# Patient Record
Sex: FEMALE | Race: WHITE | NOT HISPANIC OR LATINO | Employment: OTHER | URBAN - METROPOLITAN AREA
[De-identification: names, ages, dates, MRNs, and addresses within clinical notes are randomized per-mention and may not be internally consistent; named-entity substitution may affect disease eponyms.]

---

## 2017-01-12 ENCOUNTER — ALLSCRIPTS OFFICE VISIT (OUTPATIENT)
Dept: OTHER | Facility: OTHER | Age: 77
End: 2017-01-12

## 2017-01-16 ENCOUNTER — GENERIC CONVERSION - ENCOUNTER (OUTPATIENT)
Dept: OTHER | Facility: OTHER | Age: 77
End: 2017-01-16

## 2017-01-30 ENCOUNTER — GENERIC CONVERSION - ENCOUNTER (OUTPATIENT)
Dept: OTHER | Facility: OTHER | Age: 77
End: 2017-01-30

## 2017-02-17 ENCOUNTER — GENERIC CONVERSION - ENCOUNTER (OUTPATIENT)
Dept: OTHER | Facility: OTHER | Age: 77
End: 2017-02-17

## 2017-05-03 ENCOUNTER — TRANSCRIBE ORDERS (OUTPATIENT)
Dept: ADMINISTRATIVE | Facility: HOSPITAL | Age: 77
End: 2017-05-03

## 2017-05-03 ENCOUNTER — ALLSCRIPTS OFFICE VISIT (OUTPATIENT)
Dept: OTHER | Facility: OTHER | Age: 77
End: 2017-05-03

## 2017-05-03 DIAGNOSIS — N64.9 DISORDER OF BREAST: Primary | ICD-10-CM

## 2017-05-30 ENCOUNTER — ALLSCRIPTS OFFICE VISIT (OUTPATIENT)
Dept: OTHER | Facility: OTHER | Age: 77
End: 2017-05-30

## 2017-06-07 ENCOUNTER — HOSPITAL ENCOUNTER (OUTPATIENT)
Dept: ULTRASOUND IMAGING | Facility: CLINIC | Age: 77
Discharge: HOME | End: 2017-06-07
Payer: COMMERCIAL

## 2017-06-07 ENCOUNTER — HOSPITAL ENCOUNTER (OUTPATIENT)
Dept: MAMMOGRAPHY | Facility: CLINIC | Age: 77
Discharge: HOME | End: 2017-06-07
Payer: COMMERCIAL

## 2017-06-07 DIAGNOSIS — N64.9 DISORDER OF BREAST: ICD-10-CM

## 2017-06-07 PROCEDURE — G0204 DX MAMMO INCL CAD BI: HCPCS

## 2017-06-07 PROCEDURE — 76641 ULTRASOUND BREAST COMPLETE: CPT

## 2017-06-07 PROCEDURE — 76377 3D RENDER W/INTRP POSTPROCES: CPT

## 2017-07-10 ENCOUNTER — ALLSCRIPTS OFFICE VISIT (OUTPATIENT)
Dept: OTHER | Facility: OTHER | Age: 77
End: 2017-07-10

## 2017-07-10 ENCOUNTER — TRANSCRIBE ORDERS (OUTPATIENT)
Dept: ADMINISTRATIVE | Facility: HOSPITAL | Age: 77
End: 2017-07-10

## 2017-07-10 DIAGNOSIS — E78.5 OTHER AND UNSPECIFIED HYPERLIPIDEMIA: ICD-10-CM

## 2017-07-10 DIAGNOSIS — C85.19 B-CELL LYMPHOMA OF EXTRANODAL SITE (HCC): Primary | ICD-10-CM

## 2017-07-17 ENCOUNTER — APPOINTMENT (OUTPATIENT)
Dept: LAB | Facility: CLINIC | Age: 77
End: 2017-07-17
Payer: COMMERCIAL

## 2017-07-17 DIAGNOSIS — C85.19 B-CELL LYMPHOMA OF EXTRANODAL SITE (HCC): ICD-10-CM

## 2017-07-17 LAB
ALBUMIN SERPL BCP-MCNC: 3.6 G/DL (ref 3.5–5)
ALP SERPL-CCNC: 140 U/L (ref 46–116)
ALT SERPL W P-5'-P-CCNC: 39 U/L (ref 12–78)
ANION GAP SERPL CALCULATED.3IONS-SCNC: 5 MMOL/L (ref 4–13)
AST SERPL W P-5'-P-CCNC: 29 U/L (ref 5–45)
BASOPHILS # BLD AUTO: 0.02 THOUSANDS/ΜL (ref 0–0.1)
BASOPHILS NFR BLD AUTO: 0 % (ref 0–1)
BILIRUB SERPL-MCNC: 0.48 MG/DL (ref 0.2–1)
BUN SERPL-MCNC: 16 MG/DL (ref 5–25)
CALCIUM SERPL-MCNC: 9.7 MG/DL (ref 8.3–10.1)
CHLORIDE SERPL-SCNC: 105 MMOL/L (ref 100–108)
CHOLEST SERPL-MCNC: 179 MG/DL (ref 50–200)
CO2 SERPL-SCNC: 30 MMOL/L (ref 21–32)
CREAT SERPL-MCNC: 0.78 MG/DL (ref 0.6–1.3)
EOSINOPHIL # BLD AUTO: 0.38 THOUSAND/ΜL (ref 0–0.61)
EOSINOPHIL NFR BLD AUTO: 4 % (ref 0–6)
ERYTHROCYTE [DISTWIDTH] IN BLOOD BY AUTOMATED COUNT: 14.1 % (ref 11.6–15.1)
GFR SERPL CREATININE-BSD FRML MDRD: >60 ML/MIN/1.73SQ M
GLUCOSE P FAST SERPL-MCNC: 87 MG/DL (ref 65–99)
HCT VFR BLD AUTO: 42.7 % (ref 34.8–46.1)
HDLC SERPL-MCNC: 30 MG/DL (ref 40–60)
HGB BLD-MCNC: 14.1 G/DL (ref 11.5–15.4)
LDH SERPL-CCNC: 676 U/L (ref 81–234)
LDLC SERPL CALC-MCNC: 125 MG/DL (ref 0–100)
LYMPHOCYTES # BLD AUTO: 2.38 THOUSANDS/ΜL (ref 0.6–4.47)
LYMPHOCYTES NFR BLD AUTO: 28 % (ref 14–44)
MCH RBC QN AUTO: 29.9 PG (ref 26.8–34.3)
MCHC RBC AUTO-ENTMCNC: 33 G/DL (ref 31.4–37.4)
MCV RBC AUTO: 91 FL (ref 82–98)
MONOCYTES # BLD AUTO: 1.14 THOUSAND/ΜL (ref 0.17–1.22)
MONOCYTES NFR BLD AUTO: 13 % (ref 4–12)
NEUTROPHILS # BLD AUTO: 4.65 THOUSANDS/ΜL (ref 1.85–7.62)
NEUTS SEG NFR BLD AUTO: 55 % (ref 43–75)
NRBC BLD AUTO-RTO: 0 /100 WBCS
PLATELET # BLD AUTO: 238 THOUSANDS/UL (ref 149–390)
PMV BLD AUTO: 9.9 FL (ref 8.9–12.7)
POTASSIUM SERPL-SCNC: 4.1 MMOL/L (ref 3.5–5.3)
PROT SERPL-MCNC: 7 G/DL (ref 6.4–8.2)
RBC # BLD AUTO: 4.71 MILLION/UL (ref 3.81–5.12)
SODIUM SERPL-SCNC: 140 MMOL/L (ref 136–145)
TRIGL SERPL-MCNC: 120 MG/DL
URATE SERPL-MCNC: 5.8 MG/DL (ref 2–6.8)
WBC # BLD AUTO: 8.61 THOUSAND/UL (ref 4.31–10.16)

## 2017-07-17 PROCEDURE — 82232 ASSAY OF BETA-2 PROTEIN: CPT

## 2017-07-17 PROCEDURE — 80053 COMPREHEN METABOLIC PANEL: CPT | Performed by: INTERNAL MEDICINE

## 2017-07-17 PROCEDURE — 36415 COLL VENOUS BLD VENIPUNCTURE: CPT | Performed by: INTERNAL MEDICINE

## 2017-07-17 PROCEDURE — 80061 LIPID PANEL: CPT | Performed by: INTERNAL MEDICINE

## 2017-07-17 PROCEDURE — 84550 ASSAY OF BLOOD/URIC ACID: CPT | Performed by: INTERNAL MEDICINE

## 2017-07-17 PROCEDURE — 83615 LACTATE (LD) (LDH) ENZYME: CPT

## 2017-07-17 PROCEDURE — 85025 COMPLETE CBC W/AUTO DIFF WBC: CPT

## 2017-07-18 ENCOUNTER — GENERIC CONVERSION - ENCOUNTER (OUTPATIENT)
Dept: OTHER | Facility: OTHER | Age: 77
End: 2017-07-18

## 2017-07-18 LAB — B2 MICROGLOB SERPL-MCNC: 3.5 MG/L (ref 0.6–2.4)

## 2017-07-24 ENCOUNTER — HOSPITAL ENCOUNTER (OUTPATIENT)
Dept: RADIOLOGY | Age: 77
Discharge: HOME | End: 2017-07-24
Payer: COMMERCIAL

## 2017-07-24 DIAGNOSIS — I10 ESSENTIAL (PRIMARY) HYPERTENSION: ICD-10-CM

## 2017-07-24 DIAGNOSIS — C85.19 B-CELL LYMPHOMA OF EXTRANODAL SITE (HCC): ICD-10-CM

## 2017-07-24 DIAGNOSIS — I44.7 LEFT BUNDLE-BRANCH BLOCK: ICD-10-CM

## 2017-07-24 DIAGNOSIS — Z01.810 ENCOUNTER FOR PREPROCEDURAL CARDIOVASCULAR EXAMINATION: ICD-10-CM

## 2017-07-24 LAB — GLUCOSE SERPL-MCNC: 88 MG/DL (ref 65–140)

## 2017-07-24 PROCEDURE — A9552 F18 FDG: HCPCS

## 2017-07-24 PROCEDURE — 78815 PET IMAGE W/CT SKULL-THIGH: CPT

## 2017-07-24 PROCEDURE — 82948 REAGENT STRIP/BLOOD GLUCOSE: CPT

## 2017-07-24 RX ADMIN — IOHEXOL 5 ML: 240 INJECTION, SOLUTION INTRATHECAL; INTRAVASCULAR; INTRAVENOUS; ORAL at 10:10

## 2017-07-28 ENCOUNTER — ALLSCRIPTS OFFICE VISIT (OUTPATIENT)
Dept: OTHER | Facility: OTHER | Age: 77
End: 2017-07-28

## 2017-07-28 ENCOUNTER — OFFICE VISIT (OUTPATIENT)
Dept: LAB | Facility: CLINIC | Age: 77
End: 2017-07-28
Payer: COMMERCIAL

## 2017-07-28 ENCOUNTER — TRANSCRIBE ORDERS (OUTPATIENT)
Dept: LAB | Facility: CLINIC | Age: 77
End: 2017-07-28

## 2017-07-28 DIAGNOSIS — R59.9 ADENOPATHY: Primary | ICD-10-CM

## 2017-07-28 DIAGNOSIS — R59.9 ADENOPATHY: ICD-10-CM

## 2017-07-28 LAB
ATRIAL RATE: 71 BPM
P AXIS: 49 DEGREES
PR INTERVAL: 170 MS
QRS AXIS: -17 DEGREES
QRSD INTERVAL: 166 MS
QT INTERVAL: 484 MS
QTC INTERVAL: 525 MS
T WAVE AXIS: 96 DEGREES
VENTRICULAR RATE: 71 BPM

## 2017-07-28 PROCEDURE — 93005 ELECTROCARDIOGRAM TRACING: CPT

## 2017-08-01 ENCOUNTER — GENERIC CONVERSION - ENCOUNTER (OUTPATIENT)
Dept: OTHER | Facility: OTHER | Age: 77
End: 2017-08-01

## 2017-08-07 ENCOUNTER — GENERIC CONVERSION - ENCOUNTER (OUTPATIENT)
Dept: OTHER | Facility: OTHER | Age: 77
End: 2017-08-07

## 2017-08-07 ENCOUNTER — ALLSCRIPTS OFFICE VISIT (OUTPATIENT)
Dept: OTHER | Facility: OTHER | Age: 77
End: 2017-08-07

## 2017-08-07 ENCOUNTER — TRANSCRIBE ORDERS (OUTPATIENT)
Dept: ADMINISTRATIVE | Facility: HOSPITAL | Age: 77
End: 2017-08-07

## 2017-08-07 DIAGNOSIS — I10 ESSENTIAL HYPERTENSION, MALIGNANT: Primary | ICD-10-CM

## 2017-08-08 ENCOUNTER — HOSPITAL ENCOUNTER (OUTPATIENT)
Dept: NON INVASIVE DIAGNOSTICS | Facility: CLINIC | Age: 77
Discharge: HOME | End: 2017-08-08
Payer: COMMERCIAL

## 2017-08-08 DIAGNOSIS — I10 ESSENTIAL HYPERTENSION, MALIGNANT: ICD-10-CM

## 2017-08-08 LAB
MAX DIASTOLIC BP: 78 MMHG
MAX HEART RATE: 106 BPM
MAX PREDICTED HEART RATE: 146 BPM
MAX. SYSTOLIC BP: 152 MMHG
PROTOCOL NAME: NORMAL
REASON FOR TERMINATION: NORMAL
TARGET HR FORMULA: NORMAL
TEST INDICATION: NORMAL
TIME IN EXERCISE PHASE: 283 S

## 2017-08-08 PROCEDURE — 78452 HT MUSCLE IMAGE SPECT MULT: CPT

## 2017-08-08 PROCEDURE — A9502 TC99M TETROFOSMIN: HCPCS

## 2017-08-08 PROCEDURE — 93306 TTE W/DOPPLER COMPLETE: CPT

## 2017-08-08 PROCEDURE — 93017 CV STRESS TEST TRACING ONLY: CPT

## 2017-08-08 RX ADMIN — REGADENOSON 0.4 MG: 0.08 INJECTION, SOLUTION INTRAVENOUS at 13:50

## 2017-08-09 ENCOUNTER — GENERIC CONVERSION - ENCOUNTER (OUTPATIENT)
Dept: OTHER | Facility: OTHER | Age: 77
End: 2017-08-09

## 2017-08-13 ENCOUNTER — ANESTHESIA EVENT (OUTPATIENT)
Dept: PERIOP | Facility: HOSPITAL | Age: 77
End: 2017-08-13
Payer: COMMERCIAL

## 2017-08-14 ENCOUNTER — HOSPITAL ENCOUNTER (OUTPATIENT)
Facility: HOSPITAL | Age: 77
Setting detail: OUTPATIENT SURGERY
Discharge: HOME | End: 2017-08-14
Attending: SURGERY | Admitting: SURGERY
Payer: COMMERCIAL

## 2017-08-14 ENCOUNTER — ANESTHESIA (OUTPATIENT)
Dept: PERIOP | Facility: HOSPITAL | Age: 77
End: 2017-08-14
Payer: COMMERCIAL

## 2017-08-14 VITALS
DIASTOLIC BLOOD PRESSURE: 49 MMHG | TEMPERATURE: 97.9 F | OXYGEN SATURATION: 94 % | HEIGHT: 64 IN | BODY MASS INDEX: 38.58 KG/M2 | HEART RATE: 70 BPM | RESPIRATION RATE: 16 BRPM | WEIGHT: 226 LBS | SYSTOLIC BLOOD PRESSURE: 102 MMHG

## 2017-08-14 DIAGNOSIS — R59.1 GENERALIZED ENLARGED LYMPH NODES: ICD-10-CM

## 2017-08-14 PROBLEM — R59.0 LYMPHADENOPATHY, AXILLARY: Status: ACTIVE | Noted: 2017-08-14

## 2017-08-14 PROCEDURE — 88333 PATH CONSLTJ SURG CYTO XM 1: CPT | Performed by: SURGERY

## 2017-08-14 PROCEDURE — 88342 IMHCHEM/IMCYTCHM 1ST ANTB: CPT

## 2017-08-14 PROCEDURE — 88341 IMHCHEM/IMCYTCHM EA ADD ANTB: CPT

## 2017-08-14 PROCEDURE — 88342 IMHCHEM/IMCYTCHM 1ST ANTB: CPT | Performed by: SURGERY

## 2017-08-14 PROCEDURE — 81261 IGH GENE REARRANGE AMP METH: CPT | Performed by: SURGERY

## 2017-08-14 PROCEDURE — 88341 IMHCHEM/IMCYTCHM EA ADD ANTB: CPT | Performed by: SURGERY

## 2017-08-14 PROCEDURE — 88185 FLOWCYTOMETRY/TC ADD-ON: CPT | Performed by: NURSE ANESTHETIST, CERTIFIED REGISTERED

## 2017-08-14 PROCEDURE — 88184 FLOWCYTOMETRY/ TC 1 MARKER: CPT | Performed by: SURGERY

## 2017-08-14 PROCEDURE — 88305 TISSUE EXAM BY PATHOLOGIST: CPT | Performed by: SURGERY

## 2017-08-14 RX ORDER — ACETAMINOPHEN 325 MG/1
650 TABLET ORAL EVERY 6 HOURS PRN
Status: DISCONTINUED | OUTPATIENT
Start: 2017-08-14 | End: 2017-08-14 | Stop reason: HOSPADM

## 2017-08-14 RX ORDER — EPHEDRINE SULFATE 50 MG/ML
INJECTION, SOLUTION INTRAVENOUS AS NEEDED
Status: DISCONTINUED | OUTPATIENT
Start: 2017-08-14 | End: 2017-08-14 | Stop reason: SURG

## 2017-08-14 RX ORDER — FENTANYL CITRATE/PF 50 MCG/ML
25 SYRINGE (ML) INJECTION
Status: DISCONTINUED | OUTPATIENT
Start: 2017-08-14 | End: 2017-08-14

## 2017-08-14 RX ORDER — SODIUM CHLORIDE, SODIUM LACTATE, POTASSIUM CHLORIDE, CALCIUM CHLORIDE 600; 310; 30; 20 MG/100ML; MG/100ML; MG/100ML; MG/100ML
50 INJECTION, SOLUTION INTRAVENOUS CONTINUOUS
Status: DISCONTINUED | OUTPATIENT
Start: 2017-08-14 | End: 2017-08-14 | Stop reason: HOSPADM

## 2017-08-14 RX ORDER — ONDANSETRON 2 MG/ML
INJECTION INTRAMUSCULAR; INTRAVENOUS AS NEEDED
Status: DISCONTINUED | OUTPATIENT
Start: 2017-08-14 | End: 2017-08-14 | Stop reason: SURG

## 2017-08-14 RX ORDER — ONDANSETRON 2 MG/ML
4 INJECTION INTRAMUSCULAR; INTRAVENOUS ONCE AS NEEDED
Status: DISCONTINUED | OUTPATIENT
Start: 2017-08-14 | End: 2017-08-14

## 2017-08-14 RX ORDER — FENTANYL CITRATE/PF 50 MCG/ML
50 SYRINGE (ML) INJECTION
Status: DISCONTINUED | OUTPATIENT
Start: 2017-08-14 | End: 2017-08-14 | Stop reason: HOSPADM

## 2017-08-14 RX ORDER — BUPIVACAINE HYDROCHLORIDE 2.5 MG/ML
INJECTION, SOLUTION INFILTRATION; PERINEURAL AS NEEDED
Status: DISCONTINUED | OUTPATIENT
Start: 2017-08-14 | End: 2017-08-14 | Stop reason: HOSPADM

## 2017-08-14 RX ORDER — ONDANSETRON 2 MG/ML
4 INJECTION INTRAMUSCULAR; INTRAVENOUS EVERY 6 HOURS PRN
Status: DISCONTINUED | OUTPATIENT
Start: 2017-08-14 | End: 2017-08-14 | Stop reason: HOSPADM

## 2017-08-14 RX ORDER — MAGNESIUM HYDROXIDE 1200 MG/15ML
LIQUID ORAL AS NEEDED
Status: DISCONTINUED | OUTPATIENT
Start: 2017-08-14 | End: 2017-08-14 | Stop reason: HOSPADM

## 2017-08-14 RX ORDER — SODIUM CHLORIDE 9 MG/ML
50 INJECTION, SOLUTION INTRAVENOUS CONTINUOUS
Status: DISCONTINUED | OUTPATIENT
Start: 2017-08-14 | End: 2017-08-14 | Stop reason: HOSPADM

## 2017-08-14 RX ORDER — LIDOCAINE HYDROCHLORIDE 10 MG/ML
INJECTION, SOLUTION INFILTRATION; PERINEURAL AS NEEDED
Status: DISCONTINUED | OUTPATIENT
Start: 2017-08-14 | End: 2017-08-14 | Stop reason: SURG

## 2017-08-14 RX ORDER — PROMETHAZINE HYDROCHLORIDE 25 MG/ML
25 INJECTION, SOLUTION INTRAMUSCULAR; INTRAVENOUS ONCE AS NEEDED
Status: DISCONTINUED | OUTPATIENT
Start: 2017-08-14 | End: 2017-08-14 | Stop reason: HOSPADM

## 2017-08-14 RX ORDER — SUCCINYLCHOLINE/SOD CL,ISO/PF 100 MG/5ML
SYRINGE (ML) INTRAVENOUS AS NEEDED
Status: DISCONTINUED | OUTPATIENT
Start: 2017-08-14 | End: 2017-08-14 | Stop reason: SURG

## 2017-08-14 RX ORDER — FENTANYL CITRATE 50 UG/ML
INJECTION, SOLUTION INTRAMUSCULAR; INTRAVENOUS AS NEEDED
Status: DISCONTINUED | OUTPATIENT
Start: 2017-08-14 | End: 2017-08-14 | Stop reason: SURG

## 2017-08-14 RX ORDER — MIDAZOLAM HYDROCHLORIDE 1 MG/ML
INJECTION INTRAMUSCULAR; INTRAVENOUS AS NEEDED
Status: DISCONTINUED | OUTPATIENT
Start: 2017-08-14 | End: 2017-08-14 | Stop reason: SURG

## 2017-08-14 RX ORDER — OXYCODONE HYDROCHLORIDE 5 MG/1
5 TABLET ORAL EVERY 4 HOURS PRN
Status: DISCONTINUED | OUTPATIENT
Start: 2017-08-14 | End: 2017-08-14 | Stop reason: HOSPADM

## 2017-08-14 RX ORDER — PROPOFOL 10 MG/ML
INJECTION, EMULSION INTRAVENOUS AS NEEDED
Status: DISCONTINUED | OUTPATIENT
Start: 2017-08-14 | End: 2017-08-14 | Stop reason: SURG

## 2017-08-14 RX ORDER — SODIUM CHLORIDE, SODIUM LACTATE, POTASSIUM CHLORIDE, CALCIUM CHLORIDE 600; 310; 30; 20 MG/100ML; MG/100ML; MG/100ML; MG/100ML
100 INJECTION, SOLUTION INTRAVENOUS CONTINUOUS
Status: DISCONTINUED | OUTPATIENT
Start: 2017-08-14 | End: 2017-08-14 | Stop reason: HOSPADM

## 2017-08-14 RX ADMIN — FENTANYL CITRATE 50 MCG: 50 INJECTION INTRAMUSCULAR; INTRAVENOUS at 11:14

## 2017-08-14 RX ADMIN — FENTANYL CITRATE 50 MCG: 50 INJECTION, SOLUTION INTRAMUSCULAR; INTRAVENOUS at 10:31

## 2017-08-14 RX ADMIN — EPHEDRINE SULFATE 10 MG: 50 INJECTION, SOLUTION INTRAMUSCULAR; INTRAVENOUS; SUBCUTANEOUS at 10:24

## 2017-08-14 RX ADMIN — PROPOFOL 200 MG: 10 INJECTION, EMULSION INTRAVENOUS at 10:15

## 2017-08-14 RX ADMIN — FENTANYL CITRATE 100 MCG: 50 INJECTION, SOLUTION INTRAMUSCULAR; INTRAVENOUS at 10:14

## 2017-08-14 RX ADMIN — SODIUM CHLORIDE, SODIUM LACTATE, POTASSIUM CHLORIDE, AND CALCIUM CHLORIDE: .6; .31; .03; .02 INJECTION, SOLUTION INTRAVENOUS at 10:09

## 2017-08-14 RX ADMIN — FENTANYL CITRATE 50 MCG: 50 INJECTION, SOLUTION INTRAMUSCULAR; INTRAVENOUS at 10:29

## 2017-08-14 RX ADMIN — Medication 100 MG: at 10:15

## 2017-08-14 RX ADMIN — ONDANSETRON 4 MG: 2 INJECTION INTRAMUSCULAR; INTRAVENOUS at 13:01

## 2017-08-14 RX ADMIN — CEFAZOLIN SODIUM 2000 MG: 2 SOLUTION INTRAVENOUS at 10:19

## 2017-08-14 RX ADMIN — SODIUM CHLORIDE, SODIUM LACTATE, POTASSIUM CHLORIDE, AND CALCIUM CHLORIDE 100 ML/HR: .6; .31; .03; .02 INJECTION, SOLUTION INTRAVENOUS at 11:49

## 2017-08-14 RX ADMIN — LIDOCAINE HYDROCHLORIDE 50 MG: 10 INJECTION, SOLUTION INFILTRATION; PERINEURAL at 10:14

## 2017-08-14 RX ADMIN — FENTANYL CITRATE 50 MCG: 50 INJECTION INTRAMUSCULAR; INTRAVENOUS at 11:21

## 2017-08-14 RX ADMIN — OXYCODONE HYDROCHLORIDE 5 MG: 5 TABLET ORAL at 12:30

## 2017-08-14 RX ADMIN — MIDAZOLAM HYDROCHLORIDE 0.5 MG: 1 INJECTION, SOLUTION INTRAMUSCULAR; INTRAVENOUS at 10:10

## 2017-08-14 RX ADMIN — FENTANYL CITRATE 50 MCG: 50 INJECTION INTRAMUSCULAR; INTRAVENOUS at 11:09

## 2017-08-14 RX ADMIN — ONDANSETRON 4 MG: 2 INJECTION INTRAMUSCULAR; INTRAVENOUS at 10:36

## 2017-08-14 RX ADMIN — SODIUM CHLORIDE, SODIUM LACTATE, POTASSIUM CHLORIDE, AND CALCIUM CHLORIDE 50 ML/HR: .6; .31; .03; .02 INJECTION, SOLUTION INTRAVENOUS at 09:24

## 2017-08-17 LAB — SCAN RESULT: NORMAL

## 2017-08-23 ENCOUNTER — ALLSCRIPTS OFFICE VISIT (OUTPATIENT)
Dept: OTHER | Facility: OTHER | Age: 77
End: 2017-08-23

## 2017-08-28 ENCOUNTER — GENERIC CONVERSION - ENCOUNTER (OUTPATIENT)
Dept: OTHER | Facility: OTHER | Age: 77
End: 2017-08-28

## 2017-09-01 ENCOUNTER — ALLSCRIPTS OFFICE VISIT (OUTPATIENT)
Dept: OTHER | Facility: OTHER | Age: 77
End: 2017-09-01

## 2017-09-07 ENCOUNTER — ALLSCRIPTS OFFICE VISIT (OUTPATIENT)
Dept: OTHER | Facility: OTHER | Age: 77
End: 2017-09-07

## 2017-09-08 ENCOUNTER — HOSPITAL ENCOUNTER (OUTPATIENT)
Facility: HOSPITAL | Age: 77
Setting detail: OUTPATIENT SURGERY
Discharge: HOME | End: 2017-09-08
Attending: SURGERY | Admitting: SURGERY
Payer: COMMERCIAL

## 2017-09-08 ENCOUNTER — ANESTHESIA EVENT (OUTPATIENT)
Dept: PERIOP | Facility: HOSPITAL | Age: 77
End: 2017-09-08
Payer: COMMERCIAL

## 2017-09-08 ENCOUNTER — HOSPITAL ENCOUNTER (OUTPATIENT)
Dept: RADIOLOGY | Facility: HOSPITAL | Age: 77
Discharge: HOME | End: 2017-09-08
Attending: SURGERY
Payer: COMMERCIAL

## 2017-09-08 ENCOUNTER — ANESTHESIA (OUTPATIENT)
Dept: PERIOP | Facility: HOSPITAL | Age: 77
End: 2017-09-08
Payer: COMMERCIAL

## 2017-09-08 VITALS
HEIGHT: 64 IN | SYSTOLIC BLOOD PRESSURE: 132 MMHG | DIASTOLIC BLOOD PRESSURE: 80 MMHG | RESPIRATION RATE: 16 BRPM | WEIGHT: 216 LBS | BODY MASS INDEX: 36.88 KG/M2 | OXYGEN SATURATION: 98 % | TEMPERATURE: 98.2 F | HEART RATE: 64 BPM

## 2017-09-08 DIAGNOSIS — R59.1 LYMPHADENOPATHY: ICD-10-CM

## 2017-09-08 DIAGNOSIS — C85.80 MARGINAL ZONE LYMPHOMA (HCC): ICD-10-CM

## 2017-09-08 PROCEDURE — 88366 INSITU HYBRIDIZATION (FISH): CPT

## 2017-09-08 PROCEDURE — 88342 IMHCHEM/IMCYTCHM 1ST ANTB: CPT

## 2017-09-08 PROCEDURE — 88333 PATH CONSLTJ SURG CYTO XM 1: CPT | Performed by: SURGERY

## 2017-09-08 PROCEDURE — 88184 FLOWCYTOMETRY/ TC 1 MARKER: CPT | Performed by: SURGERY

## 2017-09-08 PROCEDURE — 88305 TISSUE EXAM BY PATHOLOGIST: CPT | Performed by: SURGERY

## 2017-09-08 PROCEDURE — 88271 CYTOGENETICS DNA PROBE: CPT

## 2017-09-08 PROCEDURE — 88341 IMHCHEM/IMCYTCHM EA ADD ANTB: CPT

## 2017-09-08 PROCEDURE — 81479 UNLISTED MOLECULAR PATHOLOGY: CPT

## 2017-09-08 PROCEDURE — 88185 FLOWCYTOMETRY/TC ADD-ON: CPT | Performed by: NURSE ANESTHETIST, CERTIFIED REGISTERED

## 2017-09-08 PROCEDURE — 38999 UNLISTD PX HEMIC/LYMPHTC SYS: CPT

## 2017-09-08 RX ORDER — SODIUM CHLORIDE, SODIUM LACTATE, POTASSIUM CHLORIDE, CALCIUM CHLORIDE 600; 310; 30; 20 MG/100ML; MG/100ML; MG/100ML; MG/100ML
INJECTION, SOLUTION INTRAVENOUS CONTINUOUS PRN
Status: DISCONTINUED | OUTPATIENT
Start: 2017-09-08 | End: 2017-09-08 | Stop reason: SURG

## 2017-09-08 RX ORDER — FENTANYL CITRATE/PF 50 MCG/ML
25 SYRINGE (ML) INJECTION
Status: COMPLETED | OUTPATIENT
Start: 2017-09-08 | End: 2017-09-08

## 2017-09-08 RX ORDER — ONDANSETRON 2 MG/ML
4 INJECTION INTRAMUSCULAR; INTRAVENOUS ONCE AS NEEDED
Status: DISCONTINUED | OUTPATIENT
Start: 2017-09-08 | End: 2017-09-08 | Stop reason: HOSPADM

## 2017-09-08 RX ORDER — BUPIVACAINE HYDROCHLORIDE 2.5 MG/ML
INJECTION, SOLUTION EPIDURAL; INFILTRATION; INTRACAUDAL AS NEEDED
Status: DISCONTINUED | OUTPATIENT
Start: 2017-09-08 | End: 2017-09-08 | Stop reason: HOSPADM

## 2017-09-08 RX ORDER — SUCCINYLCHOLINE/SOD CL,ISO/PF 100 MG/5ML
SYRINGE (ML) INTRAVENOUS AS NEEDED
Status: DISCONTINUED | OUTPATIENT
Start: 2017-09-08 | End: 2017-09-08 | Stop reason: SURG

## 2017-09-08 RX ORDER — ONDANSETRON 2 MG/ML
INJECTION INTRAMUSCULAR; INTRAVENOUS AS NEEDED
Status: DISCONTINUED | OUTPATIENT
Start: 2017-09-08 | End: 2017-09-08 | Stop reason: SURG

## 2017-09-08 RX ORDER — FENTANYL CITRATE 50 UG/ML
INJECTION, SOLUTION INTRAMUSCULAR; INTRAVENOUS AS NEEDED
Status: DISCONTINUED | OUTPATIENT
Start: 2017-09-08 | End: 2017-09-08 | Stop reason: SURG

## 2017-09-08 RX ORDER — PROPOFOL 10 MG/ML
INJECTION, EMULSION INTRAVENOUS AS NEEDED
Status: DISCONTINUED | OUTPATIENT
Start: 2017-09-08 | End: 2017-09-08 | Stop reason: SURG

## 2017-09-08 RX ADMIN — FENTANYL CITRATE 25 MCG: 50 INJECTION INTRAMUSCULAR; INTRAVENOUS at 14:17

## 2017-09-08 RX ADMIN — PROPOFOL 150 MG: 10 INJECTION, EMULSION INTRAVENOUS at 13:19

## 2017-09-08 RX ADMIN — ONDANSETRON 4 MG: 2 INJECTION INTRAMUSCULAR; INTRAVENOUS at 13:48

## 2017-09-08 RX ADMIN — SODIUM CHLORIDE, SODIUM LACTATE, POTASSIUM CHLORIDE, AND CALCIUM CHLORIDE: .6; .31; .03; .02 INJECTION, SOLUTION INTRAVENOUS at 11:30

## 2017-09-08 RX ADMIN — FENTANYL CITRATE 100 MCG: 50 INJECTION INTRAMUSCULAR; INTRAVENOUS at 13:19

## 2017-09-08 RX ADMIN — FENTANYL CITRATE 25 MCG: 50 INJECTION INTRAMUSCULAR; INTRAVENOUS at 14:28

## 2017-09-08 RX ADMIN — FENTANYL CITRATE 25 MCG: 50 INJECTION INTRAMUSCULAR; INTRAVENOUS at 14:34

## 2017-09-08 RX ADMIN — LIDOCAINE HYDROCHLORIDE 100 MG: 20 INJECTION, SOLUTION INTRAVENOUS at 13:19

## 2017-09-08 RX ADMIN — Medication 120 MG: at 13:19

## 2017-09-08 RX ADMIN — FENTANYL CITRATE 25 MCG: 50 INJECTION INTRAMUSCULAR; INTRAVENOUS at 14:39

## 2017-09-08 RX ADMIN — CEFAZOLIN SODIUM 2000 MG: 2 SOLUTION INTRAVENOUS at 13:25

## 2017-09-11 LAB — SCAN RESULT: NORMAL

## 2017-09-18 ENCOUNTER — ALLSCRIPTS OFFICE VISIT (OUTPATIENT)
Dept: OTHER | Facility: OTHER | Age: 77
End: 2017-09-18

## 2017-09-18 LAB
BILIRUB UR QL STRIP: ABNORMAL
CLARITY UR: ABNORMAL
COLOR UR: ABNORMAL
GLUCOSE (HISTORICAL): ABNORMAL
HGB UR QL STRIP.AUTO: ABNORMAL
KETONES UR STRIP-MCNC: ABNORMAL MG/DL
LEUKOCYTE ESTERASE UR QL STRIP: ABNORMAL
NITRITE UR QL STRIP: ABNORMAL
PH UR STRIP.AUTO: 6.5 [PH]
PROT UR STRIP-MCNC: ABNORMAL MG/DL
SP GR UR STRIP.AUTO: 1.01
UROBILINOGEN UR QL STRIP.AUTO: ABNORMAL

## 2017-09-20 ENCOUNTER — GENERIC CONVERSION - ENCOUNTER (OUTPATIENT)
Dept: OTHER | Facility: OTHER | Age: 77
End: 2017-09-20

## 2017-09-20 ENCOUNTER — LAB REQUISITION (OUTPATIENT)
Dept: LAB | Facility: HOSPITAL | Age: 77
End: 2017-09-20
Payer: COMMERCIAL

## 2017-09-20 DIAGNOSIS — R82.90 ABNORMAL FINDING IN URINE: ICD-10-CM

## 2017-09-20 DIAGNOSIS — R53.83 OTHER FATIGUE: ICD-10-CM

## 2017-09-20 PROCEDURE — 87086 URINE CULTURE/COLONY COUNT: CPT | Performed by: FAMILY MEDICINE

## 2017-09-20 PROCEDURE — 87186 SC STD MICRODIL/AGAR DIL: CPT | Performed by: FAMILY MEDICINE

## 2017-09-20 PROCEDURE — 87077 CULTURE AEROBIC IDENTIFY: CPT | Performed by: FAMILY MEDICINE

## 2017-09-22 ENCOUNTER — APPOINTMENT (OUTPATIENT)
Dept: LAB | Facility: CLINIC | Age: 77
End: 2017-09-22
Payer: COMMERCIAL

## 2017-09-22 ENCOUNTER — TRANSCRIBE ORDERS (OUTPATIENT)
Dept: LAB | Facility: CLINIC | Age: 77
End: 2017-09-22

## 2017-09-22 ENCOUNTER — GENERIC CONVERSION - ENCOUNTER (OUTPATIENT)
Dept: OTHER | Facility: OTHER | Age: 77
End: 2017-09-22

## 2017-09-22 DIAGNOSIS — E55.9 UNSPECIFIED VITAMIN D DEFICIENCY: ICD-10-CM

## 2017-09-22 DIAGNOSIS — E53.8 OTHER B-COMPLEX DEFICIENCIES: ICD-10-CM

## 2017-09-22 DIAGNOSIS — R53.83 FATIGUE, UNSPECIFIED TYPE: ICD-10-CM

## 2017-09-22 DIAGNOSIS — R53.83 FATIGUE, UNSPECIFIED TYPE: Primary | ICD-10-CM

## 2017-09-22 LAB
25(OH)D3 SERPL-MCNC: 31 NG/ML (ref 30–100)
BACTERIA UR CULT: NORMAL
T4 FREE SERPL-MCNC: 1.17 NG/DL (ref 0.76–1.46)
TSH SERPL DL<=0.05 MIU/L-ACNC: 1.49 UIU/ML (ref 0.36–3.74)
VIT B12 SERPL-MCNC: 1238 PG/ML (ref 100–900)

## 2017-09-22 PROCEDURE — 36415 COLL VENOUS BLD VENIPUNCTURE: CPT

## 2017-09-22 PROCEDURE — 84443 ASSAY THYROID STIM HORMONE: CPT

## 2017-09-22 PROCEDURE — 82607 VITAMIN B-12: CPT

## 2017-09-22 PROCEDURE — 82306 VITAMIN D 25 HYDROXY: CPT

## 2017-09-22 PROCEDURE — 84439 ASSAY OF FREE THYROXINE: CPT

## 2017-09-26 ENCOUNTER — GENERIC CONVERSION - ENCOUNTER (OUTPATIENT)
Dept: OTHER | Facility: OTHER | Age: 77
End: 2017-09-26

## 2017-09-26 ENCOUNTER — TRANSCRIBE ORDERS (OUTPATIENT)
Dept: ADMINISTRATIVE | Facility: HOSPITAL | Age: 77
End: 2017-09-26

## 2017-09-26 DIAGNOSIS — C83.38 DIFFUSE LARGE B-CELL LYMPHOMA OF LYMPH NODES OF MULTIPLE SITES (HCC): Primary | ICD-10-CM

## 2017-09-27 ENCOUNTER — GENERIC CONVERSION - ENCOUNTER (OUTPATIENT)
Dept: OTHER | Facility: OTHER | Age: 77
End: 2017-09-27

## 2017-09-28 ENCOUNTER — TRANSCRIBE ORDERS (OUTPATIENT)
Dept: LAB | Facility: CLINIC | Age: 77
End: 2017-09-28

## 2017-09-28 ENCOUNTER — APPOINTMENT (OUTPATIENT)
Dept: LAB | Facility: CLINIC | Age: 77
End: 2017-09-28
Payer: COMMERCIAL

## 2017-09-28 ENCOUNTER — ALLSCRIPTS OFFICE VISIT (OUTPATIENT)
Dept: OTHER | Facility: OTHER | Age: 77
End: 2017-09-28

## 2017-09-28 DIAGNOSIS — C85.19 B-CELL LYMPHOMA OF EXTRANODAL SITE (HCC): ICD-10-CM

## 2017-09-28 DIAGNOSIS — C85.19 B-CELL LYMPHOMA OF EXTRANODAL SITE (HCC): Primary | ICD-10-CM

## 2017-09-28 LAB
ALBUMIN SERPL BCP-MCNC: 3.9 G/DL (ref 3.5–5)
ALP SERPL-CCNC: 121 U/L (ref 46–116)
ALT SERPL W P-5'-P-CCNC: 26 U/L (ref 12–78)
ANION GAP SERPL CALCULATED.3IONS-SCNC: 9 MMOL/L (ref 4–13)
AST SERPL W P-5'-P-CCNC: 30 U/L (ref 5–45)
BASOPHILS # BLD AUTO: 0.02 THOUSANDS/ΜL (ref 0–0.1)
BASOPHILS NFR BLD AUTO: 0 % (ref 0–1)
BILIRUB SERPL-MCNC: 0.54 MG/DL (ref 0.2–1)
BUN SERPL-MCNC: 13 MG/DL (ref 5–25)
CALCIUM SERPL-MCNC: 9.7 MG/DL (ref 8.3–10.1)
CHLORIDE SERPL-SCNC: 105 MMOL/L (ref 100–108)
CO2 SERPL-SCNC: 26 MMOL/L (ref 21–32)
CREAT SERPL-MCNC: 0.86 MG/DL (ref 0.6–1.3)
EOSINOPHIL # BLD AUTO: 0.74 THOUSAND/ΜL (ref 0–0.61)
EOSINOPHIL NFR BLD AUTO: 8 % (ref 0–6)
ERYTHROCYTE [DISTWIDTH] IN BLOOD BY AUTOMATED COUNT: 14.9 % (ref 11.6–15.1)
GFR SERPL CREATININE-BSD FRML MDRD: 67 ML/MIN/1.73SQ M
GLUCOSE SERPL-MCNC: 74 MG/DL (ref 65–140)
HCT VFR BLD AUTO: 40.9 % (ref 34.8–46.1)
HGB BLD-MCNC: 13.2 G/DL (ref 11.5–15.4)
LDH SERPL-CCNC: 745 U/L (ref 81–234)
LYMPHOCYTES # BLD AUTO: 2.18 THOUSANDS/ΜL (ref 0.6–4.47)
LYMPHOCYTES NFR BLD AUTO: 24 % (ref 14–44)
MCH RBC QN AUTO: 29 PG (ref 26.8–34.3)
MCHC RBC AUTO-ENTMCNC: 32.3 G/DL (ref 31.4–37.4)
MCV RBC AUTO: 90 FL (ref 82–98)
MONOCYTES # BLD AUTO: 1.13 THOUSAND/ΜL (ref 0.17–1.22)
MONOCYTES NFR BLD AUTO: 12 % (ref 4–12)
NEUTROPHILS # BLD AUTO: 5.15 THOUSANDS/ΜL (ref 1.85–7.62)
NEUTS SEG NFR BLD AUTO: 56 % (ref 43–75)
NRBC BLD AUTO-RTO: 0 /100 WBCS
PLATELET # BLD AUTO: 219 THOUSANDS/UL (ref 149–390)
PMV BLD AUTO: 10.4 FL (ref 8.9–12.7)
POTASSIUM SERPL-SCNC: 3.7 MMOL/L (ref 3.5–5.3)
PROT SERPL-MCNC: 6.6 G/DL (ref 6.4–8.2)
RBC # BLD AUTO: 4.55 MILLION/UL (ref 3.81–5.12)
SODIUM SERPL-SCNC: 140 MMOL/L (ref 136–145)
URATE SERPL-MCNC: 6.4 MG/DL (ref 2–6.8)
WBC # BLD AUTO: 9.25 THOUSAND/UL (ref 4.31–10.16)

## 2017-09-28 PROCEDURE — 82232 ASSAY OF BETA-2 PROTEIN: CPT

## 2017-09-28 PROCEDURE — 80053 COMPREHEN METABOLIC PANEL: CPT

## 2017-09-28 PROCEDURE — 83615 LACTATE (LD) (LDH) ENZYME: CPT

## 2017-09-28 PROCEDURE — 36415 COLL VENOUS BLD VENIPUNCTURE: CPT

## 2017-09-28 PROCEDURE — 84550 ASSAY OF BLOOD/URIC ACID: CPT

## 2017-09-28 PROCEDURE — 85025 COMPLETE CBC W/AUTO DIFF WBC: CPT

## 2017-09-29 LAB — B2 MICROGLOB SERPL-MCNC: 3.7 MG/L (ref 0.6–2.4)

## 2017-10-02 ENCOUNTER — HOSPITAL ENCOUNTER (OUTPATIENT)
Dept: RADIOLOGY | Age: 77
Discharge: HOME | End: 2017-10-02
Payer: COMMERCIAL

## 2017-10-02 DIAGNOSIS — C83.38 DIFFUSE LARGE B-CELL LYMPHOMA OF LYMPH NODES OF MULTIPLE SITES (HCC): ICD-10-CM

## 2017-10-02 DIAGNOSIS — C85.19 B-CELL LYMPHOMA OF EXTRANODAL SITE (HCC): ICD-10-CM

## 2017-10-02 PROCEDURE — 71260 CT THORAX DX C+: CPT

## 2017-10-02 PROCEDURE — 74177 CT ABD & PELVIS W/CONTRAST: CPT

## 2017-10-02 PROCEDURE — 70491 CT SOFT TISSUE NECK W/DYE: CPT

## 2017-10-02 RX ADMIN — IOHEXOL 100 ML: 350 INJECTION, SOLUTION INTRAVENOUS at 09:42

## 2017-10-03 ENCOUNTER — APPOINTMENT (OUTPATIENT)
Dept: RADIOLOGY | Facility: HOSPITAL | Age: 77
End: 2017-10-03
Payer: COMMERCIAL

## 2017-10-03 ENCOUNTER — ANESTHESIA (OUTPATIENT)
Dept: PERIOP | Facility: HOSPITAL | Age: 77
End: 2017-10-03
Payer: COMMERCIAL

## 2017-10-03 ENCOUNTER — HOSPITAL ENCOUNTER (OUTPATIENT)
Facility: HOSPITAL | Age: 77
Setting detail: OUTPATIENT SURGERY
Discharge: HOME | End: 2017-10-03
Attending: SURGERY | Admitting: SURGERY
Payer: COMMERCIAL

## 2017-10-03 ENCOUNTER — ANESTHESIA EVENT (OUTPATIENT)
Dept: PERIOP | Facility: HOSPITAL | Age: 77
End: 2017-10-03
Payer: COMMERCIAL

## 2017-10-03 VITALS
SYSTOLIC BLOOD PRESSURE: 124 MMHG | BODY MASS INDEX: 35.85 KG/M2 | TEMPERATURE: 97.5 F | RESPIRATION RATE: 16 BRPM | HEIGHT: 64 IN | OXYGEN SATURATION: 93 % | WEIGHT: 210 LBS | DIASTOLIC BLOOD PRESSURE: 69 MMHG | HEART RATE: 76 BPM

## 2017-10-03 PROBLEM — C85.90 LYMPHOMA (HCC): Status: ACTIVE | Noted: 2017-10-03

## 2017-10-03 PROCEDURE — 77001 FLUOROGUIDE FOR VEIN DEVICE: CPT

## 2017-10-03 PROCEDURE — C1788 PORT, INDWELLING, IMP: HCPCS | Performed by: SURGERY

## 2017-10-03 PROCEDURE — 71010 HB CHEST X-RAY 1 VIEW FRONTAL (PORTABLE): CPT

## 2017-10-03 DEVICE — PORT HIGH PROFILE 8FR KIT PRO-FUSE: Type: IMPLANTABLE DEVICE | Site: CHEST | Status: FUNCTIONAL

## 2017-10-03 RX ORDER — PROPOFOL 10 MG/ML
INJECTION, EMULSION INTRAVENOUS AS NEEDED
Status: DISCONTINUED | OUTPATIENT
Start: 2017-10-03 | End: 2017-10-03 | Stop reason: SURG

## 2017-10-03 RX ORDER — SODIUM CHLORIDE 9 MG/ML
20 INJECTION, SOLUTION INTRAVENOUS CONTINUOUS
Status: DISCONTINUED | OUTPATIENT
Start: 2017-10-04 | End: 2017-10-07 | Stop reason: HOSPADM

## 2017-10-03 RX ORDER — SODIUM CHLORIDE, SODIUM LACTATE, POTASSIUM CHLORIDE, CALCIUM CHLORIDE 600; 310; 30; 20 MG/100ML; MG/100ML; MG/100ML; MG/100ML
INJECTION, SOLUTION INTRAVENOUS CONTINUOUS PRN
Status: DISCONTINUED | OUTPATIENT
Start: 2017-10-03 | End: 2017-10-03 | Stop reason: SURG

## 2017-10-03 RX ORDER — LIDOCAINE HYDROCHLORIDE 10 MG/ML
INJECTION, SOLUTION EPIDURAL; INFILTRATION; INTRACAUDAL; PERINEURAL AS NEEDED
Status: DISCONTINUED | OUTPATIENT
Start: 2017-10-03 | End: 2017-10-03 | Stop reason: SURG

## 2017-10-03 RX ORDER — PROPOFOL 10 MG/ML
INJECTION, EMULSION INTRAVENOUS CONTINUOUS PRN
Status: DISCONTINUED | OUTPATIENT
Start: 2017-10-03 | End: 2017-10-03 | Stop reason: SURG

## 2017-10-03 RX ORDER — FENTANYL CITRATE 50 UG/ML
INJECTION, SOLUTION INTRAMUSCULAR; INTRAVENOUS AS NEEDED
Status: DISCONTINUED | OUTPATIENT
Start: 2017-10-03 | End: 2017-10-03 | Stop reason: SURG

## 2017-10-03 RX ORDER — ONDANSETRON 2 MG/ML
4 INJECTION INTRAMUSCULAR; INTRAVENOUS EVERY 4 HOURS PRN
Status: DISCONTINUED | OUTPATIENT
Start: 2017-10-03 | End: 2017-10-03 | Stop reason: HOSPADM

## 2017-10-03 RX ORDER — ALLOPURINOL 300 MG/1
300 TABLET ORAL DAILY
Status: ON HOLD | COMMUNITY
End: 2017-12-06 | Stop reason: ALTCHOICE

## 2017-10-03 RX ORDER — ONDANSETRON 2 MG/ML
4 INJECTION INTRAMUSCULAR; INTRAVENOUS ONCE AS NEEDED
Status: DISCONTINUED | OUTPATIENT
Start: 2017-10-03 | End: 2017-10-03 | Stop reason: HOSPADM

## 2017-10-03 RX ORDER — ACETAMINOPHEN 325 MG/1
650 TABLET ORAL EVERY 6 HOURS PRN
Status: DISCONTINUED | OUTPATIENT
Start: 2017-10-03 | End: 2017-10-03 | Stop reason: HOSPADM

## 2017-10-03 RX ORDER — PROMETHAZINE HYDROCHLORIDE 25 MG/ML
12.5 INJECTION, SOLUTION INTRAMUSCULAR; INTRAVENOUS ONCE AS NEEDED
Status: DISCONTINUED | OUTPATIENT
Start: 2017-10-03 | End: 2017-10-03 | Stop reason: HOSPADM

## 2017-10-03 RX ORDER — ONDANSETRON 2 MG/ML
INJECTION INTRAMUSCULAR; INTRAVENOUS AS NEEDED
Status: DISCONTINUED | OUTPATIENT
Start: 2017-10-03 | End: 2017-10-03 | Stop reason: SURG

## 2017-10-03 RX ORDER — SODIUM CHLORIDE, SODIUM LACTATE, POTASSIUM CHLORIDE, CALCIUM CHLORIDE 600; 310; 30; 20 MG/100ML; MG/100ML; MG/100ML; MG/100ML
100 INJECTION, SOLUTION INTRAVENOUS CONTINUOUS
Status: DISCONTINUED | OUTPATIENT
Start: 2017-10-03 | End: 2017-10-03 | Stop reason: HOSPADM

## 2017-10-03 RX ORDER — FENTANYL CITRATE/PF 50 MCG/ML
25 SYRINGE (ML) INJECTION
Status: DISCONTINUED | OUTPATIENT
Start: 2017-10-03 | End: 2017-10-03 | Stop reason: HOSPADM

## 2017-10-03 RX ORDER — BUPIVACAINE HYDROCHLORIDE 2.5 MG/ML
INJECTION, SOLUTION EPIDURAL; INFILTRATION; INTRACAUDAL AS NEEDED
Status: DISCONTINUED | OUTPATIENT
Start: 2017-10-03 | End: 2017-10-03 | Stop reason: HOSPADM

## 2017-10-03 RX ORDER — METOCLOPRAMIDE HYDROCHLORIDE 5 MG/ML
10 INJECTION INTRAMUSCULAR; INTRAVENOUS ONCE AS NEEDED
Status: DISCONTINUED | OUTPATIENT
Start: 2017-10-03 | End: 2017-10-03 | Stop reason: HOSPADM

## 2017-10-03 RX ORDER — OXYCODONE HYDROCHLORIDE AND ACETAMINOPHEN 5; 325 MG/1; MG/1
1 TABLET ORAL EVERY 4 HOURS PRN
Status: DISCONTINUED | OUTPATIENT
Start: 2017-10-03 | End: 2017-10-03 | Stop reason: HOSPADM

## 2017-10-03 RX ORDER — DOXORUBICIN HYDROCHLORIDE 2 MG/ML
100 INJECTION, SOLUTION INTRAVENOUS ONCE
Status: COMPLETED | OUTPATIENT
Start: 2017-10-04 | End: 2017-10-04

## 2017-10-03 RX ADMIN — SODIUM CHLORIDE, SODIUM LACTATE, POTASSIUM CHLORIDE, AND CALCIUM CHLORIDE: .6; .31; .03; .02 INJECTION, SOLUTION INTRAVENOUS at 10:20

## 2017-10-03 RX ADMIN — CEFAZOLIN SODIUM 2000 MG: 2 SOLUTION INTRAVENOUS at 10:30

## 2017-10-03 RX ADMIN — LIDOCAINE HYDROCHLORIDE 50 MG: 10 INJECTION, SOLUTION EPIDURAL; INFILTRATION; INTRACAUDAL; PERINEURAL at 10:24

## 2017-10-03 RX ADMIN — FENTANYL CITRATE 25 MCG: 50 INJECTION INTRAMUSCULAR; INTRAVENOUS at 10:53

## 2017-10-03 RX ADMIN — FENTANYL CITRATE 50 MCG: 50 INJECTION INTRAMUSCULAR; INTRAVENOUS at 10:24

## 2017-10-03 RX ADMIN — PROPOFOL 200 MG: 10 INJECTION, EMULSION INTRAVENOUS at 10:24

## 2017-10-03 RX ADMIN — FENTANYL CITRATE 25 MCG: 50 INJECTION INTRAMUSCULAR; INTRAVENOUS at 10:39

## 2017-10-03 RX ADMIN — PROPOFOL 50 MCG/KG/MIN: 10 INJECTION, EMULSION INTRAVENOUS at 10:26

## 2017-10-03 RX ADMIN — ONDANSETRON 4 MG: 2 INJECTION INTRAMUSCULAR; INTRAVENOUS at 10:50

## 2017-10-03 NOTE — OP NOTE
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]    Procedure(s) (LRB):  PLACEMENT OF PORT-A-CATH DEVICE (N/A)    Specimen(s):  * No specimens in log *    Estimated Blood Loss:   Minimal    Drains:       Anesthesia Type:   General    Operative Indications:  B-cell lymphoma of extranodal site []  63-year-old female with recently diagnosed lymphoma  She needs a port for chemotherapy  Risks and benefits were explained including bleeding, infection, need for further surgery, port malfunction, DVT, and pneumothorax  Informed consent was obtained  She was brought to the operating room  Operative Findings:  Catheter tip at the cavoatrial junction  Complications:   None    Procedure and Technique:  After identifying the patient, general anesthesia was achieved  Patient was prepped and draped in the usual sterile fashion  A time-out was performed  An incision was made in the left deltopectoral groove  This was taken down to the cephalic vein  Cephalic vein was encircled and tied off distally with 2 0 silk suture  A tie was placed proximally around the vein  Catheter was introduced through the vein and confirmed to be in the superior vena cava using fluoroscopy  A subcutaneous port pocket was created using sharp dissection  There was excellent hemostasis  Three 2 0 Prolene sutures were placed in the port pocket and attached to the port  Catheter was manipulated into the cavoatrial junction under fluoroscopy, cut and attached to the port  Port was secured using the previously placed Prolene sutures   The proximal tie was placed around the catheter and vein to prevent any backbleeding  The port was accessed and withdrew blood easily and flushed very easily and was ultimately flushed with a final Hep-Lock solution  Wound was copiously irrigated  There was excellent hemostasis  Incision was approximated with interrupted 3 0 Vicryl suture subcutaneously and a running 4-0 Monocryl suture in a subcuticular fashion  The port was now accessed and left accessed  It withdrew blood easily and flushed very easily and was ultimately flushed with a final Hep-Lock solution  Steri-Strips and sterile dressings were applied  Patient was extubated having tolerated the procedure well and taken to the recovery room in stable condition  I was present and participated in all aspects of this procedure  A chest x-ray was pending at the time of this dictation         I was present for the entire procedure    Patient Disposition:  PACU     SIGNATURE: Luis Patel MD  DATE: October 3, 2017  TIME: 11:07 AM

## 2017-10-03 NOTE — ANESTHESIA PREPROCEDURE EVALUATION
Review of Systems/Medical History  Patient summary reviewed  Chart reviewed  No history of anesthetic complications     Cardiovascular  Exercise tolerance: good,  Hypertension controlled,    Pulmonary  Negative pulmonary ROS ,        GI/Hepatic  Negative GI/hepatic ROS          Negative  ROS        Endo/Other  Negative endo/other ROS      GYN  Negative gynecology ROS          Hematology    Lymphoma   Musculoskeletal       Neurology  Negative neurology ROS      Psychology           Physical Exam    Airway    Mallampati score: III  TM Distance: >3 FB  Neck ROM: full     Dental   No notable dental hx     Cardiovascular      Pulmonary      Other Findings        Anesthesia Plan  ASA Score- 3       Anesthesia Type- general  Comment: LMA, GETA backup      Induction- intravenous      Informed Consent  Anesthetic plan and risks discussed with patient

## 2017-10-03 NOTE — ANESTHESIA POSTPROCEDURE EVALUATION
Post-Op Assessment Note      CV Status:  Stable    Mental Status:  Awake and lethargic    Hydration Status:  Euvolemic    PONV Controlled:  None    Airway Patency:  Patent    Post Op Vitals Reviewed: Yes          Staff: CRNA           BP      Temp      Pulse     Resp      SpO2

## 2017-10-03 NOTE — PROGRESS NOTES
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)   3  Increased urinary frequency (788 41) (R35 0)    Plan  Flu vaccine need    · Fluzone High-Dose 0 5 ML Intramuscular Suspension Prefilled Syringe    Discussion/Summary    In office urine dip clean and negative, no antibiotic given diagnosis of diffuse B cell lymphoma, will be starting chemo this week  The was counseled regarding diagnostic results,-instructions for management,-risk factor reductions,-patient and family education,-impressions  The treatment plan was reviewed with the patient/guardian  The patient/guardian understands and agrees with the treatment plan      Chief Complaint  Pt has c/o urinary frequency, lower back pain, and overly tired  History of Present Illness  HPI: Patient presents for sick visit  She states she has had increased urinary frequency that started today  She does state that she has been drinking a lot more though  But she is worried that she has another E coli UTI as she was just treated for one Sept 18 with nitrofurantoin  She denies fever, chills, flank pain, dysuria, hematuria, urgency/hesitancy  SHe does complain and R lumbar back pain but points to the area that she had just had her attempt of BM bx the day prior, they had difficulty obtaining biopsy and really irritated the area  She is starting chemo later next week and wanted to ensure does not have a UTI  Review of Systems    Constitutional: no fever,-not feeling poorly,-no chills-and-not feeling tired  Cardiovascular: the heart rate was not slow,-no chest pain,-no intermittent leg claudication,-the heart rate was not fast,-no palpitations-and-no lower extremity edema  Respiratory: no shortness of breath,-no cough,-no wheezing-and-no shortness of breath during exertion  Gastrointestinal: no abdominal pain,-no nausea,-no vomiting,-no constipation-and-no diarrhea     Genitourinary: urinary frequency, no hematuria/urgency/hesitancy, but-no dysuria,-no pelvic pain,-no vaginal discharge-and-no incontinence  Musculoskeletal: R lumbar back discomfort where BM bx done, but-no arthralgias,-no joint swelling,-no myalgias-and-no joint stiffness  Integumentary: no rashes-and-no skin lesions  Neurological: no headache,-no numbness,-no confusion-and-no dizziness  ROS reviewed  Active Problems  1  Abnormal urinalysis (791 9) (R82 90)   2  Acute bilateral low back pain without sciatica (724 2,338 19) (M54 5)   3  Acute cystitis without hematuria (595 0) (N30 00)   4  Acute maxillary sinusitis (461 0) (J01 00)   5  Ankle pain, left (719 47) (M25 572)   6  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)   7  Breast disorder (611 9) (N64 9)   8  Cataract (366 9) (H26 9)   9  Chemotherapy-induced nausea (787 02,E933 1) (R11 0,T45  1X5A)   10  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)   11  Diffuse large B-cell lymphoma of lymph nodes of multiple regions (202 88) (C83 38)   12  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)   13  Dyslipidemia (272 4) (E78 5)   14  Dysphagia (787 20) (R13 10)   15  Dyspnea (786 09) (R06 00)   16  Encounter for screening fecal occult blood testing (V76 51) (Z12 11)   17  Fatigue (780 79) (R53 83)   18  Flu vaccine need (V04 81) (Z23)   19  Foot fracture, left (825 20) (S92 902A)   20  Gastroesophageal reflux disease (530 81) (K21 9)   21  Hypertension (401 9) (I10)   22  Itching (698 9) (L29 9)   23  Left bundle branch block (LBBB) (426 3) (I44 7)   24  Lip swelling (784 2) (R22 0)   25  Lymphadenopathy (785 6) (R59 1)   26  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)   27  Medicare annual wellness visit, subsequent (V70 0) (Z00 00)   28  Need for vaccination with 13-polyvalent pneumococcal conjugate vaccine (V03 82) (Z23)   29  Other screening mammogram (V76 12) (Z12 31)   30   Pain, foot, chronic, left (005 1,521 82) (M79 672,G89 29)   31  Preop cardiovascular exam (V72 81) (Z01 810)   32  Screening for colon cancer (V76 51) (Z12 11)   33  Screening for depression (V79 0) (Z13 89)   34  Screening for other and unspecified genitourinary condition (V81 6) (Z13 89)   35  Situational anxiety (300 09) (F41 8)   36  Special screening for other neurological conditions (V80 09) (Z13 89)   37  Urticaria (708 9) (L50 9)   38  Vitamin B12 deficiency (266 2) (E53 8)   39  Vitamin D deficiency (268 9) (E55 9)    Past Medical History  1  History of Bruit of right carotid artery (785 9) (R09 89)   2  History of Cellulitis (682 9) (L03 90)   3  Contact dermatitis (692 9) (L25 9)   4  History of Encounter for screening mammogram for malignant neoplasm of breast   (V76 12) (Z12 31)   5  History of Esophageal stricture (530 3) (K22 2)   6  History of basal cell carcinoma (V10 83) (Z85 828)   7  History of hypertension (V12 59) (Z86 79)   8  History of impacted cerumen (V12 49) (Z86 69)   9  History of osteoarthritis (V13 4) (Z87 39)   10  History of osteopenia (V13 59) (Z87 39)   11  History of vaginitis (V13 29) (Z87 42)   12  History of Medicare annual wellness visit, initial (V70 0) (Z00 00)   13  History of Need for pneumococcal vaccine (V03 82) (Z23)   14  History of Osteoarthrosis Of The Ankle/Foot (715 97)   15  History of Plantar fasciitis (728 71) (M72 2)   16  History of Plantar fasciitis of left foot (728 71) (M72 2)   17  History of Polyp of sigmoid colon (211 3) (D12 5)   18  History of Screening for colorectal cancer (V76 51) (Z12 11,Z12 12)   19  History of Vulvar abscess (616 4) (N76 4)  Active Problems And Past Medical History Reviewed: The active problems and past medical history were reviewed and updated today  Family History  Mother    1  Family history of Multiple Myeloma  Father    2  Family history of Heart Disease (V17 49)   3  Family history of Hypertension (V17 49)  Sister    4   Family history of Heart Disease (V17 49)   5  Family history of Hypertension (V17 49)  Family History    6  Family history of Arthritis (V17 7)   7  Family history of Breast Cancer (V16 3)   8  Family history of Osteoporosis (V17 81)  Family History Reviewed: The family history was reviewed and updated today  Social History   · Former smoker (I16 55) (N53 457)   · Never Drank Alcohol   · Uses Safety Equipment - Seatbelts  The social history was reviewed and updated today  The social history was reviewed and is unchanged  Surgical History  1  History of Ankle Surgery   2  History of Biopsy Lymph Node   3  History of Cataract Surgery   4  History of Esophagoscopy Rigid With Insertion Of Plastic Tube/Stent   5  History of Hysterectomy    Current Meds   1  Allopurinol 300 MG Oral Tablet; 1 tab PO daily; Therapy: 44BVI0288 to (Last SX:43TLF4108)  Requested for: 74FTL8949 Ordered   2  Calcium 600 TABS; Therapy: (Stevie Jama) to Recorded   3  Fish Oil CAPS; Therapy: (Stevie Jama) to Recorded   4  HydroCHLOROthiazide 25 MG Oral Tablet; Take 1 tablet daily; Therapy: 14RXZ2516 to (Evaluate:94Sem1531)  Requested for: 99KPW0432; Last   Rx:12Jan2017 Ordered   5  Klor-Con M20 20 MEQ Oral Tablet Extended Release; Take 1 tablet daily; Therapy: 21NBV1814 to (Evaluate:11Jul2017)  Requested for: 83WYK8864; Last   Rx:12Jan2017 Ordered   6  Multi Complete CAPS; Therapy: (Stevie Jama) to Recorded   7  Omeprazole 40 MG Oral Capsule Delayed Release; take 1 capsule daily; Therapy: 38AUX7014 to (Evaluate:19Nov2017)  Requested for: 39Qfn4873; Last   Rx:61Onc3693 Ordered   8  Ondansetron HCl - 8 MG Oral Tablet; 1 TAB PO Q 8 HR PRN N/V;   Therapy: 24LIL6370 to (Last Rx:68Bll8962)  Requested for: 82TTD9849 Ordered   9  PredniSONE 50 MG Oral Tablet; 1 tab po bid x5 days with food with each chemo cycle; Therapy: 12YZT0056 to (Last Khris Stair)  Requested for: 19GQE1206 Ordered   10   Verapamil HCl  MG Oral Tablet Extended Release; Take 1 tablet daily; Therapy: 23QQW6310 to (KAQMFWOL:12BYA0982)  Requested for: 08KCX2161; Last    Rx:67Muv6037 Ordered   11  Vitamin B12 100 MCG Oral Tablet; Therapy: (Synetta Drain) to Recorded   12  Vitamin C CAPS; Therapy: (Synetta Drain) to Recorded   13  Vitamin D 1000 UNIT Oral Tablet; TAKE 1 TABLET DAILY; Therapy: 41XUV7438 to Recorded    The medication list was reviewed and updated today  Allergies  1  Bactrim TABS   2  Aspirin CHEW   3  CeleBREX CAPS   4  NSAIDs   5  Sulfa Drugs    Vitals   Recorded: 19PVS9533 06:31PM   Temperature 99 F   Heart Rate 105   Respiration 16   Systolic 059   Diastolic 70   Height 5 ft 2 in   Weight 216 lb    BMI Calculated 39 51   BSA Calculated 1 98   O2 Saturation 96     Physical Exam    Constitutional   General appearance: No acute distress, well appearing and well nourished  Eyes   Conjunctiva and lids: No swelling, erythema or discharge  Pupils and irises: Equal, round and reactive to light  Ears, Nose, Mouth, and Throat   External inspection of ears and nose: Normal     Pulmonary   Respiratory effort: No increased work of breathing or signs of respiratory distress  Auscultation of lungs: Clear to auscultation  Cardiovascular   Auscultation of heart: Normal rate and rhythm, normal S1 and S2, without murmurs  Examination of extremities for edema and/or varicosities: Normal     Abdomen   Abdomen: Non-tender, no masses  Musculoskeletal   Gait and station: Normal     Digits and nails: Normal without clubbing or cyanosis  -Slight tenderness to palpation of R lumbar spine  Skin   Skin and subcutaneous tissue: Normal without rashes or lesions  Neurologic   Cranial nerves: Cranial nerves 2-12 intact      Psychiatric   Orientation to person, place, and time: Normal     Mood and affect: Normal          Attending Note  Collaborating Physician Note: Collaborating Note: I supervised the Advanced Practitioner-and-I agree with the Advanced Practitioner note  Future Appointments    Date/Time Provider Specialty Site   10/03/2017 10:00 AM LEXX Simms  Surgical Oncology [facility] INPATIENT   10/18/2017 10:00 AM Sherri Clemens [facility] H[facility]ology On[facility]   10/27/2017 10:40 AM LEXX Nunez  Cardiology [address]     Signatures   Electronically signed by :  Genesis Sparks [facility]; Sep 30 2017  9:00PM EST                       (Author)    Electronically signed by : Heath Ackerman DO; Oct  2 2017  7:45AM EST                       (Author)

## 2017-10-04 ENCOUNTER — HOSPITAL ENCOUNTER (OUTPATIENT)
Dept: INFUSION CENTER | Facility: CLINIC | Age: 77
Discharge: HOME | End: 2017-10-04
Payer: COMMERCIAL

## 2017-10-04 VITALS
TEMPERATURE: 97.3 F | WEIGHT: 215.61 LBS | BODY MASS INDEX: 36.81 KG/M2 | DIASTOLIC BLOOD PRESSURE: 59 MMHG | HEART RATE: 88 BPM | HEIGHT: 64 IN | SYSTOLIC BLOOD PRESSURE: 111 MMHG | RESPIRATION RATE: 18 BRPM

## 2017-10-04 PROCEDURE — 96411 CHEMO IV PUSH ADDL DRUG: CPT

## 2017-10-04 PROCEDURE — 96372 THER/PROPH/DIAG INJ SC/IM: CPT

## 2017-10-04 PROCEDURE — 96367 TX/PROPH/DG ADDL SEQ IV INF: CPT

## 2017-10-04 PROCEDURE — 96417 CHEMO IV INFUS EACH ADDL SEQ: CPT

## 2017-10-04 PROCEDURE — 96413 CHEMO IV INFUSION 1 HR: CPT

## 2017-10-04 RX ADMIN — DEXAMETHASONE SODIUM PHOSPHATE: 10 INJECTION, SOLUTION INTRAMUSCULAR; INTRAVENOUS at 13:49

## 2017-10-04 RX ADMIN — PEGFILGRASTIM 6 MG: KIT SUBCUTANEOUS at 16:30

## 2017-10-04 RX ADMIN — CYCLOPHOSPHAMIDE 1500 MG: 1 INJECTION, POWDER, FOR SOLUTION INTRAVENOUS; ORAL at 14:53

## 2017-10-04 RX ADMIN — SODIUM CHLORIDE 20 ML/HR: 0.9 INJECTION, SOLUTION INTRAVENOUS at 13:40

## 2017-10-04 RX ADMIN — DOXORUBICIN HYDROCHLORIDE 100 MG: 2 INJECTION, SOLUTION INTRAVENOUS at 15:39

## 2017-10-04 RX ADMIN — SODIUM CHLORIDE 150 MG: 0.9 INJECTION, SOLUTION INTRAVENOUS at 14:13

## 2017-10-04 RX ADMIN — Medication 300 UNITS: at 16:35

## 2017-10-04 RX ADMIN — VINCRISTINE SULFATE 1.5 MG: 1 INJECTION, SOLUTION INTRAVENOUS at 15:51

## 2017-10-04 NOTE — PROGRESS NOTES
Pt  Tolerated Cytoxan, Adriamycin and Vincristine without adverse event  Neulasta Onpro 6 mg placed on MAGDY  Education provided  Pt , spouse and son verbalized understanding  Future appointments reviewed  AVS provided  Post chemotherapy instruction discussed  Pt  Maida Carroll understanding and will call physician with any concerns  Pt  Understands to obtain labs 7 days prior to next chemotherapy as ordered

## 2017-10-04 NOTE — PROGRESS NOTES
Pt  Denies new symptoms or concerns at this time  Labs reviewed and within normal parameters for ordered infusion today  Spoke with Jai in pharmacy today who reviewed ordered dose of Vincristine 1 5mg  Dose was based on maximum dose of 2mg in which a reduced dose was ordered

## 2017-10-04 NOTE — PLAN OF CARE
Problem: PAIN - ADULT  Goal: Verbalizes/displays adequate comfort level or baseline comfort level  Interventions:  - Encourage patient to monitor pain and request assistance  - Assess pain using appropriate pain scale  - Administer analgesics based on type and severity of pain and evaluate response  - Implement non-pharmacological measures as appropriate and evaluate response  - Consider cultural and social influences on pain and pain management  - Notify physician/advanced practitioner if interventions unsuccessful or patient reports new pain  Outcome: Progressing      Problem: INFECTION - ADULT  Goal: Absence or prevention of progression during hospitalization  INTERVENTIONS:  - Assess and monitor for signs and symptoms of infection  - Monitor lab/diagnostic results  - Monitor all insertion sites, i e  indwelling lines, tubes, and drains  - Monitor endotracheal (as able) and nasal secretions for changes in amount and color  - Benson appropriate cooling/warming therapies per order  - Administer medications as ordered  - Instruct and encourage patient and family to use good hand hygiene technique  - Identify and instruct in appropriate isolation precautions for identified infection/condition  Outcome: Progressing    Goal: Absence of fever/infection during neutropenic period  INTERVENTIONS:  - Monitor WBC  - Implement neutropenic guidelines  Outcome: Progressing      Problem: Knowledge Deficit  Goal: Patient/family/caregiver demonstrates understanding of disease process, treatment plan, medications, and discharge instructions  Complete learning assessment and assess knowledge base    Interventions:  - Provide teaching at level of understanding  - Provide teaching via preferred learning methods  Outcome: Progressing

## 2017-10-09 ENCOUNTER — HOSPITAL ENCOUNTER (OUTPATIENT)
Dept: CT IMAGING | Facility: HOSPITAL | Age: 77
Discharge: HOME | End: 2017-10-09
Attending: INTERNAL MEDICINE | Admitting: RADIOLOGY
Payer: COMMERCIAL

## 2017-10-09 VITALS
HEIGHT: 63 IN | TEMPERATURE: 98.5 F | OXYGEN SATURATION: 97 % | DIASTOLIC BLOOD PRESSURE: 57 MMHG | WEIGHT: 210 LBS | HEART RATE: 74 BPM | RESPIRATION RATE: 16 BRPM | SYSTOLIC BLOOD PRESSURE: 127 MMHG | BODY MASS INDEX: 37.21 KG/M2

## 2017-10-09 DIAGNOSIS — C83.38 DIFFUSE LARGE B-CELL LYMPHOMA OF LYMPH NODES OF MULTIPLE REGIONS (HCC): ICD-10-CM

## 2017-10-09 PROCEDURE — 88333 PATH CONSLTJ SURG CYTO XM 1: CPT | Performed by: INTERNAL MEDICINE

## 2017-10-09 PROCEDURE — 99153 MOD SED SAME PHYS/QHP EA: CPT

## 2017-10-09 PROCEDURE — 85097 BONE MARROW INTERPRETATION: CPT | Performed by: INTERNAL MEDICINE

## 2017-10-09 PROCEDURE — 88341 IMHCHEM/IMCYTCHM EA ADD ANTB: CPT | Performed by: INTERNAL MEDICINE

## 2017-10-09 PROCEDURE — 88360 TUMOR IMMUNOHISTOCHEM/MANUAL: CPT | Performed by: INTERNAL MEDICINE

## 2017-10-09 PROCEDURE — 88305 TISSUE EXAM BY PATHOLOGIST: CPT | Performed by: INTERNAL MEDICINE

## 2017-10-09 PROCEDURE — 88185 FLOWCYTOMETRY/TC ADD-ON: CPT

## 2017-10-09 PROCEDURE — 88365 INSITU HYBRIDIZATION (FISH): CPT | Performed by: INTERNAL MEDICINE

## 2017-10-09 PROCEDURE — 88184 FLOWCYTOMETRY/ TC 1 MARKER: CPT | Performed by: INTERNAL MEDICINE

## 2017-10-09 PROCEDURE — 88313 SPECIAL STAINS GROUP 2: CPT | Performed by: INTERNAL MEDICINE

## 2017-10-09 PROCEDURE — 77012 CT SCAN FOR NEEDLE BIOPSY: CPT

## 2017-10-09 PROCEDURE — 38221 DX BONE MARROW BIOPSIES: CPT

## 2017-10-09 PROCEDURE — 99152 MOD SED SAME PHYS/QHP 5/>YRS: CPT

## 2017-10-09 PROCEDURE — 88342 IMHCHEM/IMCYTCHM 1ST ANTB: CPT | Performed by: INTERNAL MEDICINE

## 2017-10-09 PROCEDURE — 38220 DX BONE MARROW ASPIRATIONS: CPT

## 2017-10-09 PROCEDURE — 88311 DECALCIFY TISSUE: CPT | Performed by: INTERNAL MEDICINE

## 2017-10-09 RX ORDER — FENTANYL CITRATE 50 UG/ML
INJECTION, SOLUTION INTRAMUSCULAR; INTRAVENOUS CODE/TRAUMA/SEDATION MEDICATION
Status: COMPLETED | OUTPATIENT
Start: 2017-10-09 | End: 2017-10-09

## 2017-10-09 RX ORDER — SODIUM CHLORIDE 9 MG/ML
75 INJECTION, SOLUTION INTRAVENOUS CONTINUOUS
Status: DISCONTINUED | OUTPATIENT
Start: 2017-10-09 | End: 2017-10-10 | Stop reason: HOSPADM

## 2017-10-09 RX ORDER — MIDAZOLAM HYDROCHLORIDE 1 MG/ML
INJECTION INTRAMUSCULAR; INTRAVENOUS CODE/TRAUMA/SEDATION MEDICATION
Status: COMPLETED | OUTPATIENT
Start: 2017-10-09 | End: 2017-10-09

## 2017-10-09 RX ORDER — ONDANSETRON HYDROCHLORIDE 8 MG/1
TABLET, FILM COATED ORAL AS NEEDED
COMMUNITY
End: 2018-02-09

## 2017-10-09 RX ADMIN — FENTANYL CITRATE 50 MCG: 50 INJECTION INTRAMUSCULAR; INTRAVENOUS at 14:28

## 2017-10-09 RX ADMIN — FENTANYL CITRATE 50 MCG: 50 INJECTION INTRAMUSCULAR; INTRAVENOUS at 14:20

## 2017-10-09 RX ADMIN — MIDAZOLAM 2 MG: 1 INJECTION INTRAMUSCULAR; INTRAVENOUS at 14:28

## 2017-10-09 RX ADMIN — MIDAZOLAM 2 MG: 1 INJECTION INTRAMUSCULAR; INTRAVENOUS at 14:20

## 2017-10-09 RX ADMIN — SODIUM CHLORIDE 75 ML/HR: 0.9 INJECTION, SOLUTION INTRAVENOUS at 12:51

## 2017-10-09 NOTE — DISCHARGE INSTRUCTIONS
Bone Biopsy   WHAT YOU NEED TO KNOW:   A bone biopsy is a procedure to take a sample of bone tissue  It may be done using a special needle or during surgery  A bone biopsy may be done to test for cancer, infection, or bone disease  DISCHARGE INSTRUCTIONS:   Seek care immediately if:   · Blood soaks through your bandage  · Your stitches come apart  · Your bone breaks  Contact your healthcare provider if:   · You have a fever or chills  · Your wound is red, swollen, or draining pus  · You have nausea or vomiting  · Your skin is itchy, swollen, or you have a rash  · You have questions or concerns about your condition or care  Medicines: You may need any of the following:  · Acetaminophen  decreases pain and fever  It is available without a doctor's order  Ask how much to take and how often to take it  Follow directions  Acetaminophen can cause liver damage if not taken correctly  · Take your medicine as directed  Contact your healthcare provider if you think your medicine is not helping or if you have side effects  Tell him or her if you are allergic to any medicine  Keep a list of the medicines, vitamins, and herbs you take  Include the amounts, and when and why you take them  Bring the list or the pill bottles to follow-up visits  Carry your medicine list with you in case of an emergency  Care for your wound as directed:  Remove your bandage as directed  If you have strips of medical tape over your incision, allow them to fall off on their own  If they do not fall off within 14 days, gently peel them off  Carefully wash the wound with soap and water  Dry the area and put on new, clean bandages as directed  Change your bandages when they get wet or dirty  Do not go in hot tubs or take baths for 48 hours or as directed by your healthcare provider  Check your wound every day for signs of infection such as redness, swelling, or pus     Self-care:   · Apply ice  on your bone for 15 to 20 minutes every hour or as directed  Use an ice pack, or put crushed ice in a plastic bag  Cover it with a towel  Ice helps prevent tissue damage and decreases swelling and pain  · Do not exercise  for at least 48 hours  Too much activity may prevent healing  Rest and do quiet activities  Ask your healthcare provider when you can drive and return to your normal activities  · Elevate  your arm or leg above the level of your heart as often as you can  This will help decrease swelling and pain  Prop your arm or leg on pillows or blankets to keep it elevated comfortably  · Drink liquids as directed  Liquid will help flush the contrast liquid out of your body  Ask how much liquid to drink each day and which liquids are best for you  Follow up with your healthcare provider as directed: Your healthcare provider will call you with the results of your biopsy  Write down your questions so you remember to ask them during your visits  © 2017 [employer] Information is for End User's use only and may not be sold, redistributed or otherwise used for commercial purposes  All illustrations and images included in CareNotes® are the copyrighted property of A D A M , Inc  or Dionte Alfaro  The above information is an  only  It is not intended as medical advice for individual conditions or treatments  Talk to your doctor, nurse or pharmacist before following any medical regimen to see if it is safe and effective for you  Bone Marrow Biopsy     WHAT YOU NEED TO KNOW:   A bone marrow biopsy is a procedure to remove a small amount of bone marrow from your bone  Bone marrow is the soft tissue inside your bone that helps to make blood cells  The sample is tested for disease or infection  DISCHARGE INSTRUCTIONS:     1  Limit your activities day of biopsy as directed by your doctor  2  Use medication as ordered  3  Return to your normal diet      4  Remove band-aid or dressing 24 hours after procedure  Contact Interventional Radiology at [phone] [place] PATIENTS: Contact Interventional Radiology at [phone]) Marilyn Jackson PATIENTS: Contact Interventional Radiology at [phone]) if:    1  Difficulty breathing, nausea or vomiting  2  Chills or fever above 101 F     3  Pain at biopsy site not relieved by medication  4  Develop any redness, swelling, heat, unusual drainage, heavy bruising or bleeding from biopsy site  24 hour urine calcium output measurement   GENERAL INFORMATION:  What is this test?  This test measures the amount of calcium excreted in urine collected over 24 hours  It may be used when conditions such as kidney stones or primary hyperparathyroidism are suspected  What are other names for this test?  · 24 hour urine calcium output  What are related tests? · Calcium/creatinine ratio measurement  Why do I need this test?  Laboratory tests may be done for many reasons  Tests are performed for routine health screenings or if a disease or toxicity is suspected  Lab tests may be used to determine if a medical condition is improving or worsening  Lab tests may also be used to measure the success or failure of a medication or treatment plan  Lab tests may be ordered for professional or legal reasons  You may need this test if you have:   · Kidney stone  · Primary hyperparathyroidism  When and how often should I have this test?  When and how often laboratory tests are done may depend on many factors  The timing of laboratory tests may rely on the results or completion of other tests, procedures, or treatments  Lab tests may be performed immediately in an emergency, or tests may be delayed as a condition is treated or monitored  A test may be suggested or become necessary when certain signs or symptoms appear  Due to changes in the way your body naturally functions through the course of a day, lab tests may need to be performed at a certain time of day  If you have prepared for a test by changing your food or fluid intake, lab tests may be timed in accordance with those changes  Timing of tests may be based on increased and decreased levels of medications, drugs or other substances in the body  The age or gender of the person being tested may affect when and how often a lab test is required  Chronic or progressive conditions may need ongoing monitoring through the use of lab tests  Conditions that worsen and improve may also need frequent monitoring  Certain tests may be repeated to obtain a series of results, or tests may need to be repeated to confirm or disprove results  Timing and frequency of lab tests may vary if they are performed for professional or legal reasons  How should I get ready for the test?  During a 24-hour urine collection, follow your usual diet and drink fluids as you ordinarily would, unless healthcare workers give you other instructions  Avoid drinking alcohol before and during the urine collection  How is the test done? For a 24-hour urine collection, all of the urine that you pass over a 24-hour time period must be collected  If you are in the hospital, a healthcare worker will collect your urine  You will receive a special container to collect the sample in if you are doing the collection at home  The following are directions for collecting a 24-hour urine sample while at home:  · In the morning scheduled to begin the urine collection, urinate in the toilet and flush away the first urine you pass  Write down the date and time  That is the start date and time for the collection  · Collect all urine you pass, day and night, for 24 hours  Use the container given to you to collect the urine  Avoid using other containers  The urine sample must include the last urine that you pass 24 hours after starting the collection  Do not allow toilet paper, stool, or anything else to be added to the urine sample    · Write down the date and time that the last sample is collected  · The urine sample may need to be kept cool during the 24-hour collection period  If so, keep the closed container in a pan on ice  Do not put ice in the container with the urine  How will the test feel? The amount of discomfort you feel will depend on many factors, including your sensitivity to pain  Communicate how you are feeling with the person doing the test  Inform the person doing the test if you feel that you cannot continue with the test   This test usually causes no discomfort  What should I do after the test?  When 24-hour urine collection is complete, close the container and seal the lid tightly  Return the sample in the urine container to the facility or healthcare worker as instructed  If you had the sample in an ice bath, return the sample within two hours after removing the container from the ice bath  What are the risks? Urine: A urine test is generally considered safe  Talk to your healthcare worker if you have questions or concerns about this test   What are normal results for this test?  Laboratory test results may vary depending on your age, gender, health history, the method used for the test, and many other factors  If your results are different from the results suggested below, this may not mean that you have a disease  Contact your healthcare worker if you have any questions  The following are considered to be normal results for this test:  · Adults: <300 mg/24 hours (<7 5 mmol/24 hours) with low dietary calcium intake of 200 mg/24 hours  · Children: Up to 6 mg/kg/24 hours (0 15 mmol/kg/24 hours)  What might affect my test results? · Results increased in :  ¨ Malignancies  ¨ Immobilization  ¨ Granulomatosis  What follow up should I do after this test?  Ask your healthcare worker how you will be informed of the test results  You may be asked to call for results, schedule an appointment to discuss results, or notified of results by mail   Follow up care varies depending on many factors related to your test  Sometimes there is no follow up after you have been notified of test results  At other times follow up may be suggested or necessary  Some examples of follow up care include changes to medication or treatment plans, referral to a specialist, more or less frequent monitoring, and additional tests or procedures  Talk with your healthcare worker about any concerns or questions you have regarding follow up care or instructions  Where can I get more information? Related Companies   ¨ National Kidney and Urologic Diseases Information Clearinghouse - http://kidney  niddk nih gov/  [facility] and Digest[facility] and Kidney Diseases (NIDDK) - [employer] be    CARE AGREEMENT:   You have the right to help plan your care  To help with this plan, you must learn about your health condition and how it may be treated  You can then discuss treatment options with your caregivers  Work with them to decide what care may be used to treat you  You always have the right to refuse treatment  © 2017 [employer]   Information is for End User's use only and may not be sold, redistributed or otherwise used for commercial purposes  The above information is an  only  It is not intended as a substitute for medical advice for your individual conditions or treatments  Talk to your doctor, nurse or pharmacist before following any medical regimen to see if it is safe and effective for you

## 2017-10-09 NOTE — BRIEF OP NOTE (RAD/CATH)
CT BONE MARROW BIOPSY AND ASPIRATION  Procedure Note    PATIENT NAME: Lesa Schwab  : 1940  MRN: [id number]     Pre-op Diagnosis:   1  Diffuse large B-cell lymphoma of lymph nodes of multiple regions (HCC)      Post-op Diagnosis:   1   Diffuse large B-cell lymphoma of lymph nodes of multiple regions [facility])        Surgeon:   Udell Cheadle, MD  Assistants:     No qualified resident was available, Resident is only observing    Estimated Blood Loss: None  Findings: CT guided left iliac bone marrow aspiration and core biopsy    Specimens: left iliac bone marrow    Complications:  none    Anesthesia: Conscious sedation and Local    Udell Cheadle, MD     Date: 10/9/2017  Time: 2:51 PM

## 2017-10-18 ENCOUNTER — GENERIC CONVERSION - ENCOUNTER (OUTPATIENT)
Dept: OTHER | Facility: OTHER | Age: 77
End: 2017-10-18

## 2017-10-19 LAB — SCAN RESULT: NORMAL

## 2017-10-23 ENCOUNTER — APPOINTMENT (OUTPATIENT)
Dept: LAB | Facility: CLINIC | Age: 77
End: 2017-10-23
Payer: COMMERCIAL

## 2017-10-23 ENCOUNTER — TRANSCRIBE ORDERS (OUTPATIENT)
Dept: LAB | Facility: CLINIC | Age: 77
End: 2017-10-23

## 2017-10-23 DIAGNOSIS — C85.19 B-CELL LYMPHOMA OF EXTRANODAL SITE (HCC): ICD-10-CM

## 2017-10-23 DIAGNOSIS — I80.8 PHLEBITIS AND THROMBOPHLEBITIS OF OTHER SITES: ICD-10-CM

## 2017-10-23 DIAGNOSIS — C85.19 B-CELL LYMPHOMA OF EXTRANODAL SITE (HCC): Primary | ICD-10-CM

## 2017-10-23 LAB
ALBUMIN SERPL BCP-MCNC: 3.4 G/DL (ref 3.5–5)
ALP SERPL-CCNC: 101 U/L (ref 46–116)
ALT SERPL W P-5'-P-CCNC: 33 U/L (ref 12–78)
ANION GAP SERPL CALCULATED.3IONS-SCNC: 8 MMOL/L (ref 4–13)
AST SERPL W P-5'-P-CCNC: 17 U/L (ref 5–45)
BASOPHILS # BLD MANUAL: 0.15 THOUSAND/UL (ref 0–0.1)
BASOPHILS NFR MAR MANUAL: 3 % (ref 0–1)
BILIRUB SERPL-MCNC: 0.31 MG/DL (ref 0.2–1)
BUN SERPL-MCNC: 13 MG/DL (ref 5–25)
CALCIUM SERPL-MCNC: 9.7 MG/DL (ref 8.3–10.1)
CHLORIDE SERPL-SCNC: 106 MMOL/L (ref 100–108)
CO2 SERPL-SCNC: 28 MMOL/L (ref 21–32)
CREAT SERPL-MCNC: 0.82 MG/DL (ref 0.6–1.3)
EOSINOPHIL # BLD MANUAL: 0.05 THOUSAND/UL (ref 0–0.4)
EOSINOPHIL NFR BLD MANUAL: 1 % (ref 0–6)
ERYTHROCYTE [DISTWIDTH] IN BLOOD BY AUTOMATED COUNT: 15.1 % (ref 11.6–15.1)
GFR SERPL CREATININE-BSD FRML MDRD: 71 ML/MIN/1.73SQ M
GLUCOSE P FAST SERPL-MCNC: 90 MG/DL (ref 65–99)
HCT VFR BLD AUTO: 39 % (ref 34.8–46.1)
HGB BLD-MCNC: 12.9 G/DL (ref 11.5–15.4)
LDH SERPL-CCNC: 249 U/L (ref 81–234)
LYMPHOCYTES # BLD AUTO: 0.7 THOUSAND/UL (ref 0.6–4.47)
LYMPHOCYTES # BLD AUTO: 14 % (ref 14–44)
MCH RBC QN AUTO: 29.6 PG (ref 26.8–34.3)
MCHC RBC AUTO-ENTMCNC: 33.1 G/DL (ref 31.4–37.4)
MCV RBC AUTO: 89 FL (ref 82–98)
MONOCYTES # BLD AUTO: 0.4 THOUSAND/UL (ref 0–1.22)
MONOCYTES NFR BLD: 8 % (ref 4–12)
NEUTROPHILS # BLD MANUAL: 3.3 THOUSAND/UL (ref 1.85–7.62)
NEUTS BAND NFR BLD MANUAL: 2 % (ref 0–8)
NEUTS SEG NFR BLD AUTO: 64 % (ref 43–75)
NRBC BLD AUTO-RTO: 0 /100 WBCS
PLATELET # BLD AUTO: 244 THOUSANDS/UL (ref 149–390)
PLATELET BLD QL SMEAR: ADEQUATE
PMV BLD AUTO: 9.7 FL (ref 8.9–12.7)
POTASSIUM SERPL-SCNC: 3.8 MMOL/L (ref 3.5–5.3)
PROT SERPL-MCNC: 6.3 G/DL (ref 6.4–8.2)
RBC # BLD AUTO: 4.36 MILLION/UL (ref 3.81–5.12)
RBC MORPH BLD: NORMAL
SODIUM SERPL-SCNC: 142 MMOL/L (ref 136–145)
VARIANT LYMPHS # BLD AUTO: 8 %
WBC # BLD AUTO: 5 THOUSAND/UL (ref 4.31–10.16)

## 2017-10-23 PROCEDURE — 82232 ASSAY OF BETA-2 PROTEIN: CPT

## 2017-10-23 PROCEDURE — 36415 COLL VENOUS BLD VENIPUNCTURE: CPT

## 2017-10-23 PROCEDURE — 83615 LACTATE (LD) (LDH) ENZYME: CPT

## 2017-10-23 PROCEDURE — 80053 COMPREHEN METABOLIC PANEL: CPT

## 2017-10-23 PROCEDURE — 85007 BL SMEAR W/DIFF WBC COUNT: CPT

## 2017-10-23 PROCEDURE — 85027 COMPLETE CBC AUTOMATED: CPT

## 2017-10-24 LAB — B2 MICROGLOB SERPL-MCNC: 2 MG/L (ref 0.6–2.4)

## 2017-10-24 RX ORDER — SODIUM CHLORIDE 9 MG/ML
20 INJECTION, SOLUTION INTRAVENOUS CONTINUOUS
Status: DISCONTINUED | OUTPATIENT
Start: 2017-10-25 | End: 2017-10-28 | Stop reason: HOSPADM

## 2017-10-24 RX ORDER — DOXORUBICIN HYDROCHLORIDE 2 MG/ML
100 INJECTION, SOLUTION INTRAVENOUS ONCE
Status: COMPLETED | OUTPATIENT
Start: 2017-10-25 | End: 2017-10-25

## 2017-10-25 ENCOUNTER — HOSPITAL ENCOUNTER (OUTPATIENT)
Dept: INFUSION CENTER | Facility: CLINIC | Age: 77
Discharge: HOME | End: 2017-10-25
Payer: COMMERCIAL

## 2017-10-25 ENCOUNTER — GENERIC CONVERSION - ENCOUNTER (OUTPATIENT)
Dept: OTHER | Facility: OTHER | Age: 77
End: 2017-10-25

## 2017-10-25 VITALS
DIASTOLIC BLOOD PRESSURE: 72 MMHG | TEMPERATURE: 96.9 F | SYSTOLIC BLOOD PRESSURE: 122 MMHG | HEIGHT: 63 IN | BODY MASS INDEX: 38.12 KG/M2 | HEART RATE: 67 BPM | RESPIRATION RATE: 18 BRPM | WEIGHT: 215.17 LBS

## 2017-10-25 PROCEDURE — 96413 CHEMO IV INFUSION 1 HR: CPT

## 2017-10-25 PROCEDURE — 96411 CHEMO IV PUSH ADDL DRUG: CPT

## 2017-10-25 PROCEDURE — 96367 TX/PROPH/DG ADDL SEQ IV INF: CPT

## 2017-10-25 PROCEDURE — 96417 CHEMO IV INFUS EACH ADDL SEQ: CPT

## 2017-10-25 PROCEDURE — 96377 APPLICATON ON-BODY INJECTOR: CPT

## 2017-10-25 RX ADMIN — VINCRISTINE SULFATE 1.5 MG: 1 INJECTION, SOLUTION INTRAVENOUS at 10:37

## 2017-10-25 RX ADMIN — DOXORUBICIN HYDROCHLORIDE 100 MG: 2 INJECTION, SOLUTION INTRAVENOUS at 10:20

## 2017-10-25 RX ADMIN — SODIUM CHLORIDE 150 MG: 0.9 INJECTION, SOLUTION INTRAVENOUS at 08:57

## 2017-10-25 RX ADMIN — SODIUM CHLORIDE 20 ML/HR: 0.9 INJECTION, SOLUTION INTRAVENOUS at 08:33

## 2017-10-25 RX ADMIN — PEGFILGRASTIM 6 MG: KIT SUBCUTANEOUS at 11:15

## 2017-10-25 RX ADMIN — CYCLOPHOSPHAMIDE 1500 MG: 1 INJECTION, POWDER, FOR SOLUTION INTRAVENOUS; ORAL at 09:50

## 2017-10-25 RX ADMIN — DEXAMETHASONE SODIUM PHOSPHATE: 10 INJECTION, SOLUTION INTRAMUSCULAR; INTRAVENOUS at 08:33

## 2017-10-25 RX ADMIN — Medication 300 UNITS: at 11:13

## 2017-10-25 NOTE — PROGRESS NOTES
Patient tolerated infusions of Cytoxan, Adriamycin, and Vincristine without adverse event  Verified with patient that she has her Prednisone prescription to take for today and to continue until day 5  Patient educated on Neulasta OnPro Kit  Patient provided with AVS, left unit in stable condition with family

## 2017-10-25 NOTE — PLAN OF CARE
Problem: Potential for Falls  Goal: Patient will remain free of falls  INTERVENTIONS:  - Assess patient frequently for physical needs  -  Identify cognitive and physical deficits and behaviors that affect risk of falls    -  [place] fall precautions as indicated by assessment   - Educate patient/family on patient safety including physical limitations  - Instruct patient to call for assistance with activity based on assessment  - Modify environment to reduce risk of injury  - Consider OT/PT consult to assist with strengthening/mobility   Outcome: Progressing

## 2017-10-31 ENCOUNTER — GENERIC CONVERSION - ENCOUNTER (OUTPATIENT)
Dept: OTHER | Facility: OTHER | Age: 77
End: 2017-10-31

## 2017-11-03 ENCOUNTER — HOSPITAL ENCOUNTER (INPATIENT)
Facility: HOSPITAL | Age: 77
LOS: 2 days | Discharge: HOME | DRG: 315 | End: 2017-11-05
Attending: INTERNAL MEDICINE | Admitting: INTERNAL MEDICINE
Payer: COMMERCIAL

## 2017-11-03 ENCOUNTER — GENERIC CONVERSION - ENCOUNTER (OUTPATIENT)
Dept: OTHER | Facility: OTHER | Age: 77
End: 2017-11-03

## 2017-11-03 ENCOUNTER — HOSPITAL ENCOUNTER (OUTPATIENT)
Dept: NON INVASIVE DIAGNOSTICS | Facility: HOSPITAL | Age: 77
Discharge: HOME | End: 2017-11-03
Attending: INTERNAL MEDICINE
Payer: COMMERCIAL

## 2017-11-03 DIAGNOSIS — I80.8 PHLEBITIS AND THROMBOPHLEBITIS OF OTHER SITES: ICD-10-CM

## 2017-11-03 DIAGNOSIS — I82.622 ARM DVT (DEEP VENOUS THROMBOEMBOLISM), ACUTE, LEFT (HCC): Primary | ICD-10-CM

## 2017-11-03 DIAGNOSIS — C85.90 LYMPHOMA (HCC): ICD-10-CM

## 2017-11-03 DIAGNOSIS — R59.0 LYMPHADENOPATHY, AXILLARY: ICD-10-CM

## 2017-11-03 PROBLEM — I10 ESSENTIAL HYPERTENSION: Chronic | Status: ACTIVE | Noted: 2017-11-03

## 2017-11-03 LAB
APTT PPP: 30 SECONDS (ref 23–35)
ERYTHROCYTE [DISTWIDTH] IN BLOOD BY AUTOMATED COUNT: 16.1 % (ref 11.6–15.1)
HCT VFR BLD AUTO: 37.2 % (ref 34.8–46.1)
HGB BLD-MCNC: 11.9 G/DL (ref 11.5–15.4)
INR PPP: 0.9 (ref 0.86–1.16)
MCH RBC QN AUTO: 29.1 PG (ref 26.8–34.3)
MCHC RBC AUTO-ENTMCNC: 32 G/DL (ref 31.4–37.4)
MCV RBC AUTO: 91 FL (ref 82–98)
PLATELET # BLD AUTO: 74 THOUSANDS/UL (ref 149–390)
PMV BLD AUTO: 11.1 FL (ref 8.9–12.7)
PROTHROMBIN TIME: 12.4 SECONDS (ref 12.1–14.4)
RBC # BLD AUTO: 4.09 MILLION/UL (ref 3.81–5.12)
WBC # BLD AUTO: 3.85 THOUSAND/UL (ref 4.31–10.16)

## 2017-11-03 PROCEDURE — 85730 THROMBOPLASTIN TIME PARTIAL: CPT | Performed by: PHYSICIAN ASSISTANT

## 2017-11-03 PROCEDURE — 93971 EXTREMITY STUDY: CPT

## 2017-11-03 PROCEDURE — 85027 COMPLETE CBC AUTOMATED: CPT | Performed by: PHYSICIAN ASSISTANT

## 2017-11-03 PROCEDURE — 85610 PROTHROMBIN TIME: CPT | Performed by: PHYSICIAN ASSISTANT

## 2017-11-03 RX ORDER — HEPARIN SODIUM 10000 [USP'U]/100ML
3-30 INJECTION, SOLUTION INTRAVENOUS
Status: DISPENSED | OUTPATIENT
Start: 2017-11-03 | End: 2017-11-04

## 2017-11-03 RX ORDER — ACETAMINOPHEN 325 MG/1
650 TABLET ORAL EVERY 6 HOURS PRN
Status: DISCONTINUED | OUTPATIENT
Start: 2017-11-03 | End: 2017-11-05 | Stop reason: HOSPADM

## 2017-11-03 RX ORDER — ALLOPURINOL 100 MG/1
300 TABLET ORAL DAILY
Status: DISCONTINUED | OUTPATIENT
Start: 2017-11-04 | End: 2017-11-05 | Stop reason: HOSPADM

## 2017-11-03 RX ORDER — ASCORBIC ACID 500 MG
500 TABLET ORAL
Status: DISCONTINUED | OUTPATIENT
Start: 2017-11-04 | End: 2017-11-05 | Stop reason: HOSPADM

## 2017-11-03 RX ORDER — HEPARIN SODIUM 1000 [USP'U]/ML
3800 INJECTION, SOLUTION INTRAVENOUS; SUBCUTANEOUS AS NEEDED
Status: ACTIVE | OUTPATIENT
Start: 2017-11-03 | End: 2017-11-04

## 2017-11-03 RX ORDER — HYDROCHLOROTHIAZIDE 25 MG/1
25 TABLET ORAL
Status: DISCONTINUED | OUTPATIENT
Start: 2017-11-04 | End: 2017-11-05 | Stop reason: HOSPADM

## 2017-11-03 RX ORDER — PANTOPRAZOLE SODIUM 40 MG/1
40 TABLET, DELAYED RELEASE ORAL
Status: DISCONTINUED | OUTPATIENT
Start: 2017-11-04 | End: 2017-11-05 | Stop reason: HOSPADM

## 2017-11-03 RX ORDER — HEPARIN SODIUM 1000 [USP'U]/ML
7600 INJECTION, SOLUTION INTRAVENOUS; SUBCUTANEOUS AS NEEDED
Status: ACTIVE | OUTPATIENT
Start: 2017-11-03 | End: 2017-11-04

## 2017-11-03 RX ORDER — POTASSIUM CHLORIDE 20 MEQ/1
20 TABLET, EXTENDED RELEASE ORAL
Status: DISCONTINUED | OUTPATIENT
Start: 2017-11-04 | End: 2017-11-05 | Stop reason: HOSPADM

## 2017-11-03 RX ORDER — CHLORAL HYDRATE 500 MG
4000 CAPSULE ORAL
Status: DISCONTINUED | OUTPATIENT
Start: 2017-11-03 | End: 2017-11-05 | Stop reason: HOSPADM

## 2017-11-03 RX ORDER — HEPARIN SODIUM 1000 [USP'U]/ML
7600 INJECTION, SOLUTION INTRAVENOUS; SUBCUTANEOUS ONCE
Status: COMPLETED | OUTPATIENT
Start: 2017-11-03 | End: 2017-11-03

## 2017-11-03 RX ORDER — B-COMPLEX WITH VITAMIN C
1 TABLET ORAL
Status: DISCONTINUED | OUTPATIENT
Start: 2017-11-04 | End: 2017-11-05 | Stop reason: HOSPADM

## 2017-11-03 RX ORDER — ONDANSETRON 2 MG/ML
4 INJECTION INTRAMUSCULAR; INTRAVENOUS EVERY 6 HOURS PRN
Status: DISCONTINUED | OUTPATIENT
Start: 2017-11-03 | End: 2017-11-05 | Stop reason: HOSPADM

## 2017-11-03 RX ORDER — LORATADINE 10 MG/1
10 TABLET ORAL
COMMUNITY
End: 2018-10-13 | Stop reason: HOSPADM

## 2017-11-03 RX ORDER — LORATADINE 10 MG/1
10 TABLET ORAL DAILY
Status: DISCONTINUED | OUTPATIENT
Start: 2017-11-04 | End: 2017-11-05 | Stop reason: HOSPADM

## 2017-11-03 RX ADMIN — HEPARIN SODIUM 7600 UNITS: 1000 INJECTION, SOLUTION INTRAVENOUS; SUBCUTANEOUS at 19:39

## 2017-11-03 RX ADMIN — Medication 4000 MG: at 19:39

## 2017-11-03 RX ADMIN — HEPARIN SODIUM AND DEXTROSE 18 UNITS/KG/HR: 10000; 5 INJECTION INTRAVENOUS at 19:32

## 2017-11-03 NOTE — H&P
History and Physical - [facility] Internal Medicine    Patient Information: Elisabeth Schumacher 78 y o  female MRN: [id number]  Unit/Bed#: -01 Encounter: [id number]  Admitting Physician: Mera Caraballo PA-C  PCP: Mary Sheth DO  Date of Admission:  11/03/17    Assessment/Plan:    Hospital Problem List:     Principal Problem:    Arm DVT (deep venous thromboembolism), acute, left ([facility] )  Active Problems:    Lymphoma ([facility] )    Essential hypertension      Plan for the Primary Problem(s):  · Acute Left Arm DVT  · Admit to med/surg under inpatient status   · POA: left arm DVT diagnosed with outpatient doppler likely 2/2 to lymphoma diagnosis and port placement   · Consult Hematology as requested  · Check routine labs   · Start VTE/PE Heparin order by weight   · PT/INR, PTT as per protocol   · Further management as per anticoagulation   · May need new port placement if cannot be salvaged     Plan for Additional Problems:   · Lymphoma   · Follows Dr Vincent Kayser, on [facility] chemotherapy   · HTN  · Monitor BP   · Continue HCTZ  · Continue Verapamil     VTE Prophylaxis: Heparin Drip  / sequential compression device   Code Status: DNR/DNI  POLST: POLST form is not discussed and not completed at this time  Anticipated Length of Stay:  Patient will be admitted on an Inpatient basis with an anticipated length of stay of  Greater than 2 midnights  Justification for Hospital Stay: IV heparin     Total Time for Visit, including Counseling / Coordination of Care: 1 hour  Greater than 50% of this total time spent on direct patient counseling and coordination of care  Chief Complaint:   Left Arm Swelling     History of Present Illness:    Elisabeth Schumacher is a 78 y o  female with a history of HTN and Lymphoma who presents with left arm swelling for the last few days  She had been evaluated by Dr Vincent Kayser who ordered an upper arm doppler which showed a DVT likely 2/2 to the port placement which was compromising the port's flow   She reports that her arm feels heavy as well as that it appears slightly red to her  She denies any numbness or tingling  She denies any change in color as far as pale, blue, or black  She does report that her veins appear dilated  She denies any chest pain, pressure, palpitations, difficulty breathing, wheezing, cough, or hemoptysis  She denies neck pain, swelling, or difficulty swallowing  She denies fevers or chills  She denies a history of blood clots  Review of Systems:    Review of Systems   Constitutional: Negative for appetite change, chills, diaphoresis, fatigue and fever  HENT: Negative for congestion, rhinorrhea and sore throat  Eyes: Negative for visual disturbance  Respiratory: Negative for cough, chest tightness, shortness of breath and wheezing  Cardiovascular: Negative for chest pain, palpitations and leg swelling  Gastrointestinal: Negative for abdominal pain, constipation, diarrhea, nausea and vomiting  Genitourinary: Negative for dysuria  Musculoskeletal: Negative for arthralgias and myalgias  Skin: Positive for color change  Neurological: Negative for dizziness, syncope, weakness, light-headedness, numbness and headaches  All other systems reviewed and are negative  Past Medical and Surgical History:     Past Medical History:   Diagnosis Date    Acid reflux     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HCC)     radiation    [place] (hard of hearing)     b/l ears    Hypertension     Left bundle branch block     Osteoarthritis     Plantar fasciitis     Shortness of breath     when walking up stairs       Past Surgical History:   Procedure Laterality Date    ANKLE ARTHROPLASTY Left     BLEPHAROPLASTY      right eye     CATARACT EXTRACTION Right     ESOPHAGEAL DILATION      x2    ESOPHAGOGASTRODUODENOSCOPY      dilitation    FACIAL/NECK BIOPSY Right 9/8/2017    Procedure: NECK NODE BIOPSY WITH NEEDLE LOCALIZATION; LYMPHOMA PROTOCOL (NEEDLE LOC WITH I R  AT 1100);   Surgeon: Sheree Bravo Oly Rose MD;  [facility];  Service: Surgical Oncology    HYSTERECTOMY      LYMPH NODE DISSECTION      right groin and neck    ORBITAL FLOOR EXPLORATION Right     for cancer     PORTACATH PLACEMENT      left chest     MD BX/REMV,LYMPH NODE,DEEP AXILL Left 8/14/2017    Procedure: Axillary lymph node excision with LYMPHOMA PROTOCOL;  Surgeon: Van Goins MD;  [facility];  Service: Surgical Oncology    MD BX/REMV,LYMPH NODE,DEEP CERV Right 5/2/2016    Procedure: NECK NODE BIOPSY;  Surgeon: Van Goins MD;  [facility];  Service: Surgical Oncology    MD INSJ TUNNELED CTR VAD W/SUBQ PORT AGE 5 YR/> N/A 10/3/2017    Procedure: PLACEMENT OF PORT-A-CATH DEVICE;  Surgeon: aVn Goins MD;  [facility];  Service: Surgical Oncology       Meds/Allergies:    Prior to Admission medications    Medication Sig Start Date End Date Taking?  Authorizing Provider   allopurinol (ZYLOPRIM) 300 mg tablet Take 300 mg by mouth daily   Yes Historical Provider, MD   ascorbic acid (VITAMIN C) 500 mg tablet Take 500 mg by mouth daily in the early morning     Yes Historical Provider, MD   Calcium Carb-Cholecalciferol (CALCIUM + D3) 600-200 MG-UNIT TABS Take 1 tablet by mouth daily in the early morning     Yes Historical Provider, MD   CRANBERRY EXTRACT PO Take 2 tablets by mouth daily   Yes Historical Provider, MD   Cyanocobalamin (VITAMIN B 12 PO) Take by mouth   Yes Historical Provider, MD   hydrochlorothiazide (HYDRODIURIL) 25 mg tablet Take 25 mg by mouth daily in the early morning     Yes Historical Provider, MD   loratadine (CLARITIN) 10 mg tablet Take 10 mg by mouth daily   Yes Historical Provider, MD   Multiple Vitamin (MULTIVITAMIN) capsule Take 1 capsule by mouth daily in the early morning     Yes Historical Provider, MD   Omega-3 Fatty Acids (FISH OIL) 1200 MG CAPS Take 3 capsules by mouth daily with dinner     Yes Historical Provider, MD   omeprazole (PriLOSEC) 40 MG capsule Take 40 mg by mouth daily in the early morning     Yes Historical Provider, MD   Potassium Chloride Tiffanie CR (KLOR-CON M20 PO) Take 20 mEq by mouth daily in the early morning     Yes Historical Provider, MD   verapamil (COVERA HS) 180 MG (CO) 24 hr tablet Take 180 mg by mouth every morning     Yes Historical Provider, MD   ondansetron (ZOFRAN) 8 mg tablet Take by mouth as needed for nausea or vomiting    Historical Provider, MD     I have reviewed home medications with patient personally  Allergies: Allergies   Allergen Reactions    Aspirin Anaphylaxis    Celebrex [Celecoxib] Anaphylaxis    Nsaids Anaphylaxis    Other Anaphylaxis     Pt states when she received a certain type of chemotherapy it caused her throat to close    Rituxan [Rituximab] Anaphylaxis     Swelling of tongue and closing of throat    Bactrim [Sulfamethoxazole-Trimethoprim] Other (See Comments)     VERY EMOTIONAL/CRYING    Sulfa Antibiotics Other (See Comments)     VERY EMOTIONAL CRYING       Social History:     Marital Status: /Civil Union   Occupation: Noncontributory   Patient Pre-hospital Living Situation: Home with    Patient Pre-hospital Level of Mobility: Full   Patient Pre-hospital Diet Restrictions: None  Substance Use History:   History   Alcohol Use No     History   Smoking Status    Former Smoker    Quit date: 1970   Smokeless Tobacco    Never Used     History   Drug Use No       Family History:    No family history on file  Physical Exam:     Vitals:   Blood Pressure: (!) 179/78 (11/03/17 1759)  Pulse: (!) 108 (11/03/17 1759)  Temperature: 98 1 °F (36 7 °C) (11/03/17 1759)  Temp Source: Oral (11/03/17 1759)  Respirations: 18 (11/03/17 1759)  Height: 5' 3" (160 cm) (11/03/17 1759)  Weight - Scale: 96 6 kg (213 lb) (11/03/17 1759)  SpO2: 92 % (11/03/17 1759)    Physical Exam   Constitutional: She is oriented to person, place, and time  Vital signs are normal  She appears well-developed and well-nourished    Non-toxic appearance  No distress  HENT:   Head: Normocephalic and atraumatic  Mouth/Throat: Mucous membranes are not dry  No oropharyngeal exudate, posterior oropharyngeal edema, posterior oropharyngeal erythema or tonsillar abscesses  Eyes: Conjunctivae and EOM are normal  Pupils are equal, round, and reactive to light  No scleral icterus  Pupils are equal    Neck: Phonation normal  Neck supple  No tracheal deviation present  No thyroid mass present  Cardiovascular: Normal rate, regular rhythm, S1 normal, S2 normal, normal heart sounds and intact distal pulses  Exam reveals no S3 and no S4  No murmur heard  Pulses:       Radial pulses are 2+ on the left side  Pulmonary/Chest: Effort normal and breath sounds normal  No accessory muscle usage or stridor  No respiratory distress  She has no decreased breath sounds  She has no wheezes  She has no rhonchi  She has no rales  She exhibits no tenderness  Abdominal: Soft  Bowel sounds are normal  She exhibits no distension and no mass  There is no tenderness  There is no rigidity, no rebound and no guarding  Neurological: She is alert and oriented to person, place, and time  She has normal strength  She displays no tremor  No cranial nerve deficit or sensory deficit  She displays no seizure activity  Skin: Skin is warm and dry  Additional Data:     Lab Results: I have personally reviewed pertinent reports  Invalid input(s): LABALBU        Imaging: I have personally reviewed pertinent reports  Ct Bone Marrow Biopsy And Aspiration    Result Date: 10/9/2017  Narrative: CT-scan guided bone marrow aspiration and core biopsy History: Lymphoma Technique: The patient was brought to the CT scanner and placed prone on the table  After axial images were obtained through the pelvis, an area of the skin was then marked, prepped, and draped in usual sterile fashion   Lidocaine was administered to the skin, and subcutaneous tissues down to the periosteum of the left  ileum, and a small skin incision was made  A 11-gauge core biopsy needle was advanced through the cortex, and a bone marrow aspiration was performed  The specimen was given to the cytotech to prepare, and was found to be adequate  A core biopsy was then performed  The bone core was placed in formalin  The patient tolerated the procedure well and suffered no complications  Impression: Impression: Successful percutaneous bone marrow aspiration and bone core biopsy of left ileum, yielding a specimen adequate for evaluation   A full pathology report will follow  Workstation performed: [id number]       EKG, Pathology, and Other Studies Reviewed on Admission:   · EKG: None    Allscripts Records Reviewed: Yes     ** Please Note: Dragon 360 Dictation voice to text software may have been used in the creation of this document   **

## 2017-11-03 NOTE — PLAN OF CARE
DISCHARGE PLANNING     Discharge to home or other facility with appropriate resources Progressing        Knowledge Deficit     Patient/family/caregiver demonstrates understanding of disease process, treatment plan, medications, and discharge instructions Progressing        PAIN - ADULT     Verbalizes/displays adequate comfort level or baseline comfort level Progressing        Potential for Falls     Patient will remain free of falls Progressing        SAFETY ADULT     Patient will remain free of falls Progressing

## 2017-11-04 LAB
ANION GAP SERPL CALCULATED.3IONS-SCNC: 8 MMOL/L (ref 4–13)
APTT PPP: 119 SECONDS (ref 23–35)
APTT PPP: >210 SECONDS (ref 23–35)
BUN SERPL-MCNC: 12 MG/DL (ref 5–25)
CALCIUM SERPL-MCNC: 9.5 MG/DL (ref 8.3–10.1)
CHLORIDE SERPL-SCNC: 103 MMOL/L (ref 100–108)
CO2 SERPL-SCNC: 27 MMOL/L (ref 21–32)
CREAT SERPL-MCNC: 0.85 MG/DL (ref 0.6–1.3)
GFR SERPL CREATININE-BSD FRML MDRD: 68 ML/MIN/1.73SQ M
GLUCOSE SERPL-MCNC: 167 MG/DL (ref 65–140)
POTASSIUM SERPL-SCNC: 3.4 MMOL/L (ref 3.5–5.3)
SODIUM SERPL-SCNC: 138 MMOL/L (ref 136–145)

## 2017-11-04 PROCEDURE — 85730 THROMBOPLASTIN TIME PARTIAL: CPT | Performed by: PHYSICIAN ASSISTANT

## 2017-11-04 PROCEDURE — 80048 BASIC METABOLIC PNL TOTAL CA: CPT | Performed by: PHYSICIAN ASSISTANT

## 2017-11-04 RX ORDER — LANOLIN ALCOHOL/MO/W.PET/CERES
6 CREAM (GRAM) TOPICAL
Status: DISCONTINUED | OUTPATIENT
Start: 2017-11-04 | End: 2017-11-05 | Stop reason: HOSPADM

## 2017-11-04 RX ADMIN — OXYCODONE HYDROCHLORIDE AND ACETAMINOPHEN 500 MG: 500 TABLET ORAL at 11:56

## 2017-11-04 RX ADMIN — PANTOPRAZOLE SODIUM 40 MG: 40 TABLET, DELAYED RELEASE ORAL at 05:52

## 2017-11-04 RX ADMIN — Medication 4000 MG: at 15:58

## 2017-11-04 RX ADMIN — OYSTER SHELL CALCIUM WITH VITAMIN D 1 TABLET: 500; 200 TABLET, FILM COATED ORAL at 11:58

## 2017-11-04 RX ADMIN — MELATONIN 6 MG: 3 TAB ORAL at 21:31

## 2017-11-04 RX ADMIN — MELATONIN 6 MG: 3 TAB ORAL at 01:48

## 2017-11-04 RX ADMIN — ALLOPURINOL 300 MG: 100 TABLET ORAL at 08:49

## 2017-11-04 RX ADMIN — HEPARIN SODIUM AND DEXTROSE 12 UNITS/KG/HR: 10000; 5 INJECTION INTRAVENOUS at 11:49

## 2017-11-04 RX ADMIN — VERAPAMIL HYDROCHLORIDE 180 MG: 180 TABLET, FILM COATED, EXTENDED RELEASE ORAL at 08:49

## 2017-11-04 RX ADMIN — Medication 1 TABLET: at 11:56

## 2017-11-04 RX ADMIN — NYSTATIN 500000 UNITS: 100000 SUSPENSION ORAL at 17:24

## 2017-11-04 RX ADMIN — ACETAMINOPHEN 650 MG: 325 TABLET ORAL at 15:58

## 2017-11-04 RX ADMIN — HYDROCHLOROTHIAZIDE 25 MG: 25 TABLET ORAL at 08:49

## 2017-11-04 RX ADMIN — NYSTATIN 500000 UNITS: 100000 SUSPENSION ORAL at 13:37

## 2017-11-04 RX ADMIN — ENOXAPARIN SODIUM 100 MG: 100 INJECTION SUBCUTANEOUS at 17:00

## 2017-11-04 RX ADMIN — LORATADINE 10 MG: 10 TABLET ORAL at 08:49

## 2017-11-04 RX ADMIN — NYSTATIN 500000 UNITS: 100000 SUSPENSION ORAL at 21:31

## 2017-11-04 RX ADMIN — POTASSIUM CHLORIDE 20 MEQ: 1500 TABLET, EXTENDED RELEASE ORAL at 05:52

## 2017-11-04 NOTE — PROGRESS NOTES
Addendum Lovenox pricing determined fairly affordable  Discontinue IV heparin to initiate Lovenox 1 mg/kg b i d  obtain CBC in the a m  for follow-up with thrombocytopenia  Progress Note - Joy Ek 78 y o  female MRN: [id number]    Unit/Bed#: -01 Encounter: [id number]        * Arm DVT (deep venous thromboembolism), acute, left (HCC)   Assessment & Plan    · Left arm edematous mildly tender to palpation port mildly erythematous nontender to palpation  · Outpatient upper extremity ultrasound reveals acute DVT occlusion at subclavian vein with superficial thrombophlebitis cephalic vein  · Hematology/oncology consulted appreciate recommendations in regards to oral anticoagulant spoke with oncall hematologist/oncologist Dr Bishnu Reich recommended Lovenox 1 mg/kg b i d  for 30 days to follow up CBC on 11/6 2017  with Dr Merlin Stevens  Spoke with cm in regards to pricing  Continue heparin drip until final cost is confirmed then transition to Lovenox b i d  therapeutic dosing  Essential hypertension   Assessment & Plan    · Stable BP  · Continue home medication- hydrochlorothiazide, verapamil        Lymphoma ([facility] [address] )   Assessment & Plan    · B-cell lymphoma currently receiving chemotherapy treatment  · Follow-up infusion on 11/13/2017  Follows Dr Merlin Stevens outpatient  Continue to monitor CBC            VTE Pharmacologic Prophylaxis:   Pharmacologic: Heparin Drip  Mechanical VTE Prophylaxis in Place: Yes    Patient Centered Rounds: I have performed bedside rounds with nursing staff today  Discussions with Specialists or Other Care Team Provider:  RN, leticia, hematology/oncology    Education and Discussions with Family / Patient:  Discussed with patient and patient's son at the bedside in regards to continuation of heparin drip and options for oral anticoagulant versus subcutaneous anti coagulant  Time Spent for Care: 20 minutes    More than 50% of total time spent on counseling and coordination of care as described above  Current Length of Stay: 1 day(s)    Current Patient Status: Inpatient   Certification Statement: The patient will continue to require additional inpatient hospital stay due to Thrombocytopenia current Kristin on heparin IV transition for subcutaneous Lovenox awaiting pricing  Discharge Plan:  Likely discharge in the a  m possible transition to subcutaneous Lovenox from IV heparin for acute subclavian DVT  disposition home with VNA  Code Status: Level 3 - DNAR and DNI      Subjective:   Patient was seen and examined at the bedside with RN and patient's son this a m  Patient admitted to sleeping well overnight without fevers or chills, chest pain, shortness of breath, palpitations, diaphoresis, abdominal pain, nausea vomiting or diarrhea  Patient stated increased size in left upper extremity without tenderness  Patient was able to tolerate all meals well and able to ambulate around room with ease  Objective:     Vitals:   Temp (24hrs), Av 1 °F (36 7 °C), Min:98 1 °F (36 7 °C), Max:98 1 °F (36 7 °C)    HR:  [] 87  Resp:  [18] 18  BP: (103-179)/(54-78) 121/60  SpO2:  [92 %-97 %] 97 %  Body mass index is 37 73 kg/m²  Input and Output Summary (last 24 hours): Intake/Output Summary (Last 24 hours) at 17 1301  Last data filed at 17 0930   Gross per 24 hour   Intake              240 ml   Output                0 ml   Net              240 ml       Physical Exam:     Physical Exam   Constitutional: She is oriented to person, place, and time  She appears well-developed  No distress  HENT:   Head: Normocephalic and atraumatic  Mouth/Throat: Oropharynx is clear and moist  No oropharyngeal exudate  Eyes: Conjunctivae and EOM are normal  Pupils are equal, round, and reactive to light  Right eye exhibits no discharge  Left eye exhibits no discharge  No scleral icterus  Neck: Normal range of motion  Neck supple  No JVD present  No tracheal deviation present  Thyromegaly present  Cardiovascular: Normal rate, regular rhythm and intact distal pulses  Exam reveals no gallop and no friction rub  No murmur heard  DP pulses present bilaterally, 2+ bilateral lower extremity edema   Pulmonary/Chest: No respiratory distress  She has no wheezes  She has no rales  She exhibits tenderness  Abdominal: Soft  Bowel sounds are normal  She exhibits no distension and no mass  There is tenderness  There is no rebound and no guarding  Musculoskeletal: Normal range of motion  She exhibits edema  She exhibits no tenderness or deformity  Neurological: She is alert and oriented to person, place, and time  She displays normal reflexes  No cranial nerve deficit  Coordination normal    Skin: Skin is warm  No rash noted  She is not diaphoretic  No erythema  No pallor  Psychiatric: Her behavior is normal  Thought content normal        Additional Data:     Labs:      Results from last 7 days  Lab Units 11/03/17  1852   WBC Thousand/uL 3 85*   HEMOGLOBIN g/dL 11 9   HEMATOCRIT % 37 2   PLATELETS Thousands/uL 74*       Results from last 7 days  Lab Units 11/04/17  0818   SODIUM mmol/L 138   POTASSIUM mmol/L 3 4*   CHLORIDE mmol/L 103   CO2 mmol/L 27   BUN mg/dL 12   CREATININE mg/dL 0 85   CALCIUM mg/dL 9 5   GLUCOSE RANDOM mg/dL 167*       Results from last 7 days  Lab Units 11/03/17  1852   INR  0 90       * I Have Reviewed All Lab Data Listed Above  * Additional Pertinent Lab Tests Reviewed:  [facility] Admission Reviewed    Imaging:    Imaging Reports Reviewed Today Include:  Left upper extremity Doppler  Imaging Personally Reviewed by Myself Includes:  Left upper extremity to    Recent Cultures (last 7 days):           Last 24 Hours Medication List:     allopurinol 300 mg Oral Daily   ascorbic acid 500 mg Oral Early Morning   calcium carbonate-vitamin D 1 tablet Oral Daily With Breakfast   fish oil 4,000 mg Oral Daily With Dinner   hydrochlorothiazide 25 mg Oral Early Morning   loratadine 10 mg Oral Daily   melatonin 6 mg Oral HS   multivitamin-minerals 1 tablet Oral Early Morning   pantoprazole 40 mg Oral Early Morning   potassium chloride 20 mEq Oral Early Morning   verapamil 180 mg Oral Daily        Today, Patient Was Seen By: Tsering Pino PA-C    ** Please Note: Dragon 360 Dictation voice to text software may have been used in the creation of this document   **

## 2017-11-04 NOTE — ASSESSMENT & PLAN NOTE
· Left arm edematous mildly tender to palpation port mildly erythematous nontender to palpation  · Outpatient upper extremity ultrasound reveals acute DVT occlusion at subclavian vein with superficial thrombophlebitis cephalic vein  · Hematology/oncology consulted appreciate recommendations in regards to oral anticoagulant spoke with oncall hematologist/oncologist Dr Gerber Bustos recommended Lovenox 1 mg/kg b i d  for 30 days to follow up CBC on 11/6 2017  with Dr Irish Morfin  Spoke with  in regards to pricing  Continue heparin drip until final cost is confirmed then transition to Lovenox b i d  therapeutic dosing

## 2017-11-04 NOTE — CASE MANAGEMENT
Initial Clinical Review    Admission: Date/Time/Statement: 11/3/17 @ 1746     Orders Placed This Encounter   Procedures    Inpatient Admission     Standing Status:   Standing     Number of Occurrences:   1     Order Specific Question:   Admitting Physician     Answer:   Gilda Fields     Order Specific Question:   Level of Care     Answer:   Med Surg [16]     Order Specific Question:   Estimated length of stay     Answer:   More than 2 Midnights     Order Specific Question:   Certification     Answer:   I certify that inpatient services are medically necessary for this patient for a duration of greater than two midnights  See H&P and MD Progress Notes for additional information about the patient's course of treatment  ED: Date/Time/Mode of Arrival:   ED Arrival Information     Patient not seen in ED                       Chief Complaint: No chief complaint on file  History of Illness: Nguyen Schneider is a 78 y o  female with a history of HTN and Lymphoma who presents with left arm swelling for the last few days  She had been evaluated by Dr Sanjay Nieves who ordered an upper arm doppler which showed a DVT likely 2/2 to the port placement which was compromising the port's flow  She reports that her arm feels heavy as well as that it appears slightly red to her  She denies any numbness or tingling  She denies any change in color as far as pale, blue, or black  She does report that her veins appear dilated  She denies any chest pain, pressure, palpitations, difficulty breathing, wheezing, cough, or hemoptysis  She denies neck pain, swelling, or difficulty swallowing  She denies fevers or chills  She denies a history of blood clots  Left arm is noted to be redder and slightly edematous   There are slight dialted vessels noted throughout  ED Vital Signs:   ED Triage Vitals [11/03/17 1759]   Temperature Pulse Respirations Blood Pressure SpO2   98 1 °F (36 7 °C) (!) 108 18 (!) 179/78 92 %      Temp Source Heart Rate Source Patient Position - Orthostatic VS BP Location FiO2 (%)   Oral -- Lying Right arm --      Pain Score       No Pain        Wt Readings from Last 1 Encounters:   11/03/17 96 6 kg (213 lb)       Vital Signs (abnormal): Abnormal Labs/Diagnostic Test Results: WC 3 85 PLT 74 , PT/INR 12 4/0 90 PTT 30  Vascular upper limb duplex   LEFT UPPER LIMB:  There is evidence of acute mostly occlusive deep vein thrombosis noted in the  subclavian vein  There is evidence of superficial thrombophlebitis noted in the cephalic vein  around the port and proximal basilic vein  Doppler evaluation shows a normal response to augmentation maneuvers  ED Treatment:   Medication Administration - No Administrations Displayed (No Start Event Found)     None          Past Medical/Surgical History: Active Ambulatory Problems     Diagnosis Date Noted    Enlarged lymph node in neck 05/02/2016    Lymphadenopathy, axillary 08/14/2017    Lymphoma ([facility] ) 10/03/2017     Resolved Ambulatory Problems     Diagnosis Date Noted    No Resolved Ambulatory Problems     Past Medical History:   Diagnosis Date    Acid reflux     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HCC)     [place] (hard of hearing)     Hypertension     Left bundle branch block     Osteoarthritis     Plantar fasciitis     Shortness of breath        Admitting Diagnosis: [facility] [C[facility] [id number]]    Age/Sex: 78 y o  female    PER MD     Principal Problem:    Arm DVT (deep venous thromboembolism), acute, left (HCC)  Active Problems:    Lymphoma (HCC)    Essential hypertension  Plan for the Primary Problem(s):  · Acute Left Arm DVT  ? Admit to med/surg under inpatient status   ? POA: left arm DVT diagnosed with outpatient doppler likely 2/2 to lymphoma diagnosis and port placement   ? Consult Hematology as requested  ? Check routine labs   ? Start VTE/PE Heparin order by weight   ? PT/INR, PTT as per protocol   ? Further management as per anticoagulation   ?  May need new port placement if cannot be salvaged   Plan for Additional Problems:   · Lymphoma   ? Follows Dr Kristyn Almanzar, on [facility] chemotherapy   · HTN  ? Monitor BP   ? Continue HCTZ  ? Continue Verapamil      VTE Prophylaxis: Heparin Drip  / sequential compression device   Code Status: DNR/DNI  POLST: POLST form is not discussed and not completed at this time    Anticipated Length of Stay:  Patient will be admitted on an Inpatient basis with an anticipated length of stay of  Greater than 2 midnights     Justification for Hospital Stay: IV heparin       Admission Orders:  [facility]  CONSULT HEMATOLOGY    Scheduled Meds:   allopurinol 300 mg Oral Daily   ascorbic acid 500 mg Oral Early Morning   calcium carbonate-vitamin D 1 tablet Oral Daily With Breakfast   fish oil 4,000 mg Oral Daily With Dinner   hydrochlorothiazide 25 mg Oral Early Morning   loratadine 10 mg Oral Daily   melatonin 6 mg Oral HS   multivitamin-minerals 1 tablet Oral Early Morning   pantoprazole 40 mg Oral Early Morning   potassium chloride 20 mEq Oral Early Morning   verapamil 180 mg Oral Daily     Continuous Infusions:   heparin (porcine) 3-30 Units/kg/hr (Order-Specific) Last Rate: Stopped (11/04/17 1045)     PRN Meds:   acetaminophen    heparin (porcine)    heparin (porcine)    ondansetron

## 2017-11-04 NOTE — ASSESSMENT & PLAN NOTE
· B-cell lymphoma currently receiving chemotherapy treatment  · Follow-up infusion on 11/13/2017  Follows Dr Roni Espinosa outpatient    Continue to monitor CBC

## 2017-11-05 VITALS
DIASTOLIC BLOOD PRESSURE: 56 MMHG | SYSTOLIC BLOOD PRESSURE: 114 MMHG | RESPIRATION RATE: 18 BRPM | WEIGHT: 213 LBS | HEIGHT: 63 IN | TEMPERATURE: 98.8 F | OXYGEN SATURATION: 96 % | BODY MASS INDEX: 37.74 KG/M2 | HEART RATE: 80 BPM

## 2017-11-05 LAB
ANION GAP SERPL CALCULATED.3IONS-SCNC: 8 MMOL/L (ref 4–13)
BUN SERPL-MCNC: 12 MG/DL (ref 5–25)
CALCIUM SERPL-MCNC: 9.7 MG/DL (ref 8.3–10.1)
CHLORIDE SERPL-SCNC: 106 MMOL/L (ref 100–108)
CO2 SERPL-SCNC: 30 MMOL/L (ref 21–32)
CREAT SERPL-MCNC: 0.87 MG/DL (ref 0.6–1.3)
ERYTHROCYTE [DISTWIDTH] IN BLOOD BY AUTOMATED COUNT: 16.8 % (ref 11.6–15.1)
GFR SERPL CREATININE-BSD FRML MDRD: 66 ML/MIN/1.73SQ M
GLUCOSE SERPL-MCNC: 99 MG/DL (ref 65–140)
HCT VFR BLD AUTO: 35.6 % (ref 34.8–46.1)
HGB BLD-MCNC: 11.2 G/DL (ref 11.5–15.4)
MCH RBC QN AUTO: 29 PG (ref 26.8–34.3)
MCHC RBC AUTO-ENTMCNC: 31.5 G/DL (ref 31.4–37.4)
MCV RBC AUTO: 92 FL (ref 82–98)
PLATELET # BLD AUTO: 101 THOUSANDS/UL (ref 149–390)
PMV BLD AUTO: 10.8 FL (ref 8.9–12.7)
POTASSIUM SERPL-SCNC: 3.7 MMOL/L (ref 3.5–5.3)
RBC # BLD AUTO: 3.86 MILLION/UL (ref 3.81–5.12)
SODIUM SERPL-SCNC: 144 MMOL/L (ref 136–145)
WBC # BLD AUTO: 6.23 THOUSAND/UL (ref 4.31–10.16)

## 2017-11-05 PROCEDURE — 80048 BASIC METABOLIC PNL TOTAL CA: CPT | Performed by: PHYSICIAN ASSISTANT

## 2017-11-05 PROCEDURE — 85027 COMPLETE CBC AUTOMATED: CPT | Performed by: PHYSICIAN ASSISTANT

## 2017-11-05 RX ORDER — LANOLIN ALCOHOL/MO/W.PET/CERES
6 CREAM (GRAM) TOPICAL
Qty: 60 TABLET | Refills: 0 | Status: SHIPPED | OUTPATIENT
Start: 2017-11-05 | End: 2017-12-11 | Stop reason: HOSPADM

## 2017-11-05 RX ADMIN — PANTOPRAZOLE SODIUM 40 MG: 40 TABLET, DELAYED RELEASE ORAL at 05:09

## 2017-11-05 RX ADMIN — HYDROCHLOROTHIAZIDE 25 MG: 25 TABLET ORAL at 08:10

## 2017-11-05 RX ADMIN — LORATADINE 10 MG: 10 TABLET ORAL at 08:10

## 2017-11-05 RX ADMIN — POTASSIUM CHLORIDE 20 MEQ: 1500 TABLET, EXTENDED RELEASE ORAL at 05:09

## 2017-11-05 RX ADMIN — NYSTATIN 500000 UNITS: 100000 SUSPENSION ORAL at 08:10

## 2017-11-05 RX ADMIN — ENOXAPARIN SODIUM 100 MG: 100 INJECTION SUBCUTANEOUS at 08:10

## 2017-11-05 RX ADMIN — ALLOPURINOL 300 MG: 100 TABLET ORAL at 08:10

## 2017-11-05 RX ADMIN — VERAPAMIL HYDROCHLORIDE 180 MG: 180 TABLET, FILM COATED, EXTENDED RELEASE ORAL at 08:11

## 2017-11-05 NOTE — DISCHARGE INSTRUCTIONS
Please obtain CBC with differential on Tuesday 11/07/2017 and follow up with Dr Madelin Quinonez on 11/13/2017  Deep Venous Thrombosis   WHAT YOU NEED TO KNOW:   Deep venous thrombosis (DVT) is a blood clot that forms in a deep vein of the body  The deep veins in the legs, thighs, and hips are the most common sites for DVT  DVT can also occur in a deep vein within your arms  The clot prevents the normal flow of blood in the vein  The blood backs up and causes pain and swelling  The DVT can break into smaller pieces and travel to your lungs and cause a blockage called a pulmonary embolism  A pulmonary embolism can become life-threatening  DISCHARGE INSTRUCTIONS:   Call 911 for any of the following:   · You feel lightheaded, short of breath, and have chest pain  · You cough up blood  Seek care immediately if:   · Your symptoms get worse  · You develop new DVT symptoms in another leg or arm  Contact your healthcare provider if:   · Your gums or nose bleed  · You see blood in your urine or bowel movements  · Your bowel movements are black or darker than normal     · You have questions or concerns about your conditions or care  Medicines:   · Blood thinners  help prevent the DVT from getting bigger and prevent new clots from forming  Examples of blood thinners include heparin, rivaroxaban, apixiban, and warfarin  The following are general safety guidelines to follow while you are taking a blood thinner:     ¨ Watch for bleeding and bruising  Watch for bleeding from your gums or nose  Watch for blood in your urine and bowel movements  Use a soft washcloth on your skin, and a soft toothbrush to brush your teeth  This can keep your skin and gums from bleeding  If you shave, use an electric shaver  Do not play contact sports  ¨ Tell your dentist and other healthcare providers that you take a blood thinner  Wear a bracelet or necklace that says you take this medicine       ¨ Do not start or stop any medicines unless your healthcare provider tells you to  Many medicines cannot be used with blood thinners  ¨ Tell your healthcare provider right away if you forget to take the blood thinner , or if you take too much  ¨ Warfarin  is a blood thinner that you may need to take  The following are additional things you should be aware of if you take warfarin:    § Foods and medicines can affect the amount of warfarin in your blood  Do not make major changes to your diet  Warfarin works best when you eat about the same amount of vitamin K every day  Vitamin K is found in green leafy vegetables and certain other foods  Ask for more information about what to eat or not to eat  § You will need to see your healthcare provider for follow-up visits  You will need regular blood tests to decide how much warfarin you need  · Take your medicine as directed  Contact your healthcare provider if you think your medicine is not helping or if you have side effects  Tell him or her if you are allergic to any medicine  Keep a list of the medicines, vitamins, and herbs you take  Include the amounts, and when and why you take them  Bring the list or the pill bottles to follow-up visits  Carry your medicine list with you in case of an emergency  Manage your DVT:   · Wear pressure stockings  The stockings are tight and put pressure on your legs  This improves blood flow and helps prevent clots  Wear the stockings during the day  Do not wear them when you sleep  · Elevate your legs  above the level of your heart as often as you can  This will help decrease swelling and pain  Prop your legs on pillows or blankets to keep them elevated comfortably  · Exercise regularly  to help increase your blood flow  Walking is a good low-impact exercise  Talk to your healthcare provider about the best exercise plan for you  · Change body positions often    If you travel by car or work at a desk, move and stretch in your seat several times each hour  In an airplane, get up and walk every hour  If you are bedridden, ask for help to change your position every 1 to 2 hours  · Maintain a healthy weight  Ask your healthcare provider how much you should weigh  Ask him to help you create a weight loss plan if you are overweight  · Do not smoke  Nicotine and other chemicals in cigarettes and cigars can damage blood vessels and make it more difficult to manage your DVT  Ask your healthcare provider for information if you currently smoke and need help to quit  E-cigarettes or smokeless tobacco still contain nicotine  Talk to your healthcare provider before you use these products  Follow up with your healthcare provider as directed:  Write down your questions so you remember to ask them during your visits  © 2017 [address] [facility] Information is for End User's use only and may not be sold, redistributed or otherwise used for commercial purposes  All illustrations and images included in CareNotes® are the copyrighted property of [employer]  or Dionte Alfaro  The above information is an  only  It is not intended as medical advice for individual conditions or treatments  Talk to your doctor, nurse or pharmacist before following any medical regimen to see if it is safe and effective for you

## 2017-11-05 NOTE — DISCHARGE SUMMARY
Discharge Summary - [facility] Internal Medicine    Patient Information: Merary Garcia 78 y o  female MRN: [id number]  Unit/Bed#: -01 Encounter: [id number]    Discharging Physician / Practitioner: Carine Reyes PA-C  PCP: Sherif Pinto DO  Admission Date: 11/3/2017  Discharge Date: 11/05/17    Reason for Admission:  Left acute subclavian vein DVT    Discharge Diagnoses:     Principal Problem:    Arm DVT (deep venous thromboembolism), acute, left ([facility] [address] )  Active Problems:    Lymphoma ([facility] )    Essential hypertension  Resolved Problems:    * No resolved hospital problems  *      Consultations During Hospital Stay:  · Spoke with Dr Idalia Morales hematologist/oncologist on call in regards to appropriate anticoagulation for left subclavian vein DVT with possible occlusion recommended Lovenox subcutaneous 1 mg/kg b i d  Procedures Performed:     · none    Significant Findings / Test Results:     · None    Incidental Findings:   · none     Test Results Pending at Discharge (will require follow up):   · none     Outpatient Tests Requested:  · CBC with differential on 77/14/7812    Complications:  none    Hospital Course:     Merary Garcia is a 78 y o  female patient past medical history of hypertension and B-cell lymphoma currently on chemotherapy who originally presented to the hospital on 11/3/2017 due to left arm swelling X 3 days  Patient hitting increase left arm swelling with mild tenderness without numbness or tingling chest pain, shortness of breath, wheezing, cough, hemoptysis or palpitations  Patient was seen by outpatient hematologist/oncologist Mateus Junior prior to hospitalization who obtained left upper extremity venous Doppler which revealed possible occlusion of left subclavian vein DVT with thrombophlebitis of the cephalic vein and was directed to [facility] as a direct admission for 2 days stay acute subclavian DVT likely precipitated by port placement on 10/03/2017      On the medical-surgical floor, patient remained hemodynamically stable with a mild thrombocytopenia 74,000 with initiated on heparin IV and adequate access of port  Hematologist/oncologist on call Dr Anatoliy Curtis recommended anticoagulation with subcutaneous Lovenox 1 mg/kg b i d  given provoking factor was likely left chest port placement  Thrombocytopenia improved prior to discharge at 101,000 and patient was deemed medically stable for discharge to home with prescriptions for melatonin and enoxaparin 100 mg subcutaneous b i d  along with a CBC with differential order on Tuesday 11/07/2017 with close follow-up with Dr Bridget Milan  All questions were answered to the best of my abilities and patient was discharged home with  and son  Condition at Discharge: good     Discharge Day Visit / Exam:     Subjective:  Patient was seen and examined at the bedside with RN janine Dumont Patient admitted to improvement of left upper extremity edema with mild tenderness upon return increased abduction  Patient denies fevers or chills, shortness of breath, palpitations, chest pain, abdominal pain nausea vomiting  Patient asked when she was able to go home today  Vitals: Blood Pressure: 114/56 (11/05/17 0800)  Pulse: 80 (11/05/17 0800)  Temperature: 98 8 °F (37 1 °C) (11/05/17 0800)  Temp Source: Oral (11/05/17 0800)  Respirations: 18 (11/05/17 0800)  Height: 5' 3" (160 cm) (11/03/17 1759)  Weight - Scale: 96 6 kg (213 lb) (11/03/17 1759)  SpO2: 96 % (11/05/17 0800)  Exam:   Physical Exam   Constitutional: She is oriented to person, place, and time  She appears well-developed  No distress  Sitting upright in chair fairly comfortable in no acute distress   HENT:   Head: Normocephalic and atraumatic  Mouth/Throat: Oropharynx is clear and moist  No oropharyngeal exudate  Eyes: Conjunctivae and EOM are normal  Pupils are equal, round, and reactive to light  Right eye exhibits no discharge  Left eye exhibits no discharge  No scleral icterus  Neck: Normal range of motion  Neck supple  No JVD present  No tracheal deviation present  Thyromegaly present  Cardiovascular: Normal rate, regular rhythm and intact distal pulses  Exam reveals no gallop and no friction rub  Murmur heard  DP pulses present bilaterally, trace bilateral lower extremity edema   Pulmonary/Chest: Effort normal  No stridor  No respiratory distress  She has no wheezes  She has no rales  She exhibits no tenderness  Diminished breath sounds at the base bilaterally   Abdominal: Soft  Bowel sounds are normal  She exhibits no distension and no mass  There is no tenderness  There is no rebound and no guarding  Musculoskeletal: She exhibits edema  Left upper extremity edema nontender to palpation   Lymphadenopathy:     She has no cervical adenopathy  Neurological: She is alert and oriented to person, place, and time  She displays normal reflexes  No cranial nerve deficit  Coordination normal    5/5 upper and lower extremity strength, finger  5/5, sensation intact bilaterally, capillary refill upper extremities bilaterally +2   Skin: Skin is warm and dry  She is not diaphoretic  No pallor  Psychiatric: Her behavior is normal  Thought content normal        Discussion with Family:  Discussed with patient's son and  at the bedside in regards to continuation of anticoagulation with Lovenox and close follow-up with hematologist/oncologist Dr James Pappas  Patient was also by stat to repeat CBC with differential on Tuesday 11/07/2017  Discharge instructions/Information to patient and family:   See after visit summary for information provided to patient and family  Provisions for Follow-Up Care:  See after visit summary for information related to follow-up care and any pertinent home health orders        Disposition:     Home    For Discharges to [facility]:   · Not Applicable to this Patient - Not Applicable to this Patient    Planned Readmission: none     Discharge Statement:  I spent 35 minutes discharging the patient  This time was spent on the day of discharge  I had direct contact with the patient on the day of discharge  Greater than 50% of the total time was spent examining patient, answering all patient questions, arranging and discussing plan of care with patient as well as directly providing post-discharge instructions  Additional time then spent on discharge activities  Discharge Medications:  See after visit summary for reconciled discharge medications provided to patient and family        ** Please Note: This note has been constructed using a voice recognition system **

## 2017-11-06 NOTE — CASE MANAGEMENT
Kimber Velasco, RN Registered Nurse Signed   Case Management Date of Service: 11/4/2017 11:17 AM         []Hide copied text  Initial Clinical Review     Admission: Date/Time/Statement: 11/3/17 @ 1746            Orders Placed This Encounter   Procedures    Inpatient Admission       Standing Status:   Standing       Number of Occurrences:   1       Order Specific Question:   Admitting Physician       Answer:   Loreta Alvarez [id number] [[id number]]       Order Specific Question:   Level of Care       Answer:   Med Surg [[id number]]       Order Specific Question:   Estimated length of stay       Answer:   More than 2 Midnights       Order Specific Question:   Certification       Answer:   I certify that inpatient services are medically necessary for this patient for a duration of greater than two midnights  See H&P and MD Progress Notes for additional information about the patient's course of treatment             ED: Date/Time/Mode of Arrival:       ED Arrival Information          Patient not seen in ED                              Chief Complaint: No chief complaint on file         History of Illness: Lindsey Caballero a 78 y  o  female with a history of HTN and Lymphoma who presents with left arm swelling for the last few days  She had been evaluated by Dr Mikey Cruz who ordered an upper arm doppler which showed a DVT likely 2/2 to the port placement which was compromising the port's flow  She reports that her arm feels heavy as well as that it appears slightly red to her  She denies any numbness or tingling  She denies any change in color as far as pale, blue, or black  She does report that her veins appear dilated  She denies any chest pain, pressure, palpitations, difficulty breathing, wheezing, cough, or hemoptysis  She denies neck pain, swelling, or difficulty swallowing  She denies fevers or chills  She denies a history of blood clots  Left arm is noted to be redder and slightly edematous   There are slight dialted vessels noted throughout  ED Vital Signs:            ED Triage Vitals [11/03/17 1759]   Temperature Pulse Respirations Blood Pressure SpO2   98 1 °F (36 7 °C) (!) 108 18 (!) 179/78 92 %       Temp Source Heart Rate Source Patient Position - Orthostatic VS BP Location FiO2 (%)   Oral -- Lying Right arm --       Pain Score           No Pain                Wt Readings from Last 1 Encounters:   11/03/17 96 6 kg (213 lb)         Vital Signs (abnormal):      Abnormal Labs/Diagnostic Test Results: WC 3 85 PLT 74 , PT/INR 12 4/0 90 PTT 30  Vascular upper limb duplex   LEFT UPPER LIMB:  There is evidence of acute mostly occlusive deep vein thrombosis noted in the  subclavian vein  There is evidence of superficial thrombophlebitis noted in the cephalic vein  around the port and proximal basilic vein  Doppler evaluation shows a normal response to augmentation maneuvers         ED Treatment:       Medication Administration - No Administrations Displayed (No Start Event Found)      None             Past Medical/Surgical History: Active Ambulatory Problems     Diagnosis Date Noted    Enlarged lymph node in neck 05/02/2016    Lymphadenopathy, axillary 08/14/2017    Lymphoma ([facility] [address] ) 10/03/2017           Resolved Ambulatory Problems     Diagnosis Date Noted    No Resolved Ambulatory Problems           Past Medical History:   Diagnosis Date    Acid reflux      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HCC)      [place] (hard of hearing)      Hypertension      Left bundle branch block      Osteoarthritis      Plantar fasciitis      Shortness of breath           Admitting Diagnosis: Lymphoma (HCC) [C85 90]     Age/Sex: 78 y o  female     PER MD     Principal Problem:    Arm DVT (deep venous thromboembolism), acute, left (HCC)  Active Problems:    Lymphoma (HCC)    Essential hypertension  Plan for the Primary Problem(s):  · Acute Left Arm DVT  ? Admit to med/surg under inpatient status   ?  POA: left arm DVT diagnosed with outpatient doppler likely 2/2 to lymphoma diagnosis and port placement   ? Consult Hematology as requested  ? Check routine labs   ? Start VTE/PE Heparin order by weight   ? PT/INR, PTT as per protocol   ? Further management as per anticoagulation   ? May need new port placement if cannot be salvaged   Plan for Additional Problems:   · Lymphoma   ? Follows Dr Mikey Cruz, on [facility] chemotherapy   · HTN  ? Monitor BP   ? Continue HCTZ  ? Continue Verapamil      VTE Prophylaxis: Heparin Drip  / sequential compression device   Code Status: DNR/DNI  POLST: POLST form is not discussed and not completed at this time    Anticipated Length of Stay: Anne-Marie Wilson will be admitted on an Inpatient basis with an anticipated length of stay of  Greater than 2 midnights    Justification for Hospital Stay: IV heparin         Admission Orders:  [facility]  CONSULT HEMATOLOGY     Scheduled Meds:   allopurinol 300 mg Oral Daily   ascorbic acid 500 mg Oral Early Morning   calcium carbonate-vitamin D 1 tablet Oral Daily With Breakfast   fish oil 4,000 mg Oral Daily With Dinner   hydrochlorothiazide 25 mg Oral Early Morning   loratadine 10 mg Oral Daily   melatonin 6 mg Oral HS   multivitamin-minerals 1 tablet Oral Early Morning   pantoprazole 40 mg Oral Early Morning   potassium chloride 20 mEq Oral Early Morning   verapamil 180 mg Oral Daily      Continuous Infusions:   heparin (porcine) 3-30 Units/kg/hr (Order-Specific) Last Rate: Stopped (11/04/17 1045)      PRN Meds:   acetaminophen    heparin (porcine)    heparin (porcine)    ondansetron              [address] [facility] in the [facility] by Dionte Alfaro for 2017  Network Utilization Review Department  Phone: [phone]; Fax [phone]  ATTENTION: The Network Utilization Review Department is now centralized for our 7 Facilities  Make a note that we have a new phone and fax numbers for our Department   Please call with any questions or concerns to [phone] and carefully follow the prompts so that you are directed to the right person  All voicemails are confidential  Fax any determinations, approvals, denials, and requests for initial or continue stay review clinical to [phone]  Due to HIGH CALL volume, it would be easier if you could please send faxed requests to expedite your requests and in part, help us provide discharge notifications faster

## 2017-11-07 ENCOUNTER — APPOINTMENT (OUTPATIENT)
Dept: LAB | Facility: CLINIC | Age: 77
End: 2017-11-07
Payer: COMMERCIAL

## 2017-11-07 ENCOUNTER — TRANSCRIBE ORDERS (OUTPATIENT)
Dept: LAB | Facility: CLINIC | Age: 77
End: 2017-11-07

## 2017-11-07 DIAGNOSIS — C85.90 LYMPHOMA (HCC): ICD-10-CM

## 2017-11-07 DIAGNOSIS — I82.622 ARM DVT (DEEP VENOUS THROMBOEMBOLISM), ACUTE, LEFT (HCC): ICD-10-CM

## 2017-11-07 DIAGNOSIS — R59.0 LYMPHADENOPATHY, AXILLARY: ICD-10-CM

## 2017-11-07 LAB
ANISOCYTOSIS BLD QL SMEAR: PRESENT
BASOPHILS # BLD MANUAL: 0 THOUSAND/UL (ref 0–0.1)
BASOPHILS NFR MAR MANUAL: 0 % (ref 0–1)
EOSINOPHIL # BLD MANUAL: 0.08 THOUSAND/UL (ref 0–0.4)
EOSINOPHIL NFR BLD MANUAL: 1 % (ref 0–6)
ERYTHROCYTE [DISTWIDTH] IN BLOOD BY AUTOMATED COUNT: 17.1 % (ref 11.6–15.1)
HCT VFR BLD AUTO: 37.5 % (ref 34.8–46.1)
HGB BLD-MCNC: 12.1 G/DL (ref 11.5–15.4)
LYMPHOCYTES # BLD AUTO: 0.49 THOUSAND/UL (ref 0.6–4.47)
LYMPHOCYTES # BLD AUTO: 6 % (ref 14–44)
MCH RBC QN AUTO: 29.6 PG (ref 26.8–34.3)
MCHC RBC AUTO-ENTMCNC: 32.3 G/DL (ref 31.4–37.4)
MCV RBC AUTO: 92 FL (ref 82–98)
METAMYELOCYTES NFR BLD MANUAL: 2 % (ref 0–1)
MONOCYTES # BLD AUTO: 0.81 THOUSAND/UL (ref 0–1.22)
MONOCYTES NFR BLD: 10 % (ref 4–12)
NEUTROPHILS # BLD MANUAL: 6.02 THOUSAND/UL (ref 1.85–7.62)
NEUTS BAND NFR BLD MANUAL: 11 % (ref 0–8)
NEUTS SEG NFR BLD AUTO: 63 % (ref 43–75)
NRBC BLD AUTO-RTO: 1 /100 WBCS
PLATELET # BLD AUTO: 165 THOUSANDS/UL (ref 149–390)
PLATELET BLD QL SMEAR: ADEQUATE
PMV BLD AUTO: 11 FL (ref 8.9–12.7)
RBC # BLD AUTO: 4.09 MILLION/UL (ref 3.81–5.12)
RBC MORPH BLD: PRESENT
VARIANT LYMPHS # BLD AUTO: 7 %
WBC # BLD AUTO: 8.13 THOUSAND/UL (ref 4.31–10.16)

## 2017-11-07 PROCEDURE — 36415 COLL VENOUS BLD VENIPUNCTURE: CPT

## 2017-11-07 PROCEDURE — 85007 BL SMEAR W/DIFF WBC COUNT: CPT

## 2017-11-07 PROCEDURE — 85027 COMPLETE CBC AUTOMATED: CPT

## 2017-11-10 ENCOUNTER — ALLSCRIPTS OFFICE VISIT (OUTPATIENT)
Dept: OTHER | Facility: OTHER | Age: 77
End: 2017-11-10

## 2017-11-13 ENCOUNTER — TRANSCRIBE ORDERS (OUTPATIENT)
Dept: ADMINISTRATIVE | Facility: HOSPITAL | Age: 77
End: 2017-11-13

## 2017-11-13 ENCOUNTER — TRANSCRIBE ORDERS (OUTPATIENT)
Dept: LAB | Facility: CLINIC | Age: 77
End: 2017-11-13

## 2017-11-13 ENCOUNTER — APPOINTMENT (OUTPATIENT)
Dept: LAB | Facility: CLINIC | Age: 77
End: 2017-11-13
Payer: COMMERCIAL

## 2017-11-13 ENCOUNTER — GENERIC CONVERSION - ENCOUNTER (OUTPATIENT)
Dept: OTHER | Facility: OTHER | Age: 77
End: 2017-11-13

## 2017-11-13 DIAGNOSIS — C85.19 B-CELL LYMPHOMA OF EXTRANODAL SITE (HCC): ICD-10-CM

## 2017-11-13 DIAGNOSIS — C85.90 LYMPHOMA, UNSPECIFIED BODY REGION, UNSPECIFIED LYMPHOMA TYPE (HCC): ICD-10-CM

## 2017-11-13 DIAGNOSIS — C85.19 B-CELL LYMPHOMA OF EXTRANODAL SITE (HCC): Primary | ICD-10-CM

## 2017-11-13 DIAGNOSIS — C83.38 DIFFUSE LARGE B-CELL LYMPHOMA OF LYMPH NODES OF MULTIPLE SITES (HCC): Primary | ICD-10-CM

## 2017-11-13 LAB
ALBUMIN SERPL BCP-MCNC: 3.5 G/DL (ref 3.5–5)
ALP SERPL-CCNC: 94 U/L (ref 46–116)
ALT SERPL W P-5'-P-CCNC: 43 U/L (ref 12–78)
ANION GAP SERPL CALCULATED.3IONS-SCNC: 7 MMOL/L (ref 4–13)
AST SERPL W P-5'-P-CCNC: 18 U/L (ref 5–45)
BASOPHILS # BLD AUTO: 0.04 THOUSANDS/ΜL (ref 0–0.1)
BASOPHILS NFR BLD AUTO: 1 % (ref 0–1)
BILIRUB SERPL-MCNC: 0.24 MG/DL (ref 0.2–1)
BUN SERPL-MCNC: 15 MG/DL (ref 5–25)
CALCIUM SERPL-MCNC: 9.8 MG/DL (ref 8.3–10.1)
CHLORIDE SERPL-SCNC: 106 MMOL/L (ref 100–108)
CO2 SERPL-SCNC: 27 MMOL/L (ref 21–32)
CREAT SERPL-MCNC: 0.77 MG/DL (ref 0.6–1.3)
EOSINOPHIL # BLD AUTO: 0.06 THOUSAND/ΜL (ref 0–0.61)
EOSINOPHIL NFR BLD AUTO: 1 % (ref 0–6)
ERYTHROCYTE [DISTWIDTH] IN BLOOD BY AUTOMATED COUNT: 18.3 % (ref 11.6–15.1)
GFR SERPL CREATININE-BSD FRML MDRD: 76 ML/MIN/1.73SQ M
GLUCOSE P FAST SERPL-MCNC: 97 MG/DL (ref 65–99)
HCT VFR BLD AUTO: 38.8 % (ref 34.8–46.1)
HGB BLD-MCNC: 12.8 G/DL (ref 11.5–15.4)
LDH SERPL-CCNC: 200 U/L (ref 81–234)
LYMPHOCYTES # BLD AUTO: 1.49 THOUSANDS/ΜL (ref 0.6–4.47)
LYMPHOCYTES NFR BLD AUTO: 25 % (ref 14–44)
MCH RBC QN AUTO: 30.3 PG (ref 26.8–34.3)
MCHC RBC AUTO-ENTMCNC: 33 G/DL (ref 31.4–37.4)
MCV RBC AUTO: 92 FL (ref 82–98)
MONOCYTES # BLD AUTO: 0.67 THOUSAND/ΜL (ref 0.17–1.22)
MONOCYTES NFR BLD AUTO: 11 % (ref 4–12)
NEUTROPHILS # BLD AUTO: 3.49 THOUSANDS/ΜL (ref 1.85–7.62)
NEUTS SEG NFR BLD AUTO: 62 % (ref 43–75)
NRBC BLD AUTO-RTO: 0 /100 WBCS
PLATELET # BLD AUTO: 257 THOUSANDS/UL (ref 149–390)
PMV BLD AUTO: 10 FL (ref 8.9–12.7)
POTASSIUM SERPL-SCNC: 3.8 MMOL/L (ref 3.5–5.3)
PROT SERPL-MCNC: 6.5 G/DL (ref 6.4–8.2)
RBC # BLD AUTO: 4.22 MILLION/UL (ref 3.81–5.12)
SODIUM SERPL-SCNC: 140 MMOL/L (ref 136–145)
WBC # BLD AUTO: 5.87 THOUSAND/UL (ref 4.31–10.16)

## 2017-11-13 PROCEDURE — 85025 COMPLETE CBC W/AUTO DIFF WBC: CPT

## 2017-11-13 PROCEDURE — 83615 LACTATE (LD) (LDH) ENZYME: CPT

## 2017-11-13 PROCEDURE — 82232 ASSAY OF BETA-2 PROTEIN: CPT

## 2017-11-13 PROCEDURE — 36415 COLL VENOUS BLD VENIPUNCTURE: CPT

## 2017-11-13 PROCEDURE — 80053 COMPREHEN METABOLIC PANEL: CPT

## 2017-11-14 LAB — B2 MICROGLOB SERPL-MCNC: 1.7 MG/L (ref 0.6–2.4)

## 2017-11-14 RX ORDER — DOXORUBICIN HYDROCHLORIDE 2 MG/ML
100 INJECTION, SOLUTION INTRAVENOUS ONCE
Status: COMPLETED | OUTPATIENT
Start: 2017-11-15 | End: 2017-11-15

## 2017-11-14 RX ORDER — SODIUM CHLORIDE 9 MG/ML
20 INJECTION, SOLUTION INTRAVENOUS CONTINUOUS
Status: DISCONTINUED | OUTPATIENT
Start: 2017-11-15 | End: 2017-11-18 | Stop reason: HOSPADM

## 2017-11-15 ENCOUNTER — HOSPITAL ENCOUNTER (OUTPATIENT)
Dept: INFUSION CENTER | Facility: CLINIC | Age: 77
Discharge: HOME | End: 2017-11-15
Payer: COMMERCIAL

## 2017-11-15 VITALS
TEMPERATURE: 97.1 F | HEART RATE: 78 BPM | BODY MASS INDEX: 38.16 KG/M2 | HEIGHT: 63 IN | SYSTOLIC BLOOD PRESSURE: 112 MMHG | WEIGHT: 215.39 LBS | RESPIRATION RATE: 18 BRPM | DIASTOLIC BLOOD PRESSURE: 59 MMHG

## 2017-11-15 PROCEDURE — 96367 TX/PROPH/DG ADDL SEQ IV INF: CPT

## 2017-11-15 PROCEDURE — 96417 CHEMO IV INFUS EACH ADDL SEQ: CPT

## 2017-11-15 PROCEDURE — 96377 APPLICATON ON-BODY INJECTOR: CPT

## 2017-11-15 PROCEDURE — 96411 CHEMO IV PUSH ADDL DRUG: CPT

## 2017-11-15 PROCEDURE — 96413 CHEMO IV INFUSION 1 HR: CPT

## 2017-11-15 RX ORDER — ACETAMINOPHEN 325 MG/1
650 TABLET ORAL ONCE
Status: COMPLETED | OUTPATIENT
Start: 2017-11-15 | End: 2017-11-15

## 2017-11-15 RX ADMIN — VINCRISTINE SULFATE 1.5 MG: 1 INJECTION, SOLUTION INTRAVENOUS at 10:26

## 2017-11-15 RX ADMIN — DOXORUBICIN HYDROCHLORIDE 100 MG: 2 INJECTION, SOLUTION INTRAVENOUS at 10:08

## 2017-11-15 RX ADMIN — SODIUM CHLORIDE 150 MG: 0.9 INJECTION, SOLUTION INTRAVENOUS at 08:52

## 2017-11-15 RX ADMIN — PEGFILGRASTIM 6 MG: KIT SUBCUTANEOUS at 10:50

## 2017-11-15 RX ADMIN — ACETAMINOPHEN 650 MG: 325 TABLET, FILM COATED ORAL at 10:30

## 2017-11-15 RX ADMIN — Medication 300 UNITS: at 10:50

## 2017-11-15 RX ADMIN — CYCLOPHOSPHAMIDE 1500 MG: 1 INJECTION, POWDER, FOR SOLUTION INTRAVENOUS; ORAL at 09:33

## 2017-11-15 RX ADMIN — SODIUM CHLORIDE 20 ML/HR: 0.9 INJECTION, SOLUTION INTRAVENOUS at 08:29

## 2017-11-15 RX ADMIN — DEXAMETHASONE SODIUM PHOSPHATE: 10 INJECTION, SOLUTION INTRAMUSCULAR; INTRAVENOUS at 08:30

## 2017-11-15 NOTE — PROGRESS NOTES
Patient tolerated chemotherapy today without incident  Patient educated on Neulasta OnPro removal time  Patient provided with AVS and left unit in stable condition

## 2017-11-15 NOTE — PROGRESS NOTES
Patient c/o headache, requesting Tylenol; Jose L Cabrera RN with Dr Bert Ruiz aware, will send order for Tylenol

## 2017-11-20 ENCOUNTER — GENERIC CONVERSION - ENCOUNTER (OUTPATIENT)
Dept: OTHER | Facility: OTHER | Age: 77
End: 2017-11-20

## 2017-11-29 ENCOUNTER — HOSPITAL ENCOUNTER (OUTPATIENT)
Dept: RADIOLOGY | Age: 77
Discharge: HOME | End: 2017-11-29
Payer: COMMERCIAL

## 2017-11-29 DIAGNOSIS — C85.19 B-CELL LYMPHOMA OF EXTRANODAL SITE (HCC): ICD-10-CM

## 2017-11-29 DIAGNOSIS — C83.38 DIFFUSE LARGE B-CELL LYMPHOMA OF LYMPH NODES OF MULTIPLE SITES (HCC): ICD-10-CM

## 2017-11-29 DIAGNOSIS — C85.90 LYMPHOMA, UNSPECIFIED BODY REGION, UNSPECIFIED LYMPHOMA TYPE (HCC): ICD-10-CM

## 2017-11-29 DIAGNOSIS — C83.31 DIFFUSE LARGE B-CELL LYMPHOMA, LYMPH NODES OF HEAD, FACE, AND NECK (HCC): ICD-10-CM

## 2017-11-29 LAB — GLUCOSE SERPL-MCNC: 89 MG/DL (ref 65–140)

## 2017-11-29 PROCEDURE — A9552 F18 FDG: HCPCS

## 2017-11-29 PROCEDURE — 82948 REAGENT STRIP/BLOOD GLUCOSE: CPT

## 2017-11-29 PROCEDURE — 78815 PET IMAGE W/CT SKULL-THIGH: CPT

## 2017-11-29 RX ADMIN — IOHEXOL 5 ML: 240 INJECTION, SOLUTION INTRATHECAL; INTRAVASCULAR; INTRAVENOUS; ORAL at 09:10

## 2017-11-30 ENCOUNTER — GENERIC CONVERSION - ENCOUNTER (OUTPATIENT)
Dept: OTHER | Facility: OTHER | Age: 77
End: 2017-11-30

## 2017-11-30 ENCOUNTER — TRANSCRIBE ORDERS (OUTPATIENT)
Dept: ADMINISTRATIVE | Facility: HOSPITAL | Age: 77
End: 2017-11-30

## 2017-11-30 DIAGNOSIS — C83.38 DIFFUSE LARGE B-CELL LYMPHOMA OF LYMPH NODES OF MULTIPLE SITES (HCC): Primary | ICD-10-CM

## 2017-12-01 ENCOUNTER — GENERIC CONVERSION - ENCOUNTER (OUTPATIENT)
Dept: OTHER | Facility: OTHER | Age: 77
End: 2017-12-01

## 2017-12-04 ENCOUNTER — GENERIC CONVERSION - ENCOUNTER (OUTPATIENT)
Dept: OTHER | Facility: OTHER | Age: 77
End: 2017-12-04

## 2017-12-04 ENCOUNTER — APPOINTMENT (OUTPATIENT)
Dept: LAB | Facility: CLINIC | Age: 77
End: 2017-12-04
Payer: COMMERCIAL

## 2017-12-04 ENCOUNTER — TRANSCRIBE ORDERS (OUTPATIENT)
Dept: LAB | Facility: CLINIC | Age: 77
End: 2017-12-04

## 2017-12-04 DIAGNOSIS — C85.19 B-CELL LYMPHOMA OF EXTRANODAL SITE (HCC): ICD-10-CM

## 2017-12-04 DIAGNOSIS — C85.19 B-CELL LYMPHOMA OF EXTRANODAL SITE (HCC): Primary | ICD-10-CM

## 2017-12-04 LAB
ALBUMIN SERPL BCP-MCNC: 3.5 G/DL (ref 3.5–5)
ALP SERPL-CCNC: 116 U/L (ref 46–116)
ALT SERPL W P-5'-P-CCNC: 54 U/L (ref 12–78)
ANION GAP SERPL CALCULATED.3IONS-SCNC: 5 MMOL/L (ref 4–13)
AST SERPL W P-5'-P-CCNC: 21 U/L (ref 5–45)
BASOPHILS # BLD AUTO: 0.02 THOUSANDS/ΜL (ref 0–0.1)
BASOPHILS NFR BLD AUTO: 0 % (ref 0–1)
BILIRUB SERPL-MCNC: 0.34 MG/DL (ref 0.2–1)
BUN SERPL-MCNC: 15 MG/DL (ref 5–25)
CALCIUM SERPL-MCNC: 9.5 MG/DL (ref 8.3–10.1)
CHLORIDE SERPL-SCNC: 103 MMOL/L (ref 100–108)
CO2 SERPL-SCNC: 29 MMOL/L (ref 21–32)
CREAT SERPL-MCNC: 0.7 MG/DL (ref 0.6–1.3)
EOSINOPHIL # BLD AUTO: 0.03 THOUSAND/ΜL (ref 0–0.61)
EOSINOPHIL NFR BLD AUTO: 1 % (ref 0–6)
ERYTHROCYTE [DISTWIDTH] IN BLOOD BY AUTOMATED COUNT: 19 % (ref 11.6–15.1)
GFR SERPL CREATININE-BSD FRML MDRD: 85 ML/MIN/1.73SQ M
GLUCOSE P FAST SERPL-MCNC: 94 MG/DL (ref 65–99)
HCT VFR BLD AUTO: 38 % (ref 34.8–46.1)
HGB BLD-MCNC: 12.6 G/DL (ref 11.5–15.4)
LDH SERPL-CCNC: 182 U/L (ref 81–234)
LYMPHOCYTES # BLD AUTO: 1.22 THOUSANDS/ΜL (ref 0.6–4.47)
LYMPHOCYTES NFR BLD AUTO: 27 % (ref 14–44)
MCH RBC QN AUTO: 31.3 PG (ref 26.8–34.3)
MCHC RBC AUTO-ENTMCNC: 33.2 G/DL (ref 31.4–37.4)
MCV RBC AUTO: 95 FL (ref 82–98)
MONOCYTES # BLD AUTO: 0.62 THOUSAND/ΜL (ref 0.17–1.22)
MONOCYTES NFR BLD AUTO: 14 % (ref 4–12)
NEUTROPHILS # BLD AUTO: 2.52 THOUSANDS/ΜL (ref 1.85–7.62)
NEUTS SEG NFR BLD AUTO: 58 % (ref 43–75)
NRBC BLD AUTO-RTO: 0 /100 WBCS
PLATELET # BLD AUTO: 251 THOUSANDS/UL (ref 149–390)
PMV BLD AUTO: 10.1 FL (ref 8.9–12.7)
POTASSIUM SERPL-SCNC: 3.6 MMOL/L (ref 3.5–5.3)
PROT SERPL-MCNC: 6.7 G/DL (ref 6.4–8.2)
RBC # BLD AUTO: 4.02 MILLION/UL (ref 3.81–5.12)
SODIUM SERPL-SCNC: 137 MMOL/L (ref 136–145)
WBC # BLD AUTO: 4.48 THOUSAND/UL (ref 4.31–10.16)

## 2017-12-04 PROCEDURE — 82232 ASSAY OF BETA-2 PROTEIN: CPT

## 2017-12-04 PROCEDURE — 85025 COMPLETE CBC W/AUTO DIFF WBC: CPT

## 2017-12-04 PROCEDURE — 36415 COLL VENOUS BLD VENIPUNCTURE: CPT

## 2017-12-04 PROCEDURE — 83615 LACTATE (LD) (LDH) ENZYME: CPT

## 2017-12-04 PROCEDURE — 80053 COMPREHEN METABOLIC PANEL: CPT

## 2017-12-05 LAB — B2 MICROGLOB SERPL-MCNC: 2 MG/L (ref 0.6–2.4)

## 2017-12-06 ENCOUNTER — HOSPITAL ENCOUNTER (INPATIENT)
Facility: HOSPITAL | Age: 77
LOS: 5 days | Discharge: HOME | DRG: 847 | End: 2017-12-11
Attending: INTERNAL MEDICINE | Admitting: INTERNAL MEDICINE
Payer: COMMERCIAL

## 2017-12-06 DIAGNOSIS — I10 ESSENTIAL HYPERTENSION: Primary | Chronic | ICD-10-CM

## 2017-12-06 PROBLEM — C83.38 DIFFUSE LARGE B-CELL LYMPHOMA OF LYMPH NODES OF MULTIPLE SITES (HCC): Status: ACTIVE | Noted: 2017-12-06

## 2017-12-06 LAB
ALBUMIN SERPL BCP-MCNC: 3.6 G/DL (ref 3.5–5)
ALP SERPL-CCNC: 113 U/L (ref 46–116)
ALT SERPL W P-5'-P-CCNC: 48 U/L (ref 12–78)
ANION GAP SERPL CALCULATED.3IONS-SCNC: 6 MMOL/L (ref 4–13)
AST SERPL W P-5'-P-CCNC: 21 U/L (ref 5–45)
BASOPHILS # BLD AUTO: 0.03 THOUSANDS/ΜL (ref 0–0.1)
BASOPHILS NFR BLD AUTO: 1 % (ref 0–1)
BILIRUB SERPL-MCNC: 0.33 MG/DL (ref 0.2–1)
BUN SERPL-MCNC: 16 MG/DL (ref 5–25)
CALCIUM SERPL-MCNC: 9.8 MG/DL (ref 8.3–10.1)
CHLORIDE SERPL-SCNC: 106 MMOL/L (ref 100–108)
CO2 SERPL-SCNC: 27 MMOL/L (ref 21–32)
CREAT SERPL-MCNC: 0.63 MG/DL (ref 0.6–1.3)
EOSINOPHIL # BLD AUTO: 0.03 THOUSAND/ΜL (ref 0–0.61)
EOSINOPHIL NFR BLD AUTO: 1 % (ref 0–6)
ERYTHROCYTE [DISTWIDTH] IN BLOOD BY AUTOMATED COUNT: 18.8 % (ref 11.6–15.1)
GFR SERPL CREATININE-BSD FRML MDRD: 88 ML/MIN/1.73SQ M
GLUCOSE SERPL-MCNC: 92 MG/DL (ref 65–140)
HCT VFR BLD AUTO: 36 % (ref 34.8–46.1)
HGB BLD-MCNC: 11.8 G/DL (ref 11.5–15.4)
LYMPHOCYTES # BLD AUTO: 1.21 THOUSANDS/ΜL (ref 0.6–4.47)
LYMPHOCYTES NFR BLD AUTO: 24 % (ref 14–44)
MCH RBC QN AUTO: 31.1 PG (ref 26.8–34.3)
MCHC RBC AUTO-ENTMCNC: 32.8 G/DL (ref 31.4–37.4)
MCV RBC AUTO: 95 FL (ref 82–98)
MONOCYTES # BLD AUTO: 0.85 THOUSAND/ΜL (ref 0.17–1.22)
MONOCYTES NFR BLD AUTO: 17 % (ref 4–12)
NEUTROPHILS # BLD AUTO: 2.88 THOUSANDS/ΜL (ref 1.85–7.62)
NEUTS SEG NFR BLD AUTO: 57 % (ref 43–75)
NRBC BLD AUTO-RTO: 0 /100 WBCS
PLATELET # BLD AUTO: 226 THOUSANDS/UL (ref 149–390)
PMV BLD AUTO: 9.2 FL (ref 8.9–12.7)
POTASSIUM SERPL-SCNC: 3.8 MMOL/L (ref 3.5–5.3)
PROT SERPL-MCNC: 6.7 G/DL (ref 6.4–8.2)
RBC # BLD AUTO: 3.8 MILLION/UL (ref 3.81–5.12)
SODIUM SERPL-SCNC: 139 MMOL/L (ref 136–145)
WBC # BLD AUTO: 5.05 THOUSAND/UL (ref 4.31–10.16)

## 2017-12-06 PROCEDURE — 85025 COMPLETE CBC W/AUTO DIFF WBC: CPT | Performed by: INTERNAL MEDICINE

## 2017-12-06 PROCEDURE — 80053 COMPREHEN METABOLIC PANEL: CPT | Performed by: INTERNAL MEDICINE

## 2017-12-06 PROCEDURE — 3E04305 INTRODUCTION OF OTHER ANTINEOPLASTIC INTO CENTRAL VEIN, PERCUTANEOUS APPROACH: ICD-10-PCS | Performed by: INTERNAL MEDICINE

## 2017-12-06 RX ORDER — HYDROCHLOROTHIAZIDE 25 MG/1
25 TABLET ORAL DAILY
Status: DISCONTINUED | OUTPATIENT
Start: 2017-12-06 | End: 2017-12-11 | Stop reason: HOSPADM

## 2017-12-06 RX ORDER — PREDNISONE 20 MG/1
100 TABLET ORAL
Status: COMPLETED | OUTPATIENT
Start: 2017-12-06 | End: 2017-12-10

## 2017-12-06 RX ORDER — ALLOPURINOL 300 MG/1
300 TABLET ORAL DAILY
Status: DISCONTINUED | OUTPATIENT
Start: 2017-12-06 | End: 2017-12-11 | Stop reason: HOSPADM

## 2017-12-06 RX ORDER — ACYCLOVIR 200 MG/1
400 CAPSULE ORAL EVERY 12 HOURS SCHEDULED
Status: DISCONTINUED | OUTPATIENT
Start: 2017-12-06 | End: 2017-12-11 | Stop reason: HOSPADM

## 2017-12-06 RX ORDER — SODIUM CHLORIDE 9 MG/ML
75 INJECTION, SOLUTION INTRAVENOUS CONTINUOUS
Status: DISCONTINUED | OUTPATIENT
Start: 2017-12-06 | End: 2017-12-11 | Stop reason: HOSPADM

## 2017-12-06 RX ORDER — ACETAMINOPHEN 325 MG/1
650 TABLET ORAL EVERY 6 HOURS PRN
Status: DISCONTINUED | OUTPATIENT
Start: 2017-12-06 | End: 2017-12-11 | Stop reason: HOSPADM

## 2017-12-06 RX ORDER — SODIUM CHLORIDE 9 MG/ML
333 INJECTION, SOLUTION INTRAVENOUS CONTINUOUS
Status: DISCONTINUED | OUTPATIENT
Start: 2017-12-10 | End: 2017-12-11 | Stop reason: HOSPADM

## 2017-12-06 RX ORDER — PANTOPRAZOLE SODIUM 40 MG/1
40 TABLET, DELAYED RELEASE ORAL
Status: DISCONTINUED | OUTPATIENT
Start: 2017-12-06 | End: 2017-12-11 | Stop reason: HOSPADM

## 2017-12-06 RX ORDER — DAPSONE 100 MG/1
100 TABLET ORAL DAILY
Status: DISCONTINUED | OUTPATIENT
Start: 2017-12-06 | End: 2017-12-11 | Stop reason: HOSPADM

## 2017-12-06 RX ORDER — POTASSIUM CHLORIDE 20 MEQ/1
20 TABLET, EXTENDED RELEASE ORAL DAILY
Status: DISCONTINUED | OUTPATIENT
Start: 2017-12-06 | End: 2017-12-11 | Stop reason: HOSPADM

## 2017-12-06 RX ORDER — MELATONIN
1000 DAILY
Status: DISCONTINUED | OUTPATIENT
Start: 2017-12-06 | End: 2017-12-11 | Stop reason: HOSPADM

## 2017-12-06 RX ADMIN — ALLOPURINOL 300 MG: 300 TABLET ORAL at 12:19

## 2017-12-06 RX ADMIN — ETOPOSIDE: 20 INJECTION INTRAVENOUS at 15:38

## 2017-12-06 RX ADMIN — VITAMIN D, TAB 1000IU (100/BT) 1000 UNITS: 25 TAB at 12:18

## 2017-12-06 RX ADMIN — SODIUM CHLORIDE 150 MG: 0.9 INJECTION, SOLUTION INTRAVENOUS at 14:22

## 2017-12-06 RX ADMIN — ACYCLOVIR 400 MG: 200 CAPSULE ORAL at 21:23

## 2017-12-06 RX ADMIN — DAPSONE 100 MG: 100 TABLET ORAL at 12:19

## 2017-12-06 RX ADMIN — DEXAMETHASONE SODIUM PHOSPHATE: 10 INJECTION, SOLUTION INTRAMUSCULAR; INTRAVENOUS at 14:22

## 2017-12-06 RX ADMIN — SODIUM CHLORIDE 75 ML/HR: 0.9 INJECTION, SOLUTION INTRAVENOUS at 11:13

## 2017-12-06 RX ADMIN — Medication 1 TABLET: at 12:19

## 2017-12-06 RX ADMIN — ENOXAPARIN SODIUM 100 MG: 100 INJECTION SUBCUTANEOUS at 21:28

## 2017-12-06 RX ADMIN — PREDNISONE 100 MG: 20 TABLET ORAL at 12:19

## 2017-12-06 NOTE — CONSULTS
Inpatient Medical Consultation - [facility] Internal Medicine    Patient Information: Sim Brunner 78 y o  female MRN: [id number]  Unit/Bed#: [facility] 623-01 Encounter: [id number]  PCP: Lazarus Floras, PA-C  Date of Admission:  12/6/2017  Date of Consultation: 12/06/17  Requesting Physician: Maddie Gonzalez MD    Reason For Consultation:   Medical management    Assessment/Plan:    Diffuse large B-cell lymphoma Chemotherapy  Cyclophosphamide- monitor for hematuria  Dapsone  Decadron  Doxorubicin- monitor LFTs; echocardiogram since its antracycline based therapy is directly toxic to heart  Plan:   Monitor CBC- pancytopenia, current labs significant for Decreased RBC   Monitor CMP: to evaluate renal function (16/0 63), hepatic function (AST/ALT: 21/48); LDH (200/182)  Monitor for the development: Tumor lysis syndrome- electrolytes K (3 8)/Mg (/Ca/Phos/ uric acid (5 8/6 4)  Continue use of Allopurinol to lower uric acid level  Daily weights  I/Os  IVF hydration  PUD prophylaxis with protonix  Consider cardiology consult- needs ECHO to evaluate Ejection fraction     Hypertension: BP controlled, continue verampamil 180mg daily, HCTZ 25mg daily, continue BP monitoring          VTE Prophylaxis: Enoxaparin (Lovenox)  / sequential compression device     Recommendations for Discharge:  · BP monitoring  · Oncology follow-up    Counseling / Coordination of Care Time: 45 minutes  Greater than 50% of total time spent on patient counseling and coordination of care  Collaboration of Care: Were Recommendations Directly Discussed with Primary Treatment Team? - Yes     History of Present Illness:    Sim Brunner is a 78 y o  female who is originally admitted to the oncology service on 12/6/2017 due to chemotherapy complications monitoring  We are consulted for medical management  Patient has a history of diffuse large B-cell lymphoma, completed 3 cycles of chop chemotherapy, allergic to Rituxan and has not received it    Patient was found to have a DVT in the left upper arm on the side of the Port-A-Cath  Currently on Lovenox  In April 2015 PET scan revealed hypermetabolic lymphadenopathy involving the neck, chest, abdomen and pelvis with right breast nodularity, hepatic steatosis, mild/borderline aneurysmal dilatation of descending thoracic aorta  Tumor markers worse returned positive for CD 20  Patient was recommended to start ADVOCATE [facility] chemotherapy regimen- to be observed for side effects to this combination treatment  Patient also has a past medical history of hypertension  Patient was seen and evaluated sitting up in chair with family at bedside, in no apparent clinical distress, she denied any chest pain shortness of breath, nausea, vomiting, changes in bowel or urinary habits, fever, chills, rigors, changes in vision  Review of systems negative otherwise        Review of Systems:    Review of Systems   Constitutional: Negative  HENT: Negative  Eyes: Negative  Respiratory: Negative  Cardiovascular: Negative  Gastrointestinal: Negative  Endocrine: Negative  Genitourinary: Negative  Musculoskeletal: Negative  Skin: Negative  Allergic/Immunologic: Negative  Neurological: Negative  Hematological: Negative  Psychiatric/Behavioral: Negative          Past Medical and Surgical History:     Past Medical History:   Diagnosis Date    Acid reflux     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HCC)     radiation    [place] (hard of hearing)     b/l ears    Hypertension     Left bundle branch block     Osteoarthritis     Plantar fasciitis     Shortness of breath     when walking up stairs       Past Surgical History:   Procedure Laterality Date    ANKLE ARTHROPLASTY Left     BLEPHAROPLASTY      right eye     CATARACT EXTRACTION Right     ESOPHAGEAL DILATION      x2    ESOPHAGOGASTRODUODENOSCOPY      dilitation    FACIAL/NECK BIOPSY Right 9/8/2017    Procedure: NECK NODE BIOPSY WITH NEEDLE LOCALIZATION; LYMPHOMA PROTOCOL (NEEDLE LOC WITH I R  AT 1100); Surgeon: Ryan Roy MD;  [facility];  Service: Surgical Oncology    HYSTERECTOMY      LYMPH NODE DISSECTION      right groin and neck    ORBITAL FLOOR EXPLORATION Right     for cancer     PORTACATH PLACEMENT      left chest     CA BX/REMV,LYMPH NODE,DEEP AXILL Left 8/14/2017    Procedure: Axillary lymph node excision with LYMPHOMA PROTOCOL;  Surgeon: Ryan Roy MD;  [facility];  Service: Surgical Oncology    CA BX/REMV,LYMPH NODE,DEEP CERV Right 5/2/2016    Procedure: NECK NODE BIOPSY;  Surgeon: Ryan Roy MD;  [facility];  Service: Surgical Oncology    CA INSJ TUNNELED CTR VAD W/SUBQ PORT AGE 5 YR/> N/A 10/3/2017    Procedure: PLACEMENT OF PORT-A-CATH DEVICE;  Surgeon: Ryan Roy MD;  [facility];  Service: Surgical Oncology       Meds/Allergies:    all medications and allergies reviewed    Allergies: Allergies   Allergen Reactions    Aspirin Anaphylaxis    Celebrex [Celecoxib] Anaphylaxis    Nsaids Anaphylaxis    Other Anaphylaxis     Pt states when she received a certain type of chemotherapy it caused her throat to close    Rituxan [Rituximab] Anaphylaxis     Swelling of tongue and closing of throat    Bactrim [Sulfamethoxazole-Trimethoprim] Other (See Comments)     VERY EMOTIONAL/CRYING    Sulfa Antibiotics Other (See Comments)     VERY EMOTIONAL CRYING       Social History:     Marital Status: /Civil Union    Substance Use History:   History   Alcohol Use No     History   Smoking Status    Former Smoker    Quit date: 1970   Smokeless Tobacco    Never Used     History   Drug Use No       Family History:    No family history on file      Physical Exam:     Vitals:   Blood Pressure: 112/61 (12/06/17 1548)  Pulse: 88 (12/06/17 1548)  Temperature: 98 °F (36 7 °C) (12/06/17 1548)  Temp Source: Oral (12/06/17 1548)  Respirations: 18 (12/06/17 1548)  Height: 5' 3 5" (161 3 cm) (12/06/17 1535)  Weight - Scale: 96 5 kg (212 lb 11 9 oz) (12/06/17 1535)  SpO2: 93 % (12/06/17 1548)    Physical Exam   Constitutional: She is oriented to person, place, and time  She appears well-developed and well-nourished  HENT:   Head: Normocephalic and atraumatic  Eyes: EOM are normal  Pupils are equal, round, and reactive to light  Conjunctival pallor noted   Neck: Normal range of motion  Neck supple  No JVD present  Cardiovascular: Normal rate, regular rhythm, normal heart sounds and intact distal pulses  No murmur heard  Pulmonary/Chest: Effort normal and breath sounds normal  No respiratory distress  She has no wheezes  Abdominal: Soft  Bowel sounds are normal  She exhibits no distension  There is no tenderness  There is no rebound  Musculoskeletal: Normal range of motion  Lymphadenopathy:     She has cervical adenopathy  Neurological: She is alert and oriented to person, place, and time  She has normal reflexes  Skin: Skin is warm and dry  No rash noted  No erythema  Psychiatric: She has a normal mood and affect  Her behavior is normal  Judgment and thought content normal          Additional Data:     Lab Results: I have personally reviewed pertinent reports  Results from last 7 days  Lab Units 12/06/17  1105   WBC Thousand/uL 5 05   HEMOGLOBIN g/dL 11 8   HEMATOCRIT % 36 0   PLATELETS Thousands/uL 226   NEUTROS PCT % 57   LYMPHS PCT % 24   MONOS PCT % 17*   EOS PCT % 1       Results from last 7 days  Lab Units 12/06/17  1218   SODIUM mmol/L 139   POTASSIUM mmol/L 3 8   CHLORIDE mmol/L 106   CO2 mmol/L 27   BUN mg/dL 16   CREATININE mg/dL 0 63   CALCIUM mg/dL 9 8   TOTAL PROTEIN g/dL 6 7   BILIRUBIN TOTAL mg/dL 0 33   ALK PHOS U/L 113   ALT U/L 48   AST U/L 21   GLUCOSE RANDOM mg/dL 92           Imaging: I have personally reviewed pertinent reports  Nm Pet Ct Skull Base To Mid Thigh    Result Date: 11/29/2017  Narrative: PET/CT SCAN INDICATION: C83 38:  Diffuse large b-cell lymphoma, lymph nodes of multiple sites C85 90: Non-Hodgkin lymphoma, unspecified, unspecified site  History taken directly from the electronic ordering system  Marginal zone lymphoma of right orbit, restaging postchemotherapy, radiation MODIFIER:     PS COMPARISON: CT 10/2/2017 and priors, including PET CT 7/24/2017 CELL TYPE:  Marginal zone B-cell lymphoma, right orbital lesion biopsy 3/3/2009 TECHNIQUE:   8 5 mCi F-18-FDG administered IV  Multiplanar attenuation corrected and non attenuation corrected PET images are available for interpretation, and contiguous, low dose, axial CT sections were obtained from the skull base through the femurs following the administration of oral contrast material (7 5 cc Omnipaque-240 in 300 cc water)  Intravenous contrast material was not utilized  This examination, like all CT scans performed in the [facility], was performed utilizing techniques to minimize radiation dose exposure, including the use of iterative reconstruction and automated exposure control  Fasting serum glucose: 89 mg/dl FINDINGS: Mediastinal blood pool activity SUV 3 1  Liver SUV 3 7  VISUALIZED BRAIN:   No acute abnormalities are seen  HEAD/NECK:   Significantly decreased size and hypermetabolism of bilateral neck and supraclavicular adenopathy, compatible with response to therapy  For example, right supraclavicular node series 3 image 102 measures 1 5 x 0 9 cm, SUV 1 8  On the prior CT of 10/2/2017, this measured 3 4 x 2 5 cm  On the prior PET CT of 7/24/2017, this measured 2 5 x 2 1 cm, SUV 18 1  No new FDG avid cervical adenopathy is seen  CT images: Stable nodular right thyroid  CHEST: Significantly decreased size and hypermetabolism of mediastinal, hilar and bilateral axillary hypermetabolic adenopathy, compatible with response to therapy  For example, left axillary subpectoral node series 3 image 112 measures 1 1 x 0 9 cm, SUV 1 4  On the prior CT of 10/2/2017, this measured 2 5 x 1 4 cm    On the prior PET CT, this measured 2 4 x 1 7 cm, SUV 3 5  Right axillary node series 3 image 107 measures 1 3 x 0 7 cm, SUV 1 2  On the prior CT of 10/2/2017, this measured 1 7 x 1 2 cm  On the prior PET CT, this measured 2 5 x 1 3 cm, SUV 12 1  Right hilar activity has an SUV of 3 1, prior SUV 5 1  Left hilar activity has an SUV of 2 8, prior SUV 7 5  Lung nodules seen on the prior CT of 10/2/2017 have significantly decreased and are no longer clearly visualized  Previously seen right lateral breast nodules also have resolved  No new FDG avid soft tissue lesions are seen  CT images: Otherwise stable  ABDOMEN:   Significantly decreased size and hypermetabolism of retrocrural and retroperitoneal adenopathy, compatible with response to therapy  For example, left para-aortic retroperitoneal node series 3 image 196 measures 1 3 x 1 cm, SUV 2 6  On the prior CT of 10/2/2017, this measured 3 x 3 2 cm  On the prior PET CT, this measured 2 4 x 3 cm, SUV 10 3  Spleen activity is uniform, and less intense than liver activity  No new FDG avid soft tissue lesions are seen  CT images: Otherwise stable  PELVIS: Significantly decreased size and hypermetabolism of bilateral pelvic and inguinal adenopathy, compatible with response to therapy  For example, right pelvic sidewall node series 3 image 251 measures 2 5 x 0 7 cm, SUV 1 5  On the prior CT of 10/2/2017, this measured 4 1 x 2 cm  On the prior PET CT, this measured 4 7 x 1 9 cm, SUV 9 3  Left inguinal node series 3 image 262 measures 1 5 x 0 7 cm, SUV 1 9  On the prior CT of 10/2/2017, this measured 2 8 x 1 6 cm  On the prior PET CT, this measured 2 2 x 1 1 cm, SUV 5 9  No new FDG avid soft tissue lesions are seen  CT images: Otherwise stable  OSSEOUS STRUCTURES: Mild marrow activity is compatible with recent chemotherapy  No new focal FDG avid lesions are seen  CT images: Spine degenerative change  Impression: 1    Significant decreased size and hypermetabolism of widespread adenopathy, compatible with response to therapy  Lung nodules also have decreased  No new hypermetabolic lesions  Findings correspond to a Deauville score of 2  Workstation performed: [id number]       EKG, Pathology, and Other Studies Reviewed on Admission: yes    ** Please Note: This note has been constructed using a voice recognition system   **

## 2017-12-06 NOTE — CASE MANAGEMENT
[address] [facility] in the [facility] by Dionte Alfaro for 2017  Network Utilization Review Department  Phone: [phone]; Fax [phone]  ATTENTION: The Network Utilization Review Department is now centralized for our 7 Facilities  Make a note that we have a new phone and fax numbers for our Department  Please call with any questions or concerns to [phone] and carefully follow the prompts so that you are directed to the right person  All voicemails are confidential  Fax any determinations, approvals, denials, and requests for initial or continue stay review clinical to [phone]  Due to HIGH CALL volume, it would be easier if you could please send faxed requests to expedite your requests and in part, help us provide discharge notifications faster  Initial Clinical Review    Admission: Date/Time/Statement: 12/6/17 @ 1022    Orders Placed This Encounter   Procedures    Inpatient Admission     Standing Status:   Standing     Number of Occurrences:   1     Order Specific Question:   Admitting Physician     Answer:   Wen Wells [[id number]]     Order Specific Question:   Level of Care     Answer:   Level 2 Stepdown / HOT [14]     Order Specific Question:   Estimated length of stay     Answer:   More than 2 Midnights     Order Specific Question:   Certification     Answer:   I certify that inpatient services are medically necessary for this patient for a duration of greater than two midnights  See H&P and MD Progress Notes for additional information about the patient's course of treatment  Chief Complaint:  Relapsed diffuse large B-cell lymphoma  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        History of Illness:  A 28-year-old female with history of 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, diagnosed in 2008    She underwent radiation therapy as well as 1 dose of rituximab which caused significant hypersensitivity reaction  Earlier in 2017, she was diagnosed with relapse of lymphoma  Biopsy showed diffuse large B-cell lymphoma which was thought to be history are the transformation  She had 3 cycle of CHOP resulting in good response  However, further review of pathology, it was thought to be highly aggressive disease  Therefore, she was recommended to change treatment regimen to ADVOCATE [facility]  Therefore, she was hospitalized  Vital Signs:   Temp Pulse Resp BP SpO2   98 2 98 18 125/70 92%   --        Wt Readings from Last 1 Encounters:   11/15/17 97 7 kg (215 lb 6 2 oz)       Abnormal Labs/Diagnostic Test Results:    WBC 5 05 4 31 - 10 16 Thousand/uL     RBC 3 80 (L) 3 81 - 5 12 Million/uL     Hemoglobin 11 8 11 5 - 15 4 g/dL     Hematocrit 36 0 34 8 - 46 1 %       Sodium 139 136 - 145 mmol/L     Potassium 3 8 3 5 - 5 3 mmol/L     Chloride 106 100 - 108 mmol/L     CO2 27 21 - 32 mmol/L     Anion Gap 6 4 - 13 mmol/L     BUN 16 5 - 25 mg/dL     Creatinine 0 63 0 60 - 1 30 mg/dL     Glucose 92 65 - 140 mg/dL     Calcium 9 8 8 3 - 10 1 mg/dL     AST 21 5 - 45 U/L     ALT 48 12 - 78 U/L     Alkaline Phosphatase 113 46 - 116 U/L     Total Protein 6 7 6 4 - 8 2 g/dL     Albumin 3 6 3 5 - 5 0 g/dL     Total Bilirubin 0 33 0 20 - 1 00 mg/dL     eGFR 88 ml/min/1 73sq m            Past Medical/Surgical History: Active Ambulatory Problems     Diagnosis Date Noted    Enlarged lymph node in neck 05/02/2016    Lymphadenopathy, axillary 08/14/2017    Lymphoma ([facility] ) 10/03/2017    Arm DVT (deep venous thromboembolism), [facility] ) 11/03/2017    Essential hypertension 11/03/2017       Past Medical History:   Diagnosis Date    Acid reflux     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HCC)     [place] (hard of hearing)     Hypertension     Left bundle branch block     Osteoarthritis     Plantar fasciitis     Shortness of breath        Admitting Diagnosis: Diffuse large b-cell lymphoma, lymph nodes of multiple sites [C83 38]    Age/Sex: 78 y o  female    Assessment/Plan:     Transformed diffuse large B-cell lymphoma  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     Plan:  A 53-year-old female with history as described above  She has adequate ANC and good performance status to start ADVOCATE [facility], today  Toxicity of this regimen was thoroughly reviewed  She wished to proceed  We are going to monitor any toxicities    She is scheduled to have Neulasta support on Tuesday next week        Admission Orders:  Scheduled Meds:   acyclovir 400 mg Oral Q12H [facility]   allopurinol 300 mg Oral Daily   cholecalciferol 1,000 Units Oral Daily   [START ON 12/10/2017] cyclophosphamide (CYTOXAN) IVPB 1,485 mg Intravenous Once   dapsone 100 mg Oral Daily   etoposide-DOXOrubicin-vincristine infusion  Intravenous Q24H   enoxaparin 1 mg/kg Subcutaneous Q12H CANDACE   fosaprepitant (EMEND) IVPB (ONC use only) 150 mg Intravenous Once   hydrochlorothiazide 25 mg Oral Daily   multivitamin-minerals 1 tablet Oral Daily   ondansetron with dexamethasone IVPB  Intravenous Q24H   pantoprazole 40 mg Oral Early Morning   potassium chloride 20 mEq Oral Daily   predniSONE 100 mg Oral Daily With Lunch   verapamil 180 mg Oral Daily     Continuous Infusions:   sodium chloride 75 mL/hr Last Rate: 75 mL/hr (12/06/17 1113)   [START ON 12/10/2017] sodium chloride 333 mL/hr      PRN Meds:   acetaminophen    alteplase    heparin lock flush    Regular diet  OOB as emma  Consult internal medicine

## 2017-12-06 NOTE — H&P
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, diagnosed in 2008  She underwent radiation therapy as well as 1 dose of rituximab which caused significant hypersensitivity reaction  Earlier in 2017, she was diagnosed with relapse of lymphoma  Biopsy showed diffuse large B-cell lymphoma which was thought to be history are the transformation  She had 3 cycle of CHOP resulting in good response  However, further review of pathology, it was thought to be highly aggressive disease  Therefore, she was recommended to change treatment regimen to ADVOCATE [facility]  Therefore, she was hospitalized  She feels well  She has no fever, chills or night sweats  She has no complaint of pain  She denied any ability dysfunction  Her performance status appeared to be normal     Review of Systems - As stated in the HPI otherwise the fourteen point review of systems was negative      Past Medical History:   Diagnosis Date    Acid reflux     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HCC)     radiation    [place]hard [place] hearing[place]     b/l ears    Hypertension     Left bundle branch block     Osteoarthritis     Plantar fasciitis     Shortness of breath     when walking up stairs       Social History     Social History    Marital status: /Civil Union     Spouse name: N/A    Number of children: N/A    Years of education: N/A     Social History Main Topics    Smoking status: Former Smoker     Quit date: 1970    Smokeless tobacco: Never Used    Alcohol use No    Drug use: No    Sexual activity: Not on file     Other Topics Concern    Not on file     Social History Narrative    No narrative on file No family history on file      Allergies   Allergen Reactions    Aspirin Anaphylaxis    Celebrex [Celecoxib] Anaphylaxis    Nsaids Anaphylaxis    Other Anaphylaxis     Pt states when she received a certain type of chemotherapy it caused her throat to close    Rituxan [Rituximab] Anaphylaxis     Swelling of tongue and closing of throat    Bactrim [Sulfamethoxazole-Trimethoprim] Other (See Comments)     VERY EMOTIONAL/CRYING    Sulfa Antibiotics Other (See Comments)     VERY EMOTIONAL CRYING         Current Facility-Administered Medications:     acetaminophen (TYLENOL) tablet 650 mg, 650 mg, Oral, Q6H PRN, Kevin Gallegos MD    acyclovir (ZOVIRAX) capsule 400 mg, 400 mg, Oral, Q12H CANDACE, Kevin Gallegos MD    allopurinol (ZYLOPRIM) tablet 300 mg, 300 mg, Oral, Daily, Kevin Gallegos MD, 300 mg at 12/06/17 1219    alteplase (CATHFLO) injection 2 mg, 2 mg, Intracatheter, Once PRN, Kevin Gallegos MD    cholecalciferol (VITAMIN D3) tablet 1,000 Units, 1,000 Units, Oral, Daily, Kevin Gallegos MD, 1,000 Units at 12/06/17 1218    [START ON 12/10/2017] cyclophosphamide (CYTOXAN) 1,485 mg in sodium chloride 0 9 % 250 mL IVPB, 1,485 mg, Intravenous, Once, Kevin Gallegos MD    dapsone tablet 100 mg, 100 mg, Oral, Daily, Kevin Gallegos MD, 100 mg at 12/06/17 1219    DOXOrubicin (ADRIAMYCIN) 20 mg, etoposide (TOPOSAR) 99 mg, vinCRIStine (ONCOVIN) 0 8 mg in sodium chloride 0 9 % 500 mL infusion, , Intravenous, Q24H, Antonio MD Karthik    enoxaparin (LOVENOX) subcutaneous injection 100 mg, 1 mg/kg, Subcutaneous, Q12H CANDACE, Kevin Gallegos MD    fosaprepitant (EMEND) 150 mg in sodium chloride 0 9 % 250 mL IVPB, 150 mg, Intravenous, Once, Kevin Gallegos MD    heparin lock flush 100 units/mL injection 300 Units, 300 Units, Intracatheter, Q1H PRN, Kevin Gallegos MD    hydrochlorothiazide (HYDRODIURIL) tablet 25 mg, 25 mg, Oral, Daily, Kevin Gallegos MD    multivitamin-minerals (CENTRUM) tablet 1 tablet, 1 tablet, Oral, Daily, Annetta Christian MD, 1 tablet at 12/06/17 1219    ondansetron (ZOFRAN) 16 mg, dexamethasone (DECADRON) 10 mg in sodium chloride 0 9 % 50 mL IVPB, , Intravenous, Q24H, Annetta Christian MD    pantoprazole (PROTONIX) EC tablet 40 mg, 40 mg, Oral, Early Morning, Annetta Christian MD    potassium chloride (K-DUR,KLOR-CON) CR tablet 20 mEq, 20 mEq, Oral, Daily, Annetta Christian MD    predniSONE tablet 100 mg, 100 mg, Oral, Daily With Lunch, Antonioreji Angeles MD, 100 mg at 12/06/17 1219    sodium chloride 0 9 % infusion, 75 mL/hr, Intravenous, Continuous, Annetta Christian MD, Last Rate: 75 mL/hr at 12/06/17 1113, 75 mL/hr at 12/06/17 1113    [START ON 12/10/2017] sodium chloride 0 9 % infusion, 333 mL/hr, Intravenous, Continuous, Antonio MD Karthik    verapamil (CALAN-SR) CR tablet 180 mg, 180 mg, Oral, Daily, Antonio MD Karthik      /70   Pulse 98   Temp 98 2 °F (36 8 °C) (Oral)   Resp 18   Ht 5' 3 5" (1 613 m)   Wt 96 5 kg (212 lb 11 9 oz)   SpO2 92%   BMI 37 09 kg/m²     General Appearance:    Alert, oriented        Eyes:    PERRL   Ears:    Normal external ear canals, both ears   Nose:   Nares normal, septum midline   Throat:   Mucosa moist  Pharynx without injection  Neck:   Supple       Lungs:     Clear to auscultation bilaterally   Chest Wall:    No tenderness or deformity    Heart:    Regular rate and rhythm       Abdomen:     Soft, non-tender, bowel sounds +, no organomegaly           Extremities:   Extremities no cyanosis or edema       Skin:   no rash or icterus      Lymph nodes:   Cervical, supraclavicular, and axillary nodes normal   Neurologic:   CNII-XII intact, normal strength, sensation and reflexes     Throughout               Recent Results (from the past 48 hour(s))   CBC and differential    Collection Time: 12/06/17 11:05 AM   Result Value Ref Range    WBC 5 05 4 31 - 10 16 Thousand/uL    RBC 3 80 (L) 3 81 - 5 12 Million/uL    Hemoglobin 11 8 11 5 - 15 4 g/dL    Hematocrit 36 0 34 8 - 46 1 %    MCV 95 82 - 98 fL    MCH 31 1 26 8 - 34 3 pg    MCHC 32 8 31 4 - 37 4 g/dL    RDW 18 8 (H) 11 6 - 15 1 %    MPV 9 2 8 9 - 12 7 fL    Platelets 298 306 - 452 Thousands/uL    nRBC 0 /100 WBCs    Neutrophils Relative 57 43 - 75 %    Lymphocytes Relative 24 14 - 44 %    Monocytes Relative 17 (H) 4 - 12 %    Eosinophils Relative 1 0 - 6 %    Basophils Relative 1 0 - 1 %    Neutrophils Absolute 2 88 1 85 - 7 62 Thousands/µL    Lymphocytes Absolute 1 21 0 60 - 4 47 Thousands/µL    Monocytes Absolute 0 85 0 17 - 1 22 Thousand/µL    Eosinophils Absolute 0 03 0 00 - 0 61 Thousand/µL    Basophils Absolute 0 03 0 00 - 0 10 Thousands/µL   Comprehensive metabolic panel    Collection Time: 12/06/17 12:18 PM   Result Value Ref Range    Sodium 139 136 - 145 mmol/L    Potassium 3 8 3 5 - 5 3 mmol/L    Chloride 106 100 - 108 mmol/L    CO2 27 21 - 32 mmol/L    Anion Gap 6 4 - 13 mmol/L    BUN 16 5 - 25 mg/dL    Creatinine 0 63 0 60 - 1 30 mg/dL    Glucose 92 65 - 140 mg/dL    Calcium 9 8 8 3 - 10 1 mg/dL    AST 21 5 - 45 U/L    ALT 48 12 - 78 U/L    Alkaline Phosphatase 113 46 - 116 U/L    Total Protein 6 7 6 4 - 8 2 g/dL    Albumin 3 6 3 5 - 5 0 g/dL    Total Bilirubin 0 33 0 20 - 1 00 mg/dL    eGFR 88 ml/min/1 73sq m         Nm Pet Ct Skull Base To Mid Thigh    Result Date: 11/29/2017  Narrative: PET/CT SCAN INDICATION: C83 38: Diffuse large b-cell lymphoma, lymph nodes of multiple sites C85 90: Non-Hodgkin lymphoma, unspecified, unspecified site  History taken directly from the electronic ordering system  Marginal zone lymphoma of right orbit, restaging postchemotherapy, radiation MODIFIER:     PS COMPARISON: CT 10/2/2017 and priors, including PET CT 7/24/2017 CELL TYPE:  Marginal zone B-cell lymphoma, right orbital lesion biopsy 3/3/2009 TECHNIQUE:   8 5 mCi F-18-FDG administered IV   Multiplanar attenuation corrected and non attenuation corrected PET images are available for interpretation, and contiguous, low dose, axial CT sections were obtained from the skull base through the femurs following the administration of oral contrast material (7 5 cc Omnipaque-240 in 300 cc water)  Intravenous contrast material was not utilized  This examination, like all CT scans performed in the [facility], was performed utilizing techniques to minimize radiation dose exposure, including the use of iterative reconstruction and automated exposure control  Fasting serum glucose: 89 mg/dl FINDINGS: Mediastinal blood pool activity SUV 3 1  Liver SUV 3 7  VISUALIZED BRAIN:   No acute abnormalities are seen  HEAD/NECK:   Significantly decreased size and hypermetabolism of bilateral neck and supraclavicular adenopathy, compatible with response to therapy  For example, right supraclavicular node series 3 image 102 measures 1 5 x 0 9 cm, SUV 1 8  On the prior CT of 10/2/2017, this measured 3 4 x 2 5 cm  On the prior PET CT of 7/24/2017, this measured 2 5 x 2 1 cm, SUV 18 1  No new FDG avid cervical adenopathy is seen  CT images: Stable nodular right thyroid  CHEST: Significantly decreased size and hypermetabolism of mediastinal, hilar and bilateral axillary hypermetabolic adenopathy, compatible with response to therapy  For example, left axillary subpectoral node series 3 image 112 measures 1 1 x 0 9 cm, SUV 1 4  On the prior CT of 10/2/2017, this measured 2 5 x 1 4 cm  On the prior PET CT, this measured 2 4 x 1 7 cm, SUV 3 5  Right axillary node series 3 image 107 measures 1 3 x 0 7 cm, SUV 1 2  On the prior CT of 10/2/2017, this measured 1 7 x 1 2 cm  On the prior PET CT, this measured 2 5 x 1 3 cm, SUV 12 1  Right hilar activity has an SUV of 3 1, prior SUV 5 1  Left hilar activity has an SUV of 2 8, prior SUV 7 5  Lung nodules seen on the prior CT of 10/2/2017 have significantly decreased and are no longer clearly visualized    Previously seen right lateral breast nodules also have resolved  No new FDG avid soft tissue lesions are seen  CT images: Otherwise stable  ABDOMEN:   Significantly decreased size and hypermetabolism of retrocrural and retroperitoneal adenopathy, compatible with response to therapy  For example, left para-aortic retroperitoneal node series 3 image 196 measures 1 3 x 1 cm, SUV 2 6  On the prior CT of 10/2/2017, this measured 3 x 3 2 cm  On the prior PET CT, this measured 2 4 x 3 cm, SUV 10 3  Spleen activity is uniform, and less intense than liver activity  No new FDG avid soft tissue lesions are seen  CT images: Otherwise stable  PELVIS: Significantly decreased size and hypermetabolism of bilateral pelvic and inguinal adenopathy, compatible with response to therapy  For example, right pelvic sidewall node series 3 image 251 measures 2 5 x 0 7 cm, SUV 1 5  On the prior CT of 10/2/2017, this measured 4 1 x 2 cm  On the prior PET CT, this measured 4 7 x 1 9 cm, SUV 9 3  Left inguinal node series 3 image 262 measures 1 5 x 0 7 cm, SUV 1 9  On the prior CT of 10/2/2017, this measured 2 8 x 1 6 cm  On the prior PET CT, this measured 2 2 x 1 1 cm, SUV 5 9  No new FDG avid soft tissue lesions are seen  CT images: Otherwise stable  OSSEOUS STRUCTURES: Mild marrow activity is compatible with recent chemotherapy  No new focal FDG avid lesions are seen  CT images: Spine degenerative change  Impression: 1  Significant decreased size and hypermetabolism of widespread adenopathy, compatible with response to therapy  Lung nodules also have decreased  No new hypermetabolic lesions  Findings correspond to a Deauville score of 2  Workstation performed: [id number]       Assessment:  Transformed diffuse large B-cell lymphoma  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

## 2017-12-07 LAB
ALBUMIN SERPL BCP-MCNC: 3.3 G/DL (ref 3.5–5)
ALP SERPL-CCNC: 104 U/L (ref 46–116)
ALT SERPL W P-5'-P-CCNC: 50 U/L (ref 12–78)
ANION GAP SERPL CALCULATED.3IONS-SCNC: 9 MMOL/L (ref 4–13)
AST SERPL W P-5'-P-CCNC: 22 U/L (ref 5–45)
BASOPHILS # BLD AUTO: 0 THOUSANDS/ΜL (ref 0–0.1)
BASOPHILS NFR BLD AUTO: 0 % (ref 0–1)
BILIRUB SERPL-MCNC: 0.35 MG/DL (ref 0.2–1)
BUN SERPL-MCNC: 16 MG/DL (ref 5–25)
CA-I BLD-SCNC: 1.27 MMOL/L (ref 1.12–1.32)
CALCIUM SERPL-MCNC: 9.6 MG/DL (ref 8.3–10.1)
CHLORIDE SERPL-SCNC: 107 MMOL/L (ref 100–108)
CO2 SERPL-SCNC: 25 MMOL/L (ref 21–32)
CREAT SERPL-MCNC: 0.72 MG/DL (ref 0.6–1.3)
EOSINOPHIL # BLD AUTO: 0 THOUSAND/ΜL (ref 0–0.61)
EOSINOPHIL NFR BLD AUTO: 0 % (ref 0–6)
ERYTHROCYTE [DISTWIDTH] IN BLOOD BY AUTOMATED COUNT: 18.5 % (ref 11.6–15.1)
GFR SERPL CREATININE-BSD FRML MDRD: 82 ML/MIN/1.73SQ M
GLUCOSE SERPL-MCNC: 107 MG/DL (ref 65–140)
HCT VFR BLD AUTO: 35.1 % (ref 34.8–46.1)
HGB BLD-MCNC: 11.8 G/DL (ref 11.5–15.4)
LYMPHOCYTES # BLD AUTO: 0.96 THOUSANDS/ΜL (ref 0.6–4.47)
LYMPHOCYTES NFR BLD AUTO: 8 % (ref 14–44)
MAGNESIUM SERPL-MCNC: 2.2 MG/DL (ref 1.6–2.6)
MCH RBC QN AUTO: 31.6 PG (ref 26.8–34.3)
MCHC RBC AUTO-ENTMCNC: 33.6 G/DL (ref 31.4–37.4)
MCV RBC AUTO: 94 FL (ref 82–98)
MONOCYTES # BLD AUTO: 0.44 THOUSAND/ΜL (ref 0.17–1.22)
MONOCYTES NFR BLD AUTO: 4 % (ref 4–12)
NEUTROPHILS # BLD AUTO: 10.79 THOUSANDS/ΜL (ref 1.85–7.62)
NEUTS SEG NFR BLD AUTO: 88 % (ref 43–75)
NRBC BLD AUTO-RTO: 0 /100 WBCS
NT-PROBNP SERPL-MCNC: 269 PG/ML
PHOSPHATE SERPL-MCNC: 3.2 MG/DL (ref 2.3–4.1)
PLATELET # BLD AUTO: 228 THOUSANDS/UL (ref 149–390)
PMV BLD AUTO: 9.3 FL (ref 8.9–12.7)
POTASSIUM SERPL-SCNC: 3.9 MMOL/L (ref 3.5–5.3)
PROT SERPL-MCNC: 6.3 G/DL (ref 6.4–8.2)
RBC # BLD AUTO: 3.73 MILLION/UL (ref 3.81–5.12)
SODIUM SERPL-SCNC: 141 MMOL/L (ref 136–145)
URATE SERPL-MCNC: 3.4 MG/DL (ref 2–6.8)
WBC # BLD AUTO: 12.25 THOUSAND/UL (ref 4.31–10.16)

## 2017-12-07 PROCEDURE — 84550 ASSAY OF BLOOD/URIC ACID: CPT | Performed by: HOSPITALIST

## 2017-12-07 PROCEDURE — 83735 ASSAY OF MAGNESIUM: CPT | Performed by: HOSPITALIST

## 2017-12-07 PROCEDURE — 84100 ASSAY OF PHOSPHORUS: CPT | Performed by: HOSPITALIST

## 2017-12-07 PROCEDURE — 80053 COMPREHEN METABOLIC PANEL: CPT | Performed by: HOSPITALIST

## 2017-12-07 PROCEDURE — 85025 COMPLETE CBC W/AUTO DIFF WBC: CPT | Performed by: HOSPITALIST

## 2017-12-07 PROCEDURE — 82330 ASSAY OF CALCIUM: CPT | Performed by: HOSPITALIST

## 2017-12-07 PROCEDURE — 83880 ASSAY OF NATRIURETIC PEPTIDE: CPT | Performed by: HOSPITALIST

## 2017-12-07 RX ADMIN — NYSTATIN 500000 UNITS: 100000 SUSPENSION ORAL at 10:40

## 2017-12-07 RX ADMIN — POTASSIUM CHLORIDE 20 MEQ: 1500 TABLET, EXTENDED RELEASE ORAL at 10:39

## 2017-12-07 RX ADMIN — Medication 1 TABLET: at 10:39

## 2017-12-07 RX ADMIN — ACYCLOVIR 400 MG: 200 CAPSULE ORAL at 10:39

## 2017-12-07 RX ADMIN — VITAMIN D, TAB 1000IU (100/BT) 1000 UNITS: 25 TAB at 10:39

## 2017-12-07 RX ADMIN — NYSTATIN 500000 UNITS: 100000 SUSPENSION ORAL at 18:34

## 2017-12-07 RX ADMIN — SODIUM CHLORIDE 75 ML/HR: 0.9 INJECTION, SOLUTION INTRAVENOUS at 00:49

## 2017-12-07 RX ADMIN — VERAPAMIL HYDROCHLORIDE 180 MG: 180 TABLET, FILM COATED, EXTENDED RELEASE ORAL at 10:39

## 2017-12-07 RX ADMIN — NYSTATIN 500000 UNITS: 100000 SUSPENSION ORAL at 13:06

## 2017-12-07 RX ADMIN — PANTOPRAZOLE SODIUM 40 MG: 40 TABLET, DELAYED RELEASE ORAL at 06:03

## 2017-12-07 RX ADMIN — HYDROCHLOROTHIAZIDE 25 MG: 25 TABLET ORAL at 10:40

## 2017-12-07 RX ADMIN — ENOXAPARIN SODIUM 100 MG: 100 INJECTION SUBCUTANEOUS at 10:38

## 2017-12-07 RX ADMIN — ALLOPURINOL 300 MG: 300 TABLET ORAL at 10:39

## 2017-12-07 RX ADMIN — DAPSONE 100 MG: 100 TABLET ORAL at 10:39

## 2017-12-07 RX ADMIN — NYSTATIN 500000 UNITS: 100000 SUSPENSION ORAL at 21:03

## 2017-12-07 RX ADMIN — ENOXAPARIN SODIUM 100 MG: 100 INJECTION SUBCUTANEOUS at 21:04

## 2017-12-07 RX ADMIN — ETOPOSIDE: 20 INJECTION INTRAVENOUS at 17:37

## 2017-12-07 RX ADMIN — ACYCLOVIR 400 MG: 200 CAPSULE ORAL at 21:04

## 2017-12-07 RX ADMIN — ACETAMINOPHEN 650 MG: 325 TABLET, FILM COATED ORAL at 00:47

## 2017-12-07 RX ADMIN — ACETAMINOPHEN 650 MG: 325 TABLET, FILM COATED ORAL at 18:34

## 2017-12-07 RX ADMIN — NYSTATIN 500000 UNITS: 100000 SUSPENSION ORAL at 21:04

## 2017-12-07 RX ADMIN — PREDNISONE 100 MG: 20 TABLET ORAL at 13:06

## 2017-12-07 RX ADMIN — DEXAMETHASONE SODIUM PHOSPHATE: 10 INJECTION, SOLUTION INTRAMUSCULAR; INTRAVENOUS at 16:50

## 2017-12-07 NOTE — PROGRESS NOTES
[facility] Internal Medicine Progress Note  Patient: Sim Brunner 78 y o  female   MRN: [id number]  PCP: Lazarus Floras, PA-C  Unit/Bed#: [facility] 976-64 Encounter: [id number]  Date Of Visit: 17    Assessment/Plan:  ·   Principal Problem:    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 [facility])  Active Problems:    Enlarged lymph node in neck    Lymphadenopathy, axillary    Lymphoma ([address] )    Arm DVT (deep venous thromboembolism), acute, left (HCC)    Essential hypertension    Diffuse large B-cell lymphoma chemotherapy  EPOCH regimen currently ongoing DAY 2  Labs unremarkable except for leukocytosis- could be steroid induced  decreased RBC with increased RDW  As per oncology- continue to monitor for side effects, None so far  Daily weights  I/Os  IVF hydration- to avoid nephrotoxicity  PUD prophylaxis: PPI    Hypertension- BP controlled, continue meds      VTE Pharmacologic Prophylaxis:   Pharmacologic: Enoxaparin (Lovenox)  Mechanical VTE Prophylaxis in Place: Yes    Patient Centered Rounds: I have performed bedside rounds with nursing staff today  Discussions with Specialists or Other Care Team Provider: yes    Education and Discussions with Family / Patient: yes    Current Length of Stay: 1 day(s)    Current Patient Status: Inpatient   Certification Statement: The patient will continue to require additional inpatient hospital stay due to Medical management    Discharge Plan: home    Code Status: Level 1 - Full Code      Subjective:   No new complaints today  Objective:     Vitals:   Temp (24hrs), Av 9 °F (36 6 °C), Min:97 9 °F (36 6 °C), Max:98 °F (36 7 °C)    HR:  [79-88] 82  Resp:  [16-18] 18  BP: (102-123)/(54-72) 123/72  SpO2:  [92 %-94 %] 92 %  Body mass index is 37 83 kg/m²  Input and Output Summary (last 24 hours):        Intake/Output Summary (Last 24 hours) at 17 1140  Last data filed at 17 1051   Gross per 24 hour   Intake          1890 74 ml   Output 1500 ml   Net           390 74 ml       Physical Exam:  General Appearance:    Alert, cooperative, no distress, appropriately responsive    Head:    Normocephalic, without obvious abnormality, atraumatic, mucous membranes moist    Eyes:    Conjunctiva/corneas clear, EOM's intact   Neck:   Supple   Lungs:     Clear to auscultation bilaterally, respirations unlabored, no crackles or wheeze heard    Heart:    Regular rate and rhythm, S1 and S2 murmur present   Abdomen:     Soft, non-tender, bowel sounds active all four quadrants,     no masses, no organomegaly   Extremities:   Extremities normal, atraumatic, no cyanosis or edema   Neurologic:  nonfocal         Additional Data:     Labs:      Results from last 7 days  Lab Units 12/07/17  0603   WBC Thousand/uL 12 25*   HEMOGLOBIN g/dL 11 8   HEMATOCRIT % 35 1   PLATELETS Thousands/uL 228   NEUTROS PCT % 88*   LYMPHS PCT % 8*   MONOS PCT % 4   EOS PCT % 0       Results from last 7 days  Lab Units 12/07/17  0603   SODIUM mmol/L 141   POTASSIUM mmol/L 3 9   CHLORIDE mmol/L 107   CO2 mmol/L 25   BUN mg/dL 16   CREATININE mg/dL 0 72   CALCIUM mg/dL 9 6   TOTAL PROTEIN g/dL 6 3*   BILIRUBIN TOTAL mg/dL 0 35   ALK PHOS U/L 104   ALT U/L 50   AST U/L 22   GLUCOSE RANDOM mg/dL 107           * I Have Reviewed All Lab Data Listed Above  * Additional Pertinent Lab Tests Reviewed:  [facility] Admission Reviewed    Cultures:   Blood Culture: No results found for: BLOODCX  Urine Culture:   Lab Results   Component Value Date    URINECX 40,000-49,000 cfu/ml Escherichia coli 09/20/2017     Sputum Culture: No components found for: SPUTUMCX  Wound Culture: No results found for: WOUNDCULT    Last 24 Hours Medication List:     acyclovir 400 mg Oral Q12H [facility]   allopurinol 300 mg Oral Daily   cholecalciferol 1,000 Units Oral Daily   [START ON 12/10/2017] cyclophosphamide (CYTOXAN) IVPB 1,485 mg Intravenous Once   dapsone 100 mg Oral Daily etoposide-DOXOrubicin-vincristine infusion  Intravenous Q24H   enoxaparin 1 mg/kg Subcutaneous Q12H CANDACE   hydrochlorothiazide 25 mg Oral Daily   multivitamin-minerals 1 tablet Oral Daily   nystatin 500,000 Units Swish & Swallow 4x Daily   ondansetron with dexamethasone IVPB  Intravenous Q24H   pantoprazole 40 mg Oral Early Morning   potassium chloride 20 mEq Oral Daily   predniSONE 100 mg Oral Daily With Lunch   verapamil 180 mg Oral Daily        Today, Patient Was Seen By: Zhanna Alcazar MD    ** Please Note: Dragon 360 Dictation voice to text software may have been used in the creation of this document   **

## 2017-12-07 NOTE — SOCIAL WORK
Met with pt and hsb at bedside  Pt resides with hsb and son in a ranch home with 2 ELIOT  Pt states her hsb Edwin Meyer is her contact [phone]  Pt is indep at home and does not use any assistive devices  Pt states she has never had home care  Pt states her PCP is Jocelyn Ballard in [facility]  Pt uses [facility] in [place]  Pt denies hx of mental illness or substance abuse  Pt states her son will transport her home at [place]  CM reviewed d/c planning process including the following: identifying help at home, patient preference for d/c planning needs, Discharge Lounge, [facility] Meds to Bed program, availability of treatment team to discuss questions or concerns patient and/or family may have regarding understanding medications and recognizing signs and symptoms once discharged  CM also encouraged patient to follow up with all recommended appointments after discharge  Patient advised of importance for patient and family to participate in managing patients medical well being

## 2017-12-07 NOTE — PLAN OF CARE
INFECTION - ADULT     Absence or prevention of progression during hospitalization Progressing     Absence of fever/infection during neutropenic period Progressing        Knowledge Deficit     Patient/family/caregiver demonstrates understanding of disease process, treatment plan, medications, and discharge instructions Progressing

## 2017-12-07 NOTE — PROGRESS NOTES
Oncology Progress Note  Nguyen Schneider 78 y o  female MRN: [id number]  Unit/Bed#: [facility] 623-01 Encounter: [id number]      /72   Pulse 82   Temp 97 9 °F (36 6 °C) (Oral)   Resp 18   Ht 5' 3 5" (1 613 m)   Wt 98 4 kg (216 lb 14 9 oz)   SpO2 92%   BMI 37 83 kg/m²     Subjective: This is day 2 of EPOCH for relapsed high-grade B-cell lymphoma  She has history of OB total marginal zone lymphoma, in 2008  She is tolerating systemic chemotherapy very well  She has absolutely no nausea or vomiting  She is eating well  She is afebrile  She has no respiratory symptoms  She denied any pain  Her performance status is normal     Objective:    General Appearance:    Alert, oriented        Eyes:    PERRL   Ears:    Normal external ear canals, both ears   Nose:   Nares normal, septum midline   Throat:   Mucosa moist  Pharynx without injection  Neck:   Supple       Lungs:     Clear to auscultation bilaterally   Chest Wall:    No tenderness or deformity    Heart:    Regular rate and rhythm       Abdomen:     Soft, non-tender, bowel sounds +, no organomegaly           Extremities:   Extremities no cyanosis or edema       Skin:   no rash or icterus      Lymph nodes:   Cervical, supraclavicular, and axillary nodes normal   Neurologic:   CNII-XII intact, normal strength, sensation and reflexes     throughout        Recent Results (from the past 48 hour(s))   CBC and differential    Collection Time: 12/06/17 11:05 AM   Result Value Ref Range    WBC 5 05 4 31 - 10 16 Thousand/uL    RBC 3 80 (L) 3 81 - 5 12 Million/uL    Hemoglobin 11 8 11 5 - 15 4 g/dL    Hematocrit 36 0 34 8 - 46 1 %    MCV 95 82 - 98 fL    MCH 31 1 26 8 - 34 3 pg    MCHC 32 8 31 4 - 37 4 g/dL    RDW 18 8 (H) 11 6 - 15 1 %    MPV 9 2 8 9 - 12 7 fL    Platelets 795 100 - 617 Thousands/uL    nRBC 0 /100 WBCs    Neutrophils Relative 57 43 - 75 %    Lymphocytes Relative 24 14 - 44 %    Monocytes Relative 17 (H) 4 - 12 %    Eosinophils Relative 1 0 - 6 % Basophils Relative 1 0 - 1 %    Neutrophils Absolute 2 88 1 85 - 7 62 Thousands/µL    Lymphocytes Absolute 1 21 0 60 - 4 47 Thousands/µL    Monocytes Absolute 0 85 0 17 - 1 22 Thousand/µL    Eosinophils Absolute 0 03 0 00 - 0 61 Thousand/µL    Basophils Absolute 0 03 0 00 - 0 10 Thousands/µL   Comprehensive metabolic panel    Collection Time: 12/06/17 12:18 PM   Result Value Ref Range    Sodium 139 136 - 145 mmol/L    Potassium 3 8 3 5 - 5 3 mmol/L    Chloride 106 100 - 108 mmol/L    CO2 27 21 - 32 mmol/L    Anion Gap 6 4 - 13 mmol/L    BUN 16 5 - 25 mg/dL    Creatinine 0 63 0 60 - 1 30 mg/dL    Glucose 92 65 - 140 mg/dL    Calcium 9 8 8 3 - 10 1 mg/dL    AST 21 5 - 45 U/L    ALT 48 12 - 78 U/L    Alkaline Phosphatase 113 46 - 116 U/L    Total Protein 6 7 6 4 - 8 2 g/dL    Albumin 3 6 3 5 - 5 0 g/dL    Total Bilirubin 0 33 0 20 - 1 00 mg/dL    eGFR 88 ml/min/1 73sq m   CBC and differential    Collection Time: 12/07/17  6:03 AM   Result Value Ref Range    WBC 12 25 (H) 4 31 - 10 16 Thousand/uL    RBC 3 73 (L) 3 81 - 5 12 Million/uL    Hemoglobin 11 8 11 5 - 15 4 g/dL    Hematocrit 35 1 34 8 - 46 1 %    MCV 94 82 - 98 fL    MCH 31 6 26 8 - 34 3 pg    MCHC 33 6 31 4 - 37 4 g/dL    RDW 18 5 (H) 11 6 - 15 1 %    MPV 9 3 8 9 - 12 7 fL    Platelets 461 324 - 888 Thousands/uL    nRBC 0 /100 WBCs    Neutrophils Relative 88 (H) 43 - 75 %    Lymphocytes Relative 8 (L) 14 - 44 %    Monocytes Relative 4 4 - 12 %    Eosinophils Relative 0 0 - 6 %    Basophils Relative 0 0 - 1 %    Neutrophils Absolute 10 79 (H) 1 85 - 7 62 Thousands/µL    Lymphocytes Absolute 0 96 0 60 - 4 47 Thousands/µL    Monocytes Absolute 0 44 0 17 - 1 22 Thousand/µL    Eosinophils Absolute 0 00 0 00 - 0 61 Thousand/µL    Basophils Absolute 0 00 0 00 - 0 10 Thousands/µL   Comprehensive metabolic panel    Collection Time: 12/07/17  6:03 AM   Result Value Ref Range    Sodium 141 136 - 145 mmol/L    Potassium 3 9 3 5 - 5 3 mmol/L    Chloride 107 100 - 108 mmol/L    CO2 25 21 - 32 mmol/L    Anion Gap 9 4 - 13 mmol/L    BUN 16 5 - 25 mg/dL    Creatinine 0 72 0 60 - 1 30 mg/dL    Glucose 107 65 - 140 mg/dL    Calcium 9 6 8 3 - 10 1 mg/dL    AST 22 5 - 45 U/L    ALT 50 12 - 78 U/L    Alkaline Phosphatase 104 46 - 116 U/L    Total Protein 6 3 (L) 6 4 - 8 2 g/dL    Albumin 3 3 (L) 3 5 - 5 0 g/dL    Total Bilirubin 0 35 0 20 - 1 00 mg/dL    eGFR 82 ml/min/1 73sq m   Uric acid    Collection Time: 12/07/17  6:03 AM   Result Value Ref Range    Uric Acid 3 4 2 0 - 6 8 mg/dL   Magnesium    Collection Time: 12/07/17  6:03 AM   Result Value Ref Range    Magnesium 2 2 1 6 - 2 6 mg/dL   Phosphorus    Collection Time: 12/07/17  6:03 AM   Result Value Ref Range    Phosphorus 3 2 2 3 - 4 1 mg/dL   NT-BNP PRO    Collection Time: 12/07/17  6:03 AM   Result Value Ref Range    NT-proBNP 269 <450 pg/mL   Calcium, ionized    Collection Time: 12/07/17  6:03 AM   Result Value Ref Range    Calcium, Ionized 1 27 1 12 - 1 32 mmol/L         Nm Pet Ct Skull Base To Mid Thigh    Result Date: 11/29/2017  Narrative: PET/CT SCAN INDICATION: C83 38: Diffuse large b-cell lymphoma, lymph nodes of multiple sites C85 90: Non-Hodgkin lymphoma, unspecified, unspecified site  History taken directly from the electronic ordering system  Marginal zone lymphoma of right orbit, restaging postchemotherapy, radiation MODIFIER:     PS COMPARISON: CT 10/2/2017 and priors, including PET CT 7/24/2017 CELL TYPE:  Marginal zone B-cell lymphoma, right orbital lesion biopsy 3/3/2009 TECHNIQUE:   8 5 mCi F-18-FDG administered IV  Multiplanar attenuation corrected and non attenuation corrected PET images are available for interpretation, and contiguous, low dose, axial CT sections were obtained from the skull base through the femurs following the administration of oral contrast material (7 5 cc Omnipaque-240 in 300 cc water)  Intravenous contrast material was not utilized    This examination, like all CT scans performed in the [facility], was performed utilizing techniques to minimize radiation dose exposure, including the use of iterative reconstruction and automated exposure control  Fasting serum glucose: 89 mg/dl FINDINGS: Mediastinal blood pool activity SUV 3 1  Liver SUV 3 7  VISUALIZED BRAIN:   No acute abnormalities are seen  HEAD/NECK:   Significantly decreased size and hypermetabolism of bilateral neck and supraclavicular adenopathy, compatible with response to therapy  For example, right supraclavicular node series 3 image 102 measures 1 5 x 0 9 cm, SUV 1 8  On the prior CT of 10/2/2017, this measured 3 4 x 2 5 cm  On the prior PET CT of 7/24/2017, this measured 2 5 x 2 1 cm, SUV 18 1  No new FDG avid cervical adenopathy is seen  CT images: Stable nodular right thyroid  CHEST: Significantly decreased size and hypermetabolism of mediastinal, hilar and bilateral axillary hypermetabolic adenopathy, compatible with response to therapy  For example, left axillary subpectoral node series 3 image 112 measures 1 1 x 0 9 cm, SUV 1 4  On the prior CT of 10/2/2017, this measured 2 5 x 1 4 cm  On the prior PET CT, this measured 2 4 x 1 7 cm, SUV 3 5  Right axillary node series 3 image 107 measures 1 3 x 0 7 cm, SUV 1 2  On the prior CT of 10/2/2017, this measured 1 7 x 1 2 cm  On the prior PET CT, this measured 2 5 x 1 3 cm, SUV 12 1  Right hilar activity has an SUV of 3 1, prior SUV 5 1  Left hilar activity has an SUV of 2 8, prior SUV 7 5  Lung nodules seen on the prior CT of 10/2/2017 have significantly decreased and are no longer clearly visualized  Previously seen right lateral breast nodules also have resolved  No new FDG avid soft tissue lesions are seen  CT images: Otherwise stable  ABDOMEN:   Significantly decreased size and hypermetabolism of retrocrural and retroperitoneal adenopathy, compatible with response to therapy   For example, left para-aortic retroperitoneal node series 3 image 196 measures 1 3 x 1 cm, SUV 2 6  On the prior CT of 10/2/2017, this measured 3 x 3 2 cm  On the prior PET CT, this measured 2 4 x 3 cm, SUV 10 3  Spleen activity is uniform, and less intense than liver activity  No new FDG avid soft tissue lesions are seen  CT images: Otherwise stable  PELVIS: Significantly decreased size and hypermetabolism of bilateral pelvic and inguinal adenopathy, compatible with response to therapy  For example, right pelvic sidewall node series 3 image 251 measures 2 5 x 0 7 cm, SUV 1 5  On the prior CT of 10/2/2017, this measured 4 1 x 2 cm  On the prior PET CT, this measured 4 7 x 1 9 cm, SUV 9 3  Left inguinal node series 3 image 262 measures 1 5 x 0 7 cm, SUV 1 9  On the prior CT of 10/2/2017, this measured 2 8 x 1 6 cm  On the prior PET CT, this measured 2 2 x 1 1 cm, SUV 5 9  No new FDG avid soft tissue lesions are seen  CT images: Otherwise stable  OSSEOUS STRUCTURES: Mild marrow activity is compatible with recent chemotherapy  No new focal FDG avid lesions are seen  CT images: Spine degenerative change  Impression: 1  Significant decreased size and hypermetabolism of widespread adenopathy, compatible with response to therapy  Lung nodules also have decreased  No new hypermetabolic lesions  Findings correspond to a Deauville score of 2  Workstation performed: [id number]         Assessment :  Relapsed diffuse large B-cell lymphoma  History of marginal zone lymphoma in 2008  Day 2 of EPOCH with no toxicity  Plan: We will continue with current regimen  Will continue to monitor any toxicities  No additional supportive measure is indicated at this moment

## 2017-12-08 LAB
ALBUMIN SERPL BCP-MCNC: 3.1 G/DL (ref 3.5–5)
ALP SERPL-CCNC: 98 U/L (ref 46–116)
ALT SERPL W P-5'-P-CCNC: 46 U/L (ref 12–78)
ANION GAP SERPL CALCULATED.3IONS-SCNC: 6 MMOL/L (ref 4–13)
AST SERPL W P-5'-P-CCNC: 18 U/L (ref 5–45)
BILIRUB SERPL-MCNC: 0.31 MG/DL (ref 0.2–1)
BUN SERPL-MCNC: 17 MG/DL (ref 5–25)
CALCIUM SERPL-MCNC: 9.2 MG/DL (ref 8.3–10.1)
CHLORIDE SERPL-SCNC: 107 MMOL/L (ref 100–108)
CO2 SERPL-SCNC: 27 MMOL/L (ref 21–32)
CREAT SERPL-MCNC: 0.7 MG/DL (ref 0.6–1.3)
GFR SERPL CREATININE-BSD FRML MDRD: 85 ML/MIN/1.73SQ M
GLUCOSE SERPL-MCNC: 127 MG/DL (ref 65–140)
POTASSIUM SERPL-SCNC: 3.7 MMOL/L (ref 3.5–5.3)
PROT SERPL-MCNC: 6 G/DL (ref 6.4–8.2)
SODIUM SERPL-SCNC: 140 MMOL/L (ref 136–145)

## 2017-12-08 PROCEDURE — 80053 COMPREHEN METABOLIC PANEL: CPT | Performed by: HOSPITALIST

## 2017-12-08 RX ADMIN — Medication 1 TABLET: at 09:53

## 2017-12-08 RX ADMIN — ACETAMINOPHEN 650 MG: 325 TABLET, FILM COATED ORAL at 08:00

## 2017-12-08 RX ADMIN — NYSTATIN 500000 UNITS: 100000 SUSPENSION ORAL at 21:05

## 2017-12-08 RX ADMIN — ACYCLOVIR 400 MG: 200 CAPSULE ORAL at 09:53

## 2017-12-08 RX ADMIN — DEXAMETHASONE SODIUM PHOSPHATE: 10 INJECTION, SOLUTION INTRAMUSCULAR; INTRAVENOUS at 17:28

## 2017-12-08 RX ADMIN — DAPSONE 100 MG: 100 TABLET ORAL at 09:53

## 2017-12-08 RX ADMIN — HYDROCHLOROTHIAZIDE 25 MG: 25 TABLET ORAL at 09:53

## 2017-12-08 RX ADMIN — ALLOPURINOL 300 MG: 300 TABLET ORAL at 09:53

## 2017-12-08 RX ADMIN — NYSTATIN 500000 UNITS: 100000 SUSPENSION ORAL at 09:53

## 2017-12-08 RX ADMIN — PANTOPRAZOLE SODIUM 40 MG: 40 TABLET, DELAYED RELEASE ORAL at 05:26

## 2017-12-08 RX ADMIN — SODIUM CHLORIDE 75 ML/HR: 0.9 INJECTION, SOLUTION INTRAVENOUS at 05:16

## 2017-12-08 RX ADMIN — VERAPAMIL HYDROCHLORIDE 180 MG: 180 TABLET, FILM COATED, EXTENDED RELEASE ORAL at 09:53

## 2017-12-08 RX ADMIN — VITAMIN D, TAB 1000IU (100/BT) 1000 UNITS: 25 TAB at 09:53

## 2017-12-08 RX ADMIN — NYSTATIN 500000 UNITS: 100000 SUSPENSION ORAL at 17:28

## 2017-12-08 RX ADMIN — POTASSIUM CHLORIDE 20 MEQ: 1500 TABLET, EXTENDED RELEASE ORAL at 09:53

## 2017-12-08 RX ADMIN — ACYCLOVIR 400 MG: 200 CAPSULE ORAL at 21:04

## 2017-12-08 RX ADMIN — ENOXAPARIN SODIUM 100 MG: 100 INJECTION SUBCUTANEOUS at 21:03

## 2017-12-08 RX ADMIN — SODIUM CHLORIDE 75 ML/HR: 0.9 INJECTION, SOLUTION INTRAVENOUS at 18:17

## 2017-12-08 RX ADMIN — NYSTATIN 500000 UNITS: 100000 SUSPENSION ORAL at 14:30

## 2017-12-08 RX ADMIN — ENOXAPARIN SODIUM 100 MG: 100 INJECTION SUBCUTANEOUS at 09:52

## 2017-12-08 RX ADMIN — ETOPOSIDE: 20 INJECTION INTRAVENOUS at 18:17

## 2017-12-08 RX ADMIN — PREDNISONE 100 MG: 20 TABLET ORAL at 14:30

## 2017-12-08 NOTE — PROGRESS NOTES
Tolerating chemotherapy very well without any significant complications  Vitals are stable  No thrush  Lungs clear to auscultation  Heart S1-S2 regular rhythm  Abdomen soft nontender positive for bowel sounds no hepatic splenomegaly  Extremities no cyanosis or edema  Calcium 9 2, glucose 127, creatinine 0 7, BUN 17, potassium 3 7, bilirubin 0 31  Assessment and plan marginal zone lymphoma in 2008  2  Relapse with diffuse large B-cell lymphoma, she is allergic to rituximab, she received chop chemotherapy for 3 cycles and the decision to change to [facility], in total cycle 4 , day 3 , continue treatment  3    After discharge she needed Neulasta as an outpatient

## 2017-12-09 LAB
ALBUMIN SERPL BCP-MCNC: 3.1 G/DL (ref 3.5–5)
ALP SERPL-CCNC: 82 U/L (ref 46–116)
ALT SERPL W P-5'-P-CCNC: 42 U/L (ref 12–78)
ANION GAP SERPL CALCULATED.3IONS-SCNC: 5 MMOL/L (ref 4–13)
AST SERPL W P-5'-P-CCNC: 12 U/L (ref 5–45)
BASOPHILS # BLD AUTO: 0 THOUSANDS/ΜL (ref 0–0.1)
BASOPHILS NFR BLD AUTO: 0 % (ref 0–1)
BILIRUB SERPL-MCNC: 0.28 MG/DL (ref 0.2–1)
BUN SERPL-MCNC: 18 MG/DL (ref 5–25)
CALCIUM SERPL-MCNC: 8.9 MG/DL (ref 8.3–10.1)
CHLORIDE SERPL-SCNC: 107 MMOL/L (ref 100–108)
CO2 SERPL-SCNC: 29 MMOL/L (ref 21–32)
CREAT SERPL-MCNC: 0.68 MG/DL (ref 0.6–1.3)
EOSINOPHIL # BLD AUTO: 0 THOUSAND/ΜL (ref 0–0.61)
EOSINOPHIL NFR BLD AUTO: 0 % (ref 0–6)
ERYTHROCYTE [DISTWIDTH] IN BLOOD BY AUTOMATED COUNT: 18.9 % (ref 11.6–15.1)
GFR SERPL CREATININE-BSD FRML MDRD: 86 ML/MIN/1.73SQ M
GLUCOSE SERPL-MCNC: 129 MG/DL (ref 65–140)
HCT VFR BLD AUTO: 32.2 % (ref 34.8–46.1)
HGB BLD-MCNC: 10.7 G/DL (ref 11.5–15.4)
LYMPHOCYTES # BLD AUTO: 0.48 THOUSANDS/ΜL (ref 0.6–4.47)
LYMPHOCYTES NFR BLD AUTO: 9 % (ref 14–44)
MAGNESIUM SERPL-MCNC: 2.4 MG/DL (ref 1.6–2.6)
MCH RBC QN AUTO: 31.8 PG (ref 26.8–34.3)
MCHC RBC AUTO-ENTMCNC: 33.2 G/DL (ref 31.4–37.4)
MCV RBC AUTO: 96 FL (ref 82–98)
MONOCYTES # BLD AUTO: 0.31 THOUSAND/ΜL (ref 0.17–1.22)
MONOCYTES NFR BLD AUTO: 6 % (ref 4–12)
NEUTROPHILS # BLD AUTO: 4.6 THOUSANDS/ΜL (ref 1.85–7.62)
NEUTS SEG NFR BLD AUTO: 85 % (ref 43–75)
NRBC BLD AUTO-RTO: 0 /100 WBCS
PHOSPHATE SERPL-MCNC: 3.9 MG/DL (ref 2.3–4.1)
PLATELET # BLD AUTO: 185 THOUSANDS/UL (ref 149–390)
PMV BLD AUTO: 9.1 FL (ref 8.9–12.7)
POTASSIUM SERPL-SCNC: 3.7 MMOL/L (ref 3.5–5.3)
PROT SERPL-MCNC: 5.6 G/DL (ref 6.4–8.2)
RBC # BLD AUTO: 3.37 MILLION/UL (ref 3.81–5.12)
SODIUM SERPL-SCNC: 141 MMOL/L (ref 136–145)
WBC # BLD AUTO: 5.41 THOUSAND/UL (ref 4.31–10.16)

## 2017-12-09 PROCEDURE — 80053 COMPREHEN METABOLIC PANEL: CPT | Performed by: HOSPITALIST

## 2017-12-09 PROCEDURE — 84100 ASSAY OF PHOSPHORUS: CPT | Performed by: HOSPITALIST

## 2017-12-09 PROCEDURE — 83735 ASSAY OF MAGNESIUM: CPT | Performed by: HOSPITALIST

## 2017-12-09 PROCEDURE — 85025 COMPLETE CBC W/AUTO DIFF WBC: CPT | Performed by: HOSPITALIST

## 2017-12-09 RX ADMIN — POTASSIUM CHLORIDE 20 MEQ: 1500 TABLET, EXTENDED RELEASE ORAL at 09:31

## 2017-12-09 RX ADMIN — ETOPOSIDE: 20 INJECTION INTRAVENOUS at 20:07

## 2017-12-09 RX ADMIN — PANTOPRAZOLE SODIUM 40 MG: 40 TABLET, DELAYED RELEASE ORAL at 06:35

## 2017-12-09 RX ADMIN — NYSTATIN 500000 UNITS: 100000 SUSPENSION ORAL at 13:02

## 2017-12-09 RX ADMIN — NYSTATIN 500000 UNITS: 100000 SUSPENSION ORAL at 09:31

## 2017-12-09 RX ADMIN — DAPSONE 100 MG: 100 TABLET ORAL at 09:31

## 2017-12-09 RX ADMIN — VITAMIN D, TAB 1000IU (100/BT) 1000 UNITS: 25 TAB at 09:31

## 2017-12-09 RX ADMIN — ACYCLOVIR 400 MG: 200 CAPSULE ORAL at 09:31

## 2017-12-09 RX ADMIN — HYDROCHLOROTHIAZIDE 25 MG: 25 TABLET ORAL at 09:31

## 2017-12-09 RX ADMIN — NYSTATIN 500000 UNITS: 100000 SUSPENSION ORAL at 22:43

## 2017-12-09 RX ADMIN — ACYCLOVIR 400 MG: 200 CAPSULE ORAL at 20:20

## 2017-12-09 RX ADMIN — SODIUM CHLORIDE 75 ML/HR: 0.9 INJECTION, SOLUTION INTRAVENOUS at 20:17

## 2017-12-09 RX ADMIN — Medication 1 TABLET: at 09:31

## 2017-12-09 RX ADMIN — DEXAMETHASONE SODIUM PHOSPHATE: 10 INJECTION, SOLUTION INTRAMUSCULAR; INTRAVENOUS at 18:12

## 2017-12-09 RX ADMIN — PREDNISONE 100 MG: 20 TABLET ORAL at 13:01

## 2017-12-09 RX ADMIN — ENOXAPARIN SODIUM 100 MG: 100 INJECTION SUBCUTANEOUS at 20:20

## 2017-12-09 RX ADMIN — VERAPAMIL HYDROCHLORIDE 180 MG: 180 TABLET, FILM COATED, EXTENDED RELEASE ORAL at 09:31

## 2017-12-09 RX ADMIN — ENOXAPARIN SODIUM 100 MG: 100 INJECTION SUBCUTANEOUS at 09:31

## 2017-12-09 RX ADMIN — ALLOPURINOL 300 MG: 300 TABLET ORAL at 09:31

## 2017-12-09 NOTE — PROGRESS NOTES
The patient was seen and examined she has no complaints today  Vitals are stable  No thrush  Lungs clear to auscultation  Heart S1-S2 regular rhythm no additional sounds  Abdomen soft nontender positive for bowel sounds  Extremities no cyanosis or edema  1  Diffuse large B-cell lymphoma, proceed with [address]  2    Left jugular vein thrombosis from Port-A-Cath placement, continue Lovenox 100 mg subcu daily

## 2017-12-09 NOTE — PROGRESS NOTES
[facility] Internal Medicine Progress Note  Patient: Renata Jean Baptiste 78 y o  female   MRN: [id number]  PCP: Khushbu Briggs PA-C  Unit/Bed#: [facility] 780-44 Encounter: [id number]  Date Of Visit: 12/09/17    Assessment/Plan:  ·   Principal Problem:    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 [facility])  Active Problems:    Enlarged lymph node in neck    Lymphadenopathy, axillary    Lymphoma ([address] )    Arm DVT (deep venous thromboembolism), acute, left (HCC)    Essential hypertension    Diffuse large B-cell lymphoma  EPOCH chemotherapy regimen  Has dara facial appearance compared to yesterday due to dexamethasone as per Oncology  Day 3 of chemotherapy    Labs remarkable for decreased hemoglobin hematocrit red cell production/ increased the reticulocytosis  Hypoalbuminemia, other electrolytes unremarkable    Continue chemotherapy, continue to monitor for side effects  Repeat CBC, CMP tomorrow  Protein calorie malnutrition- protein supplementation provided  PPI for peptic ulcer disease prophylaxis  Hypertension-blood pressure monitoring, continue meds      VTE Pharmacologic Prophylaxis:   Pharmacologic: Enoxaparin (Lovenox)  Mechanical VTE Prophylaxis in Place: Yes    Patient Centered Rounds: I have performed bedside rounds with nursing staff today  Discussions with Specialists or Other Care Team Provider: yes    Education and Discussions with Family / Patient:yes    Current Length of Stay: 3 day(s)    Current Patient Status: Inpatient   Certification Statement: The patient will continue to require additional inpatient hospital stay due to Medical management while receiving chemotherapy    Discharge Plan:  Home    Code Status: Level 1 - Full Code      Subjective: Increased redness in face and across anterior chest wall  Review of systems negative for chest tightness, shortness breath, wheezing, itching, facial swelling   Review of systems negative otherwise    Objective:     Vitals:   Temp (24hrs), Av 9 °F (36 6 °C), Min:97 6 °F (36 4 °C), Max:98 1 °F (36 7 °C)    HR:  [67-77] 77  Resp:  [18] 18  BP: (136-140)/(66-73) 136/66  SpO2:  [94 %-95 %] 94 %  Body mass index is 39 21 kg/m²  Input and Output Summary (last 24 hours): Intake/Output Summary (Last 24 hours) at 17 1455  Last data filed at 17 1300   Gross per 24 hour   Intake          1397 75 ml   Output             4050 ml   Net         -2652 25 ml       Physical Exam:  General Appearance:    Alert, cooperative, no distress, appropriately responsive Bonilla appearance noted today   Head:    Normocephalic, without obvious abnormality, atraumatic, mucous membranes moist    Eyes:    Conjunctival pallor noted/corneas clear, EOM's intact   Neck:   Supple   Lungs:     Clear to auscultation bilaterally, respirations unlabored, no crackles or wheeze     Heart:    Regular rate and rhythm, S1 and S2 murmur +   Abdomen:     Soft, non-tender, bowel sounds active all four quadrants,     no masses, no organomegaly   Extremities:   Extremities normal, atraumatic, no cyanosis or edema   Neurologic:  nonfocal         Additional Data:     Labs:      Results from last 7 days  Lab Units 17  0449   WBC Thousand/uL 5 41   HEMOGLOBIN g/dL 10 7*   HEMATOCRIT % 32 2*   PLATELETS Thousands/uL 185   NEUTROS PCT % 85*   LYMPHS PCT % 9*   MONOS PCT % 6   EOS PCT % 0       Results from last 7 days  Lab Units 17  0449   SODIUM mmol/L 141   POTASSIUM mmol/L 3 7   CHLORIDE mmol/L 107   CO2 mmol/L 29   BUN mg/dL 18   CREATININE mg/dL 0 68   CALCIUM mg/dL 8 9   TOTAL PROTEIN g/dL 5 6*   BILIRUBIN TOTAL mg/dL 0 28   ALK PHOS U/L 82   ALT U/L 42   AST U/L 12   GLUCOSE RANDOM mg/dL 129           * I Have Reviewed All Lab Data Listed Above  * Additional Pertinent Lab Tests Reviewed:  [facility] Admission Reviewed    Cultures:   Blood Culture: No results found for: BLOODCX  Urine Culture:   Lab Results   Component Value Date    [facility] 40,000-49,000 cfu/ml Escherichia coli 09/20/2017     Sputum Culture: No components found for: SPUTUMCX  Wound Culture: No results found for: WOUNDCULT    Last 24 Hours Medication List:     acyclovir 400 mg Oral Q12H [facility]   allopurinol 300 mg Oral Daily   cholecalciferol 1,000 Units Oral Daily   [START ON 12/10/2017] cyclophosphamide (CYTOXAN) IVPB 1,485 mg Intravenous Once   dapsone 100 mg Oral Daily   etoposide-DOXOrubicin-vincristine infusion  Intravenous Q24H   enoxaparin 1 mg/kg Subcutaneous Q12H CANDACE   hydrochlorothiazide 25 mg Oral Daily   multivitamin-minerals 1 tablet Oral Daily   nystatin 500,000 Units Swish & Swallow 4x Daily   ondansetron with dexamethasone IVPB  Intravenous Q24H   pantoprazole 40 mg Oral Early Morning   potassium chloride 20 mEq Oral Daily   predniSONE 100 mg Oral Daily With Lunch   verapamil 180 mg Oral Daily        Today, Patient Was Seen By: Brandy Munoz MD    ** Please Note: Dragon 360 Dictation voice to text software may have been used in the creation of this document   **

## 2017-12-09 NOTE — PROGRESS NOTES
[facility] Internal Medicine Progress Note  Patient: Cranston Dubin 78 y o  female   MRN: [id number]  PCP: Felxi Lynch PA-C  Unit/Bed#: [facility] 881-38 Encounter: [id number]  Date Of Visit: 17    Assessment/Plan:  ·   Principal Problem:    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 [facility])  Active Problems:    Enlarged lymph node in neck    Lymphadenopathy, axillary    Lymphoma ([address] )    Arm DVT (deep venous thromboembolism), acute, left (HCC)    Essential hypertension    Diffuse large B-cell lymphoma of the lymph nodes of multiple sites  EPO see H regimen ongoing day 3  Labs unremarkable except for leukocytosis  Repeat CBC tomorrow  Protein calorie malnutrition -Total protein albumin reduced- protein to be supplemented  Continue chemotherapy  PPI for peptic ulcer disease prophylaxis  Hypertension- blood pressure monitoring, continue meds    VTE Pharmacologic Prophylaxis:   Pharmacologic: Enoxaparin (Lovenox)  Mechanical VTE Prophylaxis in Place: Yes    Patient Centered Rounds: I have performed bedside rounds with nursing staff today  Discussions with Specialists or Other Care Team Provider: yes    Education and Discussions with Family / Patient: yes    Current Length of Stay: 2 day(s)    Current Patient Status: Inpatient   Certification Statement: The patient will continue to require additional inpatient hospital stay due to medical management    Discharge Plan: home    Code Status: Level 1 - Full Code      Subjective:   No new complaints today  Tolerating Chemo well so far  Objective:     Vitals:   Temp (24hrs), Av 9 °F (36 6 °C), Min:97 5 °F (36 4 °C), Max:98 5 °F (36 9 °C)    HR:  [67-82] 67  Resp:  [18] 18  BP: (112-140)/(64-73) 140/73  SpO2:  [94 %-95 %] 95 %  Body mass index is 38 82 kg/m²  Input and Output Summary (last 24 hours):        Intake/Output Summary (Last 24 hours) at 17 1912  Last data filed at 17 1824   Gross per 24 hour   Intake 2322 5 ml   Output             5200 ml   Net          -2877 5 ml       Physical Exam:  General Appearance:    Alert, cooperative, no distress, appropriately responsive    Head:    Normocephalic, without obvious abnormality, atraumatic, mucous membranes moist    Eyes:    Conjunctival pallor noted/corneas clear, EOM's intact   Neck:   Supple   Lungs:     Clear to auscultation bilaterally, respirations unlabored, no crackles or wheeze     Heart:    Regular rate and rhythm, S1 and S2 no murmur   Abdomen:     Soft, non-tender, bowel sounds active all four quadrants,     no masses, no organomegaly   Extremities:   Extremities normal, atraumatic, no cyanosis or edema   Neurologic:  nonfocal         Additional Data:     Labs:      Results from last 7 days  Lab Units 12/07/17  0603   WBC Thousand/uL 12 25*   HEMOGLOBIN g/dL 11 8   HEMATOCRIT % 35 1   PLATELETS Thousands/uL 228   NEUTROS PCT % 88*   LYMPHS PCT % 8*   MONOS PCT % 4   EOS PCT % 0       Results from last 7 days  Lab Units 12/08/17  0516   SODIUM mmol/L 140   POTASSIUM mmol/L 3 7   CHLORIDE mmol/L 107   CO2 mmol/L 27   BUN mg/dL 17   CREATININE mg/dL 0 70   CALCIUM mg/dL 9 2   TOTAL PROTEIN g/dL 6 0*   BILIRUBIN TOTAL mg/dL 0 31   ALK PHOS U/L 98   ALT U/L 46   AST U/L 18   GLUCOSE RANDOM mg/dL 127           * I Have Reviewed All Lab Data Listed Above  * Additional Pertinent Lab Tests Reviewed:  [address] Admission Reviewed    Cultures:   Blood Culture: No results found for: BLOODCX  Urine Culture:   Lab Results   Component Value Date    URINECX 40,000-49,000 cfu/ml Escherichia coli 09/20/2017     Sputum Culture: No components found for: SPUTUMCX  Wound Culture: No results found for: WOUNDCULT    Last 24 Hours Medication List:     acyclovir 400 mg Oral Q12H [address]   allopurinol 300 mg Oral Daily   cholecalciferol 1,000 Units Oral Daily   [START ON 12/10/2017] cyclophosphamide (CYTOXAN) IVPB 1,485 mg Intravenous Once   dapsone 100 mg Oral Daily etoposide-DOXOrubicin-vincristine infusion  Intravenous Q24H   enoxaparin 1 mg/kg Subcutaneous Q12H CANDACE   hydrochlorothiazide 25 mg Oral Daily   multivitamin-minerals 1 tablet Oral Daily   nystatin 500,000 Units Swish & Swallow 4x Daily   ondansetron with dexamethasone IVPB  Intravenous Q24H   pantoprazole 40 mg Oral Early Morning   potassium chloride 20 mEq Oral Daily   predniSONE 100 mg Oral Daily With Lunch   verapamil 180 mg Oral Daily        Today, Patient Was Seen By: Sheree Santos MD    ** Please Note: Dragon 360 Dictation voice to text software may have been used in the creation of this document   **

## 2017-12-09 NOTE — PLAN OF CARE
DISCHARGE PLANNING - CARE MANAGEMENT     Discharge to post-acute care or home with appropriate resources Progressing        INFECTION - ADULT     Absence or prevention of progression during hospitalization Progressing     Absence of fever/infection during neutropenic period Progressing        Knowledge Deficit     Patient/family/caregiver demonstrates understanding of disease process, treatment plan, medications, and discharge instructions Progressing

## 2017-12-10 LAB
ALBUMIN SERPL BCP-MCNC: 3 G/DL (ref 3.5–5)
ALP SERPL-CCNC: 71 U/L (ref 46–116)
ALT SERPL W P-5'-P-CCNC: 52 U/L (ref 12–78)
ANION GAP SERPL CALCULATED.3IONS-SCNC: 6 MMOL/L (ref 4–13)
AST SERPL W P-5'-P-CCNC: 24 U/L (ref 5–45)
BASOPHILS # BLD AUTO: 0 THOUSANDS/ΜL (ref 0–0.1)
BASOPHILS NFR BLD AUTO: 0 % (ref 0–1)
BILIRUB SERPL-MCNC: 0.42 MG/DL (ref 0.2–1)
BUN SERPL-MCNC: 19 MG/DL (ref 5–25)
CALCIUM SERPL-MCNC: 8.9 MG/DL (ref 8.3–10.1)
CHLORIDE SERPL-SCNC: 106 MMOL/L (ref 100–108)
CO2 SERPL-SCNC: 29 MMOL/L (ref 21–32)
CREAT SERPL-MCNC: 0.66 MG/DL (ref 0.6–1.3)
EOSINOPHIL # BLD AUTO: 0 THOUSAND/ΜL (ref 0–0.61)
EOSINOPHIL NFR BLD AUTO: 0 % (ref 0–6)
ERYTHROCYTE [DISTWIDTH] IN BLOOD BY AUTOMATED COUNT: 18 % (ref 11.6–15.1)
GFR SERPL CREATININE-BSD FRML MDRD: 87 ML/MIN/1.73SQ M
GLUCOSE SERPL-MCNC: 125 MG/DL (ref 65–140)
HCT VFR BLD AUTO: 31.6 % (ref 34.8–46.1)
HGB BLD-MCNC: 10.5 G/DL (ref 11.5–15.4)
LYMPHOCYTES # BLD AUTO: 0.42 THOUSANDS/ΜL (ref 0.6–4.47)
LYMPHOCYTES NFR BLD AUTO: 13 % (ref 14–44)
MAGNESIUM SERPL-MCNC: 2.5 MG/DL (ref 1.6–2.6)
MCH RBC QN AUTO: 31.5 PG (ref 26.8–34.3)
MCHC RBC AUTO-ENTMCNC: 33.2 G/DL (ref 31.4–37.4)
MCV RBC AUTO: 95 FL (ref 82–98)
MONOCYTES # BLD AUTO: 0.38 THOUSAND/ΜL (ref 0.17–1.22)
MONOCYTES NFR BLD AUTO: 11 % (ref 4–12)
NEUTROPHILS # BLD AUTO: 2.5 THOUSANDS/ΜL (ref 1.85–7.62)
NEUTS SEG NFR BLD AUTO: 76 % (ref 43–75)
NRBC BLD AUTO-RTO: 0 /100 WBCS
PHOSPHATE SERPL-MCNC: 3.3 MG/DL (ref 2.3–4.1)
PLATELET # BLD AUTO: 175 THOUSANDS/UL (ref 149–390)
PMV BLD AUTO: 8.8 FL (ref 8.9–12.7)
POTASSIUM SERPL-SCNC: 3.6 MMOL/L (ref 3.5–5.3)
PROT SERPL-MCNC: 5.4 G/DL (ref 6.4–8.2)
RBC # BLD AUTO: 3.33 MILLION/UL (ref 3.81–5.12)
SODIUM SERPL-SCNC: 141 MMOL/L (ref 136–145)
WBC # BLD AUTO: 3.32 THOUSAND/UL (ref 4.31–10.16)

## 2017-12-10 PROCEDURE — 83735 ASSAY OF MAGNESIUM: CPT | Performed by: HOSPITALIST

## 2017-12-10 PROCEDURE — 85025 COMPLETE CBC W/AUTO DIFF WBC: CPT | Performed by: HOSPITALIST

## 2017-12-10 PROCEDURE — 80053 COMPREHEN METABOLIC PANEL: CPT | Performed by: HOSPITALIST

## 2017-12-10 PROCEDURE — 84100 ASSAY OF PHOSPHORUS: CPT | Performed by: HOSPITALIST

## 2017-12-10 RX ADMIN — PANTOPRAZOLE SODIUM 40 MG: 40 TABLET, DELAYED RELEASE ORAL at 05:59

## 2017-12-10 RX ADMIN — NYSTATIN 500000 UNITS: 100000 SUSPENSION ORAL at 22:31

## 2017-12-10 RX ADMIN — NYSTATIN 500000 UNITS: 100000 SUSPENSION ORAL at 18:40

## 2017-12-10 RX ADMIN — DEXAMETHASONE SODIUM PHOSPHATE: 10 INJECTION, SOLUTION INTRAMUSCULAR; INTRAVENOUS at 20:18

## 2017-12-10 RX ADMIN — PREDNISONE 100 MG: 20 TABLET ORAL at 13:10

## 2017-12-10 RX ADMIN — VITAMIN D, TAB 1000IU (100/BT) 1000 UNITS: 25 TAB at 08:41

## 2017-12-10 RX ADMIN — ACYCLOVIR 400 MG: 200 CAPSULE ORAL at 20:49

## 2017-12-10 RX ADMIN — NYSTATIN 500000 UNITS: 100000 SUSPENSION ORAL at 08:44

## 2017-12-10 RX ADMIN — SODIUM CHLORIDE 333 ML/HR: 0.9 INJECTION, SOLUTION INTRAVENOUS at 17:00

## 2017-12-10 RX ADMIN — ACYCLOVIR 400 MG: 200 CAPSULE ORAL at 08:40

## 2017-12-10 RX ADMIN — POTASSIUM CHLORIDE 20 MEQ: 1500 TABLET, EXTENDED RELEASE ORAL at 08:41

## 2017-12-10 RX ADMIN — DAPSONE 100 MG: 100 TABLET ORAL at 08:43

## 2017-12-10 RX ADMIN — ALLOPURINOL 300 MG: 300 TABLET ORAL at 08:41

## 2017-12-10 RX ADMIN — NYSTATIN 500000 UNITS: 100000 SUSPENSION ORAL at 13:10

## 2017-12-10 RX ADMIN — CYCLOPHOSPHAMIDE 1485 MG: 1 INJECTION, POWDER, FOR SOLUTION INTRAVENOUS; ORAL at 21:10

## 2017-12-10 RX ADMIN — ACETAMINOPHEN 650 MG: 325 TABLET, FILM COATED ORAL at 22:24

## 2017-12-10 RX ADMIN — SODIUM CHLORIDE 75 ML/HR: 0.9 INJECTION, SOLUTION INTRAVENOUS at 20:20

## 2017-12-10 RX ADMIN — VERAPAMIL HYDROCHLORIDE 180 MG: 180 TABLET, FILM COATED, EXTENDED RELEASE ORAL at 08:43

## 2017-12-10 RX ADMIN — HYDROCHLOROTHIAZIDE 25 MG: 25 TABLET ORAL at 08:42

## 2017-12-10 RX ADMIN — ENOXAPARIN SODIUM 100 MG: 100 INJECTION SUBCUTANEOUS at 20:17

## 2017-12-10 RX ADMIN — SODIUM CHLORIDE 75 ML/HR: 0.9 INJECTION, SOLUTION INTRAVENOUS at 08:47

## 2017-12-10 RX ADMIN — Medication 1 TABLET: at 08:41

## 2017-12-10 RX ADMIN — ENOXAPARIN SODIUM 100 MG: 100 INJECTION SUBCUTANEOUS at 08:44

## 2017-12-10 NOTE — PROGRESS NOTES
She is tolerating therapy very well denied any fever chills nausea vomiting diarrhea constipation dysuria hematuria melena or hematochezia  Vitals are stable  No thrush  Lungs clear to auscultation  Heart S1-S2 regular rhythm  Abdomen soft nontender positive for bowel sounds no hepatic splenomegaly or masses  Extremities +1 edema  WBC 3 3, hemoglobin 10 5, platelets [id number], 66% neutrophils, creatinine 0 6  Assessment and plan diffuse large B-cell lymphoma she is allergic to rituximab, proceed with chemotherapy [facility] [place]  Expect discharge tomorrow after cycle 4    Day 5

## 2017-12-10 NOTE — PROGRESS NOTES
[facility] Progress Note  Patient: Clemente Figueroa 78 y o  female   MRN: [id number]  PCP: Gutierrez Helton PA-C  Unit/Bed#: [facility] 888-78 Encounter: [id number]  Date Of Visit: 12/10/17    Assessment/Plan:  ·   Principal Problem:    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 [facility])  Active Problems:    Enlarged lymph node in neck    Lymphadenopathy, axillary    Lymphoma ([address] )    Arm DVT (deep venous thromboembolism), acute, left (HCC)    Essential hypertension   Diffuse large B-cell lymphoma lymph nodes   EPOCH chemo regimen, facial plethora present as compared to yesterday with response of dexamethasone  Day 4 of chemotherapy  Vital signs unremarkable for fever, pulse 77, blood pressure 149/70 saturation 94% on room air comfortable no apparent distress  Labs remarkable for, leukopenia, anemia, decreased total protein and albumin- due to chemotherapy toxicity    Plan continue chemotherapy, monitor for side effects  Repeat CBC, CMP tomorrow  Protein calorie malnutrition-protein supplementation provided  PPI for peptic ulcer prophylaxis  Hypertension-blood pressure monitoring, continue meds      VTE Pharmacologic Prophylaxis:   Pharmacologic: Enoxaparin (Lovenox)  Mechanical VTE Prophylaxis in Place: Yes    Patient Centered Rounds: I have performed bedside rounds with nursing staff today  Discussions with Specialists or Other Care Team Provider: yes    Education and Discussions with Family / Patient: yes  Current Length of Stay: 4 day(s)    Current Patient Status: Inpatient   Certification Statement: The patient will continue to require additional inpatient hospital stay due to chemo    Discharge Plan: home tomorrow    Code Status: Level 1 - Full Code      Subjective:   No new complaints  Has dara facial plethora  ROS negative otherwise      Objective:     Vitals:   Temp (24hrs), Av 1 °F (36 7 °C), Min:98 °F (36 7 °C), Max:98 1 °F (36 7 °C)    HR:  [73-77] 77  Resp:  [18-20] 18  BP: (128-149)/(70-73) 149/70  SpO2:  [93 %-94 %] 94 %  Body mass index is 38 9 kg/m²  Input and Output Summary (last 24 hours): Intake/Output Summary (Last 24 hours) at 12/10/17 0839  Last data filed at 12/10/17 0608   Gross per 24 hour   Intake          4006 25 ml   Output             2900 ml   Net          1106 25 ml       Physical Exam:  General Appearance:    Facial plethora, Alert, cooperative, no distress, appropriately responsive    Head:    Normocephalic, without obvious abnormality, atraumatic, mucous membranes moist    Eyes:    Conjunctiva/corneas clear, EOM's intact   Neck:   Supple   Lungs:     Clear to auscultation bilaterally, respirations unlabored, no crackles or wheeze     Heart:    Regular rate and rhythm, S1 and S2 murmur present   Abdomen:     Soft, non-tender, bowel sounds active all four quadrants,     no masses, no organomegaly   Extremities:   Extremities normal, atraumatic, no cyanosis or edema   Neurologic:  nonfocal         Additional Data:     Labs:      Results from last 7 days  Lab Units 12/10/17  0505   WBC Thousand/uL 3 32*   HEMOGLOBIN g/dL 10 5*   HEMATOCRIT % 31 6*   PLATELETS Thousands/uL 175   NEUTROS PCT % 76*   LYMPHS PCT % 13*   MONOS PCT % 11   EOS PCT % 0       Results from last 7 days  Lab Units 12/10/17  0505   SODIUM mmol/L 141   POTASSIUM mmol/L 3 6   CHLORIDE mmol/L 106   CO2 mmol/L 29   BUN mg/dL 19   CREATININE mg/dL 0 66   CALCIUM mg/dL 8 9   TOTAL PROTEIN g/dL 5 4*   BILIRUBIN TOTAL mg/dL 0 42   ALK PHOS U/L 71   ALT U/L 52   AST U/L 24   GLUCOSE RANDOM mg/dL 125           * I Have Reviewed All Lab Data Listed Above  * Additional Pertinent Lab Tests Reviewed: [address] [facility] Admission Reviewed    Cultures:   Blood Culture: No results found for: BLOODCX  Urine Culture:   Lab Results   Component Value Date    URINECX 40,000-49,000 cfu/ml Escherichia coli 09/20/2017     Sputum Culture: No components found for: SPUTUMCX  Wound Culture:  No results found for: WOUNDCULT    Last 24 Hours Medication List:     acyclovir 400 mg Oral Q12H [address]   allopurinol 300 mg Oral Daily   cholecalciferol 1,000 Units Oral Daily   cyclophosphamide (CYTOXAN) IVPB 1,485 mg Intravenous Once   dapsone 100 mg Oral Daily   etoposide-DOXOrubicin-vincristine infusion  Intravenous Q24H   enoxaparin 1 mg/kg Subcutaneous Q12H [address]   hydrochlorothiazide 25 mg Oral Daily   multivitamin-minerals 1 tablet Oral Daily   nystatin 500,000 Units Swish & Swallow 4x Daily   ondansetron with dexamethasone IVPB  Intravenous Q24H   pantoprazole 40 mg Oral Early Morning   potassium chloride 20 mEq Oral Daily   predniSONE 100 mg Oral Daily With Lunch   verapamil 180 mg Oral Daily        Today, Patient Was Seen By: Yulia Morfin MD    ** Please Note: Dragon 360 Dictation voice to text software may have been used in the creation of this document   **

## 2017-12-11 ENCOUNTER — GENERIC CONVERSION - ENCOUNTER (OUTPATIENT)
Dept: OTHER | Facility: OTHER | Age: 77
End: 2017-12-11

## 2017-12-11 VITALS
SYSTOLIC BLOOD PRESSURE: 132 MMHG | DIASTOLIC BLOOD PRESSURE: 68 MMHG | BODY MASS INDEX: 38.43 KG/M2 | OXYGEN SATURATION: 93 % | HEIGHT: 64 IN | HEART RATE: 59 BPM | WEIGHT: 225.09 LBS | TEMPERATURE: 97.6 F | RESPIRATION RATE: 18 BRPM

## 2017-12-11 LAB
ALBUMIN SERPL BCP-MCNC: 3 G/DL (ref 3.5–5)
ALP SERPL-CCNC: 65 U/L (ref 46–116)
ALT SERPL W P-5'-P-CCNC: 62 U/L (ref 12–78)
ANION GAP SERPL CALCULATED.3IONS-SCNC: 7 MMOL/L (ref 4–13)
AST SERPL W P-5'-P-CCNC: 23 U/L (ref 5–45)
BASOPHILS # BLD AUTO: 0 THOUSANDS/ΜL (ref 0–0.1)
BASOPHILS NFR BLD AUTO: 0 % (ref 0–1)
BILIRUB SERPL-MCNC: 0.41 MG/DL (ref 0.2–1)
BUN SERPL-MCNC: 21 MG/DL (ref 5–25)
CALCIUM SERPL-MCNC: 8.6 MG/DL (ref 8.3–10.1)
CHLORIDE SERPL-SCNC: 102 MMOL/L (ref 100–108)
CO2 SERPL-SCNC: 28 MMOL/L (ref 21–32)
CREAT SERPL-MCNC: 0.7 MG/DL (ref 0.6–1.3)
EOSINOPHIL # BLD AUTO: 0 THOUSAND/ΜL (ref 0–0.61)
EOSINOPHIL NFR BLD AUTO: 0 % (ref 0–6)
ERYTHROCYTE [DISTWIDTH] IN BLOOD BY AUTOMATED COUNT: 17.3 % (ref 11.6–15.1)
GFR SERPL CREATININE-BSD FRML MDRD: 85 ML/MIN/1.73SQ M
GLUCOSE SERPL-MCNC: 137 MG/DL (ref 65–140)
HCT VFR BLD AUTO: 30.3 % (ref 34.8–46.1)
HGB BLD-MCNC: 10.2 G/DL (ref 11.5–15.4)
LYMPHOCYTES # BLD AUTO: 0.34 THOUSANDS/ΜL (ref 0.6–4.47)
LYMPHOCYTES NFR BLD AUTO: 15 % (ref 14–44)
MCH RBC QN AUTO: 31.8 PG (ref 26.8–34.3)
MCHC RBC AUTO-ENTMCNC: 33.7 G/DL (ref 31.4–37.4)
MCV RBC AUTO: 94 FL (ref 82–98)
MONOCYTES # BLD AUTO: 0.11 THOUSAND/ΜL (ref 0.17–1.22)
MONOCYTES NFR BLD AUTO: 5 % (ref 4–12)
NEUTROPHILS # BLD AUTO: 1.76 THOUSANDS/ΜL (ref 1.85–7.62)
NEUTS SEG NFR BLD AUTO: 80 % (ref 43–75)
NRBC BLD AUTO-RTO: 0 /100 WBCS
PLATELET # BLD AUTO: 160 THOUSANDS/UL (ref 149–390)
PMV BLD AUTO: 9.1 FL (ref 8.9–12.7)
POTASSIUM SERPL-SCNC: 3.6 MMOL/L (ref 3.5–5.3)
PROT SERPL-MCNC: 5.4 G/DL (ref 6.4–8.2)
RBC # BLD AUTO: 3.21 MILLION/UL (ref 3.81–5.12)
SODIUM SERPL-SCNC: 137 MMOL/L (ref 136–145)
WBC # BLD AUTO: 2.22 THOUSAND/UL (ref 4.31–10.16)

## 2017-12-11 PROCEDURE — 80053 COMPREHEN METABOLIC PANEL: CPT | Performed by: HOSPITALIST

## 2017-12-11 PROCEDURE — 85025 COMPLETE CBC W/AUTO DIFF WBC: CPT | Performed by: HOSPITALIST

## 2017-12-11 RX ORDER — ACYCLOVIR 200 MG/1
400 CAPSULE ORAL EVERY 12 HOURS SCHEDULED
Qty: 40 CAPSULE | Refills: 0 | Status: SHIPPED | OUTPATIENT
Start: 2017-12-11 | End: 2018-03-14 | Stop reason: SDUPTHER

## 2017-12-11 RX ADMIN — ACYCLOVIR 400 MG: 200 CAPSULE ORAL at 08:21

## 2017-12-11 RX ADMIN — ENOXAPARIN SODIUM 100 MG: 100 INJECTION SUBCUTANEOUS at 08:22

## 2017-12-11 RX ADMIN — POTASSIUM CHLORIDE 20 MEQ: 1500 TABLET, EXTENDED RELEASE ORAL at 08:21

## 2017-12-11 RX ADMIN — HYDROCHLOROTHIAZIDE 25 MG: 25 TABLET ORAL at 08:21

## 2017-12-11 RX ADMIN — NYSTATIN 500000 UNITS: 100000 SUSPENSION ORAL at 08:21

## 2017-12-11 RX ADMIN — VERAPAMIL HYDROCHLORIDE 180 MG: 180 TABLET, FILM COATED, EXTENDED RELEASE ORAL at 08:21

## 2017-12-11 RX ADMIN — PANTOPRAZOLE SODIUM 40 MG: 40 TABLET, DELAYED RELEASE ORAL at 06:42

## 2017-12-11 RX ADMIN — ALLOPURINOL 300 MG: 300 TABLET ORAL at 08:21

## 2017-12-11 RX ADMIN — Medication 1 TABLET: at 08:21

## 2017-12-11 RX ADMIN — DAPSONE 100 MG: 100 TABLET ORAL at 08:21

## 2017-12-11 RX ADMIN — VITAMIN D, TAB 1000IU (100/BT) 1000 UNITS: 25 TAB at 08:21

## 2017-12-11 NOTE — DISCHARGE SUMMARY
Discharge Summary - Esa Borden 78 y o  female MRN: [id number]    Unit/Bed#: [facility] 623-01 Encounter: [id number]    Admission Date: 12/6/2017     Admitting Diagnosis: Diffuse large b-cell lymphoma, lymph nodes of multiple sites [[address]]    HPI:  74-year-old  female who was admitted for cycle 1  EPOCH, after she received 3 cycles of chop, she could not tolerate rituximab because of severe allergic reaction, she tolerated the chemotherapy very well, and she is ready to be discharged home    Procedures Performed: Chemotherapy    Hospital Course:  She has no complications in the hospital tolerated therapy very well    Significant Findings, Care, Treatment and Services Provided: none    Complications: none    Discharge Diagnosis:  Diffuse large B-cell lymphoma    Condition at Discharge:  Stable     Discharge instructions/Information to patient and family:   See after visit summary for information provided to patient and family  Provisions for Follow-Up Care:  See after visit summary for information related to follow-up care and any pertinent home health orders  Disposition:   Discharge patient home    Planned Readmission:   Yes in 3 weeks    Discharge Statement   I spent 35 minutes discharging the patient  This time was spent on the day of discharge  I had direct contact with the patient on the day of discharge  Discharge Medications:  See after visit summary for reconciled discharge medications provided to patient and family      Patient to receive Neulasta tomorrow in [facility] infusion center and she will follow up with Dr Smita Slater on schedule appointment

## 2017-12-11 NOTE — PROGRESS NOTES
[facility] Internal Medicine Progress Note  Patient: Damon Asher 78 y o  female   MRN: [id number]  PCP: Martin Valencia PA-C  Unit/Bed#: [facility] 871-42 Encounter: [id number]  Date Of Visit: 17    Assessment/Plan:  ·   Principal Problem:    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 [facility])  Active Problems:    Enlarged lymph node in neck    Lymphadenopathy, axillary    Lymphoma ([address] )    Arm DVT (deep venous thromboembolism), acute, left (HCC)    Essential hypertension    Diffuse large B cell lymphoma  Patient tolerated chemo well  Vital signs stable  Labs remarkable for Leucopenia, Anemia ( chronic disease/malignancy)    Patient is medically stable to be discharged  F/U with oncology       VTE Pharmacologic Prophylaxis:   Pharmacologic: Enoxaparin (Lovenox)  Mechanical VTE Prophylaxis in Place: No    Patient Centered Rounds: I have performed bedside rounds with nursing staff today  Discussions with Specialists or Other Care Team Provider: yes    Education and Discussions with Family / Patient:yes    Current Length of Stay: 5 day(s)    Current Patient Status: Inpatient   Certification Statement: The patient will continue to require additional inpatient hospital stay due to chemo    Discharge Plan: home    Code Status: Level 1 - Full Code      Subjective:   No new complaints today  ROS negative otherwise  Objective:     Vitals:   Temp (24hrs), Av 9 °F (36 6 °C), Min:97 6 °F (36 4 °C), Max:98 1 °F (36 7 °C)    HR:  [59-73] 59  Resp:  [18] 18  BP: (109-152)/(55-68) 132/68  SpO2:  [93 %-94 %] 93 %  Body mass index is 39 25 kg/m²  Input and Output Summary (last 24 hours):        Intake/Output Summary (Last 24 hours) at 17 1021  Last data filed at 17 6881   Gross per 24 hour   Intake             2500 ml   Output             4925 ml   Net            -2425 ml       Physical Exam:  General Appearance:    Alert, cooperative, no distress, appropriately responsive    Head: Normocephalic, without obvious abnormality, atraumatic, mucous membranes moist    Eyes:    Conjunctival pallor /corneas clear, EOM's intact   Neck:   Supple   Lungs:     Clear to auscultation bilaterally, respirations unlabored, no crackles or wheeze     Heart:    Regular rate and rhythm, S1 and S2 no murmur   Abdomen:     Soft, non-tender, bowel sounds active all four quadrants,     no masses, no organomegaly   Extremities:   Extremities normal, atraumatic, no cyanosis or edema   Neurologic:  nonfocal         Additional Data:     Labs:      Results from last 7 days  Lab Units 12/11/17  0500   WBC Thousand/uL 2 22*   HEMOGLOBIN g/dL 10 2*   HEMATOCRIT % 30 3*   PLATELETS Thousands/uL 160   NEUTROS PCT % 80*   LYMPHS PCT % 15   MONOS PCT % 5   EOS PCT % 0       Results from last 7 days  Lab Units 12/11/17  0501   SODIUM mmol/L 137   POTASSIUM mmol/L 3 6   CHLORIDE mmol/L 102   CO2 mmol/L 28   BUN mg/dL 21   CREATININE mg/dL 0 70   CALCIUM mg/dL 8 6   TOTAL PROTEIN g/dL 5 4*   BILIRUBIN TOTAL mg/dL 0 41   ALK PHOS U/L 65   ALT U/L 62   AST U/L 23   GLUCOSE RANDOM mg/dL 137           * I Have Reviewed All Lab Data Listed Above  * Additional Pertinent Lab Tests Reviewed:  All [facility] Admission Reviewed    Cultures:   Blood Culture: No results found for: BLOODCX  Urine Culture:   Lab Results   Component Value Date    URINECX 40,000-49,000 cfu/ml Escherichia coli 09/20/2017     Sputum Culture: No components found for: SPUTUMCX  Wound Culture: No results found for: WOUNDCULT    Last 24 Hours Medication List:     acyclovir 400 mg Oral Q12H [address]   allopurinol 300 mg Oral Daily   cholecalciferol 1,000 Units Oral Daily   dapsone 100 mg Oral Daily   enoxaparin 1 mg/kg Subcutaneous Q12H [address]   hydrochlorothiazide 25 mg Oral Daily   multivitamin-minerals 1 tablet Oral Daily   nystatin 500,000 Units Swish & Swallow 4x Daily   pantoprazole 40 mg Oral Early Morning   potassium chloride 20 mEq Oral Daily   verapamil 180 mg Oral Daily        Today, Patient Was Seen By: Sesar Trujillo MD    ** Please Note: Dragon 360 Dictation voice to text software may have been used in the creation of this document   **

## 2017-12-11 NOTE — PLAN OF CARE
DISCHARGE PLANNING - CARE MANAGEMENT     Discharge to post-acute care or home with appropriate resources Progressing        INFECTION - ADULT     Absence or prevention of progression during hospitalization Progressing        Knowledge Deficit     Patient/family/caregiver demonstrates understanding of disease process, treatment plan, medications, and discharge instructions Progressing

## 2017-12-12 ENCOUNTER — HOSPITAL ENCOUNTER (OUTPATIENT)
Dept: INFUSION CENTER | Facility: CLINIC | Age: 77
Discharge: HOME | End: 2017-12-12
Payer: COMMERCIAL

## 2017-12-12 VITALS — TEMPERATURE: 97.6 F

## 2017-12-12 PROCEDURE — 96372 THER/PROPH/DIAG INJ SC/IM: CPT

## 2017-12-12 RX ADMIN — PEGFILGRASTIM 6 MG: 6 INJECTION SUBCUTANEOUS at 11:23

## 2017-12-12 NOTE — CASE MANAGEMENT
Notification of Discharge  This is a Notification of Discharge from our facility [address]  Please be advised that this patient has been discharge from our facility  Below you will find the admission and discharge date and time including the patients disposition  PRESENTATION DATE: 12/6/2017 10:15 AM  IP ADMISSION DATE: 12/6/17 1015  DISCHARGE DATE: 12/11/2017 10:36 AM  DISPOSITION: Home/Self Care    [address] [facility] in the [employer] by Dionte Alfaro for 2017  Network Utilization Review Department  Phone: [phone]; Fax [phone]  ATTENTION: The Network Utilization Review Department is now centralized for our 7 Facilities  Make a note that we have a new phone and fax numbers for our Department  Please call with any questions or concerns to [phone] and carefully follow the prompts so that you are directed to the right person  All voicemails are confidential  Fax any determinations, approvals, denials, and requests for initial or continue stay review clinical to [phone]  Due to HIGH CALL volume, it would be easier if you could please send faxed requests to expedite your requests and in part, help us provide discharge notifications faster

## 2017-12-12 NOTE — PLAN OF CARE
Problem: Potential for Falls  Goal: Patient will remain free of falls  INTERVENTIONS:  - Assess patient frequently for physical needs  -  Identify cognitive and physical deficits and behaviors that affect risk of falls    -  [place] fall precautions as indicated by assessment   - Educate patient/family on patient safety including physical limitations  - Instruct patient to call for assistance with activity based on assessment  - Modify environment to reduce risk of injury  - Consider OT/PT consult to assist with strengthening/mobility   Outcome: Progressing

## 2017-12-12 NOTE — PROGRESS NOTES
Pt received chemotherapy treatment in-patient x5days  Pt arrived to infusion center for neulasta injection, offers no complaints  Neulasta 6mg administered in right arm without complications  Pt discharged in stable condition accompanied by , and is aware of next infusion appointment  AVS provided

## 2017-12-18 ENCOUNTER — GENERIC CONVERSION - ENCOUNTER (OUTPATIENT)
Dept: OTHER | Facility: OTHER | Age: 77
End: 2017-12-18

## 2017-12-21 ENCOUNTER — GENERIC CONVERSION - ENCOUNTER (OUTPATIENT)
Dept: OTHER | Facility: OTHER | Age: 77
End: 2017-12-21

## 2017-12-21 ENCOUNTER — ALLSCRIPTS OFFICE VISIT (OUTPATIENT)
Dept: OTHER | Facility: OTHER | Age: 77
End: 2017-12-21

## 2017-12-22 ENCOUNTER — GENERIC CONVERSION - ENCOUNTER (OUTPATIENT)
Dept: HEMATOLOGY ONCOLOGY | Facility: CLINIC | Age: 77
End: 2017-12-22

## 2017-12-22 ENCOUNTER — APPOINTMENT (OUTPATIENT)
Dept: LAB | Facility: CLINIC | Age: 77
End: 2017-12-22
Payer: COMMERCIAL

## 2017-12-22 ENCOUNTER — ALLSCRIPTS OFFICE VISIT (OUTPATIENT)
Dept: OTHER | Facility: OTHER | Age: 77
End: 2017-12-22

## 2017-12-22 ENCOUNTER — HOSPITAL ENCOUNTER (OUTPATIENT)
Dept: NON INVASIVE DIAGNOSTICS | Facility: HOSPITAL | Age: 77
Discharge: HOME | End: 2017-12-22
Attending: INTERNAL MEDICINE
Payer: COMMERCIAL

## 2017-12-22 DIAGNOSIS — C85.19 B-CELL LYMPHOMA OF EXTRANODAL SITE (HCC): ICD-10-CM

## 2017-12-22 DIAGNOSIS — C83.38 DIFFUSE LARGE B-CELL LYMPHOMA OF LYMPH NODES OF MULTIPLE SITES (HCC): ICD-10-CM

## 2017-12-22 LAB
ALBUMIN SERPL BCP-MCNC: 3.6 G/DL (ref 3.5–5)
ALP SERPL-CCNC: 138 U/L (ref 46–116)
ALT SERPL W P-5'-P-CCNC: 57 U/L (ref 12–78)
ANION GAP SERPL CALCULATED.3IONS-SCNC: 8 MMOL/L (ref 4–13)
ANISOCYTOSIS BLD QL SMEAR: PRESENT
AST SERPL W P-5'-P-CCNC: 27 U/L (ref 5–45)
BASOPHILS # BLD MANUAL: 0 THOUSAND/UL (ref 0–0.1)
BASOPHILS NFR MAR MANUAL: 0 % (ref 0–1)
BILIRUB SERPL-MCNC: 0.39 MG/DL (ref 0.2–1)
BUN SERPL-MCNC: 13 MG/DL (ref 5–25)
CALCIUM SERPL-MCNC: 9.5 MG/DL (ref 8.3–10.1)
CHLORIDE SERPL-SCNC: 103 MMOL/L (ref 100–108)
CO2 SERPL-SCNC: 29 MMOL/L (ref 21–32)
CREAT SERPL-MCNC: 0.81 MG/DL (ref 0.6–1.3)
EOSINOPHIL # BLD MANUAL: 0.21 THOUSAND/UL (ref 0–0.4)
EOSINOPHIL NFR BLD MANUAL: 2 % (ref 0–6)
ERYTHROCYTE [DISTWIDTH] IN BLOOD BY AUTOMATED COUNT: 17.7 % (ref 11.6–15.1)
GFR SERPL CREATININE-BSD FRML MDRD: 71 ML/MIN/1.73SQ M
GLUCOSE P FAST SERPL-MCNC: 107 MG/DL (ref 65–99)
HCT VFR BLD AUTO: 35.6 % (ref 34.8–46.1)
HGB BLD-MCNC: 11.4 G/DL (ref 11.5–15.4)
LDH SERPL-CCNC: 247 U/L (ref 81–234)
LYMPHOCYTES # BLD AUTO: 1.06 THOUSAND/UL (ref 0.6–4.47)
LYMPHOCYTES # BLD AUTO: 10 % (ref 14–44)
MCH RBC QN AUTO: 32 PG (ref 26.8–34.3)
MCHC RBC AUTO-ENTMCNC: 32 G/DL (ref 31.4–37.4)
MCV RBC AUTO: 100 FL (ref 82–98)
MONOCYTES # BLD AUTO: 1.7 THOUSAND/UL (ref 0–1.22)
MONOCYTES NFR BLD: 16 % (ref 4–12)
NEUTROPHILS # BLD MANUAL: 6.89 THOUSAND/UL (ref 1.85–7.62)
NEUTS BAND NFR BLD MANUAL: 6 % (ref 0–8)
NEUTS SEG NFR BLD AUTO: 59 % (ref 43–75)
NRBC BLD AUTO-RTO: 1 /100 WBCS
PLATELET # BLD AUTO: 196 THOUSANDS/UL (ref 149–390)
PLATELET BLD QL SMEAR: ADEQUATE
PMV BLD AUTO: 11.1 FL (ref 8.9–12.7)
POTASSIUM SERPL-SCNC: 3.8 MMOL/L (ref 3.5–5.3)
PROT SERPL-MCNC: 6.6 G/DL (ref 6.4–8.2)
RBC # BLD AUTO: 3.56 MILLION/UL (ref 3.81–5.12)
RBC MORPH BLD: PRESENT
SODIUM SERPL-SCNC: 140 MMOL/L (ref 136–145)
VARIANT LYMPHS # BLD AUTO: 7 %
WBC # BLD AUTO: 10.6 THOUSAND/UL (ref 4.31–10.16)

## 2017-12-22 PROCEDURE — 85007 BL SMEAR W/DIFF WBC COUNT: CPT

## 2017-12-22 PROCEDURE — 83615 LACTATE (LD) (LDH) ENZYME: CPT

## 2017-12-22 PROCEDURE — 85027 COMPLETE CBC AUTOMATED: CPT

## 2017-12-22 PROCEDURE — 82232 ASSAY OF BETA-2 PROTEIN: CPT

## 2017-12-22 PROCEDURE — 93308 TTE F-UP OR LMTD: CPT

## 2017-12-22 PROCEDURE — 36415 COLL VENOUS BLD VENIPUNCTURE: CPT

## 2017-12-22 PROCEDURE — 80053 COMPREHEN METABOLIC PANEL: CPT

## 2017-12-22 RX ADMIN — PERFLUTREN 0.8 ML/MIN: 6.52 INJECTION, SUSPENSION INTRAVENOUS at 14:16

## 2017-12-23 ENCOUNTER — GENERIC CONVERSION - ENCOUNTER (OUTPATIENT)
Dept: OTHER | Facility: OTHER | Age: 77
End: 2017-12-23

## 2017-12-23 LAB — B2 MICROGLOB SERPL-MCNC: 2.5 MG/L (ref 0.6–2.4)

## 2017-12-27 ENCOUNTER — HOSPITAL ENCOUNTER (INPATIENT)
Facility: HOSPITAL | Age: 77
LOS: 4 days | Discharge: HOME | DRG: 847 | End: 2017-12-31
Attending: INTERNAL MEDICINE | Admitting: INTERNAL MEDICINE
Payer: COMMERCIAL

## 2017-12-27 DIAGNOSIS — I10 ESSENTIAL HYPERTENSION: Primary | Chronic | ICD-10-CM

## 2017-12-27 DIAGNOSIS — C83.38 DIFFUSE LARGE B-CELL LYMPHOMA OF LYMPH NODES OF MULTIPLE SITES (HCC): ICD-10-CM

## 2017-12-27 LAB
ALBUMIN SERPL BCP-MCNC: 3.8 G/DL (ref 3.5–5)
ALP SERPL-CCNC: 99 U/L (ref 46–116)
ALT SERPL W P-5'-P-CCNC: 46 U/L (ref 12–78)
ANION GAP SERPL CALCULATED.3IONS-SCNC: 8 MMOL/L (ref 4–13)
ANISOCYTOSIS BLD QL SMEAR: PRESENT
AST SERPL W P-5'-P-CCNC: 23 U/L (ref 5–45)
BASOPHILS # BLD MANUAL: 0 THOUSAND/UL (ref 0–0.1)
BASOPHILS NFR MAR MANUAL: 0 % (ref 0–1)
BILIRUB SERPL-MCNC: 0.35 MG/DL (ref 0.2–1)
BUN SERPL-MCNC: 16 MG/DL (ref 5–25)
CALCIUM SERPL-MCNC: 9.7 MG/DL (ref 8.3–10.1)
CHLORIDE SERPL-SCNC: 105 MMOL/L (ref 100–108)
CO2 SERPL-SCNC: 26 MMOL/L (ref 21–32)
CREAT SERPL-MCNC: 0.77 MG/DL (ref 0.6–1.3)
EOSINOPHIL # BLD MANUAL: 0 THOUSAND/UL (ref 0–0.4)
EOSINOPHIL NFR BLD MANUAL: 0 % (ref 0–6)
ERYTHROCYTE [DISTWIDTH] IN BLOOD BY AUTOMATED COUNT: 17.1 % (ref 11.6–15.1)
GFR SERPL CREATININE-BSD FRML MDRD: 76 ML/MIN/1.73SQ M
GLUCOSE SERPL-MCNC: 88 MG/DL (ref 65–140)
HCT VFR BLD AUTO: 32.6 % (ref 34.8–46.1)
HGB BLD-MCNC: 10.7 G/DL (ref 11.5–15.4)
LYMPHOCYTES # BLD AUTO: 1.18 THOUSAND/UL (ref 0.6–4.47)
LYMPHOCYTES # BLD AUTO: 15 % (ref 14–44)
MACROCYTES BLD QL AUTO: PRESENT
MCH RBC QN AUTO: 33 PG (ref 26.8–34.3)
MCHC RBC AUTO-ENTMCNC: 32.8 G/DL (ref 31.4–37.4)
MCV RBC AUTO: 101 FL (ref 82–98)
METAMYELOCYTES NFR BLD MANUAL: 2 % (ref 0–1)
MONOCYTES # BLD AUTO: 0.86 THOUSAND/UL (ref 0–1.22)
MONOCYTES NFR BLD: 11 % (ref 4–12)
MYELOCYTES NFR BLD MANUAL: 2 % (ref 0–1)
NEUTROPHILS # BLD MANUAL: 5.42 THOUSAND/UL (ref 1.85–7.62)
NEUTS BAND NFR BLD MANUAL: 5 % (ref 0–8)
NEUTS SEG NFR BLD AUTO: 64 % (ref 43–75)
NRBC BLD AUTO-RTO: 1 /100 WBCS
PLATELET # BLD AUTO: 227 THOUSANDS/UL (ref 149–390)
PLATELET BLD QL SMEAR: ADEQUATE
PMV BLD AUTO: 9.6 FL (ref 8.9–12.7)
POTASSIUM SERPL-SCNC: 4.1 MMOL/L (ref 3.5–5.3)
PROT SERPL-MCNC: 6.4 G/DL (ref 6.4–8.2)
RBC # BLD AUTO: 3.24 MILLION/UL (ref 3.81–5.12)
RBC MORPH BLD: PRESENT
SODIUM SERPL-SCNC: 139 MMOL/L (ref 136–145)
TOXIC GRANULES BLD QL SMEAR: PRESENT
VARIANT LYMPHS # BLD AUTO: 1 %
WBC # BLD AUTO: 7.85 THOUSAND/UL (ref 4.31–10.16)

## 2017-12-27 PROCEDURE — 3E04305 INTRODUCTION OF OTHER ANTINEOPLASTIC INTO CENTRAL VEIN, PERCUTANEOUS APPROACH: ICD-10-PCS | Performed by: INTERNAL MEDICINE

## 2017-12-27 PROCEDURE — 85027 COMPLETE CBC AUTOMATED: CPT | Performed by: INTERNAL MEDICINE

## 2017-12-27 PROCEDURE — 80053 COMPREHEN METABOLIC PANEL: CPT | Performed by: INTERNAL MEDICINE

## 2017-12-27 PROCEDURE — 85007 BL SMEAR W/DIFF WBC COUNT: CPT | Performed by: INTERNAL MEDICINE

## 2017-12-27 RX ORDER — ACYCLOVIR 200 MG/1
400 CAPSULE ORAL EVERY 12 HOURS SCHEDULED
Status: DISCONTINUED | OUTPATIENT
Start: 2017-12-27 | End: 2017-12-31 | Stop reason: HOSPADM

## 2017-12-27 RX ORDER — SODIUM CHLORIDE 9 MG/ML
333 INJECTION, SOLUTION INTRAVENOUS CONTINUOUS
Status: DISCONTINUED | OUTPATIENT
Start: 2017-12-31 | End: 2017-12-31 | Stop reason: HOSPADM

## 2017-12-27 RX ORDER — B-COMPLEX WITH VITAMIN C
1 TABLET ORAL
Status: DISCONTINUED | OUTPATIENT
Start: 2017-12-28 | End: 2017-12-31 | Stop reason: HOSPADM

## 2017-12-27 RX ORDER — HYDROCHLOROTHIAZIDE 25 MG/1
25 TABLET ORAL DAILY
Status: DISCONTINUED | OUTPATIENT
Start: 2017-12-27 | End: 2017-12-31 | Stop reason: HOSPADM

## 2017-12-27 RX ORDER — POTASSIUM CHLORIDE 20 MEQ/1
20 TABLET, EXTENDED RELEASE ORAL DAILY
Status: DISCONTINUED | OUTPATIENT
Start: 2017-12-27 | End: 2017-12-31 | Stop reason: HOSPADM

## 2017-12-27 RX ORDER — ONDANSETRON 2 MG/ML
4 INJECTION INTRAMUSCULAR; INTRAVENOUS EVERY 6 HOURS PRN
Status: DISCONTINUED | OUTPATIENT
Start: 2017-12-27 | End: 2017-12-31 | Stop reason: HOSPADM

## 2017-12-27 RX ORDER — LORATADINE 10 MG/1
10 TABLET ORAL DAILY
Status: DISCONTINUED | OUTPATIENT
Start: 2017-12-27 | End: 2017-12-31 | Stop reason: HOSPADM

## 2017-12-27 RX ORDER — PANTOPRAZOLE SODIUM 40 MG/1
40 TABLET, DELAYED RELEASE ORAL
Status: DISCONTINUED | OUTPATIENT
Start: 2017-12-27 | End: 2017-12-31 | Stop reason: HOSPADM

## 2017-12-27 RX ORDER — LORAZEPAM 2 MG/ML
0.5 INJECTION INTRAMUSCULAR EVERY 6 HOURS PRN
Status: DISCONTINUED | OUTPATIENT
Start: 2017-12-27 | End: 2017-12-31 | Stop reason: HOSPADM

## 2017-12-27 RX ORDER — ACETAMINOPHEN 325 MG/1
650 TABLET ORAL EVERY 6 HOURS PRN
Status: DISCONTINUED | OUTPATIENT
Start: 2017-12-27 | End: 2017-12-31 | Stop reason: HOSPADM

## 2017-12-27 RX ORDER — SODIUM CHLORIDE 9 MG/ML
75 INJECTION, SOLUTION INTRAVENOUS CONTINUOUS
Status: DISCONTINUED | OUTPATIENT
Start: 2017-12-27 | End: 2017-12-31 | Stop reason: HOSPADM

## 2017-12-27 RX ORDER — DAPSONE 100 MG/1
100 TABLET ORAL DAILY
Status: DISCONTINUED | OUTPATIENT
Start: 2017-12-27 | End: 2017-12-31 | Stop reason: HOSPADM

## 2017-12-27 RX ORDER — PREDNISONE 20 MG/1
100 TABLET ORAL DAILY
Status: COMPLETED | OUTPATIENT
Start: 2017-12-27 | End: 2017-12-31

## 2017-12-27 RX ADMIN — ENOXAPARIN SODIUM 100 MG: 100 INJECTION SUBCUTANEOUS at 22:41

## 2017-12-27 RX ADMIN — SODIUM CHLORIDE 75 ML/HR: 0.9 INJECTION, SOLUTION INTRAVENOUS at 22:39

## 2017-12-27 RX ADMIN — DEXAMETHASONE SODIUM PHOSPHATE: 10 INJECTION, SOLUTION INTRAMUSCULAR; INTRAVENOUS at 15:36

## 2017-12-27 RX ADMIN — SODIUM CHLORIDE 75 ML/HR: 0.9 INJECTION, SOLUTION INTRAVENOUS at 11:21

## 2017-12-27 RX ADMIN — PREDNISONE 100 MG: 20 TABLET ORAL at 15:41

## 2017-12-27 RX ADMIN — SODIUM CHLORIDE 150 MG: 0.9 INJECTION, SOLUTION INTRAVENOUS at 16:15

## 2017-12-27 RX ADMIN — ACYCLOVIR 400 MG: 200 CAPSULE ORAL at 22:30

## 2017-12-27 RX ADMIN — ETOPOSIDE: 20 INJECTION INTRAVENOUS at 17:01

## 2017-12-27 NOTE — CONSULTS
Consult cancelled by primary service  Spoke to Dr Brayden Sweeney on 12/27/2017      Staci Guzman DO

## 2017-12-27 NOTE — H&P
H&P Exam - Cedrick Jeong 78 y o  female MRN: [id number]    [facility] Encounter: [id number]      Assessment/Plan     Assessment:  In summary, this is a 79-year-old female history of recurrent lymphoma as outlined  She has been on [facility] chemotherapy  She has tolerated this relatively well  She has had significant improvement by most recent PET-CT a few weeks ago  She is now admitted for her 5th cycle  Treatment to be administered per protocol with appropriate antiemetics, hydration, etc   Ambulation in the halls was recommended  Mild anemia noted, attributable to chemotherapy toxicity  CMP normal   I reviewed the above with the patient  She voiced understanding and agreement  Plan:  See above  History of Present Illness     HPI:  Cedrick Jeong is a 78 y o  female who presents with   Lymphoma  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  She was treated with radiation and Rituxan  She had severe hypersensitivity to the Rituxan which was discontinued  In early 2017 patient was diagnosed with diffuse large B-cell lymphoma thought secondary to transformation  She had 3 cycles of CHOP resulting in good response  Upon further review of the pathology this was felt to represent more aggressive disease and her treatment was changed to [facility]  With omission of Rituxan due to previous reaction  November 29, 2017 PET-CT showed decreased size and hypermetabolism of widespread lymphadenopathy  Lung nodules have also decreased  Review of Systems   Constitutional: Negative for chills, fever and unexpected weight change  HENT: Negative for mouth sores, sore throat and tinnitus  Eyes: Negative for discharge and visual disturbance  Respiratory: Negative for cough, shortness of breath and wheezing  Cardiovascular: Negative for chest pain and palpitations  Gastrointestinal: Negative for abdominal pain, constipation, diarrhea and nausea     Endocrine: Negative for cold intolerance and polyuria  Genitourinary: Negative for dysuria, hematuria and urgency  Musculoskeletal: Negative for back pain and myalgias  Skin: Negative for rash  Allergic/Immunologic: Negative for food allergies  Neurological: Negative for seizures, numbness and headaches  Hematological: Negative for adenopathy  Does not bruise/bleed easily  Psychiatric/Behavioral: Negative for confusion and hallucinations  Historical Information   Past Medical History:   Diagnosis Date    Acid reflux     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HCC)     radiation    [place] (hard of hearing)     b/l ears    Hypertension     Left bundle branch block     Osteoarthritis     Plantar fasciitis     Shortness of breath     when walking up stairs     Past Surgical History:   Procedure Laterality Date    ANKLE ARTHROPLASTY Left     BLEPHAROPLASTY      right eye     CATARACT EXTRACTION Right     ESOPHAGEAL DILATION      x2    ESOPHAGOGASTRODUODENOSCOPY      dilitation    FACIAL/NECK BIOPSY Right 9/8/2017    Procedure: NECK NODE BIOPSY WITH NEEDLE LOCALIZATION; LYMPHOMA PROTOCOL (NEEDLE LOC WITH I R  AT 1100);   Surgeon: Kari Ashraf MD;  [facility];  Service: Surgical Oncology    HYSTERECTOMY      LYMPH NODE DISSECTION      right groin and neck    ORBITAL FLOOR EXPLORATION Right     for cancer     PORTACATH PLACEMENT      left chest     CT BX/REMV,LYMPH NODE,DEEP AXILL Left 8/14/2017    Procedure: Axillary lymph node excision with LYMPHOMA PROTOCOL;  Surgeon: Kari Ashraf MD;  [facility];  Service: Surgical Oncology    CT BX/REMV,LYMPH NODE,DEEP CERV Right 5/2/2016    Procedure: NECK NODE BIOPSY;  Surgeon: Kari Ashraf MD;  [facility];  Service: Surgical Oncology    CT INSJ TUNNELED CTR VAD W/SUBQ PORT AGE 5 YR/> N/A 10/3/2017    Procedure: PLACEMENT OF PORT-A-CATH DEVICE;  Surgeon: Kari Ashraf MD;  [facility]: [facility];  Service: Surgical Oncology     Social History   History Alcohol Use No     History   Drug Use No     History   Smoking Status    Former Smoker    Quit date: 1970   Smokeless Tobacco    Never Used     Family History: non-contributory    Meds/Allergies   all medications and allergies reviewed  Allergies   Allergen Reactions    Aspirin Anaphylaxis    Celebrex [Celecoxib] Anaphylaxis    Nsaids Anaphylaxis    Other Anaphylaxis     Pt states when she received a certain type of chemotherapy it caused her throat to close    Rituxan [Rituximab] Anaphylaxis     Swelling of tongue and closing of throat    Bactrim [Sulfamethoxazole-Trimethoprim] Other (See Comments)     VERY EMOTIONAL/CRYING    Sulfa Antibiotics Other (See Comments)     VERY EMOTIONAL CRYING       Objective   Vitals: Blood pressure 119/64, pulse 102, temperature 98 4 °F (36 9 °C), temperature source Oral, resp  rate 18, height 5' 3" (1 6 m), weight 96 9 kg (213 lb 10 oz), SpO2 94 %  No intake or output data in the 24 hours ending 12/27/17 1134    Invasive Devices     Central Venous Catheter Line            Port A Cath 12/27/17 Left Chest less than 1 day          Peripheral Intravenous Line            Peripheral IV 12/22/17 Left Wrist 4 days                Physical Exam   Constitutional: She is oriented to person, place, and time  She appears well-developed  HENT:   Head: Normocephalic  Eyes: Pupils are equal, round, and reactive to light  Neck: Neck supple  Cardiovascular: Normal rate  No murmur heard  Pulmonary/Chest: No respiratory distress  She has no wheezes  She has no rales  Abdominal: Soft  She exhibits no distension  There is no tenderness  There is no rebound  Musculoskeletal: She exhibits no edema  Lymphadenopathy:     She has no cervical adenopathy  Neurological: She is alert and oriented to person, place, and time  She displays normal reflexes  Skin: Skin is warm  No rash noted  Psychiatric: She has a normal mood and affect   Thought content normal        Lab Results: I have personally reviewed pertinent reports  Imaging: I have personally reviewed pertinent reports  EKG, Pathology, and Other Studies: I have personally reviewed pertinent reports  Code Status: Level 1 - Full Code  Advance Directive and Living Will:      Power of :    POLST:      Counseling / Coordination of Care  Total floor / unit time spent today 25 minutes  Greater than 50% of total time was spent with the patient and / or family counseling and / or coordination of care  A description of the counseling / coordination of care: see note

## 2017-12-28 LAB
ANION GAP SERPL CALCULATED.3IONS-SCNC: 11 MMOL/L (ref 4–13)
BUN SERPL-MCNC: 17 MG/DL (ref 5–25)
CALCIUM SERPL-MCNC: 8.7 MG/DL (ref 8.3–10.1)
CHLORIDE SERPL-SCNC: 108 MMOL/L (ref 100–108)
CO2 SERPL-SCNC: 23 MMOL/L (ref 21–32)
CREAT SERPL-MCNC: 0.79 MG/DL (ref 0.6–1.3)
GFR SERPL CREATININE-BSD FRML MDRD: 73 ML/MIN/1.73SQ M
GLUCOSE SERPL-MCNC: 181 MG/DL (ref 65–140)
POTASSIUM SERPL-SCNC: 3.3 MMOL/L (ref 3.5–5.3)
SODIUM SERPL-SCNC: 142 MMOL/L (ref 136–145)

## 2017-12-28 PROCEDURE — 80048 BASIC METABOLIC PNL TOTAL CA: CPT | Performed by: INTERNAL MEDICINE

## 2017-12-28 RX ORDER — DIPHENHYDRAMINE HCL 25 MG
25 TABLET ORAL EVERY 6 HOURS PRN
Status: DISCONTINUED | OUTPATIENT
Start: 2017-12-28 | End: 2017-12-31 | Stop reason: HOSPADM

## 2017-12-28 RX ORDER — DOCUSATE SODIUM 100 MG/1
100 CAPSULE, LIQUID FILLED ORAL 2 TIMES DAILY PRN
Status: DISCONTINUED | OUTPATIENT
Start: 2017-12-28 | End: 2017-12-31 | Stop reason: HOSPADM

## 2017-12-28 RX ADMIN — DEXAMETHASONE SODIUM PHOSPHATE: 10 INJECTION, SOLUTION INTRAMUSCULAR; INTRAVENOUS at 18:12

## 2017-12-28 RX ADMIN — ACETAMINOPHEN 650 MG: 325 TABLET, FILM COATED ORAL at 04:26

## 2017-12-28 RX ADMIN — CALCIUM CARBONATE 500 MG (1,250 MG)-VITAMIN D3 200 UNIT TABLET 1 TABLET: at 09:04

## 2017-12-28 RX ADMIN — PREDNISONE 100 MG: 20 TABLET ORAL at 09:04

## 2017-12-28 RX ADMIN — VERAPAMIL HYDROCHLORIDE 180 MG: 180 TABLET, FILM COATED, EXTENDED RELEASE ORAL at 09:05

## 2017-12-28 RX ADMIN — ENOXAPARIN SODIUM 100 MG: 100 INJECTION SUBCUTANEOUS at 09:04

## 2017-12-28 RX ADMIN — ENOXAPARIN SODIUM 100 MG: 100 INJECTION SUBCUTANEOUS at 20:29

## 2017-12-28 RX ADMIN — Medication 1 TABLET: at 09:04

## 2017-12-28 RX ADMIN — NYSTATIN 500000 UNITS: 100000 SUSPENSION ORAL at 20:31

## 2017-12-28 RX ADMIN — ACYCLOVIR 400 MG: 200 CAPSULE ORAL at 20:28

## 2017-12-28 RX ADMIN — POTASSIUM CHLORIDE 20 MEQ: 1500 TABLET, EXTENDED RELEASE ORAL at 09:05

## 2017-12-28 RX ADMIN — DAPSONE 100 MG: 100 TABLET ORAL at 09:05

## 2017-12-28 RX ADMIN — LORATADINE 10 MG: 10 TABLET ORAL at 09:04

## 2017-12-28 RX ADMIN — HYDROCHLOROTHIAZIDE 25 MG: 25 TABLET ORAL at 09:05

## 2017-12-28 RX ADMIN — DOCUSATE SODIUM 100 MG: 100 CAPSULE, LIQUID FILLED ORAL at 15:03

## 2017-12-28 RX ADMIN — NYSTATIN 500000 UNITS: 100000 SUSPENSION ORAL at 15:03

## 2017-12-28 RX ADMIN — ACETAMINOPHEN 650 MG: 325 TABLET, FILM COATED ORAL at 14:53

## 2017-12-28 RX ADMIN — ETOPOSIDE: 20 INJECTION INTRAVENOUS at 18:29

## 2017-12-28 RX ADMIN — ACYCLOVIR 400 MG: 200 CAPSULE ORAL at 09:04

## 2017-12-28 NOTE — PROGRESS NOTES
Progress Note - Primo De La Vega 78 y o  female MRN: [id number]    Unit/Bed#: [facility] 217-37 Encounter: [id number]      Assessment:  In summary, this is a 51-year-old female history of recurrent lymphoma, as outlined  Chemotherapy is ongoing  She tolerates this without difficulty  Continue treatment, hydration, our arranged  Ambulation in the pandya is encouraged  Plan:  See above  Subjective:   Patient is clinically stable  Appetite is good  Denies nausea, vomiting  She has no urinary complaints  Minor constipation  She has no shortness of breath or dyspnea on exertion  She is able to ambulate in the pandya without difficulty  Objective:     Vitals: Blood pressure 137/59, pulse (!) 111, temperature 98 7 °F (37 1 °C), temperature source Oral, resp  rate 18, height 5' 3" (1 6 m), weight 96 9 kg (213 lb 10 oz), SpO2 97 %  ,Body mass index is 37 84 kg/m²  Intake/Output Summary (Last 24 hours) at 12/28/17 1258  Last data filed at 12/28/17 3273   Gross per 24 hour   Intake          1396 25 ml   Output             2750 ml   Net         -1353 75 ml       Physical Exam:  General Appearance:    Alert, oriented        Eyes:    PERRL   Ears:    Normal external ear canals, both ears   Nose:   Nares normal, septum midline   Throat:   Mucosa moist  Pharynx without injection  Neck:   Supple       Lungs:     Clear to auscultation bilaterally   Chest Wall:    No tenderness or deformity    Heart:    Regular rate and rhythm       Abdomen:     Soft, non-tender, bowel sounds +, no organomegaly           Extremities:   Extremities no cyanosis or edema       Skin:   no rash or icterus      Lymph nodes:   Cervical, supraclavicular, and axillary nodes normal   Neurologic:   CNII-XII intact, normal strength, sensation and reflexes     throughout          Invasive Devices     Central Venous Catheter Line            Port A Cath 12/27/17 Left Chest 1 day          Peripheral Intravenous Line            Peripheral IV 12/22/17 Left Wrist 5 days                Lab, Imaging and other studies: I have personally reviewed pertinent reports

## 2017-12-28 NOTE — CASE MANAGEMENT
Initial Clinical Review    Admission: Date/Time/Statement: 12/27/17 @ 0951     Orders Placed This Encounter   Procedures    Inpatient Admission     Standing Status:   Standing     Number of Occurrences:   1     Order Specific Question:   Admitting Physician     Answer:   Ana Fregoso [[id number]]     Order Specific Question:   Level of Care     Answer:   Level 2 Stepdown / HOT [14]     Order Specific Question:   Estimated length of stay     Answer:   More than 2 Midnights     Order Specific Question:   Certification     Answer:   I certify that inpatient services are medically necessary for this patient for a duration of greater than two midnights  See H&P and MD Progress Notes for additional information about the patient's course of treatment  Chief Complaint: Lymphoma - Chemotherapy admission     History of Illness: Gayle Overton is a 78 y o  female who presents with   Lymphoma  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  She was treated with radiation and Rituxan  She had severe hypersensitivity to the Rituxan which was discontinued  In early 2017 patient was diagnosed with diffuse large B-cell lymphoma thought secondary to transformation  She had 3 cycles of CHOP resulting in good response  Upon further review of the pathology this was felt to represent more aggressive disease and her treatment was changed to [facility]  With omission of Rituxan due to previous reaction  November 29, 2017 PET-CT showed decreased size and hypermetabolism of widespread lymphadenopathy  Lung nodules have also decreased      ED Vital Signs:   ED Triage Vitals   Temperature Pulse Respirations Blood Pressure SpO2   12/27/17 0950 12/27/17 0950 12/27/17 0950 12/27/17 0950 12/27/17 0950   98 4 °F (36 9 °C) 102 18 119/64 94 % RA sat       Temp Source Heart Rate Source Patient Position - Orthostatic VS BP Location FiO2 (%)   12/27/17 0950 12/27/17 1508 12/27/17 0950 12/27/17 0950 --   Oral Monitor Sitting Right arm       Pain Score       12/27/17 1508       No Pain        Wt Readings from Last 1 Encounters:   12/27/17 96 9 kg (213 lb 10 oz)       Abnormal Labs/Diagnostic Test Results:   Ref Range & Units 12/27/17 1027   Sodium 136 - 145 mmol/L 139    Potassium 3 5 - 5 3 mmol/L 4 1    Chloride 100 - 108 mmol/L 105    CO2 21 - 32 mmol/L 26    Anion Gap 4 - 13 mmol/L 8    BUN 5 - 25 mg/dL 16    Creatinine 0 60 - 1 30 mg/dL 0 77    Glucose 65 - 140 mg/dL 88    Calcium 8 3 - 10 1 mg/dL 9 7    AST 5 - 45 U/L 23    ALT 12 - 78 U/L 46    Alkaline Phosphatase 46 - 116 U/L 99    Total Protein 6 4 - 8 2 g/dL 6 4    Albumin 3 5 - 5 0 g/dL 3 8    Total Bilirubin 0 20 - 1 00 mg/dL 0 35    eGFR ml/min/1 73sq m 76             Ref Range & Units 12/27/17 1027   WBC 4 31 - 10 16 Thousand/uL 7 85    RBC 3 81 - 5 12 Million/uL 3 24     Hemoglobin 11 5 - 15 4 g/dL 10 7     Hematocrit 34 8 - 46 1 % 32 6     MCV 82 - 98 fL 101     MCH 26 8 - 34 3 pg 33 0    MCHC 31 4 - 37 4 g/dL 32 8    RDW 11 6 - 15 1 % 17 1     MPV 8 9 - 12 7 fL 9 6    Platelets 560 - 112 Thousands/uL 227    nRBC /100 WBCs 1                  Past Medical/Surgical History:   Past Medical History:   Diagnosis Date    Acid reflux     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HCC)     [place] (hard of hearing)     Hypertension     Left bundle branch block     Osteoarthritis     Plantar fasciitis     Shortness of breath        Admitting Diagnosis: Follicular lymphoma grade II, extranodal and solid organ sites [C82 19]  B-cell lymphoma (HCC) [C85 10]    Age/Sex: 78 y o  female    Assessment/Plan:     In summary, this is a 81-year-old female history of recurrent lymphoma as outlined  She has been on [facility] chemotherapy  She has tolerated this relatively well  She has had significant improvement by most recent PET-CT a few weeks ago  She is now admitted for her 5th cycle    Treatment to be administered per protocol with appropriate antiemetics, hydration, etc   Ambulation in the halls was recommended  Mild anemia noted, attributable to chemotherapy toxicity  CMP normal   I reviewed the above with the patient  She voiced understanding and agreement      Admission Orders:  Scheduled Meds:   acyclovir 400 mg Oral Q12H [address]   calcium carbonate-vitamin D 1 tablet Oral Daily With Breakfast   [START ON 12/31/2017] cyclophosphamide (CYTOXAN) IVPB 1,462 mg Intravenous Once   dapsone 100 mg Oral Daily   enoxaparin 1 mg/kg Subcutaneous Q12H [address]   etoposide-DOXOrubicin-vincristine infusion  Intravenous Q24H   hydrochlorothiazide 25 mg Oral Daily   loratadine 10 mg Oral Daily   multivitamin-minerals 1 tablet Oral Daily   ondansetron with dexamethasone IVPB  Intravenous Q24H   pantoprazole 40 mg Oral Early Morning   potassium chloride 20 mEq Oral Daily   predniSONE 100 mg Oral Daily   verapamil 180 mg Oral Daily     Continuous Infusions:   sodium chloride 75 mL/hr Last Rate: 75 mL/hr (12/27/17 2239)   [START ON 12/31/2017] sodium chloride 333 mL/hr      PRN Meds:   Acetaminophen po 12/28 x 1    alteplase    heparin lock flush    LORazepam    ondansetron    Nursing Orders- VS q 4 - Up and OOB as tolerated - Daily weights - I & O q shift - Diet regular

## 2017-12-28 NOTE — SOCIAL WORK
Met with pt at bedside  Pt resides with hsb and son in a ranch home with 2 ELIOT  Pt states her  Mehdi Howell is her primary contact [phone]  Pt is indep at home and does not use any assistive devices  Pt states she did have home care 5 years ago after ankle surgery but does not remember the agency  Pt states her PCP in [place] [facility] in [facility]  Pt uses [facility] in [place]  Pt denies mental illness or substance abuse  Pt states her son will transport her at dc  CM reviewed d/c planning process including the following: identifying help at home, patient preference for d/c planning needs, Discharge Lounge, [facility] Meds to Bed program, availability of treatment team to discuss questions or concerns patient and/or family may have regarding understanding medications and recognizing signs and symptoms once discharged  CM also encouraged patient to follow up with all recommended appointments after discharge  Patient advised of importance for patient and family to participate in managing patients medical well being

## 2017-12-29 ENCOUNTER — GENERIC CONVERSION - ENCOUNTER (OUTPATIENT)
Dept: OTHER | Facility: OTHER | Age: 77
End: 2017-12-29

## 2017-12-29 PROCEDURE — 36569 INSJ PICC 5 YR+ W/O IMAGING: CPT

## 2017-12-29 PROCEDURE — 05HY33Z INSERTION OF INFUSION DEVICE INTO UPPER VEIN, PERCUTANEOUS APPROACH: ICD-10-PCS | Performed by: INTERNAL MEDICINE

## 2017-12-29 PROCEDURE — C1751 CATH, INF, PER/CENT/MIDLINE: HCPCS

## 2017-12-29 RX ADMIN — ACYCLOVIR 400 MG: 200 CAPSULE ORAL at 20:16

## 2017-12-29 RX ADMIN — SODIUM CHLORIDE 75 ML/HR: 0.9 INJECTION, SOLUTION INTRAVENOUS at 20:16

## 2017-12-29 RX ADMIN — HYALURONIDASE (HUMAN RECOMBINANT) 750 UNITS: 150 INJECTION, SOLUTION SUBCUTANEOUS at 08:41

## 2017-12-29 RX ADMIN — HYDROCHLOROTHIAZIDE 25 MG: 25 TABLET ORAL at 08:02

## 2017-12-29 RX ADMIN — HYALURONIDASE (HUMAN RECOMBINANT) 150 UNITS: 150 INJECTION, SOLUTION SUBCUTANEOUS at 08:00

## 2017-12-29 RX ADMIN — NYSTATIN 500000 UNITS: 100000 SUSPENSION ORAL at 20:16

## 2017-12-29 RX ADMIN — DAPSONE 100 MG: 100 TABLET ORAL at 08:14

## 2017-12-29 RX ADMIN — POTASSIUM CHLORIDE 20 MEQ: 1500 TABLET, EXTENDED RELEASE ORAL at 08:03

## 2017-12-29 RX ADMIN — VERAPAMIL HYDROCHLORIDE 180 MG: 180 TABLET, FILM COATED, EXTENDED RELEASE ORAL at 08:14

## 2017-12-29 RX ADMIN — ACETAMINOPHEN 650 MG: 325 TABLET, FILM COATED ORAL at 16:38

## 2017-12-29 RX ADMIN — CALCIUM CARBONATE 500 MG (1,250 MG)-VITAMIN D3 200 UNIT TABLET 1 TABLET: at 07:58

## 2017-12-29 RX ADMIN — LORATADINE 10 MG: 10 TABLET ORAL at 08:02

## 2017-12-29 RX ADMIN — PANTOPRAZOLE SODIUM 40 MG: 40 TABLET, DELAYED RELEASE ORAL at 05:46

## 2017-12-29 RX ADMIN — NYSTATIN 500000 UNITS: 100000 SUSPENSION ORAL at 08:02

## 2017-12-29 RX ADMIN — ENOXAPARIN SODIUM 100 MG: 100 INJECTION SUBCUTANEOUS at 07:58

## 2017-12-29 RX ADMIN — ENOXAPARIN SODIUM 100 MG: 100 INJECTION SUBCUTANEOUS at 20:16

## 2017-12-29 RX ADMIN — DOCUSATE SODIUM 100 MG: 100 CAPSULE, LIQUID FILLED ORAL at 08:02

## 2017-12-29 RX ADMIN — DIPHENHYDRAMINE HCL 25 MG: 25 TABLET ORAL at 16:40

## 2017-12-29 RX ADMIN — HYALURONIDASE (HUMAN RECOMBINANT) 750 UNITS: 150 INJECTION, SOLUTION SUBCUTANEOUS at 23:10

## 2017-12-29 RX ADMIN — PREDNISONE 100 MG: 20 TABLET ORAL at 08:02

## 2017-12-29 RX ADMIN — DEXAMETHASONE SODIUM PHOSPHATE: 10 INJECTION, SOLUTION INTRAMUSCULAR; INTRAVENOUS at 15:55

## 2017-12-29 RX ADMIN — ETOPOSIDE: 20 INJECTION INTRAVENOUS at 16:31

## 2017-12-29 RX ADMIN — Medication 1 TABLET: at 08:02

## 2017-12-29 RX ADMIN — ACYCLOVIR 400 MG: 200 CAPSULE ORAL at 08:02

## 2017-12-29 RX ADMIN — SODIUM CHLORIDE 75 ML/HR: 0.9 INJECTION, SOLUTION INTRAVENOUS at 01:58

## 2017-12-29 NOTE — PROGRESS NOTES
After piccline placed, pt  Immediately got oob and piccline started to bleed  Dressing removed, gelfoam applied , new dressing and ace wrap applied  Reported to RN

## 2017-12-29 NOTE — PROGRESS NOTES
Oncology Progress Note  Aden Mayer 78 y o  female MRN: [id number]  Unit/Bed#: [facility] 628-01 Encounter: [id number]      /62   Pulse 75   Temp 98 6 °F (37 °C) (Oral)   Resp 19   Ht 5' 3" (1 6 m)   Wt 97 9 kg (215 lb 13 3 oz)   SpO2 97%   BMI 38 23 kg/m²     Subjective:  No new complaints  Objective:    General Appearance:    Alert, oriented        Eyes:    PERRL   Ears:    Normal external ear canals, both ears   Nose:   Nares normal, septum midline   Throat:   Mucosa moist  Pharynx without injection  Neck:   Supple       Lungs:     Clear to auscultation bilaterally   Chest Wall:    No tenderness or deformity, mild erythema at the site of extravasation    Heart:    Regular rate and rhythm       Abdomen:     Soft, non-tender, bowel sounds +, no organomegaly           Extremities:   Extremities no cyanosis or edema       Skin:   no rash or icterus  Lymph nodes:   Cervical, supraclavicular, and axillary nodes normal   Neurologic:   CNII-XII intact, normal strength, sensation and reflexes     throughout        Recent Results (from the past 48 hour(s))   Basic metabolic panel    Collection Time: 12/28/17 10:07 AM   Result Value Ref Range    Sodium 142 136 - 145 mmol/L    Potassium 3 3 (L) 3 5 - 5 3 mmol/L    Chloride 108 100 - 108 mmol/L    CO2 23 21 - 32 mmol/L    Anion Gap 11 4 - 13 mmol/L    BUN 17 5 - 25 mg/dL    Creatinine 0 79 0 60 - 1 30 mg/dL    Glucose 181 (H) 65 - 140 mg/dL    Calcium 8 7 8 3 - 10 1 mg/dL    eGFR 73 ml/min/1 73sq m         No results found  Assessment and Plan : This is a pleasant 72-year-old female who was admitted for routine chemotherapy which started 2 days ago  Today was day 3  This morning she had extravasation of chemotherapy as her port needle got dislodged  Hyalanex was injected as an antidote  She will get another dose later today  Day 4  Of chemotherapy will start tonight through a PICC line which we will have place    The patient is aware of the extravasation and the potential for complications  She is agreeable to getting a PICC line  She did sign a consent  She is also agreeable to resuming chemotherapy  Once she is finished with her chemotherapy we will discharge her home with a plan to get Neulasta on Tuesday  Alexandro Oneill is aware

## 2017-12-29 NOTE — PROCEDURES
Insert PICC line  Date/Time: 12/29/2017 12:41 PM  Performed by: Renita Avendaño by: Reji Gray     Patient location:  Bedside  Other Assisting Provider: Yes (comment)    Consent:     Consent obtained:  Written (physician obtained consent)    Consent given by:  Patient  Universal protocol:     Procedure explained and questions answered to patient or proxy's satisfaction: yes      Relevant documents present and verified: yes      Test results available and properly labeled: yes      Imaging studies available: yes      Required blood products, implants, devices, and special equipment available: yes      Site/side marked: yes      Immediately prior to procedure, a time out was called: yes      Patient identity confirmed:  Verbally with patient and arm band  Pre-procedure details:     Hand hygiene: Hand hygiene performed prior to insertion      Sterile barrier technique: All elements of maximal sterile technique followed      Skin preparation:  ChloraPrep    Skin preparation agent: Skin preparation agent completely dried prior to procedure    Indications:     PICC line indications: chemotherapy    Anesthesia (see MAR for exact dosages):      Anesthesia method:  Local infiltration    Local anesthetic:  Lidocaine 1% w/o epi (2 ml)  Procedure details:     Location:  Basilic    Vessel type: vein      Laterality:  Right    Approach: percutaneous technique used      Patient position:  Flat    Procedural supplies:  Double lumen    Catheter size:  5 Fr    Landmarks identified: yes      Ultrasound guidance: yes      Sterile ultrasound techniques: Sterile gel and sterile probe covers were used      Number of attempts:  1    Successful placement: yes      Vessel of catheter tip end:  Sherlock 3CG confirmed (Lauri Adame to use,  reported to [email or web address] )    Total catheter length (cm):  44    Catheter out on skin (cm):  0    Max flow rate:  999 ml / hr    Arm circumference:  39  Post-procedure details:     Post-procedure:  Dressing applied and securement device placed    Assessment:  Blood return through all ports and free fluid flow    Post-procedure complications: none      Patient tolerance of procedure:   Tolerated well, no immediate complications    Observer: Yes      Observer name:  Jolynn Sharif [employer]

## 2017-12-29 NOTE — PROGRESS NOTES
12/29/17 1500   Spiritual Beliefs/Perceptions   Concept of God Accepting   Relationship with God Close   Spiritual Strengths Nuha   Plan of Care   Comments  spent some time with Johnnie Ramirez and her   We talked about family and appreciation of life      Assessment Completed by: Unit visit

## 2017-12-30 RX ADMIN — ETOPOSIDE: 20 INJECTION INTRAVENOUS at 17:22

## 2017-12-30 RX ADMIN — POTASSIUM CHLORIDE 20 MEQ: 1500 TABLET, EXTENDED RELEASE ORAL at 09:19

## 2017-12-30 RX ADMIN — DEXAMETHASONE SODIUM PHOSPHATE: 10 INJECTION, SOLUTION INTRAMUSCULAR; INTRAVENOUS at 16:12

## 2017-12-30 RX ADMIN — LORATADINE 10 MG: 10 TABLET ORAL at 09:19

## 2017-12-30 RX ADMIN — DAPSONE 100 MG: 100 TABLET ORAL at 09:19

## 2017-12-30 RX ADMIN — PANTOPRAZOLE SODIUM 40 MG: 40 TABLET, DELAYED RELEASE ORAL at 06:50

## 2017-12-30 RX ADMIN — PREDNISONE 100 MG: 20 TABLET ORAL at 09:18

## 2017-12-30 RX ADMIN — ACYCLOVIR 400 MG: 200 CAPSULE ORAL at 09:18

## 2017-12-30 RX ADMIN — ENOXAPARIN SODIUM 100 MG: 100 INJECTION SUBCUTANEOUS at 09:19

## 2017-12-30 RX ADMIN — Medication 1 TABLET: at 09:19

## 2017-12-30 RX ADMIN — NYSTATIN 500000 UNITS: 100000 SUSPENSION ORAL at 20:21

## 2017-12-30 RX ADMIN — HYDROCHLOROTHIAZIDE 25 MG: 25 TABLET ORAL at 09:19

## 2017-12-30 RX ADMIN — DOCUSATE SODIUM 100 MG: 100 CAPSULE, LIQUID FILLED ORAL at 09:19

## 2017-12-30 RX ADMIN — CALCIUM CARBONATE 500 MG (1,250 MG)-VITAMIN D3 200 UNIT TABLET 1 TABLET: at 09:18

## 2017-12-30 RX ADMIN — SODIUM CHLORIDE 75 ML/HR: 0.9 INJECTION, SOLUTION INTRAVENOUS at 09:17

## 2017-12-30 RX ADMIN — ACYCLOVIR 400 MG: 200 CAPSULE ORAL at 20:21

## 2017-12-30 RX ADMIN — ENOXAPARIN SODIUM 100 MG: 100 INJECTION SUBCUTANEOUS at 20:21

## 2017-12-30 RX ADMIN — SODIUM CHLORIDE 75 ML/HR: 0.9 INJECTION, SOLUTION INTRAVENOUS at 22:52

## 2017-12-30 RX ADMIN — VERAPAMIL HYDROCHLORIDE 180 MG: 180 TABLET, FILM COATED, EXTENDED RELEASE ORAL at 09:19

## 2017-12-30 NOTE — PROGRESS NOTES
Oncology Progress Note  Rolene Amarilis 78 y o  female MRN: [id number]  Unit/Bed#: [facility] 628-01 Encounter: [id number]      /68   Pulse 75   Temp 98 2 °F (36 8 °C) (Oral)   Resp 20   Ht 5' 3" (1 6 m)   Wt 98 3 kg (216 lb 11 4 oz)   SpO2 95%   BMI 38 39 kg/m²     Subjective:  No new complaints    Objective:     General Appearance:    Alert, oriented        Eyes:    PERRL   Ears:    Normal external ear canals, both ears   Nose:   Nares normal, septum midline   Throat:   Mucosa moist  Pharynx without injection  Neck:   Supple       Lungs:     Clear to auscultation bilaterally   Chest Wall:    No tenderness or deformity    Heart:    Regular rate and rhythm       Abdomen:     Soft, non-tender, bowel sounds +, no organomegaly           Extremities:   Extremities no cyanosis or edema       Skin:   no rash or icterus  Extravasation site looks good   Lymph nodes:   Cervical, supraclavicular, and axillary nodes normal   Neurologic:   CNII-XII intact, normal strength, sensation and reflexes     throughout        No results found for this or any previous visit (from the past 48 hour(s))  No results found  Assessment and Plan :  The patient is a pleasant 63-year-old female admitted for routine chemotherapy  She has done well with the chemotherapy would had an extravasation  She did get 2 rounds of the usual antidote for this type of extravasation and the skin above the area that was infiltrated looks very nice today  There is no erythema or swelling  She is tolerating her chemotherapy well  She will be discharged tomorrow after completion of her chemotherapy  She will then follow up with her primary oncologist as an outpatient  They are aware of the extravasation

## 2017-12-31 VITALS
TEMPERATURE: 98.3 F | OXYGEN SATURATION: 91 % | HEART RATE: 71 BPM | WEIGHT: 216.71 LBS | DIASTOLIC BLOOD PRESSURE: 64 MMHG | SYSTOLIC BLOOD PRESSURE: 120 MMHG | BODY MASS INDEX: 38.4 KG/M2 | RESPIRATION RATE: 18 BRPM | HEIGHT: 63 IN

## 2017-12-31 RX ADMIN — HYDROCHLOROTHIAZIDE 25 MG: 25 TABLET ORAL at 08:50

## 2017-12-31 RX ADMIN — PANTOPRAZOLE SODIUM 40 MG: 40 TABLET, DELAYED RELEASE ORAL at 06:13

## 2017-12-31 RX ADMIN — SODIUM CHLORIDE 333 ML/HR: 0.9 INJECTION, SOLUTION INTRAVENOUS at 13:23

## 2017-12-31 RX ADMIN — DIPHENHYDRAMINE HCL 25 MG: 25 TABLET ORAL at 06:20

## 2017-12-31 RX ADMIN — ACETAMINOPHEN 650 MG: 325 TABLET, FILM COATED ORAL at 06:20

## 2017-12-31 RX ADMIN — DOCUSATE SODIUM 100 MG: 100 CAPSULE, LIQUID FILLED ORAL at 08:50

## 2017-12-31 RX ADMIN — LORATADINE 10 MG: 10 TABLET ORAL at 08:50

## 2017-12-31 RX ADMIN — PREDNISONE 100 MG: 20 TABLET ORAL at 08:50

## 2017-12-31 RX ADMIN — NYSTATIN 500000 UNITS: 100000 SUSPENSION ORAL at 08:53

## 2017-12-31 RX ADMIN — DEXAMETHASONE SODIUM PHOSPHATE: 10 INJECTION, SOLUTION INTRAMUSCULAR; INTRAVENOUS at 15:48

## 2017-12-31 RX ADMIN — Medication 1 TABLET: at 08:50

## 2017-12-31 RX ADMIN — ACYCLOVIR 400 MG: 200 CAPSULE ORAL at 08:50

## 2017-12-31 RX ADMIN — POTASSIUM CHLORIDE 20 MEQ: 1500 TABLET, EXTENDED RELEASE ORAL at 08:50

## 2017-12-31 RX ADMIN — VERAPAMIL HYDROCHLORIDE 180 MG: 180 TABLET, FILM COATED, EXTENDED RELEASE ORAL at 08:50

## 2017-12-31 RX ADMIN — ENOXAPARIN SODIUM 100 MG: 100 INJECTION SUBCUTANEOUS at 08:50

## 2017-12-31 RX ADMIN — DAPSONE 100 MG: 100 TABLET ORAL at 08:50

## 2017-12-31 RX ADMIN — CYCLOPHOSPHAMIDE 1462 MG: 1 INJECTION, POWDER, FOR SOLUTION INTRAVENOUS; ORAL at 16:59

## 2017-12-31 RX ADMIN — CALCIUM CARBONATE 500 MG (1,250 MG)-VITAMIN D3 200 UNIT TABLET 1 TABLET: at 08:50

## 2017-12-31 NOTE — DISCHARGE SUMMARY
Discharge Summary - Alan Benites 78 y o  female MRN: [id number]    Unit/Bed#: [facility] 584-71 Encounter: [id number]    Admission Date: 12/27/2017     Admitting Diagnosis: Follicular lymphoma grade II, extranodal and solid organ sites [C82 19]  B-cell lymphoma (HCC) [C85 10]    HPI:  The patient is a pleasant 53-year-old female recurrent diffuse large B-cell lymphoma who was admitted for routine chemotherapy with EPOCH  Procedures Performed: No orders of the defined types were placed in this encounter  Hospital Course:  THE PATIENT WAS ADMITTED FOR ROUTINE CHEMOTHERAPY  During the hospitalization she actually ended up having an extravasation of chemotherapy but did get the antidote injected around the infiltration site  She has no erythema or changes on the day of discharge  It appears she did not have any adverse outcome from the infiltration  She was feeling comfortable on the day of discharge  Really did not have any side effects during hospitalization  She will be discharged home in good condition  General Appearance:    Alert, oriented          Eyes:    PERRL   Ears:    Normal external ear canals, both ears   Nose:   Nares normal, septum midline   Throat:   Mucosa moist  Pharynx without injection  Neck:   Supple         Lungs:     Clear to auscultation bilaterally   Chest Wall:    No tenderness or deformity    Heart:    Regular rate and rhythm         Abdomen:     Soft, non-tender, bowel sounds +, no organomegaly               Extremities:   Extremities no cyanosis or edema         Skin:   no rash or icterus  Extravasation site looks good   Lymph nodes:   Cervical, supraclavicular, and axillary nodes normal   Neurologic:   CNII-XII intact, normal strength, sensation and reflexes     throughout       Significant Findings, Care, Treatment and Services Provided:  ROUTINE CHEMOTHERAPY    Complications:   The patient did have an extravasation but no skin changes or any adverse outcome from this     Discharge Diagnosis:  Non-Hodgkin's Lymphoma    Condition at Discharge: fair     Discharge instructions/Information to patient and family:   See after visit summary for information provided to patient and family  Provisions for Follow-Up Care:  See after visit summary for information related to follow-up care and any pertinent home health orders  Disposition: Home    Planned Readmission: Yes for routine chemotherapy in a few weeks  Discharge Statement   I spent 45 minutes discharging the patient  This time was spent on the day of discharge  I had direct contact with the patient on the day of discharge  Discharge Medications:  See after visit summary for reconciled discharge medications provided to patient and family

## 2018-01-02 ENCOUNTER — HOSPITAL ENCOUNTER (OUTPATIENT)
Dept: INFUSION CENTER | Facility: HOSPITAL | Age: 78
Discharge: HOME | End: 2018-01-04
Payer: COMMERCIAL

## 2018-01-02 ENCOUNTER — GENERIC CONVERSION - ENCOUNTER (OUTPATIENT)
Dept: OTHER | Facility: OTHER | Age: 78
End: 2018-01-02

## 2018-01-02 PROCEDURE — 96372 THER/PROPH/DIAG INJ SC/IM: CPT

## 2018-01-02 RX ADMIN — PEGFILGRASTIM 6 MG: 6 INJECTION SUBCUTANEOUS at 08:11

## 2018-01-02 NOTE — PLAN OF CARE
Problem: Potential for Falls  Goal: Patient will remain free of falls  INTERVENTIONS:  - Assess patient frequently for physical needs  -  Identify cognitive and physical deficits and behaviors that affect risk of falls    -  [place] fall precautions as indicated by assessment   - Educate patient/family on patient safety including physical limitations  - Instruct patient to call for assistance with activity based on assessment  - Modify environment to reduce risk of injury  - Consider OT/PT consult to assist with strengthening/mobility   Outcome: Progressing

## 2018-01-03 ENCOUNTER — GENERIC CONVERSION - ENCOUNTER (OUTPATIENT)
Dept: OTHER | Facility: OTHER | Age: 78
End: 2018-01-03

## 2018-01-05 ENCOUNTER — GENERIC CONVERSION - ENCOUNTER (OUTPATIENT)
Dept: OTHER | Facility: OTHER | Age: 78
End: 2018-01-05

## 2018-01-10 NOTE — PROGRESS NOTES
Assessment   1  Medicare annual wellness visit, subsequent (V70 0) (Z00 00)  2  Need for vaccination with 13-polyvalent pneumococcal conjugate vaccine (V03 82) (Z23)    Plan  Encounter for screening fecal occult blood testing    · (1) OCCULT BLOOD, FECAL IMMUNOCHEMICAL TEST; Status:Active; Requested  for:07Mar2016;   Need for vaccination with 13-polyvalent pneumococcal conjugate vaccine    · Administered: Prevnar 13 Intramuscular Suspension  Screening for depression    · *VB-Depression Screening; Status:Complete;   Done: 23XUO4549 12:00AM  Screening for other and unspecified genitourinary condition    · *VB-Urinary Incontinence Screen (Dx V81 6 Screen for UI); Status:Complete;   Done:  66NRE4491 12:00AM  Special screening for other neurological conditions    · *VB - Fall Risk Assessment  (Dx V80 09 Screen for Neurologic Disorder);  Status:Complete;   Done: 75AWP1526 12:00AM    Discussion/Summary  Impression: Subsequent Annual Wellness Visit  Cardiovascular screening and counseling: screening is current  Diabetes screening and counseling: screening is current  Colorectal cancer screening and counseling: screening is current  Breast cancer screening and counseling: screening is current  Cervical cancer screening and counseling: screening not indicated  Osteoporosis screening and counseling: the patient declines screening  Abdominal aortic aneurysm screening and counseling: screening is current  Glaucoma screening and counseling: screening is current  HIV screening and counseling: screening not indicated  Immunizations: influenza vaccine is up to date this year, pneumococcal vaccine due today, Prevnar 13 given today, hepatitis B vaccination series is not indicated at this time due to the patient's low risk of rachel the disease, Zostavax vaccination up to date, Td vaccination up to date and the patient declines the Tdap vaccine     Advance Directive Planning: complete and up to date, paperwork and instructions were given to the patient  Patient Discussion: plan discussed with the patient, follow-up visit needed in one year  Chief Complaint  Patient presents for Medicare wellness visit  History of Present Illness  Welcome to Medicare and Wellness Visits: The patient is being seen for the subsequent annual wellness visit  Medicare Screening and Risk Factors   Hospitalizations: no previous hospitalizations  Medicare Screening Tests Risk Questions   Abdominal aortic aneurysm risk assessment: none indicated  Osteoporosis risk assessment: , female gender, over 48years of age and past medical history of fracture(s)  Drug and Alcohol Use: The patient has never smoked cigarettes  The patient reports rare alcohol use  She has never used illicit drugs  Diet and Physical Activity: Current diet includes low salt food choices, frequent junk food, 2-3 servings of fruit per day, 3 servings of vegetables per day, 2 servings of meat per day, 1 servings of whole grains per day, 2-3 servings of simple carbohydrates per day, 1 servings of dairy products per day, 2-3 cups of coffee per day and 1 cups of tea per day  She exercises infrequently  Exercise: stretching 15 minutes per day  Mood Disorder and Cognitive Impairment Screening: PHQ-9 Depression Scale   Over the past 2 weeks, how often have you been bothered by the following problems? 1 ) Little interest or pleasure in doing things? Not at all    2 ) Feeling down, depressed or hopeless? Not at all    3 ) Trouble falling asleep or sleeping too much? Several days  4 ) Feeling tired or having little energy? Half the days or more  5 ) Poor appetite or overeating? Half the days or more  6 ) Feeling bad about yourself, or that you are a failure, or have let yourself or your family down? Not at all    7 ) Trouble concentrating on things, such as reading a newspaper or watching television?  Not at all    8 ) Moving or speaking so slowly that other people could have noticed, or the opposite, moving or speaking faster than usual? Not at all  TOTAL SCORE: 5, severity of depression is mild  How difficult have these problems made it for you to do your work, take care of things at home, or get along with people? Not at all  Functional Ability/Level of Safety: Hearing is slightly decreased, slightly decreased in the right ear, slightly decreased in the left ear and a hearing aid is not used  She reports hearing difficulties  The patient is currently able to do activities of daily living without limitations, able to participate in social activities without limitations and able to drive without limitations  Activities of daily living details: does not need help using the phone, no transportation help needed, does not need help shopping, no meal preparation help needed, does not need help doing housework, does not need help doing laundry, does not need help managing medications and does not need help managing money  Fall risk factors: The patient fell 0 times in the past 12 months , no mobility impairment, no urinary incontinence and no previous fall  Home safety risk factors:  no grab bars in the bathroom, but no poor household lighting, no uneven floors, no household clutter and handrails on the stairs  Advance Directives: Advance directives: living will, durable power of  for health care directives and advance directives  Co-Managers and Medical Equipment/Suppliers: See Patient Care Team   Reviewed Updated Khadijah Duran:   Last Medicare Wellness Visit Information was reviewed, patient interviewed and updates made to the record today  Preventive Quality Program 65 and Older: Falls Risk: The patient fell 0 times in the past 12 months  The patient currently has no urinary incontinence symptoms     Date of last glaucoma screen was December 2015      Patient Care Team    Care Team Member Role Specialty Office Number   Scott Tellez MD  Ophthalmology [phone]   Walter Blackmon MD  Radiation Oncology [phone]   [facility]  Hematology Oncology [phone]   Mary Morfin MD  Surgical Oncology [phone]   Albert Vora MD  Dermatology [phone]     Review of Systems    Constitutional: negative  Head and Face: negative  Eyes: negative  ENT: negative  Cardiovascular: negative  Respiratory: negative  Gastrointestinal: negative  Genitourinary: negative  Musculoskeletal: negative  Integumentary and Breasts: negative  Psychiatric: negative  Endocrine: negative  Hematologic and Lymphatic: negative  Active Problems   1  Acute maxillary sinusitis (461 0) (J01 00)  2  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)  3  Breast disorder (611 9) (N64 9)  4  Dyslipidemia (272 4) (E78 5)  5  Dysphagia (787 20) (R13 10)  6  Dyspnea (786 09) (R06 00)  7  Fatigue (780 79) (R53 83)  8  Flu vaccine need (V04 81) (Z23)  9  Foot fracture, left (825 20) (S92 902A)  10  Gastroesophageal reflux disease (530 81) (K21 9)  11  Hypertension (401 9) (I10)  12  Lip swelling (528 5) (K13 0)  13  Lymphadenopathy (785 6) (R59 1)  14  Other screening mammogram (V76 12) (Z12 31)  15  Pain, foot, chronic, left (729 5,338 29) (M79 672,G89 29)  16  Screening for colon cancer (V76 51) (Z12 11)  17  Urticaria (708 9) (L50 9)  18  Vitamin B12 deficiency (266 2) (E53 8)  19   Vitamin D deficiency (268 9) (E55 9)    Past Medical History    · History of Bruit of right carotid artery (785 9) (R09 89)   · History of Cellulitis (682 9) (L03 90)   · History of Encounter for screening mammogram for malignant neoplasm of breast  (V76 12) (Z12 31)   · History of Esophageal stricture (530 3) (K22 2)   · History of basal cell carcinoma (V10 83) (W42 337)   · History of hypertension (V12 59) (Z86 79)   · History of impacted cerumen (V12 49) (Z86 69)   · History of osteoarthritis (V13 4) (Z87 39)   · History of osteopenia (V13 59) (Z87 39)   · History of vaginitis (V13 29) (Z87 42)   · History of Medicare annual wellness visit, initial (V70 0) (Z00 00)   · History of Need for pneumococcal vaccine (V03 82) (Z23)   · History of Osteoarthrosis Of The Ankle/Foot (715 97)   · History of Plantar fasciitis (728 71) (M72 2)   · History of Plantar fasciitis of left foot (728 71) (M72 2)   · History of Polyp of sigmoid colon (211 3) (D12 5)   · History of Screening for colorectal cancer (V76 51) (Z12 11,Z12 12)   · History of Vulvar abscess (616 4) (N76 4)    The active problems and past medical history were reviewed and updated today  Surgical History    · History of Ankle Surgery   · History of Biopsy Lymph Node   · History of Cataract Surgery   · History of Esophagoscopy Rigid With Insertion Of Plastic Tube/Stent   · History of Hysterectomy    The surgical history was reviewed and updated today  Family History    · Family history of Multiple Myeloma    · Family history of Heart Disease (V17 49)   · Family history of Hypertension (V17 49)    · Family history of Heart Disease (V17 49)   · Family history of Hypertension (V17 49)    · Family history of Arthritis (V17 7)   · Family history of Breast Cancer (V16 3)   · Family history of Osteoporosis (V17 81)    The family history was reviewed and updated today  Social History    · Former smoker (I08 05) (K72 064)   · Never Drank Alcohol   · Uses Safety Equipment - Seatbelts  The social history was reviewed and updated today  The social history was reviewed and is unchanged  Current Meds  1  Calcium 600 TABS; Therapy: (Miguel Maravilla) to Recorded  2  Claritin 10 MG Oral Tablet; TAKE ONE TABLET BY MOUTH ONCE DAILY; Therapy: 71Hus7685 to (Last Rx:09Klz4846)  Requested for: 81Tth5964 Ordered  3  Fish Oil CAPS; Therapy: (Oddis Ray) to Recorded  4  Hydrochlorothiazide 25 MG Oral Tablet; Take 1 tablet daily;    Therapy: 08QQZ7377 to (Evaluate:03Rgb9128)  Requested for: 54SSL8267; Last Rx:11Jan2016 Ordered  5  Klor-Con M20 20 MEQ Oral Tablet Extended Release; Take 1 tablet daily; Therapy: 91QTF5634 to (Evaluate:08Rip7996)  Requested for: 74FDN9660; Last   Rx:31Jan2016 Ordered  6  Multi Complete CAPS; Therapy: (Teto Low) to Recorded  7  Omeprazole 40 MG Oral Capsule Delayed Release; take 1 capsule daily; Therapy: 31NGF1066 to (Evaluate:05Gke5159)  Requested for: 18SPC7395; Last   Rx:11Jan2016 Ordered  8  Verapamil HCl  MG Oral Tablet Extended Release; Take 1 tablet daily; Therapy: 49QYR0720 to (Berl Pollen)  Requested for: 52JXL5205; Last   Rx:04Nov2015 Ordered  9  Vitamin B12 100 MCG Oral Tablet; Therapy: (Teto Low) to Recorded  10  Vitamin C CAPS; Therapy: (Teto Low) to Recorded    The medication list was reviewed and updated today  Allergies   1  Bactrim TABS  2  Aspirin CHEW  3  CeleBREX CAPS  4  NSAIDs  5  Sulfa Drugs    Immunizations   1 2 3 4    Influenza  Approx 85JFG1992 26KFM1064 61HOS0712 925618    Pneumococcal  17EVI2326       Td/DT  57LAE9111       Zoster  May 2007        Vitals  Signs [Data Includes: Current Encounter]    Temperature: 98 5 F  Heart Rate: 77  Systolic: 100  Diastolic: 80  Height: 5 ft 3 in  Weight: 230 lb 2 08 oz  BMI Calculated: 40 77  BSA Calculated: 2 06  O2 Saturation: 96    Physical Exam    Constitutional   General appearance: No acute distress, well appearing and well nourished  Pulmonary   Auscultation of lungs: Clear to auscultation  Cardiovascular   Auscultation of heart: Normal rate and rhythm, normal S1 and S2, no murmurs  Carotid pulses: 2+ bilaterally  Examination of extremities for edema and/or varicosities: Abnormal   Minimal swelling of the left foot and ankle  Abdomen   Abdomen: Non-tender, no masses  Liver and spleen: No hepatomegaly or splenomegaly  Neurologic   Cranial nerves: Cranial nerves II-XII intact  Cortical function: Normal mental status      Psychiatric Judgment and insight: Normal     Orientation to person, place, and time: Normal     Recent and remote memory: Intact  Mood and affect: Normal     The patient achieved a score of 30 / 30 on the MMSE  Language: The evaluation was normal       Results/Data  PHQ-9 Adult Depression Screening 34JSS8009 04:03PM User, Mason     Test Name Result Flag Reference   PHQ-9 Adult Depression Score 5     Q1: 0, Q2: 0, Q3: 1, Q4: 2, Q5: 2, Q6: 0, Q7: 0, Q8: 0, Q9: 0   PHQ-9 Adult Depression Screening Negative     PHQ-9 Difficulty Level Not difficult at all     PHQ-9 Severity Mild Depression       *VB - Fall Risk Assessment  (Dx V80 09 Screen for Neurologic Disorder) 71NNM0814 12:00AM [place]     Test Name Result Flag Reference   Fall Risk Assessment 15VUJ5566       *VB-Urinary Incontinence Screen (Dx V81 6 Screen for UI) 20ZAZ1576 12:00AM [place] [facility]     Test Name Result Flag Reference   Urinary Incontinence Assessment 27QYD5224         Procedure    Procedure: Visual Acuity Test    Indication: routine screening  Inforrmation supplied by a Snellen chart     Results: 20/25 in both eyes with corrective device, 20/25 in the right eye with corrective device, 20/30 in the left eye with corrective device      Future Appointments    Date/Time Provider Specialty Site   06/08/2016 11:00 AM Proothi, Darcel Cogan, MD Hematology Oncology [facility]     Signatures   Electronically signed by : Loreta Stallings DO; Mar  7 2016  4:55PM EST                       (Author)

## 2018-01-11 NOTE — MISCELLANEOUS
Message   Recorded as Task   Date: 11/03/2017 11:15 AM, Created By: Huber Wilson   Task Name: Medical Complaint Callback   Assigned To: Elena Cochran   Regarding Patient: Roxann Fernandez, Status: In Progress   Chuy Nagy - 89 Nov 2017 11:15 AM     TASK CREATED  Caller: Self; Medical Complaint; [phone] (Home)  stated that her left arm where the pt's port is feels heavy & is more red that her right arm  She can see blood vessels and would like someone to give her a call back at [facility] Nov 2017 11:40 AM     TASK REASSIGNED: Previously Assigned To Denise Soliman - 84 Nov 2017 11:48 AM     TASK IN PROGRESS   Patient called reporting that her left arm with the port is feeling heavier, has reddish blue discoloration, and she believes to be swollen  D/W Dr Lexi Scott and he stated he would like her come to the office immediately to be seen  Reviewed with patient and she stated she will be here in an hour  Appt scheduled for 11/3 at 1:45pm       Active Problems    1  Abnormal urinalysis (791 9) (R82 90)   2  Acute bilateral low back pain without sciatica (724 2,338 19) (M54 5)   3  Acute cystitis without hematuria (595 0) (N30 00)   4  Acute maxillary sinusitis (461 0) (J01 00)   5  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 (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 09,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 1) (L65 8,T45  1X5A)   6  Ankle pain, left (719 47) (M25 572)   7  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)   8  Breast disorder (611 9) (N64 9)   9  Cataract (366 9) (H26 9)   10  Chemotherapy-induced nausea (787 02,E933 1) (R11 0,T45  1X5A)   11  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)   12  Diffuse large B-cell lymphoma of lymph nodes of multiple regions (202 88) (C83 38)   13  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)   14  Dyslipidemia (272 4) (E78 5)   15  Dysphagia (787 20) (R13 10)   16  Dyspnea (786 09) (R06 00)   17   Encounter for screening fecal occult blood testing (V76 51) (Z12 11)   18  Fatigue (780 79) (R53 83)   19  Flu vaccine need (V04 81) (Z23)   20  Foot fracture, left (825 20) (S92 902A)   21  Gastroesophageal reflux disease (530 81) (K21 9)   22  Hypertension (401 9) (I10)   23  Increased urinary frequency (788 41) (R35 0)   24  Itching (698 9) (L29 9)   25  Left bundle branch block (LBBB) (426 3) (I44 7)   26  Lip swelling (784 2) (R22 0)   27  Lymphadenopathy (785 6) (R59 1)   28  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)   29  Medicare annual wellness visit, subsequent (V70 0) (Z00 00)   30  Need for vaccination with 13-polyvalent pneumococcal conjugate vaccine (V03 82) (Z23)   31  Oral thrush (112 0) (B37 0)   32  Other screening mammogram (V76 12) (Z12 31)   33  Pain, foot, chronic, left (729 5,338 29) (M79 672,G89 29)   34  Preop cardiovascular exam (V72 81) (Z01 810)   35  Screening for colon cancer (V76 51) (Z12 11)   36  Screening for depression (V79 0) (Z13 89)   37  Screening for other and unspecified genitourinary condition (V81 6) (Z13 89)   38  Situational anxiety (300 09) (F41 8)   39  Special screening for other neurological conditions (V80 09) (Z13 89)   40  Urticaria (708 9) (L50 9)   41  Vitamin B12 deficiency (266 2) (E53 8)   42  Vitamin D deficiency (268 9) (E55 9)    Current Meds   1  Allopurinol 300 MG Oral Tablet; 1 tab PO daily; Therapy: 82HOK2542 to (Last CY:67FPF2055)  Requested for: 88RPR6683 Ordered   2  Calcium 600 TABS; Therapy: (Marko Doom) to Recorded   3  Fish Oil CAPS; Therapy: (Marko Doom) to Recorded   4  HydroCHLOROthiazide 25 MG Oral Tablet; Take 1 tablet daily; Therapy: 57TGB4437 to (Evaluate:04Apr2018)  Requested for: 59FDD8577; Last   Rx:44Nzz6559 Ordered   5  Klor-Con M20 20 MEQ Oral Tablet Extended Release; Take 1 tablet daily; Therapy: 72CDW4919 to (Maral Montilla)  Requested for: 30CJL8596; Last   Rx:02Nov2017 Ordered   6  Multi Complete CAPS;    Therapy: (Addi Aguirre) to Recorded   7  Nystatin 557365 UNIT/ML Mouth/Throat Suspension; SWISH AND SWALLOW 5ML 4   TIMES DAILY; Therapy: 58ZIY5016 to (Evaluate:52Jxi5261)  Requested for: 31Oct2017; Last   Rx:31Oct2017 Ordered   8  Omeprazole 40 MG Oral Capsule Delayed Release; take 1 capsule daily; Therapy: 60AUB0804 to (Evaluate:19Nov2017)  Requested for: 92Nyk2086; Last   Rx:82Gsk4219 Ordered   9  Ondansetron HCl - 8 MG Oral Tablet; 1 TAB PO Q 8 HR PRN N/V;   Therapy: 72GXP2389 to (Last Rx:55Ylk9130)  Requested for: 08JQT6321 Ordered   10  PredniSONE 50 MG Oral Tablet; 1 tab po bid x5 days with food with each chemo cycle; Therapy: 84XJF1595 to (Last Navid Spittle)  Requested for: 06ZHX3401 Ordered   11  Verapamil HCl  MG Oral Tablet Extended Release; Take 1 tablet daily; Therapy: 07OBJ3389 to (ELQDUBSS:30PGY8178)  Requested for: 80ATO9405; Last    Rx:39Faa7143 Ordered   12  Vitamin B12 100 MCG Oral Tablet; Therapy: (Addi Aguirre) to Recorded   13  Vitamin C CAPS; Therapy: (Addi Aguirre) to Recorded   14  Vitamin D 1000 UNIT Oral Tablet; TAKE 1 TABLET DAILY; Therapy: 33IJE2667 to Recorded    Allergies    1  Bactrim TABS   2  Aspirin CHEW   3  CeleBREX CAPS   4  NSAIDs   5   Sulfa Drugs    Signatures   Electronically signed by : Mirta Mariano, ; Nov  3 2017 12:01PM EST                       (Author)

## 2018-01-11 NOTE — MISCELLANEOUS
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 are going to do more studies and let me know because here for double hit it might change her chemotherapy to [facility]        Signatures   Electronically signed by : Harsh Rizvi MD; Oct 25 2017 11:26PM EST                       (Author)

## 2018-01-11 NOTE — MISCELLANEOUS
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)  2  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)  3  Anticoagulated on heparin (V58 61) (Z79 01)  4  Hospital discharge follow-up (V67 59) (Z09)    Discussion/Summary  Discussion Summary:   -Patient has f/u with hem/onc on Monday 11/13 - will discuss port placement at this visit  -Continue lovenox 100 BID; while on AC avoid NSAIDs and ASA, monitor for signs of bleeding such blood in sputum, urine, stool  - please call or return to office if worsening pain/swelling/erythema in left upper extremity   - continue current treatment plan  Counseling Documentation With Imm: The patient was counseled regarding instructions for management, risk factor reductions, patient and family education, impressions, importance of compliance with treatment  Medication SE Review and Pt Understands Tx: Possible side effects of new medications were reviewed with the patient/guardian today  The treatment plan was reviewed with the patient/guardian  The patient/guardian understands and agrees with the treatment plan      Chief Complaint  Chief Complaint Free Text Note Form: PT is here for a ARTI  PT is doing better and is feeling well  History of Present Illness  TCM Communication [facility]: The patient is being contacted for follow-up after hospitalization  Hospital records were reviewed  She was hospitalized at [address] [place] [address]  The dates of hospitalization: 11/03/2017 till 11/05/2017, date of admission: 11/03/2017, date of discharge: 11/05/2017  Diagnosis: acute DVT of L subclavian vein  She was discharged to home  Medications reviewed and updated today  She scheduled a follow up appointment  Follow-up appointments with other specialists: 11/13/2017 has appt with Dr Rashmi Ma     Symptoms: fatigue and arm pain left side, but no fever, no weakness, no dizziness, no headache, no cough, no shortness of breath, no chest pain, no back pain on left side, no back pain on right side, no arm pain on right side, no leg pain on left side, no leg pain on right side, no upper abdominal pain, no middle abdominal pain, no lower abdominal pain, no rash:, no anorexia, no nausea, no vomiting, no loose stools, no constipation and no pain with urinating  Left arm has feeling of heaviness from the blood clot  Counseling was provided to the patient  Topics counseled included instructions for management, prognosis, patient and family education and importance of compliance with treatment  Communication performed and completed by1  Janae Navarrete    HPI: Patient is a 79 yo female currently undergoing chemotherapy for B-cell lymphoma who presents to office for follow up of hospitalization 11/3-11/5 due to mostly occlusive L subclavian DVT  Patient follows clostely with [facility] hem/onc and recently had port placed 10/3/17 on left upper chest and has been undergoing chemo since October  She was seen in hem/onc office on Nov 3 for reported left arm redness, swelling and pain/heaviness  She was sent for vas venous duplex showing DVT  She was directly hospitalized and started on IV heparin  SHe had thrombocytopenia 74, 000 that improved to 101,000 on day of discharge and normalized on repeat labs on Nov 7  She was started on lovenox 1 mg/kg BID per hem/onc recommendations  She understands how to adminster the lovenox inj herself and is tolerating well  She denies headaches, dizziness, chest pain, sob, abd pain, hematuria, melena, hemoptysis, easy bleeding  Her arms feels better but does still feel heavy however denies numbness, tingling, severe weakness  She has follow up with hem/onc on Monday 11/13 and will talk about port placement at that visit  1 Amended By: Joao Anton; Jan 05 2018 10:49 AM EST    Review of Systems  Complete-Female:   Constitutional: no fever, not feeling poorly, no recent weight gain, no chills, not feeling tired and no recent weight loss  Cardiovascular: the heart rate was not slow, no chest pain, the heart rate was not fast, no palpitations and no lower extremity edema  Respiratory: no shortness of breath, no cough, no wheezing and no shortness of breath during exertion  Gastrointestinal: no abdominal pain, no nausea, no vomiting, no constipation, no diarrhea and no blood in stools  Genitourinary: no hematuria, but no dysuria and no incontinence  Musculoskeletal: left arm feels heavy, but no joint swelling, no myalgias and no joint stiffness  Integumentary: slight left arm erythema, but no rashes and no skin lesions  Neurological: no headache, no numbness, no tingling, no confusion, no dizziness, no convulsions, no fainting and no difficulty walking  Hematologic/Lymphatic: no swollen glands, no tendency for easy bleeding and no tendency for easy bruising  ROS Reviewed:   ROS reviewed  Active Problems    1  Abnormal urinalysis (791 9) (R82 90)  2  Acute bilateral low back pain without sciatica (724 2,338 19) (M54 5)  3  Acute cystitis without hematuria (595 0) (N30 00)  4  Acute maxillary sinusitis (461 0) (J01 00)  5  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 (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,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) (L65 8,T45  1X5A)  6  Ankle pain, left (719 47) (M25 572)  7  Breast disorder (611 9) (N64 9)  8  Cataract (366 9) (H26 9)  9  Chemotherapy-induced nausea (787 02,E933 1) (R11 0,T45  1X5A)  10  Dyslipidemia (272 4) (E78 5)  11  Dysphagia (787 20) (R13 10)  12  Dyspnea (786 09) (R06 00)  13  Encounter for screening fecal occult blood testing (V76 51) (Z12 11)  14  Fatigue (780 79) (R53 83)  15  Flu vaccine need (V04 81) (Z23)  16  Foot fracture, left (825 20) (S92 902A)  17  Gastroesophageal reflux disease (530 81) (K21 9)  18  Hypertension (401 9) (I10)  19  Increased urinary frequency (788 41) (R35 0)  20  Itching (698 9) (L29 9)  21  Left bundle branch block (LBBB) (426 3) (I44 7)  22  Lip swelling (784 2) (R22 0)  23  Lymphadenopathy (785 6) (R59 1)  24   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)  25  Medicare annual wellness visit, subsequent (V70 0) (Z00 00)  26  Need for vaccination with 13-polyvalent pneumococcal conjugate vaccine (V03 82) (Z23)  27  Oral thrush (112 0) (B37 0)  28  Other screening mammogram (V76 12) (Z12 31)  29  Pain, foot, chronic, left (729 5,338 29) (M79 672,G89 29)  30  Phlebitis and thrombophlebitis of deep veins of upper extremities (451 83) (I80 8)  31  Preop cardiovascular exam (V72 81) (Z01 810)  32  Screening for colon cancer (V76 51) (Z12 11)  33  Screening for depression (V79 0) (Z13 89)  34  Screening for other and unspecified genitourinary condition (V81 6) (Z13 89)  35  Situational anxiety (300 09) (F41 8)  36  Special screening for other neurological conditions (V80 09) (Z13 89)  37  Urticaria (708 9) (L50 9)  38  Vitamin B12 deficiency (266 2) (E53 8)  39  Vitamin D deficiency (268 9) (E55 9)    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)       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)       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)       Diffuse large B-cell lymphoma of lymph nodes of multiple regions (202 88) (C83 38)          Past Medical History   1  History of Bruit of right carotid artery (785 9) (R09 89)  2  History of Cellulitis (682 9) (L03 90)  3  Contact dermatitis (692 9) (L25 9)  4  History of Encounter for screening mammogram for malignant neoplasm of breast   (V76 12) (Z12 31)  5  History of Esophageal stricture (530 3) (K22 2)  6  History of basal cell carcinoma (V10 83) (Z85 828)  7  History of hypertension (V12 59) (Z86 79)  8  History of impacted cerumen (V12 49) (Z86 69)  9  History of osteoarthritis (V13 4) (Z87 39)  10  History of osteopenia (V13 59) (Z87 39)  11  History of vaginitis (V13 29) (Z87 42)  12  History of Medicare annual wellness visit, initial (V70 0) (Z00 00)  13  History of Need for pneumococcal vaccine (V03 82) (Z23)  14   History of Osteoarthrosis Of The Ankle/Foot (715 97)  15  History of Plantar fasciitis (728 71) (M72 2)  16  History of Plantar fasciitis of left foot (728 71) (M72 2)  17  History of Polyp of sigmoid colon (211 3) (D12 5)  18  History of Screening for colorectal cancer (V76 51) (Z12 11,Z12 12)  19  History of Vulvar abscess (616 4) (N76 4)    Surgical History   1  History of Ankle Surgery  2  History of Biopsy Lymph Node  3  History of Cataract Surgery  4  History of Esophagoscopy Rigid With Insertion Of Plastic Tube/Stent  5  History of Hysterectomy  Surgical History Reviewed: The surgical history was reviewed and updated today  Family History  Mother   1  Family history of Multiple Myeloma  Father   2  Family history of Heart Disease (V17 49)  3  Family history of Hypertension (V17 49)  Sister   4  Family history of Heart Disease (V17 49)  5  Family history of Hypertension (V17 49)  Family History   6  Family history of Arthritis (V17 7)  7  Family history of Breast Cancer (V16 3)  8  Family history of Osteoporosis (V17 81)  Family History Reviewed: The family history was reviewed and updated today  Social History    · Former smoker (I92 21) (V52 590)   · Never Drank Alcohol   · Uses Safety Equipment - Seatbelts  Social History Reviewed: The social history was reviewed and updated today  Current Meds  1  Allopurinol 300 MG Oral Tablet; 1 tab PO daily; Therapy: 03GAB3346 to (Last PH:27CTF4034)  Requested for: 85VAK3296 Ordered  2  Calcium 600 TABS; Therapy: (Roxine Cluck) to Recorded  3  Fish Oil CAPS; Therapy: (Roxine Cluck) to Recorded  4  HydroCHLOROthiazide 25 MG Oral Tablet; Take 1 tablet daily; Therapy: 07AGW3213 to (Evaluate:57Gzo9563)  Requested for: 71FXL9423; Last   Rx:06Oct2017 Ordered  5  Klor-Con M20 20 MEQ Oral Tablet Extended Release; Take 1 tablet daily; Therapy: 14POE8181 to (Marco Antonio Basket)  Requested for: 61IDT8543; Last   Rx:02Nov2017 Ordered  6   Lovenox 100 MG/ML Subcutaneous Solution; Therapy: 81EDC6503 to Recorded  7  Melatonin 3 MG Oral Tablet; Therapy: 54RBF0059 to Recorded  8  Multi Complete CAPS; Therapy: (Ara Figures) to Recorded  9  Nystatin 905966 UNIT/ML Mouth/Throat Suspension; SWISH AND SWALLOW 5ML 4   TIMES DAILY; Therapy: 48CNC8467 to (Evaluate:04Mbu4632)  Requested for: 31Oct2017; Last   Rx:31Oct2017 Ordered  10  Omeprazole 40 MG Oral Capsule Delayed Release; take 1 capsule daily; Therapy: 01LGJ9338 to (Evaluate:19Nov2017)  Requested for: 80Bpb6312; Last    Rx:74Qio5083 Ordered  11  Ondansetron HCl - 8 MG Oral Tablet; 1 TAB PO Q 8 HR PRN N/V;    Therapy: 82YGK3350 to (Last HJ:76WER3989)  Requested for: 73TPW5236 Ordered  12  PredniSONE 50 MG Oral Tablet; 1 tab po bid x5 days with food with each chemo cycle; Therapy: 30IFY2005 to (Last Zachery Bejakobbecki)  Requested for: 42CII8235 Ordered  13  SLPG Magic Mouthwash 1:1:1 maalox/diphenhydramine/lidocaine; USE 4 TIMES DAILY; Therapy: [phone] 049-020) to Recorded  14  Verapamil HCl  MG Oral Tablet Extended Release; Take 1 tablet daily; Therapy: 01OUS0494 to (VAPMWQFD:54IVT5588)  Requested for: 05DLT8964; Last    Rx:66Foq6532 Ordered  15  Vitamin B12 100 MCG Oral Tablet; Therapy: (Ara Figures) to Recorded  16  Vitamin C CAPS; Therapy: (Ara Figures) to Recorded  17  Vitamin D 1000 UNIT Oral Tablet; TAKE 1 TABLET DAILY; Therapy: 11CVI2327 to Recorded  Medication List Reviewed: The medication list was reviewed and updated today  Allergies   1  Bactrim TABS  2  Aspirin CHEW  3  CeleBREX CAPS  4  NSAIDs  5   Sulfa Drugs    Vitals  Signs   Recorded: 27DTU8553 08:46AM   Temperature: 97 9 F  Heart Rate: 103  Respiration: 16  Systolic: 832  Diastolic: 76  Height: 5 ft 2 in  Weight: 215 lb 2 oz  BMI Calculated: 39 35  BSA Calculated: 1 97  O2 Saturation: 96    Physical Exam    Constitutional   General appearance: No acute distress, well appearing and well nourished  Eyes   Conjunctiva and lids: No swelling, erythema or discharge  Pupils and irises: Equal, round and reactive to light  Pulmonary   Respiratory effort: No increased work of breathing or signs of respiratory distress  Auscultation of lungs: Clear to auscultation  Cardiovascular   Auscultation of heart: Abnormal   The heart rate was normal  The rhythm was regular  Heart sounds: normal S1 and normal S2  Systolic murmur  Examination of extremities for edema and/or varicosities: Abnormal   (left upper extremity with slight erythema and nonpitting edema, 2+ pulses radial and brachialis, 5/5 strength b/l UE )   Carotid pulses: Normal     Abdomen   Abdomen: Non-tender, no masses  small ecchymosis periumbilical near inj sites  Musculoskeletal   Gait and station: Normal     Digits and nails: Normal without clubbing or cyanosis  Skin   Skin and subcutaneous tissue: Normal without rashes or lesions  Neurologic   Cranial nerves: Cranial nerves 2-12 intact  Reflexes: 2+ and symmetric  Sensation: No sensory loss  Psychiatric   Orientation to person, place, and time: Normal     Mood and affect: Normal          Results/Data  (1) CBC/PLT/DIFF 85BZX9281 08:50AM EPIC, Provider   Test ordered by: Laquita Mckeon     Test Name Result Flag Reference   WBC COUNT 8 13 Thousand/uL  4 31-10 16   RBC COUNT 4 09 Million/uL  3 81-5 12   HEMOGLOBIN 12 1 g/dL  11 5-15 4   HEMATOCRIT 37 5 %  34 8-46  1   MCV 92 fL  82-98   MCH 29 6 pg  26 8-34 3   MCHC 32 3 g/dL  31 4-37 4   RDW 17 1 % H 11 6-15 1   MPV 11 0 fL  8 9-12 7   PLATELET COUNT 112 Thousands/uL  149-390   nRBC AUTOMATED 1 /100 WBCs     This is an appended report  These results have been appended to a previously preliminary verified report  This is a patient instruction: This test is non-fasting  Please drink two glasses of water morning of bloodwork  This is an appended report    These results have been appended to a previously verified report  (1) CBC/PLT/DIFF 81VBU0595 08:50AM EPIC, Provider   Test ordered by: Rico Macias     Test Name Result Flag Reference   NEUTROPHILS - REL 63 %  43-75   BANDS - REL 11 % H 0-8   LYMPHOCYTES - REL 6 % L 14-44   MONOCYTES - REL 10 %  4-12   EOSINOPHILS - REL 1 %  0-6   BASOPHILS - REL 0 %  0-1   METAMYELOCYTES 2 % H 0-1   ATYPICAL LYMPH 7 % H <=0   NEUTROPHILS ABS 6 02 Thousand/uL  1 85-7 62   LYMPHOTCYTES ABS 0 49 Thousand/uL L 0 60-4 47   MONOCYTES ABS 0 81 Thousand/uL  0 00-1 22   EOSINOPHILS ABS 0 08 Thousand/uL  0 00-0 40   BASOPHILS ABS 0 00 Thousand/uL  0 00-0 10   TOTAL COUNTED      RBC MORPHOLOGY Present     Anisocytosis Present     PLT ESTIMATE Adequate  Adequate   This is a patient instruction: This test is non-fasting  Please drink two glasses of water morning of bloodwork  VAS UPPER LIMB VENOUS DUPLEX [address] [place] [address], UNILATERAL/LIMITED [id number] 03:31PM Carmelo Alpha Order Number: [id number]   Performing Comments: Special attention to left arm and left side of the neck  Please do today as soon as possible and call me with the results at [phone] before sending patient home  - Patient Instructions: To schedule this appointment, please    contact Central Scheduling at [phone]  Test Name Result Flag Reference   VAS UPPER LIMB VENOUS DUPLEX SCAN, UNILATERAL/LIMITED (Report)     [facility] REPORT   CLINICAL:   Indications: Thrombus Superficial, Arm [I80 8]  Patient presents with swelling   of the left arm  Patient has a port in the upper arm for chemotherapy   treatments  Risk Factors: The patient has history of malignancy  She has no history of   DVT  FINDINGS:      Left     Impression       Thrombus    Int   Jugular Normal (Patent)             Subclavian  Non Occlusive Thrombus         Bas Upper               Acute      Ceph Upper              Acute               CONCLUSION:      Impression         LEFT UPPER LIMB:   There is evidence of acute mostly occlusive deep vein thrombosis noted in the   subclavian vein  There is evidence of superficial thrombophlebitis noted in the cephalic vein   around the port and proximal basilic vein  Doppler evaluation shows a normal response to augmentation maneuvers  Preliminary results given to Dr Libertad Gabriel at 12: 25      SIGNATURE:   Electronically Signed by: Linus Hutton on 2017-11-03 07:45:59 PM     Health Management  Other screening mammogram   Digital Bilateral Screening Mammogram With CAD; every 1 year; Last 96WRO3277; Next Due:  17VHC5398; Overdue  Screening for colon cancer   COLONOSCOPY; every 5 years; Last 23Oct2015; Next Due: 58RXM5813; Active  Health Maintenance   Medicare Annual Wellness Visit; every 1 year; Next Due: 39ZPN8104; Overdue    Future Appointments    Date/Time Provider Specialty Site   12/20/2017 11:20 AM LEXX Nath  [address]   11/30/2017 01:45 PM Ebenezer Orr MD Hematology Oncology [facility]     Signatures   Electronically signed by : Jordan Cline; Nov 12 2017 10:38PM EST                       (Author)    Electronically signed by : Cornelius Aburto DO; Nov 14 2017  7:50AM EST                       (Author)    Electronically signed by :  Jordan Cline; Jan 5 2018 10:51AM EST                       (Author)    Electronically signed by : Cornelius Aburto DO; Jan 7 2018 11:08AM EST                       (Author)

## 2018-01-12 VITALS
SYSTOLIC BLOOD PRESSURE: 124 MMHG | RESPIRATION RATE: 16 BRPM | DIASTOLIC BLOOD PRESSURE: 76 MMHG | HEART RATE: 103 BPM | BODY MASS INDEX: 39.59 KG/M2 | WEIGHT: 215.13 LBS | TEMPERATURE: 97.9 F | OXYGEN SATURATION: 96 % | HEIGHT: 62 IN

## 2018-01-12 VITALS
DIASTOLIC BLOOD PRESSURE: 78 MMHG | HEART RATE: 73 BPM | SYSTOLIC BLOOD PRESSURE: 124 MMHG | RESPIRATION RATE: 14 BRPM | BODY MASS INDEX: 38.3 KG/M2 | WEIGHT: 224.31 LBS | HEIGHT: 64 IN | TEMPERATURE: 98.1 F | OXYGEN SATURATION: 95 %

## 2018-01-12 NOTE — RESULT NOTES
Verified Results  (1) COMPREHENSIVE METABOLIC PANEL 16BYO2188 15:76LN Ezequiel Sanchez     Test Name Result Flag Reference   GLUCOSE 92 mg/dL  65-99   Fasting reference interval   UREA NITROGEN (BUN) 14 mg/dL  7-25   CREATININE 0 74 mg/dL  0 60-0 93   For patients >52years of age, the reference limit  for Creatinine is approximately 13% higher for people  identified as -American  eGFR NON-AFR  AMERICAN 80 mL/min/1 73m2  > OR = 60   eGFR AFRICAN AMERICAN 93 mL/min/1 73m2  > OR = 60   BUN/CREATININE RATIO   9-42   NOT APPLICABLE (calc)   SODIUM 140 mmol/L  135-146   POTASSIUM 4 1 mmol/L  3 5-5 3   CHLORIDE 104 mmol/L     CARBON DIOXIDE 28 mmol/L  20-31   CALCIUM 9 7 mg/dL  8 6-10 4   PROTEIN, TOTAL 6 6 g/dL  6 1-8 1   ALBUMIN 4 5 g/dL  3 6-5 1   GLOBULIN 2 1 g/dL (calc)  1 9-3 7   ALBUMIN/GLOBULIN RATIO 2 1 (calc)  1 0-2 5   BILIRUBIN, TOTAL 0 6 mg/dL  0 2-1 2   ALKALINE PHOSPHATASE 85 U/L     AST 28 U/L  10-35   ALT 42 U/L H 6-29     (1) LIPID PANEL, FASTING 36Trf2256 10:05AM Ezequiel Sanchez     Test Name Result Flag Reference   CHOLESTEROL, TOTAL 210 mg/dL H 125-200   HDL CHOLESTEROL 39 mg/dL L > OR = 46   TRIGLICERIDES 753 mg/dL H <150   LDL-CHOLESTEROL 139 mg/dL (calc) H <130   Desirable range <100 mg/dL for patients with CHD or  diabetes and <70 mg/dL for diabetic patients with  known heart disease  CHOL/HDLC RATIO 5 4 (calc) H < OR = 5 0   NON HDL CHOLESTEROL 171 mg/dL (calc) H    Target for non-HDL cholesterol is 30 mg/dL higher than   LDL cholesterol target       (1) THYROXINE 20TVZ2356 10:05AM Ezequiel Sanchez     Test Name Result Flag Reference   T4 (THYROXINE), TOTAL 7 6 mcg/dL  4 5-12 0     (1) FOLATE 18IGF9820 10:05AM Ezequiel Sanchez     Test Name Result Flag Reference   FOLATE, SERUM >24 0 ng/mL     Reference Range                             Low:           <3 4                             Borderline:    3 4-5 4                             Normal:        >5 4     (1) URINALYSIS w URINE C/S REFLEX (will reflex a microscopy if leukocytes, occult blood, or nitrites are not within normal limits) 68LNT1789 10:05AM [facility]rist[facility]     Test Name Result Flag Reference   COLOR YELLOW  YELLOW   APPEARANCE CLEAR  CLEAR   SPECIFIC GRAVITY 1 004  1 001-1 035   PH 7 5  5 0-8 0   GLUCOSE NEGATIVE  NEGATIVE   BILIRUBIN NEGATIVE  NEGATIVE   KETONES NEGATIVE  NEGATIVE   OCCULT BLOOD NEGATIVE  NEGATIVE   PROTEIN NEGATIVE  NEGATIVE   NITRITE NEGATIVE  NEGATIVE   LEUKOCYTE ESTERASE NEGATIVE  NEGATIVE   WBC NONE SEEN /HPF  < OR = 5   RBC NONE SEEN /HPF  < OR = 2   SQUAMOUS EPITHELIAL CELLS 0-5 /HPF  < OR = 5   BACTERIA NONE SEEN /HPF  NONE SEEN   HYALINE CAST NONE SEEN /LPF  NONE SEEN   REFLEXIVE URINE CULTURE NO CULTURE INDICATED       (Q) TSH, 3RD GENERATION [id number] 10:05AM [facility]     Test Name Result Flag Reference   TSH 1 49 mIU/L  0 40-4 50     *(Q) VITAMIN D, 25-HYDROXY, LC/MS/MS [id number] 10:05AM [place] [place]   REPORT COMMENT:  REQ ON HOLD  FASTING:YES     Test Name Result Flag Reference   VITAMIN D, 25-OH, TOTAL 33 ng/mL     Vitamin D Status         25-OH Vitamin D:     Deficiency:                    <20 ng/mL  Insufficiency:             20 - 29 ng/mL  Optimal:                 > or = 30 ng/mL     For 25-OH Vitamin D testing on patients on   D2-supplementation and patients for whom quantitation   of D2 and D3 fractions is required, the QuestAssureD(TM)  25-OH VIT D, (D2,D3), LC/MS/MS is recommended: order   code 00563 (patients >2yrs)  For more information on this test, go to:  [email or web address]  (This link is being provided for   informational/educational purposes only )

## 2018-01-12 NOTE — PROGRESS NOTES
Preliminary Nursing Report                Patient Information    Initial Encounter Entry Date:   2017 1:32 PM EST (Automated Transmission Automated Transmission)       Last Modified:   {Rio Roberts}              Legal Name: Lucrecia Mcghee Number:        YOB: 1940        Age (years): 78        Gender: F        Body Mass Index (BMI): 40 kg/m2        Height: 63 in  Weight: 224 lbs (102 kgs)           Address:   [address] [place] [address] [address] Daniella HYDE [id number]               Phone: [phone]   (consent to leave messages)        Email:        Ethnicity: Decline to State        Buddhism:        Marital Status:        Preferred Language: English        Race: Other Race                    Patient Insurance Information        Primary Insurance Information Carrier Name: {Primary  CarrierName}           Carrier Address:   {Primary  CarrierAddress}              Carrier Phone: {Primary  CarrierPhone}          Group Number: {Primary  GroupNumber}          Policy Number: {Primary  PolicyNumber}          Insured Name: {Primary  InsuredName}          Insured : {Primary  InsuredDOB}          Relationship to Insured: {Primary  RelationshiptoInsured}           Secondary Insurance Information Carrier Name: {Secondary  CarrierName}           Carrier Address:   {Secondary  CarrierAddress}              Carrier Phone: {Secondary  CarrierPhone}          Group Number: {Secondary  GroupNumber}          Policy Number: {Secondary  PolicyNumber}          Insured Name: {Secondary  InsuredName}          Insured : {Secondary  InsuredDOB}          Relationship to Insured: {Secondary  RelationshiptoInsured}                       Health Profile   Booking #:   Paola Rios #: [id number]               DOS: 2017    Surgery : BIOPSY/REMOVAL,LYMPH NODE(S)        Add'l Procedures/Notes:     Surgery Risk: Intermediate          Precautions     BMI                   Allergies    Aspirin CHEW       Clinical Comments: Reaction Type: , Reaction: , Severity:        Bactrim TABS       Clinical Comments: Reaction Type: , Reaction: , Severity:        CeleBREX CAPS       Clinical Comments: Reaction Type: , Reaction: , Severity:        NSAIDs       Sulfa Drugs             Medications    ALPRAZolam 0 25 MG Oral Tablet       Calcium 600 TABS       Fish Oil CAPS       HydroCHLOROthiazide 25 MG Oral Tablet       Klor-Con M20 20 MEQ Oral Tablet Extended Release       Multi Complete CAPS       Omeprazole 40 MG Oral Capsule Delayed Release       Verapamil HCl  MG Oral Tablet Extended Release       Vitamin B12 100 MCG Oral Tablet       Vitamin C CAPS               Conditions    Acute bilateral low back pain without sciatica       Acute maxillary sinusitis       Ankle pain, left       B-cell lymphoma of extranodal site       Breast disorder       Dyslipidemia       Dysphagia       Dyspnea       Encounter for screening fecal occult blood testing       Fatigue       Flu vaccine need       Foot fracture, left       Gastroesophageal reflux disease       Hypertension       Itching       Lip swelling       Lymphadenopathy       Medicare annual wellness visit, subsequent       Need for vaccination with 13-polyvalent pneumococcal conjugate vaccine       Other screening mammogram       Pain, foot, chronic, left       Screening for colon cancer       Screening for depression       Screening for other and unspecified genitourinary condition       Situational anxiety       Special screening for other neurological conditions       Urticaria       Vitamin B12 deficiency       Vitamin D deficiency               Family History    None             Surgical History    None             Social History    Former smoker       Never Drank Alcohol       Uses Safety Equipment - Seatbelts                               Patient Instructions       Medical Procedure Risk  NPO Instructions   The day before surgery it is recommended to have a light dinner at your usual time and you are allowed a light snack early in the evening  Do not eat anything heavy or eat a big meal after 7pm  Do not eat or drink anything after midnight prior to your surgery  If you are supposed to take any of your medications, do so with a sip of water  Failure to follow these instructions can lead to an increased risk of lung complications and may result in a delay or cancellation of your procedure  If you have any questions, contact your institution for further instructions  No candy, no gum, no mints, no chewing tobacco          ALPRAZolam 0 25 MG Oral Tablet  Medication Instruction (Benzodiazepine) 15  Please continue the following medications, if needed, up to and including the day of surgery (with a sip of water)  Verapamil HCl  MG Oral Tablet Extended Release  Calcium Blocker (Blood Pressure Medication) 3, 4, 6, 5, 2  Please continue to take this medication on your normal schedule  If this is an oral medication and you take in the morning, you may do so with a sip of water  HydroCHLOROthiazide 25 MG Oral Tablet  Medication Instruction (Diuretics - Water Pills) 28, 29  Please continue the following medications up to the evening before surgery, but do not take it on the day of surgery  Gastroesophageal reflux disease  Medication Instruction (Reflux Disease)   Please continue to take this medication on your normal schedule  If this is an oral medication and you take in the morning, you may do so with a sip of water  Testing Considerations       ? Chest X-ray (CXR) t  Consider a Chest X-Ray (CXR) if patient is having respiratory symptoms  Triggered by: B-cell lymphoma of extranodal site, Dyspnea         ? Coagulation Tests (PT/PTT/INR) t  Triggered by: B-cell lymphoma of extranodal site         ? Complete Blood Count (CBC) t, client, client  If test was completed and normal within last six months, repeat test is not necessary    Triggered by: B-cell lymphoma of extranodal site, Dyspnea, Age or Facility Rec         ? Comprehensive Metabolic Panel (CMP) t  If test was completed and normal within last six months, repeat test is not necessary  Triggered by: B-cell lymphoma of extranodal site, Dyspnea         ? Electrocardiogram (ECG) t  Patient does not need new test if normal ECG is present within the last six months and no change in clinical condition  Triggered by: Dyspnea, Hypertension, Age or Facility Rec         ? Type and Screen client  Type and Screen - Blood: If there is anticipated or possible large blood loss with this procedure, then a Type and Screen for Blood should be ordered  Triggered by: Age or Facility Rec               Consultations       ? Primary Care Physician Evaluation   Primary care physician may need to evaluate patient prior to surgery  This is likely NOT necessary if the listed conditions are chronic and stable  Triggered by: B-cell lymphoma of extranodal site, Dyspnea               Miscellaneous Questions         Question: Are you able to walk up a flight of stairs, walk up a hill or do heavy housework WITHOUT having chest pain or shortness of breath? Answer: YES                   Allergies/Conditions/Medications Not Found        The following were not recognized by our system when generating the recommendations  Please consider if this would impact any preoperative protocols  ? Ankle pain, left       ? Lymphadenopathy       ? Never Drank Alcohol       ? Other screening mammogram       ? Special screening for other neurological conditions       ? Uses Safety Equipment - Seatbelts       ? Calcium 600 TABS       ? Fish Oil CAPS       ? Klor-Con M20 20 MEQ Oral Tablet Extended Release       ? Multi Complete CAPS       ? Vitamin C CAPS                  Appointment Information         Date:    08/14/2017        Location:    [facility]        Address:           Directions:                      Footnotes revision 14      ??  Denotes a free-text entry  Legal Disclaimer: Any and all recommendations and services provided herein are designed to assist in the preoperative care of the patient  Nothing contained herein is designed to replace, eliminate or alleviate the responsibility of the attending physician to supervise and determine the patient?s preoperative care and course of treatment  Failure to provide complete, accurate information may negatively impact the system?s ability to recommend the proper preoperative protocol  THE ATTENDING PHYSICIAN IS RESPONSIBLE TO REVIEW THE SUGGESTED PREOPERATIVE PROTOCOLS/COURSE OF TREATMENT AND PRESCRIBE THE FINAL COURSE OF PREOPERATIVE TREATMENT IN CONSULTATION WITH THE PATIENT  THE ePREOP SYSTEM AND ITS MATERIALS ARE PROVIDED ? AS IS? WITHOUT WARRANTY OF ANY KIND, EXPRESS OR IMPLIED, INCLUDING, BUT NOT LIMITED TO, WARRANTIES OF PERFORMANCE OR MERCHANTABILITY OR FITNESS FOR A PARTICULAR PURPOSE  PATIENT AND PHYSICIANS HEREBY AGREE THAT THEIR USE OF THE MATERIALS AND RESOURCES ACT AS A CONSENT TO RELEASE AND WAIVE ePREOP FROM ANY AND ALL CLAIMS OF WARRANTY, TORT OR CONTRACT LAW OF ANY KIND  Electronically signed by:Quinton IVORY    Aug  2 2017  6:06PM EST

## 2018-01-12 NOTE — PROGRESS NOTES
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  Review of Systems    Constitutional: The patient denies new or recent history of general fatigue, no recent weight loss, no change in appetite  Eyes: No complaints of visual problems, no scleral icterus  ENT: no complaints of ear pain, no hoarseness, no difficulty swallowing, no tinnitus and no new masses in head, oral cavity, or neck  Cardiovascular: No complaints of chest pain, no palpitations, no ankle edema  Respiratory: No complaints of shortness of breath, no cough  Gastrointestinal: No complaints of jaundice, no bloody stools, no pale stools  Genitourinary: No complaints of dysuria, no hematuria, no nocturia, no frequent urination, no urethral discharge  Musculoskeletal: No complaints of weakness, paralysis, joint stiffness or arthralgias,  Integumentary: skin wound, but no rashes, no itching, no breast pain, no skin lesions, no breast lump and no nipple discharge  Neurological: No complaints of convulsions, no seizures, no dizziness  Hematologic/Lymphatic: No complaints of easy bruising  ROS reviewed  Active Problems    1  Acute maxillary sinusitis (461 0) (J01 00)   2  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)   3  Breast disorder (611 9) (N64 9)   4  Dyslipidemia (272 4) (E78 5)   5  Dysphagia (787 20) (R13 10)   6  Dyspnea (786 09) (R06 00)   7  Fatigue (780 79) (R53 83)   8  Flu vaccine need (V04 81) (Z23)   9  Foot fracture, left (825 20) (S92 902A)   10  Gastroesophageal reflux disease (530 81) (K21 9)   11  Hypertension (401 9) (I10)   12   Lip swelling (528 5) (K13 0)   13  Lymphadenopathy (785 6) (R59 1)   14  Other screening mammogram (V76 12) (Z12 31)   15  Pain, foot, chronic, left (729 5,338 29) (M79 672,G89 29)   16  Screening for colon cancer (V76 51) (Z12 11)   17  Urticaria (708 9) (L50 9)   18  Vitamin B12 deficiency (266 2) (E53 8)   19  Vitamin D deficiency (268 9) (E55 9)    Social History    · Former smoker (V15 82) (D27 036)   · Never Drank Alcohol   · Uses Safety Equipment - Seatbelts    Current Meds   1  Amoxicillin-Pot Clavulanate 500-125 MG Oral Tablet; TAKE 1 TABLET Every twelve hours   with breakfast and dinner; Therapy: 76XIO1332 to (Evaluate:21Jan2016)  Requested for: 71GRL9438; Last   Rx:11Jan2016 Ordered   2  Calcium 600 TABS; Therapy: (Donata Beech) to Recorded   3  Claritin 10 MG Oral Tablet; TAKE ONE TABLET BY MOUTH ONCE DAILY; Therapy: 15Oqe4968 to (Last Rx:06Aug2015)  Requested for: 06Aug2015 Ordered   4  Fish Oil CAPS; Therapy: (Donata Beech) to Recorded   5  Hydrochlorothiazide 25 MG Oral Tablet; Take 1 tablet daily; Therapy: 81ZHD1098 to (Evaluate:95Dnl7689)  Requested for: 22FSG6601; Last   Rx:11Jan2016 Ordered   6  Klor-Con M20 20 MEQ Oral Tablet Extended Release; Take 1 tablet daily; Therapy: 35JWQ9463 to (Evaluate:31Jan2016)  Requested for: 11Yth1591; Last   Rx:04Aug2015 Ordered   7  Multi Complete CAPS; Therapy: (Donata Beech) to Recorded   8  Omeprazole 40 MG Oral Capsule Delayed Release; take 1 capsule daily; Therapy: 65CLW4670 to (Evaluate:70Jhc1044)  Requested for: 58NRS0192; Last   Rx:11Jan2016 Ordered   9  Verapamil HCl  MG Oral Tablet Extended Release; Take 1 tablet daily; Therapy: 43RDQ0001 to (Pushpa Blanchard)  Requested for: 66IIV8985; Last   Rx:04Nov2015 Ordered   10  Vicodin 5-300 MG Oral Tablet; TAKE 1 TABLET EVERY 4 TO 6 HOURS AS NEEDED FOR    PAIN;    Therapy: 80WQE5085 to (Evaluate:18Zhp9606); Last Rx:15Dec2015 Ordered   11  Vitamin B12 100 MCG Oral Tablet;     Therapy: (Brielle Argue) to Recorded   12  Vitamin C CAPS; Therapy: (Recorded:88Ijc6176) to Recorded    Allergies    1  Bactrim TABS   2  Aspirin CHEW   3  CeleBREX CAPS   4  NSAIDs   5  Sulfa Drugs    Vitals   Recorded: 13DYW0715 12:46PM   Temperature 97 6 F   Heart Rate 96   Respiration 18   Systolic 032   Diastolic 80   Height 5 ft 3 in   Weight 231 lb    BMI Calculated 40 92   BSA Calculated 2 06   Pain Scale 0     Physical Exam    Skin: Warm, anicteric  (Incision has healed well  No erythema, drainage, or tenderness  There is a small seroma)  Results/Data    Pathology Review PRELIMINARY PATHOLOGIC DIAGNOSIS  Reported: 1/8/2016  A  Lymph nodes, right axillary:   - Rare atypical lymphoid cells present in the setting of a reactive-appearing lymphoid node   with monocytoid B-cell hyperplasia, of uncertain significance - see Note  - No evidence of metastatic melanoma, confirmed by Melan A immunohistochemical stains,    evaluated with appropriate positive & negtiave co   - Flow cytometry ([facility]# [id number], evaluated by Dr Tesha Phillips) reveals:   * A medium-sized, double light chain negative B-cell population, comprising 4% of   the total cellularity is present - the majority of B-cells (49%) are polytypic but a small, medium-sized, light chain    negative population is noted, comprising about 4% of total cellularity  These cells show moderate CD19   and bright CD20 expression, and are negative for CD10  They coexpress FMC-7 and CD11c    * NO evidence of T-cell proliferative disorders - T-cells (32%) show no pan T-cell antigenic deletion or diminution,   nor CD4/CD8 subset restriction  * Viability 7AAD: 90%  * The following antigens were evaluated & found to be expressed as described above or by appropriate cells: CD2,   CD3, CD4, CD5, CD7, CD8, CD10, CD11c, CD19, CD20, CD23, CD38, CD45, CD56, CD57, FMC-7, sKappa,   sLambda         Comment:  Routine H&E sections demonstrate an enlarged lymph node with open sinuses in which one sees moderate, reactive vascular proliferation, monocytoid B-cell hyperplasia & somewhat atretic appearing germinal centers  Focally, larger lymphoid cells, possibly   immunoblasts (e g  A4) are vaguely aggregated  Immunohistochemical stains, evaluated with appropriate positive & negative controls, document that the sometime hyalized & atretic germinal centers are surrounded by concentrically arrange small CD20(+)   B-lymphocytes  (+) plasma cells are present in the usual paracortical locations but do not appear increased in number  CD3(+) T-lymphocytes show a physiologic distribution  CD30(+) cells appear more likely immunoblasts and do not have the   appearance of Hodgkin lymphoma cells  TZ79(+) follicular dendritic networks appear generally restricted to germinal centers but focally expanded by in-wandering monocytoid B-lymphocytes  Although the combined histologic & immunophenotypic findings favor a benign, reactive lymphadenopathy, flow cytometric findings leave open a question of the significance of phenotypically abnormal, medium-size B-lymphocytes  Given the patient's   history of marginal zone lymphoma, the case is forwarded to expert lymph node hematopathologist Dr Francy Huitron, [facility], [place], [place], whose opinion will be described in the final report  Finallly, given the patient's history of melanoma, a search utilizing Melan A immunohistochemical stains was made of each block of this case  Results of this investigation confirm the routine H&E impression of no melanoma cells in the present   material   Preliminary report is faxed by Dr LEMA [facility] to Dr Cathy Carver @ [address] [place] [address], 1/8/2016  *Electronic SignatureDenise Que Morataya MD,PhD         Assessment    1  Lymphadenopathy (785 6) (R59 1)    Plan  Lymphadenopathy    · Follow-up PRN Evaluation and Treatment  Follow-up  Status: Complete  Done:  48NVL2823   Ordered;  For: Lymphadenopathy; Ordered By: Conda Marrow Performed:  Due: 60BKQ0985    Discussion/Summary    68-year-old female with a history of marginal cell lymphoma, melanoma and an abnormal PET/CT in April and continued lymphadenopathy seen on axillary ultrasound  Biopsy revealed atypical cells, 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  She will followup with Dr Jovani Agustin once the pathology has been completely finalized and PCR has been performed  If her seroma increases in size, she will contact us and we will plan on aspiration  All her questions were answered  The treatment plan was reviewed with the patient/guardian  The patient/guardian understands and agrees with the treatment plan   The treatment plan was reviewed with the patient/guardian   The patient/guardian understands and agrees with the treatment plan      Future Appointments    Signatures   Electronically signed by : LEXX Avalos ; Jan 20 2016  1:17PM EST                       (Author)

## 2018-01-12 NOTE — RESULT NOTES
Verified Results  ECHO COMPLETE WITH CONTRAST IF INDICATED 16Hie5473 10:28AM Aditya Santana     Test Name Result Flag Reference   ECHO COMPLETE WITH CONTRAST IF INDICATED (Report)     [facility] [address]   [address]   [phone]     Transthoracic Echocardiogram   2D, M-mode, Doppler, and Color Doppler     Study date: 08-Aug-2017     Patient: Marlo Rivas   MR number: [id number]   Account number: [de-identified]   : 1940   Age: 78 years   Gender: Female   Status: Outpatient   Location: [address] [place] [address]   Height: 63 5 in   Weight: 225 5 lb   BP: 134/ 88 mmHg     Indications: Hypertension     Diagnoses: I44 7 - Left bundle-branch block, unspecified     Sonographer: TOBY Noble   Primary Physician: Gerson Parks DO   Referring Physician: Ephraim Gallo MD   Group: [facility]   Cardiology Fellow: Zo Diehl MD   Interpreting Physician: Anthony Gonzalez MD     SUMMARY     LEFT VENTRICLE:   Size was normal    Systolic function was normal  Ejection fraction was estimated to be 55 %  Wall thickness was normal    Doppler parameters were consistent with abnormal left ventricular relaxation (grade 1 diastolic dysfunction)  VENTRICULAR SEPTUM:   There was bounce motion  These changes are consistent with LBBB  RIGHT VENTRICLE:   The size was normal    Systolic function was normal      MITRAL VALVE:   There was trace to mild regurgitation  AORTIC VALVE:   There was trace regurgitation  TRICUSPID VALVE:   There was mild regurgitation  HISTORY: PRIOR HISTORY: LBBB; B-cell lymphoma; Dyslipidemia; Obesity     PROCEDURE: The study was performed in the [address]  This was a routine study  The transthoracic approach was used  The study included complete 2D imaging, M-mode, complete spectral Doppler, and color Doppler  The   heart rate was 73 bpm, at the start of the study  Images were obtained from the parasternal, apical, subcostal, and suprasternal notch acoustic windows  Image quality was adequate  LEFT VENTRICLE: Size was normal  Systolic function was normal  Ejection fraction was estimated to be 55 %  There were no regional wall motion abnormalities  Wall thickness was normal  DOPPLER: Doppler parameters were consistent with   abnormal left ventricular relaxation (grade 1 diastolic dysfunction)  VENTRICULAR SEPTUM: There was bounce motion  These changes are consistent with LBBB  RIGHT VENTRICLE: The size was normal  Systolic function was normal  Wall thickness was normal      LEFT ATRIUM: Size was normal      RIGHT ATRIUM: Size was normal      MITRAL VALVE: Valve structure was normal  There was normal leaflet separation  DOPPLER: The transmitral velocity was within the normal range  There was no evidence for stenosis  There was trace to mild regurgitation  AORTIC VALVE: The valve was trileaflet  Leaflets exhibited normal thickness and normal cuspal separation  DOPPLER: Transaortic velocity was within the normal range  There was no evidence for stenosis  There was trace regurgitation  TRICUSPID VALVE: The valve structure was normal  There was normal leaflet separation  DOPPLER: The transtricuspid velocity was within the normal range  There was no evidence for stenosis  There was mild regurgitation  The tricuspid jet   envelope definition was inadequate for estimation of RV systolic pressure  There are no indirect findings suggestive of moderate or severe pulmonary hypertension  PULMONIC VALVE: Leaflets exhibited normal thickness, no calcification, and normal cuspal separation  DOPPLER: The transpulmonic velocity was within the normal range  There was no regurgitation  PERICARDIUM: There was no pericardial effusion  The pericardium was normal in appearance  AORTA: The root exhibited normal size       SYSTEMIC VEINS: IVC: The inferior vena cava was normal in size and course   Respirophasic changes were normal      SYSTEM MEASUREMENT TABLES     2D   %FS: 32 29 %   Ao Diam: 3 12 cm   EDV(Teich): 98 58 ml   EF Biplane: 47 28 %   EF(Cube): 68 96 %   EF(Teich): 60 58 %   ESV(Cube): 30 71 ml   ESV(Teich): 38 87 ml   IVSd: 0 83 cm   LA Area: 18 95 cm2   LA Diam: 3 57 cm   LVEDV MOD A2C: 155 79 ml   LVEDV MOD A4C: 142 24 ml   LVEDV MOD BP: 150 82 ml   LVEF MOD A2C: 47 58 %   LVEF MOD A4C: 50 04 %   LVESV MOD A2C: 81 66 ml   LVESV MOD A4C: 71 06 ml   LVESV MOD BP: 79 51 ml   LVIDd: 4 63 cm   LVIDs: 3 13 cm   LVLd A2C: 8 5 cm   LVLd A4C: 8 25 cm   LVLs A2C: 7 4 cm   LVLs A4C: 6 78 cm   LVPWd: 0 96 cm   RA Area: 11 27 cm2   RV Diam : 3 23 cm   SI(Cube): 33 28 ml/m2   SI(Teich): 29 13 ml/m2   SV MOD A2C: 74 13 ml   SV MOD A4C: 71 18 ml   SV(Cube): 68 23 ml   SV(Teich): 59 72 ml     CW   AV Vmax: 1 42 m/s   AV maxP 05 mmHg   TR Vmax: 2 23 m/s   TR maxP 96 mmHg     MM   TAPSE: 1 53 cm     PW   AVC: 400 02 ms   E': 0 05 m/s   E/E': 19 23   LVOT Vmax: 1 14 m/s   LVOT maxP 23 mmHg   MV A Laci: 1 29 m/s   MV Dec [place]: 7 29 m/s2   MV DecT: 131 72 ms   MV E Laci: 0 96 m/s   MV E/A Ratio: 0 74     [employer] Accredited Echocardiography Laboratory     Prepared and electronically signed by     Brian Tillman MD   Signed [id number] 17:02:42

## 2018-01-13 VITALS
SYSTOLIC BLOOD PRESSURE: 140 MMHG | WEIGHT: 231.13 LBS | TEMPERATURE: 97.9 F | RESPIRATION RATE: 16 BRPM | HEART RATE: 93 BPM | OXYGEN SATURATION: 97 % | DIASTOLIC BLOOD PRESSURE: 80 MMHG | HEIGHT: 64 IN | BODY MASS INDEX: 39.46 KG/M2

## 2018-01-13 VITALS
RESPIRATION RATE: 14 BRPM | BODY MASS INDEX: 38.59 KG/M2 | SYSTOLIC BLOOD PRESSURE: 120 MMHG | OXYGEN SATURATION: 96 % | HEART RATE: 88 BPM | WEIGHT: 217.8 LBS | HEIGHT: 63 IN | DIASTOLIC BLOOD PRESSURE: 76 MMHG | TEMPERATURE: 99.2 F

## 2018-01-13 VITALS
DIASTOLIC BLOOD PRESSURE: 70 MMHG | RESPIRATION RATE: 16 BRPM | BODY MASS INDEX: 39.75 KG/M2 | HEART RATE: 105 BPM | WEIGHT: 216 LBS | TEMPERATURE: 99 F | HEIGHT: 62 IN | OXYGEN SATURATION: 96 % | SYSTOLIC BLOOD PRESSURE: 132 MMHG

## 2018-01-13 VITALS
TEMPERATURE: 96.8 F | DIASTOLIC BLOOD PRESSURE: 80 MMHG | HEART RATE: 73 BPM | OXYGEN SATURATION: 96 % | RESPIRATION RATE: 16 BRPM | HEIGHT: 64 IN | SYSTOLIC BLOOD PRESSURE: 128 MMHG | WEIGHT: 225.38 LBS | BODY MASS INDEX: 38.48 KG/M2

## 2018-01-13 VITALS
WEIGHT: 226 LBS | TEMPERATURE: 97.6 F | HEIGHT: 64 IN | HEART RATE: 82 BPM | DIASTOLIC BLOOD PRESSURE: 78 MMHG | OXYGEN SATURATION: 98 % | SYSTOLIC BLOOD PRESSURE: 124 MMHG | BODY MASS INDEX: 38.58 KG/M2

## 2018-01-13 VITALS
WEIGHT: 226.44 LBS | BODY MASS INDEX: 38.66 KG/M2 | HEIGHT: 64 IN | SYSTOLIC BLOOD PRESSURE: 134 MMHG | DIASTOLIC BLOOD PRESSURE: 88 MMHG | HEART RATE: 88 BPM

## 2018-01-13 VITALS
TEMPERATURE: 98.9 F | RESPIRATION RATE: 15 BRPM | HEIGHT: 64 IN | OXYGEN SATURATION: 91 % | BODY MASS INDEX: 37.98 KG/M2 | WEIGHT: 222.5 LBS | DIASTOLIC BLOOD PRESSURE: 74 MMHG | HEART RATE: 81 BPM | SYSTOLIC BLOOD PRESSURE: 132 MMHG

## 2018-01-13 NOTE — MISCELLANEOUS
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)   3  Breast disorder (611 9) (N64 9)   4  Dyslipidemia (272 4) (E78 5)   5  Dysphagia (787 20) (R13 10)   6  Dyspnea (786 09) (R06 00)   7  Encounter for screening fecal occult blood testing (V76 51) (Z12 11)   8  Fatigue (780 79) (R53 83)   9  Flu vaccine need (V04 81) (Z23)   10  Foot fracture, left (825 20) (S92 902A)   11  Gastroesophageal reflux disease (530 81) (K21 9)   12  Hypertension (401 9) (I10)   13  Lip swelling (528 5) (R22 9)   14  Lymphadenopathy (785 6) (R59 1)   15  Medicare annual wellness visit, subsequent (V70 0) (Z00 00)   16  Need for vaccination with 13-polyvalent pneumococcal conjugate vaccine (V03 82) (Z23)   17  Other screening mammogram (V76 12) (Z12 31)   18  Pain, foot, chronic, left (729 5,338 29) (M79 672,G89 29)   19  Screening for colon cancer (V76 51) (Z12 11)   20  Screening for depression (V79 0) (Z13 89)   21  Screening for other and unspecified genitourinary condition (V81 6) (Z13 89)   22  Special screening for other neurological conditions (V80 09) (Z13 89)   23  Urticaria (708 9) (L50 9)   24  Vitamin B12 deficiency (266 2) (E53 8)   25  Vitamin D deficiency (268 9) (E55 9)    Current Meds   1  Calcium 600 TABS; Therapy: (Muriel Marshall) to Recorded   2  Claritin 10 MG Oral Tablet; TAKE ONE TABLET BY MOUTH ONCE DAILY;    Therapy: 06Aug2015 to (Last Rx:06Aug2015)  Requested for: 58Khg7539 Ordered   3  Fish Oil CAPS; Therapy: (Oddis Ray) to Recorded   4  Hydrochlorothiazide 25 MG Oral Tablet; Take 1 tablet daily; Therapy: 75KGA8795 to (Evaluate:78Ivi9822)  Requested for: 03TWV1057; Last   Rx:11Jan2016 Ordered   5  Klor-Con M20 20 MEQ Oral Tablet Extended Release; Take 1 tablet daily; Therapy: 01TPV5442 to (Evaluate:29Kgf1141)  Requested for: 93YPU9554; Last   Rx:31Jan2016 Ordered   6  Multi Complete CAPS; Therapy: (Oddis Ray) to Recorded   7  Omeprazole 40 MG Oral Capsule Delayed Release; take 1 capsule daily; Therapy: 45MWM0279 to (Evaluate:43Zmn7205)  Requested for: 11SIK5170; Last   Rx:11Jan2016 Ordered   8  Verapamil HCl  MG Oral Tablet Extended Release; Take 1 tablet daily; Therapy: 27XVJ5954 to (Krystal Mccabe)  Requested for: 86MLO6010; Last   Rx:04Nov2015 Ordered   9  Vitamin B12 100 MCG Oral Tablet; Therapy: (Oddis Ray) to Recorded   10  Vitamin C CAPS; Therapy: (Recorded:27Apr2015) to Recorded    Allergies    1  Bactrim TABS   2  Aspirin CHEW   3  CeleBREX CAPS   4  NSAIDs   5   Sulfa Drugs    Signatures   Electronically signed by : Ayo Lucero RN; Mar 29 2016 11:03AM EST                       (Author)

## 2018-01-14 VITALS
TEMPERATURE: 98.5 F | HEIGHT: 64 IN | WEIGHT: 217.5 LBS | HEART RATE: 106 BPM | SYSTOLIC BLOOD PRESSURE: 128 MMHG | BODY MASS INDEX: 37.13 KG/M2 | DIASTOLIC BLOOD PRESSURE: 70 MMHG | OXYGEN SATURATION: 93 % | RESPIRATION RATE: 16 BRPM

## 2018-01-14 VITALS
DIASTOLIC BLOOD PRESSURE: 70 MMHG | BODY MASS INDEX: 38.77 KG/M2 | SYSTOLIC BLOOD PRESSURE: 124 MMHG | HEIGHT: 64 IN | TEMPERATURE: 97.4 F | RESPIRATION RATE: 16 BRPM | HEART RATE: 88 BPM | OXYGEN SATURATION: 92 % | WEIGHT: 227.13 LBS

## 2018-01-15 ENCOUNTER — GENERIC CONVERSION - ENCOUNTER (OUTPATIENT)
Dept: OTHER | Facility: OTHER | Age: 78
End: 2018-01-15

## 2018-01-15 ENCOUNTER — TRANSCRIBE ORDERS (OUTPATIENT)
Dept: LAB | Facility: HOSPITAL | Age: 78
End: 2018-01-15

## 2018-01-15 ENCOUNTER — APPOINTMENT (OUTPATIENT)
Dept: LAB | Facility: HOSPITAL | Age: 78
End: 2018-01-15
Attending: INTERNAL MEDICINE
Payer: COMMERCIAL

## 2018-01-15 VITALS
BODY MASS INDEX: 37.92 KG/M2 | RESPIRATION RATE: 16 BRPM | HEIGHT: 63 IN | TEMPERATURE: 99.1 F | WEIGHT: 214 LBS | HEART RATE: 96 BPM | SYSTOLIC BLOOD PRESSURE: 118 MMHG | OXYGEN SATURATION: 97 % | DIASTOLIC BLOOD PRESSURE: 70 MMHG

## 2018-01-15 DIAGNOSIS — C85.19 B-CELL LYMPHOMA OF EXTRANODAL SITE (HCC): Primary | ICD-10-CM

## 2018-01-15 DIAGNOSIS — C85.99 PRIMARY HISTIOCYTIC LYMPHOMA OF BRAIN (HCC): ICD-10-CM

## 2018-01-15 LAB
ALBUMIN SERPL BCP-MCNC: 4.1 G/DL (ref 3.5–5)
ALP SERPL-CCNC: 133 U/L (ref 46–116)
ALT SERPL W P-5'-P-CCNC: 87 U/L (ref 12–78)
ANION GAP SERPL CALCULATED.3IONS-SCNC: 6 MMOL/L (ref 4–13)
AST SERPL W P-5'-P-CCNC: 37 U/L (ref 5–45)
BASOPHILS # BLD MANUAL: 0.11 THOUSAND/UL (ref 0–0.1)
BASOPHILS NFR MAR MANUAL: 1 % (ref 0–1)
BILIRUB SERPL-MCNC: 0.35 MG/DL (ref 0.2–1)
BUN SERPL-MCNC: 14 MG/DL (ref 5–25)
CALCIUM SERPL-MCNC: 9.9 MG/DL (ref 8.3–10.1)
CHLORIDE SERPL-SCNC: 106 MMOL/L (ref 100–108)
CO2 SERPL-SCNC: 30 MMOL/L (ref 21–32)
CREAT SERPL-MCNC: 0.71 MG/DL (ref 0.6–1.3)
EOSINOPHIL # BLD MANUAL: 0 THOUSAND/UL (ref 0–0.4)
EOSINOPHIL NFR BLD MANUAL: 0 % (ref 0–6)
ERYTHROCYTE [DISTWIDTH] IN BLOOD BY AUTOMATED COUNT: 15.8 % (ref 11.6–15.1)
GFR SERPL CREATININE-BSD FRML MDRD: 84 ML/MIN/1.73SQ M
GLUCOSE SERPL-MCNC: 81 MG/DL (ref 65–140)
HCT VFR BLD AUTO: 35.2 % (ref 34.8–46.1)
HGB BLD-MCNC: 11.1 G/DL (ref 11.5–15.4)
LDH SERPL-CCNC: 264 U/L (ref 81–234)
LYMPHOCYTES # BLD AUTO: 0.84 THOUSAND/UL (ref 0.6–4.47)
LYMPHOCYTES # BLD AUTO: 8 % (ref 14–44)
MCH RBC QN AUTO: 33.8 PG (ref 26.8–34.3)
MCHC RBC AUTO-ENTMCNC: 31.5 G/DL (ref 31.4–37.4)
MCV RBC AUTO: 107 FL (ref 82–98)
METAMYELOCYTES NFR BLD MANUAL: 3 % (ref 0–1)
MONOCYTES # BLD AUTO: 1.05 THOUSAND/UL (ref 0–1.22)
MONOCYTES NFR BLD: 10 % (ref 4–12)
MYELOCYTES NFR BLD MANUAL: 2 % (ref 0–1)
NEUTROPHILS # BLD MANUAL: 7.46 THOUSAND/UL (ref 1.85–7.62)
NEUTS BAND NFR BLD MANUAL: 2 % (ref 0–8)
NEUTS SEG NFR BLD AUTO: 69 % (ref 43–75)
NRBC BLD AUTO-RTO: 1 /100 WBCS
PLATELET # BLD AUTO: 260 THOUSANDS/UL (ref 149–390)
PLATELET BLD QL SMEAR: ADEQUATE
PMV BLD AUTO: 9.3 FL (ref 8.9–12.7)
POTASSIUM SERPL-SCNC: 3.7 MMOL/L (ref 3.5–5.3)
PROT SERPL-MCNC: 6.9 G/DL (ref 6.4–8.2)
RBC # BLD AUTO: 3.28 MILLION/UL (ref 3.81–5.12)
RBC MORPH BLD: PRESENT
SODIUM SERPL-SCNC: 142 MMOL/L (ref 136–145)
STOMATOCYTES BLD QL SMEAR: PRESENT
URATE SERPL-MCNC: 5.2 MG/DL (ref 2–6.8)
VARIANT LYMPHS # BLD AUTO: 5 %
WBC # BLD AUTO: 10.51 THOUSAND/UL (ref 4.31–10.16)

## 2018-01-15 PROCEDURE — 85027 COMPLETE CBC AUTOMATED: CPT

## 2018-01-15 PROCEDURE — 84550 ASSAY OF BLOOD/URIC ACID: CPT

## 2018-01-15 PROCEDURE — 80053 COMPREHEN METABOLIC PANEL: CPT

## 2018-01-15 PROCEDURE — 82232 ASSAY OF BETA-2 PROTEIN: CPT

## 2018-01-15 PROCEDURE — 83615 LACTATE (LD) (LDH) ENZYME: CPT

## 2018-01-15 PROCEDURE — 85007 BL SMEAR W/DIFF WBC COUNT: CPT

## 2018-01-15 PROCEDURE — 36415 COLL VENOUS BLD VENIPUNCTURE: CPT

## 2018-01-15 NOTE — MISCELLANEOUS
Message  Completed teach on [facility] with patient, patient , and son  Gave patient print outs on medications  Had patient sign chemo consent and blood consent  Reviewed signs and symptoms, potential side effects, and answered questions  Reviewed patient should call immediately if fever of 100 4, chills, etc  Patient was appreciative  Encouraged patient to call with any additional questions regarding treatment or future concerns  Active Problems    1  Abnormal urinalysis (791 9) (R82 90)   2  Acute bilateral low back pain without sciatica (724 2,338 19) (M54 5)   3  Acute cystitis without hematuria (595 0) (N30 00)   4  Acute maxillary sinusitis (461 0) (J01 00)   5  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 (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 09,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 1) (L65 8,T45  1X5A)   6  Ankle pain, left (719 47) (M25 572)   7  Anticoagulated on heparin (V58 61) (Z79 01)   8  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)   9  Breast disorder (611 9) (N64 9)   10  Cataract (366 9) (H26 9)   11  Chemotherapy-induced nausea (787 02,E933 1) (R11 0,T45  1X5A)   12  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)   13  Diffuse large B-cell lymphoma of lymph nodes of multiple regions (202 88) (C83 38)   14  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)   15  Dyslipidemia (272 4) (E78 5)   16  Dysphagia (787 20) (R13 10)   17  Dyspnea (786 09) (R06 00)   18  Encounter for screening fecal occult blood testing (V76 51) (Z12 11)   19  Fatigue (780 79) (R53 83)   20  Flu vaccine need (V04 81) (Z23)   21  Foot fracture, left (825 20) (S92 902A)   22  Gastroesophageal reflux disease (530 81) (K21 9)   23  Hospital discharge follow-up (V67 59) (Z09)   24  Hypertension (401 9) (I10)   25  Increased urinary frequency (788 41) (R35 0)   26  Itching (698 9) (L29 9)   27  Left bundle branch block (LBBB) (426 3) (I44 7)   28  Lip swelling (784 2) (R22 0)   29  Lymphadenopathy (785 6) (R59 1)   30   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)   31  Medicare annual wellness visit, subsequent (V70 0) (Z00 00)   32  Need for vaccination with 13-polyvalent pneumococcal conjugate vaccine (V03 82) (Z23)   33  Oral thrush (112 0) (B37 0)   34  Other screening mammogram (V76 12) (Z12 31)   35  Pain, foot, chronic, left (729 5,338 29) (M79 672,G89 29)   36  Phlebitis and thrombophlebitis of deep veins of upper extremities (451 83) (I80 8)   37  Preop cardiovascular exam (V72 81) (Z01 810)   38  Screening for colon cancer (V76 51) (Z12 11)   39  Screening for depression (V79 0) (Z13 89)   40  Screening for other and unspecified genitourinary condition (V81 6) (Z13 89)   41  Situational anxiety (300 09) (F41 8)   42  Special screening for other neurological conditions (V80 09) (Z13 89)   43  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)   44  Urticaria (708 9) (L50 9)   45  Vitamin B12 deficiency (266 2) (E53 8)   46  Vitamin D deficiency (268 9) (E55 9)    Current Meds   1  Allopurinol 300 MG Oral Tablet; 1 tab PO daily; Therapy: 42HQZ9657 to (Last DYLLAN:72WGV2546)  Requested for: 16FHD7626 Ordered   2  Calcium 600 TABS; Therapy: (Glo Ayon) to Recorded   3  Fish Oil CAPS; Therapy: (Glo Ayon) to Recorded   4  HydroCHLOROthiazide 25 MG Oral Tablet; Take 1 tablet daily; Therapy: 08GCW2930 to (Evaluate:42Wme2613)  Requested for: 00OBS9110; Last   Rx:72Qwq1006 Ordered   5  Klor-Con M20 20 MEQ Oral Tablet Extended Release; Take 1 tablet daily; Therapy: 24SLU7696 to (Kalee Allen)  Requested for: 51WFA4634; Last   Rx:02Nov2017 Ordered   6  Lovenox 100 MG/ML Subcutaneous Solution (Enoxaparin Sodium); Therapy: 69GMM4431 to Recorded   7  Melatonin 3 MG Oral Tablet; Therapy: 71HXM4999 to Recorded   8  Multi Complete CAPS; Therapy: (Elby Gabo) to Recorded   9  Nystatin 034770 UNIT/ML Mouth/Throat Suspension; SWISH AND SWALLOW 5ML 4   TIMES DAILY;    Therapy: 70NUD3534 to (Evaluate:46Vxc3215)  Requested for: 34THM6587; Last   Rx:55Qqx2276 Ordered   10  Omeprazole 40 MG Oral Capsule Delayed Release; take 1 capsule daily; Therapy: 78WVA6263 to (Evaluate:19Nov2017)  Requested for: 14UVP5809; Last    Rx:58Wge2335 Ordered   11  Ondansetron HCl - 8 MG Oral Tablet; 1 TAB PO Q 8 HR PRN N/V;    Therapy: 75UHO1910 to (Last VK:06GMJ1393)  Requested for: 17FBE6734 Ordered   12  PredniSONE 50 MG Oral Tablet; 1 tab po bid x5 days with food with each chemo cycle; Therapy: 56AYO3658 to (Last Murdock Ala)  Requested for: 19PIK8472 Ordered   13  SLPG Magic Mouthwash 1:1:1 maalox/diphenhydramine/lidocaine; USE 4 TIMES DAILY; Therapy: ([phone]) to Recorded   14  Verapamil HCl  MG Oral Tablet Extended Release; Take 1 tablet daily; Therapy: 34MHR8653 to ([facility]LCB[facility]30)  Requested for: 23JYM3490; Last    Rx:22Nhi3320 Ordered   15  Vitamin B12 100 MCG Oral Tablet; Therapy: (Aissatou Lozano) to Recorded   16  Vitamin C CAPS; Therapy: (Aissatou Lozano) to Recorded   17  Vitamin D 1000 UNIT Oral Tablet; TAKE 1 TABLET DAILY; Therapy: 74IPC9122 to Recorded    Allergies    1  Bactrim TABS   2  Aspirin CHEW   3  CeleBREX CAPS   4  NSAIDs   5   Sulfa Drugs    Signatures   Electronically signed by : Pankaj Simon, ; Nov 30 2017  5:43PM EST                       (Author)

## 2018-01-16 LAB — B2 MICROGLOB SERPL-MCNC: 2.6 MG/L (ref 0.6–2.4)

## 2018-01-16 NOTE — MISCELLANEOUS
Message   Recorded as Task   Date: 08/28/2017 09:58 AM, Created By: Stefany Espitia   Task Name: Care Coordination   Assigned To: Brandin Matter   Regarding Patient: Mark Orellana, Status: Active   CommentHeron Eis - 28 Aug 2017 9:58 AM     TASK CREATED  Please call the pt to discuss path results and Dr Angeles's plan following biopsy  If she agrees to another biopsy, let me know and I'll set her up to see Dr Chaz Dallas to sign consent  Thank you! 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)   5  Breast disorder (611 9) (N64 9)   6  Cataract (366 9) (H26 9)   7  Dyslipidemia (272 4) (E78 5)   8  Dysphagia (787 20) (R13 10)   9  Dyspnea (786 09) (R06 00)   10  Encounter for screening fecal occult blood testing (V76 51) (Z12 11)   11  Fatigue (780 79) (R53 83)   12  Flu vaccine need (V04 81) (Z23)   13  Foot fracture, left (825 20) (S92 902A)   14  Gastroesophageal reflux disease (530 81) (K21 9)   15  Hypertension (401 9) (I10)   16  Itching (698 9) (L29 9)   17  Left bundle branch block (LBBB) (426 3) (I44 7)   18  Lip swelling (784 2) (R22 0)   19  Lymphadenopathy (785 6) (R59 1)   20  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)   21  Medicare annual wellness visit, subsequent (V70 0) (Z00 00)   22  Need for vaccination with 13-polyvalent pneumococcal conjugate vaccine (V03 82) (Z23)   23  Other screening mammogram (V76 12) (Z12 31)   24  Pain, foot, chronic, left (729 5,338 29) (M79 672,G89 29)   25  Preop cardiovascular exam (V72 81) (Z01 810)   26  Screening for colon cancer (V76 51) (Z12 11)   27  Screening for depression (V79 0) (Z13 89)   28  Screening for other and unspecified genitourinary condition (V81 6) (Z13 89)   29  Situational anxiety (300 09) (F41 8)   30  Special screening for other neurological conditions (V80 09) (Z13 89)   31  Urticaria (708 9) (L50 9)   32  Vitamin B12 deficiency (266 2) (E53 8)   33  Vitamin D deficiency (268 9) (E55 9)    Current Meds   1  Calcium 600 TABS; Therapy: (Rebbecca Old) to Recorded   2  Fish Oil CAPS; Therapy: (Rebbecca Old) to Recorded   3  HydroCHLOROthiazide 25 MG Oral Tablet; Take 1 tablet daily; Therapy: 66CKC0361 to (Evaluate:19Gnt2654)  Requested for: 20XMJ7595; Last   Rx:12Jan2017 Ordered   4  Klor-Con M20 20 MEQ Oral Tablet Extended Release; Take 1 tablet daily; Therapy: 79CBU5978 to (Evaluate:11Jul2017)  Requested for: 20LGC2599; Last   Rx:12Jan2017 Ordered   5  Multi Complete CAPS; Therapy: (Rebbecca Old) to Recorded   6  Omeprazole 40 MG Oral Capsule Delayed Release; take 1 capsule daily; Therapy: 68RQW7464 to (Evaluate:19Nov2017)  Requested for: 51Xuz8730; Last   Rx:60Xyo6322 Ordered   7  Verapamil HCl  MG Oral Tablet Extended Release; Take 1 tablet daily; Therapy: 33YIL0051 to (MVJQQGCM:20NQT1342)  Requested for: 42Ymk2570; Last   Rx:04Wsc7859 Ordered   8  Vitamin B12 100 MCG Oral Tablet; Therapy: (Rebbecca Old) to Recorded   9  Vitamin C CAPS; Therapy: (Recorded:27Apr2015) to Recorded    Allergies    1  Bactrim TABS   2  CeleBREX CAPS   3  Sulfa Drugs   4   Aspirin CHEW   5  NSAIDs    Signatures   Electronically signed by : Rosemarie Conley RN; Aug 28 2017  1:53PM EST                       (Author)

## 2018-01-16 NOTE — MISCELLANEOUS
Message   Recorded as Task   Date: 10/31/2017 12:32 PM, Created By: Pablo Condon   Task Name: Call Back   Assigned To: Mami Ji   Regarding Patient: Catherine Funez, Status: Active   CommentLeni Ports - 31 Oct 2017 12:32 PM     TASK CREATED  Caller: Self; General Medical Question; [phone] (Home)  stated that her throat bothers her since Saturday  She wants to know if this is from the chemo & would like to speak with you  Call back at [phone]   [facility] with [facility] and reviewed she is experiencing red and white spots on her throat, and that her throat feels scratchy/like there is a lump in it  Reviewed this information with Dr Umu Howard and he order Nystatin oral solution 5ml/4 times a day and encouraged use of throat lozenges  Spoke with patient again and reviewed with her how to use Nystatin oral solution, and that [facility]  In addition, reviewed she could use throat lozenges to help  Encouraged her to call back if her throat became worse or more painful  Active Problems    1  Abnormal urinalysis (791 9) (R82 90)   2  Acute bilateral low back pain without sciatica (724 2,338 19) (M54 5)   3  Acute cystitis without hematuria (595 0) (N30 00)   4  Acute maxillary sinusitis (461 0) (J01 00)   5  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 (704 09,E933 1) (L65 8,T45  1X5A)   6  Ankle pain, left (719 47) (M25 572)   7  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)   8  Breast disorder (611 9) (N64 9)   9  Cataract (366 9) (H26 9)   10  Chemotherapy-induced nausea (787 02,E933 1) (R11 0,T45  1X5A)   11  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)   12  Diffuse large B-cell lymphoma of lymph nodes of multiple regions (202 88) (C83 38)   13  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)   14  Dyslipidemia (272 4) (E78 5)   15  Dysphagia (787 20) (R13 10)   16  Dyspnea (786 09) (R06 00)   17   Encounter for screening fecal occult blood testing (V76 51) (Z12 11)   18  Fatigue (780 79) (R53 83)   19  Flu vaccine need (V04 81) (Z23)   20  Foot fracture, left (825 20) (S92 902A)   21  Gastroesophageal reflux disease (530 81) (K21 9)   22  Hypertension (401 9) (I10)   23  Increased urinary frequency (788 41) (R35 0)   24  Itching (698 9) (L29 9)   25  Left bundle branch block (LBBB) (426 3) (I44 7)   26  Lip swelling (784 2) (R22 0)   27  Lymphadenopathy (785 6) (R59 1)   28  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)   29  Medicare annual wellness visit, subsequent (V70 0) (Z00 00)   30  Need for vaccination with 13-polyvalent pneumococcal conjugate vaccine (V03 82) (Z23)   31  Oral thrush (112 0) (B37 0)   32  Other screening mammogram (V76 12) (Z12 31)   33  Pain, foot, chronic, left (729 5,338 29) (M79 672,G89 29)   34  Preop cardiovascular exam (V72 81) (Z01 810)   35  Screening for colon cancer (V76 51) (Z12 11)   36  Screening for depression (V79 0) (Z13 89)   37  Screening for other and unspecified genitourinary condition (V81 6) (Z13 89)   38  Situational anxiety (300 09) (F41 8)   39  Special screening for other neurological conditions (V80 09) (Z13 89)   40  Urticaria (708 9) (L50 9)   41  Vitamin B12 deficiency (266 2) (E53 8)   42  Vitamin D deficiency (268 9) (E55 9)    Current Meds   1  Allopurinol 300 MG Oral Tablet; 1 tab PO daily; Therapy: 96MRD7673 to (Last SI:43EXY9159)  Requested for: 98HFY7753 Ordered   2  Calcium 600 TABS; Therapy: (Marisol Cook) to Recorded   3  Fish Oil CAPS; Therapy: (Marisol Cook) to Recorded   4  HydroCHLOROthiazide 25 MG Oral Tablet; Take 1 tablet daily; Therapy: 36DHS4999 to (Evaluate:04Apr2018)  Requested for: 41TWF7420; Last   Rx:47Bal8457 Ordered   5  Klor-Con M20 20 MEQ Oral Tablet Extended Release; Take 1 tablet daily; Therapy: 32LRO1748 to (Evaluate:66Zxc7100)  Requested for: 65WCT6909; Last   Rx:12Jan2017 Ordered   6  Multi Complete CAPS;    Therapy: (Dania Lawton) to Recorded   7  Nystatin 584214 UNIT/ML Mouth/Throat Suspension; SWISH AND SWALLOW 5ML 4   TIMES DAILY; Therapy: 85XRR5317 to (Evaluate:48Gvn2743)  Requested for: [phone]; Last   Rx:31Oct2017; Status: ACTIVE - Retrospective By Protocol Authorization Ordered   8  Omeprazole 40 MG Oral Capsule Delayed Release; take 1 capsule daily; Therapy: 49TJD4452 to (Evaluate:19Nov2017)  Requested for: 09Iui6031; Last   Rx:02Yjo8638 Ordered   9  Ondansetron HCl - 8 MG Oral Tablet; 1 TAB PO Q 8 HR PRN N/V;   Therapy: 42HYD2497 to (Last Rx:00Rsq1649)  Requested for: 89GDS1766 Ordered   10  PredniSONE 50 MG Oral Tablet; 1 tab po bid x5 days with food with each chemo cycle; Therapy: 40UNI9802 to (Last Kaylynn Jermaine)  Requested for: 27JSR5317 Ordered   11  Verapamil HCl  MG Oral Tablet Extended Release; Take 1 tablet daily; Therapy: 18DMU3288 to (QCRIYGKW:05PUC0733)  Requested for: 09VWL5933; Last    Rx:81Epl3931 Ordered   12  Vitamin B12 100 MCG Oral Tablet; Therapy: (Dania Lawton) to Recorded   13  Vitamin C CAPS; Therapy: (Dania Lawton) to Recorded   14  Vitamin D 1000 UNIT Oral Tablet; TAKE 1 TABLET DAILY; Therapy: 78MHP9695 to Recorded    Allergies    1  Bactrim TABS   2  Aspirin CHEW   3  CeleBREX CAPS   4  NSAIDs   5   Sulfa Drugs    Signatures   Electronically signed by : Scott Lyn, ; Oct 31 2017  1:19PM EST                       (Author)

## 2018-01-16 NOTE — MISCELLANEOUS
Message   Recorded as Task   Date: 09/25/2017 09:02 AM, Created By: Ventura Rondon   Task Name: Follow Up   Assigned To: Pinky Poon   Regarding Patient: Triny Pool, Status: In Progress   Comment:    Yenifer Lagos - 25 Sep 2017 9:02 AM     TASK CREATED  Caller: Self; General Medical Question; [phone] (Home)  wanted to know if she can get a flu shot before her port placement this week  Req a call back at [facility] - 25 Sep 2017 1:59 PM     TASK IN PROGRESS   Pinky Poon - 26 Sep 2017 12:30 PM     TASK REPLIED TO: Previously Assigned To Pinky Poon  patient will be seeing Smita Slater today and he will give her instructions   noted      Active Problems    1  Abnormal urinalysis (791 9) (R82 90)   2  Acute bilateral low back pain without sciatica (724 2,338 19) (M54 5)   3  Acute cystitis without hematuria (595 0) (N30 00)   4  Acute maxillary sinusitis (461 0) (J01 00)   5  Ankle pain, left (719 47) (M25 572)   6  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)   7  Breast disorder (611 9) (N64 9)   8  Cataract (366 9) (H26 9)   9  Dyslipidemia (272 4) (E78 5)   10  Dysphagia (787 20) (R13 10)   11  Dyspnea (786 09) (R06 00)   12  Encounter for screening fecal occult blood testing (V76 51) (Z12 11)   13  Fatigue (780 79) (R53 83)   14  Flu vaccine need (V04 81) (Z23)   15  Foot fracture, left (825 20) (S92 902A)   16  Gastroesophageal reflux disease (530 81) (K21 9)   17  Hypertension (401 9) (I10)   18  Itching (698 9) (L29 9)   19  Left bundle branch block (LBBB) (426 3) (I44 7)   20  Lip swelling (784 2) (R22 0)   21  Lymphadenopathy (785 6) (R59 1)   22  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)   23  Medicare annual wellness visit, subsequent (V70 0) (Z00 00)   24  Need for vaccination with 13-polyvalent pneumococcal conjugate vaccine (V03 82) (Z23)   25  Other screening mammogram (V76 12) (Z12 31)   26   Pain, foot, chronic, left (440 9,141 07) (M79 672,G89 29)   27  Preop cardiovascular exam (V72 81) (Z01 810)   28  Screening for colon cancer (V76 51) (Z12 11)   29  Screening for depression (V79 0) (Z13 89)   30  Screening for other and unspecified genitourinary condition (V81 6) (Z13 89)   31  Situational anxiety (300 09) (F41 8)   32  Special screening for other neurological conditions (V80 09) (Z13 89)   33  Urticaria (708 9) (L50 9)   34  Vitamin B12 deficiency (266 2) (E53 8)   35  Vitamin D deficiency (268 9) (E55 9)    Current Meds   1  Calcium 600 TABS; Therapy: (Cloverdale Gosling) to Recorded   2  Fish Oil CAPS; Therapy: (Cloverdale Gosling) to Recorded   3  HydroCHLOROthiazide 25 MG Oral Tablet; Take 1 tablet daily; Therapy: 88UAG1467 to (Evaluate:13Odz8720)  Requested for: 56JIB7118; Last   Rx:12Jan2017 Ordered   4  Klor-Con M20 20 MEQ Oral Tablet Extended Release; Take 1 tablet daily; Therapy: 64NZS5629 to (Evaluate:28Sgt7513)  Requested for: 92DUC5913; Last   Rx:12Jan2017 Ordered   5  Multi Complete CAPS; Therapy: (Cloverdale Gosling) to Recorded   6  Nitrofurantoin Monohyd Macro 100 MG Oral Capsule; TAKE 1 CAPSULE TWICE DAILY   UNTIL GONE;   Therapy: 89Ckl5333 to (Evaluate:45Qym5657)  Requested for: 57Una2845; Last   Rx:55Gpn4982 Ordered   7  Omeprazole 40 MG Oral Capsule Delayed Release; take 1 capsule daily; Therapy: 86LHC7921 to (Evaluate:26Ffd6704)  Requested for: 92Qpv8453; Last   Rx:86Npj7245 Ordered   8  Verapamil HCl  MG Oral Tablet Extended Release; Take 1 tablet daily; Therapy: 38FJM9823 to (ASWIMLKX:10NBG1003)  Requested for: 94HRM1490; Last   Rx:04Ezh9019 Ordered   9  Vitamin B12 100 MCG Oral Tablet; Therapy: (Cloverdale Gosling) to Recorded   10  Vitamin C CAPS; Therapy: (Recorded:09Svj6851) to Recorded    Allergies    1  Bactrim TABS   2  Aspirin CHEW   3  CeleBREX CAPS   4  NSAIDs   5   Sulfa Drugs    Signatures   Electronically signed by : Dayami Blancas MA; Sep 26 2017 12:30PM EST (Author)

## 2018-01-16 NOTE — RESULT NOTES
Verified Results  * NM MYOCARDIAL PERFUSION SPECT (STRESS AND/OR REST) [id number] 10:29AM Ricky Martin     Test Name Result Flag Reference   NM MYOCARDIAL PERFUSION SPECT (STRESS AND/OR REST) (Report)     [address] [address]   [address]   [phone]     Stress Gated SPECT Myocardial Perfusion Imaging after Regadenoson     Patient: Courtney Noyola   MR number: [id number]   Account number: [de-identified]   : 1940   Age: 78 years   Gender: Female   Status: Outpatient   Location: [address] [place] [address] and [facility] [place]   Height: 64 in   Weight: 226 lb   BP: 142/ 82 mmHg     Allergies: ASPIRIN, CELECOXIB, NSAIDS, SULFAMETHOXAZOLE-TRIMETHOPRIM, SULFA ANTIBIOTICS     Referring Physician: Dominic Sahni MD   Primary Physician: Sheila Figueroa DO   RN: Amirah Hallman RN   Group: [facility]   Cardiology Fellow: Zack Ellison MD   Report Prepared by[de-identified] Amirah Hallman RN   RN: George Miramontes RN   Interpreting Physician: Panda Lozoya MD     INDICATIONS: Pre-operative risk assessment  HISTORY: The patient is a 78year old  female  Chest pain status: no chest pain  Coronary artery disease risk factors: hypertension and former smoker  Cardiovascular history: LBBB  PHYSICAL EXAM: Baseline physical exam screening: no wheezes audible  REST ECG: Normal sinus rhythm  The ECG showed left bundle branch block  Nonspecific ST and T wave abnormalities were present  PROCEDURE: The study was performed at the [address]  A regadenoson infusion pharmacologic stress test was performed  Gated SPECT myocardial perfusion imaging was performed during stress  Systolic blood pressure was   142 mmHg, at the start of the study  Diastolic blood pressure was 82 mmHg, at the start of the study  The heart rate was 83 bpm, at the start of the study  IV double checked     Regadenoson protocol:   HR bpm SBP mmHg DBP mmHg Symptoms Baseline 83 142 82 --   Immediate 105 152 78 mild dyspnea, nausea   1 min 93 122 74 subsiding   2 min 91 134 74 none   No medications or fluids given  The patient also performed low level exercise with leg lifts  STRESS SUMMARY: Duration of pharmacologic stress was 3 min  Maximal heart rate during stress was 105 bpm  The rate-pressure product for the peak heart rate and blood pressure was 73211  There was no chest pain during stress  The stress   test was terminated due to protocol completion  Pre oxygen saturation: 97 %  Peak oxygen saturation: 97 %  There were no stress arrhythmias or conduction abnormalities  The stress ECG was non-diagnostic due to baseline left bundle branch   block  ISOTOPE ADMINISTRATION:   Resting isotope administration Stress isotope administration   Agent Tetrofosmin Tetrofosmin   Dose 10 59 mCi 32 6 mCi   Date 08/08/2017 08/08/2017   Injection time 11:50 13:50   Imaging time 12:53 15:00   Injection-image interval 63 min 70 min     The radiopharmaceutical was injected at the peak effect of pharmacologic stress  MYOCARDIAL PERFUSION IMAGING:   The image quality was good  Rotating projection images reveal moderate breast attenuation  The TID ratio was 0 85  PERFUSION DEFECTS:   - There was a moderate to large, moderately severe, fixed myocardial perfusion defect of the entire anterior wall likely due to attenuation from breast tissue  However, cannot exclude a small region of myocardial ischemia  GATED SPECT:   The calculated left ventricular ejection fraction was 61 %  There was no left ventricular regional abnormality  SUMMARY:   - Stress results: There was no chest pain during stress  - ECG conclusions: The stress ECG was non-diagnostic due to baseline left bundle branch block  - Perfusion imaging: There was a moderate to large, moderately severe, fixed myocardial perfusion defect of the entire anterior wall likely due to attenuation from breast tissue  However, cannot exclude a small region of myocardial   ischemia    - Gated SPECT: The calculated left ventricular ejection fraction was 61 %  There was no left ventricular regional abnormality  IMPRESSIONS: Normal study after pharmacologic vasodilation  There was image breast shadow attenuation artifact, without diagnostic evidence for perfusion abnormality  However, a small region of myocardial ischemia cannot be excluded   Left   ventricular systolic function was normal      Prepared and signed by     Florina Villa MD   Signed 08/08/2017 15:43:24

## 2018-01-16 NOTE — PROGRESS NOTES
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  She will followup with Dr Alvin Perdomo once the pathology has been completely finalized and PCR has been performed  If her seroma increases in size, she will contact us and we will plan on aspiration  All her questions were answered  The treatment plan was reviewed with the patient/guardian  The patient/guardian understands and agrees with the treatment plan   The treatment plan was reviewed with the patient/guardian  The patient/guardian understands and agrees with the treatment plan      Chief Complaint  Pt here for post-op visit, right axillary lymph node biopsy  Incision is healing well  Post-Op  Interval History: Patient returns in followup of her right axillary lymph node biopsy  She is doing well at this time with no complaints  She does have some discomfort in the back of her arm  No fevers or chills  Her final pathology is still pending  This could possibly be Castleman's disease  Review of Systems    Constitutional: The patient denies new or recent history of general fatigue, no recent weight loss, no change in appetite  Eyes: No complaints of visual problems, no scleral icterus  ENT: no complaints of ear pain, no hoarseness, no difficulty swallowing, no tinnitus and no new masses in head, oral cavity, or neck  Cardiovascular: No complaints of chest pain, no palpitations, no ankle edema  Respiratory: No complaints of shortness of breath, no cough     Gastrointestinal: No complaints of jaundice, no bloody stools, no pale stools  Genitourinary: No complaints of dysuria, no hematuria, no nocturia, no frequent urination, no urethral discharge  Musculoskeletal: No complaints of weakness, paralysis, joint stiffness or arthralgias,  Integumentary: skin wound, but no rashes, no itching, no breast pain, no skin lesions, no breast lump and no nipple discharge  Neurological: No complaints of convulsions, no seizures, no dizziness  Hematologic/Lymphatic: No complaints of easy bruising  ROS reviewed  Active Problems    1  Acute maxillary sinusitis (461 0) (J01 00)   2  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)   3  Breast disorder (611 9) (N64 9)   4  Dyslipidemia (272 4) (E78 5)   5  Dysphagia (787 20) (R13 10)   6  Dyspnea (786 09) (R06 00)   7  Fatigue (780 79) (R53 83)   8  Flu vaccine need (V04 81) (Z23)   9  Foot fracture, left (825 20) (S92 902A)   10  Gastroesophageal reflux disease (530 81) (K21 9)   11  Hypertension (401 9) (I10)   12  Lip swelling (528 5) (K13 0)   13  Lymphadenopathy (785 6) (R59 1)   14  Other screening mammogram (V76 12) (Z12 31)   15  Pain, foot, chronic, left (729 5,338 29) (M79 672,G89 29)   16  Screening for colon cancer (V76 51) (Z12 11)   17  Urticaria (708 9) (L50 9)   18  Vitamin B12 deficiency (266 2) (E53 8)   19  Vitamin D deficiency (268 9) (E55 9)    Social History    · Former smoker (V15 82) (O40 698)   · Never Drank Alcohol   · Uses Safety Equipment - Seatbelts    Current Meds   1  Amoxicillin-Pot Clavulanate 500-125 MG Oral Tablet; TAKE 1 TABLET Every twelve hours   with breakfast and dinner; Therapy: 28VEB7380 to (Evaluate:21Jan2016)  Requested for: 16CTM3110; Last   Rx:11Jan2016 Ordered   2  Calcium 600 TABS; Therapy: (Rebbecamado Amato) to Recorded   3  Claritin 10 MG Oral Tablet; TAKE ONE TABLET BY MOUTH ONCE DAILY; Therapy: 93Bgu7633 to (Last Rx:06Aug2015)  Requested for: 06Aug2015 Ordered   4  Fish Oil CAPS;    Therapy: (Clarke Pineda) to Recorded   5  Hydrochlorothiazide 25 MG Oral Tablet; Take 1 tablet daily; Therapy: 68PPC1453 to (Evaluate:75Zku1699)  Requested for: 38INU1987; Last   Rx:11Jan2016 Ordered   6  Klor-Con M20 20 MEQ Oral Tablet Extended Release; Take 1 tablet daily; Therapy: 42GQU5309 to (Evaluate:31Jan2016)  Requested for: 88Tjg9958; Last   Rx:71Xlz4833 Ordered   7  Multi Complete CAPS; Therapy: (Clarke Pineda) to Recorded   8  Omeprazole 40 MG Oral Capsule Delayed Release; take 1 capsule daily; Therapy: 10ACN8117 to (Evaluate:77Awt8935)  Requested for: 13QYH8782; Last   Rx:11Jan2016 Ordered   9  Verapamil HCl  MG Oral Tablet Extended Release; Take 1 tablet daily; Therapy: 38FGQ7225 to (Yee Stewart)  Requested for: 68FZB1825; Last   Rx:04Nov2015 Ordered   10  Vicodin 5-300 MG Oral Tablet; TAKE 1 TABLET EVERY 4 TO 6 HOURS AS NEEDED FOR    PAIN;    Therapy: 14KFV0114 to (Evaluate:52Dxy2223); Last Rx:30Qpa9757 Ordered   11  Vitamin B12 100 MCG Oral Tablet; Therapy: (Clarke Pineda) to Recorded   12  Vitamin C CAPS; Therapy: (Recorded:27Apr2015) to Recorded    Allergies    1  Bactrim TABS   2  Aspirin CHEW   3  CeleBREX CAPS   4  NSAIDs   5  Sulfa Drugs    Vitals   Recorded: 47UFP2776 12:46PM   Temperature 97 6 F   Heart Rate 96   Respiration 18   Systolic 529   Diastolic 80   Height 5 ft 3 in   Weight 231 lb    BMI Calculated 40 92   BSA Calculated 2 06   Pain Scale 0     Physical Exam    Skin: Warm, anicteric  (Incision has healed well  No erythema, drainage, or tenderness  There is a small seroma)  Results/Data    Pathology Review PRELIMINARY PATHOLOGIC DIAGNOSIS  Reported: 1/8/2016  A  Lymph nodes, right axillary:   - Rare atypical lymphoid cells present in the setting of a reactive-appearing lymphoid node   with monocytoid B-cell hyperplasia, of uncertain significance - see Note     - No evidence of metastatic melanoma, confirmed by Melan A immunohistochemical stains,    evaluated with appropriate positive & negtiave co   - Flow cytometry ([facility]# [id number], evaluated by Dr Cornelia Cohen) reveals:   * A medium-sized, double light chain negative B-cell population, comprising 4% of   the total cellularity is present - the majority of B-cells (49%) are polytypic but a small, medium-sized, light chain    negative population is noted, comprising about 4% of total cellularity  These cells show moderate CD19   and bright CD20 expression, and are negative for CD10  They coexpress FMC-7 and CD11c    * NO evidence of T-cell proliferative disorders - T-cells (32%) show no pan T-cell antigenic deletion or diminution,   nor CD4/CD8 subset restriction  * Viability 7AAD: 90%  * The following antigens were evaluated & found to be expressed as described above or by appropriate cells: CD2,   CD3, CD4, CD5, CD7, CD8, CD10, CD11c, CD19, CD20, CD23, CD38, CD45, CD56, CD57, FMC-7, sKappa,   sLambda  Comment:  Routine H&E sections demonstrate an enlarged lymph node with open sinuses in which one sees moderate, reactive vascular proliferation, monocytoid B-cell hyperplasia & somewhat atretic appearing germinal centers  Focally, larger lymphoid cells, possibly   immunoblasts (e g  A4) are vaguely aggregated  Immunohistochemical stains, evaluated with appropriate positive & negative controls, document that the sometime hyalized & atretic germinal centers are surrounded by concentrically arrange small CD20(+)   B-lymphocytes  (+) plasma cells are present in the usual paracortical locations but do not appear increased in number  CD3(+) T-lymphocytes show a physiologic distribution  CD30(+) cells appear more likely immunoblasts and do not have the   appearance of Hodgkin lymphoma cells  NT64(+) follicular dendritic networks appear generally restricted to germinal centers but focally expanded by in-wandering monocytoid B-lymphocytes     Although the combined histologic & immunophenotypic findings favor a benign, reactive lymphadenopathy, flow cytometric findings leave open a question of the significance of phenotypically abnormal, medium-size B-lymphocytes  Given the patient's   history of marginal zone lymphoma, the case is forwarded to expert lymph node hematopathologist Dr Helio Winn, [facility], [facility], whose opinion will be described in the final report  Finallly, given the patient's history of melanoma, a search utilizing Melan A immunohistochemical stains was made of each block of this case  Results of this investigation confirm the routine H&E impression of no melanoma cells in the present   material   Preliminary report is faxed by   [facility] to Dr Dimitris Lewis @ [address] [place] [address] PM, 1/8/2016  *Electronic SignatureLeverette Bebe Morataya MD,PhD         Future Appointments    Date/Time Provider Specialty Site   02/10/2016 11:00 AM Cierra Angeles MD Hematology Oncology [facility]     Signatures   Electronically signed by : LEXX Infante ; Jan 20 2016  1:17PM EST                       (Author)

## 2018-01-17 ENCOUNTER — HOSPITAL ENCOUNTER (INPATIENT)
Facility: HOSPITAL | Age: 78
LOS: 4 days | Discharge: HOME | DRG: 847 | End: 2018-01-21
Attending: INTERNAL MEDICINE | Admitting: INTERNAL MEDICINE
Payer: COMMERCIAL

## 2018-01-17 DIAGNOSIS — I10 ESSENTIAL HYPERTENSION: Primary | Chronic | ICD-10-CM

## 2018-01-17 LAB
ALBUMIN SERPL BCP-MCNC: 3.9 G/DL (ref 3.5–5)
ALP SERPL-CCNC: 129 U/L (ref 46–116)
ALT SERPL W P-5'-P-CCNC: 86 U/L (ref 12–78)
ANION GAP SERPL CALCULATED.3IONS-SCNC: 6 MMOL/L (ref 4–13)
AST SERPL W P-5'-P-CCNC: 36 U/L (ref 5–45)
BASOPHILS # BLD MANUAL: 0 THOUSAND/UL (ref 0–0.1)
BASOPHILS NFR MAR MANUAL: 0 % (ref 0–1)
BILIRUB SERPL-MCNC: 0.27 MG/DL (ref 0.2–1)
BUN SERPL-MCNC: 18 MG/DL (ref 5–25)
CALCIUM SERPL-MCNC: 9.9 MG/DL (ref 8.3–10.1)
CHLORIDE SERPL-SCNC: 108 MMOL/L (ref 100–108)
CO2 SERPL-SCNC: 28 MMOL/L (ref 21–32)
CREAT SERPL-MCNC: 0.76 MG/DL (ref 0.6–1.3)
EOSINOPHIL # BLD MANUAL: 0.22 THOUSAND/UL (ref 0–0.4)
EOSINOPHIL NFR BLD MANUAL: 3 % (ref 0–6)
ERYTHROCYTE [DISTWIDTH] IN BLOOD BY AUTOMATED COUNT: 15.7 % (ref 11.6–15.1)
GFR SERPL CREATININE-BSD FRML MDRD: 77 ML/MIN/1.73SQ M
GLUCOSE SERPL-MCNC: 117 MG/DL (ref 65–140)
HCT VFR BLD AUTO: 34.3 % (ref 34.8–46.1)
HGB BLD-MCNC: 11.1 G/DL (ref 11.5–15.4)
LYMPHOCYTES # BLD AUTO: 0.44 THOUSAND/UL (ref 0.6–4.47)
LYMPHOCYTES # BLD AUTO: 6 % (ref 14–44)
MCH RBC QN AUTO: 34.3 PG (ref 26.8–34.3)
MCHC RBC AUTO-ENTMCNC: 32.4 G/DL (ref 31.4–37.4)
MCV RBC AUTO: 106 FL (ref 82–98)
METAMYELOCYTES NFR BLD MANUAL: 5 % (ref 0–1)
MONOCYTES # BLD AUTO: 0.81 THOUSAND/UL (ref 0–1.22)
MONOCYTES NFR BLD: 11 % (ref 4–12)
MYELOCYTES NFR BLD MANUAL: 1 % (ref 0–1)
NEUTROPHILS # BLD MANUAL: 5.06 THOUSAND/UL (ref 1.85–7.62)
NEUTS BAND NFR BLD MANUAL: 5 % (ref 0–8)
NEUTS SEG NFR BLD AUTO: 64 % (ref 43–75)
NRBC BLD AUTO-RTO: 0 /100 WBCS
PLATELET # BLD AUTO: 268 THOUSANDS/UL (ref 149–390)
PLATELET BLD QL SMEAR: ADEQUATE
PMV BLD AUTO: 9.1 FL (ref 8.9–12.7)
POTASSIUM SERPL-SCNC: 3.9 MMOL/L (ref 3.5–5.3)
PROT SERPL-MCNC: 6.9 G/DL (ref 6.4–8.2)
RBC # BLD AUTO: 3.24 MILLION/UL (ref 3.81–5.12)
SODIUM SERPL-SCNC: 142 MMOL/L (ref 136–145)
VARIANT LYMPHS # BLD AUTO: 5 %
WBC # BLD AUTO: 7.33 THOUSAND/UL (ref 4.31–10.16)

## 2018-01-17 PROCEDURE — 3E03305 INTRODUCTION OF OTHER ANTINEOPLASTIC INTO PERIPHERAL VEIN, PERCUTANEOUS APPROACH: ICD-10-PCS | Performed by: INTERNAL MEDICINE

## 2018-01-17 PROCEDURE — 80053 COMPREHEN METABOLIC PANEL: CPT | Performed by: INTERNAL MEDICINE

## 2018-01-17 PROCEDURE — 85027 COMPLETE CBC AUTOMATED: CPT | Performed by: INTERNAL MEDICINE

## 2018-01-17 PROCEDURE — 85007 BL SMEAR W/DIFF WBC COUNT: CPT | Performed by: INTERNAL MEDICINE

## 2018-01-17 RX ORDER — ACETAMINOPHEN 325 MG/1
650 TABLET ORAL EVERY 6 HOURS PRN
Status: DISCONTINUED | OUTPATIENT
Start: 2018-01-17 | End: 2018-01-21 | Stop reason: HOSPADM

## 2018-01-17 RX ORDER — LORATADINE 10 MG/1
10 TABLET ORAL DAILY
Status: DISCONTINUED | OUTPATIENT
Start: 2018-01-17 | End: 2018-01-21 | Stop reason: HOSPADM

## 2018-01-17 RX ORDER — HYDROCHLOROTHIAZIDE 25 MG/1
25 TABLET ORAL DAILY
Status: DISCONTINUED | OUTPATIENT
Start: 2018-01-17 | End: 2018-01-21 | Stop reason: HOSPADM

## 2018-01-17 RX ORDER — ONDANSETRON 2 MG/ML
4 INJECTION INTRAMUSCULAR; INTRAVENOUS EVERY 6 HOURS PRN
Status: DISCONTINUED | OUTPATIENT
Start: 2018-01-17 | End: 2018-01-21 | Stop reason: HOSPADM

## 2018-01-17 RX ORDER — DAPSONE 100 MG/1
100 TABLET ORAL DAILY
Status: DISCONTINUED | OUTPATIENT
Start: 2018-01-17 | End: 2018-01-17

## 2018-01-17 RX ORDER — SODIUM CHLORIDE 9 MG/ML
75 INJECTION, SOLUTION INTRAVENOUS CONTINUOUS
Status: DISCONTINUED | OUTPATIENT
Start: 2018-01-17 | End: 2018-01-21 | Stop reason: HOSPADM

## 2018-01-17 RX ORDER — PREDNISONE 20 MG/1
100 TABLET ORAL DAILY
Status: COMPLETED | OUTPATIENT
Start: 2018-01-17 | End: 2018-01-21

## 2018-01-17 RX ORDER — ACYCLOVIR 800 MG/1
400 TABLET ORAL EVERY 12 HOURS SCHEDULED
Status: DISCONTINUED | OUTPATIENT
Start: 2018-01-17 | End: 2018-01-21 | Stop reason: HOSPADM

## 2018-01-17 RX ORDER — B-COMPLEX WITH VITAMIN C
1 TABLET ORAL
Status: DISCONTINUED | OUTPATIENT
Start: 2018-01-18 | End: 2018-01-21 | Stop reason: HOSPADM

## 2018-01-17 RX ORDER — SODIUM CHLORIDE 9 MG/ML
333 INJECTION, SOLUTION INTRAVENOUS CONTINUOUS
Status: DISCONTINUED | OUTPATIENT
Start: 2018-01-21 | End: 2018-01-21 | Stop reason: HOSPADM

## 2018-01-17 RX ORDER — PANTOPRAZOLE SODIUM 40 MG/1
40 TABLET, DELAYED RELEASE ORAL
Status: DISCONTINUED | OUTPATIENT
Start: 2018-01-17 | End: 2018-01-21 | Stop reason: HOSPADM

## 2018-01-17 RX ORDER — POTASSIUM CHLORIDE 20 MEQ/1
20 TABLET, EXTENDED RELEASE ORAL DAILY
Status: DISCONTINUED | OUTPATIENT
Start: 2018-01-17 | End: 2018-01-21 | Stop reason: HOSPADM

## 2018-01-17 RX ADMIN — PREDNISONE 100 MG: 20 TABLET ORAL at 13:05

## 2018-01-17 RX ADMIN — SODIUM CHLORIDE 75 ML/HR: 0.9 INJECTION, SOLUTION INTRAVENOUS at 23:37

## 2018-01-17 RX ADMIN — ACYCLOVIR 400 MG: 800 TABLET ORAL at 21:54

## 2018-01-17 RX ADMIN — SODIUM CHLORIDE 75 ML/HR: 0.9 INJECTION, SOLUTION INTRAVENOUS at 10:31

## 2018-01-17 RX ADMIN — VINCRISTINE SULFATE: 1 INJECTION, SOLUTION INTRAVENOUS at 13:46

## 2018-01-17 RX ADMIN — NYSTATIN 500000 UNITS: 100000 SUSPENSION ORAL at 18:01

## 2018-01-17 RX ADMIN — NYSTATIN 500000 UNITS: 100000 SUSPENSION ORAL at 13:06

## 2018-01-17 RX ADMIN — ENOXAPARIN SODIUM 100 MG: 100 INJECTION SUBCUTANEOUS at 21:53

## 2018-01-17 RX ADMIN — NYSTATIN 500000 UNITS: 100000 SUSPENSION ORAL at 21:53

## 2018-01-17 RX ADMIN — SODIUM CHLORIDE 150 MG: 0.9 INJECTION, SOLUTION INTRAVENOUS at 13:01

## 2018-01-17 RX ADMIN — DEXAMETHASONE SODIUM PHOSPHATE: 10 INJECTION, SOLUTION INTRAMUSCULAR; INTRAVENOUS at 12:57

## 2018-01-17 NOTE — SOCIAL WORK
Met with pt, son, and hsb at bedside  Pt resides with her hsb and son in a ranch house with 2 ELIOT  Pt states her hsb Mehdi Howell is her primary contact [phone] and son Christina Nearing [phone]  Pt does not have a POA or living will  Pt states she is indep at home and does not use any assistive devices and does not have home care  Pt [facility]  Pt uses [facility] in [place]  Pt denies hx of mental illness or substance abuse  Pts son will transport pt home  CM reviewed d/c planning process including the following: identifying help at home, patient preference for d/c planning needs, Discharge Lounge, [facility] Meds to Bed program, availability of treatment team to discuss questions or concerns patient and/or family may have regarding understanding medications and recognizing signs and symptoms once discharged  CM also encouraged patient to follow up with all recommended appointments after discharge  Patient advised of importance for patient and family to participate in managing patients medical well being

## 2018-01-17 NOTE — H&P
Patient: Gayle Overton  Patient MRN: [id number]  Service date: 1/17/2018  Attending Physician:       CHIEF COMPLAIN  No chief complaint on file  Heme / Oncology history:  Patient is here with her  and son  Patient will be receiving her 6th and final planned infusion chemotherapy this week starting on 01/17/2018  This time she will have a longer Port-A-Cath access needle and hopefully needle will not dislodge  Because that is what happened the last time  No problem around Port-A-Cath area secondary to extravasation except for discomfort for few days after extravasation  1,  diffuse large B-cell lymphoma, 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     - received 3 cycles of CHOP chemotherapy and 2 cycles of EPOCH   She did not receive Adriamycin with cycle 5 because of drop in ejection fraction  Treatment was changed from [facility] to [facility] when we found out she has 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  Patient did not receive Rituxan because she is allergic to Rituxan  She receives Neulasta  - last cycle in 12/2017 , There was extravasation of  16 and vincristine from the port when needle got dislodged but because of very diluted medications and prompt attention she did not have any swelling or redness around the port but had burning sensation/pain  2, In November 2017 patient was diagnosed to have DVT left upper arm and that is the side she has Port-A-Cath Patient has been on Lovenox  Swelling from left upper arm has gone down and there is no pain  Port-A-Cath is working  3, Patient has previous history of marginal zone lymphoma, extranodal  This could have been be a transformed or a new lymphoma  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 in 2008â2009  It was a localized disease  Plan was radiation and Rituxan  She had radiation  She had only one dose of Rituxan and had reaction to Rituxan  She had swelling of the tongue and closing of throat   Rituxan was discontinued        HISTORY OF PRESENT ILLNESS:  Pk Velazquez is a 78 y o  female who has above mentioned hx, presented for scheduled cycle # 6 of chemo for [facility]  She tolerated previous chemotherapy very well without any major complications  Repeat imaging study after 3 cycles of chop showed having good response with tumor size reduction  Reported having no fever chills, weight is stable, no new lumps bumps  No bleeding  Her skin burning sensations from previous chemo leak has completely resolved  PAST MEDICAL HISTORY:   has a past medical history of Acid reflux; 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[address] ); [place] (hard of hearing); Hypertension; Left bundle branch block; Osteoarthritis; Plantar fasciitis; and Shortness of breath  PAST SURGICAL HISTORY:   has a past surgical history that includes pr bx/remv,lymph node,deep cerv (Right, 5/2/2016); Hysterectomy; Ankle arthroplasty (Left); Orbital floor exploration (Right); Blepharoplasty; Cataract extraction (Right); Esophagogastroduodenoscopy; Lymph node dissection; pr bx/remv,lymph node,deep axill (Left, 8/14/2017); FACIAL/NECK BIOPSY (Right, 9/8/2017); pr insj tunneled ctr vad w/subq port age 13 yr/> (N/A, 10/3/2017); Portacath placement; and Esophageal dilation      CURRENT MEDICATIONS  Scheduled Meds:  acyclovir 400 mg Oral Q12H [address]   [START ON 1/18/2018] calcium carbonate-vitamin D 1 tablet Oral Daily With Breakfast   [START ON 1/21/2018] cyclophosphamide (CYTOXAN) IVPB 1,470 mg Intravenous Once   dapsone 100 mg Oral Daily   enoxaparin 100 mg Subcutaneous Q12H [address]   etoposide-DOXOrubicin-vincristine infusion  Intravenous Q24H   fosaprepitant (EMEND) IVPB (ONC use only) 150 mg Intravenous Once   hydrochlorothiazide 25 mg Oral Daily   loratadine 10 mg Oral Daily   multivitamin-minerals 1 tablet Oral Daily   nystatin 500,000 Units Swish & Swallow 4x Daily   ondansetron with dexamethasone IVPB  Intravenous Daily   pantoprazole 40 mg Oral Early Morning   potassium chloride 20 mEq Oral Daily   predniSONE 100 mg Oral Daily   verapamil 180 mg Oral Daily     Continuous Infusions:  sodium chloride 75 mL/hr Last Rate: 75 mL/hr (01/17/18 1031)   [START ON 1/21/2018] sodium chloride 333 mL/hr      PRN Meds:   acetaminophen    alteplase    ondansetron    SOCIAL HISTORY:   reports that she quit smoking about 48 years ago  She has never used smokeless tobacco  She reports that she does not drink alcohol or use drugs  FAMILY HISTORY:  family history is not on file  ALLERGIES:  is allergic to aspirin; celebrex [celecoxib]; nsaids; other; rituxan [rituximab]; bactrim [sulfamethoxazole-trimethoprim]; and sulfa antibiotics  REVIEW OF SYSTEMS:  Please note that a 14-point review of systems was performed to include Constitutional, HEENT, Respiratory, CVS, GI, , Musculoskeletal, Integumentary, Neurologic, Rheumatologic, Endocrinologic, Psychiatric, Lymphatic, and Hematologic/Oncologic systems were reviewed and are negative unless otherwise stated in HPI  Positive and negative findings pertinent to this evaluation are incorporated into the history of present illness  PHYSICAL EXAMINATION:  Vital Signs: Temp:  [98 1 °F (36 7 °C)] 98 1 °F (36 7 °C)  HR:  [100] 100  Resp:  [20] 20  BP: (158)/(69) 158/69  Body mass index is 37 06 kg/m²  Body surface area is 2 meters squared  Constitutional: Alert and oriented  HEENT: Anicteric, PERRLA  Chest: Decreased breathing sound bilaterally, No wheezes/rales/rhonchi  CVS: Regular rhythm  Normal rate  Abdomen: Soft, nontender, nondistended  Bowel sounds positive X 4  No palpable organomegaly  Extremities: No cyanosis/clubbing/edema  Integumentary: No obvious rashes or bruises  Musculoskeletal: No obvious bony or joint deformities  Psychiatric: Appropriate affect and mood  Lymph Node Survey: No palpable preauricular, submandibular, cervical, supraclavicular, axillary, epitrochlear or inguinal lymphadenopathy      LABS:    Results from last 7 days  Lab Units 01/17/18  1006 01/15/18  1005   WBC Thousand/uL 7 33 10 51*   HEMATOCRIT % 34 3* 35 2   PLATELETS Thousands/uL 268 260   MONO PCT MAN % 11 10       Results from last 7 days  Lab Units 01/17/18  1006 01/15/18  1005   SODIUM mmol/L 142 142   POTASSIUM mmol/L 3 9 3 7   CHLORIDE mmol/L 108 106   CO2 mmol/L 28 30   BUN mg/dL 18 14       Results from last 7 days  Lab Units 01/17/18  1006 01/15/18  1005   BILIRUBIN TOTAL mg/dL 0 27 0 35   ALK PHOS U/L 129* 133*   ALT U/L 86* 87*   AST U/L 36 37               Invalid input(s): TNI,  PCT      Results from last 7 days  Lab Units 01/15/18  1005   LD U/L 264*           IMAGING:  No orders to display       PROBLEM LIST:  Patient Active Problem List   Diagnosis    Enlarged lymph node in neck    Lymphadenopathy, axillary    Lymphoma ([address] )    Arm DVT (deep venous thromboembolism), acute, left (HCC)    Essential hypertension    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 [facility])       ASSESSMENT/PLAN:  Cedrick Jeong is a 78 y o  female with:    1) diffuse large B-cell lymphoma, 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, admitted for scheduled chemotherapy, EPOCH # 3 ( total # 6)  - etoposide : D 1-4 : 1/17 - 1/20 : 50 mg / m2  - prednisone : D 1-5 : 1/17- 21 : 60 mg   - vincristine : D 1-4 : 0 4 mg / m2 : = 0 8 mg   - Cytoxan : D 1-4 : 750 mg / m2   - Doxorubucin : D 1-4:  NO :  due to cardiac issues  2) DVT:  History as mentioned above, on Lovenox b i d     3) supportive care:  Zofran p r n  nausea vomiting;  Acyclovir for prophylaxis viral infection        Line : port  Iv fluid: ns 75 cc / hr   Electrolytes:   WNL  Diet: regular  DVT prophylaxis:  Lovenox , therapeutic dose   Code status: full           Asher Washington MD PhD  Hematology / Oncology

## 2018-01-17 NOTE — CASE MANAGEMENT
Initial Clinical Review    Thank you,  [facility] in the [facility] by Dionte Alfaro for 2017  Network Utilization Review Department  Phone: [phone]; Fax [phone]  ATTENTION: The Network Utilization Review Department is now centralized for our 7 Facilities  Make a note that we have a new phone and fax numbers for our Department  Please call with any questions or concerns to [phone] and carefully follow the prompts so that you are directed to the right person  All voicemails are confidential  Fax any determinations, approvals, denials, and requests for initial or continue stay review clinical to [phone]  Due to HIGH CALL volume, it would be easier if you could please send faxed requests to expedite your requests and in part, help us provide discharge notifications faster  Admission: Date/Time/Statement: 1/17/18 @ 0940     Orders Placed This Encounter   Procedures    Inpatient Admission     Standing Status:   Standing     Number of Occurrences:   1     Order Specific Question:   Admitting Physician     Answer:   Giovanna Segal [[id number]]     Order Specific Question:   Level of Care     Answer:   Level 2 Stepdown / HOT [14]     Order Specific Question:   Estimated length of stay     Answer:   More than 2 Midnights     Order Specific Question:   Certification     Answer:   I certify that inpatient services are medically necessary for this patient for a duration of greater than two midnights  See H&P and MD Progress Notes for additional information about the patient's course of treatment  Chief Complaint: No chief complaint on file  History of Illness: Jay Mccord is a 78 y o  female who has above mentioned hx, presented for scheduled cycle # 6 of chemo for [facility]       She tolerated previous chemotherapy very well without any major complications      Repeat imaging study after 3 cycles of chop showed having good response with tumor size reduction  Reported having no fever chills, weight is stable, no new lumps bumps  No bleeding  Her skin burning sensations from previous chemo leak has completely resolved  Vital Signs:   Vitals [01/17/18 0945]   Temperature Pulse Respirations Blood Pressure SpO2   98 1 °F (36 7 °C) 100 20 158/69 94 % RA sat      Temp Source Heart Rate Source Patient Position - Orthostatic VS BP Location FiO2 (%)   Oral -- Sitting Right arm --      Pain Score       --        Wt Readings from Last 1 Encounters:   01/17/18 96 4 kg (212 lb 8 4 oz)         Abnormal Labs/Diagnostic Test Results:   Lab Units 01/17/18  1006 01/15/18  1005   WBC Thousand/uL 7 33 10 51*   HEMATOCRIT % 34 3* 35 2   PLATELETS Thousands/uL 268 260   MONO PCT MAN % 11 10       Lab Units 01/17/18  1006 01/15/18  1005   SODIUM mmol/L 142 142   POTASSIUM mmol/L 3 9 3 7   CHLORIDE mmol/L 108 106   CO2 mmol/L 28 30   BUN mg/dL 18 14     Lab Units 01/17/18  1006 01/15/18  1005   BILIRUBIN TOTAL mg/dL 0 27 0 35   ALK PHOS U/L 129* 133*   ALT U/L 86* 87*   AST U/L 36 37          Lab Units 01/15/18  1005   LD U/L 264*             Past Medical/Surgical History:     Past Medical History:   Diagnosis Date    Acid reflux     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HCC)     [place] (hard of hearing)     Hypertension     Left bundle branch block     Osteoarthritis     Plantar fasciitis     Shortness of breath    PAST SURGICAL HISTORY:   has a past surgical history that includes pr bx/remv,lymph node,deep cerv (Right, 5/2/2016); Hysterectomy; Ankle arthroplasty (Left); Orbital floor exploration (Right); Blepharoplasty; Cataract extraction (Right); Esophagogastroduodenoscopy; Lymph node dissection; pr bx/remv,lymph node,deep axill (Left, 8/14/2017); FACIAL/NECK BIOPSY (Right, 9/8/2017); pr insj tunneled ctr vad w/subq port age 13 yr/> (N/A, 10/3/2017); Portacath placement; and Esophageal dilation  Heme / Oncology history:  Patient is here with her  and son   Patient will be receiving her 6th and final planned infusion chemotherapy this week starting on 01/17/2018  This time she will have a longer Port-A-Cath access needle and hopefully needle will not dislodge  Because that is what happened the last time  No problem around Port-A-Cath area secondary to extravasation except for discomfort for few days after extravasation        1,  diffuse large B-cell lymphoma, 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     - received 3 cycles of CHOP chemotherapy and 2 cycles of EPOCH   She did not receive Adriamycin with cycle 5 because of drop in ejection fraction  Treatment was changed from CHOP to [facility] when we found out she has 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  Patient did not receive Rituxan because she is allergic to Rituxan  She receives Neulasta  - last cycle in 12/2017 , There was extravasation of  16 and vincristine from the port when needle got dislodged but because of very diluted medications and prompt attention she did not have any swelling or redness around the port but had burning sensation/pain  2, In November 2017 patient was diagnosed to have DVT left upper arm and that is the side she has Port-A-Cath Patient has been on Lovenox  Swelling from left upper arm has gone down and there is no pain  Port-A-Cath is working     3, Patient has previous history of marginal zone lymphoma, extranodal  This could have been be a transformed or a new lymphoma    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 in 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2009  It was a localized disease  Plan was radiation and Rituxan  She had radiation  She had only one dose of Rituxan and had reaction to Rituxan  She had swelling of the tongue and closing of throat   Rituxan was discontinued      Admitting Diagnosis: Unspecified b-cell lymphoma, extranodal and solid organ sites [C85 19]  Diffuse large b-cell lymphoma, lymph nodes of head, face, and neck [C83 31]    Age/Sex: 78 y o  female    Assessment/Plan:     Oren Joy is a 78 y o  female with:    1) diffuse large B-cell lymphoma, 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, admitted for scheduled chemotherapy, EPOCH # 3 ( total # 6)  - etoposide : D 1-4 : 1/17 - 1/20 : 50 mg / m2  - prednisone : D 1-5 : 1/17- 21 : 60 mg   - vincristine : D 1-4 : 0 4 mg / m2 : = 0 8 mg   - Cytoxan : D 1-4 : 750 mg / m2   - Doxorubucin : D 1-4:  NO :  due to cardiac issues       2) DVT:  History as mentioned above, on Lovenox b i d     3) supportive care:  Zofran p r n  nausea vomiting;  Acyclovir for prophylaxis viral infection    Admission Orders:  Scheduled Meds:   acyclovir 400 mg Oral Q12H [address]   [START ON 1/18/2018] calcium carbonate-vitamin D 1 tablet Oral Daily With Breakfast   [START ON 1/21/2018] cyclophosphamide (CYTOXAN) IVPB 1,470 mg Intravenous Once   enoxaparin 100 mg Subcutaneous Q12H [address]   etoposide-DOXOrubicin-vincristine infusion  Intravenous Q24H   hydrochlorothiazide 25 mg Oral Daily   loratadine 10 mg Oral Daily   multivitamin-minerals 1 tablet Oral Daily   nystatin 500,000 Units Swish & Swallow 4x Daily   ondansetron with dexamethasone IVPB  Intravenous Daily   pantoprazole 40 mg Oral Early Morning   potassium chloride 20 mEq Oral Daily   predniSONE 100 mg Oral Daily   verapamil 180 mg Oral Daily     Continuous Infusions:   sodium chloride 75 mL/hr Last Rate: 75 mL/hr (01/17/18 1031)   [START ON 1/21/2018] sodium chloride 333 mL/hr      PRN Meds:   acetaminophen    alteplase    ondansetron    Nursing Orders - VS q 4  - Up and Oob as tolerated - I & O q shift - sequential compression device to le's- Diet regular [facility]  Internal Medicine

## 2018-01-18 ENCOUNTER — APPOINTMENT (INPATIENT)
Dept: RADIOLOGY | Facility: HOSPITAL | Age: 78
DRG: 847 | End: 2018-01-18
Attending: INTERNAL MEDICINE
Payer: COMMERCIAL

## 2018-01-18 LAB
ALBUMIN SERPL BCP-MCNC: 3.3 G/DL (ref 3.5–5)
ALP SERPL-CCNC: 110 U/L (ref 46–116)
ALT SERPL W P-5'-P-CCNC: 84 U/L (ref 12–78)
ANION GAP SERPL CALCULATED.3IONS-SCNC: 8 MMOL/L (ref 4–13)
ANISOCYTOSIS BLD QL SMEAR: PRESENT
AST SERPL W P-5'-P-CCNC: 37 U/L (ref 5–45)
BASOPHILS # BLD MANUAL: 0 THOUSAND/UL (ref 0–0.1)
BASOPHILS NFR MAR MANUAL: 0 % (ref 0–1)
BILIRUB SERPL-MCNC: 0.26 MG/DL (ref 0.2–1)
BUN SERPL-MCNC: 17 MG/DL (ref 5–25)
CALCIUM SERPL-MCNC: 9.4 MG/DL (ref 8.3–10.1)
CHLORIDE SERPL-SCNC: 108 MMOL/L (ref 100–108)
CO2 SERPL-SCNC: 26 MMOL/L (ref 21–32)
CREAT SERPL-MCNC: 0.64 MG/DL (ref 0.6–1.3)
EOSINOPHIL # BLD MANUAL: 0 THOUSAND/UL (ref 0–0.4)
EOSINOPHIL NFR BLD MANUAL: 0 % (ref 0–6)
ERYTHROCYTE [DISTWIDTH] IN BLOOD BY AUTOMATED COUNT: 15.3 % (ref 11.6–15.1)
GFR SERPL CREATININE-BSD FRML MDRD: 88 ML/MIN/1.73SQ M
GLUCOSE SERPL-MCNC: 118 MG/DL (ref 65–140)
HCT VFR BLD AUTO: 31 % (ref 34.8–46.1)
HGB BLD-MCNC: 10.1 G/DL (ref 11.5–15.4)
LYMPHOCYTES # BLD AUTO: 0.95 THOUSAND/UL (ref 0.6–4.47)
LYMPHOCYTES # BLD AUTO: 7 % (ref 14–44)
MCH RBC QN AUTO: 34.5 PG (ref 26.8–34.3)
MCHC RBC AUTO-ENTMCNC: 32.6 G/DL (ref 31.4–37.4)
MCV RBC AUTO: 106 FL (ref 82–98)
MONOCYTES # BLD AUTO: 0.14 THOUSAND/UL (ref 0–1.22)
MONOCYTES NFR BLD: 1 % (ref 4–12)
MYELOCYTES NFR BLD MANUAL: 3 % (ref 0–1)
NEUTROPHILS # BLD MANUAL: 12.09 THOUSAND/UL (ref 1.85–7.62)
NEUTS BAND NFR BLD MANUAL: 5 % (ref 0–8)
NEUTS SEG NFR BLD AUTO: 84 % (ref 43–75)
NRBC BLD AUTO-RTO: 0 /100 WBCS
PLATELET # BLD AUTO: 236 THOUSANDS/UL (ref 149–390)
PLATELET BLD QL SMEAR: ADEQUATE
PMV BLD AUTO: 9.4 FL (ref 8.9–12.7)
POTASSIUM SERPL-SCNC: 3.8 MMOL/L (ref 3.5–5.3)
PROT SERPL-MCNC: 6 G/DL (ref 6.4–8.2)
RBC # BLD AUTO: 2.93 MILLION/UL (ref 3.81–5.12)
RBC MORPH BLD: PRESENT
SODIUM SERPL-SCNC: 142 MMOL/L (ref 136–145)
WBC # BLD AUTO: 13.58 THOUSAND/UL (ref 4.31–10.16)

## 2018-01-18 PROCEDURE — 85027 COMPLETE CBC AUTOMATED: CPT | Performed by: INTERNAL MEDICINE

## 2018-01-18 PROCEDURE — 71045 X-RAY EXAM CHEST 1 VIEW: CPT

## 2018-01-18 PROCEDURE — 80053 COMPREHEN METABOLIC PANEL: CPT | Performed by: INTERNAL MEDICINE

## 2018-01-18 PROCEDURE — 85007 BL SMEAR W/DIFF WBC COUNT: CPT | Performed by: INTERNAL MEDICINE

## 2018-01-18 RX ORDER — DIPHENHYDRAMINE HCL 25 MG
12.5 TABLET ORAL EVERY 6 HOURS PRN
Status: DISCONTINUED | OUTPATIENT
Start: 2018-01-18 | End: 2018-01-21 | Stop reason: HOSPADM

## 2018-01-18 RX ADMIN — ACYCLOVIR 400 MG: 800 TABLET ORAL at 22:24

## 2018-01-18 RX ADMIN — NYSTATIN 500000 UNITS: 100000 SUSPENSION ORAL at 18:30

## 2018-01-18 RX ADMIN — VERAPAMIL HYDROCHLORIDE 180 MG: 180 TABLET, FILM COATED, EXTENDED RELEASE ORAL at 08:39

## 2018-01-18 RX ADMIN — ENOXAPARIN SODIUM 100 MG: 100 INJECTION SUBCUTANEOUS at 22:24

## 2018-01-18 RX ADMIN — ACYCLOVIR 400 MG: 800 TABLET ORAL at 08:40

## 2018-01-18 RX ADMIN — LORATADINE 10 MG: 10 TABLET ORAL at 08:39

## 2018-01-18 RX ADMIN — PANTOPRAZOLE SODIUM 40 MG: 40 TABLET, DELAYED RELEASE ORAL at 06:11

## 2018-01-18 RX ADMIN — VINCRISTINE SULFATE: 1 INJECTION, SOLUTION INTRAVENOUS at 13:44

## 2018-01-18 RX ADMIN — NYSTATIN 500000 UNITS: 100000 SUSPENSION ORAL at 22:24

## 2018-01-18 RX ADMIN — Medication 1 TABLET: at 08:39

## 2018-01-18 RX ADMIN — DEXAMETHASONE SODIUM PHOSPHATE: 10 INJECTION, SOLUTION INTRAMUSCULAR; INTRAVENOUS at 13:23

## 2018-01-18 RX ADMIN — HYDROCHLOROTHIAZIDE 25 MG: 25 TABLET ORAL at 08:39

## 2018-01-18 RX ADMIN — PREDNISONE 100 MG: 20 TABLET ORAL at 13:27

## 2018-01-18 RX ADMIN — SODIUM CHLORIDE 75 ML/HR: 0.9 INJECTION, SOLUTION INTRAVENOUS at 13:24

## 2018-01-18 RX ADMIN — ACETAMINOPHEN 650 MG: 325 TABLET, FILM COATED ORAL at 03:08

## 2018-01-18 RX ADMIN — CALCIUM CARBONATE 500 MG (1,250 MG)-VITAMIN D3 200 UNIT TABLET 1 TABLET: at 08:39

## 2018-01-18 RX ADMIN — NYSTATIN 500000 UNITS: 100000 SUSPENSION ORAL at 08:39

## 2018-01-18 RX ADMIN — ENOXAPARIN SODIUM 100 MG: 100 INJECTION SUBCUTANEOUS at 08:39

## 2018-01-18 RX ADMIN — NYSTATIN 500000 UNITS: 100000 SUSPENSION ORAL at 13:27

## 2018-01-18 RX ADMIN — POTASSIUM CHLORIDE 20 MEQ: 1500 TABLET, EXTENDED RELEASE ORAL at 08:39

## 2018-01-18 NOTE — PROGRESS NOTES
Progress Note - Randy Armenta 1940, 78 y o  female MRN: [id number]    Unit/Bed#: [facility] 664-37 Encounter: [id number]    Primary Care Provider: Brook Carver PA-C   Date and time admitted to hospital: 2018  9:40 AM        Essential hypertension   Assessment & Plan    She is on verapamil  mg daily, HCTZ 25 mg daily  Her blood pressure is under control  She is not symptomatic  Will continue to follow along        * 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 [facility])   Assessment & Plan    Being treated for large cell lymphoma and marginal zone lymphoma  She will received chemotherapy and radiation, may be this is last round  Complaining of flushing intremittently              VTE Pharmacologic Prophylaxis:   Pharmacologic: Heparin  Mechanical VTE Prophylaxis in Place: Yes    Patient Centered Rounds: I have performed bedside rounds with nursing staff today  Discussions with Specialists or Other Care Team Provider: oncology    Education and Discussions with Family / Patient: patient    Time Spent for Care: 45 minutes  More than 50% of total time spent on counseling and coordination of care as described above  Current Length of Stay: 1 day(s)    Current Patient Status: Inpatient   Certification Statement: The patient will continue to require additional inpatient hospital stay due to chemotherapy    Discharge Plan: per primary team    Code Status: Level 1 - Full Code      Subjective:   I am doing well  I am hoping to complete chemotherapy    Objective:     Vitals:   Temp (24hrs), Av 1 °F (36 7 °C), Min:97 5 °F (36 4 °C), Max:98 8 °F (37 1 °C)    HR:  [] 92  Resp:  [18-20] 18  BP: ()/(52-69) 112/59  SpO2:  [94 %-95 %] 94 %  Body mass index is 37 06 kg/m²  Input and Output Summary (last 24 hours):        Intake/Output Summary (Last 24 hours) at 18 0824  Last data filed at 18 0601   Gross per 24 hour   Intake            5 ml   Output 2400 ml   Net           -397 5 ml       Physical Exam:     Physical Exam   Constitutional: She is oriented to person, place, and time  She appears well-developed and well-nourished  No distress  HENT:   Head: Normocephalic and atraumatic  Right Ear: External ear normal    Left Ear: External ear normal    Nose: Nose normal    Mouth/Throat: Oropharynx is clear and moist  No oropharyngeal exudate  Eyes: Conjunctivae and EOM are normal  Pupils are equal, round, and reactive to light  Right eye exhibits no discharge  Left eye exhibits no discharge  No scleral icterus  Neck: Normal range of motion  Neck supple  No JVD present  No tracheal deviation present  No thyromegaly present  Cardiovascular: Normal rate, regular rhythm, normal heart sounds and intact distal pulses  Exam reveals no gallop and no friction rub  No murmur heard  Pulmonary/Chest: Effort normal and breath sounds normal  No stridor  No respiratory distress  She has no wheezes  She has no rales  She exhibits no tenderness  Abdominal: Soft  Bowel sounds are normal  She exhibits no distension and no mass  There is no tenderness  There is no rebound and no guarding  Musculoskeletal: She exhibits no edema, tenderness or deformity  Lymphadenopathy:     She has no cervical adenopathy  Neurological: She is alert and oriented to person, place, and time  She has normal reflexes  She displays normal reflexes  No cranial nerve deficit  She exhibits normal muscle tone  Coordination normal    Skin: Skin is warm and dry  No rash noted  She is not diaphoretic  No erythema  No pallor  Psychiatric: She has a normal mood and affect  Her behavior is normal  Judgment and thought content normal    Nursing note and vitals reviewed        Additional Data:     Labs:      Results from last 7 days  Lab Units 01/18/18  0611 01/17/18  1006   WBC Thousand/uL 13 58* 7 33   HEMOGLOBIN g/dL 10 1* 11 1*   HEMATOCRIT % 31 0* 34 3*   PLATELETS Thousands/uL 236 268 LYMPHO PCT %  --  6*   MONO PCT MAN %  --  11   EOSINO PCT MANUAL %  --  3       Results from last 7 days  Lab Units 01/18/18  0611   SODIUM mmol/L 142   POTASSIUM mmol/L 3 8   CHLORIDE mmol/L 108   CO2 mmol/L 26   BUN mg/dL 17   CREATININE mg/dL 0 64   CALCIUM mg/dL 9 4   TOTAL PROTEIN g/dL 6 0*   BILIRUBIN TOTAL mg/dL 0 26   ALK PHOS U/L 110   ALT U/L 84*   AST U/L 37   GLUCOSE RANDOM mg/dL 118           * I Have Reviewed All Lab Data Listed Above  * Additional Pertinent Lab Tests Reviewed: Frankie 66 Admission Reviewed    Imaging:    Imaging Reports Reviewed Today Include:    Imaging Personally Reviewed by Myself Includes:       Recent Cultures (last 7 days):           Last 24 Hours Medication List:     acyclovir 400 mg Oral Q12H [address]   calcium carbonate-vitamin D 1 tablet Oral Daily With Breakfast   [START ON 1/21/2018] cyclophosphamide (CYTOXAN) IVPB 1,470 mg Intravenous Once   enoxaparin 100 mg Subcutaneous Q12H [address]   etoposide-DOXOrubicin-vincristine infusion  Intravenous Q24H   hydrochlorothiazide 25 mg Oral Daily   loratadine 10 mg Oral Daily   multivitamin-minerals 1 tablet Oral Daily   nystatin 500,000 Units Swish & Swallow 4x Daily   ondansetron with dexamethasone IVPB  Intravenous Daily   pantoprazole 40 mg Oral Early Morning   potassium chloride 20 mEq Oral Daily   predniSONE 100 mg Oral Daily   verapamil 180 mg Oral Daily        Today, Patient Was Seen By: Mathieu Luna MD    ** Please Note: Dictation voice to text software may have been used in the creation of this document   **

## 2018-01-18 NOTE — ASSESSMENT & PLAN NOTE
She is on verapamil  mg daily, HCTZ 25 mg daily  Her blood pressure is under control  She is not symptomatic  Will continue to follow along

## 2018-01-18 NOTE — PLAN OF CARE
DISCHARGE PLANNING - CARE MANAGEMENT     Discharge to post-acute care or home with appropriate resources Progressing        INFECTION - ADULT     Absence or prevention of progression during hospitalization Progressing        Potential for Falls     Patient will remain free of falls Progressing

## 2018-01-18 NOTE — PROGRESS NOTES
01/18/18 1100   Clinical Encounter Type   Visited With Patient   Routine Visit Follow-up   Patient Spiritual Encounters   Spiritual Encounter Notes Kylee received Sacrament from the [facility]   Family Coping Accepting

## 2018-01-18 NOTE — ASSESSMENT & PLAN NOTE
She is on verapamil  mg daily, HCTZ 25 mg daily  Her blood pressure is under control  Continue monitoring and we will follow with you

## 2018-01-18 NOTE — ASSESSMENT & PLAN NOTE
Being treated for large cell lymphoma and marginal zone lymphoma  She will received chemotherapy and radiation, may be this is last round    Complaining of flushing intremittently

## 2018-01-18 NOTE — PROGRESS NOTES
01/18/18 4100 Raj MOORE   Spiritual Beliefs/Perceptions   Concept of God Accepting   Relationship with God Close   Spiritual Strengths Nuha    Support Systems Spouse/significant other   Coping Responses   Patient Coping Accepting   Family Coping Accepting   Plan of Care   Comments  and pt talked about her receiving Sacament from the [facility] who had just left pt room     Assessment Completed by: Unit visit

## 2018-01-18 NOTE — CONSULTS
Consult- Devorah Anabella 1940, 78 y o  female MRN: [id number]    Unit/Bed#: [facility] 113-60 Encounter: [id number]    Primary Care Provider: Ericka Carreno PA-C   Date and time admitted to hospital: 2018  9:40 AM      Inpatient consult to Internal Medicine  Consult performed by: Venita Campuzano ordered by: Jelly Sheth, [facility]   Assessment & Plan    She is on verapamil  mg daily, HCTZ 25 mg daily  Her blood pressure is under control  Continue monitoring and we will follow with you  * 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 [facility])   Assessment & Plan    Being treated for large cell lymphoma and marginal zone lymphoma  She will received chemotherapy and radiation, may be this is last round  Complaining of flushing intremittently            VTE Pharmacologic Prophylaxis:   Pharmacologic: Enoxaparin (Lovenox)  Mechanical VTE Prophylaxis in Place: Yes    Patient Centered Rounds: I have performed bedside rounds with nursing staff today  Discussions with Specialists or Other Care Team Provider:      Education and Discussions with Family / Patient: patient    Time Spent for Care: 45 minutes  More than 50% of total time spent on counseling and coordination of care as described above  Current Length of Stay: 0 day(s)    Current Patient Status: Inpatient   Certification Statement: The patient will continue to require additional inpatient hospital stay due to chemotherapy    Discharge Plan: would follow and make adjustments as needed    Code Status: Level 1 - Full Code      Subjective:   I am doing well  Objective:     Vitals:   Temp (24hrs), Av 5 °F (36 9 °C), Min:98 1 °F (36 7 °C), Max:98 8 °F (37 1 °C)    HR:  [] 91  Resp:  [20] 20  BP: ()/(58-69) 99/58  SpO2:  [94 %-95 %] 95 %  Body mass index is 37 06 kg/m²  Input and Output Summary (last 24 hours):        Intake/Output Summary (Last 24 hours) at 18 2128  Last data filed at 01/17/18 1901   Gross per 24 hour   Intake              540 ml   Output              800 ml   Net             -260 ml       Physical Exam:     Physical Exam   Constitutional: She is oriented to person, place, and time  She appears well-developed and well-nourished  No distress  HENT:   Head: Normocephalic and atraumatic  Right Ear: External ear normal    Left Ear: External ear normal    Nose: Nose normal    Mouth/Throat: Oropharynx is clear and moist  No oropharyngeal exudate  Eyes: Conjunctivae and EOM are normal  Pupils are equal, round, and reactive to light  Right eye exhibits no discharge  Left eye exhibits no discharge  No scleral icterus  Neck: Normal range of motion  Neck supple  No JVD present  No tracheal deviation present  No thyromegaly present  Cardiovascular: Normal rate, regular rhythm, normal heart sounds and intact distal pulses  Exam reveals no gallop and no friction rub  No murmur heard  Pulmonary/Chest: Effort normal and breath sounds normal  No stridor  No respiratory distress  She has no wheezes  She has no rales  She exhibits no tenderness  Abdominal: Soft  Bowel sounds are normal  She exhibits no distension and no mass  There is no tenderness  There is no rebound and no guarding  Musculoskeletal: Normal range of motion  She exhibits no edema, tenderness or deformity  Lymphadenopathy:     She has no cervical adenopathy  Neurological: She is alert and oriented to person, place, and time  She has normal reflexes  She displays normal reflexes  No cranial nerve deficit  She exhibits normal muscle tone  Coordination normal    Skin: Skin is warm and dry  No rash noted  She is not diaphoretic  No erythema  No pallor  Psychiatric: She has a normal mood and affect  Her behavior is normal  Judgment and thought content normal    Nursing note and vitals reviewed        Additional Data:     Labs:      Results from last 7 days  Lab Units 01/17/18  1006   WBC Thousand/uL 7 33   HEMOGLOBIN g/dL 11 1*   HEMATOCRIT % 34 3*   PLATELETS Thousands/uL 268   LYMPHO PCT % 6*   MONO PCT MAN % 11   EOSINO PCT MANUAL % 3       Results from last 7 days  Lab Units 01/17/18  1006   SODIUM mmol/L 142   POTASSIUM mmol/L 3 9   CHLORIDE mmol/L 108   CO2 mmol/L 28   BUN mg/dL 18   CREATININE mg/dL 0 76   CALCIUM mg/dL 9 9   TOTAL PROTEIN g/dL 6 9   BILIRUBIN TOTAL mg/dL 0 27   ALK PHOS U/L 129*   ALT U/L 86*   AST U/L 36   GLUCOSE RANDOM mg/dL 117           * I Have Reviewed All Lab Data Listed Above  * Additional Pertinent Lab Tests Reviewed: Frankie 66 Admission Reviewed    Imaging:    Imaging Reports Reviewed Today Include:    Imaging Personally Reviewed by Myself Includes:       Recent Cultures (last 7 days):           Last 24 Hours Medication List:     acyclovir 400 mg Oral Q12H [facility]   [START ON 1/18/2018] calcium carbonate-vitamin D 1 tablet Oral Daily With Breakfast   [START ON 1/21/2018] cyclophosphamide (CYTOXAN) IVPB 1,470 mg Intravenous Once   enoxaparin 100 mg Subcutaneous Q12H [facility]   etoposide-DOXOrubicin-vincristine infusion  Intravenous Q24H   hydrochlorothiazide 25 mg Oral Daily   loratadine 10 mg Oral Daily   multivitamin-minerals 1 tablet Oral Daily   nystatin 500,000 Units Swish & Swallow 4x Daily   ondansetron with dexamethasone IVPB  Intravenous Daily   pantoprazole 40 mg Oral Early Morning   potassium chloride 20 mEq Oral Daily   predniSONE 100 mg Oral Daily   verapamil 180 mg Oral Daily        Today, Patient Was Seen By: Leslie Pennington MD    ** Please Note: Dictation voice to text software may have been used in the creation of this document   **

## 2018-01-18 NOTE — PROGRESS NOTES
Patient: Brandy Borjas  Patient MRN: [id number]  Service date: 1/18/2018  Attending Physician:       CHIEF COMPLAIN  No chief complaint on file  HISTORY OF PRESENT ILLNESS:  Brandy Borjas is a 78 y o  female who was admitted for chemo , scheduled cycle # 6 of chemo for [facility]       She tolerated   chemotherapy very well without any major complications  Repeat imaging study after 3 cycles of chop showed having good response with tumor size reduction  She tolerated   chemotherapy very well without any major complications  Reported having no fever chills,  No bleeding  PAST MEDICAL HISTORY:   has a past medical history of Acid reflux; 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[facility] [address] ); [place] (hard of hearing); Hypertension; Left bundle branch block; Osteoarthritis; Plantar fasciitis; and Shortness of breath  PAST SURGICAL HISTORY:   has a past surgical history that includes pr bx/remv,lymph node,deep cerv (Right, 5/2/2016); Hysterectomy; Ankle arthroplasty (Left); Orbital floor exploration (Right); Blepharoplasty; Cataract extraction (Right); Esophagogastroduodenoscopy; Lymph node dissection; pr bx/remv,lymph node,deep axill (Left, 8/14/2017); FACIAL/NECK BIOPSY (Right, 9/8/2017); pr insj tunneled ctr vad w/subq port age 13 yr/> (N/A, 10/3/2017); Portacath placement; and Esophageal dilation      CURRENT MEDICATIONS  Scheduled Meds:  acyclovir 400 mg Oral Q12H [address]   calcium carbonate-vitamin D 1 tablet Oral Daily With Breakfast   [START ON 1/21/2018] cyclophosphamide (CYTOXAN) IVPB 1,470 mg Intravenous Once   enoxaparin 100 mg Subcutaneous Q12H [address]   etoposide-DOXOrubicin-vincristine infusion  Intravenous Q24H   hydrochlorothiazide 25 mg Oral Daily   loratadine 10 mg Oral Daily   multivitamin-minerals 1 tablet Oral Daily   nystatin 500,000 Units Swish & Swallow 4x Daily   ondansetron with dexamethasone IVPB  Intravenous Daily   pantoprazole 40 mg Oral Early Morning   potassium chloride 20 mEq Oral Daily   predniSONE 100 mg Oral Daily   verapamil 180 mg Oral Daily     Continuous Infusions:  sodium chloride 75 mL/hr Last Rate: 75 mL/hr (01/17/18 4519)   [START ON 1/21/2018] sodium chloride 333 mL/hr      PRN Meds:   acetaminophen    alteplase    ondansetron    SOCIAL HISTORY:   reports that she quit smoking about 48 years ago  She has never used smokeless tobacco  She reports that she does not drink alcohol or use drugs  FAMILY HISTORY:  family history is not on file  ALLERGIES:  is allergic to aspirin; celebrex [celecoxib]; nsaids; other; rituxan [rituximab]; bactrim [sulfamethoxazole-trimethoprim]; and sulfa antibiotics  REVIEW OF SYSTEMS:  Please note that a 14-point review of systems was performed to include Constitutional, HEENT, Respiratory, CVS, GI, , Musculoskeletal, Integumentary, Neurologic, Rheumatologic, Endocrinologic, Psychiatric, Lymphatic, and Hematologic/Oncologic systems were reviewed and are negative unless otherwise stated in HPI  Positive and negative findings pertinent to this evaluation are incorporated into the history of present illness  PHYSICAL EXAMINATION:  Vital Signs: Temp:  [97 5 °F (36 4 °C)-98 8 °F (37 1 °C)] 97 9 °F (36 6 °C)  HR:  [89-92] 92  Resp:  [18-20] 18  BP: ()/(52-59) 112/59  Body mass index is 37 06 kg/m²  Body surface area is 2 meters squared  Constitutional: Alert and oriented  HEENT: Anicteric, PERRLA  Chest: Decreased breathing sound bilaterally, No wheezes/rales/rhonchi  CVS: Regular rhythm  Normal rate  Abdomen: Soft, nontender, nondistended  Bowel sounds positive X 4  No palpable organomegaly  Extremities: No cyanosis/clubbing/edema  Integumentary: No obvious rashes or bruises  Musculoskeletal: No obvious bony or joint deformities  Psychiatric: Appropriate affect and mood  Lymph Node Survey: No palpable preauricular, submandibular, cervical, supraclavicular, axillary, epitrochlear or inguinal lymphadenopathy      LABS:    Results from last 7 days  Lab Units 01/18/18  0611 01/17/18  1006 01/15/18  1005   WBC Thousand/uL 13 58* 7 33 10 51*   HEMATOCRIT % 31 0* 34 3* 35 2   PLATELETS Thousands/uL 236 268 260   MONO PCT MAN % 1* 11 10       Results from last 7 days  Lab Units 01/18/18  0611 01/17/18  1006 01/15/18  1005   SODIUM mmol/L 142 142 142   POTASSIUM mmol/L 3 8 3 9 3 7   CHLORIDE mmol/L 108 108 106   CO2 mmol/L 26 28 30   BUN mg/dL 17 18 14       Results from last 7 days  Lab Units 01/18/18  0611 01/17/18  1006 01/15/18  1005   BILIRUBIN TOTAL mg/dL 0 26 0 27 0 35   ALK PHOS U/L 110 129* 133*   ALT U/L 84* 86* 87*   AST U/L 37 36 37               Invalid input(s): TNI,  PCT      Results from last 7 days  Lab Units 01/15/18  1005   LD U/L 264*           IMAGING:  No orders to display       PROBLEM LIST:  Patient Active Problem List   Diagnosis    Enlarged lymph node in neck    Lymphadenopathy, axillary    Lymphoma ([address] )    Arm DVT (deep venous thromboembolism), acute, left (HCC)    Essential hypertension    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 [facility])       ASSESSMENT/PLAN:  Waqar Rodney is a 78 y o  female with:    1) diffuse large B-cell lymphoma, double hit, admitted for scheduled chemotherapy, EPOCH # 3 ( total # 6)  - etoposide : D 1-4 : 1/17 - 1/20 : 50 mg / m2  - prednisone : D 1-5 : 1/17- 21 : 60 mg   - vincristine : D 1-4 : 0 4 mg / m2 : = 0 8 mg   - Cytoxan : D 1-4 : 750 mg / m2   - Doxorubucin : D 1-4:  NO :  due to cardiac issues  2) DVT:  History as mentioned above, on Lovenox b i d     3) supportive care:  Zofran p r n  nausea vomiting;  Acyclovir for prophylaxis viral infection        Line : port  Iv fluid: ns 75 cc / hr   Electrolytes:   WNL  Diet: regular  DVT prophylaxis:  Lovenox , therapeutic dose   Code status: full          Lupe Lazar MD PhD  Hematology / Oncology

## 2018-01-19 LAB
ALBUMIN SERPL BCP-MCNC: 3.1 G/DL (ref 3.5–5)
ALP SERPL-CCNC: 100 U/L (ref 46–116)
ALT SERPL W P-5'-P-CCNC: 79 U/L (ref 12–78)
ANION GAP SERPL CALCULATED.3IONS-SCNC: 6 MMOL/L (ref 4–13)
AST SERPL W P-5'-P-CCNC: 26 U/L (ref 5–45)
BASOPHILS # BLD AUTO: 0.02 THOUSANDS/ΜL (ref 0–0.1)
BASOPHILS NFR BLD AUTO: 0 % (ref 0–1)
BILIRUB SERPL-MCNC: 0.22 MG/DL (ref 0.2–1)
BUN SERPL-MCNC: 18 MG/DL (ref 5–25)
CALCIUM SERPL-MCNC: 9.3 MG/DL (ref 8.3–10.1)
CHLORIDE SERPL-SCNC: 110 MMOL/L (ref 100–108)
CO2 SERPL-SCNC: 28 MMOL/L (ref 21–32)
CREAT SERPL-MCNC: 0.66 MG/DL (ref 0.6–1.3)
EOSINOPHIL # BLD AUTO: 0 THOUSAND/ΜL (ref 0–0.61)
EOSINOPHIL NFR BLD AUTO: 0 % (ref 0–6)
ERYTHROCYTE [DISTWIDTH] IN BLOOD BY AUTOMATED COUNT: 15.4 % (ref 11.6–15.1)
GFR SERPL CREATININE-BSD FRML MDRD: 87 ML/MIN/1.73SQ M
GLUCOSE SERPL-MCNC: 106 MG/DL (ref 65–140)
HCT VFR BLD AUTO: 30.7 % (ref 34.8–46.1)
HGB BLD-MCNC: 9.7 G/DL (ref 11.5–15.4)
LYMPHOCYTES # BLD AUTO: 0.92 THOUSANDS/ΜL (ref 0.6–4.47)
LYMPHOCYTES NFR BLD AUTO: 6 % (ref 14–44)
MCH RBC QN AUTO: 33.9 PG (ref 26.8–34.3)
MCHC RBC AUTO-ENTMCNC: 31.6 G/DL (ref 31.4–37.4)
MCV RBC AUTO: 107 FL (ref 82–98)
MONOCYTES # BLD AUTO: 0.66 THOUSAND/ΜL (ref 0.17–1.22)
MONOCYTES NFR BLD AUTO: 5 % (ref 4–12)
NEUTROPHILS # BLD AUTO: 12.94 THOUSANDS/ΜL (ref 1.85–7.62)
NEUTS SEG NFR BLD AUTO: 89 % (ref 43–75)
NRBC BLD AUTO-RTO: 0 /100 WBCS
PLATELET # BLD AUTO: 210 THOUSANDS/UL (ref 149–390)
PMV BLD AUTO: 9.1 FL (ref 8.9–12.7)
POTASSIUM SERPL-SCNC: 3.9 MMOL/L (ref 3.5–5.3)
PROT SERPL-MCNC: 5.8 G/DL (ref 6.4–8.2)
RBC # BLD AUTO: 2.86 MILLION/UL (ref 3.81–5.12)
SODIUM SERPL-SCNC: 144 MMOL/L (ref 136–145)
WBC # BLD AUTO: 14.81 THOUSAND/UL (ref 4.31–10.16)

## 2018-01-19 PROCEDURE — 85025 COMPLETE CBC W/AUTO DIFF WBC: CPT | Performed by: INTERNAL MEDICINE

## 2018-01-19 PROCEDURE — 80053 COMPREHEN METABOLIC PANEL: CPT | Performed by: INTERNAL MEDICINE

## 2018-01-19 RX ORDER — AMOXICILLIN 250 MG
1 CAPSULE ORAL
Status: DISCONTINUED | OUTPATIENT
Start: 2018-01-19 | End: 2018-01-21 | Stop reason: HOSPADM

## 2018-01-19 RX ADMIN — SODIUM CHLORIDE 75 ML/HR: 0.9 INJECTION, SOLUTION INTRAVENOUS at 03:11

## 2018-01-19 RX ADMIN — Medication 1 TABLET: at 22:03

## 2018-01-19 RX ADMIN — NYSTATIN 500000 UNITS: 100000 SUSPENSION ORAL at 22:04

## 2018-01-19 RX ADMIN — SODIUM CHLORIDE 75 ML/HR: 0.9 INJECTION, SOLUTION INTRAVENOUS at 17:55

## 2018-01-19 RX ADMIN — HYDROCHLOROTHIAZIDE 25 MG: 25 TABLET ORAL at 10:02

## 2018-01-19 RX ADMIN — POTASSIUM CHLORIDE 20 MEQ: 1500 TABLET, EXTENDED RELEASE ORAL at 10:03

## 2018-01-19 RX ADMIN — ACYCLOVIR 400 MG: 800 TABLET ORAL at 10:02

## 2018-01-19 RX ADMIN — VINCRISTINE SULFATE: 1 INJECTION, SOLUTION INTRAVENOUS at 14:35

## 2018-01-19 RX ADMIN — CALCIUM CARBONATE 500 MG (1,250 MG)-VITAMIN D3 200 UNIT TABLET 1 TABLET: at 10:02

## 2018-01-19 RX ADMIN — LORATADINE 10 MG: 10 TABLET ORAL at 10:03

## 2018-01-19 RX ADMIN — PREDNISONE 100 MG: 20 TABLET ORAL at 12:40

## 2018-01-19 RX ADMIN — ACYCLOVIR 400 MG: 800 TABLET ORAL at 22:03

## 2018-01-19 RX ADMIN — PANTOPRAZOLE SODIUM 40 MG: 40 TABLET, DELAYED RELEASE ORAL at 06:15

## 2018-01-19 RX ADMIN — Medication 1 TABLET: at 10:02

## 2018-01-19 RX ADMIN — NYSTATIN 500000 UNITS: 100000 SUSPENSION ORAL at 12:40

## 2018-01-19 RX ADMIN — NYSTATIN 500000 UNITS: 100000 SUSPENSION ORAL at 17:58

## 2018-01-19 RX ADMIN — ENOXAPARIN SODIUM 100 MG: 100 INJECTION SUBCUTANEOUS at 22:04

## 2018-01-19 RX ADMIN — ENOXAPARIN SODIUM 100 MG: 100 INJECTION SUBCUTANEOUS at 10:01

## 2018-01-19 RX ADMIN — VERAPAMIL HYDROCHLORIDE 180 MG: 180 TABLET, FILM COATED, EXTENDED RELEASE ORAL at 10:02

## 2018-01-19 RX ADMIN — NYSTATIN 500000 UNITS: 100000 SUSPENSION ORAL at 10:02

## 2018-01-19 RX ADMIN — DEXAMETHASONE SODIUM PHOSPHATE: 10 INJECTION, SOLUTION INTRAMUSCULAR; INTRAVENOUS at 14:09

## 2018-01-19 RX ADMIN — ACETAMINOPHEN 650 MG: 325 TABLET, FILM COATED ORAL at 10:03

## 2018-01-19 NOTE — PROGRESS NOTES
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)   Assessment & Plan    Being treated for large cell lymphoma and marginal zone lymphoma  She will received chemotherapy and radiation, may be this is last round  Complaining of flushing intremittently               VTE Pharmacologic Prophylaxis:   Pharmacologic: Heparin  Mechanical VTE Prophylaxis in Place: Yes    Patient Centered Rounds: I have performed bedside rounds with nursing staff today  Discussions with Specialists or Other Care Team Provider: oncology    Education and Discussions with Family / Patient: patient    Time Spent for Care: 45 minutes  More than 50% of total time spent on counseling and coordination of care as described above  Current Length of Stay: 2 day(s)    Current Patient Status: Inpatient   Certification Statement: The patient will continue to require additional inpatient hospital stay due to [address]    Discharge Plan: per primary team    Code Status: Level 1 - Full Code      Subjective:   I am doing well today doctor  My blood pressure is good  Objective:     Vitals:   Temp (24hrs), Av 6 °F (36 4 °C), Min:97 5 °F (36 4 °C), Max:97 8 °F (36 6 °C)    HR:  [75-82] 75  Resp:  [18] 18  BP: (128-142)/(57-75) 131/57  SpO2:  [94 %-97 %] 94 %  Body mass index is 37 06 kg/m²  Input and Output Summary (last 24 hours):        Intake/Output Summary (Last 24 hours) at 18 1852  Last data filed at 18 1749   Gross per 24 hour   Intake          1892 65 ml   Output 4375 ml   Net         -2482 35 ml       Physical Exam:     Physical Exam   Constitutional: She is oriented to person, place, and time  Eyes: Conjunctivae are normal  Pupils are equal, round, and reactive to light  Cardiovascular: Normal rate, regular rhythm, normal heart sounds and intact distal pulses  Exam reveals no gallop and no friction rub  No murmur heard  Pulmonary/Chest: Effort normal and breath sounds normal  No respiratory distress  She has no wheezes  She has no rales  She exhibits no tenderness  Abdominal: Soft  Bowel sounds are normal  She exhibits no distension and no mass  There is no tenderness  There is no rebound and no guarding  Neurological: She is alert and oriented to person, place, and time  She has normal reflexes  She displays normal reflexes  No cranial nerve deficit  She exhibits normal muscle tone  Coordination normal    Skin: Skin is warm and dry  Psychiatric: She has a normal mood and affect  Her behavior is normal  Judgment and thought content normal    Nursing note and vitals reviewed  Additional Data:     Labs:      Results from last 7 days  Lab Units 01/19/18  0615   WBC Thousand/uL 14 81*   HEMOGLOBIN g/dL 9 7*   HEMATOCRIT % 30 7*   PLATELETS Thousands/uL 210   NEUTROS PCT % 89*   LYMPHS PCT % 6*   MONOS PCT % 5   EOS PCT % 0       Results from last 7 days  Lab Units 01/19/18  0615   SODIUM mmol/L 144   POTASSIUM mmol/L 3 9   CHLORIDE mmol/L 110*   CO2 mmol/L 28   BUN mg/dL 18   CREATININE mg/dL 0 66   CALCIUM mg/dL 9 3   TOTAL PROTEIN g/dL 5 8*   BILIRUBIN TOTAL mg/dL 0 22   ALK PHOS U/L 100   ALT U/L 79*   AST U/L 26   GLUCOSE RANDOM mg/dL 106           * I Have Reviewed All Lab Data Listed Above  * Additional Pertinent Lab Tests Reviewed:  [address] Admission Reviewed    Imaging:    Imaging Reports Reviewed Today Include:    Imaging Personally Reviewed by Myself Includes:       Recent Cultures (last 7 days):           Last 24 Hours Medication List:     acyclovir 400 mg Oral Q12H [address]   calcium carbonate-vitamin D 1 tablet Oral Daily With Breakfast   [START ON 1/21/2018] cyclophosphamide (CYTOXAN) IVPB 1,470 mg Intravenous Once   enoxaparin 100 mg Subcutaneous Q12H [address]   etoposide-DOXOrubicin-vincristine infusion  Intravenous Q24H   hydrochlorothiazide 25 mg Oral Daily   loratadine 10 mg Oral Daily   multivitamin-minerals 1 tablet Oral Daily   nystatin 500,000 Units Swish & Swallow 4x Daily   ondansetron with dexamethasone IVPB  Intravenous Daily   pantoprazole 40 mg Oral Early Morning   potassium chloride 20 mEq Oral Daily   predniSONE 100 mg Oral Daily   senna-docusate sodium 1 tablet Oral HS   verapamil 180 mg Oral Daily        Today, Patient Was Seen By: Salina Bamberger, MD    ** Please Note: Dictation voice to text software may have been used in the creation of this document   **

## 2018-01-19 NOTE — PROGRESS NOTES
Oncology Progress Note  Drea Conroy 78 y o  female MRN: [id number]  Unit/Bed#: [facility] 618-01 Encounter: [id number]      /75 (BP Location: Right arm)   Pulse 82   Temp 97 5 °F (36 4 °C) (Oral)   Resp 18   Ht 5' 3 5" (1 613 m)   Wt 96 4 kg (212 lb 8 4 oz)   SpO2 97%   BMI 37 06 kg/m²     Subjective: The patient is a 82-year-old female with history of orbital marginal zone lymphoma diagnosed in 2008  She had radiation therapy  She recently had recurrent lymphoma  Biopsy showed high-grade B-cell lymphoma  Therefore, she has 3 cycle of R-chop  Subsequently, chemotherapy regimen was changed  She was electively hospitalized for the 6 cycle of EPOCH  This is day 3 of 6 cycle of EPOCH  She is tolerating treatment very well  She has absolutely no nausea vomiting  She has mild fatigue  She denied any bleeding  She denied any respiratory symptoms  Her performance status is very well maintained  Objective:    General Appearance:    Alert, oriented        Eyes:    PERRL   Ears:    Normal external ear canals, both ears   Nose:   Nares normal, septum midline   Throat:   Mucosa moist  Pharynx without injection  Neck:   Supple       Lungs:     Clear to auscultation bilaterally   Chest Wall:    No tenderness or deformity    Heart:    Regular rate and rhythm       Abdomen:     Soft, non-tender, bowel sounds +, no organomegaly           Extremities:   Extremities no cyanosis or edema       Skin:   no rash or icterus      Lymph nodes:   Cervical, supraclavicular, and axillary nodes normal   Neurologic:   CNII-XII intact, normal strength, sensation and reflexes     throughout        Recent Results (from the past 48 hour(s))   CBC and differential    Collection Time: 01/17/18 10:06 AM   Result Value Ref Range    WBC 7 33 4 31 - 10 16 Thousand/uL    RBC 3 24 (L) 3 81 - 5 12 Million/uL    Hemoglobin 11 1 (L) 11 5 - 15 4 g/dL    Hematocrit 34 3 (L) 34 8 - 46 1 %     (H) 82 - 98 fL    MCH 34 3 26 8 - 34 3 pg    MCHC 32 4 31 4 - 37 4 g/dL    RDW 15 7 (H) 11 6 - 15 1 %    MPV 9 1 8 9 - 12 7 fL    Platelets 731 514 - 161 Thousands/uL    nRBC 0 /100 WBCs   Comprehensive metabolic panel    Collection Time: 01/17/18 10:06 AM   Result Value Ref Range    Sodium 142 136 - 145 mmol/L    Potassium 3 9 3 5 - 5 3 mmol/L    Chloride 108 100 - 108 mmol/L    CO2 28 21 - 32 mmol/L    Anion Gap 6 4 - 13 mmol/L    BUN 18 5 - 25 mg/dL    Creatinine 0 76 0 60 - 1 30 mg/dL    Glucose 117 65 - 140 mg/dL    Calcium 9 9 8 3 - 10 1 mg/dL    AST 36 5 - 45 U/L    ALT 86 (H) 12 - 78 U/L    Alkaline Phosphatase 129 (H) 46 - 116 U/L    Total Protein 6 9 6 4 - 8 2 g/dL    Albumin 3 9 3 5 - 5 0 g/dL    Total Bilirubin 0 27 0 20 - 1 00 mg/dL    eGFR 77 ml/min/1 73sq m   Manual Differential(PHLEBS Do Not Order)    Collection Time: 01/17/18 10:06 AM   Result Value Ref Range    Segmented % 64 43 - 75 %    Bands % 5 0 - 8 %    Lymphocytes % 6 (L) 14 - 44 %    Monocytes % 11 4 - 12 %    Eosinophils % 3 0 - 6 %    Basophils % 0 0 - 1 %    Metamyelocytes% 5 (H) 0 - 1 %    Myelocytes % 1 0 - 1 %    Atypical Lymphocytes % 5 (H) <=0 %    Absolute Neutrophils 5 06 1 85 - 7 62 Thousand/uL    Lymphocytes Absolute 0 44 (L) 0 60 - 4 47 Thousand/uL    Monocytes Absolute 0 81 0 00 - 1 22 Thousand/uL    Eosinophils Absolute 0 22 0 00 - 0 40 Thousand/uL    Basophils Absolute 0 00 0 00 - 0 10 Thousand/uL    Total Counted      Platelet Estimate Adequate Adequate   CBC and differential    Collection Time: 01/18/18  6:11 AM   Result Value Ref Range    WBC 13 58 (H) 4 31 - 10 16 Thousand/uL    RBC 2 93 (L) 3 81 - 5 12 Million/uL    Hemoglobin 10 1 (L) 11 5 - 15 4 g/dL    Hematocrit 31 0 (L) 34 8 - 46 1 %     (H) 82 - 98 fL    MCH 34 5 (H) 26 8 - 34 3 pg    MCHC 32 6 31 4 - 37 4 g/dL    RDW 15 3 (H) 11 6 - 15 1 %    MPV 9 4 8 9 - 12 7 fL    Platelets 549 488 - 805 Thousands/uL    nRBC 0 /100 WBCs   Comprehensive metabolic panel    Collection Time: 01/18/18  6:11 AM Result Value Ref Range    Sodium 142 136 - 145 mmol/L    Potassium 3 8 3 5 - 5 3 mmol/L    Chloride 108 100 - 108 mmol/L    CO2 26 21 - 32 mmol/L    Anion Gap 8 4 - 13 mmol/L    BUN 17 5 - 25 mg/dL    Creatinine 0 64 0 60 - 1 30 mg/dL    Glucose 118 65 - 140 mg/dL    Calcium 9 4 8 3 - 10 1 mg/dL    AST 37 5 - 45 U/L    ALT 84 (H) 12 - 78 U/L    Alkaline Phosphatase 110 46 - 116 U/L    Total Protein 6 0 (L) 6 4 - 8 2 g/dL    Albumin 3 3 (L) 3 5 - 5 0 g/dL    Total Bilirubin 0 26 0 20 - 1 00 mg/dL    eGFR 88 ml/min/1 73sq m   Manual Differential(PHLEBS Do Not Order)    Collection Time: 01/18/18  6:11 AM   Result Value Ref Range    Segmented % 84 (H) 43 - 75 %    Bands % 5 0 - 8 %    Lymphocytes % 7 (L) 14 - 44 %    Monocytes % 1 (L) 4 - 12 %    Eosinophils % 0 0 - 6 %    Basophils % 0 0 - 1 %    Myelocytes % 3 (H) 0 - 1 %    Absolute Neutrophils 12 09 (H) 1 85 - 7 62 Thousand/uL    Lymphocytes Absolute 0 95 0 60 - 4 47 Thousand/uL    Monocytes Absolute 0 14 0 00 - 1 22 Thousand/uL    Eosinophils Absolute 0 00 0 00 - 0 40 Thousand/uL    Basophils Absolute 0 00 0 00 - 0 10 Thousand/uL    Total Counted      RBC Morphology Present     Anisocytosis Present     Platelet Estimate Adequate Adequate   CBC and differential    Collection Time: 01/19/18  6:15 AM   Result Value Ref Range    WBC 14 81 (H) 4 31 - 10 16 Thousand/uL    RBC 2 86 (L) 3 81 - 5 12 Million/uL    Hemoglobin 9 7 (L) 11 5 - 15 4 g/dL    Hematocrit 30 7 (L) 34 8 - 46 1 %     (H) 82 - 98 fL    MCH 33 9 26 8 - 34 3 pg    MCHC 31 6 31 4 - 37 4 g/dL    RDW 15 4 (H) 11 6 - 15 1 %    MPV 9 1 8 9 - 12 7 fL    Platelets 408 973 - 900 Thousands/uL    nRBC 0 /100 WBCs    Neutrophils Relative 89 (H) 43 - 75 %    Lymphocytes Relative 6 (L) 14 - 44 %    Monocytes Relative 5 4 - 12 %    Eosinophils Relative 0 0 - 6 %    Basophils Relative 0 0 - 1 %    Neutrophils Absolute 12 94 (H) 1 85 - 7 62 Thousands/µL    Lymphocytes Absolute 0 92 0 60 - 4 47 Thousands/µL Monocytes Absolute 0 66 0 17 - 1 22 Thousand/µL    Eosinophils Absolute 0 00 0 00 - 0 61 Thousand/µL    Basophils Absolute 0 02 0 00 - 0 10 Thousands/µL   Comprehensive metabolic panel    Collection Time: 01/19/18  6:15 AM   Result Value Ref Range    Sodium 144 136 - 145 mmol/L    Potassium 3 9 3 5 - 5 3 mmol/L    Chloride 110 (H) 100 - 108 mmol/L    CO2 28 21 - 32 mmol/L    Anion Gap 6 4 - 13 mmol/L    BUN 18 5 - 25 mg/dL    Creatinine 0 66 0 60 - 1 30 mg/dL    Glucose 106 65 - 140 mg/dL    Calcium 9 3 8 3 - 10 1 mg/dL    AST 26 5 - 45 U/L    ALT 79 (H) 12 - 78 U/L    Alkaline Phosphatase 100 46 - 116 U/L    Total Protein 5 8 (L) 6 4 - 8 2 g/dL    Albumin 3 1 (L) 3 5 - 5 0 g/dL    Total Bilirubin 0 22 0 20 - 1 00 mg/dL    eGFR 87 ml/min/1 73sq m         Xr Chest Portable    Result Date: 1/18/2018  Narrative: CHEST  INDICATION: Shortness of breath  COMPARISON:  10/3/2017  VIEWS:   AP frontal; 1 image FINDINGS: Left chest wall port catheter is present  The cardiomediastinal silhouette is stable  The lungs are clear  No pleural effusion  Osseous structures are age appropriate  Impression: No active disease  Workstation performed: [id number]         Assessment :  Relapsed lymphoma with diffuse large B-cell lymphoma  Six cycle of EPOCH, day 3 with no toxicity  Plan: We will continue with current chemotherapy regimen  We will continue to monitor any toxicities  She is probably to be discharged on Monday  She is already scheduled to have Neulasta support on Tuesday next week

## 2018-01-20 PROBLEM — K59.00 CONSTIPATION: Status: ACTIVE | Noted: 2018-01-20

## 2018-01-20 RX ADMIN — ACYCLOVIR 400 MG: 800 TABLET ORAL at 10:18

## 2018-01-20 RX ADMIN — NYSTATIN 500000 UNITS: 100000 SUSPENSION ORAL at 10:16

## 2018-01-20 RX ADMIN — SODIUM CHLORIDE 75 ML/HR: 0.9 INJECTION, SOLUTION INTRAVENOUS at 21:29

## 2018-01-20 RX ADMIN — HYDROCHLOROTHIAZIDE 25 MG: 25 TABLET ORAL at 10:17

## 2018-01-20 RX ADMIN — Medication 1 TABLET: at 10:17

## 2018-01-20 RX ADMIN — NYSTATIN 500000 UNITS: 100000 SUSPENSION ORAL at 17:33

## 2018-01-20 RX ADMIN — LORATADINE 10 MG: 10 TABLET ORAL at 10:17

## 2018-01-20 RX ADMIN — ENOXAPARIN SODIUM 100 MG: 100 INJECTION SUBCUTANEOUS at 10:16

## 2018-01-20 RX ADMIN — SODIUM CHLORIDE 75 ML/HR: 0.9 INJECTION, SOLUTION INTRAVENOUS at 17:34

## 2018-01-20 RX ADMIN — CALCIUM CARBONATE 500 MG (1,250 MG)-VITAMIN D3 200 UNIT TABLET 1 TABLET: at 10:17

## 2018-01-20 RX ADMIN — PREDNISONE 100 MG: 20 TABLET ORAL at 12:50

## 2018-01-20 RX ADMIN — PANTOPRAZOLE SODIUM 40 MG: 40 TABLET, DELAYED RELEASE ORAL at 08:49

## 2018-01-20 RX ADMIN — VINCRISTINE SULFATE: 1 INJECTION, SOLUTION INTRAVENOUS at 15:34

## 2018-01-20 RX ADMIN — Medication 1 TABLET: at 21:35

## 2018-01-20 RX ADMIN — NYSTATIN 500000 UNITS: 100000 SUSPENSION ORAL at 21:34

## 2018-01-20 RX ADMIN — NYSTATIN 500000 UNITS: 100000 SUSPENSION ORAL at 12:50

## 2018-01-20 RX ADMIN — ACYCLOVIR 400 MG: 800 TABLET ORAL at 21:34

## 2018-01-20 RX ADMIN — POTASSIUM CHLORIDE 20 MEQ: 1500 TABLET, EXTENDED RELEASE ORAL at 10:16

## 2018-01-20 RX ADMIN — DEXAMETHASONE SODIUM PHOSPHATE: 10 INJECTION, SOLUTION INTRAMUSCULAR; INTRAVENOUS at 15:06

## 2018-01-20 RX ADMIN — ONDANSETRON 4 MG: 2 INJECTION INTRAMUSCULAR; INTRAVENOUS at 08:47

## 2018-01-20 RX ADMIN — ENOXAPARIN SODIUM 100 MG: 100 INJECTION SUBCUTANEOUS at 21:34

## 2018-01-20 RX ADMIN — VERAPAMIL HYDROCHLORIDE 180 MG: 180 TABLET, FILM COATED, EXTENDED RELEASE ORAL at 10:17

## 2018-01-20 NOTE — ASSESSMENT & PLAN NOTE
She has no BM in 3 days   She will be given senna and docusate  Will try warm prune juice and ambulation

## 2018-01-20 NOTE — PROGRESS NOTES
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 [facility])   Assessment & Plan    Being treated for large cell lymphoma and marginal zone lymphoma  She will received chemotherapy and radiation, may be this is last round  Complaining of flushing intremittently              VTE Pharmacologic Prophylaxis:   Pharmacologic: Enoxaparin (Lovenox)  Mechanical VTE Prophylaxis in Place: Yes    Patient Centered Rounds: I have performed bedside rounds with nursing staff today  Discussions with Specialists or Other Care Team Provider:     Education and Discussions with Family / Patient: patient    Time Spent for Care: 45 minutes  More than 50% of total time spent on counseling and coordination of care as described above  Current Length of Stay: 3 day(s)    Current Patient Status: Inpatient   Certification Statement: The patient will continue to require additional inpatient hospital stay due to chemotherapy per primary team    Discharge Plan: per primary team    Code Status: Level 1 - Full Code      Subjective:   I am doing well  I think my blood pressure is high because I am constipated  Objective:     Vitals:   Temp (24hrs), Av 8 °F (36 6 °C), Min:97 6 °F (36 4 °C), Max:98 1 °F (36 7 °C)    HR:  [62-83] 78  Resp:  [16-18] 18  BP: (128-140)/(62-71) 130/62  SpO2:  [94 %-96 %] 94 %  Body mass index is 37 06 kg/m²       Input and Output Summary (last 24 hours): Intake/Output Summary (Last 24 hours) at 01/20/18 1615  Last data filed at 01/20/18 1440   Gross per 24 hour   Intake          1998 73 ml   Output             3750 ml   Net         -1751 27 ml       Physical Exam:     Physical Exam   Constitutional: She is oriented to person, place, and time  She appears well-developed and well-nourished  HENT:   Head: Normocephalic  Eyes: Conjunctivae are normal  Pupils are equal, round, and reactive to light  Right eye exhibits no discharge  Left eye exhibits no discharge  No scleral icterus  Neck: Normal range of motion  Neck supple  JVD present  No tracheal deviation present  No thyromegaly present  Cardiovascular: Normal rate and regular rhythm  Exam reveals no gallop and no friction rub  Murmur heard  Pulmonary/Chest: Effort normal and breath sounds normal  No stridor  No respiratory distress  She has no wheezes  She has no rales  She exhibits no tenderness  Abdominal: Soft  Bowel sounds are normal  She exhibits no distension and no mass  There is no tenderness  There is no rebound and no guarding  Musculoskeletal: Normal range of motion  She exhibits no edema or tenderness  Lymphadenopathy:     She has no cervical adenopathy  Neurological: She is alert and oriented to person, place, and time  She has normal reflexes  She displays normal reflexes  No cranial nerve deficit  She exhibits normal muscle tone  Coordination normal    Skin: Skin is warm and dry  No rash noted  No erythema  No pallor  Nursing note and vitals reviewed        Additional Data:     Labs:      Results from last 7 days  Lab Units 01/19/18  0615   WBC Thousand/uL 14 81*   HEMOGLOBIN g/dL 9 7*   HEMATOCRIT % 30 7*   PLATELETS Thousands/uL 210   NEUTROS PCT % 89*   LYMPHS PCT % 6*   MONOS PCT % 5   EOS PCT % 0       Results from last 7 days  Lab Units 01/19/18  0615   SODIUM mmol/L 144   POTASSIUM mmol/L 3 9   CHLORIDE mmol/L 110*   CO2 mmol/L 28 BUN mg/dL 18   CREATININE mg/dL 0 66   CALCIUM mg/dL 9 3   TOTAL PROTEIN g/dL 5 8*   BILIRUBIN TOTAL mg/dL 0 22   ALK PHOS U/L 100   ALT U/L 79*   AST U/L 26   GLUCOSE RANDOM mg/dL 106           * I Have Reviewed All Lab Data Listed Above  * Additional Pertinent Lab Tests Reviewed: Frankie [address] Admission Reviewed    Imaging:    Imaging Reports Reviewed Today Include:    Imaging Personally Reviewed by Myself Includes:       Recent Cultures (last 7 days):           Last 24 Hours Medication List:     acyclovir 400 mg Oral Q12H [address]   calcium carbonate-vitamin D 1 tablet Oral Daily With Breakfast   [START ON 1/21/2018] cyclophosphamide (CYTOXAN) IVPB 1,470 mg Intravenous Once   enoxaparin 100 mg Subcutaneous Q12H [address]   etoposide-DOXOrubicin-vincristine infusion  Intravenous Q24H   hydrochlorothiazide 25 mg Oral Daily   loratadine 10 mg Oral Daily   multivitamin-minerals 1 tablet Oral Daily   nystatin 500,000 Units Swish & Swallow 4x Daily   ondansetron with dexamethasone IVPB  Intravenous Daily   pantoprazole 40 mg Oral Early Morning   potassium chloride 20 mEq Oral Daily   predniSONE 100 mg Oral Daily   senna-docusate sodium 1 tablet Oral HS   verapamil 180 mg Oral Daily        Today, Patient Was Seen By: Queenie Anderson MD    ** Please Note: Dictation voice to text software may have been used in the creation of this document   **

## 2018-01-20 NOTE — PROGRESS NOTES
Oncology Progress Note  kP Velazquez 78 y o  female MRN: [id number]  Unit/Bed#: [facility] 618-01 Encounter: [id number]      /67 (BP Location: Right arm)   Pulse 62   Temp 97 6 °F (36 4 °C) (Oral)   Resp 18   Ht 5' 3 5" (1 613 m)   Wt 96 4 kg (212 lb 8 4 oz)   SpO2 96%   BMI 37 06 kg/m²     Subjective: This is day 4 of EPOCH for her relapsed high-grade B-cell lymphoma  She is tolerating chemotherapy very well  She still has constipation, despite stool softener that she took last night  She has no respiratory symptoms  She is afebrile  She denied any pain  Her performance status is normal     Objective:    General Appearance:    Alert, oriented        Eyes:    PERRL   Ears:    Normal external ear canals, both ears   Nose:   Nares normal, septum midline   Throat:   Mucosa moist  Pharynx without injection  Neck:   Supple       Lungs:     Clear to auscultation bilaterally   Chest Wall:    No tenderness or deformity    Heart:    Regular rate and rhythm       Abdomen:     Soft, non-tender, bowel sounds +, no organomegaly           Extremities:   Extremities no cyanosis or edema       Skin:   no rash or icterus      Lymph nodes:   Cervical, supraclavicular, and axillary nodes normal   Neurologic:   CNII-XII intact, normal strength, sensation and reflexes     throughout        Recent Results (from the past 48 hour(s))   CBC and differential    Collection Time: 01/19/18  6:15 AM   Result Value Ref Range    WBC 14 81 (H) 4 31 - 10 16 Thousand/uL    RBC 2 86 (L) 3 81 - 5 12 Million/uL    Hemoglobin 9 7 (L) 11 5 - 15 4 g/dL    Hematocrit 30 7 (L) 34 8 - 46 1 %     (H) 82 - 98 fL    MCH 33 9 26 8 - 34 3 pg    MCHC 31 6 31 4 - 37 4 g/dL    RDW 15 4 (H) 11 6 - 15 1 %    MPV 9 1 8 9 - 12 7 fL    Platelets 682 812 - 040 Thousands/uL    nRBC 0 /100 WBCs    Neutrophils Relative 89 (H) 43 - 75 %    Lymphocytes Relative 6 (L) 14 - 44 %    Monocytes Relative 5 4 - 12 %    Eosinophils Relative 0 0 - 6 %    Basophils Relative 0 0 - 1 %    Neutrophils Absolute 12 94 (H) 1 85 - 7 62 Thousands/µL    Lymphocytes Absolute 0 92 0 60 - 4 47 Thousands/µL    Monocytes Absolute 0 66 0 17 - 1 22 Thousand/µL    Eosinophils Absolute 0 00 0 00 - 0 61 Thousand/µL    Basophils Absolute 0 02 0 00 - 0 10 Thousands/µL   Comprehensive metabolic panel    Collection Time: 01/19/18  6:15 AM   Result Value Ref Range    Sodium 144 136 - 145 mmol/L    Potassium 3 9 3 5 - 5 3 mmol/L    Chloride 110 (H) 100 - 108 mmol/L    CO2 28 21 - 32 mmol/L    Anion Gap 6 4 - 13 mmol/L    BUN 18 5 - 25 mg/dL    Creatinine 0 66 0 60 - 1 30 mg/dL    Glucose 106 65 - 140 mg/dL    Calcium 9 3 8 3 - 10 1 mg/dL    AST 26 5 - 45 U/L    ALT 79 (H) 12 - 78 U/L    Alkaline Phosphatase 100 46 - 116 U/L    Total Protein 5 8 (L) 6 4 - 8 2 g/dL    Albumin 3 1 (L) 3 5 - 5 0 g/dL    Total Bilirubin 0 22 0 20 - 1 00 mg/dL    eGFR 87 ml/min/1 73sq m         Xr Chest Portable    Result Date: 1/18/2018  Narrative: CHEST  INDICATION: Shortness of breath  COMPARISON:  10/3/2017  VIEWS:   AP frontal; 1 image FINDINGS: Left chest wall port catheter is present  The cardiomediastinal silhouette is stable  The lungs are clear  No pleural effusion  Osseous structures are age appropriate  Impression: No active disease  Workstation performed: [id number]         Assessment :  Relapsed diffuse large B-cell lymphoma  Day 4 of EPOCH with no significant toxicity  Plan: We will continue with current chemotherapy regimen  We will continue to monitor any toxicities

## 2018-01-21 VITALS
BODY MASS INDEX: 36.28 KG/M2 | SYSTOLIC BLOOD PRESSURE: 122 MMHG | HEIGHT: 64 IN | OXYGEN SATURATION: 95 % | HEART RATE: 71 BPM | TEMPERATURE: 98.8 F | DIASTOLIC BLOOD PRESSURE: 55 MMHG | RESPIRATION RATE: 18 BRPM | WEIGHT: 212.52 LBS

## 2018-01-21 RX ADMIN — ACYCLOVIR 400 MG: 800 TABLET ORAL at 08:33

## 2018-01-21 RX ADMIN — HYDROCHLOROTHIAZIDE 25 MG: 25 TABLET ORAL at 08:32

## 2018-01-21 RX ADMIN — PANTOPRAZOLE SODIUM 40 MG: 40 TABLET, DELAYED RELEASE ORAL at 06:09

## 2018-01-21 RX ADMIN — CYCLOPHOSPHAMIDE 1470 MG: 1 INJECTION, POWDER, FOR SOLUTION INTRAVENOUS; ORAL at 15:58

## 2018-01-21 RX ADMIN — VERAPAMIL HYDROCHLORIDE 180 MG: 180 TABLET, FILM COATED, EXTENDED RELEASE ORAL at 08:33

## 2018-01-21 RX ADMIN — POTASSIUM CHLORIDE 20 MEQ: 1500 TABLET, EXTENDED RELEASE ORAL at 08:33

## 2018-01-21 RX ADMIN — DEXAMETHASONE SODIUM PHOSPHATE: 10 INJECTION, SOLUTION INTRAMUSCULAR; INTRAVENOUS at 15:36

## 2018-01-21 RX ADMIN — NYSTATIN 500000 UNITS: 100000 SUSPENSION ORAL at 08:32

## 2018-01-21 RX ADMIN — PREDNISONE 100 MG: 20 TABLET ORAL at 12:41

## 2018-01-21 RX ADMIN — CALCIUM CARBONATE 500 MG (1,250 MG)-VITAMIN D3 200 UNIT TABLET 1 TABLET: at 08:32

## 2018-01-21 RX ADMIN — NYSTATIN 500000 UNITS: 100000 SUSPENSION ORAL at 11:29

## 2018-01-21 RX ADMIN — SODIUM CHLORIDE 333 ML/HR: 0.9 INJECTION, SOLUTION INTRAVENOUS at 12:41

## 2018-01-21 RX ADMIN — LORATADINE 10 MG: 10 TABLET ORAL at 08:32

## 2018-01-21 RX ADMIN — Medication 1 TABLET: at 08:32

## 2018-01-21 RX ADMIN — ENOXAPARIN SODIUM 100 MG: 100 INJECTION SUBCUTANEOUS at 08:32

## 2018-01-21 NOTE — DISCHARGE SUMMARY
Discharge Summary - Raquel Morelos 78 y o  female MRN: [id number]    Unit/Bed#: [facility] 445-76 Encounter: [id number]    Admission Date: 1/17/2018     Admitting Diagnosis: Unspecified b-cell lymphoma, extranodal and solid organ sites [C85 19]  Diffuse large b-cell lymphoma, lymph nodes of head, face, and neck [C83 31]    HPI:  A 29-year-old female who has history of orbital marginal zone lymphoma in 2007, status post radiation therapy  In 2017, she was diagnosed with recurrent lymphoma  However, histology has changed to diffuse large B-cell lymphoma  She has 3 cycle of CHOP  Subsequently, regimen was changed to [facility]  She was electively hospitalized for the 6 cycle of EPOCH  She had adequate CBC to start systemic chemotherapy  During the hospital stay, her vitals side has been stable  She tolerated treatment very well with minimal nausea  On January 21, 2018, she is going to be discharged upon completion of systemic chemotherapy  She is scheduled to have Neulasta support in January 23, 2018 as outpatient basis  She is going to be followed by Dr Luis Wyatt as outpatient  Procedures Performed: Chemotherapy    Hospital Course:  See above  Significant Findings, Care, Treatment and Services Provided:  None    Complications:  None    Discharge Diagnosis:  Diffuse large B-cell lymphoma  Condition at Discharge: good     Discharge instructions/Information to patient and family:   See after visit summary for information provided to patient and family  Provisions for Follow-Up Care:  See after visit summary for information related to follow-up care and any pertinent home health orders  Disposition: Home    Planned Readmission: No    Discharge Statement   I spent 30 minutes discharging the patient  This time was spent on the day of discharge  I had direct contact with the patient on the day of discharge         Discharge Medications:  See after visit summary for reconciled discharge medications provided to patient and family

## 2018-01-21 NOTE — PLAN OF CARE
Problem: Potential for Falls  Goal: Patient will remain free of falls  INTERVENTIONS:  - Assess patient frequently for physical needs  -  Identify cognitive and physical deficits and behaviors that affect risk of falls    -  [place] fall precautions as indicated by assessment   - Educate patient/family on patient safety including physical limitations  - Instruct patient to call for assistance with activity based on assessment  - Modify environment to reduce risk of injury  - Consider OT/PT consult to assist with strengthening/mobility   Outcome: Progressing      Problem: INFECTION - ADULT  Goal: Absence or prevention of progression during hospitalization  INTERVENTIONS:  - Assess and monitor for signs and symptoms of infection  - Monitor lab/diagnostic results  - Monitor all insertion sites, i e  indwelling lines, tubes, and drains  - Monitor endotracheal (as able) and nasal secretions for changes in amount and color  - [place] appropriate cooling/warming therapies per order  - Administer medications as ordered  - Instruct and encourage patient and family to use good hand hygiene technique  - Identify and instruct in appropriate isolation precautions for identified infection/condition   Outcome: Progressing      Problem: DISCHARGE PLANNING - CARE MANAGEMENT  Goal: Discharge to post-acute care or home with appropriate resources  INTERVENTIONS:  - Conduct assessment to determine patient/family and health care team treatment goals, and need for post-acute services based on payer coverage, community resources, and patient preferences, and barriers to discharge  - Address psychosocial, clinical, and financial barriers to discharge as identified in assessment in conjunction with the patient/family and health care team  - Arrange appropriate level of post-acute services according to patient's   needs and preference and payer coverage in collaboration with the physician and health care team  - Communicate with and update the patient/family, physician, and health care team regarding progress on the discharge plan  - Arrange appropriate transportation to post-acute venues   Outcome: Progressing

## 2018-01-22 ENCOUNTER — GENERIC CONVERSION - ENCOUNTER (OUTPATIENT)
Dept: OTHER | Facility: OTHER | Age: 78
End: 2018-01-22

## 2018-01-22 VITALS
HEIGHT: 62 IN | DIASTOLIC BLOOD PRESSURE: 64 MMHG | SYSTOLIC BLOOD PRESSURE: 116 MMHG | HEART RATE: 90 BPM | RESPIRATION RATE: 16 BRPM | BODY MASS INDEX: 39.27 KG/M2 | WEIGHT: 213.38 LBS | TEMPERATURE: 97.6 F | OXYGEN SATURATION: 94 %

## 2018-01-22 VITALS
OXYGEN SATURATION: 96 % | RESPIRATION RATE: 16 BRPM | HEART RATE: 96 BPM | WEIGHT: 216 LBS | BODY MASS INDEX: 39.75 KG/M2 | DIASTOLIC BLOOD PRESSURE: 70 MMHG | HEIGHT: 62 IN | SYSTOLIC BLOOD PRESSURE: 118 MMHG | TEMPERATURE: 97.7 F

## 2018-01-22 VITALS
SYSTOLIC BLOOD PRESSURE: 110 MMHG | DIASTOLIC BLOOD PRESSURE: 70 MMHG | RESPIRATION RATE: 16 BRPM | OXYGEN SATURATION: 94 % | HEART RATE: 100 BPM | HEIGHT: 62 IN | WEIGHT: 210 LBS | BODY MASS INDEX: 38.64 KG/M2 | TEMPERATURE: 97.7 F

## 2018-01-22 VITALS
HEIGHT: 62 IN | BODY MASS INDEX: 39.22 KG/M2 | TEMPERATURE: 98.1 F | WEIGHT: 213.13 LBS | RESPIRATION RATE: 15 BRPM | OXYGEN SATURATION: 96 % | HEART RATE: 77 BPM | SYSTOLIC BLOOD PRESSURE: 128 MMHG | DIASTOLIC BLOOD PRESSURE: 76 MMHG

## 2018-01-22 VITALS
OXYGEN SATURATION: 95 % | TEMPERATURE: 97.6 F | DIASTOLIC BLOOD PRESSURE: 72 MMHG | SYSTOLIC BLOOD PRESSURE: 124 MMHG | BODY MASS INDEX: 39.45 KG/M2 | RESPIRATION RATE: 15 BRPM | HEART RATE: 95 BPM | WEIGHT: 214.38 LBS | HEIGHT: 62 IN

## 2018-01-22 VITALS
DIASTOLIC BLOOD PRESSURE: 78 MMHG | SYSTOLIC BLOOD PRESSURE: 126 MMHG | TEMPERATURE: 97.5 F | WEIGHT: 214 LBS | HEART RATE: 93 BPM | HEIGHT: 62 IN | BODY MASS INDEX: 39.38 KG/M2 | OXYGEN SATURATION: 98 % | RESPIRATION RATE: 16 BRPM

## 2018-01-22 VITALS
OXYGEN SATURATION: 95 % | HEIGHT: 62 IN | RESPIRATION RATE: 15 BRPM | SYSTOLIC BLOOD PRESSURE: 126 MMHG | WEIGHT: 213.31 LBS | BODY MASS INDEX: 39.26 KG/M2 | DIASTOLIC BLOOD PRESSURE: 72 MMHG | HEART RATE: 104 BPM | TEMPERATURE: 99 F

## 2018-01-22 VITALS
TEMPERATURE: 98.8 F | HEART RATE: 94 BPM | RESPIRATION RATE: 15 BRPM | SYSTOLIC BLOOD PRESSURE: 130 MMHG | BODY MASS INDEX: 39.27 KG/M2 | OXYGEN SATURATION: 99 % | WEIGHT: 213.38 LBS | HEIGHT: 62 IN | DIASTOLIC BLOOD PRESSURE: 72 MMHG

## 2018-01-23 ENCOUNTER — HOSPITAL ENCOUNTER (OUTPATIENT)
Dept: INFUSION CENTER | Facility: CLINIC | Age: 78
Discharge: HOME | End: 2018-01-23
Payer: COMMERCIAL

## 2018-01-23 VITALS
TEMPERATURE: 98.4 F | DIASTOLIC BLOOD PRESSURE: 66 MMHG | RESPIRATION RATE: 18 BRPM | HEART RATE: 89 BPM | SYSTOLIC BLOOD PRESSURE: 115 MMHG

## 2018-01-23 PROCEDURE — 96372 THER/PROPH/DIAG INJ SC/IM: CPT

## 2018-01-23 RX ADMIN — PEGFILGRASTIM 6 MG: 6 INJECTION SUBCUTANEOUS at 14:35

## 2018-01-23 NOTE — PROCEDURES
Procedures by Car Jensen RN at 12/29/2017  12:41 PM      Author:  Car Jensen RN Service:  (none) Author Type:  Registered Nurse    Filed:  12/29/2017 12:44 PM Date of Service:  12/29/2017 12:41 PM Status:  Signed    :  Car Jensen RN (Registered Nurse)        Procedure Orders:       1  Insert PICC line [[id number]] ordered by Tomy Solano MD at 12/29/17 1140                  Insert PICC  line  Date/Time: 12/29/2017 12:41 PM  Performed by: Kyle Bridges by: Krystina Watt     Patient location:  Bedside  Other Assisting Provider:  Yes (comment)    Consent:     Consent obtained:  Written (physician obtained consent)    Consent given by:  Patient  Universal protocol:     Procedure explained and questions answered to patient or proxy's satisfaction: yes      Relevant documents present and verified: yes      Test results available and properly labeled: yes       Imaging studies available: yes      Required blood products, implants, devices, and special equipment available: yes      Site/side marked: yes      Immediately prior to procedure, a time out was called: yes      Patient identity confirmed:  Verbally with patient and arm band  Pre-procedure details:     Hand hygiene: Hand hygiene performed prior to insertion      Sterile barrier technique: All elements of maximal sterile technique followed      Skin preparation:  ChloraPrep    Skin preparation agent: Skin preparation agent completely dried prior to procedure    Indications:     PICC line indications: chemotherapy    Anesthesia (see MAR for exact dosages):      Anesthesia method:  Local infiltration    Local anesthetic:  Lidocaine 1% w/o epi (2 ml)  Procedure details:     Location:  Basilic    Vessel type: vein      Laterality:  Right    Approach: percutaneous technique used      Patient position:  Flat    Procedural supplies:  Double lumen    Catheter size:  5 Fr    Landmarks identified: yes      Ultrasound guidance: yes Sterile ultrasound techniques: Sterile gel and sterile probe covers were used      Number of attempts:  1    Successful placement: yes      Vessel of catheter tip end:  Sherlock 3CG confirmed (Lexus Waread to use,  reported to [place] )    Total catheter length (cm):  44    Catheter out on skin (cm):  0    Max flow rate:  999 ml / hr    Arm circumference:  39  Post-procedure details:     Post-procedure:  Dressing applied and securement device placed    Assessment:  Blood return through all ports and free fluid flow    Post-procedure complications: none      Patient tolerance of procedure:   Tolerated well, no immediate complications    Observer: Yes      Observer name:  Blair Pettit Infusion Tech                     Received for:Provider  EPIC   Dec 29 2017 12:45PM [facility] Standard Time

## 2018-01-23 NOTE — MISCELLANEOUS
Message   Recorded as Task   Date: 12/01/2017 11:55 AM, Created By: Jade Christianson   Task Name: Follow Up   Assigned To: Trina Sifuentes   Regarding Patient: Iva Hurst, Status: Active   CommentMarilee Toth - 01 Dec 2017 11:55 AM     TASK CREATED  Caller: Self; General Medical Question; [phone] (Home)  stated that she is suppose to start taking a medication today for her chemo on 12 6 17 at the hospital  Stated that there is nothing at the pharmacy  Call back at [phone] with Dr Ubaldo Chairez  He ordered Acyclovir 400mg BID PO, and Dapsone 100mg once a day PO  I sent these scripts to patient's [facility]  Spoke with patient and reviewed scripts sent and how to take the medications  Encouraged patient to call back with concerns  Active Problems    1  Abnormal urinalysis (791 9) (R82 90)   2  Acute bilateral low back pain without sciatica (724 2,338 19) (M54 5)   3  Acute cystitis without hematuria (595 0) (N30 00)   4  Acute maxillary sinusitis (461 0) (J01 00)   5  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 (704 09,E933 1) (L65 8,T45  1X5A)   6  Ankle pain, left (719 47) (M25 572)   7  Anticoagulated on heparin (V58 61) (Z79 01)   8  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)   9  Breast disorder (611 9) (N64 9)   10  Cataract (366 9) (H26 9)   11  Chemotherapy-induced nausea (787 02,E933 1) (R11 0,T45  1X5A)   12  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)   13  Diffuse large B-cell lymphoma of lymph nodes of multiple regions (202 88) (C83 38)   14  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)   15  Dyslipidemia (272 4) (E78 5)   16  Dysphagia (787 20) (R13 10)   17  Dyspnea (786 09) (R06 00)   18  Encounter for screening fecal occult blood testing (V76 51) (Z12 11)   19  Fatigue (780 79) (R53 83)   20  Flu vaccine need (V04 81) (Z23)   21  Foot fracture, left (825 20) (Z30 516N)   22   Gastroesophageal reflux disease (530 81) (K21 9)   23  Hospital discharge follow-up (V67 59) (Z09)   24  Hypertension (401 9) (I10)   25  Increased urinary frequency (788 41) (R35 0)   26  Itching (698 9) (L29 9)   27  Left bundle branch block (LBBB) (426 3) (I44 7)   28  Lip swelling (784 2) (R22 0)   29  Lymphadenopathy (785 6) (R59 1)   30  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)   31  Medicare annual wellness visit, subsequent (V70 0) (Z00 00)   32  Need for vaccination with 13-polyvalent pneumococcal conjugate vaccine (V03 82) (Z23)   33  Oral thrush (112 0) (B37 0)   34  Other screening mammogram (V76 12) (Z12 31)   35  Pain, foot, chronic, left (729 5,338 29) (M79 672,G89 29)   36  Phlebitis and thrombophlebitis of deep veins of upper extremities (451 83) (I80 8)   37  Preop cardiovascular exam (V72 81) (Z01 810)   38  Screening for colon cancer (V76 51) (Z12 11)   39  Screening for depression (V79 0) (Z13 89)   40  Screening for other and unspecified genitourinary condition (V81 6) (Z13 89)   41  Situational anxiety (300 09) (F41 8)   42  Special screening for other neurological conditions (V80 09) (Z13 89)   43  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)   44  Urticaria (708 9) (L50 9)   45  Vitamin B12 deficiency (266 2) (E53 8)   46  Vitamin D deficiency (268 9) (E55 9)    Current Meds   1  Allopurinol 300 MG Oral Tablet; 1 tab PO daily; Therapy: 41DLC1106 to (Last IZ:58QHX2888)  Requested for: 37GYZ3920 Ordered   2  Calcium 600 TABS; Therapy: (Sangeeta Simple) to Recorded   3  Fish Oil CAPS; Therapy: (Sangeeta Simple) to Recorded   4  HydroCHLOROthiazide 25 MG Oral Tablet; Take 1 tablet daily; Therapy: 49HHM6166 to (Evaluate:67Mxb3034)  Requested for: 20LTN2916; Last   Rx:06Oct2017 Ordered   5  Klor-Con M20 20 MEQ Oral Tablet Extended Release; Take 1 tablet daily; Therapy: 76BFH3980 to (Sharad Love)  Requested for: 17CKR5265; Last   Rx:02Nov2017 Ordered   6   Lovenox 100 MG/ML Subcutaneous Solution (Enoxaparin Sodium); Therapy: 04UAB1922 to Recorded   7  Melatonin 3 MG Oral Tablet; Therapy: 37RRE9347 to Recorded   8  Multi Complete CAPS; Therapy: (Mckee Dills) to Recorded   9  Nystatin 398794 UNIT/ML Mouth/Throat Suspension; SWISH AND SWALLOW 5ML 4   TIMES DAILY; Therapy: 86GPN1870 to (Evaluate:04Elo8341)  Requested for: 31Oct2017; Last   Rx:31Oct2017 Ordered   10  Omeprazole 40 MG Oral Capsule Delayed Release; take 1 capsule daily; Therapy: 96IYP9815 to (Evaluate:19Nov2017)  Requested for: 27NHC0344; Last    Rx:54Yhk2260 Ordered   11  Ondansetron HCl - 8 MG Oral Tablet; 1 TAB PO Q 8 HR PRN N/V;    Therapy: 42JZM7446 to (Last OP:50EWQ2647)  Requested for: 92GQR9660 Ordered   12  PredniSONE 50 MG Oral Tablet; 1 tab po bid x5 days with food with each chemo cycle; Therapy: 29FOW4401 to (Last Faina Fournier)  Requested for: 56KLA8055 Ordered   13  SLPG Magic Mouthwash 1:1:1 maalox/diphenhydramine/lidocaine; USE 4 TIMES DAILY; Therapy: (0523-0817515) to Recorded   14  Verapamil HCl  MG Oral Tablet Extended Release; Take 1 tablet daily; Therapy: 91EFL3777 to (YZNEXCAN:12XBJ1994)  Requested for: 14ASY7446; Last    Rx:03Gxv8695 Ordered   15  Vitamin B12 100 MCG Oral Tablet; Therapy: (Mckee Dills) to Recorded   16  Vitamin C CAPS; Therapy: (Mckee Dills) to Recorded   17  Vitamin D 1000 UNIT Oral Tablet; TAKE 1 TABLET DAILY; Therapy: 80VDF6773 to Recorded    Allergies    1  Bactrim TABS   2  Aspirin CHEW   3  CeleBREX CAPS   4  NSAIDs   5   Sulfa Drugs    Plan  Diffuse large B-cell lymphoma of lymph nodes of neck    · Acyclovir 400 MG Oral Tablet; take 1 tablet bid   · Dapsone 100 MG Oral Tablet; TAKE 1 TABLET ONCE DAILY    Signatures   Electronically signed by : Humberto Colunga, ; Dec  1 2017 12:11PM EST                       (Author)

## 2018-01-23 NOTE — MISCELLANEOUS
Message   Recorded as Task   Date: 12/18/2017 09:12 AM, Created By: Trevor Overton   Task Name: Call Back   Assigned To: Deepali Lorenzo   Regarding Patient: Devorah Pires, Status: Active   CommentMarvel Merl - 18 Dec 2017 9:12 AM     TASK CREATED  Patient kameron had Neulasta on Tuesday has been taking Tylenol for aches , does not seem to be working  Asking if she could take something else, and how long will this last    Spoke with pt and is using Claritin as well  This is first time pain has occurred post Neulasta  Pt has Tramadol and informed can take as ordered and considered HS to sleep well  Active Problems    1  Abnormal urinalysis (791 9) (R82 90)   2  Acute bilateral low back pain without sciatica (724 2,338 19) (M54 5)   3  Acute cystitis without hematuria (595 0) (N30 00)   4  Acute maxillary sinusitis (461 0) (J01 00)   5  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 (704 09,E933 1) (L65 8,T45  1X5A)   6  Ankle pain, left (719 47) (M25 572)   7  Anticoagulated on heparin (V58 61) (Z79 01)   8  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)   9  Breast disorder (611 9) (N64 9)   10  Cataract (366 9) (H26 9)   11  Chemotherapy-induced nausea (787 02,E933 1) (R11 0,T45  1X5A)   12  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)   13  Diffuse large B-cell lymphoma of lymph nodes of multiple regions (202 88) (C83 38)   14  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)   15  Dyslipidemia (272 4) (E78 5)   16  Dysphagia (787 20) (R13 10)   17  Dyspnea (786 09) (R06 00)   18  Encounter for screening fecal occult blood testing (V76 51) (Z12 11)   19  Fatigue (780 79) (R53 83)   20  Flu vaccine need (V04 81) (Z23)   21  Foot fracture, left (825 20) (S92 902A)   22  Gastroesophageal reflux disease (530 81) (K21 9)   23  Hospital discharge follow-up (V67 59) (Z09)   24  Hypertension (401 9) (I10)   25  Increased urinary frequency (788 41) (R35 0)   26   Itching (698 9) (L29 9)   27  Left bundle branch block (LBBB) (426 3) (I44 7)   28  Lip swelling (784 2) (R22 0)   29  Lymphadenopathy (785 6) (R59 1)   30  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)   31  Medicare annual wellness visit, subsequent (V70 0) (Z00 00)   32  Need for vaccination with 13-polyvalent pneumococcal conjugate vaccine (V03 82) (Z23)   33  Oral thrush (112 0) (B37 0)   34  Other screening mammogram (V76 12) (Z12 31)   35  Pain, foot, chronic, left (729 5,338 29) (M79 672,G89 29)   36  Phlebitis and thrombophlebitis of deep veins of upper extremities (451 83) (I80 8)   37  Preop cardiovascular exam (V72 81) (Z01 810)   38  Screening for colon cancer (V76 51) (Z12 11)   39  Screening for depression (V79 0) (Z13 89)   40  Screening for other and unspecified genitourinary condition (V81 6) (Z13 89)   41  Situational anxiety (300 09) (F41 8)   42  Special screening for other neurological conditions (V80 09) (Z13 89)   43  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)   44  Urticaria (708 9) (L50 9)   45  Vitamin B12 deficiency (266 2) (E53 8)   46  Vitamin D deficiency (268 9) (E55 9)    Current Meds   1  Acyclovir 400 MG Oral Tablet; take 1 tablet bid; Therapy: 97GUM9130 to (Evaluate:01Mar2018)  Requested for: 88Vdp9730; Last   Rx:01Dec2017 Ordered   2  Allopurinol 300 MG Oral Tablet; 1 tab PO daily; Therapy: 82TZJ8601 to (Last Carmenza Rucker)  Requested for: 73BQS0311 Ordered   3  Calcium 600 TABS; Therapy: (Gillian Lau) to Recorded   4  Dapsone 100 MG Oral Tablet; TAKE 1 TABLET ONCE DAILY; Therapy: 00RIS6813 to (Evaluate:03Sev8585)  Requested for: 56UZD2989; Last   Rx:01Dec2017 Ordered   5  Enoxaparin Sodium 100 MG/ML Subcutaneous Solution (Lovenox); 100mg/ml SQ   injections BID; Therapy: 89ADY9651 to (Evaluate:03Jan2018)  Requested for: 77CXS2726; Last   Rx:98Jwo6033 Ordered   6  Fish Oil CAPS; Therapy: (Gillian Lau) to Recorded   7  HydroCHLOROthiazide 25 MG Oral Tablet;  Take 1 tablet daily; Therapy: 26VGE4952 to (Evaluate:04Apr2018)  Requested for: 78OEA5366; Last   Rx:25Rjk7795 Ordered   8  Klor-Con M20 20 MEQ Oral Tablet Extended Release; Take 1 tablet daily; Therapy: 16CJT9006 to (Villa Alvarez)  Requested for: 94ECC5933; Last   Rx:02Nov2017 Ordered   9  Melatonin 3 MG Oral Tablet; Therapy: 72ZCU3324 to Recorded   10  Multi Complete CAPS; Therapy: (Dang Back) to Recorded   11  Nystatin 347670 UNIT/ML Mouth/Throat Suspension; SWISH AND SWALLOW 5ML 4    TIMES DAILY; Therapy: 50BKM7271 to (Evaluate:96Kyh8823)  Requested for: 31Oct2017; Last    Rx:31Oct2017 Ordered   12  Omeprazole 40 MG Oral Capsule Delayed Release; take 1 capsule daily; Therapy: 90QTS3942 to (Evaluate:19Nov2017)  Requested for: 95PVI5224; Last    Rx:62Axb4423 Ordered   13  Ondansetron HCl - 8 MG Oral Tablet; 1 TAB PO Q 8 HR PRN N/V;    Therapy: 39NLT8371 to (Last Rx:34Cfv8054)  Requested for: 71YDL3719 Ordered   14  PredniSONE 50 MG Oral Tablet; 1 tab po bid x5 days with food with each chemo cycle; Therapy: 05PSC0036 to (Last Kathy Breed)  Requested for: 50LYT4999 Ordered   15  SLPG Magic Mouthwash 1:1:1 maalox/diphenhydramine/lidocaine; USE 4 TIMES DAILY; Therapy: (Terrell Raza) to Recorded   16  Verapamil HCl  MG Oral Tablet Extended Release; Take 1 tablet daily; Therapy: 25UHB0202 to (QHJJQEWT:61SBU8270)  Requested for: 85SBN8247; Last    Rx:75Uke4512 Ordered   17  Vitamin B12 100 MCG Oral Tablet; Therapy: (Dang Back) to Recorded   18  Vitamin C CAPS; Therapy: (Dang Back) to Recorded   19  Vitamin D 1000 UNIT Oral Tablet; TAKE 1 TABLET DAILY; Therapy: 50SYZ2632 to Recorded    Allergies    1  Bactrim TABS   2  Aspirin CHEW   3  CeleBREX CAPS   4  NSAIDs   5   Sulfa Drugs    Signatures   Electronically signed by : Bri Alarcon RN; Dec 18 2017  9:39AM EST                       (Author)

## 2018-01-23 NOTE — MISCELLANEOUS
Message  Ejection fraction has dropped to 40%  I explained to patient on the phone on 12/22/2017  She will not be getting Adriamycin anymore        Signatures   Electronically signed by : Chirs Whaley MD; Dec 23 2017 11:40AM EST                       (Author)

## 2018-01-23 NOTE — PROGRESS NOTES
Patient presents today for Neulasta post chemotherapy inpatient  Patient offered no complaints  Neulsta injected to right upper arm SQ without any complication

## 2018-01-23 NOTE — MISCELLANEOUS
Message  Spoke with patient and reviewed her hospital admission will not be at [facility] but at [place] on 12/6/17  Additionally, scheduled patient's Neulasta for 12/12/17 at [facility]  Patient is okay with this  Active Problems    1  Abnormal urinalysis (791 9) (R82 90)   2  Acute bilateral low back pain without sciatica (724 2,338 19) (M54 5)   3  Acute cystitis without hematuria (595 0) (N30 00)   4  Acute maxillary sinusitis (461 0) (J01 00)   5  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 (704 09,E933 1) (L65 8,T45  1X5A)   6  Ankle pain, left (719 47) (M25 572)   7  Anticoagulated on heparin (V58 61) (Z79 01)   8  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)   9  Breast disorder (611 9) (N64 9)   10  Cataract (366 9) (H26 9)   11  Chemotherapy-induced nausea (787 02,E933 1) (R11 0,T45  1X5A)   12  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)   13  Diffuse large B-cell lymphoma of lymph nodes of multiple regions (202 88) (C83 38)   14  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)   15  Dyslipidemia (272 4) (E78 5)   16  Dysphagia (787 20) (R13 10)   17  Dyspnea (786 09) (R06 00)   18  Encounter for screening fecal occult blood testing (V76 51) (Z12 11)   19  Fatigue (780 79) (R53 83)   20  Flu vaccine need (V04 81) (Z23)   21  Foot fracture, left (825 20) (S92 902A)   22  Gastroesophageal reflux disease (530 81) (K21 9)   23  Hospital discharge follow-up (V67 59) (Z09)   24  Hypertension (401 9) (I10)   25  Increased urinary frequency (788 41) (R35 0)   26  Itching (698 9) (L29 9)   27  Left bundle branch block (LBBB) (426 3) (I44 7)   28  Lip swelling (784 2) (R22 0)   29  Lymphadenopathy (785 6) (R59 1)   30  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)   31  Medicare annual wellness visit, subsequent (V70 0) (Z00 00)   32  Need for vaccination with 13-polyvalent pneumococcal conjugate vaccine (V03 82) (Z23)   33   Oral thrush (112 0) (B37 0) 34  Other screening mammogram (V76 12) (Z12 31)   35  Pain, foot, chronic, left (729 5,338 29) (M79 672,G89 29)   36  Phlebitis and thrombophlebitis of deep veins of upper extremities (451 83) (I80 8)   37  Preop cardiovascular exam (V72 81) (Z01 810)   38  Screening for colon cancer (V76 51) (Z12 11)   39  Screening for depression (V79 0) (Z13 89)   40  Screening for other and unspecified genitourinary condition (V81 6) (Z13 89)   41  Situational anxiety (300 09) (F41 8)   42  Special screening for other neurological conditions (V80 09) (Z13 89)   43  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)   44  Urticaria (708 9) (L50 9)   45  Vitamin B12 deficiency (266 2) (E53 8)   46  Vitamin D deficiency (268 9) (E55 9)    Current Meds   1  Acyclovir 400 MG Oral Tablet; take 1 tablet bid; Therapy: 97QJW9744 to (Romie Covington)  Requested for: 26JWL5273; Last   Rx:57Jwp2072; Status: ACTIVE - Retrospective By Protocol Authorization Ordered   2  Allopurinol 300 MG Oral Tablet; 1 tab PO daily; Therapy: 51GKA8373 to (Last Donnita Person)  Requested for: 13RIY0140 Ordered   3  Calcium 600 TABS; Therapy: (Wonda Anibal) to Recorded   4  Dapsone 100 MG Oral Tablet; TAKE 1 TABLET ONCE DAILY; Therapy: 76UOM1172 to (Evaluate:46Mpj3643)  Requested for: 06MRR7254; Last   Rx:51Pce6584; Status: ACTIVE - Retrospective By Protocol Authorization Ordered   5  Fish Oil CAPS; Therapy: (Wonda Anibal) to Recorded   6  HydroCHLOROthiazide 25 MG Oral Tablet; Take 1 tablet daily; Therapy: 29UZX7629 to (Evaluate:62Zjj0812)  Requested for: 74GLY7954; Last   Rx:06Oct2017 Ordered   7  Klor-Con M20 20 MEQ Oral Tablet Extended Release; Take 1 tablet daily; Therapy: 61HOM3665 to (Dru Booker)  Requested for: 85NPU0051; Last   Rx:02Nov2017 Ordered   8  Lovenox 100 MG/ML Subcutaneous Solution (Enoxaparin Sodium); Therapy: 42VTU9644 to Recorded   9  Melatonin 3 MG Oral Tablet;    Therapy: 11TQY1281 to Recorded 10  Multi Complete CAPS; Therapy: (Roxine Cluck) to Recorded   11  Nystatin 569776 UNIT/ML Mouth/Throat Suspension; SWISH AND SWALLOW 5ML 4    TIMES DAILY; Therapy: 09SWI3727 to (Evaluate:93Fiv3472)  Requested for: 31Oct2017; Last    Rx:31Oct2017 Ordered   12  Omeprazole 40 MG Oral Capsule Delayed Release; take 1 capsule daily; Therapy: 98KCI8204 to (Evaluate:19Nov2017)  Requested for: 17QUV4654; Last    Rx:80Mik9392 Ordered   13  Ondansetron HCl - 8 MG Oral Tablet; 1 TAB PO Q 8 HR PRN N/V;    Therapy: 16JAU6977 to (Last Rx:13Isr6105)  Requested for: 04PGZ0289 Ordered   14  PredniSONE 50 MG Oral Tablet; 1 tab po bid x5 days with food with each chemo cycle; Therapy: 38MME0214 to (Last Patsie Osgood)  Requested for: 05ERV8367 Ordered   15  SLPG Magic Mouthwash 1:1:1 maalox/diphenhydramine/lidocaine; USE 4 TIMES DAILY; Therapy: [phone]) to Recorded   16  Verapamil HCl  MG Oral Tablet Extended Release; Take 1 tablet daily; Therapy: 15NEL5898 to (YMBXNTCB:62CNO7457)  Requested for: 03RQV7608; Last    Rx:69Gyn4439 Ordered   17  Vitamin B12 100 MCG Oral Tablet; Therapy: (Roxine Cluck) to Recorded   18  Vitamin C CAPS; Therapy: (Roxine Cluck) to Recorded   19  Vitamin D 1000 UNIT Oral Tablet; TAKE 1 TABLET DAILY; Therapy: 45XGT9720 to Recorded    Allergies    1  Bactrim TABS   2  Aspirin CHEW   3  CeleBREX CAPS   4  NSAIDs   5   Sulfa Drugs    Signatures   Electronically signed by : Tatyana Hill, ; Dec  1 2017  4:01PM EST                       (Author)

## 2018-01-23 NOTE — MISCELLANEOUS
Message   Recorded as Task   Date: 12/11/2017 11:44 AM, Created By: Griselda Escobar   Task Name: Follow Up   Assigned To: Deepali Lorenzo   Regarding Patient: Maria Luz Shepherd, Status: Active   CommentCarltzoie Mcdonald - 11 Dec 2017 11:44 AM     TASK CREATED  Caller: Self; General Medical Question; [phone] (Home); [phone] (Work)  Patient was D/C today from [facility] for extended chemo and Dr Esther Hatch sent her home on medicine Acyclovir 2 200MG BID ten days  Dr Libertad Gabriel had her on 400mg bid pt is confused and asking what dose she should take call [facility]#[phone]  Spoke with pt and instructed to follow Dr Lucille Branham directions  Active Problems    1  Abnormal urinalysis (791 9) (R82 90)   2  Acute bilateral low back pain without sciatica (724 2,338 19) (M54 5)   3  Acute cystitis without hematuria (595 0) (N30 00)   4  Acute maxillary sinusitis (461 0) (J01 00)   5  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 (704 09,E933 1) (L65 8,T45  1X5A)   6  Ankle pain, left (719 47) (M25 572)   7  Anticoagulated on heparin (V58 61) (Z79 01)   8  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)   9  Breast disorder (611 9) (N64 9)   10  Cataract (366 9) (H26 9)   11  Chemotherapy-induced nausea (787 02,E933 1) (R11 0,T45  1X5A)   12  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)   13  Diffuse large B-cell lymphoma of lymph nodes of multiple regions (202 88) (C83 38)   14  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)   15  Dyslipidemia (272 4) (E78 5)   16  Dysphagia (787 20) (R13 10)   17  Dyspnea (786 09) (R06 00)   18  Encounter for screening fecal occult blood testing (V76 51) (Z12 11)   19  Fatigue (780 79) (R53 83)   20  Flu vaccine need (V04 81) (Z23)   21  Foot fracture, left (825 20) (S92 902A)   22  Gastroesophageal reflux disease (530 81) (K21 9)   23  Hospital discharge follow-up (V67 59) (Z09)   24  Hypertension (401 9) (I10)   25   Increased urinary frequency (788 41) (R35 0)   26  Itching (698 9) (L29 9)   27  Left bundle branch block (LBBB) (426 3) (I44 7)   28  Lip swelling (784 2) (R22 0)   29  Lymphadenopathy (785 6) (R59 1)   30  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)   31  Medicare annual wellness visit, subsequent (V70 0) (Z00 00)   32  Need for vaccination with 13-polyvalent pneumococcal conjugate vaccine (V03 82) (Z23)   33  Oral thrush (112 0) (B37 0)   34  Other screening mammogram (V76 12) (Z12 31)   35  Pain, foot, chronic, left (729 5,338 29) (M79 672,G89 29)   36  Phlebitis and thrombophlebitis of deep veins of upper extremities (451 83) (I80 8)   37  Preop cardiovascular exam (V72 81) (Z01 810)   38  Screening for colon cancer (V76 51) (Z12 11)   39  Screening for depression (V79 0) (Z13 89)   40  Screening for other and unspecified genitourinary condition (V81 6) (Z13 89)   41  Situational anxiety (300 09) (F41 8)   42  Special screening for other neurological conditions (V80 09) (Z13 89)   43  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)   44  Urticaria (708 9) (L50 9)   45  Vitamin B12 deficiency (266 2) (E53 8)   46  Vitamin D deficiency (268 9) (E55 9)    Current Meds   1  Acyclovir 400 MG Oral Tablet; take 1 tablet bid; Therapy: 15DXL2799 to (Evaluate:01Mar2018)  Requested for: 71Iym7343; Last   Rx:33Fmo2075 Ordered   2  Allopurinol 300 MG Oral Tablet; 1 tab PO daily; Therapy: 72OVX2980 to (Last Larraine Curlin)  Requested for: 41YEX6074 Ordered   3  Calcium 600 TABS; Therapy: (John Davenport) to Recorded   4  Dapsone 100 MG Oral Tablet; TAKE 1 TABLET ONCE DAILY; Therapy: 20HKU4548 to (Evaluate:45Rjy9344)  Requested for: 51JFY3038; Last   Rx:01Dec2017 Ordered   5  Enoxaparin Sodium 100 MG/ML Subcutaneous Solution (Lovenox); 100mg/ml SQ   injections BID; Therapy: 78MDM6910 to (Evaluate:03Jan2018)  Requested for: 13MBD3749; Last   Rx:92Jag3658 Ordered   6  Fish Oil CAPS; Therapy: (Sameerl Ulisesk) to Recorded   7  HydroCHLOROthiazide 25 MG Oral Tablet; Take 1 tablet daily; Therapy: 17UCG7828 to (Evaluate:04Apr2018)  Requested for: 88HQE3678; Last   Rx:14Quh4448 Ordered   8  Klor-Con M20 20 MEQ Oral Tablet Extended Release; Take 1 tablet daily; Therapy: 93DJK1902 to (Jada Smart)  Requested for: 50KUV8281; Last   Rx:02Nov2017 Ordered   9  Melatonin 3 MG Oral Tablet; Therapy: 89JJB5714 to Recorded   10  Multi Complete CAPS; Therapy: (53 730 54 84) to Recorded   11  Nystatin 066371 UNIT/ML Mouth/Throat Suspension; SWISH AND SWALLOW 5ML 4    TIMES DAILY; Therapy: 26ZOT6950 to (Evaluate:51Wzr7467)  Requested for: 31Oct2017; Last    Rx:46Yex1771 Ordered   12  Omeprazole 40 MG Oral Capsule Delayed Release; take 1 capsule daily; Therapy: 15MJV2623 to (Evaluate:19Nov2017)  Requested for: 54HTA1850; Last    Rx:61Ywp5406 Ordered   13  Ondansetron HCl - 8 MG Oral Tablet; 1 TAB PO Q 8 HR PRN N/V;    Therapy: 50JWH3697 to (Last Rx:44Fqc1360)  Requested for: 90VEC0242 Ordered   14  PredniSONE 50 MG Oral Tablet; 1 tab po bid x5 days with food with each chemo cycle; Therapy: 09NIK5739 to (Last Dot Duvall)  Requested for: 59YPC1502 Ordered   15  SLPG Magic Mouthwash 1:1:1 maalox/diphenhydramine/lidocaine; USE 4 TIMES DAILY; Therapy: (0370 9563239) to Recorded   16  Verapamil HCl  MG Oral Tablet Extended Release; Take 1 tablet daily; Therapy: 39BAD8322 to (NCJLRHBN:05LNC4539)  Requested for: 87VPS7822; Last    Rx:10Cis6020 Ordered   17  Vitamin B12 100 MCG Oral Tablet; Therapy: (53 730 54 84) to Recorded   18  Vitamin C CAPS; Therapy: (53 730 54 84) to Recorded   19  Vitamin D 1000 UNIT Oral Tablet; TAKE 1 TABLET DAILY; Therapy: 87FTB8859 to Recorded    Allergies    1  Bactrim TABS   2  Aspirin CHEW   3  CeleBREX CAPS   4  NSAIDs   5   Sulfa Drugs    Signatures   Electronically signed by : Elin Earl RN; Dec 11 2017 11:53AM EST                       (Author)

## 2018-01-23 NOTE — MISCELLANEOUS
Message   Recorded as Task   Date: 12/04/2017 12:10 PM, Created By: Kathleen Bob   Task Name: Med Renewal Request   Assigned To: Elena Cochran   Regarding Patient: Roxann Fernandez, Status: In Progress   Alma Falcon - 04 Dec 2017 12:10 PM     TASK CREATED  Caller: Self; [phone] (Home); [phone] (Work)  patient is calling because she has not received any Lovenox and she only has 1 dose left  You can send her refill to the pharmacy on file  Any questions you can reach her at the number provided  Suha Rizo - 04 Dec 2017 12:38 PM     TASK IN PROGRESS   Spoke with Dr Lexi Scott and he reviewed we can order this for the patient and instructed me to order 100mg/ml SQ injections BID for 30 days  Sent script to patient's [facility] pharmacy and reviewed she can pick them up  Active Problems    1  Abnormal urinalysis (791 9) (R82 90)   2  Acute bilateral low back pain without sciatica (724 2,338 19) (M54 5)   3  Acute cystitis without hematuria (595 0) (N30 00)   4  Acute maxillary sinusitis (461 0) (J01 00)   5  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 (704 09,E933 1) (L65 8,T45  1X5A)   6  Ankle pain, left (719 47) (M25 572)   7  Anticoagulated on heparin (V58 61) (Z79 01)   8  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)   9  Breast disorder (611 9) (N64 9)   10  Cataract (366 9) (H26 9)   11  Chemotherapy-induced nausea (787 02,E933 1) (R11 0,T45  1X5A)   12  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)   13  Diffuse large B-cell lymphoma of lymph nodes of multiple regions (202 88) (C83 38)   14  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)   15  Dyslipidemia (272 4) (E78 5)   16  Dysphagia (787 20) (R13 10)   17  Dyspnea (786 09) (R06 00)   18  Encounter for screening fecal occult blood testing (V76 51) (Z12 11)   19  Fatigue (780 79) (R53 83)   20  Flu vaccine need (V04 81) (Z23)   21   Foot fracture, left (840 20) (S93 933W) 22  Gastroesophageal reflux disease (530 81) (K21 9)   23  Hospital discharge follow-up (V67 59) (Z09)   24  Hypertension (401 9) (I10)   25  Increased urinary frequency (788 41) (R35 0)   26  Itching (698 9) (L29 9)   27  Left bundle branch block (LBBB) (426 3) (I44 7)   28  Lip swelling (784 2) (R22 0)   29  Lymphadenopathy (785 6) (R59 1)   30  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)   31  Medicare annual wellness visit, subsequent (V70 0) (Z00 00)   32  Need for vaccination with 13-polyvalent pneumococcal conjugate vaccine (V03 82) (Z23)   33  Oral thrush (112 0) (B37 0)   34  Other screening mammogram (V76 12) (Z12 31)   35  Pain, foot, chronic, left (729 5,338 29) (M79 672,G89 29)   36  Phlebitis and thrombophlebitis of deep veins of upper extremities (451 83) (I80 8)   37  Preop cardiovascular exam (V72 81) (Z01 810)   38  Screening for colon cancer (V76 51) (Z12 11)   39  Screening for depression (V79 0) (Z13 89)   40  Screening for other and unspecified genitourinary condition (V81 6) (Z13 89)   41  Situational anxiety (300 09) (F41 8)   42  Special screening for other neurological conditions (V80 09) (Z13 89)   43  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)   44  Urticaria (708 9) (L50 9)   45  Vitamin B12 deficiency (266 2) (E53 8)   46  Vitamin D deficiency (268 9) (E55 9)    Current Meds   1  Acyclovir 400 MG Oral Tablet; take 1 tablet bid; Therapy: 06EWM8293 to (Evaluate:01Mar2018)  Requested for: 92Vnd4073; Last   Rx:62Nju8315 Ordered   2  Allopurinol 300 MG Oral Tablet; 1 tab PO daily; Therapy: 68AKK7362 to (Last Kayden Alcantar)  Requested for: 49HLA2759 Ordered   3  Calcium 600 TABS; Therapy: (Katherine Blood) to Recorded   4  Dapsone 100 MG Oral Tablet; TAKE 1 TABLET ONCE DAILY; Therapy: 20UPQ0292 to (Evaluate:17Kxi7435)  Requested for: 18THX6200; Last   Rx:01Dec2017 Ordered   5  Fish Oil CAPS; Therapy: (Katherine Blood) to Recorded   6   HydroCHLOROthiazide 25 MG Oral Tablet; Take 1 tablet daily; Therapy: 41ZZL4875 to (Evaluate:04Apr2018)  Requested for: 32OTP4824; Last   Rx:67Yuy3773 Ordered   7  Klor-Con M20 20 MEQ Oral Tablet Extended Release; Take 1 tablet daily; Therapy: 57BUS3824 to (Rochele Coral)  Requested for: 62YXE0864; Last   Rx:02Nov2017 Ordered   8  Lovenox 100 MG/ML Subcutaneous Solution; Therapy: 18ZHE7826 to Recorded   9  Melatonin 3 MG Oral Tablet; Therapy: 77JLS4040 to Recorded   10  Multi Complete CAPS; Therapy: (Johanny Dragon) to Recorded   11  Nystatin 365793 UNIT/ML Mouth/Throat Suspension; SWISH AND SWALLOW 5ML 4    TIMES DAILY; Therapy: 82LVZ2002 to (Evaluate:83Zdf2357)  Requested for: 31Oct2017; Last    Rx:31Oct2017 Ordered   12  Omeprazole 40 MG Oral Capsule Delayed Release; take 1 capsule daily; Therapy: 28LXI5437 to (Evaluate:19Nov2017)  Requested for: 76TWG1219; Last    Rx:85Usi7127 Ordered   13  Ondansetron HCl - 8 MG Oral Tablet; 1 TAB PO Q 8 HR PRN N/V;    Therapy: 97AEH8176 to (Last Rx:04Vme9148)  Requested for: 54YGJ7596 Ordered   14  PredniSONE 50 MG Oral Tablet; 1 tab po bid x5 days with food with each chemo cycle; Therapy: 97SXW7487 to (Last Ivan Queenie)  Requested for: 24WHZ6046 Ordered   15  SLPG Magic Mouthwash 1:1:1 maalox/diphenhydramine/lidocaine; USE 4 TIMES DAILY; Therapy: (0472 51 11 42) to Recorded   16  Verapamil HCl  MG Oral Tablet Extended Release; Take 1 tablet daily; Therapy: 77LSV1436 to (QRSDQNEN:21FHV6405)  Requested for: 44IMC1873; Last    Rx:76Mqh8151 Ordered   17  Vitamin B12 100 MCG Oral Tablet; Therapy: (Johanny Dragon) to Recorded   18  Vitamin C CAPS; Therapy: (Johanny Dragon) to Recorded   19  Vitamin D 1000 UNIT Oral Tablet; TAKE 1 TABLET DAILY; Therapy: 28PWW3844 to Recorded    Allergies    1  Bactrim TABS   2  Aspirin CHEW   3  CeleBREX CAPS   4  NSAIDs   5   Sulfa Drugs    Plan  Thrombosis of left subclavian vein    · Enoxaparin Sodium 100 MG/ML Subcutaneous Solution (Lovenox); 100mg/ml  SQ injections BID    Signatures   Electronically signed by : Jose Alberto Nunez, ; Dec  4 2017 12:40PM EST                       (Author)

## 2018-01-24 VITALS
WEIGHT: 212.5 LBS | TEMPERATURE: 98.3 F | HEART RATE: 108 BPM | OXYGEN SATURATION: 95 % | DIASTOLIC BLOOD PRESSURE: 72 MMHG | RESPIRATION RATE: 16 BRPM | HEIGHT: 62 IN | SYSTOLIC BLOOD PRESSURE: 124 MMHG | BODY MASS INDEX: 39.11 KG/M2

## 2018-01-24 VITALS
HEART RATE: 119 BPM | HEIGHT: 62 IN | OXYGEN SATURATION: 98 % | WEIGHT: 209.5 LBS | RESPIRATION RATE: 16 BRPM | SYSTOLIC BLOOD PRESSURE: 140 MMHG | TEMPERATURE: 98.9 F | DIASTOLIC BLOOD PRESSURE: 72 MMHG | BODY MASS INDEX: 38.55 KG/M2

## 2018-01-24 VITALS
RESPIRATION RATE: 16 BRPM | WEIGHT: 210.13 LBS | TEMPERATURE: 98.1 F | HEART RATE: 112 BPM | DIASTOLIC BLOOD PRESSURE: 78 MMHG | HEIGHT: 62 IN | OXYGEN SATURATION: 94 % | SYSTOLIC BLOOD PRESSURE: 142 MMHG | BODY MASS INDEX: 38.67 KG/M2

## 2018-01-24 VITALS
WEIGHT: 215.38 LBS | RESPIRATION RATE: 16 BRPM | BODY MASS INDEX: 39.63 KG/M2 | HEART RATE: 113 BPM | HEIGHT: 62 IN | DIASTOLIC BLOOD PRESSURE: 68 MMHG | OXYGEN SATURATION: 93 % | SYSTOLIC BLOOD PRESSURE: 134 MMHG | TEMPERATURE: 99.5 F

## 2018-01-24 NOTE — MISCELLANEOUS
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  She was discharged to home  Medications reviewed and updated today  She did not schedule a follow up appointment  She refused a follow up appointment due to Patient will f/u with oncologist due to the nature of her illness  She states she must try to stay away from others that are ill at this time  The patient is currently asymptomatic  Counseling was provided to the patient  Communication performed and completed by Cem Bello      Active Problems    1  Abnormal urinalysis (791 9) (R82 90)   2  Acute bilateral low back pain without sciatica (724 2,338 19) (M54 5)   3  Acute cystitis without hematuria (595 0) (N30 00)   4  Acute maxillary sinusitis (461 0) (J01 00)   5  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 (704 09,E933 1) (L65 8,T45  1X5A)   6  Ankle pain, left (719 47) (M25 572)   7  Anticoagulated on heparin (V58 61) (Z79 01)   8  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)   9  Breast disorder (611 9) (N64 9)   10  Cataract (366 9) (H26 9)   11  Chemotherapy-induced nausea (787 02,E933 1) (R11 0,T45  1X5A)   12  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)   13  Diffuse large B-cell lymphoma of lymph nodes of multiple regions (202 88) (C83 38)   14  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)   15  Dyslipidemia (272 4) (E78 5)   16  Dysphagia (787 20) (R13 10)   17  Dyspnea (786 09) (R06 00)   18  Encounter for screening fecal occult blood testing (V76 51) (Z12 11)   19  Fatigue (780 79) (R53 83)   20  Flu vaccine need (V04 81) (Z23)   21  Foot fracture, left (825 20) (S92 902A)   22  Gastroesophageal reflux disease (530 81) (K21 9)   23   Hospital discharge follow-up (V67 59) (Z09)   24  Hypertension (401 9) (I10)   25  Increased urinary frequency (788 41) (R35 0)   26  Itching (698 9) (L29 9)   27  Left bundle branch block (LBBB) (426 3) (I44 7)   28  Lip swelling (784 2) (R22 0)   29  Lymphadenopathy (785 6) (R59 1)   30  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)   31  Medicare annual wellness visit, subsequent (V70 0) (Z00 00)   32  Need for vaccination with 13-polyvalent pneumococcal conjugate vaccine (V03 82) (Z23)   33  Oral thrush (112 0) (B37 0)   34  Other screening mammogram (V76 12) (Z12 31)   35  Pain, foot, chronic, left (729 5,338 29) (M79 672,G89 29)   36  Phlebitis and thrombophlebitis of deep veins of upper extremities (451 83) (I80 8)   37  Preop cardiovascular exam (V72 81) (Z01 810)   38  Screening for colon cancer (V76 51) (Z12 11)   39  Screening for depression (V79 0) (Z13 89)   40  Screening for other and unspecified genitourinary condition (V81 6) (Z13 89)   41  Situational anxiety (300 09) (F41 8)   42  Special screening for other neurological conditions (V80 09) (Z13 89)   43  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)   44  Urticaria (708 9) (L50 9)   45  Vitamin B12 deficiency (266 2) (E53 8)   46  Vitamin D deficiency (268 9) (E55 9)    Past Medical History    1  History of Bruit of right carotid artery (785 9) (R09 89)   2  History of Cellulitis (682 9) (L03 90)   3  Contact dermatitis (692 9) (L25 9)   4  History of Encounter for screening mammogram for malignant neoplasm of breast   (V76 12) (Z12 31)   5  History of Esophageal stricture (530 3) (K22 2)   6  History of basal cell carcinoma (V10 83) (Z85 828)   7  History of hypertension (V12 59) (Z86 79)   8  History of impacted cerumen (V12 49) (Z86 69)   9  History of osteoarthritis (V13 4) (Z87 39)   10  History of osteopenia (V13 59) (Z87 39)   11  History of vaginitis (V13 29) (Z87 42)   12  History of Medicare annual wellness visit, initial (V70 0) (Z00 00)   13   History of Need for pneumococcal vaccine (V03 82) (Z23)   14  History of Osteoarthrosis Of The Ankle/Foot (715 97)   15  History of Plantar fasciitis (728 71) (M72 2)   16  History of Plantar fasciitis of left foot (728 71) (M72 2)   17  History of Polyp of sigmoid colon (211 3) (D12 5)   18  History of Screening for colorectal cancer (V76 51) (Z12 11,Z12 12)   19  History of Vulvar abscess (616 4) (N76 4)    Surgical History    1  History of Ankle Surgery   2  History of Biopsy Lymph Node   3  History of Cataract Surgery   4  History of Esophagoscopy Rigid With Insertion Of Plastic Tube/Stent   5  History of Hysterectomy    Family History  Mother    1  Family history of Multiple Myeloma  Father    2  Family history of Heart Disease (V17 49)   3  Family history of Hypertension (V17 49)  Sister    4  Family history of Heart Disease (V17 49)   5  Family history of Hypertension (V17 49)  Family History    6  Family history of Arthritis (V17 7)   7  Family history of Breast Cancer (V16 3)   8  Family history of Osteoporosis (V17 81)    Social History    · Former smoker (S18 41) (M01 525)   · Never Drank Alcohol   · Uses Safety Equipment - Seatbelts    Current Meds   1  Acyclovir 400 MG Oral Tablet; take 1 tablet bid; Therapy: 15UST0698 to (Evaluate:01Mar2018)  Requested for: 09Aiv4096; Last   Rx:01Dec2017 Ordered   2  Allopurinol 300 MG Oral Tablet; 1 tab PO daily; Therapy: 50OOI4789 to (Last Nonda Manges)  Requested for: 15Qax7750; Status:   ACTIVE - Renewal Denied Ordered   3  Calcium 600 TABS; Therapy: (Clarke Pineda) to Recorded   4  Dapsone 100 MG Oral Tablet; TAKE 1 TABLET ONCE DAILY; Therapy: 17SQB0510 to (Evaluate:32Zrw8719)  Requested for: 29SDW0404; Last   Rx:01Dec2017 Ordered   5  Enoxaparin Sodium 100 MG/ML Subcutaneous Solution; 100mg/ml SQ injections BID; Therapy: 31TFE1203 to (Evaluate:15Apr2018)  Requested for: 72WZN3507; Last   Rx:15Jan2018 Ordered   6  Fish Oil CAPS;    Therapy: (Krissy Khmer) to Recorded   7  HydroCHLOROthiazide 25 MG Oral Tablet; Take 1 tablet daily; Therapy: 99IAI8005 to (Evaluate:04Apr2018)  Requested for: 62PTE1402; Last   Rx:06Oct2017 Ordered   8  Klor-Con M20 20 MEQ Oral Tablet Extended Release; Take 1 tablet daily; Therapy: 14EQL9252 to (Willchuckie Speaker)  Requested for: 10OFG3420; Last   Rx:87Hrs7469 Ordered   9  Melatonin 3 MG Oral Tablet; Therapy: 39SWO6524 to Recorded   10  Multi Complete CAPS; Therapy: (Krissy Khmer) to Recorded   11  Nystatin 597148 UNIT/ML Mouth/Throat Suspension; SWISH AND SWALLOW 5ML 4    TIMES DAILY; Therapy: 27QAO3235 to (Evaluate:41Vkp6458)  Requested for: 31Oct2017; Last    Rx:31Oct2017 Ordered   12  Omeprazole 40 MG Oral Capsule Delayed Release; take 1 capsule daily; Therapy: 91AWE1037 to (Evaluate:19Jun2018)  Requested for: 25Mff1554; Last    Rx:93Vcw5756 Ordered   13  Ondansetron HCl - 8 MG Oral Tablet; 1 TAB PO Q 8 HR PRN N/V;    Therapy: 83XFU6153 to (Last Rx:14Qcd3952)  Requested for: 24XLV2187 Ordered   14  SLPG Magic Mouthwash 1:1:1 maalox/diphenhydramine/lidocaine; USE 4 TIMES DAILY; Therapy: ([phone]) to Recorded   15  Verapamil HCl  MG Oral Tablet Extended Release; Take 1 tablet daily; Therapy: 90AMR8760 to (HLBBYIVN:32OBG3397)  Requested for: 11UII6495; Last    Rx:67Xda7499 Ordered   16  Vitamin B12 100 MCG Oral Tablet; Therapy: (Krissy Khmer) to Recorded   17  Vitamin C CAPS; Therapy: (Krissy Khmer) to Recorded   18  Vitamin D 1000 UNIT Oral Tablet; TAKE 1 TABLET DAILY; Therapy: 59WME9351 to Recorded    Allergies    1  Bactrim TABS   2  Aspirin CHEW   3  CeleBREX CAPS   4  NSAIDs   5  Sulfa Drugs    Health Management  Other screening mammogram   Digital Bilateral Screening Mammogram With CAD; every 1 year; Last 00VBJ4619; Next Due:  23DJT8970; Overdue  Screening for colon cancer   COLONOSCOPY; every 5 years; Last 99Uwd5028;  Next Due: 54GQQ6702; Active  Health Maintenance   Medicare Annual Wellness Visit; every 1 year; Next Due: 60CEU6431; Overdue    Message   Recorded as Task   Date: 01/21/2018 06:01 PM, Created By: System   Task Name: Hospital ARTI   Assigned To:  Denisse Myers   Regarding Patient: Freeman Gonzalez, Status: Active   Comment:    System - 21 Jan 2018 6:01 PM     Patient discharged from hospital   Patient Name: Harrison Garcia  Patient YOB: 1940  Discharge Date: 1/21/2018  Facility: [address] [place] [address]   Electronically signed by : Javid Del Cid DO; Jan 23 2018 12:21PM EST                       (Author)

## 2018-01-24 NOTE — MISCELLANEOUS
Message   Recorded as Task   Date: 01/22/2018 09:43 AM, Created By: Shirley Knapp   Task Name: Call Back   Assigned To: Deepali Lorenzo   Regarding Patient: Frank Spangler, Status: Active   CommentVincentdiamado [place] - 22 Jan 2018 9:43 AM     TASK CREATED  Caller: Self; General Medical Question; [phone] (Home)  stated that she was discharged from the hospital yesterday  She has a question on the neulasta shot & the lovanox  Req a call back at [phone]   Spoke with pt and was instructed to stop Lovenox but wanted to know what Dr Eduin Huber thought  D/W Dr Eduin Huber and instructed to continue  Also asked if OK to get Neulasta tomorrow and is  Active Problems    1  Abnormal urinalysis (791 9) (R82 90)   2  Acute bilateral low back pain without sciatica (724 2,338 19) (M54 5)   3  Acute cystitis without hematuria (595 0) (N30 00)   4  Acute maxillary sinusitis (461 0) (J01 00)   5  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 (704 09,E933 1) (L65 8,T45  1X5A)   6  Ankle pain, left (719 47) (M25 572)   7  Anticoagulated on heparin (V58 61) (Z79 01)   8  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)   9  Breast disorder (611 9) (N64 9)   10  Cataract (366 9) (H26 9)   11  Chemotherapy-induced nausea (787 02,E933 1) (R11 0,T45  1X5A)   12  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)   13  Diffuse large B-cell lymphoma of lymph nodes of multiple regions (202 88) (C83 38)   14  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)   15  Dyslipidemia (272 4) (E78 5)   16  Dysphagia (787 20) (R13 10)   17  Dyspnea (786 09) (R06 00)   18  Encounter for screening fecal occult blood testing (V76 51) (Z12 11)   19  Fatigue (780 79) (R53 83)   20  Flu vaccine need (V04 81) (Z23)   21  Foot fracture, left (825 20) (S91 902A)   22  Gastroesophageal reflux disease (530 81) (K21 9)   23  Hospital discharge follow-up (V67 59) (Z09)   24  Hypertension (401 9) (I10)   25  Increased urinary frequency (788 41) (R35 0)   26  Itching (698 9) (L29 9)   27  Left bundle branch block (LBBB) (426 3) (I44 7)   28  Lip swelling (784 2) (R22 0)   29  Lymphadenopathy (785 6) (R59 1)   30  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)   31  Medicare annual wellness visit, subsequent (V70 0) (Z00 00)   32  Need for vaccination with 13-polyvalent pneumococcal conjugate vaccine (V03 82) (Z23)   33  Oral thrush (112 0) (B37 0)   34  Other screening mammogram (V76 12) (Z12 31)   35  Pain, foot, chronic, left (729 5,338 29) (M79 672,G89 29)   36  Phlebitis and thrombophlebitis of deep veins of upper extremities (451 83) (I80 8)   37  Preop cardiovascular exam (V72 81) (Z01 810)   38  Screening for colon cancer (V76 51) (Z12 11)   39  Screening for depression (V79 0) (Z13 89)   40  Screening for other and unspecified genitourinary condition (V81 6) (Z13 89)   41  Situational anxiety (300 09) (F41 8)   42  Special screening for other neurological conditions (V80 09) (Z13 89)   43  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)   44  Urticaria (708 9) (L50 9)   45  Vitamin B12 deficiency (266 2) (E53 8)   46  Vitamin D deficiency (268 9) (E55 9)    Current Meds   1  Acyclovir 400 MG Oral Tablet; take 1 tablet bid; Therapy: 89VWA4683 to (Evaluate:01Mar2018)  Requested for: 98Wur6419; Last   Rx:36Wam0765 Ordered   2  Allopurinol 300 MG Oral Tablet; 1 tab PO daily; Therapy: 21QFO4368 to (Last Jessica Gentleman)  Requested for: 96Iew1792; Status:   ACTIVE - Renewal Denied Ordered   3  Calcium 600 TABS; Therapy: (Sade Simple) to Recorded   4  Dapsone 100 MG Oral Tablet; TAKE 1 TABLET ONCE DAILY; Therapy: 01KBQ4819 to (Evaluate:20Uzc3337)  Requested for: 59SRF3062; Last   Rx:60Qvi6612 Ordered   5  Enoxaparin Sodium 100 MG/ML Subcutaneous Solution (Lovenox); 100mg/ml SQ   injections BID; Therapy: 43QZG2265 to (Evaluate:15Apr2018)  Requested for: 12MEG4485; Last   Rx:15Jan2018 Ordered   6   Fish Oil CAPS;   Therapy: (Diana Strauss) to Recorded   7  HydroCHLOROthiazide 25 MG Oral Tablet; Take 1 tablet daily; Therapy: 28NIH7155 to (Evaluate:04Apr2018)  Requested for: 13EAP9901; Last   Rx:06Oct2017 Ordered   8  Klor-Con M20 20 MEQ Oral Tablet Extended Release; Take 1 tablet daily; Therapy: 46PIC7986 to (Bessie Westbrook)  Requested for: 41YJR1809; Last   Rx:02Nov2017 Ordered   9  Melatonin 3 MG Oral Tablet; Therapy: 80EIC2136 to Recorded   10  Multi Complete CAPS; Therapy: (Diana Strauss) to Recorded   11  Nystatin 119615 UNIT/ML Mouth/Throat Suspension; SWISH AND SWALLOW 5ML 4    TIMES DAILY; Therapy: 19FKM0059 to (Evaluate:94Edw5442)  Requested for: 31Oct2017; Last    Rx:31Oct2017 Ordered   12  Omeprazole 40 MG Oral Capsule Delayed Release; take 1 capsule daily; Therapy: 78CXH3914 to (Evaluate:19Jun2018)  Requested for: 12Fpz6276; Last    Rx:28Nui7454 Ordered   13  Ondansetron HCl - 8 MG Oral Tablet; 1 TAB PO Q 8 HR PRN N/V;    Therapy: 43GNY9147 to (Last Rx:82Wff8865)  Requested for: 50NLW4177 Ordered   14  SLPG Magic Mouthwash 1:1:1 maalox/diphenhydramine/lidocaine; USE 4 TIMES DAILY; Therapy: [phone]) to Recorded   15  Verapamil HCl  MG Oral Tablet Extended Release; Take 1 tablet daily; Therapy: 52NFS2746 to (QBZYVEQK:23HBC7631)  Requested for: 83AMA1036; Last    Rx:82Wqh4699 Ordered   16  Vitamin B12 100 MCG Oral Tablet; Therapy: (Diana Strauss) to Recorded   17  Vitamin C CAPS; Therapy: (Diana Strauss) to Recorded   18  Vitamin D 1000 UNIT Oral Tablet; TAKE 1 TABLET DAILY; Therapy: 38JED0906 to Recorded    Allergies    1  Bactrim TABS   2  Aspirin CHEW   3  CeleBREX CAPS   4  NSAIDs   5   Sulfa Drugs    Signatures   Electronically signed by : Waldo Lemus RN; Jan 22 2018 10:25AM EST                       (Author)

## 2018-01-30 NOTE — MISCELLANEOUS
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)  2  Anticoagulated on heparin (V58 61) (Z79 01)  3  Gastroesophageal reflux disease (530 81) (K21 9)  4  Hospital discharge follow-up (Z26 39) (Z09)    Plan  Gastroesophageal reflux disease    · Renew: Omeprazole 40 MG Oral Capsule Delayed Release; take 1 capsule daily  Rx By: Titi Blankenship; Dispense: 90 Days ; #:90 Capsule Delayed Release; Refill:   1;For: Gastroesophageal reflux disease; ROSALIO = N; Verified Transmission to   [id number] Last Updated By: System, SureScripts; 12/21/2017 7:59:25 AM    Discussion/Summary  Discussion Summary:   Patient has echo scheduled for later today to assess EF and is obtaining routine labs tomorrow   Continue to follow up with hem/onc tomorrow  She has 2 more rounds of OP chemo, tolerating lovenox injs well   GERD - restart protonix, may supplement with tums or maaloxx prn; return if no improvement or start with fever, chills, dysphagia   Counseling Documentation With Imm: The patient was counseled regarding diagnostic results, instructions for management, risk factor reductions, patient and family education, impressions, risks and benefits of treatment options, importance of compliance with treatment  Medication SE Review and Pt Understands Tx: Possible side effects of new medications were reviewed with the patient/guardian today  The treatment plan was reviewed with the patient/guardian  The patient/guardian understands and agrees with the treatment plan      Chief Complaint  Chief Complaint Free Text Note Form: Pt is here for a ARTI      History of Present Illness  TCM Communication [facility]: The patient is being contacted for follow-up after hospitalization  Hospital records were reviewed  She was hospitalized at [facility]  The date of admission: 12/6/2017, date of discharge: 12/11/2017  Diagnosis: diffuse large B cell lymphoma, lymph nodes of multiple sites   She was discharged to home  Medications reviewed and updated today  She scheduled a follow up appointment  Symptoms: weakness and headache, but no fever, no dizziness, no fatigue, no cough, no shortness of breath, no chest pain, no back pain on left side, no back pain on right side, no arm pain left side, no arm pain on right side, no leg pain on left side, no leg pain on right side, no upper abdominal pain, no middle abdominal pain, no lower abdominal pain, no rash:, no anorexia, no nausea, no vomiting, no loose stools, no constipation, no pain with urinating, no incisional pain and no wound drainage  bone pain now resolved Counseling was provided to the patient  Topics counseled included instructions for management and patient and family education  Communication performed and completed by1  Mckenzie Blood    HPI: Patient is a 79 yo female with diffuse B cell lymph [facility] who was admitted 12/6 for chemotherapy to monitor for complications due to having a highly aggressive form of lymphoma found on pathology  She tolerated her chemo well and had no complications during her stay  She is following with Dr Marissa Rubio office tomorrow and has two more rounds of outpatient chemo  She is having an echo done later today to asses EF and labs tomorrow prior to appointment with Dr Jovani Agustin  She continues to be on lovenox bid inj for recent provoked upper L extremity DVT  She was discharged on 12/11 and since discharge has been feeling well with exception of bone pain that last x 2 days after receiving Neulasta injection  She also has been having acid reflux but has been out of protonix so she took her husbands nexium which helped  She did have thrush and was taking nystatin rinse         1 Amended By: Shamar Fontanez; Jan 05 2018 10:48 AM EST    Review of Systems  Complete-Female:   Constitutional: feeling tired and recent 5 lb weight loss, but no fever, not feeling poorly, no recent weight gain and no chills  Eyes: no eye pain, no eyesight problems, eyes not red and no purulent discharge from the eyes  ENT: no earache, no nosebleeds, no sore throat, no hearing loss and no nasal discharge  Cardiovascular: the heart rate was not slow, no chest pain, the heart rate was not fast, no palpitations and no lower extremity edema  Respiratory: no shortness of breath, no cough, no wheezing and no shortness of breath during exertion  Gastrointestinal: heartburn, but as noted in HPI, no abdominal pain, no nausea, no vomiting, no constipation, no diarrhea and no blood in stools  Genitourinary: no dysuria, no pelvic pain and no unexplained vaginal bleeding  Musculoskeletal: arthralgias and myalgias, but as noted in HPI, no joint swelling and no joint stiffness  Integumentary: no rashes, no itching and no skin wound    The patient presents with complaints of skin lesion (seborrheic keratosis )  Neurological: no headache, no confusion, no dizziness, no convulsions, no fainting and no difficulty walking  Psychiatric: no anxiety and no depression  Endocrine: hot flashes, but no proptosis and no muscle weakness  Hematologic/Lymphatic: no tendency for easy bleeding and no tendency for easy bruising  ROS Reviewed:   ROS reviewed  Active Problems   1  Abnormal urinalysis (791 9) (R82 90)  2  Acute bilateral low back pain without sciatica (724 2,338 19) (M54 5)  3  Acute cystitis without hematuria (595 0) (N30 00)  4  Acute maxillary sinusitis (461 0) (J01 00)  5  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 (704 09,E933 1) (L65 8,T45  1X5A)  6  Ankle pain, left (719 47) (M25 572)  7  Anticoagulated on heparin (V58 61) (Z79 01)  8  Breast disorder (611 9) (N64 9)  9  Cataract (366 9) (H26 9)  10  Chemotherapy-induced nausea (787 02,E933 1) (R11 0,T45  1X5A)  11  Diffuse large B-cell lymphoma of lymph nodes of multiple regions (202 88) (C83 38)  12   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)  13  Dyslipidemia (272 4) (E78 5)  14  Dysphagia (787 20) (R13 10)  15  Dyspnea (786 09) (R06 00)  16  Encounter for screening fecal occult blood testing (V76 51) (Z12 11)  17  Fatigue (780 79) (R53 83)  18  Flu vaccine need (V04 81) (Z23)  19  Foot fracture, left (825 20) (S92 902A)  20  Gastroesophageal reflux disease (530 81) (K21 9)  21  Hospital discharge follow-up (V67 59) (Z09)  22  Hypertension (401 9) (I10)  23  Increased urinary frequency (788 41) (R35 0)  24  Itching (698 9) (L29 9)  25  Left bundle branch block (LBBB) (426 3) (I44 7)  26  Lip swelling (784 2) (R22 0)  27  Lymphadenopathy (785 6) (R59 1)  28  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)  29  Medicare annual wellness visit, subsequent (V70 0) (Z00 00)  30  Need for vaccination with 13-polyvalent pneumococcal conjugate vaccine (V03 82) (Z23)  31  Oral thrush (112 0) (B37 0)  32  Other screening mammogram (V76 12) (Z12 31)  33  Pain, foot, chronic, left (729 5,338 29) (M79 672,G89 29)  34  Phlebitis and thrombophlebitis of deep veins of upper extremities (451 83) (I80 8)  35  Preop cardiovascular exam (V72 81) (Z01 810)  36  Screening for colon cancer (V76 51) (Z12 11)  37  Screening for depression (V79 0) (Z13 89)  38  Screening for other and unspecified genitourinary condition (V81 6) (Z13 89)  39  Situational anxiety (300 09) (F41 8)  40  Special screening for other neurological conditions (V80 09) (Z13 89)  41  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)  42  Urticaria (708 9) (L50 9)  43  Vitamin B12 deficiency (266 2) (E53 8)  44  Vitamin D deficiency (268 9) (E55 9)    Past Medical History   1  History of Bruit of right carotid artery (785 9) (R09 89)  2  History of Cellulitis (682 9) (L03 90)  3  Contact dermatitis (692 9) (L25 9)  4  History of Encounter for screening mammogram for malignant neoplasm of breast   (V76 12) (Z12 31)  5  History of Esophageal stricture (530 3) (K22 2)  6   History of basal cell carcinoma (V10 83) (Z85 828)  7  History of hypertension (V12 59) (Z86 79)  8  History of impacted cerumen (V12 49) (Z86 69)  9  History of osteoarthritis (V13 4) (Z87 39)  10  History of osteopenia (V13 59) (Z87 39)  11  History of vaginitis (V13 29) (Z87 42)  12  History of Medicare annual wellness visit, initial (V70 0) (Z00 00)  13  History of Need for pneumococcal vaccine (V03 82) (Z23)  14  History of Osteoarthrosis Of The Ankle/Foot (715 97)  15  History of Plantar fasciitis (728 71) (M72 2)  16  History of Plantar fasciitis of left foot (728 71) (M72 2)  17  History of Polyp of sigmoid colon (211 3) (D12 5)  18  History of Screening for colorectal cancer (V76 51) (Z12 11,Z12 12)  19  History of Vulvar abscess (616 4) (N76 4)    Surgical History   1  History of Ankle Surgery  2  History of Biopsy Lymph Node  3  History of Cataract Surgery  4  History of Esophagoscopy Rigid With Insertion Of Plastic Tube/Stent  5  History of Hysterectomy  Surgical History Reviewed: The surgical history was reviewed and updated today  Family History  Mother   1  Family history of Multiple Myeloma  Father   2  Family history of Heart Disease (V17 49)  3  Family history of Hypertension (V17 49)  Sister   4  Family history of Heart Disease (V17 49)  5  Family history of Hypertension (V17 49)  Family History   6  Family history of Arthritis (V17 7)  7  Family history of Breast Cancer (V16 3)  8  Family history of Osteoporosis (V17 81)  Family History Reviewed: The family history was reviewed and updated today  Social History    · Former smoker (O28 00) (I31 380)   · Never Drank Alcohol   · Uses Safety Equipment - Seatbelts  Social History Reviewed: The social history was reviewed and is unchanged  Current Meds  1  Acyclovir 400 MG Oral Tablet; take 1 tablet bid; Therapy: 26NSH8294 to (Evaluate:01Mar2018)  Requested for: 03Dec2017; Last   Rx:01Dec2017 Ordered  2   Allopurinol 300 MG Oral Tablet; 1 tab PO daily; Therapy: 21CDS2453 to (Lenard Rao)  Requested for: 51BRV8898 Ordered  3  Calcium 600 TABS; Therapy: ([place] Fast) to Recorded  4  Dapsone 100 MG Oral Tablet; TAKE 1 TABLET ONCE DAILY; Therapy: 15CJU9883 to (Evaluate:93Cjr8659)  Requested for: 47QFO1841; Last   Rx:14Aay6431 Ordered  5  Enoxaparin Sodium 100 MG/ML Subcutaneous Solution; 100mg/ml SQ injections BID; Therapy: 43GOD1324 to (Evaluate:03Jan2018)  Requested for: 04NIA9762; Last   Rx:45Ubh2951 Ordered  6  Fish Oil CAPS; Therapy: ([place] Fast) to Recorded  7  HydroCHLOROthiazide 25 MG Oral Tablet; Take 1 tablet daily; Therapy: 82MBD3722 to (Evaluate:04Apr2018)  Requested for: 78EUO5441; Last   Rx:06Oct2017 Ordered  8  Klor-Con M20 20 MEQ Oral Tablet Extended Release; Take 1 tablet daily; Therapy: 96WDG1638 to (Krupa Hoover)  Requested for: 35TYG9653; Last   Rx:02Nov2017 Ordered  9  Melatonin 3 MG Oral Tablet; Therapy: 44LKF3841 to Recorded  10  Multi Complete CAPS; Therapy: ([place] Fast) to Recorded  11  Nystatin 374757 UNIT/ML Mouth/Throat Suspension; SWISH AND SWALLOW 5ML 4    TIMES DAILY; Therapy: 10TAH5049 to (Evaluate:60Dvh9913)  Requested for: 31Oct2017; Last    Rx:30Pqh2566 Ordered  12  Omeprazole 40 MG Oral Capsule Delayed Release; take 1 capsule daily; Therapy: 69IQF7053 to (Evaluate:19Nov2017)  Requested for: 46MAM8021; Last    Rx:11Vmf7625 Ordered  13  Ondansetron HCl - 8 MG Oral Tablet; 1 TAB PO Q 8 HR PRN N/V;    Therapy: 60HXG9769 to (Last Rx:97Gdr4356)  Requested for: 83TEA6014 Ordered  14  PredniSONE 50 MG Oral Tablet; 1 tab po bid x5 days with food with each chemo cycle; Therapy: 88GWC9778 to (Lenard Rao)  Requested for: 14JUB8900 Ordered  15  SLPG Magic Mouthwash 1:1:1 maalox/diphenhydramine/lidocaine; USE 4 TIMES DAILY; Therapy: (573 96 282) to Recorded  16  Verapamil HCl  MG Oral Tablet Extended Release; Take 1 tablet daily;     Therapy: 14CTA1254 to (SERENITY:12MIK2385)  Requested for: 69FPZ3461; Last    Rx:04Vxg3927 Ordered  17  Vitamin B12 100 MCG Oral Tablet; Therapy: (Destiney Osborn) to Recorded  18  Vitamin C CAPS; Therapy: (Destiney Osborn) to Recorded  19  Vitamin D 1000 UNIT Oral Tablet; TAKE 1 TABLET DAILY; Therapy: 00QBH6363 to Recorded  Medication List Reviewed: The medication list was reviewed and updated today  Allergies   1  Bactrim TABS  2  Aspirin CHEW  3  CeleBREX CAPS  4  NSAIDs  5  Sulfa Drugs    Vitals  Signs   Recorded: 01Euq5589 07:32AM   Temperature: 98 9 F  Heart Rate: 119  Respiration: 16  Systolic: 408  Diastolic: 72  Height: 5 ft 2 in  Weight: 209 lb 8 oz  BMI Calculated: 38 32  BSA Calculated: 1 95  O2 Saturation: 98    Physical Exam    Constitutional   General appearance: No acute distress, well appearing and well nourished  Eyes   Conjunctiva and lids: No swelling, erythema or discharge  Pupils and irises: Equal, round and reactive to light  Ears, Nose, Mouth, and Throat   External inspection of ears and nose: Normal     Otoscopic examination: Tympanic membranes translucent with normal light reflex  Canals patent without erythema  Oropharynx: Normal with no erythema, edema, exudate or lesions  Pulmonary   Respiratory effort: No increased work of breathing or signs of respiratory distress  Auscultation of lungs: Clear to auscultation  Cardiovascular   Auscultation of heart: Abnormal   The heart rate was normal  The rhythm was regular  Heart sounds: normal S1 and normal S2  2/6 systolic murmur  Examination of extremities for edema and/or varicosities: Normal     Abdomen   Abdomen: Non-tender, no masses  Musculoskeletal   Gait and station: Normal     Digits and nails: Normal without clubbing or cyanosis  Skin   Skin and subcutaneous tissue: Abnormal   no cyanosis, no diaphoresis and no pallor   Clinical impression: seborrhea (on the right forehead, on the abdomen and on the shoulders )    Neurologic   Cranial nerves: Cranial nerves 2-12 intact  Sensation: No sensory loss  Psychiatric   Orientation to person, place, and time: Normal     Mood and affect: Normal          Results/Data  (1) CBC/PLT/DIFF 78BAP1030 08:36AM Rodney Angeleshash     Test Name Result Flag Reference   WBC COUNT 4 48 Thousand/uL  4 31-10 16   RBC COUNT 4 02 Million/uL  3 81-5 12   HEMOGLOBIN 12 6 g/dL  11 5-15 4   HEMATOCRIT 38 0 %  34 8-46  1   MCV 95 fL  82-98   MCH 31 3 pg  26 8-34 3   MCHC 33 2 g/dL  31 4-37 4   RDW 19 0 % H 11 6-15 1   MPV 10 1 fL  8 9-12 7   PLATELET COUNT 885 Thousands/uL  149-390   nRBC AUTOMATED 0 /100 WBCs     NEUTROPHILS RELATIVE PERCENT 58 %  43-75   LYMPHOCYTES RELATIVE PERCENT 27 %  14-44   MONOCYTES RELATIVE PERCENT 14 % H 4-12   EOSINOPHILS RELATIVE PERCENT 1 %  0-6   BASOPHILS RELATIVE PERCENT 0 %  0-1   NEUTROPHILS ABSOLUTE COUNT 2 52 Thousands/? ??L  1 85-7 62   LYMPHOCYTES ABSOLUTE COUNT 1 22 Thousands/? ??L  0 60-4 47   MONOCYTES ABSOLUTE COUNT 0 62 Thousand/? ??L  0 17-1 22   EOSINOPHILS ABSOLUTE COUNT 0 03 Thousand/? ??L  0 00-0 61   BASOPHILS ABSOLUTE COUNT 0 02 Thousands/? ??L  0 00-0 10   This is a patient instruction: This test is non-fasting  Please drink two glasses of water morning of bloodwork  (1) COMPREHENSIVE METABOLIC PANEL 44CKI8078 97:22KQ Rodney Angeleshash     Test Name Result Flag Reference   SODIUM 137 mmol/L  136-145   POTASSIUM 3 6 mmol/L  3 5-5 3   CHLORIDE 103 mmol/L  100-108   CARBON DIOXIDE 29 mmol/L  21-32   ANION GAP (CALC) 5 mmol/L  4-13   BLOOD UREA NITROGEN 15 mg/dL  5-25   CREATININE 0 70 mg/dL  0 60-1 30   Standardized to IDMS reference method   CALCIUM 9 5 mg/dL  8 3-10 1   BILI, TOTAL 0 34 mg/dL  0 20-1 00   ALK PHOSPHATAS 116 U/L     ALT (SGPT) 54 U/L  12-78   Specimen collection should occur prior to Sulfasalazine and/or Sulfapyridine administration due to the potential for falsely depressed results     AST(SGOT) 21 U/L  5-45   Specimen collection should occur prior to Sulfasalazine administration due to the potential for falsely depressed results  ALBUMIN 3 5 g/dL  3 5-5 0   TOTAL PROTEIN 6 7 g/dL  6 4-8 2   eGFR 85 ml/min/1 73sq m     This is a patient instruction: Patient fasting for 8 hours or longer recommended  National Kidney Disease Education Program recommendations are as follows:  GFR calculation is accurate only with a steady state creatinine  Chronic Kidney disease less than 60 ml/min/1 73 sq  meters  Kidney failure less than 15 ml/min/1 73 sq  meters  GLUCOSE FASTING 94 mg/dL  65-99   Specimen collection should occur prior to Sulfasalazine administration due to the potential for falsely depressed results  Specimen collection should occur prior to Sulfapyridine administration due to the potential for falsely elevated results  * NM PET CT SKULL BASE TO MID THIGH 42VXH7860 08:28AM Monroezoie Ramirez     Test Name Result Flag Reference   NM PET CT SKULL BASE TO MID THIGH (Report)     PET/CT SCAN     INDICATION: C83 38: Diffuse large b-cell lymphoma, lymph nodes of multiple sites   C85 90: Non-Hodgkin lymphoma, unspecified, unspecified site  History taken directly from the electronic ordering system  Marginal zone lymphoma of right orbit, restaging postchemotherapy, radiation     MODIFIER:   PS      COMPARISON: CT 10/2/2017 and priors, including PET CT 7/24/2017     CELL TYPE: Marginal zone B-cell lymphoma, right orbital lesion biopsy 3/3/2009     TECHNIQUE:  8 5 mCi F-18-FDG administered IV  Multiplanar attenuation corrected and non attenuation corrected PET images are available for interpretation, and contiguous, low dose, axial CT sections were obtained from the skull base through the femurs    following the administration of oral contrast material (7 5 cc Omnipaque-240 in 300 cc water)  Intravenous contrast material was not utilized   This examination, like all CT scans performed in the [facility], was performed utilizing    techniques to minimize radiation dose exposure, including the use of iterative reconstruction and automated exposure control  Fasting serum glucose: 89 mg/dl     FINDINGS:    Mediastinal blood pool activity SUV 3 1  Liver SUV 3 7  VISUALIZED BRAIN:     No acute abnormalities are seen  HEAD/NECK:     Significantly decreased size and hypermetabolism of bilateral neck and supraclavicular adenopathy, compatible with response to therapy  For example, right supraclavicular node series 3 image 102 measures 1 5 x 0 9 cm, SUV 1 8  On the prior CT of    10/2/2017, this measured 3 4 x 2 5 cm  On the prior PET CT of 7/24/2017, this measured 2 5 x 2 1 cm, SUV 18 1  No new FDG avid cervical adenopathy is seen  CT images: Stable nodular right thyroid  CHEST: Significantly decreased size and hypermetabolism of mediastinal, hilar and bilateral axillary hypermetabolic adenopathy, compatible with response to therapy  For example, left axillary subpectoral node series 3 image 112 measures 1 1 x 0 9 cm,    SUV 1 4  On the prior CT of 10/2/2017, this measured 2 5 x 1 4 cm  On the prior PET CT, this measured 2 4 x 1 7 cm, SUV 3 5  Right axillary node series 3 image 107 measures 1 3 x 0 7 cm, SUV 1 2  On the prior CT of 10/2/2017, this measured 1 7 x 1 2    cm  On the prior PET CT, this measured 2 5 x 1 3 cm, SUV 12 1  Right hilar activity has an SUV of 3 1, prior SUV 5 1  Left hilar activity has an SUV of 2 8, prior SUV 7 5  Lung nodules seen on the prior CT of 10/2/2017 have significantly decreased and are no longer clearly visualized  Previously seen right lateral breast nodules also have resolved  No new FDG avid soft tissue lesions are seen  CT images: Otherwise stable  ABDOMEN:  Significantly decreased size and hypermetabolism of retrocrural and retroperitoneal adenopathy, compatible with response to therapy     For example, left para-aortic retroperitoneal node series 3 image 196 measures 1 3 x 1 cm, SUV 2 6  On the prior CT of 10/2/2017, this measured 3 x 3 2 cm  On the prior PET CT, this measured 2 4 x 3 cm, SUV 10 3  Spleen activity is uniform, and less intense than liver activity  No new FDG avid soft tissue lesions are seen  CT images: Otherwise stable  PELVIS: Significantly decreased size and hypermetabolism of bilateral pelvic and inguinal adenopathy, compatible with response to therapy  For example, right pelvic sidewall node series 3 image 251 measures 2 5 x 0 7 cm, SUV 1 5  On the prior CT of    10/2/2017, this measured 4 1 x 2 cm  On the prior PET CT, this measured 4 7 x 1 9 cm, SUV 9 3  Left inguinal node series 3 image 262 measures 1 5 x 0 7 cm, SUV 1 9  On the prior CT of 10/2/2017, this measured 2 8 x 1 6 cm  On the prior PET CT, this    measured 2 2 x 1 1 cm, SUV 5 9  No new FDG avid soft tissue lesions are seen  CT images: Otherwise stable  OSSEOUS STRUCTURES:   Mild marrow activity is compatible with recent chemotherapy  No new focal FDG avid lesions are seen  CT images: Spine degenerative change  IMPRESSION:   1  Significant decreased size and hypermetabolism of widespread adenopathy, compatible with response to therapy  Lung nodules also have decreased  No new hypermetabolic lesions  Findings correspond to a Deauville score of 2  Workstation performed: [id number]     Signed by:   Jaymie Ogden MD   11/29/17     Health Management  Other screening mammogram   Digital Bilateral Screening Mammogram With CAD; every 1 year; Last 16EHW9614; Next Due:  68GUI2783; Overdue  Screening for colon cancer   COLONOSCOPY; every 5 years; Last 23Oct2015; Next Due: 55KXZ5345; Active  Health Maintenance   Medicare Annual Wellness Visit; every 1 year; Next Due: 44SIG8726; Overdue    Future Appointments    Date/Time Provider Specialty Site   01/19/2018 04:00 PM LEXX Maxwell   Cardiology [facility] CARDIOLOGY Ricky Monterroso   01/15/2018 08:45 AM Cat Wheatley MD Hematology Oncology CANCER CARE [facility] MEDICAL ONCOLOGY     Signatures   Electronically signed by : Monae Chen [facility]; Dec 21 2017  2:25PM EST                       (Author)    Electronically signed by : Byron Grimes DO; Dec 22 2017  1:45PM EST                       (Author)    Electronically signed by :  Monae Chen [facility]; Jan 5 2018 10:51AM EST                       (Author)    Electronically signed by : Byron Grimes DO; Jan 7 2018 11:07AM EST                       (Author)

## 2018-01-30 NOTE — MISCELLANEOUS
Discussion/Summary  Discussion Summary:   Patient had unremarkable hospitalization for routine chemotherapy monitoring and has continued follow up with hem/onc, reasonable to follow up with Dr Eveline Taylor  History of Present Illness  TCM Communication [facility]: The patient is being contacted for follow-up after hospitalization  Hospital records were reviewed  She was hospitalized at [facility]  The date of admission: 12/27/2017, date of discharge: 12/31/2017  Diagnosis: Follicular Lymphoma grade 2, extrandol and solid organ sits, B-cell  Lymphoma  1   She was discharged to home  Medications were not reviewed today  She refused a follow up appointment due to   Follow-up appointments with other specialists: Saw Hematology Oncology 1/2/18  Symptoms: fatigue, but no fever, no weakness, no dizziness, no headache, no cough, no shortness of breath, no chest pain, no back pain on left side, no back pain on right side, no arm pain left side, no arm pain on right side, no leg pain on left side, no leg pain on right side, no upper abdominal pain, no middle abdominal pain, no lower abdominal pain, no rash:, no anorexia, no nausea, no vomiting, no loose stools, no constipation, no pain with urinating, no wound drainage and no swelling  Counseling was provided to the patient  Communication performed and completed by chuy       1 Amended By: Annelise Langley; Jan 03 2018 12:52 PM EST    Active Problems   1  Abnormal urinalysis (791 9) (R82 90)  2  Acute bilateral low back pain without sciatica (724 2,338 19) (M54 5)  3  Acute cystitis without hematuria (595 0) (N30 00)  4  Acute maxillary sinusitis (461 0) (J01 00)  5  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 (704 09,E933 1) (L65 8,T45  1X5A)  6  Ankle pain, left (719 47) (M25 572)  7  Anticoagulated on heparin (V58 61) (Z79 01)  8  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)  9  Breast disorder (611 9) (N64 9)  10  Cataract (366 9) (H26 9)  11   Chemotherapy-induced nausea (787 02,E933 1) (R11 0,T45  1X5A)  12  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)  13  Diffuse large B-cell lymphoma of lymph nodes of multiple regions (202 88) (C83 38)  14  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)  15  Dyslipidemia (272 4) (E78 5)  16  Dysphagia (787 20) (R13 10)  17  Dyspnea (786 09) (R06 00)  18  Encounter for screening fecal occult blood testing (V76 51) (Z12 11)  19  Fatigue (780 79) (R53 83)  20  Flu vaccine need (V04 81) (Z23)  21  Foot fracture, left (825 20) (S92 902A)  22  Gastroesophageal reflux disease (530 81) (K21 9)  23  Hospital discharge follow-up (V67 59) (Z09)  24  Hypertension (401 9) (I10)  25  Increased urinary frequency (788 41) (R35 0)  26  Itching (698 9) (L29 9)  27  Left bundle branch block (LBBB) (426 3) (I44 7)  28  Lip swelling (784 2) (R22 0)  29  Lymphadenopathy (785 6) (R59 1)  30  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)  31  Medicare annual wellness visit, subsequent (V70 0) (Z00 00)  32  Need for vaccination with 13-polyvalent pneumococcal conjugate vaccine (V03 82) (Z23)  33  Oral thrush (112 0) (B37 0)  34  Other screening mammogram (V76 12) (Z12 31)  35  Pain, foot, chronic, left (729 5,338 29) (M79 672,G89 29)  36  Phlebitis and thrombophlebitis of deep veins of upper extremities (451 83) (I80 8)  37  Preop cardiovascular exam (V72 81) (Z01 810)  38  Screening for colon cancer (V76 51) (Z12 11)  39  Screening for depression (V79 0) (Z13 89)  40  Screening for other and unspecified genitourinary condition (V81 6) (Z13 89)  41  Situational anxiety (300 09) (F41 8)  42  Special screening for other neurological conditions (V80 09) (Z13 89)  43  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)  44  Urticaria (708 9) (L50 9)  45  Vitamin B12 deficiency (266 2) (E53 8)  46  Vitamin D deficiency (268 9) (E55 9)    Past Medical History   1   History of Bruit of right carotid artery (785 9) (R09 89)  2  History of Cellulitis (682 9) (L03 90)  3  Contact dermatitis (692 9) (L25 9)  4  History of Encounter for screening mammogram for malignant neoplasm of breast   (V76 12) (Z12 31)  5  History of Esophageal stricture (530 3) (K22 2)  6  History of basal cell carcinoma (V10 83) (Z85 828)  7  History of hypertension (V12 59) (Z86 79)  8  History of impacted cerumen (V12 49) (Z86 69)  9  History of osteoarthritis (V13 4) (Z87 39)  10  History of osteopenia (V13 59) (Z87 39)  11  History of vaginitis (V13 29) (Z87 42)  12  History of Medicare annual wellness visit, initial (V70 0) (Z00 00)  13  History of Need for pneumococcal vaccine (V03 82) (Z23)  14  History of Osteoarthrosis Of The Ankle/Foot (715 97)  15  History of Plantar fasciitis (728 71) (M72 2)  16  History of Plantar fasciitis of left foot (728 71) (M72 2)  17  History of Polyp of sigmoid colon (211 3) (D12 5)  18  History of Screening for colorectal cancer (V76 51) (Z12 11,Z12 12)  19  History of Vulvar abscess (616 4) (N76 4)    Surgical History   1  History of Ankle Surgery  2  History of Biopsy Lymph Node  3  History of Cataract Surgery  4  History of Esophagoscopy Rigid With Insertion Of Plastic Tube/Stent  5  History of Hysterectomy    Family History  Mother   1  Family history of Multiple Myeloma  Father   2  Family history of Heart Disease (V17 49)  3  Family history of Hypertension (V17 49)  Sister   4  Family history of Heart Disease (V17 49)  5  Family history of Hypertension (V17 49)  Family History   6  Family history of Arthritis (V17 7)  7  Family history of Breast Cancer (V16 3)  8  Family history of Osteoporosis (V17 81)    Social History    · Former smoker (U05 41) (U68 345)   · Never Drank Alcohol   · Uses Safety Equipment - Seatbelts    Current Meds  1  Acyclovir 400 MG Oral Tablet; take 1 tablet bid; Therapy: 88GPE4327 to (Evaluate:01Mar2018)  Requested for: 29Zpz1200; Last   Rx:55Fkw1072 Ordered  2   Allopurinol 300 MG Oral Tablet; 1 tab PO daily; Therapy: 40SQB7747 to (Last Hollie Bunting)  Requested for: 21Yyd7279; Status:   ACTIVE - Renewal Denied Ordered  3  Calcium 600 TABS; Therapy: (Nikky Hair) to Recorded  4  Dapsone 100 MG Oral Tablet; TAKE 1 TABLET ONCE DAILY; Therapy: 59ROF6955 to (Evaluate:08Htx9126)  Requested for: 23IGE8791; Last   Rx:00Tgs2339 Ordered  5  Enoxaparin Sodium 100 MG/ML Subcutaneous Solution; 100mg/ml SQ injections BID; Therapy: 33IFE4816 to (Evaluate:03Jan2018)  Requested for: 49NVK3651; Last   Rx:82Jlp9385 Ordered  6  Fish Oil CAPS; Therapy: (Nikky Barnes) to Recorded  7  HydroCHLOROthiazide 25 MG Oral Tablet; Take 1 tablet daily; Therapy: 40JFE2032 to (Evaluate:04Apr2018)  Requested for: 11RXY0489; Last   Rx:52Cqm8256 Ordered  8  Klor-Con M20 20 MEQ Oral Tablet Extended Release; Take 1 tablet daily; Therapy: 34ARO4216 to (Dayton Seen)  Requested for: 29DHB7313; Last   Rx:02Nov2017 Ordered  9  Melatonin 3 MG Oral Tablet; Therapy: 58LOO5415 to Recorded  10  Multi Complete CAPS; Therapy: (Nikky Hair) to Recorded  11  Nystatin 232510 UNIT/ML Mouth/Throat Suspension; SWISH AND SWALLOW 5ML 4    TIMES DAILY; Therapy: 86RAK0862 to (Evaluate:76Szs0958)  Requested for: 31Oct2017; Last    Rx:31Oct2017 Ordered  12  Omeprazole 40 MG Oral Capsule Delayed Release; take 1 capsule daily; Therapy: 88JSL4519 to (Evaluate:19Jun2018)  Requested for: 43Spv3907; Last    Rx:02Yoz0100 Ordered  13  Ondansetron HCl - 8 MG Oral Tablet; 1 TAB PO Q 8 HR PRN N/V;    Therapy: 04WKJ6910 to (Last Rx:22Syq8275)  Requested for: 24HFE1025 Ordered  14  PredniSONE 50 MG Oral Tablet; 1 tab po bid x5 days with food with each chemo cycle; Therapy: 10KXU2903 to (Last Hollie Bunting)  Requested for: 08LMR9335 Ordered  15  SLPG Magic Mouthwash 1:1:1 maalox/diphenhydramine/lidocaine; USE 4 TIMES DAILY; Therapy: ([phone]) to Recorded  16   Verapamil HCl  MG Oral Tablet Extended Release; Take 1 tablet daily; Therapy: 07LAX7186 to (MDVTMTSV:22LIQ6255)  Requested for: 88PIB0139; Last    Rx:61Rdp0376 Ordered  17  Vitamin B12 100 MCG Oral Tablet; Therapy: (Penny Becka) to Recorded  18  Vitamin C CAPS; Therapy: (Penny Becka) to Recorded  19  Vitamin D 1000 UNIT Oral Tablet; TAKE 1 TABLET DAILY; Therapy: 65ZBJ5072 to Recorded    Allergies   1  Bactrim TABS  2  Aspirin CHEW  3  CeleBREX CAPS  4  NSAIDs  5  Sulfa Drugs    Health Management  Other screening mammogram   Digital Bilateral Screening Mammogram With CAD; every 1 year; Last 88RQK4644; Next Due:  28SZZ6646; Overdue  Screening for colon cancer   COLONOSCOPY; every 5 years; Last 23Oct2015; Next Due: 56NRG3349; Active  Health Maintenance   Medicare Annual Wellness Visit; every 1 year; Next Due: 40JVV2082; Overdue    Message   Recorded as Task   Date: 12/31/2017 06:30 PM, Created By: System   Task Name: Hospital ARTI   Assigned To: Janae Bello   Regarding Patient: Devorah Pires, Status: Active   Comment:    System - 31 Dec 2017 6:30 PM     Patient discharged from hospital   Patient Name: José Killian  Patient YOB: 1940  Discharge Date: 12/31/2017  Facility: [facility] Jan 2018 2:55 PM     Dionne Mistry  02 Jan 2018 3:01 PM     TASK EDITED  PT refused to come in for an appt  due to not feeling up to it  Pt stated she will call if she needs us, but as of now she is okay  Future Appointments    Date/Time Provider Specialty Site   01/19/2018 04:00 PM LEXX Tyler  Cardiology [facility]   01/03/2018 10:45 AM Francine Angeles MD Hematology Oncology [facility]   01/15/2018 08:45 AM Francine Angeles MD [facility]ology [facility]     Signatures   Electronically signed by :  Anitha Clements, [facility]; Jan 2 2018  3:43PM EST                       (Author)    Electronically signed by Estrellita Thompson DO; Eliazar  3 2018  9:45AM EST                       (Author)    Electronically signed by :  Rafaela Negron, [address]; Jan 4 2018  9:06AM EST                       (Author)    Electronically signed by : Shiva Garcia DO; Jan 4 2018  4:21PM EST                       (Author)

## 2018-02-05 ENCOUNTER — APPOINTMENT (OUTPATIENT)
Dept: LAB | Facility: CLINIC | Age: 78
End: 2018-02-05
Payer: COMMERCIAL

## 2018-02-05 DIAGNOSIS — C85.19 B-CELL LYMPHOMA OF EXTRANODAL SITE (HCC): ICD-10-CM

## 2018-02-05 LAB
ALBUMIN SERPL BCP-MCNC: 3.7 G/DL (ref 3.5–5)
ALP SERPL-CCNC: 191 U/L (ref 46–116)
ALT SERPL W P-5'-P-CCNC: 102 U/L (ref 12–78)
ANION GAP SERPL CALCULATED.3IONS-SCNC: 7 MMOL/L (ref 4–13)
ANISOCYTOSIS BLD QL SMEAR: PRESENT
AST SERPL W P-5'-P-CCNC: 54 U/L (ref 5–45)
BASOPHILS # BLD MANUAL: 0 THOUSAND/UL (ref 0–0.1)
BASOPHILS NFR MAR MANUAL: 0 % (ref 0–1)
BILIRUB SERPL-MCNC: 0.18 MG/DL (ref 0.2–1)
BUN SERPL-MCNC: 22 MG/DL (ref 5–25)
CALCIUM SERPL-MCNC: 9.4 MG/DL (ref 8.3–10.1)
CHLORIDE SERPL-SCNC: 105 MMOL/L (ref 100–108)
CO2 SERPL-SCNC: 28 MMOL/L (ref 21–32)
CREAT SERPL-MCNC: 0.79 MG/DL (ref 0.6–1.3)
EOSINOPHIL # BLD MANUAL: 0.21 THOUSAND/UL (ref 0–0.4)
EOSINOPHIL NFR BLD MANUAL: 2 % (ref 0–6)
ERYTHROCYTE [DISTWIDTH] IN BLOOD BY AUTOMATED COUNT: 14.9 % (ref 11.6–15.1)
GFR SERPL CREATININE-BSD FRML MDRD: 73 ML/MIN/1.73SQ M
GLUCOSE SERPL-MCNC: 102 MG/DL (ref 65–140)
HCT VFR BLD AUTO: 36.5 % (ref 34.8–46.1)
HGB BLD-MCNC: 11.7 G/DL (ref 11.5–15.4)
LDH SERPL-CCNC: 248 U/L (ref 81–234)
LYMPHOCYTES # BLD AUTO: 1.98 THOUSAND/UL (ref 0.6–4.47)
LYMPHOCYTES # BLD AUTO: 19 % (ref 14–44)
MACROCYTES BLD QL AUTO: PRESENT
MCH RBC QN AUTO: 33.8 PG (ref 26.8–34.3)
MCHC RBC AUTO-ENTMCNC: 32.1 G/DL (ref 31.4–37.4)
MCV RBC AUTO: 106 FL (ref 82–98)
METAMYELOCYTES NFR BLD MANUAL: 3 % (ref 0–1)
MONOCYTES # BLD AUTO: 0.52 THOUSAND/UL (ref 0–1.22)
MONOCYTES NFR BLD: 5 % (ref 4–12)
MYELOCYTES NFR BLD MANUAL: 2 % (ref 0–1)
NEUTROPHILS # BLD MANUAL: 7.2 THOUSAND/UL (ref 1.85–7.62)
NEUTS BAND NFR BLD MANUAL: 2 % (ref 0–8)
NEUTS SEG NFR BLD AUTO: 67 % (ref 43–75)
NRBC BLD AUTO-RTO: 0 /100 WBCS
PLATELET # BLD AUTO: 234 THOUSANDS/UL (ref 149–390)
PLATELET BLD QL SMEAR: ADEQUATE
PMV BLD AUTO: 9.8 FL (ref 8.9–12.7)
POTASSIUM SERPL-SCNC: 3.3 MMOL/L (ref 3.5–5.3)
PROT SERPL-MCNC: 6.5 G/DL (ref 6.4–8.2)
RBC # BLD AUTO: 3.46 MILLION/UL (ref 3.81–5.12)
RBC MORPH BLD: PRESENT
SODIUM SERPL-SCNC: 140 MMOL/L (ref 136–145)
WBC # BLD AUTO: 10.44 THOUSAND/UL (ref 4.31–10.16)

## 2018-02-05 PROCEDURE — 83615 LACTATE (LD) (LDH) ENZYME: CPT

## 2018-02-05 PROCEDURE — 85007 BL SMEAR W/DIFF WBC COUNT: CPT

## 2018-02-05 PROCEDURE — 85027 COMPLETE CBC AUTOMATED: CPT

## 2018-02-05 PROCEDURE — 36415 COLL VENOUS BLD VENIPUNCTURE: CPT

## 2018-02-05 PROCEDURE — 82232 ASSAY OF BETA-2 PROTEIN: CPT

## 2018-02-05 PROCEDURE — 80053 COMPREHEN METABOLIC PANEL: CPT

## 2018-02-06 LAB — B2 MICROGLOB SERPL-MCNC: 2.5 MG/L (ref 0.6–2.4)

## 2018-02-09 ENCOUNTER — APPOINTMENT (OUTPATIENT)
Dept: LAB | Facility: HOSPITAL | Age: 78
End: 2018-02-09
Attending: INTERNAL MEDICINE
Payer: COMMERCIAL

## 2018-02-09 ENCOUNTER — OFFICE VISIT (OUTPATIENT)
Dept: HEMATOLOGY ONCOLOGY | Facility: CLINIC | Age: 78
End: 2018-02-09
Payer: COMMERCIAL

## 2018-02-09 VITALS
DIASTOLIC BLOOD PRESSURE: 64 MMHG | WEIGHT: 212.8 LBS | HEIGHT: 64 IN | SYSTOLIC BLOOD PRESSURE: 118 MMHG | BODY MASS INDEX: 36.33 KG/M2 | HEART RATE: 101 BPM | TEMPERATURE: 98.7 F | RESPIRATION RATE: 17 BRPM | OXYGEN SATURATION: 97 %

## 2018-02-09 DIAGNOSIS — I82.622 ARM DVT (DEEP VENOUS THROMBOEMBOLISM), ACUTE, LEFT (HCC): ICD-10-CM

## 2018-02-09 DIAGNOSIS — C83.38 DIFFUSE LARGE B-CELL LYMPHOMA OF LYMPH NODES OF MULTIPLE SITES (HCC): Primary | ICD-10-CM

## 2018-02-09 LAB
APTT PPP: 36 SECONDS (ref 23–35)
DEPRECATED D DIMER PPP: 238 NG/ML (FEU) (ref 0–424)
INR PPP: 1.04 (ref 0.86–1.16)
PROTHROMBIN TIME: 13.6 SECONDS (ref 12.1–14.4)

## 2018-02-09 PROCEDURE — 36415 COLL VENOUS BLD VENIPUNCTURE: CPT

## 2018-02-09 PROCEDURE — 99214 OFFICE O/P EST MOD 30 MIN: CPT | Performed by: INTERNAL MEDICINE

## 2018-02-09 PROCEDURE — 85730 THROMBOPLASTIN TIME PARTIAL: CPT

## 2018-02-09 PROCEDURE — 85610 PROTHROMBIN TIME: CPT

## 2018-02-09 PROCEDURE — 85379 FIBRIN DEGRADATION QUANT: CPT

## 2018-02-09 NOTE — PROGRESS NOTES
CC:  Non-Hodgkin lymphoma post chemotherapy and follow-up visit  Also visit for DVT left upper arm  HPI:   Qi Diop is a 78 y o  female  Patient is here with her  and son  She has completed planned 6 cycles of chemotherapy and last treatment was in January 2018 and plan is surveillance  Also in November 2017 patient was found to have acute DVT left upper arm decide she has Port-A-Cath  She has been on Lovenox injections twice a day and injection sites are becoming painful and nodular  She would like to get off the injections  Patient received 3 cycles of CHOP and 3 cycles of he EPOCH but the last 2 cycles did not have Adriamycin because of drop in ejection fraction  After completion of 3 cycles of CHOP pathologist from [address] [place] [address] 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 and for that reason treatment was changed to AD[facility]OCATE [facility] Patient did not receive Rituxan because she is allergic to Rituxan  She received Neulasta  She was also given acyclovir and dapsone  Dapsone had been stopped  She will continue to take acyclovir  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  It was a localized disease  Plan was radiation and Rituxan  She had radiation  She had only one dose of Rituxan and had reaction to Rituxan  She had swelling of the tongue and closing of throat  Rituxan was discontinued  In April 2015 patient had PET CT scan that showed hypermetabolic lymphadenopathy involving the neck, chest, abdomen and pelvis and also hypermetabolic right breast nodularity, hepatic steatosis, mild/borderline aneurysmal dilatation of ascending thoracic aorta  She had left inguinal excisional lymph node biopsy, right axillary lymph node biopsy and neck node biopsy and none of those 3 biopsies showed lymphoma or malignancy  She was being followed    Earlier this year she presented with lymphadenopathy again especially in left axilla that was surgically removed and final path report was inconclusive  She had another biopsy from right supraclavicular area and that 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, CD20 positive  Slides were reviewed at [address]  Report finally came after cycle 3 that she had 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   She has tiredness       Current Outpatient Prescriptions:     acyclovir (ZOVIRAX) 200 mg capsule, Take 2 capsules by mouth every 12 (twelve) hours for 10 days, Disp: 40 capsule, Rfl: 0    ascorbic acid (VITAMIN C) 500 mg tablet, Take 500 mg by mouth daily in the early morning  , Disp: , Rfl:     Calcium Carb-Cholecalciferol (CALCIUM + D3) 600-200 MG-UNIT TABS, Take 1 tablet by mouth daily in the early morning  , Disp: , Rfl:     Cyanocobalamin (VITAMIN B 12 PO), Take by mouth, Disp: , Rfl:     hydrochlorothiazide (HYDRODIURIL) 25 mg tablet, Take 25 mg by mouth daily in the early morning  , Disp: , Rfl:     loratadine (CLARITIN) 10 mg tablet, Take 10 mg by mouth daily, Disp: , Rfl:     Multiple Vitamin (MULTIVITAMIN) capsule, Take 1 capsule by mouth daily in the early morning  , Disp: , Rfl:     Omega-3 Fatty Acids (FISH OIL) 1200 MG CAPS, Take 3 capsules by mouth daily with dinner  , Disp: , Rfl:     omeprazole (PriLOSEC) 40 MG capsule, Take 40 mg by mouth daily in the early morning  , Disp: , Rfl:     Potassium Chloride Tiffanie CR (KLOR-CON M20 PO), Take 20 mEq by mouth daily in the early morning  , Disp: , Rfl:     verapamil (COVERA HS) 180 MG (CO) 24 hr tablet, Take 180 mg by mouth every morning  , Disp: , Rfl:     Allergies   Allergen Reactions    Aspirin Anaphylaxis    Celebrex [Celecoxib] Anaphylaxis    Nsaids Anaphylaxis    Other Anaphylaxis     Pt states when she received a certain type of chemotherapy it caused her throat to close    Rituxan [Rituximab] Anaphylaxis     Swelling of tongue and closing of throat    Bactrim [Sulfamethoxazole-Trimethoprim] Other (See Comments)     VERY EMOTIONAL/CRYING    Sulfa Antibiotics Other (See Comments)     VERY EMOTIONAL CRYING       ROS:  02/09/18 Reviewed 14 systems:  Presently no headaches, seizures, dizziness, diplopia, dysphagia, hoarseness, chest pain, palpitations, shortness of breath, cough, hemoptysis, abdominal pain, nausea, vomiting, change in bowel habits, melena, hematuria, fever, chills, bleeding, bone pains, skin rash, weight loss, arthritic symptoms, numbness,  weakness, claudication and gait problem  No frequent infections  No hot flashes  Not unusually sensitive to heat or cold  Tarsha Prows No swelling of the ankles  No swollen glands  No GYN symptoms  Patient is anxious  Other symptoms are in HPI  /64 (BP Location: Left arm, Cuff Size: Adult)   Pulse 101   Temp 98 7 °F (37 1 °C) (Tympanic)   Resp 17   Ht 5' 4" (1 626 m)   Wt 96 5 kg (212 lb 12 8 oz)   SpO2 97%   BMI 36 53 kg/m²   Physical Exam:  Vitals:    02/09/18 0900   BP: 118/64   BP Location: Left arm   Cuff Size: Adult   Pulse: 101   Resp: 17   Temp: 98 7 °F (37 1 °C)   TempSrc: Tympanic   SpO2: 97%   Weight: 96 5 kg (212 lb 12 8 oz)   Height: 5' 4" (1 626 m)     Alert, oriented, not in distress, no icterus, no oral thrush, no palpable neck mass, clear lung foods, regular heart rate, abdomen  soft and non tender, no palpable abdominal mass, no ascites,, small and large ecchymotic areas of skin of abdominal wall and of also few nodules in the ecchymotic areas, no edema of ankles, no calf tenderness, no focal neurological deficit, no skin rash, no palpable lymphadenopathy, good arterial pulses, no clubbing  Patient is anxious  Performance status 1      IMAGING:  NM PET CT skull base to mid thigh    (Results Pending)   VAS upper limb venous duplex scan, unilateral/limited    (Results Pending)       Lab Results    Lab Results   Component Value Date    WBC 10 44 (H) 02/05/2018    HGB 11 7 02/05/2018    HCT 36 5 02/05/2018     (H) 02/05/2018     02/05/2018     Lab Results   Component Value Date     02/05/2018    K 3 3 (L) 02/05/2018     02/05/2018    CO2 28 02/05/2018    ANIONGAP 7 02/05/2018    BUN 22 02/05/2018    CREATININE 0 79 02/05/2018    GLUCOSE 102 02/05/2018    GLUF 107 (H) 12/22/2017    CALCIUM 9 4 02/05/2018    AST 54 (H) 02/05/2018     (H) 02/05/2018    ALKPHOS 191 (H) 02/05/2018    PROT 6 5 02/05/2018    BILITOT 0 18 (L) 02/05/2018    EGFR 73 02/05/2018     Lab Results   Component Value Date    JMIQFYKE40 1,238 (H) 09/22/2017     Reviewed and discussed with patient  Assessment and plan:  Grant July is a 78 y o  female  Patient is here with her  and son  She has completed planned 6 cycles of chemotherapy and last treatment was in January 2018 and plan is surveillance  Also in November 2017 patient was found to have acute DVT left upper arm decide she has Port-A-Cath  She has been on Lovenox injections twice a day and injection sites are becoming painful and nodular  She would like to get off the injections  Patient received 3 cycles of CHOP and 3 cycles of he EPOCH but the last 2 cycles did not have Adriamycin because of drop in ejection fraction  After completion of 3 cycles of CHOP pathologist from [address] 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 and for that reason treatment was changed to [facility]  Maude Lawson Patient did not receive Rituxan because she is allergic to Rituxan  She received Neulasta  She was also given acyclovir and dapsone  Dapsone had been stopped  She will continue to take acyclovir  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  It was a localized disease  Plan was radiation and Rituxan  She had radiation  She had only one dose of Rituxan and had reaction to Rituxan  She had swelling of the tongue and closing of throat  Rituxan was discontinued      In April 2015 patient had PET CT scan that showed hypermetabolic lymphadenopathy involving the neck, chest, abdomen and pelvis and also hypermetabolic right breast nodularity, hepatic steatosis, mild/borderline aneurysmal dilatation of ascending thoracic aorta  She had left inguinal excisional lymph node biopsy, right axillary lymph node biopsy and neck node biopsy and none of those 3 biopsies showed lymphoma or malignancy  She was being followed    Earlier this year she presented with lymphadenopathy again especially in left axilla that was surgically removed and final path report was inconclusive  She had another biopsy from right supraclavicular area and that 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, CD20 positive  Slides were reviewed at [facility] [address]  Report finally came after cycle 3 that she had 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   She has tiredness  Physical examination and test results are as recorded and discussed  She will have surveillance for lymphoma  She will have blood tests for PT/PTT and D-dimer and will have Doppler study of left upper arm  In the meantime she is being switched from Lovenox to Eliquis 5 mg twice a day and she had the samples     She knows to call the office with bleeding or blood clot  She will have imaging studies for lymphoma after next visit  Condition and plan discussed  Questions answered  Goal is  cure of lymphoma and no bleeding and blood clot  Patient voiced understanding and agreement in the discussion  She is aware to contact us with questions/symptoms in the interim  Counseling / Coordination of Care  Total floor / unit time spent today 35 minutes  Greater than 50% of total time was spent with the patient and / or family counseling and / or coordination of care

## 2018-02-09 NOTE — LETTER
February 9, 2018     Brook Carver PA-C  [address]  [place]  [id number]    Patient: Randy Armenta   YOB: 1940   Date of Visit: 2/9/2018       Dear Dr Gould Courts: Thank you for referring Chris Lopez to me for evaluation  Below are my notes for this consultation  If you have questions, please do not hesitate to call me  I look forward to following your patient along with you  Sincerely,        Aleta Hammond MD        CC: No Recipients  Aleta Hammond MD  2/9/2018 11:51 PM  Sign at close encounter  CC:  Non-Hodgkin lymphoma post chemotherapy and follow-up visit  Also visit for DVT left upper arm  HPI:   Randy Armenta is a 78 y o  female  Patient is here with her  and son  She has completed planned 6 cycles of chemotherapy and last treatment was in January 2018 and plan is surveillance  Also in November 2017 patient was found to have acute DVT left upper arm decide she has Port-A-Cath  She has been on Lovenox injections twice a day and injection sites are becoming painful and nodular  She would like to get off the injections  Patient received 3 cycles of CHOP and 3 cycles of he EPOCH but the last 2 cycles did not have Adriamycin because of drop in ejection fraction  After completion of 3 cycles of CHOP pathologist from [address] [place] [address] 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 and for that reason treatment was changed to ADVOCATE [facility] Patient did not receive Rituxan because she is allergic to Rituxan  She received Neulasta  She was also given acyclovir and dapsone  Dapsone had been stopped  She will continue to take acyclovir  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  It was a localized disease  Plan was radiation and Rituxan  She had radiation  She had only one dose of Rituxan and had reaction to Rituxan  She had swelling of the tongue and closing of throat  Rituxan was discontinued  In April 2015 patient had PET CT scan that showed hypermetabolic lymphadenopathy involving the neck, chest, abdomen and pelvis and also hypermetabolic right breast nodularity, hepatic steatosis, mild/borderline aneurysmal dilatation of ascending thoracic aorta  She had left inguinal excisional lymph node biopsy, right axillary lymph node biopsy and neck node biopsy and none of those 3 biopsies showed lymphoma or malignancy  She was being followed    Earlier this year she presented with lymphadenopathy again especially in left axilla that was surgically removed and final path report was inconclusive  She had another biopsy from right supraclavicular area and that 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, CD20 positive  Slides were reviewed at [facility]  Report finally came after cycle 3 that she had 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   She has tiredness       Current Outpatient Prescriptions:     acyclovir (ZOVIRAX) 200 mg capsule, Take 2 capsules by mouth every 12 (twelve) hours for 10 days, Disp: 40 capsule, Rfl: 0    ascorbic acid (VITAMIN C) 500 mg tablet, Take 500 mg by mouth daily in the early morning  , Disp: , Rfl:     Calcium Carb-Cholecalciferol (CALCIUM + D3) 600-200 MG-UNIT TABS, Take 1 tablet by mouth daily in the early morning  , Disp: , Rfl:     Cyanocobalamin (VITAMIN B 12 PO), Take by mouth, Disp: , Rfl:     hydrochlorothiazide (HYDRODIURIL) 25 mg tablet, Take 25 mg by mouth daily in the early morning  , Disp: , Rfl:     loratadine (CLARITIN) 10 mg tablet, Take 10 mg by mouth daily, Disp: , Rfl:     Multiple Vitamin (MULTIVITAMIN) capsule, Take 1 capsule by mouth daily in the early morning  , Disp: , Rfl:     Omega-3 Fatty Acids (FISH OIL) 1200 MG CAPS, Take 3 capsules by mouth daily with dinner  , Disp: , Rfl:     omeprazole (PriLOSEC) 40 MG capsule, Take 40 mg by mouth daily in the early morning  , Disp: , Rfl:     Potassium Chloride Tiffanie CR (KLOR-CON M20 PO), Take 20 mEq by mouth daily in the early morning  , Disp: , Rfl:     verapamil (COVERA HS) 180 MG (CO) 24 hr tablet, Take 180 mg by mouth every morning  , Disp: , Rfl:     Allergies   Allergen Reactions    Aspirin Anaphylaxis    Celebrex [Celecoxib] Anaphylaxis    Nsaids Anaphylaxis    Other Anaphylaxis     Pt states when she received a certain type of chemotherapy it caused her throat to close    Rituxan [Rituximab] Anaphylaxis     Swelling of tongue and closing of throat    Bactrim [Sulfamethoxazole-Trimethoprim] Other (See Comments)     VERY EMOTIONAL/CRYING    Sulfa Antibiotics Other (See Comments)     VERY EMOTIONAL CRYING       ROS:  02/09/18 Reviewed 14 systems:  Presently no headaches, seizures, dizziness, diplopia, dysphagia, hoarseness, chest pain, palpitations, shortness of breath, cough, hemoptysis, abdominal pain, nausea, vomiting, change in bowel habits, melena, hematuria, fever, chills, bleeding, bone pains, skin rash, weight loss, arthritic symptoms, numbness,  weakness, claudication and gait problem  No frequent infections  No hot flashes  Not unusually sensitive to heat or cold  [employer] No swelling of the ankles  No swollen glands  No GYN symptoms  Patient is anxious  Other symptoms are in HPI    /64 (BP Location: Left arm, Cuff Size: Adult)   Pulse 101   Temp 98 7 °F (37 1 °C) (Tympanic)   Resp 17   Ht 5' 4" (1 626 m)   Wt 96 5 kg (212 lb 12 8 oz)   SpO2 97%   BMI 36 53 kg/m²    Physical Exam:  Vitals:    02/09/18 0900   BP: 118/64   BP Location: Left arm   Cuff Size: Adult   Pulse: 101   Resp: 17   Temp: 98 7 °F (37 1 °C)   TempSrc: Tympanic   SpO2: 97%   Weight: 96 5 kg (212 lb 12 8 oz)   Height: 5' 4" (1 626 m)     Alert, oriented, not in distress, no icterus, no oral thrush, no palpable neck mass, clear lung foods, regular heart rate, abdomen  soft and non tender, no palpable abdominal mass, no ascites,, small and large ecchymotic areas of skin of abdominal wall and of also few nodules in the ecchymotic areas, no edema of ankles, no calf tenderness, no focal neurological deficit, no skin rash, no palpable lymphadenopathy, good arterial pulses, no clubbing  Patient is anxious  Performance status 1  IMAGING:  NM PET CT skull base to mid thigh    (Results Pending)   VAS upper limb venous duplex scan, unilateral/limited    (Results Pending)       Lab Results    Lab Results   Component Value Date    WBC 10 44 (H) 02/05/2018    HGB 11 7 02/05/2018    HCT 36 5 02/05/2018     (H) 02/05/2018     02/05/2018     Lab Results   Component Value Date     02/05/2018    K 3 3 (L) 02/05/2018     02/05/2018    CO2 28 02/05/2018    ANIONGAP 7 02/05/2018    BUN 22 02/05/2018    CREATININE 0 79 02/05/2018    GLUCOSE 102 02/05/2018    GLUF 107 (H) 12/22/2017    CALCIUM 9 4 02/05/2018    AST 54 (H) 02/05/2018     (H) 02/05/2018    ALKPHOS 191 (H) 02/05/2018    PROT 6 5 02/05/2018    BILITOT 0 18 (L) 02/05/2018    EGFR 73 02/05/2018     Lab Results   Component Value Date    MOBBCYXN97 1,238 (H) 09/22/2017     Reviewed and discussed with patient  Assessment and plan:  Randy Armenta is a 78 y o  female  Patient is here with her  and son  She has completed planned 6 cycles of chemotherapy and last treatment was in January 2018 and plan is surveillance  Also in November 2017 patient was found to have acute DVT left upper arm decide she has Port-A-Cath  She has been on Lovenox injections twice a day and injection sites are becoming painful and nodular  She would like to get off the injections  Patient received 3 cycles of CHOP and 3 cycles of he EPOCH but the last 2 cycles did not have Adriamycin because of drop in ejection fraction  After completion of 3 cycles of CHOP pathologist from [address] 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 and for that reason treatment was changed to AD[facility]ATE [facility]  Patient did not receive Rituxan because she is allergic to Rituxan  She received Neulasta  She was also given acyclovir and dapsone  Dapsone had been stopped  She will continue to take acyclovir  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  It was a localized disease  Plan was radiation and Rituxan  She had radiation  She had only one dose of Rituxan and had reaction to Rituxan  She had swelling of the tongue and closing of throat  Rituxan was discontinued  In April 2015 patient had PET CT scan that showed hypermetabolic lymphadenopathy involving the neck, chest, abdomen and pelvis and also hypermetabolic right breast nodularity, hepatic steatosis, mild/borderline aneurysmal dilatation of ascending thoracic aorta  She had left inguinal excisional lymph node biopsy, right axillary lymph node biopsy and neck node biopsy and none of those 3 biopsies showed lymphoma or malignancy  She was being followed    Earlier this year she presented with lymphadenopathy again especially in left axilla that was surgically removed and final path report was inconclusive  She had another biopsy from right supraclavicular area and that 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, CD20 positive  Slides were reviewed at [address] [place] [address]  Report finally came after cycle 3 that she had 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   She has tiredness  Physical examination and test results are as recorded and discussed  She will have surveillance for lymphoma  She will have blood tests for PT/PTT and D-dimer and will have Doppler study of left upper arm  In the meantime she is being switched from Lovenox to Eliquis 5 mg twice a day and she had the samples     She knows to call the office with bleeding or blood clot  She will have imaging studies for lymphoma after next visit  Condition and plan discussed  Questions answered  Goal is  cure of lymphoma and no bleeding and blood clot    Patient voiced understanding and agreement in the discussion  She is aware to contact us with questions/symptoms in the interim  Counseling / Coordination of Care  Total floor / unit time spent today 35 minutes  Greater than 50% of total time was spent with the patient and / or family counseling and / or coordination of care

## 2018-02-12 ENCOUNTER — HOSPITAL ENCOUNTER (OUTPATIENT)
Dept: NON INVASIVE DIAGNOSTICS | Facility: CLINIC | Age: 78
Discharge: HOME | End: 2018-02-12
Payer: COMMERCIAL

## 2018-02-12 PROCEDURE — 93971 EXTREMITY STUDY: CPT

## 2018-02-12 PROCEDURE — 93971 EXTREMITY STUDY: CPT | Performed by: SURGERY

## 2018-02-13 NOTE — MISCELLANEOUS
Message   Recorded as Task   Date: 11/20/2017 12:52 PM, Created By: Julian Rodriguez   Task Name: Medical Complaint Callback   Assigned To: Leanna Boyd   Regarding Patient: Candida Obrien, Status: In Progress   Comment:    Shaye Giordano - 20 Nov 2017 12:52 PM     TASK CREATED  Caller: Self; Medical Complaint; [phone] (Home); [phone] (Work)  patient states she is bruising from the lovenox shots--can she do them somewhere else--please call to discuss  call home #   Suha Rizo - 20 Nov 2017 2:11 PM     [address] with patient and she stated that due to taking the Lovenox twice a day she is bruising around her belly button  She requested if she could use an alternate site  Reviewed she could use the top of her middle thigh for injections as well  Encouraged her to call back if she continues having issues with bruising  Patient stated she will try the thigh site and call back if problem persists  Active Problems    1  Abnormal urinalysis (791 9) (R82 90)   2  Acute bilateral low back pain without sciatica (724 2,338 19) (M54 5)   3  Acute cystitis without hematuria (595 0) (N30 00)   4  Acute maxillary sinusitis (461 0) (J01 00)   5  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 (704 09,E933 1) (L65 8,T45  1X5A)   6  Ankle pain, left (719 47) (M25 572)   7  Anticoagulated on heparin (V58 61) (Z79 01)   8  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)   9  Breast disorder (611 9) (N64 9)   10  Cataract (366 9) (H26 9)   11  Chemotherapy-induced nausea (787 02,E933 1) (R11 0,T45  1X5A)   12  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)   13  Diffuse large B-cell lymphoma of lymph nodes of multiple regions (202 88) (C83 38)   14  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)   15  Dyslipidemia (272 4) (E78 5)   16  Dysphagia (787 20) (R13 10)   17  Dyspnea (786 09) (R06 00)   18   Encounter for screening fecal occult blood testing (A38 57) (Z12 11)   19  Fatigue (780 79) (R53 83)   20  Flu vaccine need (V04 81) (Z23)   21  Foot fracture, left (825 20) (S92 902A)   22  Gastroesophageal reflux disease (530 81) (K21 9)   23  Hospital discharge follow-up (V67 59) (Z09)   24  Hypertension (401 9) (I10)   25  Increased urinary frequency (788 41) (R35 0)   26  Itching (698 9) (L29 9)   27  Left bundle branch block (LBBB) (426 3) (I44 7)   28  Lip swelling (784 2) (R22 0)   29  Lymphadenopathy (785 6) (R59 1)   30  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)   31  Medicare annual wellness visit, subsequent (V70 0) (Z00 00)   32  Need for vaccination with 13-polyvalent pneumococcal conjugate vaccine (V03 82) (Z23)   33  Oral thrush (112 0) (B37 0)   34  Other screening mammogram (V76 12) (Z12 31)   35  Pain, foot, chronic, left (729 5,338 29) (M79 672,G89 29)   36  Phlebitis and thrombophlebitis of deep veins of upper extremities (451 83) (I80 8)   37  Preop cardiovascular exam (V72 81) (Z01 810)   38  Screening for colon cancer (V76 51) (Z12 11)   39  Screening for depression (V79 0) (Z13 89)   40  Screening for other and unspecified genitourinary condition (V81 6) (Z13 89)   41  Situational anxiety (300 09) (F41 8)   42  Special screening for other neurological conditions (V80 09) (Z13 89)   43  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)   44  Urticaria (708 9) (L50 9)   45  Vitamin B12 deficiency (266 2) (E53 8)   46  Vitamin D deficiency (268 9) (E55 9)    Current Meds   1  Allopurinol 300 MG Oral Tablet; 1 tab PO daily; Therapy: 59JDX3990 to (Last XO:29EAB0888)  Requested for: 37WIN4522 Ordered   2  Calcium 600 TABS; Therapy: (Anola Patel) to Recorded   3  Fish Oil CAPS; Therapy: (Anola Patel) to Recorded   4  HydroCHLOROthiazide 25 MG Oral Tablet; Take 1 tablet daily; Therapy: 77OCF2887 to (Evaluate:04Apr2018)  Requested for: 19LPU6362; Last   Rx:06Oct2017 Ordered   5  Klor-Con M20 20 MEQ Oral Tablet Extended Release;  Take 1 tablet daily; Therapy: 10LYD6946 to (Joe Hameed)  Requested for: 72BVB1343; Last   Rx:2017 Ordered   6  Lovenox 100 MG/ML Subcutaneous Solution (Enoxaparin Sodium); Therapy: 51LKY0751 to Recorded   7  Melatonin 3 MG Oral Tablet; Therapy: 19YZB0311 to Recorded   8  Multi Complete CAPS; Therapy: (Oneil Bernie) to Recorded   9  Nystatin 054927 UNIT/ML Mouth/Throat Suspension; SWISH AND SWALLOW 5ML 4   TIMES DAILY; Therapy: 27PTN5999 to (Evaluate:38Dau1997)  Requested for: 2017; Last   Rx:2017 Ordered   10  Omeprazole 40 MG Oral Capsule Delayed Release; take 1 capsule daily; Therapy: 06UZR5145 to (Evaluate:2017)  Requested for: 27Bgq7160; Last    Rx:06Olm8369 Ordered   11  Ondansetron HCl - 8 MG Oral Tablet; 1 TAB PO Q 8 HR PRN N/V;    Therapy: 72EBU0359 to (Last F53OVB4385)  Requested for: 07SFT5222 Ordered   12  PredniSONE 50 MG Oral Tablet; 1 tab po bid x5 days with food with each chemo cycle; Therapy: 81VKX2356 to (Last Adilia Hernandez)  Requested for: 88HQU3423 Ordered   13  SLPG Magic Mouthwash 1:1:1 maalox/diphenhydramine/lidocaine; USE 4 TIMES DAILY; Therapy: [phone]) to Recorded   14  Verapamil HCl  MG Oral Tablet Extended Release; Take 1 tablet daily; Therapy: 47MDF6973 to ([facility]56VOI6293)  Requested for: 96EPE7704; Last    Rx:99Jau1564 Ordered   15  Vitamin B12 100 MCG Oral Tablet; Therapy: (Oneil Bernie) to Recorded   16  Vitamin C CAPS; Therapy: (Oneil Bernie) to Recorded   17  Vitamin D 1000 UNIT Oral Tablet; TAKE 1 TABLET DAILY; Therapy: 65BXF5721 to Recorded    Allergies    1  Bactrim TABS   2  Aspirin CHEW   3  CeleBREX CAPS   4  NSAIDs   5   Sulfa Drugs    Signatures   Electronically signed by : Alden Hernandes, ; 2017  2:13PM EST                       (Author)

## 2018-02-14 DIAGNOSIS — I10 ESSENTIAL HYPERTENSION: Primary | ICD-10-CM

## 2018-02-16 ENCOUNTER — HOSPITAL ENCOUNTER (OUTPATIENT)
Dept: RADIOLOGY | Age: 78
Discharge: HOME | End: 2018-02-16
Payer: COMMERCIAL

## 2018-02-16 LAB — GLUCOSE SERPL-MCNC: 88 MG/DL (ref 65–140)

## 2018-02-16 PROCEDURE — 82948 REAGENT STRIP/BLOOD GLUCOSE: CPT

## 2018-02-16 PROCEDURE — 78815 PET IMAGE W/CT SKULL-THIGH: CPT

## 2018-02-16 PROCEDURE — A9552 F18 FDG: HCPCS

## 2018-02-16 RX ADMIN — IOHEXOL 5 ML: 240 INJECTION, SOLUTION INTRATHECAL; INTRAVASCULAR; INTRAVENOUS; ORAL at 09:05

## 2018-03-08 ENCOUNTER — TRANSCRIBE ORDERS (OUTPATIENT)
Dept: LAB | Facility: CLINIC | Age: 78
End: 2018-03-08

## 2018-03-08 ENCOUNTER — APPOINTMENT (OUTPATIENT)
Dept: LAB | Facility: CLINIC | Age: 78
End: 2018-03-08
Payer: COMMERCIAL

## 2018-03-08 DIAGNOSIS — C85.19 B-CELL LYMPHOMA OF EXTRANODAL SITE (HCC): Primary | ICD-10-CM

## 2018-03-08 DIAGNOSIS — C85.19 B-CELL LYMPHOMA OF EXTRANODAL SITE (HCC): ICD-10-CM

## 2018-03-08 LAB
ALBUMIN SERPL BCP-MCNC: 3.9 G/DL (ref 3.5–5)
ALP SERPL-CCNC: 94 U/L (ref 46–116)
ALT SERPL W P-5'-P-CCNC: 58 U/L (ref 12–78)
ANION GAP SERPL CALCULATED.3IONS-SCNC: 6 MMOL/L (ref 4–13)
AST SERPL W P-5'-P-CCNC: 31 U/L (ref 5–45)
BASOPHILS # BLD AUTO: 0.03 THOUSANDS/ΜL (ref 0–0.1)
BASOPHILS NFR BLD AUTO: 1 % (ref 0–1)
BILIRUB SERPL-MCNC: 0.49 MG/DL (ref 0.2–1)
BUN SERPL-MCNC: 21 MG/DL (ref 5–25)
CALCIUM SERPL-MCNC: 9.7 MG/DL (ref 8.3–10.1)
CHLORIDE SERPL-SCNC: 103 MMOL/L (ref 100–108)
CO2 SERPL-SCNC: 31 MMOL/L (ref 21–32)
CREAT SERPL-MCNC: 0.78 MG/DL (ref 0.6–1.3)
EOSINOPHIL # BLD AUTO: 0.14 THOUSAND/ΜL (ref 0–0.61)
EOSINOPHIL NFR BLD AUTO: 2 % (ref 0–6)
ERYTHROCYTE [DISTWIDTH] IN BLOOD BY AUTOMATED COUNT: 12.8 % (ref 11.6–15.1)
GFR SERPL CREATININE-BSD FRML MDRD: 75 ML/MIN/1.73SQ M
GLUCOSE SERPL-MCNC: 108 MG/DL (ref 65–140)
HCT VFR BLD AUTO: 41 % (ref 34.8–46.1)
HGB BLD-MCNC: 13.5 G/DL (ref 11.5–15.4)
LDH SERPL-CCNC: 181 U/L (ref 81–234)
LYMPHOCYTES # BLD AUTO: 1.76 THOUSANDS/ΜL (ref 0.6–4.47)
LYMPHOCYTES NFR BLD AUTO: 28 % (ref 14–44)
MCH RBC QN AUTO: 32.8 PG (ref 26.8–34.3)
MCHC RBC AUTO-ENTMCNC: 32.9 G/DL (ref 31.4–37.4)
MCV RBC AUTO: 100 FL (ref 82–98)
MONOCYTES # BLD AUTO: 0.65 THOUSAND/ΜL (ref 0.17–1.22)
MONOCYTES NFR BLD AUTO: 11 % (ref 4–12)
NEUTROPHILS # BLD AUTO: 3.6 THOUSANDS/ΜL (ref 1.85–7.62)
NEUTS SEG NFR BLD AUTO: 58 % (ref 43–75)
NRBC BLD AUTO-RTO: 0 /100 WBCS
PLATELET # BLD AUTO: 189 THOUSANDS/UL (ref 149–390)
PMV BLD AUTO: 9.5 FL (ref 8.9–12.7)
POTASSIUM SERPL-SCNC: 3.6 MMOL/L (ref 3.5–5.3)
PROT SERPL-MCNC: 6.7 G/DL (ref 6.4–8.2)
RBC # BLD AUTO: 4.12 MILLION/UL (ref 3.81–5.12)
SODIUM SERPL-SCNC: 140 MMOL/L (ref 136–145)
WBC # BLD AUTO: 6.19 THOUSAND/UL (ref 4.31–10.16)

## 2018-03-08 PROCEDURE — 36415 COLL VENOUS BLD VENIPUNCTURE: CPT

## 2018-03-08 PROCEDURE — 85025 COMPLETE CBC W/AUTO DIFF WBC: CPT

## 2018-03-08 PROCEDURE — 80053 COMPREHEN METABOLIC PANEL: CPT

## 2018-03-08 PROCEDURE — 83615 LACTATE (LD) (LDH) ENZYME: CPT

## 2018-03-08 PROCEDURE — 82232 ASSAY OF BETA-2 PROTEIN: CPT

## 2018-03-09 ENCOUNTER — HOSPITAL ENCOUNTER (OUTPATIENT)
Dept: INFUSION CENTER | Facility: HOSPITAL | Age: 78
Discharge: HOME | End: 2018-03-09
Payer: COMMERCIAL

## 2018-03-09 ENCOUNTER — OFFICE VISIT (OUTPATIENT)
Dept: HEMATOLOGY ONCOLOGY | Facility: CLINIC | Age: 78
End: 2018-03-09
Payer: COMMERCIAL

## 2018-03-09 VITALS
TEMPERATURE: 97.5 F | BODY MASS INDEX: 36.54 KG/M2 | RESPIRATION RATE: 16 BRPM | WEIGHT: 214 LBS | HEART RATE: 102 BPM | OXYGEN SATURATION: 97 % | HEIGHT: 64 IN

## 2018-03-09 DIAGNOSIS — I82.622 ARM DVT (DEEP VENOUS THROMBOEMBOLISM), ACUTE, LEFT (HCC): ICD-10-CM

## 2018-03-09 DIAGNOSIS — C83.38 DIFFUSE LARGE B-CELL LYMPHOMA OF LYMPH NODES OF MULTIPLE SITES (HCC): Primary | ICD-10-CM

## 2018-03-09 DIAGNOSIS — K21.9 GASTROESOPHAGEAL REFLUX DISEASE WITHOUT ESOPHAGITIS: ICD-10-CM

## 2018-03-09 LAB — B2 MICROGLOB SERPL-MCNC: 2.5 MG/L (ref 0.6–2.4)

## 2018-03-09 PROCEDURE — 96523 IRRIG DRUG DELIVERY DEVICE: CPT

## 2018-03-09 PROCEDURE — 99214 OFFICE O/P EST MOD 30 MIN: CPT | Performed by: INTERNAL MEDICINE

## 2018-03-09 RX ORDER — OMEPRAZOLE 20 MG/1
20 TABLET, DELAYED RELEASE ORAL DAILY
Qty: 90 TABLET | Refills: 1 | Status: SHIPPED | OUTPATIENT
Start: 2018-03-09 | End: 2018-09-02 | Stop reason: SDUPTHER

## 2018-03-09 RX ADMIN — Medication 300 UNITS: at 09:45

## 2018-03-09 NOTE — PROGRESS NOTES
HPI:   Nathan Donovan is a 78 y o  female with non-Hodgkin lymphoma and DVT left arm  Patient is here with her  and son  She  completed planned 6 cycles of chemotherapy  in January 2018  She is under surveillance  Most recent PET-CT scan indicates lymphoma in remission  In November 2017 patient was found to have acute DVT left upper arm the side she has Port-A-Cath  She had Lovenox injections twice a day and injection sites were becoming painful and nodular  She was switched over to Eliquis  No bleeding or blood clot on Eliquis  Venous Doppler study is negative for blood clot  Non-Hodgkin lymphoma was diagnosed in 2017  Patient received 3 cycles of CHOP and 3 cycles of he EPOCH but the last 2 cycles did not have Adriamycin because of drop in ejection fraction  After completion of 3 cycles of CHOP pathologist from [address] [place] [address] suggested 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 and for that reason treatment was changed to [facility] Patient did not receive Rituxan because she is allergic to Rituxan  She received Neulasta  She was also given acyclovir and dapsone  Dapsone had been stopped  She will continue to take acyclovir  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  It was a localized disease  Plan was radiation and Rituxan  She had radiation  She had only one dose of Rituxan and had reaction to Rituxan  She had swelling of the tongue and closing of throat  Rituxan was discontinued     In April 2015 patient had PET CT scan that showed hypermetabolic lymphadenopathy involving the neck, chest, abdomen and pelvis and also hypermetabolic right breast nodularity, hepatic steatosis, mild/borderline aneurysmal dilatation of ascending thoracic aorta  She had left inguinal excisional lymph node biopsy, right axillary lymph node biopsy and neck node biopsy and none of those 3 biopsies showed lymphoma or malignancy   She was being followed     In 2017 she presented with lymphadenopathy again especially in left axilla that was surgically removed and final path report was inconclusive  She had another biopsy from right supraclavicular area and that 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, CD20 positive  Slides were reviewed at [facility] [place]  Report finally came after cycle 3 that she had 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   She has tiredness    She has minor arthritic symptoms and she takes Tylenol    Current Outpatient Prescriptions:     ascorbic acid (VITAMIN C) 500 mg tablet, Take 500 mg by mouth daily in the early morning  , Disp: , Rfl:     Calcium Carb-Cholecalciferol (CALCIUM + D3) 600-200 MG-UNIT TABS, Take 1 tablet by mouth daily in the early morning  , Disp: , Rfl:     Cyanocobalamin (VITAMIN B 12 PO), Take by mouth, Disp: , Rfl:     hydrochlorothiazide (HYDRODIURIL) 25 mg tablet, Take 25 mg by mouth daily in the early morning  , Disp: , Rfl:     loratadine (CLARITIN) 10 mg tablet, Take 10 mg by mouth daily, Disp: , Rfl:     Multiple Vitamin (MULTIVITAMIN) capsule, Take 1 capsule by mouth daily in the early morning  , Disp: , Rfl:     Omega-3 Fatty Acids (FISH OIL) 1200 MG CAPS, Take 3 capsules by mouth daily with dinner  , Disp: , Rfl:     omeprazole (PriLOSEC) 40 MG capsule, Take 40 mg by mouth daily in the early morning  , Disp: , Rfl:     Potassium Chloride Tiffanie CR (KLOR-CON M20 PO), Take 20 mEq by mouth daily in the early morning  , Disp: , Rfl:     verapamil (CALAN-SR) 180 mg CR tablet, Take 1 tablet (180 mg total) by mouth daily at bedtime, Disp: 90 tablet, Rfl: 0    acyclovir (ZOVIRAX) 200 mg capsule, Take 2 capsules by mouth every 12 (twelve) hours for 10 days, Disp: 40 capsule, Rfl: 0    Allergies   Allergen Reactions    Aspirin Anaphylaxis    Celebrex [Celecoxib] Anaphylaxis    Nsaids Anaphylaxis    Other Anaphylaxis     Pt states when she received a certain type of chemotherapy it caused her throat to close    Rituxan [Rituximab] Anaphylaxis     Swelling of tongue and closing of throat    Bactrim [Sulfamethoxazole-Trimethoprim] Other (See Comments)     VERY EMOTIONAL/CRYING    Sulfa Antibiotics Other (See Comments)     VERY EMOTIONAL CRYING       ROS:  03/09/18 Reviewed 13 systems:  Presently no headaches, seizures, dizziness, diplopia, dysphagia, hoarseness, chest pain, palpitations, shortness of breath, cough, hemoptysis, abdominal pain, nausea, vomiting, change in bowel habits, melena, hematuria, fever, chills, bleeding, bone pains, skin rash, weight loss,  Numbness, weakness, claudication and gait problem  No frequent infections  No hot flashes  Not unusually sensitive to heat or cold  Patient is anxious  No swelling of the ankles  No swollen glands  No GYN symptoms  Pulse 102   Temp 97 5 °F (36 4 °C) (Tympanic)   Resp 16   Ht 5' 3 5" (1 613 m)   Wt 97 1 kg (214 lb)   SpO2 97%   BMI 37 31 kg/m²     Physical Exam:  Alert, oriented, not in distress, no icterus, no oral thrush, no palpable neck mass, clear lung fields, regular heart rate, systolic murmur, abdomen  soft and non tender, no palpable abdominal mass, no ascites, no edema of ankles, no calf tenderness, no focal neurological deficit, no skin rash, no palpable lymphadenopathy, good arterial pulses, no clubbing  Patient is anxious  Performance status 1  She has Port-A-Cath    IMAGING:  No orders to display      IMPRESSION:PET-CT scan done on 02/09/2018     1  Overall stable findings compared to the prior exam   Mild areas of FDG uptake remaining in the left hilar region and in a small left para-aortic lymph node  No new findings suspicious for hypermetabolic malignancy  Deauville score of 2  Impression: Venous Doppler study done on 02/12/2018  RIGHT UPPER LIMB LIMITED:  Evaluation shows no evidence of thrombus in the internal jugular vein,  subclavian vein, and the brachiocephalic vein       LEFT UPPER LIMB:  No evidence of acute or chronic deep vein thrombosis  No evidence of superficial thrombophlebitis noted  Doppler evaluation shows a normal response to augmentation maneuvers  In comparison to the study of 11-03-17, there is complete resolution of the  previous left subclavian, cephalic and basilic vein thrombus         Lab Results  Results for orders placed or performed in visit on 03/08/18   CBC and differential   Result Value Ref Range    WBC 6 19 4 31 - 10 16 Thousand/uL    RBC 4 12 3 81 - 5 12 Million/uL    Hemoglobin 13 5 11 5 - 15 4 g/dL    Hematocrit 41 0 34 8 - 46 1 %     (H) 82 - 98 fL    MCH 32 8 26 8 - 34 3 pg    MCHC 32 9 31 4 - 37 4 g/dL    RDW 12 8 11 6 - 15 1 %    MPV 9 5 8 9 - 12 7 fL    Platelets 501 240 - 071 Thousands/uL    nRBC 0 /100 WBCs    Neutrophils Relative 58 43 - 75 %    Lymphocytes Relative 28 14 - 44 %    Monocytes Relative 11 4 - 12 %    Eosinophils Relative 2 0 - 6 %    Basophils Relative 1 0 - 1 %    Neutrophils Absolute 3 60 1 85 - 7 62 Thousands/µL    Lymphocytes Absolute 1 76 0 60 - 4 47 Thousands/µL    Monocytes Absolute 0 65 0 17 - 1 22 Thousand/µL    Eosinophils Absolute 0 14 0 00 - 0 61 Thousand/µL    Basophils Absolute 0 03 0 00 - 0 10 Thousands/µL   Comprehensive metabolic panel   Result Value Ref Range    Sodium 140 136 - 145 mmol/L    Potassium 3 6 3 5 - 5 3 mmol/L    Chloride 103 100 - 108 mmol/L    CO2 31 21 - 32 mmol/L    Anion Gap 6 4 - 13 mmol/L    BUN 21 5 - 25 mg/dL    Creatinine 0 78 0 60 - 1 30 mg/dL    Glucose 108 65 - 140 mg/dL    Calcium 9 7 8 3 - 10 1 mg/dL    AST 31 5 - 45 U/L    ALT 58 12 - 78 U/L    Alkaline Phosphatase 94 46 - 116 U/L    Total Protein 6 7 6 4 - 8 2 g/dL    Albumin 3 9 3 5 - 5 0 g/dL    Total Bilirubin 0 49 0 20 - 1 00 mg/dL    eGFR 75 ml/min/1 73sq m   LD,Blood   Result Value Ref Range     81 - 234 U/L         Results from last 7 days  Lab Units 03/08/18  0842   WBC Thousand/uL 6 19   HEMOGLOBIN g/dL 13 5   HEMATOCRIT % 41 0   PLATELETS Thousands/uL 189   SODIUM mmol/L 140   POTASSIUM mmol/L 3 6   CHLORIDE mmol/L 103   CO2 mmol/L 31   BUN mg/dL 21   CREATININE mg/dL 0 78   CALCIUM mg/dL 9 7   TOTAL PROTEIN g/dL 6 7   GLUCOSE RANDOM mg/dL 108       Reviewed and discussed with patient  Assessment and plan:  Roberto Bright is a 78 y o  female with non-Hodgkin lymphoma that appears to be in remission as detailed above  Plan is surveillance  Goal is cure from lymphoma  DVT left upper arm has resolved  Patient is on Eliquis     Goal is prevention of bleeding and blood clot  Patient has Port-A-Cath and she knows to get that flushed once every 6-8 weeks  Discussed findings on PET-CT scan and Doppler study and blood tests    All discussed in detail  Questions answered    Also discussed the importance of self-breast examination, eating healthy foods and exercise as tolerated  Addendum:  Patient has been taking Prilosec for GERD and she is symptomatic because she ran out of Prilosec  She has asked me to renew her Prilosec that was started by her primary physician  Patient voiced understanding and agreement in the discussion  Counseling / Coordination of Care   Greater than 50% of total time was spent with the patient and / or family counseling and / or coordination of care

## 2018-03-09 NOTE — LETTER
March 9, 2018     Antoinette Costello PA-C  [address]  [place] [facility]  [phone]    Patient: Morro Turcios   YOB: 1940   Date of Visit: 3/9/2018       Dear Dr Caleb De Santiago: Thank you for referring Fidel Harris to me for evaluation  Below are my notes for this consultation  If you have questions, please do not hesitate to call me  I look forward to following your patient along with you  Sincerely,        Lance Sue MD        CC: DO Lance Roblero MD  3/9/2018 10:59 AM  Sign at close encounter    HPI:   Morro Turcios is a 78 y o  female with non-Hodgkin lymphoma and DVT left arm  Patient is here with her  and son  She  completed planned 6 cycles of chemotherapy  in January 2018  She is under surveillance  Most recent PET-CT scan indicates lymphoma in remission  In November 2017 patient was found to have acute DVT left upper arm the side she has Port-A-Cath  She had Lovenox injections twice a day and injection sites were becoming painful and nodular  She was switched over to Eliquis  No bleeding or blood clot on Eliquis  Venous Doppler study is negative for blood clot  Non-Hodgkin lymphoma was diagnosed in 2017  Patient received 3 cycles of CHOP and 3 cycles of he EPOCH but the last 2 cycles did not have Adriamycin because of drop in ejection fraction  After completion of 3 cycles of CHOP pathologist from [address] [place] [address] suggested 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 and for that reason treatment was changed to [facility]  [place] Patient did not receive Rituxan because she is allergic to Rituxan  She received Neulasta  She was also given acyclovir and dapsone  Dapsone had been stopped  She will continue to take acyclovir  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  It was a localized disease  Plan was radiation and Rituxan  She had radiation   She had only one dose of Rituxan and had reaction to Rituxan  She had swelling of the tongue and closing of throat  Rituxan was discontinued     In April 2015 patient had PET CT scan that showed hypermetabolic lymphadenopathy involving the neck, chest, abdomen and pelvis and also hypermetabolic right breast nodularity, hepatic steatosis, mild/borderline aneurysmal dilatation of ascending thoracic aorta  She had left inguinal excisional lymph node biopsy, right axillary lymph node biopsy and neck node biopsy and none of those 3 biopsies showed lymphoma or malignancy  She was being followed     In 2017 she presented with lymphadenopathy again especially in left axilla that was surgically removed and final path report was inconclusive  She had another biopsy from right supraclavicular area and that 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, CD20 positive  Slides were reviewed at [facility] [place]  Report finally came after cycle 3 that she had 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   She has tiredness    She has minor arthritic symptoms and she takes Tylenol    Current Outpatient Prescriptions:     ascorbic acid (VITAMIN C) 500 mg tablet, Take 500 mg by mouth daily in the early morning  , Disp: , Rfl:     Calcium Carb-Cholecalciferol (CALCIUM + D3) 600-200 MG-UNIT TABS, Take 1 tablet by mouth daily in the early morning  , Disp: , Rfl:     Cyanocobalamin (VITAMIN B 12 PO), Take by mouth, Disp: , Rfl:     hydrochlorothiazide (HYDRODIURIL) 25 mg tablet, Take 25 mg by mouth daily in the early morning  , Disp: , Rfl:     loratadine (CLARITIN) 10 mg tablet, Take 10 mg by mouth daily, Disp: , Rfl:     Multiple Vitamin (MULTIVITAMIN) capsule, Take 1 capsule by mouth daily in the early morning  , Disp: , Rfl:     Omega-3 Fatty Acids (FISH OIL) 1200 MG CAPS, Take 3 capsules by mouth daily with dinner  , Disp: , Rfl:     omeprazole (PriLOSEC) 40 MG capsule, Take 40 mg by mouth daily in the early morning  , Disp: , Rfl:     Potassium Chloride Tiffanie CR (KLOR-CON M20 PO), Take 20 mEq by mouth daily in the early morning  , Disp: , Rfl:     verapamil (CALAN-SR) 180 mg CR tablet, Take 1 tablet (180 mg total) by mouth daily at bedtime, Disp: 90 tablet, Rfl: 0    acyclovir (ZOVIRAX) 200 mg capsule, Take 2 capsules by mouth every 12 (twelve) hours for 10 days, Disp: 40 capsule, Rfl: 0    Allergies   Allergen Reactions    Aspirin Anaphylaxis    Celebrex [Celecoxib] Anaphylaxis    Nsaids Anaphylaxis    Other Anaphylaxis     Pt states when she received a certain type of chemotherapy it caused her throat to close    Rituxan [Rituximab] Anaphylaxis     Swelling of tongue and closing of throat    Bactrim [Sulfamethoxazole-Trimethoprim] Other (See Comments)     VERY EMOTIONAL/CRYING    Sulfa Antibiotics Other (See Comments)     VERY EMOTIONAL CRYING       ROS:  03/09/18 Reviewed 13 systems:  Presently no headaches, seizures, dizziness, diplopia, dysphagia, hoarseness, chest pain, palpitations, shortness of breath, cough, hemoptysis, abdominal pain, nausea, vomiting, change in bowel habits, melena, hematuria, fever, chills, bleeding, bone pains, skin rash, weight loss,  Numbness, weakness, claudication and gait problem  No frequent infections  No hot flashes  Not unusually sensitive to heat or cold  Patient is anxious  No swelling of the ankles  No swollen glands  No GYN symptoms  Pulse 102   Temp 97 5 °F (36 4 °C) (Tympanic)   Resp 16   Ht 5' 3 5" (1 613 m)   Wt 97 1 kg (214 lb)   SpO2 97%   BMI 37 31 kg/m²      Physical Exam:  Alert, oriented, not in distress, no icterus, no oral thrush, no palpable neck mass, clear lung fields, regular heart rate, systolic murmur, abdomen  soft and non tender, no palpable abdominal mass, no ascites, no edema of ankles, no calf tenderness, no focal neurological deficit, no skin rash, no palpable lymphadenopathy, good arterial pulses, no clubbing  Patient is anxious  Performance status 1    She has Port-A-Cath    IMAGING:  No orders to display      IMPRESSION:PET-CT scan done on 02/09/2018     1  Overall stable findings compared to the prior exam   Mild areas of FDG uptake remaining in the left hilar region and in a small left para-aortic lymph node  No new findings suspicious for hypermetabolic malignancy  Deauville score of 2  Impression: Venous Doppler study done on 02/12/2018  RIGHT UPPER LIMB LIMITED:  Evaluation shows no evidence of thrombus in the internal jugular vein,  subclavian vein, and the brachiocephalic vein  LEFT UPPER LIMB:  No evidence of acute or chronic deep vein thrombosis  No evidence of superficial thrombophlebitis noted  Doppler evaluation shows a normal response to augmentation maneuvers  In comparison to the study of 11-03-17, there is complete resolution of the  previous left subclavian, cephalic and basilic vein thrombus         Lab Results  Results for orders placed or performed in visit on 03/08/18   CBC and differential   Result Value Ref Range    WBC 6 19 4 31 - 10 16 Thousand/uL    RBC 4 12 3 81 - 5 12 Million/uL    Hemoglobin 13 5 11 5 - 15 4 g/dL    Hematocrit 41 0 34 8 - 46 1 %     (H) 82 - 98 fL    MCH 32 8 26 8 - 34 3 pg    MCHC 32 9 31 4 - 37 4 g/dL    RDW 12 8 11 6 - 15 1 %    MPV 9 5 8 9 - 12 7 fL    Platelets 634 406 - 961 Thousands/uL    nRBC 0 /100 WBCs    Neutrophils Relative 58 43 - 75 %    Lymphocytes Relative 28 14 - 44 %    Monocytes Relative 11 4 - 12 %    Eosinophils Relative 2 0 - 6 %    Basophils Relative 1 0 - 1 %    Neutrophils Absolute 3 60 1 85 - 7 62 Thousands/µL    Lymphocytes Absolute 1 76 0 60 - 4 47 Thousands/µL    Monocytes Absolute 0 65 0 17 - 1 22 Thousand/µL    Eosinophils Absolute 0 14 0 00 - 0 61 Thousand/µL    Basophils Absolute 0 03 0 00 - 0 10 Thousands/µL   Comprehensive metabolic panel   Result Value Ref Range    Sodium 140 136 - 145 mmol/L    Potassium 3 6 3 5 - 5 3 mmol/L    Chloride 103 100 - 108 mmol/L    CO2 31 21 - 32 mmol/L    Anion Gap 6 4 - 13 mmol/L    BUN 21 5 - 25 mg/dL    Creatinine 0 78 0 60 - 1 30 mg/dL    Glucose 108 65 - 140 mg/dL    Calcium 9 7 8 3 - 10 1 mg/dL    AST 31 5 - 45 U/L    ALT 58 12 - 78 U/L    Alkaline Phosphatase 94 46 - 116 U/L    Total Protein 6 7 6 4 - 8 2 g/dL    Albumin 3 9 3 5 - 5 0 g/dL    Total Bilirubin 0 49 0 20 - 1 00 mg/dL    eGFR 75 ml/min/1 73sq m   LD,Blood   Result Value Ref Range     81 - 234 U/L         Results from last 7 days  Lab Units 03/08/18  0842   WBC Thousand/uL 6 19   HEMOGLOBIN g/dL 13 5   HEMATOCRIT % 41 0   PLATELETS Thousands/uL 189   SODIUM mmol/L 140   POTASSIUM mmol/L 3 6   CHLORIDE mmol/L 103   CO2 mmol/L 31   BUN mg/dL 21   CREATININE mg/dL 0 78   CALCIUM mg/dL 9 7   TOTAL PROTEIN g/dL 6 7   GLUCOSE RANDOM mg/dL 108       Reviewed and discussed with patient  Assessment and plan:  Bunny Nettles is a 78 y o  female with non-Hodgkin lymphoma that appears to be in remission as detailed above  Plan is surveillance  Goal is cure from lymphoma  DVT left upper arm has resolved  Patient is on Eliquis     Goal is prevention of bleeding and blood clot  Patient has Port-A-Cath and she knows to get that flushed once every 6-8 weeks  Discussed findings on PET-CT scan and Doppler study and blood tests    All discussed in detail  Questions answered    Also discussed the importance of self-breast examination, eating healthy foods and exercise as tolerated  Addendum:  Patient has been taking Prilosec for GERD and she is symptomatic because she ran out of Prilosec  She has asked me to renew her Prilosec that was started by her primary physician  Patient voiced understanding and agreement in the discussion  Counseling / Coordination of Care   Greater than 50% of total time was spent with the patient and / or family counseling and / or coordination of care

## 2018-03-14 DIAGNOSIS — C83.30 DIFFUSE LARGE B-CELL LYMPHOMA, UNSPECIFIED BODY REGION (HCC): Primary | ICD-10-CM

## 2018-03-14 RX ORDER — ACYCLOVIR 200 MG/1
400 CAPSULE ORAL EVERY 12 HOURS SCHEDULED
Qty: 40 CAPSULE | Refills: 0 | Status: SHIPPED | OUTPATIENT
Start: 2018-03-14 | End: 2018-04-25

## 2018-03-22 ENCOUNTER — TELEPHONE (OUTPATIENT)
Dept: HEMATOLOGY ONCOLOGY | Facility: CLINIC | Age: 78
End: 2018-03-22

## 2018-03-22 NOTE — TELEPHONE ENCOUNTER
Has questions about her acyclovir  Got enough for 10 days, but doesn't understand why only that many  Is she to stop them?

## 2018-03-22 NOTE — TELEPHONE ENCOUNTER
Called and spoke with the patient and made her aware that once her Acyclovir was done, she no longer has to take it per Dr Tim Gordillo

## 2018-03-26 ENCOUNTER — TELEPHONE (OUTPATIENT)
Dept: HEMATOLOGY ONCOLOGY | Facility: CLINIC | Age: 78
End: 2018-03-26

## 2018-03-26 NOTE — TELEPHONE ENCOUNTER
Called and left a message for Yadi Holm, the patient's son, that we have not received his FMLA paperwork  Gave fax number to have him re-fax

## 2018-04-20 ENCOUNTER — HOSPITAL ENCOUNTER (OUTPATIENT)
Dept: INFUSION CENTER | Facility: CLINIC | Age: 78
Discharge: HOME | End: 2018-04-20
Payer: COMMERCIAL

## 2018-04-20 PROCEDURE — 96523 IRRIG DRUG DELIVERY DEVICE: CPT

## 2018-04-20 RX ADMIN — Medication 300 UNITS: at 10:00

## 2018-04-20 NOTE — PROGRESS NOTES
Catheter maintenance performed per protocol without complications  Pt states she does not need blood work performed at this time  Pt aware of next port flush appointment and AVS provided  Discharged in stable condition

## 2018-04-25 ENCOUNTER — OFFICE VISIT (OUTPATIENT)
Dept: CARDIOLOGY CLINIC | Facility: CLINIC | Age: 78
End: 2018-04-25
Payer: COMMERCIAL

## 2018-04-25 VITALS
WEIGHT: 221 LBS | DIASTOLIC BLOOD PRESSURE: 70 MMHG | HEART RATE: 72 BPM | SYSTOLIC BLOOD PRESSURE: 128 MMHG | HEIGHT: 64 IN | BODY MASS INDEX: 37.73 KG/M2

## 2018-04-25 DIAGNOSIS — I10 ESSENTIAL HYPERTENSION: Chronic | ICD-10-CM

## 2018-04-25 DIAGNOSIS — I44.7 LBBB (LEFT BUNDLE BRANCH BLOCK): ICD-10-CM

## 2018-04-25 DIAGNOSIS — I10 ESSENTIAL HYPERTENSION: Primary | ICD-10-CM

## 2018-04-25 DIAGNOSIS — I42.0 DILATED CARDIOMYOPATHY (HCC): Primary | ICD-10-CM

## 2018-04-25 PROCEDURE — 99214 OFFICE O/P EST MOD 30 MIN: CPT | Performed by: INTERNAL MEDICINE

## 2018-04-25 RX ORDER — OMEPRAZOLE 20 MG/1
20 CAPSULE, DELAYED RELEASE ORAL DAILY
Refills: 1 | COMMUNITY
Start: 2018-03-09 | End: 2018-04-25

## 2018-04-25 RX ORDER — LISINOPRIL 5 MG/1
5 TABLET ORAL DAILY
Qty: 90 TABLET | Refills: 3 | Status: SHIPPED | OUTPATIENT
Start: 2018-04-25 | End: 2018-09-06

## 2018-04-25 RX ORDER — METOPROLOL SUCCINATE 50 MG/1
50 TABLET, EXTENDED RELEASE ORAL DAILY
Qty: 90 TABLET | Refills: 3 | Status: SHIPPED | OUTPATIENT
Start: 2018-04-25 | End: 2018-07-11

## 2018-04-25 NOTE — PROGRESS NOTES
Cardiology Follow Up    [facility]  1940  [id number]  [facility] CARE NOW  [facility] [place] [address] [address] [facility] [id number]    1  Dilated cardiomyopathy (HCC)  metoprolol succinate (TOPROL-XL) 50 mg 24 hr tablet    lisinopril (ZESTRIL) 5 mg tablet    Echo complete with contrast if indicated   2  LBBB (left bundle branch block)     3  Essential hypertension           Discussion/Summary: She has developed a cardiomyopathy from Adriamycin  She remains asymptomatic  I will maximize her heart failure medications  I will d/c her Verapamil and start Toprol XL 50 mg daily and Lisinopril 5 mg daily  Repeat ECHO in 3 months and then I will see her back in follow up  Interval History:  She is seen in follow up for her LBBB  She was diagnosed with non-Hodgkin's Lymphoma and received chemotherapy including Adriamycin  Repeat ECHO in 12/2017 showed that her EF had dropped to 40%  The Adriamycin was stopped at that time  ECHO and nuclear stress test in 8/2017 showed a normal EF with no ischemia  She feels good and denies any CP, SOB, palpitations, dizziness  She does c/o fatigue      Patient Active Problem List   Diagnosis    Enlarged lymph node in neck    Lymphadenopathy, axillary    Lymphoma (HCC)    Arm DVT (deep venous thromboembolism), acute, left (HCC)    Essential hypertension    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 ([facility] [address] )    Constipation    LBBB (left bundle branch block)     Past Medical History:   Diagnosis Date    Acid reflux     Basal cell carcinoma (BCC)     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HCC)     radiation    Dyslipidemia     GERD (gastroesophageal reflux disease)     [place] (hard of hearing)     b/l ears    Hypertension     Left bundle branch block     Osteoarthritis     Plantar fasciitis     Plantar fasciitis of left foot     Shortness of breath     when walking up stairs     Social History Social History    Marital status: /Civil Union     Spouse name: N/A    Number of children: N/A    Years of education: N/A     Occupational History    Not on file  Social History Main Topics    Smoking status: Former Smoker     Quit date: 1970    Smokeless tobacco: Former User    Alcohol use No    Drug use: No    Sexual activity: Not on file     Other Topics Concern    Not on file     Social History Narrative    No narrative on file      Family History   Problem Relation Age of Onset    Multiple myeloma Mother     Heart disease Father     Hypertension Father     Hypertension Sister     Heart disease Sister      Past Surgical History:   Procedure Laterality Date    ANKLE ARTHROPLASTY Left     BLEPHAROPLASTY      right eye     CATARACT EXTRACTION Right     ESOPHAGEAL DILATION      x2    ESOPHAGOGASTRODUODENOSCOPY      dilitation    FACIAL/NECK BIOPSY Right 9/8/2017    Procedure: NECK NODE BIOPSY WITH NEEDLE LOCALIZATION; LYMPHOMA PROTOCOL (NEEDLE LOC WITH I R  AT 1100);   Surgeon: Ryan Roy MD;  [facility];  Service: Surgical Oncology    HYSTERECTOMY      LYMPH NODE DISSECTION      right groin and neck    ORBITAL FLOOR EXPLORATION Right     for cancer     PORTACATH PLACEMENT      left chest     WY BX/REMV,LYMPH NODE,DEEP AXILL Left 8/14/2017    Procedure: Axillary lymph node excision with LYMPHOMA PROTOCOL;  Surgeon: Ryan Roy MD;  [facility];  Service: Surgical Oncology    WY BX/REMV,LYMPH NODE,DEEP CERV Right 5/2/2016    Procedure: NECK NODE BIOPSY;  Surgeon: Ryan Roy MD;  [facility];  Service: Surgical Oncology    WY INSJ TUNNELED CTR VAD W/SUBQ PORT AGE 5 YR/> N/A 10/3/2017    Procedure: PLACEMENT OF PORT-A-CATH DEVICE;  Surgeon: Ryan Roy MD;  [facility];  Service: Surgical Oncology       Current Outpatient Prescriptions:     apixaban (ELIQUIS) 5 mg, Take 5 mg by mouth 2 (two) times a day, Disp: , Rfl:    ascorbic acid (VITAMIN C) 500 mg tablet, Take 500 mg by mouth daily in the early morning  , Disp: , Rfl:     Calcium Carb-Cholecalciferol (CALCIUM + D3) 600-200 MG-UNIT TABS, Take 1 tablet by mouth daily in the early morning  , Disp: , Rfl:     Cyanocobalamin (VITAMIN B 12 PO), Take by mouth, Disp: , Rfl:     hydrochlorothiazide (HYDRODIURIL) 25 mg tablet, Take 25 mg by mouth daily in the early morning  , Disp: , Rfl:     loratadine (CLARITIN) 10 mg tablet, Take 10 mg by mouth daily, Disp: , Rfl:     Multiple Vitamin (MULTIVITAMIN) capsule, Take 1 capsule by mouth daily in the early morning  , Disp: , Rfl:     Omega-3 Fatty Acids (FISH OIL) 1200 MG CAPS, Take 3 capsules by mouth daily with dinner  , Disp: , Rfl:     omeprazole (PriLOSEC OTC) 20 MG tablet, Take 1 tablet (20 mg total) by mouth daily, Disp: 90 tablet, Rfl: 1    Potassium Chloride Tiffanie CR (KLOR-CON M20 PO), Take 20 mEq by mouth daily in the early morning  , Disp: , Rfl:     lisinopril (ZESTRIL) 5 mg tablet, Take 1 tablet (5 mg total) by mouth daily, Disp: 90 tablet, Rfl: 3    metoprolol succinate (TOPROL-XL) 50 mg 24 hr tablet, Take 1 tablet (50 mg total) by mouth daily, Disp: 90 tablet, Rfl: 3  Allergies   Allergen Reactions    Aspirin Anaphylaxis    Celebrex [Celecoxib] Anaphylaxis    Nsaids Anaphylaxis    Other Anaphylaxis     Pt states when she received a certain type of chemotherapy it caused her throat to close    Rituxan [Rituximab] Anaphylaxis     Swelling of tongue and closing of throat    Bactrim [Sulfamethoxazole-Trimethoprim] Other (See Comments)     VERY EMOTIONAL/CRYING    Sulfa Antibiotics Other (See Comments)     VERY EMOTIONAL CRYING     Vitals:    04/25/18 0955   BP: 128/70   BP Location: Left arm   Patient Position: Sitting   Cuff Size: Large   Pulse: 72   Weight: 100 kg (221 lb)   Height: 5' 3 5" (1 613 m)     Weight (last 2 days)     Date/Time   Weight    04/25/18 0955  100 (221)             Blood pressure 128/70, pulse 72, height 5' 3 5" (1 613 m), weight 100 kg (221 lb)  , Body mass index is 38 53 kg/m²      Labs:  Appointment on 03/08/2018   Component Date Value    Beta-2 Microglobulin 03/08/2018 2 5*    WBC 03/08/2018 6 19     RBC 03/08/2018 4 12     Hemoglobin 03/08/2018 13 5     Hematocrit 03/08/2018 41 0     MCV 03/08/2018 100*    MCH 03/08/2018 32 8     MCHC 03/08/2018 32 9     RDW 03/08/2018 12 8     MPV 03/08/2018 9 5     Platelets 50/30/3784 189     nRBC 03/08/2018 0     Neutrophils Relative 03/08/2018 58     Lymphocytes Relative 03/08/2018 28     Monocytes Relative 03/08/2018 11     Eosinophils Relative 03/08/2018 2     Basophils Relative 03/08/2018 1     Neutrophils Absolute 03/08/2018 3 60     Lymphocytes Absolute 03/08/2018 1 76     Monocytes Absolute 03/08/2018 0 65     Eosinophils Absolute 03/08/2018 0 14     Basophils Absolute 03/08/2018 0 03     Sodium 03/08/2018 140     Potassium 03/08/2018 3 6     Chloride 03/08/2018 103     CO2 03/08/2018 31     Anion Gap 03/08/2018 6     BUN 03/08/2018 21     Creatinine 03/08/2018 0 78     Glucose 03/08/2018 108     Calcium 03/08/2018 9 7     AST 03/08/2018 31     ALT 03/08/2018 58     Alkaline Phosphatase 03/08/2018 94     Total Protein 03/08/2018 6 7     Albumin 03/08/2018 3 9     Total Bilirubin 03/08/2018 0 49     eGFR 03/08/2018 75     LD 03/08/2018 181    Hospital Outpatient Visit on 02/16/2018   Component Date Value    POC Glucose 02/16/2018 88    Appointment on 02/09/2018   Component Date Value    PTT 02/09/2018 36*    Protime 02/09/2018 13 6     INR 02/09/2018 1 04     D-Dimer, Quant 02/09/2018 238    Appointment on 02/05/2018   Component Date Value    Beta-2 Microglobulin 02/05/2018 2 5*    WBC 02/05/2018 10 44*    RBC 02/05/2018 3 46*    Hemoglobin 02/05/2018 11 7     Hematocrit 02/05/2018 36 5     MCV 02/05/2018 106*    MCH 02/05/2018 33 8     MCHC 02/05/2018 32 1     RDW 02/05/2018 14 9     MPV 02/05/2018 9  8     Platelets 17/89/5390 234     nRBC 02/05/2018 0     Sodium 02/05/2018 140     Potassium 02/05/2018 3 3*    Chloride 02/05/2018 105     CO2 02/05/2018 28     Anion Gap 02/05/2018 7     BUN 02/05/2018 22     Creatinine 02/05/2018 0 79     Glucose 02/05/2018 102     Calcium 02/05/2018 9 4     AST 02/05/2018 54*    ALT 02/05/2018 102*    Alkaline Phosphatase 02/05/2018 191*    Total Protein 02/05/2018 6 5     Albumin 02/05/2018 3 7     Total Bilirubin 02/05/2018 0 18*    eGFR 02/05/2018 73     LD 02/05/2018 248*    Segmented % 02/05/2018 67     Bands % 02/05/2018 2     Lymphocytes % 02/05/2018 19     Monocytes % 02/05/2018 5     Eosinophils % 02/05/2018 2     Basophils % 02/05/2018 0     Metamyelocytes% 02/05/2018 3*    Myelocytes % 02/05/2018 2*    Absolute Neutrophils 02/05/2018 7 20     Lymphocytes Absolute 02/05/2018 1 98     Monocytes Absolute 02/05/2018 0 52     Eosinophils Absolute 02/05/2018 0 21     Basophils Absolute 02/05/2018 0 00     RBC Morphology 02/05/2018 Present     Anisocytosis 02/05/2018 Present     Macrocytes 02/05/2018 Present     Platelet Estimate 39/99/6345 Adequate    Admission on 01/17/2018, Discharged on 01/21/2018   Component Date Value    WBC 01/17/2018 7 33     RBC 01/17/2018 3 24*    Hemoglobin 01/17/2018 11 1*    Hematocrit 01/17/2018 34 3*    MCV 01/17/2018 106*    MCH 01/17/2018 34 3     MCHC 01/17/2018 32 4     RDW 01/17/2018 15 7*    MPV 01/17/2018 9 1     Platelets 68/87/5487 268     nRBC 01/17/2018 0     Sodium 01/17/2018 142     Potassium 01/17/2018 3 9     Chloride 01/17/2018 108     CO2 01/17/2018 28     Anion Gap 01/17/2018 6     BUN 01/17/2018 18     Creatinine 01/17/2018 0 76     Glucose 01/17/2018 117     Calcium 01/17/2018 9 9     AST 01/17/2018 36     ALT 01/17/2018 86*    Alkaline Phosphatase 01/17/2018 129*    Total Protein 01/17/2018 6 9     Albumin 01/17/2018 3 9     Total Bilirubin 01/17/2018 0 27  eGFR 01/17/2018 77     Segmented % 01/17/2018 64     Bands % 01/17/2018 5     Lymphocytes % 01/17/2018 6*    Monocytes % 01/17/2018 11     Eosinophils % 01/17/2018 3     Basophils % 01/17/2018 0     Metamyelocytes% 01/17/2018 5*    Myelocytes % 01/17/2018 1     Atypical Lymphocytes % 01/17/2018 5*    Absolute Neutrophils 01/17/2018 5 06     Lymphocytes Absolute 01/17/2018 0 44*    Monocytes Absolute 01/17/2018 0 81     Eosinophils Absolute 01/17/2018 0 22     Basophils Absolute 01/17/2018 0 00     Platelet Estimate 19/26/6249 Adequate     WBC 01/18/2018 13 58*    RBC 01/18/2018 2 93*    Hemoglobin 01/18/2018 10 1*    Hematocrit 01/18/2018 31 0*    MCV 01/18/2018 106*    MCH 01/18/2018 34 5*    MCHC 01/18/2018 32 6     RDW 01/18/2018 15 3*    MPV 01/18/2018 9 4     Platelets 04/22/3097 236     nRBC 01/18/2018 0     Sodium 01/18/2018 142     Potassium 01/18/2018 3 8     Chloride 01/18/2018 108     CO2 01/18/2018 26     Anion Gap 01/18/2018 8     BUN 01/18/2018 17     Creatinine 01/18/2018 0 64     Glucose 01/18/2018 118     Calcium 01/18/2018 9 4     AST 01/18/2018 37     ALT 01/18/2018 84*    Alkaline Phosphatase 01/18/2018 110     Total Protein 01/18/2018 6 0*    Albumin 01/18/2018 3 3*    Total Bilirubin 01/18/2018 0 26     eGFR 01/18/2018 88     Segmented % 01/18/2018 84*    Bands % 01/18/2018 5     Lymphocytes % 01/18/2018 7*    Monocytes % 01/18/2018 1*    Eosinophils % 01/18/2018 0     Basophils % 01/18/2018 0     Myelocytes % 01/18/2018 3*    Absolute Neutrophils 01/18/2018 12 09*    Lymphocytes Absolute 01/18/2018 0 95     Monocytes Absolute 01/18/2018 0 14     Eosinophils Absolute 01/18/2018 0 00     Basophils Absolute 01/18/2018 0 00     RBC Morphology 01/18/2018 Present     Anisocytosis 01/18/2018 Present     Platelet Estimate 78/63/5714 Adequate     WBC 01/19/2018 14 81*    RBC 01/19/2018 2 86*    Hemoglobin 01/19/2018 9 7*    Hematocrit 01/19/2018 30 7*    MCV 01/19/2018 107*    MCH 01/19/2018 33 9     MCHC 01/19/2018 31 6     RDW 01/19/2018 15 4*    MPV 01/19/2018 9 1     Platelets 91/96/3519 210     nRBC 01/19/2018 0     Neutrophils Relative 01/19/2018 89*    Lymphocytes Relative 01/19/2018 6*    Monocytes Relative 01/19/2018 5     Eosinophils Relative 01/19/2018 0     Basophils Relative 01/19/2018 0     Neutrophils Absolute 01/19/2018 12 94*    Lymphocytes Absolute 01/19/2018 0 92     Monocytes Absolute 01/19/2018 0 66     Eosinophils Absolute 01/19/2018 0 00     Basophils Absolute 01/19/2018 0 02     Sodium 01/19/2018 144     Potassium 01/19/2018 3 9     Chloride 01/19/2018 110*    CO2 01/19/2018 28     Anion Gap 01/19/2018 6     BUN 01/19/2018 18     Creatinine 01/19/2018 0 66     Glucose 01/19/2018 106     Calcium 01/19/2018 9 3     AST 01/19/2018 26     ALT 01/19/2018 79*    Alkaline Phosphatase 01/19/2018 100     Total Protein 01/19/2018 5 8*    Albumin 01/19/2018 3 1*    Total Bilirubin 01/19/2018 0 22     eGFR 01/19/2018 87    Appointment on 01/15/2018   Component Date Value    Beta-2 Microglobulin 01/15/2018 2 6*    WBC 01/15/2018 10 51*    RBC 01/15/2018 3 28*    Hemoglobin 01/15/2018 11 1*    Hematocrit 01/15/2018 35 2     MCV 01/15/2018 107*    MCH 01/15/2018 33 8     MCHC 01/15/2018 31 5     RDW 01/15/2018 15 8*    MPV 01/15/2018 9 3     Platelets 05/83/4554 260     nRBC 01/15/2018 1     Sodium 01/15/2018 142     Potassium 01/15/2018 3 7     Chloride 01/15/2018 106     CO2 01/15/2018 30     Anion Gap 01/15/2018 6     BUN 01/15/2018 14     Creatinine 01/15/2018 0 71     Glucose 01/15/2018 81     Calcium 01/15/2018 9 9     AST 01/15/2018 37     ALT 01/15/2018 87*    Alkaline Phosphatase 01/15/2018 133*    Total Protein 01/15/2018 6 9     Albumin 01/15/2018 4 1     Total Bilirubin 01/15/2018 0 35     eGFR 01/15/2018 84     LD 01/15/2018 264*    Uric Acid 01/15/2018 5 2     Segmented % 01/15/2018 69     Bands % 01/15/2018 2     Lymphocytes % 01/15/2018 8*    Monocytes % 01/15/2018 10     Eosinophils % 01/15/2018 0     Basophils % 01/15/2018 1     Metamyelocytes% 01/15/2018 3*    Myelocytes % 01/15/2018 2*    Atypical Lymphocytes % 01/15/2018 5*    Absolute Neutrophils 01/15/2018 7 46     Lymphocytes Absolute 01/15/2018 0 84     Monocytes Absolute 01/15/2018 1 05     Eosinophils Absolute 01/15/2018 0 00     Basophils Absolute 01/15/2018 0 11*    RBC Morphology 01/15/2018 Present     Stomatocytes 01/15/2018 Present     Platelet Estimate 98/84/9385 Adequate    Admission on 12/27/2017, Discharged on 12/31/2017   Component Date Value    WBC 12/27/2017 7 85     RBC 12/27/2017 3 24*    Hemoglobin 12/27/2017 10 7*    Hematocrit 12/27/2017 32 6*    MCV 12/27/2017 101*    MCH 12/27/2017 33 0     MCHC 12/27/2017 32 8     RDW 12/27/2017 17 1*    MPV 12/27/2017 9 6     Platelets 24/84/4083 227     nRBC 12/27/2017 1     Sodium 12/27/2017 139     Potassium 12/27/2017 4 1     Chloride 12/27/2017 105     CO2 12/27/2017 26     Anion Gap 12/27/2017 8     BUN 12/27/2017 16     Creatinine 12/27/2017 0 77     Glucose 12/27/2017 88     Calcium 12/27/2017 9 7     AST 12/27/2017 23     ALT 12/27/2017 46     Alkaline Phosphatase 12/27/2017 99     Total Protein 12/27/2017 6 4     Albumin 12/27/2017 3 8     Total Bilirubin 12/27/2017 0 35     eGFR 12/27/2017 76     Segmented % 12/27/2017 64     Bands % 12/27/2017 5     Lymphocytes % 12/27/2017 15     Monocytes % 12/27/2017 11     Eosinophils % 12/27/2017 0     Basophils % 12/27/2017 0     Metamyelocytes% 12/27/2017 2*    Myelocytes % 12/27/2017 2*    Atypical Lymphocytes % 12/27/2017 1*    Absolute Neutrophils 12/27/2017 5 42     Lymphocytes Absolute 12/27/2017 1 18     Monocytes Absolute 12/27/2017 0 86     Eosinophils Absolute 12/27/2017 0 00     Basophils Absolute 12/27/2017 0 00     Toxic Granulation 12/27/2017 Present     RBC Morphology 12/27/2017 Present     Anisocytosis 12/27/2017 Present     Macrocytes 12/27/2017 Present     Platelet Estimate 89/68/4970 Adequate     Sodium 12/28/2017 142     Potassium 12/28/2017 3 3*    Chloride 12/28/2017 108     CO2 12/28/2017 23     Anion Gap 12/28/2017 11     BUN 12/28/2017 17     Creatinine 12/28/2017 0 79     Glucose 12/28/2017 181*    Calcium 12/28/2017 8 7     eGFR 12/28/2017 73    Appointment on 12/22/2017   Component Date Value    Beta-2 Microglobulin 12/22/2017 2 5*    WBC 12/22/2017 10 60*    RBC 12/22/2017 3 56*    Hemoglobin 12/22/2017 11 4*    Hematocrit 12/22/2017 35 6     MCV 12/22/2017 100*    MCH 12/22/2017 32 0     MCHC 12/22/2017 32 0     RDW 12/22/2017 17 7*    MPV 12/22/2017 11 1     Platelets 46/19/0241 196     nRBC 12/22/2017 1     Sodium 12/22/2017 140     Potassium 12/22/2017 3 8     Chloride 12/22/2017 103     CO2 12/22/2017 29     Anion Gap 12/22/2017 8     BUN 12/22/2017 13     Creatinine 12/22/2017 0 81     Glucose, Fasting 12/22/2017 107*    Calcium 12/22/2017 9 5     AST 12/22/2017 27     ALT 12/22/2017 57     Alkaline Phosphatase 12/22/2017 138*    Total Protein 12/22/2017 6 6     Albumin 12/22/2017 3 6     Total Bilirubin 12/22/2017 0 39     eGFR 12/22/2017 71     LD 12/22/2017 247*    Segmented % 12/22/2017 59     Bands % 12/22/2017 6     Lymphocytes % 12/22/2017 10*    Monocytes % 12/22/2017 16*    Eosinophils % 12/22/2017 2     Basophils % 12/22/2017 0     Atypical Lymphocytes % 12/22/2017 7*    Absolute Neutrophils 12/22/2017 6 89     Lymphocytes Absolute 12/22/2017 1 06     Monocytes Absolute 12/22/2017 1 70*    Eosinophils Absolute 12/22/2017 0 21     Basophils Absolute 12/22/2017 0 00     RBC Morphology 12/22/2017 Present     Anisocytosis 12/22/2017 Present     Platelet Estimate 09/54/6993 Adequate    Admission on 12/06/2017, Discharged on 12/11/2017   No results displayed because visit has over 200 results  Appointment on 12/04/2017   Component Date Value    Beta-2 Microglobulin 12/04/2017 2 0     WBC 12/04/2017 4 48     RBC 12/04/2017 4 02     Hemoglobin 12/04/2017 12 6     Hematocrit 12/04/2017 38 0     MCV 12/04/2017 95     MCH 12/04/2017 31 3     MCHC 12/04/2017 33 2     RDW 12/04/2017 19 0*    MPV 12/04/2017 10 1     Platelets 90/52/8311 251     nRBC 12/04/2017 0     Neutrophils Relative 12/04/2017 58     Lymphocytes Relative 12/04/2017 27     Monocytes Relative 12/04/2017 14*    Eosinophils Relative 12/04/2017 1     Basophils Relative 12/04/2017 0     Neutrophils Absolute 12/04/2017 2 52     Lymphocytes Absolute 12/04/2017 1 22     Monocytes Absolute 12/04/2017 0 62     Eosinophils Absolute 12/04/2017 0 03     Basophils Absolute 12/04/2017 0 02     Sodium 12/04/2017 137     Potassium 12/04/2017 3 6     Chloride 12/04/2017 103     CO2 12/04/2017 29     Anion Gap 12/04/2017 5     BUN 12/04/2017 15     Creatinine 12/04/2017 0 70     Glucose, Fasting 12/04/2017 94     Calcium 12/04/2017 9 5     AST 12/04/2017 21     ALT 12/04/2017 54     Alkaline Phosphatase 12/04/2017 116     Total Protein 12/04/2017 6 7     Albumin 12/04/2017 3 5     Total Bilirubin 12/04/2017 0 34     eGFR 12/04/2017 85     LD 12/04/2017 182    There may be more visits with results that are not included  Imaging: No results found  Review of Systems:  Review of Systems   Constitutional: Negative for diaphoresis, fever and unexpected weight change  HENT: Negative  Respiratory: Negative for cough, shortness of breath and wheezing  Cardiovascular: Negative for chest pain, palpitations and leg swelling  Gastrointestinal: Negative for abdominal pain, diarrhea and nausea  Musculoskeletal: Negative for gait problem and myalgias  Skin: Negative for rash  Neurological: Negative for dizziness and numbness  Psychiatric/Behavioral: Negative          Physical Exam:  Physical Exam   Constitutional: She is oriented to person, place, and time  She appears well-developed and well-nourished  HENT:   Head: Normocephalic and atraumatic  Eyes: Pupils are equal, round, and reactive to light  Neck: Normal range of motion  Neck supple  No JVD present  Cardiovascular: Regular rhythm, S1 normal, S2 normal and normal pulses  Pulses:       Carotid pulses are 2+ on the right side, and 2+ on the left side  Pulmonary/Chest: Effort normal and breath sounds normal  She has no wheezes  She has no rales  Abdominal: Soft  Bowel sounds are normal  There is no tenderness  Musculoskeletal: Normal range of motion  She exhibits no edema or tenderness  Neurological: She is alert and oriented to person, place, and time  She has normal reflexes  No cranial nerve deficit  Skin: Skin is warm  Psychiatric: She has a normal mood and affect

## 2018-04-27 RX ORDER — HYDROCHLOROTHIAZIDE 25 MG/1
25 TABLET ORAL
Qty: 90 TABLET | Refills: 1 | Status: SHIPPED | OUTPATIENT
Start: 2018-04-27 | End: 2018-09-06

## 2018-05-01 ENCOUNTER — APPOINTMENT (OUTPATIENT)
Dept: LAB | Facility: CLINIC | Age: 78
End: 2018-05-01
Payer: COMMERCIAL

## 2018-05-01 ENCOUNTER — TRANSCRIBE ORDERS (OUTPATIENT)
Dept: LAB | Facility: CLINIC | Age: 78
End: 2018-05-01

## 2018-05-01 DIAGNOSIS — I82.622 ARM DVT (DEEP VENOUS THROMBOEMBOLISM), ACUTE, LEFT (HCC): ICD-10-CM

## 2018-05-01 DIAGNOSIS — C83.38 DIFFUSE LARGE B-CELL LYMPHOMA OF LYMPH NODES OF MULTIPLE SITES (HCC): ICD-10-CM

## 2018-05-01 LAB
ALBUMIN SERPL BCP-MCNC: 3.9 G/DL (ref 3.5–5)
ALP SERPL-CCNC: 115 U/L (ref 46–116)
ALT SERPL W P-5'-P-CCNC: 44 U/L (ref 12–78)
ANION GAP SERPL CALCULATED.3IONS-SCNC: 7 MMOL/L (ref 4–13)
AST SERPL W P-5'-P-CCNC: 27 U/L (ref 5–45)
BASOPHILS # BLD AUTO: 0.01 THOUSANDS/ΜL (ref 0–0.1)
BASOPHILS NFR BLD AUTO: 0 % (ref 0–1)
BILIRUB SERPL-MCNC: 0.46 MG/DL (ref 0.2–1)
BUN SERPL-MCNC: 20 MG/DL (ref 5–25)
CALCIUM SERPL-MCNC: 9.6 MG/DL (ref 8.3–10.1)
CHLORIDE SERPL-SCNC: 103 MMOL/L (ref 100–108)
CO2 SERPL-SCNC: 30 MMOL/L (ref 21–32)
CREAT SERPL-MCNC: 0.77 MG/DL (ref 0.6–1.3)
DEPRECATED D DIMER PPP: 283 NG/ML (FEU) (ref 0–424)
EOSINOPHIL # BLD AUTO: 0.17 THOUSAND/ΜL (ref 0–0.61)
EOSINOPHIL NFR BLD AUTO: 3 % (ref 0–6)
ERYTHROCYTE [DISTWIDTH] IN BLOOD BY AUTOMATED COUNT: 13.1 % (ref 11.6–15.1)
GFR SERPL CREATININE-BSD FRML MDRD: 76 ML/MIN/1.73SQ M
GLUCOSE P FAST SERPL-MCNC: 85 MG/DL (ref 65–99)
HCT VFR BLD AUTO: 44.2 % (ref 34.8–46.1)
HGB BLD-MCNC: 13.8 G/DL (ref 11.5–15.4)
LDH SERPL-CCNC: 201 U/L (ref 81–234)
LYMPHOCYTES # BLD AUTO: 2.34 THOUSANDS/ΜL (ref 0.6–4.47)
LYMPHOCYTES NFR BLD AUTO: 37 % (ref 14–44)
MCH RBC QN AUTO: 29.8 PG (ref 26.8–34.3)
MCHC RBC AUTO-ENTMCNC: 31.2 G/DL (ref 31.4–37.4)
MCV RBC AUTO: 96 FL (ref 82–98)
MONOCYTES # BLD AUTO: 0.69 THOUSAND/ΜL (ref 0.17–1.22)
MONOCYTES NFR BLD AUTO: 11 % (ref 4–12)
NEUTROPHILS # BLD AUTO: 3.13 THOUSANDS/ΜL (ref 1.85–7.62)
NEUTS SEG NFR BLD AUTO: 49 % (ref 43–75)
NRBC BLD AUTO-RTO: 0 /100 WBCS
PLATELET # BLD AUTO: 199 THOUSANDS/UL (ref 149–390)
PMV BLD AUTO: 9.8 FL (ref 8.9–12.7)
POTASSIUM SERPL-SCNC: 3.8 MMOL/L (ref 3.5–5.3)
PROT SERPL-MCNC: 6.9 G/DL (ref 6.4–8.2)
RBC # BLD AUTO: 4.63 MILLION/UL (ref 3.81–5.12)
SODIUM SERPL-SCNC: 140 MMOL/L (ref 136–145)
WBC # BLD AUTO: 6.35 THOUSAND/UL (ref 4.31–10.16)

## 2018-05-01 PROCEDURE — 85379 FIBRIN DEGRADATION QUANT: CPT

## 2018-05-01 PROCEDURE — 80053 COMPREHEN METABOLIC PANEL: CPT

## 2018-05-01 PROCEDURE — 82232 ASSAY OF BETA-2 PROTEIN: CPT

## 2018-05-01 PROCEDURE — 85025 COMPLETE CBC W/AUTO DIFF WBC: CPT

## 2018-05-01 PROCEDURE — 36415 COLL VENOUS BLD VENIPUNCTURE: CPT

## 2018-05-01 PROCEDURE — 83615 LACTATE (LD) (LDH) ENZYME: CPT

## 2018-05-03 ENCOUNTER — OFFICE VISIT (OUTPATIENT)
Dept: HEMATOLOGY ONCOLOGY | Facility: CLINIC | Age: 78
End: 2018-05-03
Payer: COMMERCIAL

## 2018-05-03 VITALS
WEIGHT: 219.8 LBS | HEIGHT: 64 IN | BODY MASS INDEX: 37.52 KG/M2 | SYSTOLIC BLOOD PRESSURE: 124 MMHG | DIASTOLIC BLOOD PRESSURE: 76 MMHG | RESPIRATION RATE: 17 BRPM | HEART RATE: 80 BPM | TEMPERATURE: 97.8 F | OXYGEN SATURATION: 98 %

## 2018-05-03 DIAGNOSIS — C83.38 DIFFUSE LARGE B-CELL LYMPHOMA OF LYMPH NODES OF MULTIPLE SITES (HCC): Primary | ICD-10-CM

## 2018-05-03 DIAGNOSIS — I82.622 ARM DVT (DEEP VENOUS THROMBOEMBOLISM), ACUTE, LEFT (HCC): ICD-10-CM

## 2018-05-03 LAB — B2 MICROGLOB SERPL-MCNC: 2.6 MG/L (ref 0.6–2.4)

## 2018-05-03 PROCEDURE — 99214 OFFICE O/P EST MOD 30 MIN: CPT | Performed by: INTERNAL MEDICINE

## 2018-05-03 NOTE — LETTER
May 3, 2018     Antione Briggs, [address] [facility]  [address]  [address] [place] [address]    Patient: Jay Graves   YOB: 1940   Date of Visit: 5/3/2018       Dear Dr Salvador Recinos: Thank you for referring Morgan Crystal to me for evaluation  Below are my notes for this consultation  If you have questions, please do not hesitate to call me  I look forward to following your patient along with you  Sincerely,        Maryjane Dang MD        CC: No Recipients  Maryjane Dang MD  5/3/2018  3:37 PM  Sign at close encounter  Follow-up office visit  HPI:     Patient is here with her  and son  Jay Graves is a 78 y o  female with  non-Hodgkin lymphoma and DVT left arm and this is a follow-up visit  She  completed all planned chemotherapy treatments in  January 2018 and is under surveillance  Last PET-CT scan was negative for lymphoma/cancer  Also she will be completing 6 months on anticoagulation therapy and has negative venous Doppler study and D-dimer test and will be finishing Eliquis this month  She has some tiredness and arthritic symptoms  She states overall she has been feeling better  No lymphoma related symptoms and no DVT related symptoms  No bleeding, fever, chills, night sweats, weight loss and swollen glands        Non-Hodgkin lymphoma was diagnosed in 2017    Patient received 3 cycles of CHOP and 3 cycles of he EPOCH but the last 2 cycles did not have Adriamycin because of drop in ejection fraction   After completion of 3 cycles of CHOP pathologist from [facility] [place] suggested that 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 and for that reason treatment was changed to ADVOCATE [facility] Patient did not receive Rituxan because she is allergic to Rituxan   She received Neulasta   She was also given acyclovir and dapsone   Dapsone had been stopped  [facility]     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   It was a localized disease  Plan was radiation and Rituxan  She had radiation  She had only one dose of Rituxan and had reaction to Rituxan  She had swelling of the tongue and closing of throat  Rituxan was discontinued     In April 2015 patient had PET CT scan that showed hypermetabolic lymphadenopathy involving the neck, chest, abdomen and pelvis and also hypermetabolic right breast nodularity, hepatic steatosis, mild/borderline aneurysmal dilatation of ascending thoracic aorta  She had left inguinal excisional lymph node biopsy, right axillary lymph node biopsy and neck node biopsy and none of those 3 biopsies showed lymphoma or malignancy  She was being followed     In 2017 she presented with lymphadenopathy again especially in left axilla that was surgically removed and final path report was inconclusive  She had another biopsy from right supraclavicular area and that 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, CD20 positive   [facility] [place] finally came after 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                Current Outpatient Prescriptions:     apixaban (ELIQUIS) 5 mg, Take 5 mg by mouth 2 (two) times a day, Disp: , Rfl:     ascorbic acid (VITAMIN C) 500 mg tablet, Take 500 mg by mouth daily in the early morning  , Disp: , Rfl:     Calcium Carb-Cholecalciferol (CALCIUM + D3) 600-200 MG-UNIT TABS, Take 1 tablet by mouth daily in the early morning  , Disp: , Rfl:     Cyanocobalamin (VITAMIN B 12 PO), Take by mouth, Disp: , Rfl:     hydrochlorothiazide (HYDRODIURIL) 25 mg tablet, Take 1 tablet (25 mg total) by mouth daily in the early morning, Disp: 90 tablet, Rfl: 1    lisinopril (ZESTRIL) 5 mg tablet, Take 1 tablet (5 mg total) by mouth daily, Disp: 90 tablet, Rfl: 3    loratadine (CLARITIN) 10 mg tablet, Take 10 mg by mouth daily, Disp: , Rfl:     metoprolol succinate (TOPROL-XL) 50 mg 24 hr tablet, Take 1 tablet (50 mg total) by mouth daily, Disp: 90 tablet, Rfl: 3    Multiple Vitamin (MULTIVITAMIN) capsule, Take 1 capsule by mouth daily in the early morning  , Disp: , Rfl:     Omega-3 Fatty Acids (FISH OIL) 1200 MG CAPS, Take 3 capsules by mouth daily with dinner  , Disp: , Rfl:     omeprazole (PriLOSEC OTC) 20 MG tablet, Take 1 tablet (20 mg total) by mouth daily, Disp: 90 tablet, Rfl: 1    Potassium Chloride Tiffanie CR (KLOR-CON M20 PO), Take 20 mEq by mouth daily in the early morning  , Disp: , Rfl:     Allergies   Allergen Reactions    Aspirin Anaphylaxis    Celebrex [Celecoxib] Anaphylaxis    Nsaids Anaphylaxis    Other Anaphylaxis     Pt states when she received a certain type of chemotherapy it caused her throat to close    Rituxan [Rituximab] Anaphylaxis     Swelling of tongue and closing of throat    Bactrim [Sulfamethoxazole-Trimethoprim] Other (See Comments)     VERY EMOTIONAL/CRYING    Sulfa Antibiotics Other (See Comments)     VERY EMOTIONAL CRYING       ROS:  05/03/18 Reviewed 13 systems:  Presently no headaches, seizures, dizziness, diplopia, dysphagia, hoarseness, chest pain, palpitations, shortness of breath, cough, hemoptysis, abdominal pain, nausea, vomiting, change in bowel habits, melena, hematuria, fever, chills, bleeding, bone pains, skin rash, weight loss,   weakness, numbness,  claudication and gait problem  No frequent infections  Not unusually sensitive to heat or cold  No swelling of the ankles  No swollen glands  Patient is anxious    No GYN symptoms Other symptoms are in HPI        /76 (BP Location: Left arm, Cuff Size: Adult)   Pulse 80   Temp 97 8 °F (36 6 °C) (Tympanic)   Resp 17   Ht 5' 3 5" (1 613 m)   Wt 99 7 kg (219 lb 12 8 oz)   SpO2 98%   BMI 38 33 kg/m²      Physical Exam:  Alert, oriented, not in distress, no icterus, no oral thrush, no palpable neck mass, clear lung fields, regular heart rate, abdomen  soft and non tender, no palpable abdominal mass, no ascites, no edema of ankles, no calf tenderness, no focal neurological deficit, no skin rash, no palpable lymphadenopathy, good arterial pulses, no clubbing  Patient is anxious  Performance status 1  IMAGING:  IMPRESSION:     1  Overall stable findings compared to the prior exam   Mild areas of FDG uptake remaining in the left hilar region and in a small left para-aortic lymph node  No new findings suspicious for hypermetabolic malignancy    Deauville score of 2      Workstation performed: [id number]      Imaging     NM PET CT skull base to mid thigh (Order #[id number]) on 2/9/2018 - Imaging Information   Result History     NM PET CT skull base to mid thigh (Order #[id number]) on 2/16/2018 - Order Result History Report   Signed by     Signed Date/Time  Phone Pager   Rajeev Ceja 2/16/2018 11:24 [phone]          LABS:  Results for orders placed or performed in visit on 05/01/18   Beta 2 microglobuline, serum   Result Value Ref Range    Beta-2 Microglobulin 2 6 (H) 0 6 - 2 4 mg/L   CBC and differential   Result Value Ref Range    WBC 6 35 4 31 - 10 16 Thousand/uL    RBC 4 63 3 81 - 5 12 Million/uL    Hemoglobin 13 8 11 5 - 15 4 g/dL    Hematocrit 44 2 34 8 - 46 1 %    MCV 96 82 - 98 fL    MCH 29 8 26 8 - 34 3 pg    MCHC 31 2 (L) 31 4 - 37 4 g/dL    RDW 13 1 11 6 - 15 1 %    MPV 9 8 8 9 - 12 7 fL    Platelets 915 794 - 264 Thousands/uL    nRBC 0 /100 WBCs    Neutrophils Relative 49 43 - 75 %    Lymphocytes Relative 37 14 - 44 %    Monocytes Relative 11 4 - 12 %    Eosinophils Relative 3 0 - 6 %    Basophils Relative 0 0 - 1 %    Neutrophils Absolute 3 13 1 85 - 7 62 Thousands/µL    Lymphocytes Absolute 2 34 0 60 - 4 47 Thousands/µL    Monocytes Absolute 0 69 0 17 - 1 22 Thousand/µL    Eosinophils Absolute 0 17 0 00 - 0 61 Thousand/µL    Basophils Absolute 0 01 0 00 - 0 10 Thousands/µL   Comprehensive metabolic panel   Result Value Ref Range    Sodium 140 136 - 145 mmol/L    Potassium 3 8 3 5 - 5 3 mmol/L    Chloride 103 100 - 108 mmol/L CO2 30 21 - 32 mmol/L    Anion Gap 7 4 - 13 mmol/L    BUN 20 5 - 25 mg/dL    Creatinine 0 77 0 60 - 1 30 mg/dL    Glucose, Fasting 85 65 - 99 mg/dL    Calcium 9 6 8 3 - 10 1 mg/dL    AST 27 5 - 45 U/L    ALT 44 12 - 78 U/L    Alkaline Phosphatase 115 46 - 116 U/L    Total Protein 6 9 6 4 - 8 2 g/dL    Albumin 3 9 3 5 - 5 0 g/dL    Total Bilirubin 0 46 0 20 - 1 00 mg/dL    eGFR 76 ml/min/1 73sq m   LD,Blood   Result Value Ref Range     81 - 234 U/L   D-dimer, quantitative   Result Value Ref Range    D-Dimer, Quant 283 0 - 424 ng/ml (FEU)     Labs, Imaging, & Other studies:   All pertinent labs and imaging studies were personally reviewed        Reviewed and discussed with patient  Assessment and plan:  Patient is here with her  and son  Merary Garcia is a 78 y o  female with  non-Hodgkin lymphoma and DVT left arm and this is a follow-up visit  She  completed all planned chemotherapy treatments in  January 2018 and is under surveillance  Last PET-CT scan was negative for lymphoma/cancer  Also she will be completing 6 months on anticoagulation therapy and has negative venous Doppler study and D-dimer test and will be finishing Eliquis this month  She has some tiredness and arthritic symptoms  She states overall she has been feeling better  No lymphoma related symptoms and no DVT related symptoms  No bleeding, fever, chills, night sweats, weight loss and swollen glands        Non-Hodgkin lymphoma was diagnosed in 2017    Patient received 3 cycles of CHOP and 3 cycles of he EPOCH but the last 2 cycles did not have Adriamycin because of drop in ejection fraction   After completion of 3 cycles of CHOP pathologist from [address] [place] suggested that 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 and for that reason treatment was changed to [facility]ATE [facility] Patient did not receive Rituxan because she is allergic to Rituxan  She received Neulasta   She was also given acyclovir and dapsone   Dapsone had been stopped  Jens  will continue to take acyclovir     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   It was a localized disease  Plan was radiation and Rituxan  She had radiation  She had only one dose of Rituxan and had reaction to Rituxan  She had swelling of the tongue and closing of throat  Rituxan was discontinued     In April 2015 patient had PET CT scan that showed hypermetabolic lymphadenopathy involving the neck, chest, abdomen and pelvis and also hypermetabolic right breast nodularity, hepatic steatosis, mild/borderline aneurysmal dilatation of ascending thoracic aorta  She had left inguinal excisional lymph node biopsy, right axillary lymph node biopsy and neck node biopsy and none of those 3 biopsies showed lymphoma or malignancy  She was being followed     In 2017 she presented with lymphadenopathy again especially in left axilla that was surgically removed and final path report was inconclusive  She had another biopsy from right supraclavicular area and that 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, CD20 positive  [facility] [place] finally came after 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   Shara Mckinley Physical examination and test results are as recorded and discussed  Lymphoma remains in remission  Goal is cure from lymphoma and no bleeding and blood clot  She will continue to get Port-A-Cath flushed once every 6 weeks or p r n     All discussed in detail  Questions answered  Discussed the importance of  self-breast examination, eating healthy foods, staying active and health screening tests  See orders below prior to next visit  1  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 ([facility] [address] )    - NM PET CT skull base to mid thigh; Future  - Beta 2 microglobuline, serum; Future  - CBC and differential; Future  - Comprehensive metabolic panel; Future  - LD,Blood; Future    2   Arm DVT (deep venous thromboembolism), acute, left [facility])          Patient voiced understanding and agreement in the discussion  Counseling / Coordination of Care   Greater than 50% of total time was spent with the patient and / or family counseling and / or coordination of care

## 2018-05-03 NOTE — PROGRESS NOTES
Follow-up office visit  HPI:     Patient is here with her  and son  Renata Jean Baptiste is a 78 y o  female with  non-Hodgkin lymphoma and DVT left arm and this is a follow-up visit  She  completed all planned chemotherapy treatments in  January 2018 and is under surveillance  Last PET-CT scan was negative for lymphoma/cancer  Also she will be completing 6 months on anticoagulation therapy and has negative venous Doppler study and D-dimer test and will be finishing Eliquis this month  She has some tiredness and arthritic symptoms  She states overall she has been feeling better  No lymphoma related symptoms and no DVT related symptoms  No bleeding, fever, chills, night sweats, weight loss and swollen glands        Non-Hodgkin lymphoma was diagnosed in 2017    Patient received 3 cycles of CHOP and 3 cycles of he EPOCH but the last 2 cycles did not have Adriamycin because of drop in ejection fraction   After completion of 3 cycles of CHOP pathologist from [address] [place] [address] suggested that 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 and for that reason treatment was changed to [facility] Georgette Ormond Patient did not receive Rituxan because she is allergic to Rituxan  She received Neulasta   She was also given acyclovir and dapsone   Dapsone had been stopped  [place] will continue to take acyclovir     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   It was a localized disease  Plan was radiation and Rituxan  She had radiation  She had only one dose of Rituxan and had reaction to Rituxan  She had swelling of the tongue and closing of throat  Rituxan was discontinued     In April 2015 patient had PET CT scan that showed hypermetabolic lymphadenopathy involving the neck, chest, abdomen and pelvis and also hypermetabolic right breast nodularity, hepatic steatosis, mild/borderline aneurysmal dilatation of ascending thoracic aorta   She had left inguinal excisional lymph node biopsy, right axillary lymph node biopsy and neck node biopsy and none of those 3 biopsies showed lymphoma or malignancy  She was being followed     In 2017 she presented with lymphadenopathy again especially in left axilla that was surgically removed and final path report was inconclusive  She had another biopsy from right supraclavicular area and that 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, CD20 positive   [facility] [place] finally came after 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                Current Outpatient Prescriptions:     apixaban (ELIQUIS) 5 mg, Take 5 mg by mouth 2 (two) times a day, Disp: , Rfl:     ascorbic acid (VITAMIN C) 500 mg tablet, Take 500 mg by mouth daily in the early morning  , Disp: , Rfl:     Calcium Carb-Cholecalciferol (CALCIUM + D3) 600-200 MG-UNIT TABS, Take 1 tablet by mouth daily in the early morning  , Disp: , Rfl:     Cyanocobalamin (VITAMIN B 12 PO), Take by mouth, Disp: , Rfl:     hydrochlorothiazide (HYDRODIURIL) 25 mg tablet, Take 1 tablet (25 mg total) by mouth daily in the early morning, Disp: 90 tablet, Rfl: 1    lisinopril (ZESTRIL) 5 mg tablet, Take 1 tablet (5 mg total) by mouth daily, Disp: 90 tablet, Rfl: 3    loratadine (CLARITIN) 10 mg tablet, Take 10 mg by mouth daily, Disp: , Rfl:     metoprolol succinate (TOPROL-XL) 50 mg 24 hr tablet, Take 1 tablet (50 mg total) by mouth daily, Disp: 90 tablet, Rfl: 3    Multiple Vitamin (MULTIVITAMIN) capsule, Take 1 capsule by mouth daily in the early morning  , Disp: , Rfl:     Omega-3 Fatty Acids (FISH OIL) 1200 MG CAPS, Take 3 capsules by mouth daily with dinner  , Disp: , Rfl:     omeprazole (PriLOSEC OTC) 20 MG tablet, Take 1 tablet (20 mg total) by mouth daily, Disp: 90 tablet, Rfl: 1    Potassium Chloride Tiffanie CR (KLOR-CON M20 PO), Take 20 mEq by mouth daily in the early morning  , Disp: , Rfl:     Allergies   Allergen Reactions    Aspirin Anaphylaxis    Celebrex [Celecoxib] Anaphylaxis    Nsaids Anaphylaxis    Other Anaphylaxis     Pt states when she received a certain type of chemotherapy it caused her throat to close    Rituxan [Rituximab] Anaphylaxis     Swelling of tongue and closing of throat    Bactrim [Sulfamethoxazole-Trimethoprim] Other (See Comments)     VERY EMOTIONAL/CRYING    Sulfa Antibiotics Other (See Comments)     VERY EMOTIONAL CRYING       ROS:  05/03/18 Reviewed 13 systems:  Presently no headaches, seizures, dizziness, diplopia, dysphagia, hoarseness, chest pain, palpitations, shortness of breath, cough, hemoptysis, abdominal pain, nausea, vomiting, change in bowel habits, melena, hematuria, fever, chills, bleeding, bone pains, skin rash, weight loss,   weakness, numbness,  claudication and gait problem  No frequent infections  Not unusually sensitive to heat or cold  No swelling of the ankles  No swollen glands  Patient is anxious  No GYN symptoms Other symptoms are in HPI        /76 (BP Location: Left arm, Cuff Size: Adult)   Pulse 80   Temp 97 8 °F (36 6 °C) (Tympanic)   Resp 17   Ht 5' 3 5" (1 613 m)   Wt 99 7 kg (219 lb 12 8 oz)   SpO2 98%   BMI 38 33 kg/m²     Physical Exam:  Alert, oriented, not in distress, no icterus, no oral thrush, no palpable neck mass, clear lung fields, regular heart rate, abdomen  soft and non tender, no palpable abdominal mass, no ascites, no edema of ankles, no calf tenderness, no focal neurological deficit, no skin rash, no palpable lymphadenopathy, good arterial pulses, no clubbing  Patient is anxious  Performance status 1  IMAGING:  IMPRESSION:     1  Overall stable findings compared to the prior exam   Mild areas of FDG uptake remaining in the left hilar region and in a small left para-aortic lymph node  No new findings suspicious for hypermetabolic malignancy    Ana score of 2      Workstation performed: [id number]      Imaging     NM PET CT skull base to mid thigh (Order #[id number]) on 2/9/2018 - Imaging Information   Result History     NM PET CT skull base to mid thigh (Order #[id number]) on 2/16/2018 - Order Result History Report   Signed by     Signed Date/Time  Phone Pager   Kylie Toth 2/16/2018 11:24 [phone]          LABS:  Results for orders placed or performed in visit on 05/01/18   Beta 2 microglobuline, serum   Result Value Ref Range    Beta-2 Microglobulin 2 6 (H) 0 6 - 2 4 mg/L   CBC and differential   Result Value Ref Range    WBC 6 35 4 31 - 10 16 Thousand/uL    RBC 4 63 3 81 - 5 12 Million/uL    Hemoglobin 13 8 11 5 - 15 4 g/dL    Hematocrit 44 2 34 8 - 46 1 %    MCV 96 82 - 98 fL    MCH 29 8 26 8 - 34 3 pg    MCHC 31 2 (L) 31 4 - 37 4 g/dL    RDW 13 1 11 6 - 15 1 %    MPV 9 8 8 9 - 12 7 fL    Platelets 351 436 - 682 Thousands/uL    nRBC 0 /100 WBCs    Neutrophils Relative 49 43 - 75 %    Lymphocytes Relative 37 14 - 44 %    Monocytes Relative 11 4 - 12 %    Eosinophils Relative 3 0 - 6 %    Basophils Relative 0 0 - 1 %    Neutrophils Absolute 3 13 1 85 - 7 62 Thousands/µL    Lymphocytes Absolute 2 34 0 60 - 4 47 Thousands/µL    Monocytes Absolute 0 69 0 17 - 1 22 Thousand/µL    Eosinophils Absolute 0 17 0 00 - 0 61 Thousand/µL    Basophils Absolute 0 01 0 00 - 0 10 Thousands/µL   Comprehensive metabolic panel   Result Value Ref Range    Sodium 140 136 - 145 mmol/L    Potassium 3 8 3 5 - 5 3 mmol/L    Chloride 103 100 - 108 mmol/L    CO2 30 21 - 32 mmol/L    Anion Gap 7 4 - 13 mmol/L    BUN 20 5 - 25 mg/dL    Creatinine 0 77 0 60 - 1 30 mg/dL    Glucose, Fasting 85 65 - 99 mg/dL    Calcium 9 6 8 3 - 10 1 mg/dL    AST 27 5 - 45 U/L    ALT 44 12 - 78 U/L    Alkaline Phosphatase 115 46 - 116 U/L    Total Protein 6 9 6 4 - 8 2 g/dL    Albumin 3 9 3 5 - 5 0 g/dL    Total Bilirubin 0 46 0 20 - 1 00 mg/dL    eGFR 76 ml/min/1 73sq m   LD,Blood   Result Value Ref Range     81 - 234 U/L   D-dimer, quantitative   Result Value Ref Range    D-Dimer, Quant 283 0 - 424 ng/ml (FEU) Labs, Imaging, & Other studies:   All pertinent labs and imaging studies were personally reviewed        Reviewed and discussed with patient  Assessment and plan:  Patient is here with her  and son  Clemente Figueroa is a 78 y o  female with  non-Hodgkin lymphoma and DVT left arm and this is a follow-up visit  She  completed all planned chemotherapy treatments in  January 2018 and is under surveillance  Last PET-CT scan was negative for lymphoma/cancer  Also she will be completing 6 months on anticoagulation therapy and has negative venous Doppler study and D-dimer test and will be finishing Eliquis this month  She has some tiredness and arthritic symptoms  She states overall she has been feeling better  No lymphoma related symptoms and no DVT related symptoms  No bleeding, fever, chills, night sweats, weight loss and swollen glands        Non-Hodgkin lymphoma was diagnosed in 2017    Patient received 3 cycles of CHOP and 3 cycles of he EPOCH but the last 2 cycles did not have Adriamycin because of drop in ejection fraction   After completion of 3 cycles of CHOP pathologist from [address] [place] [address] suggested that 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 and for that reason treatment was changed to [facility] Patient did not receive Rituxan because she is allergic to Rituxan  She received Neulasta   She was also given acyclovir and dapsone   Dapsone had been stopped  Giancarlo Quintana will continue to take acyclovir     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   It was a localized disease  Plan was radiation and Rituxan  She had radiation  She had only one dose of Rituxan and had reaction to Rituxan  She had swelling of the tongue and closing of throat   Rituxan was discontinued     In April 2015 patient had PET CT scan that showed hypermetabolic lymphadenopathy involving the neck, chest, abdomen and pelvis and also hypermetabolic right breast nodularity, hepatic steatosis, mild/borderline aneurysmal dilatation of ascending thoracic aorta  She had left inguinal excisional lymph node biopsy, right axillary lymph node biopsy and neck node biopsy and none of those 3 biopsies showed lymphoma or malignancy  She was being followed     In 2017 she presented with lymphadenopathy again especially in left axilla that was surgically removed and final path report was inconclusive  She had another biopsy from right supraclavicular area and that 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, CD20 positive  [facility] [place] finally came after 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   Navid Osullivan Physical examination and test results are as recorded and discussed  Lymphoma remains in remission  Goal is cure from lymphoma and no bleeding and blood clot  She will continue to get Port-A-Cath flushed once every 6 weeks or p r n     All discussed in detail  Questions answered  Discussed the importance of  self-breast examination, eating healthy foods, staying active and health screening tests  See orders below prior to next visit  1  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 ([facility] [address] )    - NM PET CT skull base to mid thigh; Future  - Beta 2 microglobuline, serum; Future  - CBC and differential; Future  - Comprehensive metabolic panel; Future  - LD,Blood; Future    2  Arm DVT (deep venous thromboembolism), acute, left [facility])          Patient voiced understanding and agreement in the discussion  Counseling / Coordination of Care   Greater than 50% of total time was spent with the patient and / or family counseling and / or coordination of care

## 2018-05-08 ENCOUNTER — TELEPHONE (OUTPATIENT)
Dept: CARDIOLOGY CLINIC | Facility: CLINIC | Age: 78
End: 2018-05-08

## 2018-05-08 NOTE — TELEPHONE ENCOUNTER
Since starting metoprolol and lisinopril after last ov on 4/25, pt c/o feeling tired and exhausted  Also, pain in legs  When she gets up out of bed, states she can hardly move when she stands up  She is wondering if these are side effects from new meds and does she just need to give it more time?   Please advise

## 2018-05-20 NOTE — TELEPHONE ENCOUNTER
Tell her her symptoms can be side effects from the medications, more likely the Metoprolol  Decrease the dose to 1/2 pill a day

## 2018-05-21 NOTE — TELEPHONE ENCOUNTER
I spoke with Trina Nam and advised  She will decrease metoprolol to 1/2 pill a day  She does state she has been feeling a little better

## 2018-05-22 ENCOUNTER — OFFICE VISIT (OUTPATIENT)
Dept: FAMILY MEDICINE CLINIC | Facility: CLINIC | Age: 78
End: 2018-05-22
Payer: COMMERCIAL

## 2018-05-22 VITALS
HEIGHT: 63 IN | RESPIRATION RATE: 16 BRPM | TEMPERATURE: 98.4 F | OXYGEN SATURATION: 97 % | WEIGHT: 221 LBS | HEART RATE: 94 BPM | SYSTOLIC BLOOD PRESSURE: 130 MMHG | BODY MASS INDEX: 39.16 KG/M2 | DIASTOLIC BLOOD PRESSURE: 70 MMHG
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
## 2018-05-22 DIAGNOSIS — Z79.899 ON POTASSIUM WASTING DIURETIC THERAPY: ICD-10-CM

## 2018-05-22 DIAGNOSIS — L08.9 INFECTED INSECT BITE OF NECK, INITIAL ENCOUNTER: Primary | ICD-10-CM

## 2018-05-22 DIAGNOSIS — S10.96XA INFECTED INSECT BITE OF NECK, INITIAL ENCOUNTER: Primary | ICD-10-CM

## 2018-05-22 PROCEDURE — 99213 OFFICE O/P EST LOW 20 MIN: CPT | Performed by: FAMILY MEDICINE

## 2018-05-22 RX ORDER — POTASSIUM CHLORIDE 20 MEQ/1
20 TABLET, EXTENDED RELEASE ORAL
Qty: 90 TABLET | Refills: 1 | Status: SHIPPED | OUTPATIENT
Start: 2018-05-22 | End: 2018-09-06

## 2018-05-22 RX ORDER — CEPHALEXIN 500 MG/1
500 CAPSULE ORAL EVERY 6 HOURS SCHEDULED
Qty: 28 CAPSULE | Refills: 0 | Status: SHIPPED | OUTPATIENT
Start: 2018-05-22 | End: 2018-05-29

## 2018-05-22 NOTE — PROGRESS NOTES
Assessment/Plan:       Diagnoses and all orders for this visit:    Infected insect bite of neck, initial encounter  -     cephalexin (KEFLEX) 500 mg capsule; Take 1 capsule (500 mg total) by mouth every 6 (six) hours for 7 days    On potassium wasting diuretic therapy  -     potassium chloride (KLOR-CON M20) 20 mEq tablet; Take 1 tablet (20 mEq total) by mouth daily in the early morning          Subjective:   Chief Complaint   Patient presents with   [address]     got bit yesterday, has 3 lumps and itchy        Patient ID: Esa Borden is a 78 y o  female  Per c/c reviewed by me  Here for same day sick appt   states was sitting on her porch yesterday afternoon and gnats and a small bee were flying around, didn't really feel getting bitten, but noticed 3 bites on left side of neck down to shoulder, and swelling at area of one bite later on last night, applied cool compresses and this morning took benadryl  Concerned because she just finished chemo 4 months ago        The following portions of the patient's history were reviewed and updated as appropriate: allergies, current medications, past family history, past medical history, past social history, past surgical history and problem list     Review of Systems   Constitutional: Negative  HENT: Negative  Eyes: Negative  Respiratory: Negative  Musculoskeletal: Negative  Skin:        Per hpi   Neurological: Negative  Hematological: Negative  Objective:      /70 (BP Location: Left arm, Patient Position: Sitting, Cuff Size: Adult)   Pulse 94   Temp 98 4 °F (36 9 °C)   Resp 16   Ht 5' 3" (1 6 m)   Wt 100 kg (221 lb)   SpO2 97%   BMI 39 15 kg/m²          Physical Exam   Constitutional: She appears well-developed  She is cooperative  Non-toxic appearance  She does not have a sickly appearance  She does not appear ill  No distress  Lymphadenopathy:     She has no cervical adenopathy          Right: No supraclavicular adenopathy present  Left: No supraclavicular adenopathy present  Neurological: She is alert  Skin: Skin is warm and dry  She is not diaphoretic  No pallor  3 bites left side of neck/shoulder, middle bite has subcut nodular swelling of approx 1cm, non fluctuant, no heat or tenderness  No lymphangitic streaking    Psychiatric: She has a normal mood and affect  Her behavior is normal    Nursing note and vitals reviewed

## 2018-05-30 ENCOUNTER — OFFICE VISIT (OUTPATIENT)
Dept: FAMILY MEDICINE CLINIC | Facility: CLINIC | Age: 78
End: 2018-05-30
Payer: COMMERCIAL

## 2018-05-30 VITALS
DIASTOLIC BLOOD PRESSURE: 88 MMHG | BODY MASS INDEX: 38.79 KG/M2 | WEIGHT: 219 LBS | BODY MASS INDEX: 38.79 KG/M2 | SYSTOLIC BLOOD PRESSURE: 140 MMHG | TEMPERATURE: 97.9 F | SYSTOLIC BLOOD PRESSURE: 140 MMHG | HEART RATE: 80 BPM | DIASTOLIC BLOOD PRESSURE: 88 MMHG | RESPIRATION RATE: 16 BRPM | OXYGEN SATURATION: 97 % | RESPIRATION RATE: 16 BRPM | OXYGEN SATURATION: 97 % | HEART RATE: 80 BPM | TEMPERATURE: 97.9 F | WEIGHT: 219 LBS

## 2018-05-30 DIAGNOSIS — K21.9 GASTROESOPHAGEAL REFLUX DISEASE WITHOUT ESOPHAGITIS: ICD-10-CM

## 2018-05-30 DIAGNOSIS — E78.2 MIXED HYPERLIPIDEMIA: ICD-10-CM

## 2018-05-30 DIAGNOSIS — Z13.5 SCREENING FOR GLAUCOMA: ICD-10-CM

## 2018-05-30 DIAGNOSIS — E78.5 DYSLIPIDEMIA: Primary | ICD-10-CM

## 2018-05-30 DIAGNOSIS — I10 ESSENTIAL HYPERTENSION: Chronic | ICD-10-CM

## 2018-05-30 DIAGNOSIS — M25.549 PAIN IN MULTIPLE FINGER JOINTS: ICD-10-CM

## 2018-05-30 DIAGNOSIS — I50.20 SYSTOLIC HEART FAILURE, UNSPECIFIED HF CHRONICITY (HCC): ICD-10-CM

## 2018-05-30 DIAGNOSIS — Z00.00 MEDICARE ANNUAL WELLNESS VISIT, SUBSEQUENT: ICD-10-CM

## 2018-05-30 DIAGNOSIS — Z12.39 SCREENING FOR MALIGNANT NEOPLASM OF BREAST: ICD-10-CM

## 2018-05-30 DIAGNOSIS — Z12.31 ENCOUNTER FOR SCREENING MAMMOGRAM FOR BREAST CANCER: Primary | ICD-10-CM

## 2018-05-30 DIAGNOSIS — Z13.820 SCREENING FOR OSTEOPOROSIS: ICD-10-CM

## 2018-05-30 PROBLEM — T45.1X5A ALOPECIA DUE TO CYTOTOXIC DRUG: Status: ACTIVE | Noted: 2017-10-18

## 2018-05-30 PROBLEM — L65.8 ALOPECIA DUE TO CYTOTOXIC DRUG: Status: ACTIVE | Noted: 2017-10-18

## 2018-05-30 PROBLEM — C83.38 DIFFUSE LARGE B-CELL LYMPHOMA OF LYMPH NODES OF MULTIPLE REGIONS (HCC): Status: ACTIVE | Noted: 2017-09-26

## 2018-05-30 PROBLEM — I80.8 THROMBOPHLEBITIS OF DEEP VEINS OF UPPER EXTREMITIES: Status: ACTIVE | Noted: 2017-11-03

## 2018-05-30 PROBLEM — I82.B12 THROMBOSIS OF LEFT SUBCLAVIAN VEIN (HCC): Status: ACTIVE | Noted: 2017-11-12

## 2018-05-30 PROBLEM — H26.9 CATARACT: Status: ACTIVE | Noted: 2017-08-16

## 2018-05-30 PROCEDURE — 99214 OFFICE O/P EST MOD 30 MIN: CPT | Performed by: PHYSICIAN ASSISTANT

## 2018-05-30 PROCEDURE — G0439 PPPS, SUBSEQ VISIT: HCPCS | Performed by: PHYSICIAN ASSISTANT

## 2018-05-30 NOTE — PROGRESS NOTES
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 ([facility] [address] )    Constipation    Left bundle branch block (LBBB)    Infected insect bite of neck    Vitamin D deficiency    Vitamin B12 deficiency    Thrombosis of left subclavian vein (HCC)    Situational anxiety    Thrombophlebitis of deep veins of upper extremities    Gastroesophageal reflux disease    Dyslipidemia    Diffuse large B-cell lymphoma of lymph nodes of multiple regions ([facility] [address] )    Cataract    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     Past Medical History:   Diagnosis Date    Acid reflux     Basal cell carcinoma (BCC)     Bruit of right carotid artery     resolved 5/14/15     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[address] )     radiation    Dyslipidemia     Esophageal stricture     last assessed 2/28/13       GERD (gastroesophageal reflux disease)     [place] (hard of hearing)     b/l ears    Hypertension     Left bundle branch block     Osteoarthritis     Osteoarthrosis of ankle and foot     last assessed 5/20/13     Osteopenia     last assessed 2/28/13     Plantar fasciitis     Plantar fasciitis of left foot     Polyp of sigmoid colon     last assessed 2/28/13     Shortness of breath     when walking up stairs     Past Surgical History:   Procedure Laterality Date    ANKLE ARTHROPLASTY Left     BLEPHAROPLASTY      right eye     CATARACT EXTRACTION Right     ESOPHAGEAL DILATION      x2    ESOPHAGOGASTRODUODENOSCOPY      dilitation    FACIAL/NECK BIOPSY Right 9/8/2017    Procedure: NECK NODE BIOPSY WITH NEEDLE LOCALIZATION; LYMPHOMA PROTOCOL (NEEDLE LOC WITH I R  AT 1100); Surgeon: Julia Harper MD;  [facility];  Service: Surgical Oncology    HYSTERECTOMY      LYMPH NODE DISSECTION      right groin and neck    ORBITAL FLOOR EXPLORATION Right     for cancer     PORTACATH PLACEMENT      left chest     PA BX/REMV,LYMPH NODE,DEEP AXILL Left 8/14/2017    Procedure: Axillary lymph node excision with LYMPHOMA PROTOCOL;  Surgeon: Julia Harper MD;  [facility];  Service: Surgical Oncology    PA BX/REMV,LYMPH NODE,DEEP CERV Right 5/2/2016    Procedure: NECK NODE BIOPSY;  Surgeon: Julia Harper MD;  [facility];  Service: Surgical Oncology    PA INSJ TUNNELED CTR VAD W/SUBQ PORT AGE 5 YR/> N/A 10/3/2017    Procedure: PLACEMENT OF PORT-A-CATH DEVICE;  Surgeon: Julia Harper MD;  [facility];  Service: Surgical Oncology     Family History   Problem Relation Age of Onset    Multiple myeloma Mother     Heart disease Father     Hypertension Father     Hypertension Sister     Heart disease Sister     Arthritis Family     Breast cancer Family     Osteoporosis Family      History   Smoking Status    Former Smoker    Quit date: 1970   Smokeless Tobacco    Never Used     History   Alcohol Use No      History   Drug Use No     /88   Pulse 80   Temp 97 9 °F (36 6 °C)   Resp 16   Wt 99 3 kg (219 lb)   SpO2 97%   BMI 38 79 kg/m²       Current Outpatient Prescriptions   Medication Sig Dispense Refill    ascorbic acid (VITAMIN C) 500 mg tablet Take 500 mg by mouth daily in the early morning        Calcium Carb-Cholecalciferol (CALCIUM + D3) 600-200 MG-UNIT TABS Take 1 tablet by mouth daily in the early morning        Cyanocobalamin (VITAMIN B 12 PO) Take by mouth      hydrochlorothiazide (HYDRODIURIL) 25 mg tablet Take 1 tablet (25 mg total) by mouth daily in the early morning 90 tablet 1    lisinopril (ZESTRIL) 5 mg tablet Take 1 tablet (5 mg total) by mouth daily 90 tablet 3    loratadine (CLARITIN) 10 mg tablet Take 10 mg by mouth daily      Multiple Vitamin (MULTIVITAMIN) capsule Take 1 capsule by mouth daily in the early morning        Omega-3 Fatty Acids (FISH OIL) 1200 MG CAPS Take 3 capsules by mouth daily with dinner        omeprazole (PriLOSEC OTC) 20 MG tablet Take 1 tablet (20 mg total) by mouth daily 90 tablet 1    potassium chloride (KLOR-CON M20) 20 mEq tablet Take 1 tablet (20 mEq total) by mouth daily in the early morning 90 tablet 1    metoprolol succinate (TOPROL-XL) 50 mg 24 hr tablet Take 1 tablet (50 mg total) by mouth daily 90 tablet 3     No current facility-administered medications for this visit        Allergies   Allergen Reactions    Aspirin Anaphylaxis    Celebrex [Celecoxib] Anaphylaxis    Nsaids Anaphylaxis    Other Anaphylaxis     Pt states when she received a certain type of chemotherapy it caused her throat to close    Rituxan [Rituximab] Anaphylaxis     Swelling of tongue and closing of throat    Bactrim [Sulfamethoxazole-Trimethoprim] Other (See Comments)     VERY EMOTIONAL/CRYING    Sulfa Antibiotics Other (See Comments)     VERY EMOTIONAL CRYING     Immunization History   Administered Date(s) Administered    Influenza Split High Dose Preservative Free IM 10/16/2014, 10/06/2015, 11/14/2016, 09/28/2017    Influenza TIV (IM) 1940, 10/02/2014    Pneumococcal Conjugate 13-Valent 03/07/2016    Pneumococcal Polysaccharide PPV23 01/26/2015    Td (adult), adsorbed 06/25/2008    Zoster 05/01/2007       Patient Care Team:  Kita Walker DO as PCP - MD Marya Carney MD Berdie Michaels, MD      Medicare Screening Tests and Risk Assessments:  AWV Clinical     ISAR:   Previous hospitalizations?:  Yes   How many hospitalizations have you had in the last year?:  more than 4   Additional Comments:  cancer       Once in a Lifetime Medicare Screening:   EKG performed?:  Yes    AAA screening performed? (if performed, please add date to Health Maintenance):  No       Medicare Screening Tests and Risk Assessment:   AAA Risk Assessment    None Indicated:  Yes    Osteoporosis Risk Assessment     Female:  Yes   :  Yes :  No   Age over 48:  Yes Low body weight (<127lbs):  No   Tobacco use:  No Alcohol use:  No   Low calcium diet:  No PMHX of fractures:  Yes   FHX of fractures:  Yes    HIV Risk Assessment    None indicated:  Yes        Drug and Alcohol Use:   Tobacco use    Cigarettes:  former smoker    Quit date:  1/1/1970   Smokeless:  never used smokeless tobacco    Tobacco use duration    Tobacco Cessation Readiness    Alcohol use    Alcohol use:  never    Alcohol Treatment Readiness   Illicit Drug Use    Drug use:  never    Drug type:  no sedative use       Diet & Exercise:   Diet   What is your diet?:  Regular   How many servings a day of the following:   Fruits and Vegetables:  3-4 Meat:  3-4   Whole Grains:  1 Simple Carbs:  0   Dairy:  2 Soda:  0   Coffee:  2 Tea:  3   Exercise    Do you currently exercise?:  yes    Frequency:  daily    Minutes per day:  30    Type of exercise:  walking   house work        Cognitive Impairment Screening:   Depression screening preformed:  Yes Depression screen score:  0   Cognitive Impairment Screening    Do you have difficulty learning or retaining new information?:  No Do you have difficulty handling new tasks?:  No   Do you have difficulty with reasoning?:  No Do you have difficulty with spatial ability and orientation?:  No   Do you have difficulty with language?:  No Do you have difficulty with behavior?:  No       Functional Ability/Level of Safety:   Hearing    Hearing difficulties:  No Bilateral:  slightly decreased   Hearing aid:  No    Hearing Impairment Assessment    Hearing status:  No impairment   Do your family members ever complain that you turn on the radio or T V  too loudly?:  No Do you find that other people have to repeat themselves when talking to you?:  No   Do you have difficulty hearing while talking on the phone?:  No Has anyone ever told you that you are speaking too loudly when talking with them?:  No   Do you have trouble hearing the doorbell or phone ringing?:  No Do you have difficulty hearing such that you feel frustrated talking to people?:  No   Do you feel sad because you cannot hear well?:  No Do you feel inconvienced due to your hearing problem?:  No   Do you think you would be a happier person if you could hear better?:  No Would you be willing to go for a hearing aid fitting if suggested?:  No   Current Activities    Status:  unlimited ADL's, unlimited driving, unlimited IADL's, unlimited social activities   Help needed with the folllowing:    Using the phone:  No Transportation:  No   Shopping:  No Preparing Meals:  No   Doing Housework:  No Doing Laundry:  No   Managing Medications:  No Managing Money:  No   ADL    Feeding:  Independant   Oral hygiene and Facial grooming:  Independant   Bathing:  Independant   Upper Body Dressing:  Independant   Lower Body Dressing:  Independant   Toileting:  Independant   Bed Mobility:  Independant   Fall Risk   Have you fallen in the last 12 months?:  No Are you unsteady on your feet?:  No    Are you taking any medications that may cause fatigue or dizziness?:  No   Do you have any chronic conditions that may contribute to a fall?:  Arthritis Do you rush to the bathroom potentially risking a fall?:  No   Injury History   Polypharmacy:  No Antidepressant Use:  No   Sedative Use:  No Antihypertensive Use:  No   Previous Fall:  No Alcohol Use:  No   Deconditioning:  No Visual Impairment:  No   Cogitive Impairment:  No Mmobility Impairment:  No   Postural Hypotension:  No Urinary Incontinence:  No       Home Safety:   Are there hazards in your environment?:  No   If you fell, would you need help to get back up from the ground?:  No Do you have problems or concerns getting in/out of a bed, chair, tub, or toilet?:  No   Do you feel unsteady when walking?:  No Is your activity limited by pain?:  No   Do you have handrails and grab-bars in the home?:  Yes Are emergency numbers kept by the phone and regularly updated?:  Yes   Are you and/or family members aware of the dangers of smoking in bed?:  No Are firearms stored securely?:  Yes   Do you have working smoke alarms and fire extinguisher?:  Yes Do all household members know how to use them?:  Yes   Have you left the stove on unsupervised?:  No    Home Safety Risk Factors   Unfamilar with surroundings:  No Uneven floors:  No   Stairs or handrail saftey risk:  No Loose rugs:  No   Household clutter:  No Poor household lighting:  No   No grab bars in bathroom:  No Further evaluation needed:  No       Advanced Directives:   Advanced Directives    Living Will:  Yes Durable POA for healthcare:   Yes   Advanced directive:  Yes    Patient's End of Life Decisions    Reviewed with patient:  Yes        Urinary Incontinence:   Do you have urinary incontinence?:  No Do you have incomplete emptying?:  No   Do you urinate frequently?:  No Do you have urinary urgency?:  No   Do you have urinary hesitancy?:  No Do you have dysuria (painful and/or difficult urination)?:  No   Do you have nocturia (waking up to urinate)?:  Yes Do you strain when urinating (have to push to urinate)?:  No   Do you have a weak stream when urinating?:  No Do you have intermittent streaming when urinating?:  No   Do you dribble urine after finishing?:  No    Do you have vaginal pressure?:  No Do you have vaginal dryness?:  No       Glaucoma:            Provider Screening     Preventative Screening/Counseling:   Cardiovascular Screening/Counseling:   (Labs Q5 years, EKG optional one-time)   General:  Screening Current           Diabetes Screening/Counseling:   (2 tests/year if Pre-Diabetes or 1 test/year if no Diabetes)   General:  Screening Current           Colorectal Cancer Screening/Counseling:   (FOBT Q1 yr; Flex Sig Q4 yrs or Q10 yrs after Screening Colonoscopy; Screening Colonoscpy Q2 yrs High Risk or Q10 yrs Low Risk; Barium Enema Q2 yrs High Risk or Q4 yrs Low Risk)   General:  Screening Current           Prostate Cancer Screening/Counseling:   (Annual)          Breast Cancer Screening/Counseling:   (Baseline Age 28 - 43; Annual Age 36+)    Due for: Studies:  mammogram         Cervical Cancer Screening/Counseling:   (Annual for High Risk or Childbearing Age with Abnormal Pap in Last 3 yrs; Every 2 all others)         Osteoporosis Screening/Counseling:   (Every 2 Yrs if at risk or more if medically necessary)     Due for: Studies:  Bone Density Ultrasound         AAA Screening/Counseling:   (Once per Lifetime with risk factors)    General:  Screening Not Indicated           Glaucoma Screening/Counseling:   (Annual)   General:  Screening Current          HIV Screening/Counseling:   (Voluntary; Once annually for high risk OR 3 times for Pregnancy at diagnosis of IUP; 3rd trimester; and at Labor   General:  Screening Not Indicated           Hepatitis C Screening:             Immunizations: Other Preventative Couseling (Non-Medicare Wellness Visit Required):       Referrals (Non-Medicare Wellness Visit Required):       Medical Equipment/Suppliers:           No exam data present  Reviewed Updated [facility] Prior Wellness Visits:   Last Medicare wellness visit information was reviewed, patient interviewed , no change since last AWV - yes    Assessment and Plan:  1  Dyslipidemia  Lipid panel   2  Medicare annual wellness visit, subsequent  DXA bone density spine hip and pelvis   3  Screening for glaucoma  DR Kranthi Mota, request records   4  Screening for malignant neoplasm of breast  Given script for mammogram   5   Screening for osteoporosis  DXA bone density spine hip and pelvis       Health Maintenance Due   Topic Date Due    Depression Screening PHQ-9  1940    SLP PLAN OF CARE  1940    DTaP,Tdap,and Td Vaccines (1 - Tdap) 06/26/2008    GLAUCOMA SCREENING 67+ YR  11/29/2009

## 2018-05-30 NOTE — PROGRESS NOTES
Routine Follow-up    Cecilia Recio 78 y o  female   Date:  5/30/2018      Assessment and Plan:    Annie Olmos was seen today for follow-up  Diagnoses and all orders for this visit:    Encounter for screening mammogram for breast cancer  -     Mammo screening bilateral w cad; Future    Pain in multiple finger joints  -     RF Screen w/ Reflex to Titer; Future    Gastroesophageal reflux disease without esophagitis  - stable on prilosec 20 mg tabs daily    Essential hypertension  - BP slightly over goal today    Systolic heart failure, unspecified HF chronicity ([facility] [address] )  - patient with cardiomyopathy s/p chemo with EF redeced to 40 %, she will be repeating echo in 3 months   - appreciate cardiology consult - she was recently switched from verapamil to lisinopril 5 mg and metoprolol; patient not was tolerated metoprolol she is off medication, follow up with cardiology     HLD   - repeat lipid panel, well balanced diet  HPI:  Chief Complaint   Patient presents with    Follow-up     HPI   Patient is a 79 yo female with PMH below who presents for routine follow up and AWV visit  She is due for routine mammo and dxa scan  She continues to follow with hem/onc closely  She has been off Eliquis s/p provoked upper extremity DVT  Her acid reflux has been controlled on 20 mg tablets since been dropped from 40 mg tablet  She followed with cardiology and due to EF reduction to 40% consistent with cardiomyopathy s/p chemo  She was switched from verapamil to lisinopril and metoprolol  However she has not been tolerated the metoprolol and has stopped it  She was just seen for insect bite and completed course ofkelfex and neck is doing well  ROS: Review of Systems   Constitutional: Negative for chills, fatigue, fever and unexpected weight change  HENT: Negative for congestion, ear pain, hearing loss, nosebleeds, sore throat and trouble swallowing  Eyes: Negative for pain, discharge and visual disturbance     Respiratory: Negative for cough, shortness of breath and wheezing  Cardiovascular: Negative for chest pain, palpitations and leg swelling  Gastrointestinal: Negative for abdominal pain, blood in stool, constipation, diarrhea, nausea and vomiting  Endocrine: Negative for cold intolerance and heat intolerance  Genitourinary: Negative for difficulty urinating, dysuria and hematuria  Musculoskeletal: Positive for arthralgias (finger pain, PIP swollen; chronic ankle pain)  Negative for gait problem and myalgias  Skin: Negative for color change, rash and wound  Neurological: Negative for dizziness, syncope, weakness, light-headedness and headaches  Hematological: Negative for adenopathy  Does not bruise/bleed easily  Psychiatric/Behavioral: Negative for confusion and sleep disturbance  The patient is not nervous/anxious          Past Medical History:   Diagnosis Date    Acid reflux     Basal cell carcinoma (BCC)     Bruit of right carotid artery     resolved 5/14/15     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HCC)     radiation    Dyslipidemia     Esophageal stricture     last assessed 2/28/13       GERD (gastroesophageal reflux disease)     [place] (hard of hearing)     b/l ears    Hypertension     Left bundle branch block     Osteoarthritis     Osteoarthrosis of ankle and foot     last assessed 5/20/13     Osteopenia     last assessed 2/28/13     Plantar fasciitis     Plantar fasciitis of left foot     Polyp of sigmoid colon     last assessed 2/28/13     Shortness of breath     when walking up stairs     Patient Active Problem List   Diagnosis    Enlarged lymph node in neck    Lymphadenopathy, axillary    Lymphoma ([address] )    Arm DVT (deep venous thromboembolism), acute, left (HCC)    Essential hypertension    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 ([address] )    Constipation    Left bundle branch block (LBBB)    Infected insect bite of neck    Vitamin D deficiency    Vitamin B12 deficiency  Thrombosis of left subclavian vein (HCC)    Situational anxiety    Thrombophlebitis of deep veins of upper extremities    Gastroesophageal reflux disease    Dyslipidemia    Diffuse large B-cell lymphoma of lymph nodes of multiple regions (HCC)    Cataract    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       Past Surgical History:   Procedure Laterality Date    ANKLE ARTHROPLASTY Left     BLEPHAROPLASTY      right eye     CATARACT EXTRACTION Right     ESOPHAGEAL DILATION      x2    ESOPHAGOGASTRODUODENOSCOPY      dilitation    FACIAL/NECK BIOPSY Right 9/8/2017    Procedure: NECK NODE BIOPSY WITH NEEDLE LOCALIZATION; LYMPHOMA PROTOCOL (NEEDLE LOC WITH I R  AT 1100);   Surgeon: Ada Nails MD;  [facility];  Service: Surgical Oncology    HYSTERECTOMY      LYMPH NODE DISSECTION      right groin and neck    ORBITAL FLOOR EXPLORATION Right     for cancer     PORTACATH PLACEMENT      left chest     RI BX/REMV,LYMPH NODE,DEEP AXILL Left 8/14/2017    Procedure: Axillary lymph node excision with LYMPHOMA PROTOCOL;  Surgeon: Ada Nails MD;  [facility];  Service: Surgical Oncology    RI BX/REMV,LYMPH NODE,DEEP CERV Right 5/2/2016    Procedure: NECK NODE BIOPSY;  Surgeon: Ada Nails MD;  [facility];  Service: Surgical Oncology    RI INSJ TUNNELED CTR VAD W/SUBQ PORT AGE 5 YR/> N/A 10/3/2017    Procedure: PLACEMENT OF PORT-A-CATH DEVICE;  Surgeon: Ada Nails MD;  [facility];  Service: Surgical Oncology       Social History     Social History    Marital status: /Civil Union     Spouse name: N/A    Number of children: N/A    Years of education: N/A     Social History Main Topics    Smoking status: Former Smoker     Quit date: 1970    Smokeless tobacco: Never Used    Alcohol use No    Drug use: No    Sexual activity: Not on file     Other Topics Concern    Not on file     Social History Narrative    Uses safety equipment seatbelts        Family History Problem Relation Age of Onset    Multiple myeloma Mother     Heart disease Father     Hypertension Father     Hypertension Sister     Heart disease Sister     Arthritis Family     Breast cancer Family     Osteoporosis Family        Allergies   Allergen Reactions    Aspirin Anaphylaxis    Celebrex [Celecoxib] Anaphylaxis    Nsaids Anaphylaxis    Other Anaphylaxis     Pt states when she received a certain type of chemotherapy it caused her throat to close    Rituxan [Rituximab] Anaphylaxis     Swelling of tongue and closing of throat    Bactrim [Sulfamethoxazole-Trimethoprim] Other (See Comments)     VERY EMOTIONAL/CRYING    Sulfa Antibiotics Other (See Comments)     VERY EMOTIONAL CRYING         Current Outpatient Prescriptions:     ascorbic acid (VITAMIN C) 500 mg tablet, Take 500 mg by mouth daily in the early morning  , Disp: , Rfl:     Calcium Carb-Cholecalciferol (CALCIUM + D3) 600-200 MG-UNIT TABS, Take 1 tablet by mouth daily in the early morning  , Disp: , Rfl:     Cyanocobalamin (VITAMIN B 12 PO), Take by mouth, Disp: , Rfl:     hydrochlorothiazide (HYDRODIURIL) 25 mg tablet, Take 1 tablet (25 mg total) by mouth daily in the early morning, Disp: 90 tablet, Rfl: 1    lisinopril (ZESTRIL) 5 mg tablet, Take 1 tablet (5 mg total) by mouth daily, Disp: 90 tablet, Rfl: 3    loratadine (CLARITIN) 10 mg tablet, Take 10 mg by mouth daily, Disp: , Rfl:     Multiple Vitamin (MULTIVITAMIN) capsule, Take 1 capsule by mouth daily in the early morning  , Disp: , Rfl:     Omega-3 Fatty Acids (FISH OIL) 1200 MG CAPS, Take 3 capsules by mouth daily with dinner  , Disp: , Rfl:     omeprazole (PriLOSEC OTC) 20 MG tablet, Take 1 tablet (20 mg total) by mouth daily, Disp: 90 tablet, Rfl: 1    potassium chloride (KLOR-CON M20) 20 mEq tablet, Take 1 tablet (20 mEq total) by mouth daily in the early morning, Disp: 90 tablet, Rfl: 1    metoprolol succinate (TOPROL-XL) 50 mg 24 hr tablet, Take 1 tablet (50 mg total) by mouth daily, Disp: 90 tablet, Rfl: 3      Physical Exam:  /88   Pulse 80   Temp 97 9 °F (36 6 °C)   Resp 16   Wt 99 3 kg (219 lb)   SpO2 97%   BMI 38 79 kg/m²     Physical Exam   Constitutional: She is oriented to person, place, and time  Vital signs are normal  She appears well-developed and well-nourished  No distress  obese   HENT:   Head: Normocephalic and atraumatic  Right Ear: Tympanic membrane, external ear and ear canal normal    Left Ear: Tympanic membrane, external ear and ear canal normal    Nose: Nose normal    Mouth/Throat: Oropharynx is clear and moist  No oropharyngeal exudate  Eyes: Conjunctivae and lids are normal  Pupils are equal, round, and reactive to light  Neck: Trachea normal and normal range of motion  Neck supple  No thyromegaly present  Cardiovascular: Normal rate, regular rhythm, S1 normal, S2 normal and intact distal pulses  Exam reveals no gallop  Murmur (slight ejection murmur) heard  Pulmonary/Chest: Breath sounds normal  No respiratory distress  She has no wheezes  She has no rhonchi  She has no rales  Abdominal: Normal appearance  There is no hepatosplenomegaly  Musculoskeletal: Normal range of motion  She exhibits no edema or deformity  Lymphadenopathy:     She has no cervical adenopathy  Neurological: She is alert and oriented to person, place, and time  She has normal reflexes  No cranial nerve deficit or sensory deficit  Skin: Skin is warm and dry  No rash noted  She is not diaphoretic  No cyanosis  No pallor  Nails show no clubbing  Psychiatric: She has a normal mood and affect  Her behavior is normal  Cognition and memory are normal    Vitals reviewed          Labs:  Lab Results   Component Value Date    WBC 6 35 05/01/2018    HGB 13 8 05/01/2018    HCT 44 2 05/01/2018    MCV 96 05/01/2018     05/01/2018     Lab Results   Component Value Date     05/01/2018    K 3 8 05/01/2018     05/01/2018    CO2 30 05/01/2018    ANIONGAP 7 05/01/2018    BUN 20 05/01/2018    CREATININE 0 77 05/01/2018    GLUCOSE 108 03/08/2018    GLUF 85 05/01/2018    CALCIUM 9 6 05/01/2018    AST 27 05/01/2018    ALT 44 05/01/2018    ALKPHOS 115 05/01/2018    PROT 6 9 05/01/2018    BILITOT 0 46 05/01/2018    EGFR 76 05/01/2018

## 2018-05-30 NOTE — PATIENT INSTRUCTIONS
Obesity   AMBULATORY CARE:   Obesity  is when your body mass index (BMI) is greater than 30  Your healthcare provider will use your height and weight to measure your BMI  The risks of obesity include  many health problems, such as injuries or physical disability  You may need tests to check for the following:  · Diabetes     · High blood pressure or high cholesterol     · Heart disease     · Gallbladder or liver disease     · Cancer of the colon, breast, prostate, liver, or kidney     · Sleep apnea     · Arthritis or gout  Seek care immediately if:   · You have a severe headache, confusion, or difficulty speaking  · You have weakness on one side of your body  · You have chest pain, sweating, or shortness of breath  Contact your healthcare provider if:   · You have symptoms of gallbladder or liver disease, such as pain in your upper abdomen  · You have knee or hip pain and discomfort while walking  · You have symptoms of diabetes, such as intense hunger and thirst, and frequent urination  · You have symptoms of sleep apnea, such as snoring or daytime sleepiness  · You have questions or concerns about your condition or care  Treatment for obesity  focuses on helping you lose weight to improve your health  Even a small decrease in BMI can reduce the risk for many health problems  Your healthcare provider will help you set a weight-loss goal   · Lifestyle changes  are the first step in treating obesity  These include making healthy food choices and getting regular physical activity  Your healthcare provider may suggest a weight-loss program that involves coaching, education, and therapy  · Medicine  may help you lose weight when it is used with a healthy diet and physical activity  · Surgery  can help you lose weight if you are very obese and have other health problems  There are several types of weight-loss surgery  Ask your healthcare provider for more information    Be successful losing weight:   · Set small, realistic goals  An example of a small goal is to walk for 20 minutes 5 days a week  Anther goal is to lose 5% of your body weight  · Tell friends, family members, and coworkers about your goals  and ask for their support  Ask a friend to lose weight with you, or join a weight-loss support group  · Identify foods or triggers that may cause you to overeat , and find ways to avoid them  Remove tempting high-calorie foods from your home and workplace  Place a bowl of fresh fruit on your kitchen counter  If stress causes you to eat, then find other ways to cope with stress  · Keep a diary to track what you eat and drink  Also write down how many minutes of physical activity you do each day  Weigh yourself once a week and record it in your diary  Eating changes: You will need to eat 500 to 1,000 fewer calories each day than you currently eat to lose 1 to 2 pounds a week  The following changes will help you cut calories:  · Eat smaller portions  Use small plates, no larger than 9 inches in diameter  Fill your plate half full of fruits and vegetables  Measure your food using measuring cups until you know what a serving size looks like  · Eat 3 meals and 1 or 2 snacks each day  Plan your meals in advance  Yogi Lynch and eat at home most of the time  Eat slowly  · Eat fruits and vegetables at every meal   They are low in calories and high in fiber, which makes you feel full  Do not add butter, margarine, or cream sauce to vegetables  Use herbs to season steamed vegetables  · Eat less fat and fewer fried foods  Eat more baked or grilled chicken and fish  These protein sources are lower in calories and fat than red meat  Limit fast food  Dress your salads with olive oil and vinegar instead of bottled dressing  · Limit the amount of sugar you eat  Do not drink sugary beverages  Limit alcohol  Activity changes:  Physical activity is good for your body in many ways   It helps you burn calories and build strong muscles  It decreases stress and depression, and improves your mood  It can also help you sleep better  Talk to your healthcare provider before you begin an exercise program   · Exercise for at least 30 minutes 5 days a week  Start slowly  Set aside time each day for physical activity that you enjoy and that is convenient for you  It is best to do both weight training and an activity that increases your heart rate, such as walking, bicycling, or swimming  · Find ways to be more active  Do yard work and housecleaning  Walk up the stairs instead of using elevators  Spend your leisure time going to events that require walking, such as outdoor festivals or fairs  This extra physical activity can help you lose weight and keep it off  Follow up with your healthcare provider as directed: You may need to meet with a dietitian  Write down your questions so you remember to ask them during your visits  © 2017 [employer] Information is for End User's use only and may not be sold, redistributed or otherwise used for commercial purposes  All illustrations and images included in CareNotes® are the copyrighted property of [employer]  or Dionte Alfaro  The above information is an  only  It is not intended as medical advice for individual conditions or treatments  Talk to your doctor, nurse or pharmacist before following any medical regimen to see if it is safe and effective for you  Urinary Incontinence   WHAT YOU NEED TO KNOW:   What is urinary incontinence? Urinary incontinence (UI) is when you lose control of your bladder  What causes UI? UI occurs because your bladder cannot store or empty urine properly  The following are the most common types of UI:  · Stress incontinence  is when you leak urine due to increased bladder pressure  This may happen when you cough, sneeze, or exercise       · Urge incontinence  is when you feel the need to urinate right away and leak urine accidentally  · Mixed incontinence  is when you have both stress and urge UI  What are the signs and symptoms of UI?   · You feel like your bladder does not empty completely when you urinate  · You urinate often and need to urinate immediately  · You leak urine when you sleep, or you wake up with the urge to urinate  · You leak urine when you cough, sneeze, exercise, or laugh  How is UI diagnosed? Your healthcare provider will ask how often you leak urine and whether you have stress or urge symptoms  Tell him which medicines you take, how often you urinate, and how much liquid you drink each day  You may need any of the following tests:  · Urine tests  may show infection or kidney function  · A pelvic exam  may be done to check for blockages  A pelvic exam will also show if your bladder, uterus, or other organs have moved out of place  · An x-ray, ultrasound, or CT  may show problems with parts of your urinary system  You may be given contrast liquid to help your organs show up better in the pictures  Tell the healthcare provider if you have ever had an allergic reaction to contrast liquid  Do not enter the MRI room with anything metal  Metal can cause serious injury  Tell the healthcare provider if you have any metal in or on your body  · A bladder scan  will show how much urine is left in your bladder after you urinate  You will be asked to urinate and then healthcare providers will use a small ultrasound machine to check the urine left in your bladder  · Cystometry  is used to check the function of your urinary system  Your healthcare provider checks the pressure in your bladder while filling it with fluid  Your bladder pressure may also be tested when your bladder is full and while you urinate  How is UI treated? · Medicines  can help strengthen your bladder control      · Electrical stimulation  is used to send a small amount of electrical energy to your pelvic floor muscles  This helps control your bladder function  Electrodes may be placed outside your body or in your rectum  For women, the electrodes may be placed in the vagina  · A bulking agent  may be injected into the wall of your urethra to make it thicker  This helps keep your urethra closed and decreases urine leakage  · Devices  such as a clamp, pessary, or tampon may help stop urine leaks  Ask your healthcare provider for more information about these and other devices  · Surgery  may be needed if other treatments do not work  Several types of surgery can help improve your bladder control  Ask your healthcare provider for more information about the surgery you may need  How can I manage my symptoms? · Do pelvic muscle exercises often  Your pelvic muscles help you stop urinating  Squeeze these muscles tight for 5 seconds, then relax for 5 seconds  Gradually work up to squeezing for 10 seconds  Do 3 sets of 15 repetitions a day, or as directed  This will help strengthen your pelvic muscles and improve bladder control  · A catheter  may be used to help empty your bladder  A catheter is a tiny, plastic tube that is put into your bladder to drain your urine  Your healthcare provider may tell you to use a catheter to prevent your bladder from getting too full and leaking urine  · Keep a UI record  Write down how often you leak urine and how much you leak  Make a note of what you were doing when you leaked urine  · Train your bladder  Go to the bathroom at set times, such as every 2 hours, even if you do not feel the urge to go  You can also try to hold your urine when you feel the urge to go  For example, hold your urine for 5 minutes when you feel the urge to go  As that becomes easier, hold your urine for 10 minutes  · Drink liquids as directed  Ask your healthcare provider how much liquid to drink each day and which liquids are best for you   You may need to limit the amount of liquid you drink to help control your urine leakage  Limit or do not have drinks that contain caffeine or alcohol  Do not drink any liquid right before you go to bed  · Prevent constipation  Eat a variety of high-fiber foods  Good examples are high-fiber cereals, beans, vegetables, and whole-grain breads  Prune juice may help make your bowel movement softer  Walking is the best way to trigger your intestines to have a bowel movement  · Exercise regularly and maintain a healthy weight  Ask your healthcare provider how much you should weigh and about the best exercise plan for you  Weight loss and exercise will decrease pressure on your bladder and help you control your leakage  Ask him to help you create a weight loss plan if you are overweight  When should I seek immediate care? · You have severe pain  · You are confused or cannot think clearly  When should I contact my healthcare provider? · You have a fever  · You see blood in your urine  · You have pain when you urinate  · You have new or worse pain, even after treatment  · Your mouth feels dry or you have vision changes  · Your urine is cloudy or smells bad  · You have questions or concerns about your condition or care  CARE AGREEMENT:   You have the right to help plan your care  Learn about your health condition and how it may be treated  Discuss treatment options with your caregivers to decide what care you want to receive  You always have the right to refuse treatment  The above information is an  only  It is not intended as medical advice for individual conditions or treatments  Talk to your doctor, nurse or pharmacist before following any medical regimen to see if it is safe and effective for you  © 2017 [employer] Information is for End User's use only and may not be sold, redistributed or otherwise used for commercial purposes   All illustrations and images included in [facility] are the copyrighted property of [employer]  or [employer]te Alfaro  Fall Prevention   AMBULATORY CARE:   Fall prevention  includes ways to make your home and other areas safer  It also includes ways you can move more carefully to prevent a fall  Health conditions that cause changes in your blood pressure, vision, or muscle strength and coordination may increase your risk for falls  Medicines may also increase your risk for falls if they make you dizzy, weak, or sleepy  Call 911 or have someone else call if:   · You have fallen and are unconscious  · You have fallen and cannot move part of your body  Contact your healthcare provider if:   · You have fallen and have pain or a headache  · You have questions or concerns about your condition or care  Fall prevention tips:   · Stand or sit up slowly  This may help you keep your balance and prevent falls  · Use assistive devices as directed  Your healthcare provider may suggest that you use a cane or walker to help you keep your balance  You may need to have grab bars put in your bathroom near the toilet or in the shower  · Wear shoes that fit well and have soles that   Wear shoes both inside and outside  Use slippers with good   Do not wear shoes with high heels  · Wear a personal alarm  This is a device that allows you to call 911 if you fall and need help  Ask your healthcare provider for more information  · Stay active  Exercise can help strengthen your muscles and improve your balance  Your healthcare provider may recommend water aerobics or walking  He or she may also recommend physical therapy to improve your coordination  Never start an exercise program without talking to your healthcare provider first      · Manage your medical conditions  Keep all appointments with your healthcare providers  Visit your eye doctor as directed         Home safety tips:   · Add items to prevent falls in the bathroom  Put nonslip strips on your bath or shower floor to prevent you from slipping  Use a bath mat if you do not have carpet in the bathroom  This will prevent you from falling when you step out of the bath or shower  Use a shower seat so you do not need to stand while you shower  Sit on the toilet or a chair in your bathroom to dry yourself and put on clothing  This will prevent you from losing your balance from drying or dressing yourself while you are standing  · Keep paths clear  Remove books, shoes, and other objects from walkways and stairs  Place cords for telephones and lamps out of the way so that you do not need to walk over them  Tape them down if you cannot move them  Remove small rugs  If you cannot remove a rug, secure it with double-sided tape  This will prevent you from tripping  · Install bright lights in your home  Use night lights to help light paths to the bathroom or kitchen  Always turn on the light before you start walking  · Keep items you use often on shelves within reach  Do not use a step stool to help you reach an item  · Paint or place reflective tape on the edges of your stairs  This will help you see the stairs better  Follow up with your healthcare provider as directed:  Write down your questions so you remember to ask them during your visits  © 2017 [employer] Information is for End User's use only and may not be sold, redistributed or otherwise used for commercial purposes  All illustrations and images included in CareNotes® are the copyrighted property of [employer]  or Dionte Alfaro  The above information is an  only  It is not intended as medical advice for individual conditions or treatments  Talk to your doctor, nurse or pharmacist before following any medical regimen to see if it is safe and effective for you

## 2018-05-30 NOTE — LETTER
May 30, 2018     Patient: Amrik Courtney   YOB: 1940   Date of Visit: 5/30/2018       Dear Dr Paul Ferro:    Thank you for seeing Kia Mi for cardiology evaluation  Below are my notes for this follow up visit  I would like to make you aware that she stopped her metoprolol completely due to side effects  She is having repeat echo done due to cardiomyopathy s/p chemo; please advise if other BB should be restarted? If you have questions, please do not hesitate to call me  I look forward to following your patient along with you  Sincerely,        Gabriella Del Valle PA-C        CC: No Recipients  Gabriella Del Valle PA-C  5/30/2018 11:21 PM  Sign at close encounter  Routine Follow-up    Amrik Courtney 78 y o  female   Date:  5/30/2018      Assessment and Plan:    Trina Nam was seen today for follow-up  Diagnoses and all orders for this visit:    Encounter for screening mammogram for breast cancer  -     Mammo screening bilateral w cad; Future    Pain in multiple finger joints  -     RF Screen w/ Reflex to Titer; Future    Gastroesophageal reflux disease without esophagitis  - stable on prilosec 20 mg tabs daily    Essential hypertension  - BP slightly over goal today    Systolic heart failure, unspecified HF chronicity ([facility] [address] )  - patient with cardiomyopathy s/p chemo with EF redeced to 40 %, she will be repeating echo in 3 months   - appreciate cardiology consult - she was recently switched from verapamil to lisinopril 5 mg and metoprolol; patient not was tolerated metoprolol she is off medication, follow up with cardiology     HLD   - repeat lipid panel, well balanced diet  HPI:  Chief Complaint   Patient presents with    Follow-up     HPI   Patient is a 77 yo female with PMH below who presents for routine follow up and AWV visit  She is due for routine mammo and dxa scan  She continues to follow with hem/onc closely  She has been off Eliquis s/p provoked upper extremity DVT   Her acid reflux has been controlled on 20 mg tablets since been dropped from 40 mg tablet  She followed with cardiology and due to EF reduction to 40% consistent with cardiomyopathy s/p chemo  She was switched from verapamil to lisinopril and metoprolol  However she has not been tolerated the metoprolol and has stopped it  She was just seen for insect bite and completed course ofkelfex and neck is doing well  ROS: Review of Systems   Constitutional: Negative for chills, fatigue, fever and unexpected weight change  HENT: Negative for congestion, ear pain, hearing loss, nosebleeds, sore throat and trouble swallowing  Eyes: Negative for pain, discharge and visual disturbance  Respiratory: Negative for cough, shortness of breath and wheezing  Cardiovascular: Negative for chest pain, palpitations and leg swelling  Gastrointestinal: Negative for abdominal pain, blood in stool, constipation, diarrhea, nausea and vomiting  Endocrine: Negative for cold intolerance and heat intolerance  Genitourinary: Negative for difficulty urinating, dysuria and hematuria  Musculoskeletal: Positive for arthralgias (finger pain, PIP swollen; chronic ankle pain)  Negative for gait problem and myalgias  Skin: Negative for color change, rash and wound  Neurological: Negative for dizziness, syncope, weakness, light-headedness and headaches  Hematological: Negative for adenopathy  Does not bruise/bleed easily  Psychiatric/Behavioral: Negative for confusion and sleep disturbance  The patient is not nervous/anxious          Past Medical History:   Diagnosis Date    Acid reflux     Basal cell carcinoma (BCC)     Bruit of right carotid artery     resolved 5/14/15     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HCC)     radiation    Dyslipidemia     Esophageal stricture     last assessed 2/28/13       GERD (gastroesophageal reflux disease)     Samish (hard of hearing)     b/l ears    Hypertension     Left bundle branch block     Osteoarthritis     Osteoarthrosis of ankle and foot     last assessed 5/20/13     Osteopenia     last assessed 2/28/13     Plantar fasciitis     Plantar fasciitis of left foot     Polyp of sigmoid colon     last assessed 2/28/13     Shortness of breath     when walking up stairs     Patient Active Problem List   Diagnosis    Enlarged lymph node in neck    Lymphadenopathy, axillary    Lymphoma ([facility] [address] )    Arm DVT (deep venous thromboembolism), acute, left (HCC)    Essential hypertension    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 ([facility] [address] )    Constipation    Left bundle branch block (LBBB)    Infected insect bite of neck    Vitamin D deficiency    Vitamin B12 deficiency    Thrombosis of left subclavian vein (HCC)    Situational anxiety    Thrombophlebitis of deep veins of upper extremities    Gastroesophageal reflux disease    Dyslipidemia    Diffuse large B-cell lymphoma of lymph nodes of multiple regions ([facility] [address] )    Cataract    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       Past Surgical History:   Procedure Laterality Date    ANKLE ARTHROPLASTY Left     BLEPHAROPLASTY      right eye     CATARACT EXTRACTION Right     ESOPHAGEAL DILATION      x2    ESOPHAGOGASTRODUODENOSCOPY      dilitation    FACIAL/NECK BIOPSY Right 9/8/2017    Procedure: NECK NODE BIOPSY WITH NEEDLE LOCALIZATION; LYMPHOMA PROTOCOL (NEEDLE LOC WITH I R  AT 1100);   Surgeon: Ranulfo Atkinson MD;  [facility];  Service: Surgical Oncology    HYSTERECTOMY      LYMPH NODE DISSECTION      right groin and neck    ORBITAL FLOOR EXPLORATION Right     for cancer     PORTACATH PLACEMENT      left chest     NH BX/REMV,LYMPH NODE,DEEP AXILL Left 8/14/2017    Procedure: Axillary lymph node excision with LYMPHOMA PROTOCOL;  Surgeon: Ranulfo Atkinson MD;  [facility];  Service: Surgical Oncology    NH BX/REMV,LYMPH NODE,DEEP CERV Right 5/2/2016    Procedure: NECK NODE BIOPSY;  Surgeon: Ranulfo Atkinson MD;  [facility];  Service: Surgical Oncology    AZ INSJ TUNNELED CTR VAD W/SUBQ PORT AGE 5 YR/> N/A 10/3/2017    Procedure: PLACEMENT OF PORT-A-CATH DEVICE;  Surgeon: Taiwo Odom MD;  [facility];  Service: Surgical Oncology       Social History     Social History    Marital status: /Civil Union     Spouse name: N/A    Number of children: N/A    Years of education: N/A     Social History Main Topics    Smoking status: Former Smoker     Quit date: 1970    Smokeless tobacco: Never Used    Alcohol use No    Drug use: No    Sexual activity: Not on file     Other Topics Concern    Not on file     Social History Narrative    Uses safety equipment seatbelts        Family History   Problem Relation Age of Onset    Multiple myeloma Mother     Heart disease Father     Hypertension Father     Hypertension Sister     Heart disease Sister     Arthritis Family     Breast cancer Family     Osteoporosis Family        Allergies   Allergen Reactions    Aspirin Anaphylaxis    Celebrex [Celecoxib] Anaphylaxis    Nsaids Anaphylaxis    Other Anaphylaxis     Pt states when she received a certain type of chemotherapy it caused her throat to close    Rituxan [Rituximab] Anaphylaxis     Swelling of tongue and closing of throat    Bactrim [Sulfamethoxazole-Trimethoprim] Other (See Comments)     VERY EMOTIONAL/CRYING    Sulfa Antibiotics Other (See Comments)     VERY EMOTIONAL CRYING         Current Outpatient Prescriptions:     ascorbic acid (VITAMIN C) 500 mg tablet, Take 500 mg by mouth daily in the early morning  , Disp: , Rfl:     Calcium Carb-Cholecalciferol (CALCIUM + D3) 600-200 MG-UNIT TABS, Take 1 tablet by mouth daily in the early morning  , Disp: , Rfl:     Cyanocobalamin (VITAMIN B 12 PO), Take by mouth, Disp: , Rfl:     hydrochlorothiazide (HYDRODIURIL) 25 mg tablet, Take 1 tablet (25 mg total) by mouth daily in the early morning, Disp: 90 tablet, Rfl: 1    lisinopril (ZESTRIL) 5 mg tablet, Take 1 tablet (5 mg total) by mouth daily, Disp: 90 tablet, Rfl: 3    loratadine (CLARITIN) 10 mg tablet, Take 10 mg by mouth daily, Disp: , Rfl:     Multiple Vitamin (MULTIVITAMIN) capsule, Take 1 capsule by mouth daily in the early morning  , Disp: , Rfl:     Omega-3 Fatty Acids (FISH OIL) 1200 MG CAPS, Take 3 capsules by mouth daily with dinner  , Disp: , Rfl:     omeprazole (PriLOSEC OTC) 20 MG tablet, Take 1 tablet (20 mg total) by mouth daily, Disp: 90 tablet, Rfl: 1    potassium chloride (KLOR-CON M20) 20 mEq tablet, Take 1 tablet (20 mEq total) by mouth daily in the early morning, Disp: 90 tablet, Rfl: 1    metoprolol succinate (TOPROL-XL) 50 mg 24 hr tablet, Take 1 tablet (50 mg total) by mouth daily, Disp: 90 tablet, Rfl: 3      Physical Exam:  /88   Pulse 80   Temp 97 9 °F (36 6 °C)   Resp 16   Wt 99 3 kg (219 lb)   SpO2 97%   BMI 38 79 kg/m²      Physical Exam   Constitutional: She is oriented to person, place, and time  Vital signs are normal  She appears well-developed and well-nourished  No distress  obese   HENT:   Head: Normocephalic and atraumatic  Right Ear: Tympanic membrane, external ear and ear canal normal    Left Ear: Tympanic membrane, external ear and ear canal normal    Nose: Nose normal    Mouth/Throat: Oropharynx is clear and moist  No oropharyngeal exudate  Eyes: Conjunctivae and lids are normal  Pupils are equal, round, and reactive to light  Neck: Trachea normal and normal range of motion  Neck supple  No thyromegaly present  Cardiovascular: Normal rate, regular rhythm, S1 normal, S2 normal and intact distal pulses  Exam reveals no gallop  Murmur (slight ejection murmur) heard  Pulmonary/Chest: Breath sounds normal  No respiratory distress  She has no wheezes  She has no rhonchi  She has no rales  Abdominal: Normal appearance  There is no hepatosplenomegaly  Musculoskeletal: Normal range of motion  She exhibits no edema or deformity  Lymphadenopathy:     She has no cervical adenopathy  Neurological: She is alert and oriented to person, place, and time  She has normal reflexes  No cranial nerve deficit or sensory deficit  Skin: Skin is warm and dry  No rash noted  She is not diaphoretic  No cyanosis  No pallor  Nails show no clubbing  Psychiatric: She has a normal mood and affect  Her behavior is normal  Cognition and memory are normal    Vitals reviewed          Labs:  Lab Results   Component Value Date    WBC 6 35 05/01/2018    HGB 13 8 05/01/2018    HCT 44 2 05/01/2018    MCV 96 05/01/2018     05/01/2018     Lab Results   Component Value Date     05/01/2018    K 3 8 05/01/2018     05/01/2018    CO2 30 05/01/2018    ANIONGAP 7 05/01/2018    BUN 20 05/01/2018    CREATININE 0 77 05/01/2018    GLUCOSE 108 03/08/2018    GLUF 85 05/01/2018    CALCIUM 9 6 05/01/2018    AST 27 05/01/2018    ALT 44 05/01/2018    ALKPHOS 115 05/01/2018    PROT 6 9 05/01/2018    BILITOT 0 46 05/01/2018    EGFR 76 05/01/2018

## 2018-06-01 ENCOUNTER — TELEPHONE (OUTPATIENT)
Dept: CARDIOLOGY CLINIC | Facility: CLINIC | Age: 78
End: 2018-06-01

## 2018-06-01 ENCOUNTER — HOSPITAL ENCOUNTER (OUTPATIENT)
Dept: INFUSION CENTER | Facility: CLINIC | Age: 78
Discharge: HOME | End: 2018-06-01
Payer: COMMERCIAL

## 2018-06-01 PROCEDURE — 96523 IRRIG DRUG DELIVERY DEVICE: CPT

## 2018-06-01 RX ADMIN — Medication 300 UNITS: at 09:53

## 2018-06-01 NOTE — TELEPHONE ENCOUNTER
Braulio Payne called the nursing line to say that she stopped the Metoprolol on May 29th  She said she had such terrible joint pain, even when she reduced the medication to 1/2 tablet  Her PCP said she needed to ask you if she can take something else    Please advise

## 2018-06-05 DIAGNOSIS — I10 ESSENTIAL HYPERTENSION: ICD-10-CM

## 2018-06-05 DIAGNOSIS — I42.9 CARDIOMYOPATHY, UNSPECIFIED TYPE (HCC): Primary | ICD-10-CM

## 2018-06-05 RX ORDER — CARVEDILOL 3.12 MG/1
3.12 TABLET ORAL 2 TIMES DAILY WITH MEALS
Qty: 30 TABLET | Refills: 11 | Status: SHIPPED | OUTPATIENT
Start: 2018-06-05 | End: 2018-07-11

## 2018-06-11 DIAGNOSIS — I10 ESSENTIAL HYPERTENSION: ICD-10-CM

## 2018-06-18 ENCOUNTER — HOSPITAL ENCOUNTER (OUTPATIENT)
Dept: RADIOLOGY | Age: 78
Discharge: HOME | End: 2018-06-18
Payer: COMMERCIAL

## 2018-06-18 ENCOUNTER — TELEPHONE (OUTPATIENT)
Dept: HEMATOLOGY ONCOLOGY | Facility: CLINIC | Age: 78
End: 2018-06-18

## 2018-06-18 DIAGNOSIS — C83.38 DIFFUSE LARGE B-CELL LYMPHOMA OF LYMPH NODES OF MULTIPLE SITES (HCC): ICD-10-CM

## 2018-06-18 LAB — GLUCOSE SERPL-MCNC: 81 MG/DL (ref 65–140)

## 2018-06-18 PROCEDURE — A9552 F18 FDG: HCPCS

## 2018-06-18 PROCEDURE — 78815 PET IMAGE W/CT SKULL-THIGH: CPT

## 2018-06-18 PROCEDURE — 82948 REAGENT STRIP/BLOOD GLUCOSE: CPT

## 2018-06-18 RX ADMIN — IOHEXOL 5 ML: 240 INJECTION, SOLUTION INTRATHECAL; INTRAVASCULAR; INTRAVENOUS; ORAL at 09:45

## 2018-06-29 ENCOUNTER — APPOINTMENT (OUTPATIENT)
Dept: LAB | Facility: CLINIC | Age: 78
End: 2018-06-29
Payer: COMMERCIAL

## 2018-06-29 ENCOUNTER — TRANSCRIBE ORDERS (OUTPATIENT)
Dept: LAB | Facility: CLINIC | Age: 78
End: 2018-06-29

## 2018-06-29 DIAGNOSIS — E78.2 MIXED HYPERLIPIDEMIA: ICD-10-CM

## 2018-06-29 DIAGNOSIS — M25.549 PAIN IN MULTIPLE FINGER JOINTS: ICD-10-CM

## 2018-06-29 DIAGNOSIS — C83.38 DIFFUSE LARGE B-CELL LYMPHOMA OF LYMPH NODES OF MULTIPLE SITES (HCC): ICD-10-CM

## 2018-06-29 DIAGNOSIS — E78.5 DYSLIPIDEMIA: ICD-10-CM

## 2018-06-29 LAB
ALBUMIN SERPL BCP-MCNC: 3.8 G/DL (ref 3.5–5)
ALP SERPL-CCNC: 105 U/L (ref 46–116)
ALT SERPL W P-5'-P-CCNC: 37 U/L (ref 12–78)
ANION GAP SERPL CALCULATED.3IONS-SCNC: 8 MMOL/L (ref 4–13)
AST SERPL W P-5'-P-CCNC: 22 U/L (ref 5–45)
BASOPHILS # BLD AUTO: 0.03 THOUSANDS/ΜL (ref 0–0.1)
BASOPHILS NFR BLD AUTO: 1 % (ref 0–1)
BILIRUB SERPL-MCNC: 0.59 MG/DL (ref 0.2–1)
BUN SERPL-MCNC: 16 MG/DL (ref 5–25)
CALCIUM SERPL-MCNC: 9.5 MG/DL (ref 8.3–10.1)
CHLORIDE SERPL-SCNC: 104 MMOL/L (ref 100–108)
CHOLEST SERPL-MCNC: 172 MG/DL (ref 50–200)
CO2 SERPL-SCNC: 28 MMOL/L (ref 21–32)
CREAT SERPL-MCNC: 0.77 MG/DL (ref 0.6–1.3)
EOSINOPHIL # BLD AUTO: 0.2 THOUSAND/ΜL (ref 0–0.61)
EOSINOPHIL NFR BLD AUTO: 3 % (ref 0–6)
ERYTHROCYTE [DISTWIDTH] IN BLOOD BY AUTOMATED COUNT: 14.1 % (ref 11.6–15.1)
GFR SERPL CREATININE-BSD FRML MDRD: 76 ML/MIN/1.73SQ M
GLUCOSE P FAST SERPL-MCNC: 94 MG/DL (ref 65–99)
HCT VFR BLD AUTO: 43.3 % (ref 34.8–46.1)
HDLC SERPL-MCNC: 38 MG/DL (ref 40–60)
HGB BLD-MCNC: 13.8 G/DL (ref 11.5–15.4)
IMM GRANULOCYTES # BLD AUTO: 0.02 THOUSAND/UL (ref 0–0.2)
IMM GRANULOCYTES NFR BLD AUTO: 0 % (ref 0–2)
LDH SERPL-CCNC: 192 U/L (ref 81–234)
LDLC SERPL CALC-MCNC: 110 MG/DL (ref 0–100)
LYMPHOCYTES # BLD AUTO: 1.83 THOUSANDS/ΜL (ref 0.6–4.47)
LYMPHOCYTES NFR BLD AUTO: 30 % (ref 14–44)
MCH RBC QN AUTO: 29.1 PG (ref 26.8–34.3)
MCHC RBC AUTO-ENTMCNC: 31.9 G/DL (ref 31.4–37.4)
MCV RBC AUTO: 91 FL (ref 82–98)
MONOCYTES # BLD AUTO: 0.65 THOUSAND/ΜL (ref 0.17–1.22)
MONOCYTES NFR BLD AUTO: 11 % (ref 4–12)
NEUTROPHILS # BLD AUTO: 3.41 THOUSANDS/ΜL (ref 1.85–7.62)
NEUTS SEG NFR BLD AUTO: 55 % (ref 43–75)
NONHDLC SERPL-MCNC: 134 MG/DL
NRBC BLD AUTO-RTO: 0 /100 WBCS
PLATELET # BLD AUTO: 196 THOUSANDS/UL (ref 149–390)
PMV BLD AUTO: 10 FL (ref 8.9–12.7)
POTASSIUM SERPL-SCNC: 3.6 MMOL/L (ref 3.5–5.3)
PROT SERPL-MCNC: 6.6 G/DL (ref 6.4–8.2)
RBC # BLD AUTO: 4.75 MILLION/UL (ref 3.81–5.12)
SODIUM SERPL-SCNC: 140 MMOL/L (ref 136–145)
TRIGL SERPL-MCNC: 118 MG/DL
WBC # BLD AUTO: 6.14 THOUSAND/UL (ref 4.31–10.16)

## 2018-06-29 PROCEDURE — 83615 LACTATE (LD) (LDH) ENZYME: CPT

## 2018-06-29 PROCEDURE — 85025 COMPLETE CBC W/AUTO DIFF WBC: CPT

## 2018-06-29 PROCEDURE — 86430 RHEUMATOID FACTOR TEST QUAL: CPT

## 2018-06-29 PROCEDURE — 80053 COMPREHEN METABOLIC PANEL: CPT

## 2018-06-29 PROCEDURE — 36415 COLL VENOUS BLD VENIPUNCTURE: CPT

## 2018-06-29 PROCEDURE — 80061 LIPID PANEL: CPT

## 2018-06-29 PROCEDURE — 82232 ASSAY OF BETA-2 PROTEIN: CPT

## 2018-07-02 LAB
B2 MICROGLOB SERPL-MCNC: 3 MG/L (ref 0.6–2.4)
RHEUMATOID FACT SER QL LA: NEGATIVE

## 2018-07-05 ENCOUNTER — TELEPHONE (OUTPATIENT)
Dept: CARDIOLOGY CLINIC | Facility: CLINIC | Age: 78
End: 2018-07-05

## 2018-07-05 PROBLEM — K22.2 ESOPHAGEAL STRICTURE: Status: ACTIVE | Noted: 2018-07-05

## 2018-07-05 PROBLEM — H91.90 HOH (HARD OF HEARING): Status: ACTIVE | Noted: 2018-07-05

## 2018-07-05 PROBLEM — M19.90 OSTEOARTHRITIS: Status: ACTIVE | Noted: 2018-07-05

## 2018-07-05 PROBLEM — C41.0: Status: ACTIVE | Noted: 2018-07-05

## 2018-07-05 PROBLEM — K63.5 POLYP OF SIGMOID COLON: Status: RESOLVED | Noted: 2018-07-05 | Resolved: 2018-07-05

## 2018-07-05 NOTE — TELEPHONE ENCOUNTER
Dr Kurt Ramos, [employer]    Patient called stating she had severe joint pain soon after she started Carvedilol  She d/c med

## 2018-07-06 ENCOUNTER — OFFICE VISIT (OUTPATIENT)
Dept: LAB | Facility: HOSPITAL | Age: 78
End: 2018-07-06
Attending: SURGERY
Payer: COMMERCIAL

## 2018-07-06 ENCOUNTER — OFFICE VISIT (OUTPATIENT)
Dept: SURGICAL ONCOLOGY | Facility: CLINIC | Age: 78
End: 2018-07-06
Payer: COMMERCIAL

## 2018-07-06 ENCOUNTER — APPOINTMENT (OUTPATIENT)
Dept: LAB | Facility: HOSPITAL | Age: 78
End: 2018-07-06
Attending: SURGERY
Payer: COMMERCIAL

## 2018-07-06 ENCOUNTER — OFFICE VISIT (OUTPATIENT)
Dept: HEMATOLOGY ONCOLOGY | Facility: CLINIC | Age: 78
End: 2018-07-06
Payer: COMMERCIAL

## 2018-07-06 VITALS
HEART RATE: 72 BPM | HEIGHT: 63 IN | BODY MASS INDEX: 37.92 KG/M2 | WEIGHT: 214 LBS | SYSTOLIC BLOOD PRESSURE: 146 MMHG | TEMPERATURE: 98.3 F | DIASTOLIC BLOOD PRESSURE: 94 MMHG | RESPIRATION RATE: 16 BRPM

## 2018-07-06 VITALS
HEART RATE: 84 BPM | WEIGHT: 213.4 LBS | DIASTOLIC BLOOD PRESSURE: 78 MMHG | OXYGEN SATURATION: 95 % | RESPIRATION RATE: 16 BRPM | TEMPERATURE: 97.9 F | BODY MASS INDEX: 37.81 KG/M2 | SYSTOLIC BLOOD PRESSURE: 130 MMHG | HEIGHT: 63 IN

## 2018-07-06 DIAGNOSIS — I80.8 THROMBOPHLEBITIS OF DEEP VEINS OF UPPER EXTREMITIES: ICD-10-CM

## 2018-07-06 DIAGNOSIS — R59.1 LYMPHADENOPATHY OF HEAD AND NECK: ICD-10-CM

## 2018-07-06 DIAGNOSIS — C83.38 DIFFUSE LARGE B-CELL LYMPHOMA OF LYMPH NODES OF MULTIPLE SITES (HCC): Primary | ICD-10-CM

## 2018-07-06 DIAGNOSIS — R59.1 LYMPHADENOPATHY OF HEAD AND NECK: Primary | ICD-10-CM

## 2018-07-06 LAB
ALBUMIN SERPL BCP-MCNC: 3.8 G/DL (ref 3.5–5)
ALP SERPL-CCNC: 98 U/L (ref 46–116)
ALT SERPL W P-5'-P-CCNC: 35 U/L (ref 12–78)
ANION GAP SERPL CALCULATED.3IONS-SCNC: 5 MMOL/L (ref 4–13)
APTT PPP: 27 SECONDS (ref 24–36)
AST SERPL W P-5'-P-CCNC: 22 U/L (ref 5–45)
ATRIAL RATE: 82 BPM
BASOPHILS # BLD AUTO: 0.03 THOUSANDS/ΜL (ref 0–0.1)
BASOPHILS NFR BLD AUTO: 0 % (ref 0–1)
BILIRUB SERPL-MCNC: 0.45 MG/DL (ref 0.2–1)
BUN SERPL-MCNC: 19 MG/DL (ref 5–25)
CALCIUM SERPL-MCNC: 9.5 MG/DL (ref 8.3–10.1)
CHLORIDE SERPL-SCNC: 104 MMOL/L (ref 100–108)
CO2 SERPL-SCNC: 30 MMOL/L (ref 21–32)
CREAT SERPL-MCNC: 0.88 MG/DL (ref 0.6–1.3)
EOSINOPHIL # BLD AUTO: 0.23 THOUSAND/ΜL (ref 0–0.61)
EOSINOPHIL NFR BLD AUTO: 3 % (ref 0–6)
ERYTHROCYTE [DISTWIDTH] IN BLOOD BY AUTOMATED COUNT: 14.6 % (ref 11.6–15.1)
GFR SERPL CREATININE-BSD FRML MDRD: 64 ML/MIN/1.73SQ M
GLUCOSE SERPL-MCNC: 81 MG/DL (ref 65–140)
HCT VFR BLD AUTO: 43.6 % (ref 34.8–46.1)
HGB BLD-MCNC: 13.9 G/DL (ref 11.5–15.4)
IMM GRANULOCYTES # BLD AUTO: 0.02 THOUSAND/UL (ref 0–0.2)
IMM GRANULOCYTES NFR BLD AUTO: 0 % (ref 0–2)
INR PPP: 1 (ref 0.86–1.17)
LYMPHOCYTES # BLD AUTO: 1.99 THOUSANDS/ΜL (ref 0.6–4.47)
LYMPHOCYTES NFR BLD AUTO: 27 % (ref 14–44)
MCH RBC QN AUTO: 29.1 PG (ref 26.8–34.3)
MCHC RBC AUTO-ENTMCNC: 31.9 G/DL (ref 31.4–37.4)
MCV RBC AUTO: 91 FL (ref 82–98)
MONOCYTES # BLD AUTO: 0.71 THOUSAND/ΜL (ref 0.17–1.22)
MONOCYTES NFR BLD AUTO: 10 % (ref 4–12)
NEUTROPHILS # BLD AUTO: 4.27 THOUSANDS/ΜL (ref 1.85–7.62)
NEUTS SEG NFR BLD AUTO: 60 % (ref 43–75)
NRBC BLD AUTO-RTO: 0 /100 WBCS
P AXIS: 53 DEGREES
PLATELET # BLD AUTO: 201 THOUSANDS/UL (ref 149–390)
PMV BLD AUTO: 9.7 FL (ref 8.9–12.7)
POTASSIUM SERPL-SCNC: 3.7 MMOL/L (ref 3.5–5.3)
PR INTERVAL: 152 MS
PROT SERPL-MCNC: 6.8 G/DL (ref 6.4–8.2)
PROTHROMBIN TIME: 13.3 SECONDS (ref 11.8–14.2)
QRS AXIS: -40 DEGREES
QRSD INTERVAL: 174 MS
QT INTERVAL: 456 MS
QTC INTERVAL: 532 MS
RBC # BLD AUTO: 4.77 MILLION/UL (ref 3.81–5.12)
SODIUM SERPL-SCNC: 139 MMOL/L (ref 136–145)
T WAVE AXIS: 91 DEGREES
VENTRICULAR RATE: 82 BPM
WBC # BLD AUTO: 7.25 THOUSAND/UL (ref 4.31–10.16)

## 2018-07-06 PROCEDURE — 99214 OFFICE O/P EST MOD 30 MIN: CPT | Performed by: SURGERY

## 2018-07-06 PROCEDURE — 85025 COMPLETE CBC W/AUTO DIFF WBC: CPT

## 2018-07-06 PROCEDURE — 36415 COLL VENOUS BLD VENIPUNCTURE: CPT

## 2018-07-06 PROCEDURE — 93005 ELECTROCARDIOGRAM TRACING: CPT

## 2018-07-06 PROCEDURE — 80053 COMPREHEN METABOLIC PANEL: CPT

## 2018-07-06 PROCEDURE — 99214 OFFICE O/P EST MOD 30 MIN: CPT | Performed by: INTERNAL MEDICINE

## 2018-07-06 PROCEDURE — 85610 PROTHROMBIN TIME: CPT

## 2018-07-06 PROCEDURE — 85730 THROMBOPLASTIN TIME PARTIAL: CPT

## 2018-07-06 PROCEDURE — 93010 ELECTROCARDIOGRAM REPORT: CPT | Performed by: INTERNAL MEDICINE

## 2018-07-06 NOTE — PROGRESS NOTES
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   It was a localized disease  Plan was radiation and Rituxan  She had radiation  She had only one dose of Rituxan and had reaction to Rituxan  She had swelling of the tongue and closing of throat  Rituxan was discontinued     In April 2015 patient had PET CT scan that showed hypermetabolic lymphadenopathy involving the neck, chest, abdomen and pelvis and also hypermetabolic right breast nodularity, hepatic steatosis, mild/borderline aneurysmal dilatation of ascending thoracic aorta  She had left inguinal excisional lymph node biopsy, right axillary lymph node biopsy and neck node biopsy and none of those 3 biopsies showed lymphoma or malignancy  She was being followed  Kelvin Bean 2017 she presented with lymphadenopathy again especially in left axilla that was surgically removed and final path report was inconclusive  She had another biopsy from right supraclavicular area and that 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, CD20 positive  Slides were reviewed at [facility] [place] finally came after 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   Patient received 3 cycles of CHOP and 3 cycles of he EPOCH but the last 2 cycles did not have Adriamycin because of drop in ejection fraction   After completion of 3 cycles of CHOP pathologist from [address] [place] [address] suggested that 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 and for that reason treatment was changed to [facility]  Aletha Rosenberg Patient did not receive Rituxan because she is allergic to Rituxan  She received Neulasta    The last chemotherapy treatment was in January 2018     She was also given acyclovir and dapsone   Dapsone was stopped  Shari Rankin continued to take acyclovir     PET-CT scan done in February 2018 did not show residual or recurrent lymphoma   Jackie Morales Patient had PET-CT scan on 06/18/2018 and that showed hypermetabolic generalized lymphadenopathy  History of DVT left arm and she was given Lovenox followed by Eliquis for a total of 6 months or so  Follow-up Doppler study was negative and D-dimer test was negative  Patient is allergic to aspirin and she did not receive that post discontinuation of Lovenox     No bleeding, fever, chills, night sweats, weight loss and swollen glands     No B symptoms    Has some tiredness                 Current Outpatient Prescriptions:     ascorbic acid (VITAMIN C) 500 mg tablet, Take 500 mg by mouth daily in the early morning  , Disp: , Rfl:     Calcium Carb-Cholecalciferol (CALCIUM + D3) 600-200 MG-UNIT TABS, Take 1 tablet by mouth daily in the early morning  , Disp: , Rfl:     carvedilol (COREG) 3 125 mg tablet, Take 1 tablet (3 125 mg total) by mouth 2 (two) times a day with meals, Disp: 30 tablet, Rfl: 11    Cyanocobalamin (VITAMIN B 12 PO), Take by mouth, Disp: , Rfl:     hydrochlorothiazide (HYDRODIURIL) 25 mg tablet, Take 1 tablet (25 mg total) by mouth daily in the early morning, Disp: 90 tablet, Rfl: 1    lisinopril (ZESTRIL) 5 mg tablet, Take 1 tablet (5 mg total) by mouth daily, Disp: 90 tablet, Rfl: 3    loratadine (CLARITIN) 10 mg tablet, Take 10 mg by mouth daily, Disp: , Rfl:     metoprolol succinate (TOPROL-XL) 50 mg 24 hr tablet, Take 1 tablet (50 mg total) by mouth daily (Patient taking differently: Take 25 mg by mouth daily  ), Disp: 90 tablet, Rfl: 3    Multiple Vitamin (MULTIVITAMIN) capsule, Take 1 capsule by mouth daily in the early morning  , Disp: , Rfl:     Omega-3 Fatty Acids (FISH OIL) 1200 MG CAPS, Take 3 capsules by mouth daily with dinner  , Disp: , Rfl:     omeprazole (PriLOSEC OTC) 20 MG tablet, Take 1 tablet (20 mg total) by mouth daily, Disp: 90 tablet, Rfl: 1    potassium chloride (KLOR-CON M20) 20 mEq tablet, Take 1 tablet (20 mEq total) by mouth daily in the early morning, Disp: 90 tablet, Rfl: 1    Allergies   Allergen Reactions    Aspirin Anaphylaxis    Celebrex [Celecoxib] Anaphylaxis    Nsaids Anaphylaxis    Other Anaphylaxis     Pt states when she received a certain type of chemotherapy it caused her throat to close    Rituxan [Rituximab] Anaphylaxis     Swelling of tongue and closing of throat    Sulfa Antibiotics Other (See Comments) and Anaphylaxis     VERY EMOTIONAL CRYING    Bactrim [Sulfamethoxazole-Trimethoprim] Other (See Comments)     VERY EMOTIONAL/CRYING        No history exists  ROS:  07/06/18 Reviewed 13 systems:  Presently no headaches, seizures, dizziness, diplopia, dysphagia, hoarseness, chest pain, palpitations, shortness of breath, cough, hemoptysis, abdominal pain, nausea, vomiting, change in bowel habits, melena, hematuria, fever, chills, bleeding, bone pains, skin rash, weight loss, arthritic symptoms,  weakness, numbness,  claudication and gait problem  No frequent infections  Not unusually sensitive to heat or cold  No swelling of the ankles  No swollen glands  Patient is anxious  Other symptoms are in HPI        /78   Pulse 84   Temp 97 9 °F (36 6 °C) (Oral)   Resp 16   Ht 5' 3" (1 6 m)   Wt 96 8 kg (213 lb 6 4 oz)   SpO2 95%   BMI 37 80 kg/m²     Physical Exam:  Alert, oriented, not in distress, no icterus, no oral thrush, no palpable neck mass, clear lung fields, regular heart rate, abdomen  soft and non tender, no palpable abdominal mass, no ascites, no edema of ankles, no calf tenderness, no focal neurological deficit, no skin rash, no palpable lymphadenopathy, good arterial pulses, no clubbing  Patient is anxious  Performance status 1  IMAGING:  IMPRESSION:     1  Findings compatible with disease recurrence    Multiple FDG avid lymph nodes in the bilateral neck, axilla, subpectoral regions, mediastinum, portacaval region and retroperitoneum compatible with hypermetabolic malignancy  Deauville score of 5         Workstation performed: [id number]      Imaging     NM PET CT skull base to mid thigh (Order #[id number]) on 6/18/2018 - Imaging Information       LABS:  Results for orders placed or performed in visit on 07/06/18   EKG 12 lead   Result Value Ref Range    Ventricular Rate 82 BPM    Atrial Rate 82 BPM    SD Interval 152 ms    QRSD Interval 174 ms    QT Interval 456 ms    QTC Interval 532 ms    P Sisters 53 degrees    QRS Axis -40 degrees    T Wave Axis 91 degrees     Labs, Imaging, & Other studies:   All pertinent labs and imaging studies were personally reviewed    Lab Results   Component Value Date     06/29/2018    K 3 6 06/29/2018     06/29/2018    CO2 28 06/29/2018    ANIONGAP 8 06/29/2018    BUN 16 06/29/2018    CREATININE 0 77 06/29/2018    GLUCOSE 108 03/08/2018    GLUF 94 06/29/2018    CALCIUM 9 5 06/29/2018    AST 22 06/29/2018    ALT 37 06/29/2018    ALKPHOS 105 06/29/2018    PROT 6 6 06/29/2018    BILITOT 0 59 06/29/2018    EGFR 76 06/29/2018     Lab Results   Component Value Date    WBC 7 25 07/06/2018    HGB 13 9 07/06/2018    HCT 43 6 07/06/2018    MCV 91 07/06/2018     07/06/2018       Reviewed and discussed with patient  Assessment and plan:   Merary Garcia is a 78 y o  female with suspected recurrent lymphoma  Positive findings on PET-CT scan, generalized hypermetabolic lymphadenopathy  No palpable lymph node on examination  No lymphoma related symptoms  Patient is here with her  and son  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   It was a localized disease  Plan was radiation and Rituxan  She had radiation  She had only one dose of Rituxan and had reaction to Rituxan  She had swelling of the tongue and closing of throat   Rituxan was discontinued     In April 2015 patient had PET CT scan that showed hypermetabolic lymphadenopathy involving the neck, chest, abdomen and pelvis and also hypermetabolic right breast nodularity, hepatic steatosis, mild/borderline aneurysmal dilatation of ascending thoracic aorta  She had left inguinal excisional lymph node biopsy, right axillary lymph node biopsy and neck node biopsy and none of those 3 biopsies showed lymphoma or malignancy  She was being followed  Ruby Fried 2017 she presented with lymphadenopathy again especially in left axilla that was surgically removed and final path report was inconclusive  She had another biopsy from right supraclavicular area and that 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, CD20 positive  Slides were reviewed at [facility] [place] finally came after 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   Patient received 3 cycles of CHOP and 3 cycles of he EPOCH but the last 2 cycles did not have Adriamycin because of drop in ejection fraction   After completion of 3 cycles of CHOP pathologist from [address] [place] [address] suggested that 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 and for that reason treatment was changed to [facility]  Walter Wright Patient did not receive Rituxan because she is allergic to Rituxan  She received Neulasta  The last chemotherapy treatment was in January 2018     She was also given acyclovir and dapsone  Ludy [place] was stopped  Evlyn Lilian continued to take acyclovir     PET-CT scan done in February 2018 did not show residual or recurrent lymphoma   Walter Wright Patient had PET-CT scan on 06/18/2018 and that showed hypermetabolic generalized lymphadenopathy  History of DVT left arm and she was given Lovenox followed by Eliquis for a total of 6 months or so  Follow-up Doppler study was negative and D-dimer test was negative  Patient is allergic to aspirin and she did not receive that post discontinuation of Lovenox     No bleeding, fever, chills, night sweats, weight loss and swollen glands     No B symptoms    Has some tiredness  Quynh Dumont Physical examination and test results are as recorded and discussed  Suspecting recurrent lymphoma  Requesting tissue diagnosis for confirmation of lymphoma and the type to plan therapy  Discussed in detail  Questions answered  Also discussed the importance of self-breast examination, eating healthy foods, staying active and health screening tests    1  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 ([facility] [address] )      2  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

## 2018-07-06 NOTE — LETTER
July 6, 2018     Mónica Conley, [address]  [address]  [facility] [address]    Patient: Armand Phipps   YOB: 1940   Date of Visit: 7/6/2018       Dear Dr Catie Reyes: Thank you for referring Alda Gonzalez to me for evaluation  Below are my notes for this consultation  If you have questions, please do not hesitate to call me  I look forward to following your patient along with you  Sincerely,        Letitia Heath MD        CC: No Recipients  Letitia Heath MD  7/6/2018  5:05 PM  Sign at close encounter    HPI:   Armand Phipps is a 78 y o  female with suspected recurrent lymphoma  Positive findings on PET-CT scan, generalized hypermetabolic lymphadenopathy  No palpable lymph node on examination  No lymphoma related symptoms  Patient is here with her  and son  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   It was a localized disease  Plan was radiation and Rituxan  She had radiation  She had only one dose of Rituxan and had reaction to Rituxan  She had swelling of the tongue and closing of throat  Rituxan was discontinued     In April 2015 patient had PET CT scan that showed hypermetabolic lymphadenopathy involving the neck, chest, abdomen and pelvis and also hypermetabolic right breast nodularity, hepatic steatosis, mild/borderline aneurysmal dilatation of ascending thoracic aorta  She had left inguinal excisional lymph node biopsy, right axillary lymph node biopsy and neck node biopsy and none of those 3 biopsies showed lymphoma or malignancy  She was being followed  Anni Finder 2017 she presented with lymphadenopathy again especially in left axilla that was surgically removed and final path report was inconclusive  She had another biopsy from right supraclavicular area and that 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, CD20 positive   [facility] [place] [facility] [place] finally came after 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   Patient received 3 cycles of CHOP and 3 cycles of he EPOCH but the last 2 cycles did not have Adriamycin because of drop in ejection fraction   After completion of 3 cycles of CHOP pathologist from [address] suggested that 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 and for that reason treatment was changed to [facility]  Georgette Ormond Patient did not receive Rituxan because she is allergic to Rituxan  She received Neulasta  The last chemotherapy treatment was in January 2018     She was also given acyclovir and dapsone  Richrd Brand was stopped  [facility] continued to take acyclovir     PET-CT scan done in February 2018 did not show residual or recurrent lymphoma   Georgette Ormond Patient had PET-CT scan on 06/18/2018 and that showed hypermetabolic generalized lymphadenopathy  History of DVT left arm and  she was given Lovenox followed by Eliquis for a total of 6 months or so  Follow-up Doppler study was negative and D-dimer test was negative  Patient is allergic to aspirin and she did not receive that post discontinuation of Lovenox     No bleeding, fever, chills, night sweats, weight loss and swollen glands     No B symptoms    Has some tiredness                 Current Outpatient Prescriptions:     ascorbic acid (VITAMIN C) 500 mg tablet, Take 500 mg by mouth daily in the early morning  , Disp: , Rfl:     Calcium Carb-Cholecalciferol (CALCIUM + D3) 600-200 MG-UNIT TABS, Take 1 tablet by mouth daily in the early morning  , Disp: , Rfl:     carvedilol (COREG) 3 125 mg tablet, Take 1 tablet (3 125 mg total) by mouth 2 (two) times a day with meals, Disp: 30 tablet, Rfl: 11    Cyanocobalamin (VITAMIN B 12 PO), Take by mouth, Disp: , Rfl:     hydrochlorothiazide (HYDRODIURIL) 25 mg tablet, Take 1 tablet (25 mg total) by mouth daily in the early morning, Disp: 90 tablet, Rfl: 1    lisinopril (ZESTRIL) 5 mg tablet, Take 1 tablet (5 mg total) by mouth daily, Disp: 90 tablet, Rfl: 3   loratadine (CLARITIN) 10 mg tablet, Take 10 mg by mouth daily, Disp: , Rfl:     metoprolol succinate (TOPROL-XL) 50 mg 24 hr tablet, Take 1 tablet (50 mg total) by mouth daily (Patient taking differently: Take 25 mg by mouth daily  ), Disp: 90 tablet, Rfl: 3    Multiple Vitamin (MULTIVITAMIN) capsule, Take 1 capsule by mouth daily in the early morning  , Disp: , Rfl:     Omega-3 Fatty Acids (FISH OIL) 1200 MG CAPS, Take 3 capsules by mouth daily with dinner  , Disp: , Rfl:     omeprazole (PriLOSEC OTC) 20 MG tablet, Take 1 tablet (20 mg total) by mouth daily, Disp: 90 tablet, Rfl: 1    potassium chloride (KLOR-CON M20) 20 mEq tablet, Take 1 tablet (20 mEq total) by mouth daily in the early morning, Disp: 90 tablet, Rfl: 1    Allergies   Allergen Reactions    Aspirin Anaphylaxis    Celebrex [Celecoxib] Anaphylaxis    Nsaids Anaphylaxis    Other Anaphylaxis     Pt states when she received a certain type of chemotherapy it caused her throat to close    Rituxan [Rituximab] Anaphylaxis     Swelling of tongue and closing of throat    Sulfa Antibiotics Other (See Comments) and Anaphylaxis     VERY EMOTIONAL CRYING    Bactrim [Sulfamethoxazole-Trimethoprim] Other (See Comments)     VERY EMOTIONAL/CRYING        No history exists  ROS:  07/06/18 Reviewed 13 systems:  Presently no headaches, seizures, dizziness, diplopia, dysphagia, hoarseness, chest pain, palpitations, shortness of breath, cough, hemoptysis, abdominal pain, nausea, vomiting, change in bowel habits, melena, hematuria, fever, chills, bleeding, bone pains, skin rash, weight loss, arthritic symptoms,  weakness, numbness,  claudication and gait problem  No frequent infections  Not unusually sensitive to heat or cold  No swelling of the ankles  No swollen glands  Patient is anxious   Other symptoms are in HPI        /78   Pulse 84   Temp 97 9 °F (36 6 °C) (Oral)   Resp 16   Ht 5' 3" (1 6 m)   Wt 96 8 kg (213 lb 6 4 oz)   SpO2 95% BMI 37 80 kg/m²      Physical Exam:  Alert, oriented, not in distress, no icterus, no oral thrush, no palpable neck mass, clear lung fields, regular heart rate, abdomen  soft and non tender, no palpable abdominal mass, no ascites, no edema of ankles, no calf tenderness, no focal neurological deficit, no skin rash, no palpable lymphadenopathy, good arterial pulses, no clubbing  Patient is anxious  Performance status 1  IMAGING:  IMPRESSION:     1  Findings compatible with disease recurrence  Multiple FDG avid lymph nodes in the bilateral neck, axilla, subpectoral regions, mediastinum, portacaval region and retroperitoneum compatible with hypermetabolic malignancy  Deauville score of 5         Workstation performed: [id number]      Imaging     NM PET CT skull base to mid thigh (Order #[id number]) on 6/18/2018 - Imaging Information       LABS:  Results for orders placed or performed in visit on 07/06/18   EKG 12 lead   Result Value Ref Range    Ventricular Rate 82 BPM    Atrial Rate 82 BPM    OH Interval 152 ms    QRSD Interval 174 ms    QT Interval 456 ms    QTC Interval 532 ms    P [place] 53 degrees    QRS Axis -40 degrees    T Wave Axis 91 degrees     Labs, Imaging, & Other studies:   All pertinent labs and imaging studies were personally reviewed    Lab Results   Component Value Date     06/29/2018    K 3 6 06/29/2018     06/29/2018    CO2 28 06/29/2018    ANIONGAP 8 06/29/2018    BUN 16 06/29/2018    CREATININE 0 77 06/29/2018    GLUCOSE 108 03/08/2018    GLUF 94 06/29/2018    CALCIUM 9 5 06/29/2018    AST 22 06/29/2018    ALT 37 06/29/2018    ALKPHOS 105 06/29/2018    PROT 6 6 06/29/2018    BILITOT 0 59 06/29/2018    EGFR 76 06/29/2018     Lab Results   Component Value Date    WBC 7 25 07/06/2018    HGB 13 9 07/06/2018    HCT 43 6 07/06/2018    MCV 91 07/06/2018     07/06/2018       Reviewed and discussed with patient      Assessment and plan:   Nguyen Schneider is a 78 y o  female with suspected recurrent lymphoma  Positive findings on PET-CT scan, generalized hypermetabolic lymphadenopathy  No palpable lymph node on examination  No lymphoma related symptoms  Patient is here with her  and son  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   It was a localized disease  Plan was radiation and Rituxan  She had radiation  She had only one dose of Rituxan and had reaction to Rituxan  She had swelling of the tongue and closing of throat  Rituxan was discontinued     In April 2015 patient had PET CT scan that showed hypermetabolic lymphadenopathy involving the neck, chest, abdomen and pelvis and also hypermetabolic right breast nodularity, hepatic steatosis, mild/borderline aneurysmal dilatation of ascending thoracic aorta  She had left inguinal excisional lymph node biopsy, right axillary lymph node biopsy and neck node biopsy and none of those 3 biopsies showed lymphoma or malignancy  She was being followed  [facility] 2017 she presented with lymphadenopathy again especially in left axilla that was surgically removed and final path report was inconclusive  She had another biopsy from right supraclavicular area and that 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, CD20 positive  [facility] [place] finally came after 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   Patient received 3 cycles of CHOP and 3 cycles of he EPOCH but the last 2 cycles did not have Adriamycin because of drop in ejection fraction   After completion of 3 cycles of CHOP pathologist from [address] suggested that 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 and for that reason treatment was changed to [facility]  Quynh Katyajhony Patient did not receive Rituxan because she is allergic to Rituxan  She received Neulasta    The last chemotherapy treatment was in January 2018     She was also given acyclovir and dapsone   Dapsone was stopped  Kamilla Jara continued to take acyclovir     PET-CT scan done in February 2018 did not show residual or recurrent lymphoma   Stevie Rajan Patient had PET-CT scan on 06/18/2018 and that showed hypermetabolic generalized lymphadenopathy  History of DVT left arm and  she was given Lovenox followed by Eliquis for a total of 6 months or so  Follow-up Doppler study was negative and D-dimer test was negative  Patient is allergic to aspirin and she did not receive that post discontinuation of Lovenox     No bleeding, fever, chills, night sweats, weight loss and swollen glands     No B symptoms  Has some tiredness  Stevie Rajan Physical examination and test results are as recorded and discussed  Suspecting recurrent lymphoma  Requesting tissue diagnosis for confirmation of lymphoma and the type to plan therapy  Discussed in detail  Questions answered  Also discussed the importance of self-breast examination, eating healthy foods, staying active and health screening tests    1  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 ([address] )      2  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

## 2018-07-06 NOTE — PROGRESS NOTES
Surgical Oncology Follow Up       [address] [facility] Brittney Ballard  [address]  [place] [address] [facility] [address] [phone]    Elisabeth Been  1940  [id number]  [address] [facility] Brittney Ballard  [address]  [facility] [address] [facility] [address] [phone]    Diagnoses and all orders for this visit:    Lymphadenopathy of head and neck  -     Case request operating room: EXCISION BIOPSY LYMPH NODE SUPRACLAVICULAR with lymphoma protocol; Standing  -     Comprehensive metabolic panel; Future  -     CBC and differential; Future  -     APTT; Future  -     Protime-INR; Future  -     EKG 12 lead; Future  -     Case request operating room: EXCISION BIOPSY LYMPH NODE SUPRACLAVICULAR with lymphoma protocol  -     US head neck soft tissue; Future    Other orders  -     Incentive spirometry; Standing  -     Insert and maintain IV line; Standing  -     Void On-Call to O R ; Standing  -     Place sequential compression device; Standing  -     ceFAZolin (ANCEF) IVPB (premix) 2,000 mg; Infuse 2,000 mg into a venous catheter once         Chief Complaint   Patient presents with    Lymphadenopathy     Pt is here to discuss possible repeat lymph node biopsy  No Follow-up on file  No history exists  History of Present Illness:   81-year-old female with a history of extranodal marginal zone lymphoma  She completed chemotherapy in January of 2018  Follow-up PET-CT from June 18, 2018 reveals a right upper cervical lymph node with an SUV of 10 9  A left lower cervical lymph node had an SUV of 9 4  This measured 1 2 x 0 7 centimeters  There are now FDG avid lymph nodes in the axilla and subpectoral regions, although the SUV was much lower  A subcarinal lymph node had an SUV of 16 6 and measured 4 1 x 1 7 centimeters  There was new FDG avid lymph nodes in the portacaval region    I personally reviewed the films  Patient comes in now to discuss further therapy  She is seeing her medical oncologist later today  Her medical oncologist does request another biopsy  The patient denies any fevers, chills, night sweats  No fatigue  Her appetite is good  No weight loss  Review of Systems  Complete ROS Surg Onc:   Complete ROS Surg Onc:   Constitutional: The patient denies new or recent history of general fatigue, no recent weight loss, no change in appetite  Eyes: No complaints of visual problems, no scleral icterus  ENT: no complaints of ear pain, no hoarseness, no difficulty swallowing,  no tinnitus and no new masses in head, oral cavity, or neck  Cardiovascular: No complaints of chest pain, no palpitations, no ankle edema  Respiratory: No complaints of shortness of breath, no cough  Gastrointestinal: No complaints of jaundice, no bloody stools, no pale stools  Genitourinary: No complaints of dysuria, no hematuria, no nocturia, no frequent urination, no urethral discharge  Musculoskeletal: No complaints of weakness, paralysis, joint stiffness or arthralgias  Integumentary: No complaints of rash, no new lesions  Neurological: No complaints of convulsions, no seizures, no dizziness  Hematologic/Lymphatic: No complaints of easy bruising  Endocrine:  No hot or cold intolerance  No polydipsia, polyphagia, or polyuria  Allergy/immunology:  No environmental allergies  No food allergies  Not immunocompromised  Skin:  No pallor or rash  No wound          Patient Active Problem List   Diagnosis    Enlarged lymph node in neck    Lymphadenopathy, axillary    Lymphoma (HCC)    Arm DVT (deep venous thromboembolism), acute, left (HCC)    Essential hypertension    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 ([address] )    Constipation    Left bundle branch block (LBBB)    Infected insect bite of neck    Vitamin D deficiency    Vitamin B12 deficiency    Thrombosis of left subclavian vein (HCC)    Situational anxiety    Thrombophlebitis of deep veins of upper extremities    Gastroesophageal reflux disease    Dyslipidemia    Diffuse large B-cell lymphoma of lymph nodes of multiple regions (HCC)    Cataract    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    Esophageal stricture    Nikolai (hard of hearing)    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[address] )    Osteoarthritis     Past Medical History:   Diagnosis Date    Basal cell carcinoma (BCC)     Bruit of right carotid artery     resolved 5/14/15     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HCC)     radiation    Dyslipidemia     Esophageal stricture     last assessed 2/28/13       Nikolai (hard of hearing)     b/l ears    Osteoarthritis     Osteoarthrosis of ankle and foot     last assessed 5/20/13     Osteopenia     last assessed 2/28/13     Plantar fasciitis     Plantar fasciitis of left foot     Polyp of sigmoid colon     last assessed 2/28/13     Shortness of breath     when walking up stairs     Past Surgical History:   Procedure Laterality Date    ANKLE ARTHROPLASTY Left     BLEPHAROPLASTY      right eye     CATARACT EXTRACTION Right     ESOPHAGEAL DILATION      x2    ESOPHAGOGASTRODUODENOSCOPY      dilitation    FACIAL/NECK BIOPSY Right 9/8/2017    Procedure: NECK NODE BIOPSY WITH NEEDLE LOCALIZATION; LYMPHOMA PROTOCOL (NEEDLE LOC WITH I R  AT 1100);   Surgeon: Madelyn Lopez MD;  [facility];  Service: Surgical Oncology    HYSTERECTOMY      LYMPH NODE DISSECTION      right groin and neck    ORBITAL FLOOR EXPLORATION Right     for cancer     PORTACATH PLACEMENT      left chest     NM BX/REMV,LYMPH NODE,DEEP AXILL Left 8/14/2017    Procedure: Axillary lymph node excision with LYMPHOMA PROTOCOL;  Surgeon: Madelyn Lopez MD;  [facility];  Service: Surgical Oncology    NM BX/REMV,LYMPH NODE,DEEP CERV Right 5/2/2016    Procedure: NECK NODE BIOPSY;  Surgeon: Madelyn Lopez MD;  [facility];  Service: Surgical Oncology    ME INSJ TUNNELED CTR VAD W/SUBQ PORT AGE 5 YR/> N/A 10/3/2017    Procedure: PLACEMENT OF PORT-A-CATH DEVICE;  Surgeon: Ranulfo Atkinson MD;  [facility];  Service: Surgical Oncology     Family History   Problem Relation Age of Onset    Multiple myeloma Mother     Heart disease Father     Hypertension Father     Hypertension Sister     Heart disease Sister     Arthritis Family     Breast cancer Family     Osteoporosis Family      Social History     Social History    Marital status: /Civil Union     Spouse name: N/A    Number of children: N/A    Years of education: N/A     Occupational History    Not on file       Social History Main Topics    Smoking status: Former Smoker     Quit date: 1970    Smokeless tobacco: Never Used    Alcohol use No    Drug use: No    Sexual activity: Not on file     Other Topics Concern    Not on file     Social History Narrative    Uses safety equipment seatbelts        Current Outpatient Prescriptions:     ascorbic acid (VITAMIN C) 500 mg tablet, Take 500 mg by mouth daily in the early morning  , Disp: , Rfl:     Calcium Carb-Cholecalciferol (CALCIUM + D3) 600-200 MG-UNIT TABS, Take 1 tablet by mouth daily in the early morning  , Disp: , Rfl:     Cyanocobalamin (VITAMIN B 12 PO), Take by mouth, Disp: , Rfl:     hydrochlorothiazide (HYDRODIURIL) 25 mg tablet, Take 1 tablet (25 mg total) by mouth daily in the early morning, Disp: 90 tablet, Rfl: 1    lisinopril (ZESTRIL) 5 mg tablet, Take 1 tablet (5 mg total) by mouth daily, Disp: 90 tablet, Rfl: 3    loratadine (CLARITIN) 10 mg tablet, Take 10 mg by mouth daily, Disp: , Rfl:     metoprolol succinate (TOPROL-XL) 50 mg 24 hr tablet, Take 1 tablet (50 mg total) by mouth daily (Patient taking differently: Take 25 mg by mouth daily  ), Disp: 90 tablet, Rfl: 3    Multiple Vitamin (MULTIVITAMIN) capsule, Take 1 capsule by mouth daily in the early morning  , Disp: , Rfl:     Omega-3 Fatty Acids (FISH OIL) 1200 MG CAPS, Take 3 capsules by mouth daily with dinner  , Disp: , Rfl:     omeprazole (PriLOSEC OTC) 20 MG tablet, Take 1 tablet (20 mg total) by mouth daily, Disp: 90 tablet, Rfl: 1    potassium chloride (KLOR-CON M20) 20 mEq tablet, Take 1 tablet (20 mEq total) by mouth daily in the early morning, Disp: 90 tablet, Rfl: 1    carvedilol (COREG) 3 125 mg tablet, Take 1 tablet (3 125 mg total) by mouth 2 (two) times a day with meals, Disp: 30 tablet, Rfl: 11  Allergies   Allergen Reactions    Aspirin Anaphylaxis    Celebrex [Celecoxib] Anaphylaxis    Nsaids Anaphylaxis    Other Anaphylaxis     Pt states when she received a certain type of chemotherapy it caused her throat to close    Rituxan [Rituximab] Anaphylaxis     Swelling of tongue and closing of throat    Sulfa Antibiotics Other (See Comments) and Anaphylaxis     VERY EMOTIONAL CRYING    Bactrim [Sulfamethoxazole-Trimethoprim] Other (See Comments)     VERY EMOTIONAL/CRYING     Vitals:    07/06/18 1127   BP: 146/94   Pulse: 72   Resp: 16   Temp: 98 3 °F (36 8 °C)       Physical Exam  Constitutional: General appearance: The Patient is well-developed and well-nourished who appears the stated age in no acute distress  Patient is pleasant and talkative  HEENT:  Normocephalic  Sclerae are anicteric  Mucous membranes are moist  Neck is supple without adenopathy  No JVD  Chest: The lungs are clear to auscultation  Cardiac: Heart is regular rate  Abdomen: Abdomen is soft, non-tender, non-distended and without masses  Extremities: There is no clubbing or cyanosis  There is no edema  Symmetric  Neuro: Grossly nonfocal  Gait is normal      Lymphatic: No evidence of cervical adenopathy bilaterally  No evidence of axillary adenopathy bilaterally  No evidence of inguinal adenopathy bilaterally  Skin: Warm, anicteric  Psych:  Patient is pleasant and talkative    Breasts:        Pathology:      Labs:      Imaging  Nm Pet Ct Skull Base To Mid Thigh    Result Date: 6/18/2018  Narrative: PET/CT SCAN INDICATION: History of marginal zone lymphoma in a left axillary lymph node  Restaging exam  C83 38: Diffuse large b-cell lymphoma, lymph nodes of multiple sites MODIFIER: PS COMPARISON: PET/CT of 2/16/2018 CELL TYPE:  marginal zone lymphoma B-cell lymphoma (bx R orbital lesion 3/3/09) TECHNIQUE:   8 0 mCi F-18-FDG administered IV  Multiplanar attenuation corrected and non attenuation corrected PET images are available for interpretation, and contiguous, low dose, axial CT sections were obtained from the skull vertex through the femurs    Oral contrast material (7 5 cc Omnipaque-240 in 300 cc water) was administered  Intravenous contrast material was not utilized  This examination, like all CT scans performed in the [facility], was performed utilizing techniques to minimize radiation dose exposure, including the use of iterative reconstruction and automated exposure control  Fasting serum glucose: 81 mg/dl FINDINGS: VISUALIZED BRAIN:   No acute abnormalities are seen  HEAD/NECK:   Multiple new FDG avid lymph nodes are now noted along the bilateral cervical chains  A right upper cervical chain lymph node demonstrates a SUV max of 10 9  This lymph node measures 9 x 6 mm, image 80 series 3  A left lower cervical chain lymph node demonstrates SUV max of 9 4  This measures 1 2 x 0 7 cm, image 97 series 3  CT images: Thyroid gland is again heterogeneous with a stable 1 1 cm hypodense nodule on the right  CHEST:   There are now multiple FDG avid lymph nodes in the bilateral axillary and subpectoral regions more extensive on the left  New FDG avid lymph node just deep to the left pectoralis major demonstrates SUV max of 4 1  This measures 1 6 x 0 8 cm, image 116 series 3  A left axillary lymph node demonstrates SUV max of 3 6 previously without FDG uptake  This measures 3 2 x 1 2 cm, image 133 series 3    Previously this measured 2 4 x 0 5 cm  New mild FDG uptake noted in a right axillary lymph node, SUV max of 2 1  This measures 1 4 x 0 5 cm, image 125 series 3  There is a FDG avid subcarinal lymph node, SUV max of 16 6  This measures 4 1 x 1 7 cm, previously 3 7 x 0 9 cm and not FDG avid  A few scattered 2 to 3 mm pulmonary nodules again noted, stable, not FDG avid  CT images: Scattered coronary artery calcifications are noted  There is a left-sided Mediport line with tip in the right atrium  ABDOMEN:   There are new FDG avid lymph nodes in the portacaval region  A portal caval lymph node just superior to the gallbladder fossa demonstrates SUV max of 14 0  A more medial focus demonstrates SUV max of 6 3  These are difficult to define on the limited noncontrast CT images  Previously noted left para-aortic lymph node demonstrates SUV max of 2 1, unchanged  This is now larger however measuring 1 4 x 1 2 cm, image 200 series 3 previously 1 1 x 0 8 cm  Less numerous foci of FDG uptake along the anterior abdominal wall likely related to subcutaneous injections  CT images: No additional significant findings  PELVIS: New single FDG avid right external iliac chain lymph node demonstrates a SUV max of 5 6  This measures 1 5 x 1 0 cm, image 228 series 3  CT images: Unremarkable  OSSEOUS STRUCTURES: No FDG avid lesions are seen  Scattered mild foci of FDG uptake along the spine corresponding to degenerative changes on CT  CT images: Multilevel degenerative changes again noted of the spine  For reference: SUV max of the mediastinal blood pool: 2 4 SUV max of the liver: 3 3     Impression: 1  Findings compatible with disease recurrence  Multiple FDG avid lymph nodes in the bilateral neck, axilla, subpectoral regions, mediastinum, portacaval region and retroperitoneum compatible with hypermetabolic malignancy  Deauville score of 5    Workstation performed: [id number]     I reviewed the above laboratory and imaging data     Discussion/Summary:   77-year-old female with a history of lymphoma  She now has a PET scan that shows new FDG avid disease  Lymph nodes in the neck would be most accessible to biopsy  I would recommend that she undergo left neck lymph node biopsy with lymphoma protocol  I explained the risks including bleeding, infection, recurrence, need for further surgery and neurovascular injury  Informed consent was obtained  Since this notice not palpable, we will obtain a neck ultrasound to evaluate the adenopathy to make sure it is well visualized prior to surgery  Based on the ultrasound, this could either be localized preoperatively or intraoperatively  I will see her again at the time of surgery  She is agreeable to this  All her questions were answered

## 2018-07-06 NOTE — LETTER
July 6, 2018     Shona Fer, [address] [facility]  [address]  [facility] [address]    Patient: Hasmukh Sauceda   YOB: 1940   Date of Visit: 7/6/2018       Dear Dr Ángel Alarcon: Thank you for referring Nasir Crocker to me for evaluation  Below are my notes for this consultation  If you have questions, please do not hesitate to call me  I look forward to following your patient along with you  Sincerely,        Sang Romero MD        CC: Derk Dakin, MD Tereso Sarna, MD  7/6/2018 12:09 PM  Sign at close encounter               Surgical Oncology Follow Up       [address]  [address] [address]   [facility]  1940  [id number]  [address] [facility] Lisa Bernstein  [address]  [facility] [address] [place] [address]  [phone]    Diagnoses and all orders for this visit:    Lymphadenopathy of head and neck  -     Case request operating room: EXCISION BIOPSY LYMPH NODE SUPRACLAVICULAR with lymphoma protocol; Standing  -     Comprehensive metabolic panel; Future  -     CBC and differential; Future  -     APTT; Future  -     Protime-INR; Future  -     EKG 12 lead; Future  -     Case request operating room: EXCISION BIOPSY LYMPH NODE SUPRACLAVICULAR with lymphoma protocol  -     US head neck soft tissue; Future    Other orders  -     Incentive spirometry; Standing  -     Insert and maintain IV line; Standing  -     Void On-Call to O R ; Standing  -     Place sequential compression device; Standing  -     ceFAZolin (ANCEF) IVPB (premix) 2,000 mg; Infuse 2,000 mg into a venous catheter once         Chief Complaint   Patient presents with    Lymphadenopathy     Pt is here to discuss possible repeat lymph node biopsy  No Follow-up on file  No history exists             History of Present Illness:   78year-old female with a history of extranodal marginal zone lymphoma  She completed chemotherapy in January of 2018  Follow-up PET-CT from June 18, 2018 reveals a right upper cervical lymph node with an SUV of 10 9  A left lower cervical lymph node had an SUV of 9 4  This measured 1 2 x 0 7 centimeters  There are now FDG avid lymph nodes in the axilla and subpectoral regions, although the SUV was much lower  A subcarinal lymph node had an SUV of 16 6 and measured 4 1 x 1 7 centimeters  There was new FDG avid lymph nodes in the portacaval region  I personally reviewed the films  Patient comes in now to discuss further therapy  She is seeing her medical oncologist later today  Her medical oncologist does request another biopsy  The patient denies any fevers, chills, night sweats  No fatigue  Her appetite is good  No weight loss  Review of Systems  Complete ROS Surg Onc:   Complete ROS Surg Onc:   Constitutional: The patient denies new or recent history of general fatigue, no recent weight loss, no change in appetite  Eyes: No complaints of visual problems, no scleral icterus  ENT: no complaints of ear pain, no hoarseness, no difficulty swallowing,  no tinnitus and no new masses in head, oral cavity, or neck  Cardiovascular: No complaints of chest pain, no palpitations, no ankle edema  Respiratory: No complaints of shortness of breath, no cough  Gastrointestinal: No complaints of jaundice, no bloody stools, no pale stools  Genitourinary: No complaints of dysuria, no hematuria, no nocturia, no frequent urination, no urethral discharge  Musculoskeletal: No complaints of weakness, paralysis, joint stiffness or arthralgias  Integumentary: No complaints of rash, no new lesions  Neurological: No complaints of convulsions, no seizures, no dizziness  Hematologic/Lymphatic: No complaints of easy bruising  Endocrine:  No hot or cold intolerance    No polydipsia, polyphagia, or polyuria  Allergy/immunology:  No environmental allergies  No food allergies  Not immunocompromised  Skin:  No pallor or rash  No wound  Patient Active Problem List   Diagnosis    Enlarged lymph node in neck    Lymphadenopathy, axillary    Lymphoma (HCC)    Arm DVT (deep venous thromboembolism), acute, left (HCC)    Essential hypertension    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 ([facility] [address] )    Constipation    Left bundle branch block (LBBB)    Infected insect bite of neck    Vitamin D deficiency    Vitamin B12 deficiency    Thrombosis of left subclavian vein (HCC)    Situational anxiety    Thrombophlebitis of deep veins of upper extremities    Gastroesophageal reflux disease    Dyslipidemia    Diffuse large B-cell lymphoma of lymph nodes of multiple regions (HCC)    Cataract    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    Esophageal stricture    [place] (hard of hearing)    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[facility] [address] )    Osteoarthritis     Past Medical History:   Diagnosis Date    Basal cell carcinoma (BCC)     Bruit of right carotid artery     resolved 5/14/15     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HCC)     radiation    Dyslipidemia     Esophageal stricture     last assessed 2/28/13       [place] (hard of hearing)     b/l ears    Osteoarthritis     Osteoarthrosis of ankle and foot     last assessed 5/20/13     Osteopenia     last assessed 2/28/13     Plantar fasciitis     Plantar fasciitis of left foot     Polyp of sigmoid colon     last assessed 2/28/13     Shortness of breath     when walking up stairs     Past Surgical History:   Procedure Laterality Date    ANKLE ARTHROPLASTY Left     BLEPHAROPLASTY      right eye     CATARACT EXTRACTION Right     ESOPHAGEAL DILATION      x2    ESOPHAGOGASTRODUODENOSCOPY      dilitation    FACIAL/NECK BIOPSY Right 9/8/2017    Procedure: NECK NODE BIOPSY WITH NEEDLE LOCALIZATION; LYMPHOMA PROTOCOL (NEEDLE LOC WITH I R  AT 1100);   Surgeon: Sheryl Faustin MD;  [facility];  Service: Surgical Oncology    HYSTERECTOMY      LYMPH NODE DISSECTION      right groin and neck    ORBITAL FLOOR EXPLORATION Right     for cancer     PORTACATH PLACEMENT      left chest     ME BX/REMV,LYMPH NODE,DEEP AXILL Left 8/14/2017    Procedure: Axillary lymph node excision with LYMPHOMA PROTOCOL;  Surgeon: Julia Harper MD;  [facility];  Service: Surgical Oncology    ME BX/REMV,LYMPH NODE,DEEP CERV Right 5/2/2016    Procedure: NECK NODE BIOPSY;  Surgeon: Julia Harper MD;  [facility];  Service: Surgical Oncology    ME INSJ TUNNELED CTR VAD W/SUBQ PORT AGE 5 YR/> N/A 10/3/2017    Procedure: Kristin Hemming;  Surgeon: Julia Harper MD;  [facility];  Service: Surgical Oncology     Family History   Problem Relation Age of Onset    Multiple myeloma Mother     Heart disease Father     Hypertension Father     Hypertension Sister     Heart disease Sister     Arthritis Family     Breast cancer Family     Osteoporosis Family      Social History     Social History    Marital status: /Civil Union     Spouse name: N/A    Number of children: N/A    Years of education: N/A     Occupational History    Not on file       Social History Main Topics    Smoking status: Former Smoker     Quit date: 1970    Smokeless tobacco: Never Used    Alcohol use No    Drug use: No    Sexual activity: Not on file     Other Topics Concern    Not on file     Social History Narrative    Uses safety equipment seatbelts        Current Outpatient Prescriptions:     ascorbic acid (VITAMIN C) 500 mg tablet, Take 500 mg by mouth daily in the early morning  , Disp: , Rfl:     Calcium Carb-Cholecalciferol (CALCIUM + D3) 600-200 MG-UNIT TABS, Take 1 tablet by mouth daily in the early morning  , Disp: , Rfl:     Cyanocobalamin (VITAMIN B 12 PO), Take by mouth, Disp: , Rfl:     hydrochlorothiazide (HYDRODIURIL) 25 mg tablet, Take 1 tablet (25 mg total) by mouth daily in the early morning, Disp: 90 tablet, Rfl: 1    lisinopril (ZESTRIL) 5 mg tablet, Take 1 tablet (5 mg total) by mouth daily, Disp: 90 tablet, Rfl: 3    loratadine (CLARITIN) 10 mg tablet, Take 10 mg by mouth daily, Disp: , Rfl:     metoprolol succinate (TOPROL-XL) 50 mg 24 hr tablet, Take 1 tablet (50 mg total) by mouth daily (Patient taking differently: Take 25 mg by mouth daily  ), Disp: 90 tablet, Rfl: 3    Multiple Vitamin (MULTIVITAMIN) capsule, Take 1 capsule by mouth daily in the early morning  , Disp: , Rfl:     Omega-3 Fatty Acids (FISH OIL) 1200 MG CAPS, Take 3 capsules by mouth daily with dinner  , Disp: , Rfl:     omeprazole (PriLOSEC OTC) 20 MG tablet, Take 1 tablet (20 mg total) by mouth daily, Disp: 90 tablet, Rfl: 1    potassium chloride (KLOR-CON M20) 20 mEq tablet, Take 1 tablet (20 mEq total) by mouth daily in the early morning, Disp: 90 tablet, Rfl: 1    carvedilol (COREG) 3 125 mg tablet, Take 1 tablet (3 125 mg total) by mouth 2 (two) times a day with meals, Disp: 30 tablet, Rfl: 11  Allergies   Allergen Reactions    Aspirin Anaphylaxis    Celebrex [Celecoxib] Anaphylaxis    Nsaids Anaphylaxis    Other Anaphylaxis     Pt states when she received a certain type of chemotherapy it caused her throat to close    Rituxan [Rituximab] Anaphylaxis     Swelling of tongue and closing of throat    Sulfa Antibiotics Other (See Comments) and Anaphylaxis     VERY EMOTIONAL CRYING    Bactrim [Sulfamethoxazole-Trimethoprim] Other (See Comments)     VERY EMOTIONAL/CRYING     Vitals:    07/06/18 1127   BP: 146/94   Pulse: 72   Resp: 16   Temp: 98 3 °F (36 8 °C)       Physical Exam  Constitutional: General appearance: The Patient is well-developed and well-nourished who appears the stated age in no acute distress  Patient is pleasant and talkative  HEENT:  Normocephalic  Sclerae are anicteric  Mucous membranes are moist  Neck is supple without adenopathy  No JVD  Chest: The lungs are clear to auscultation  Cardiac: Heart is regular rate  Abdomen: Abdomen is soft, non-tender, non-distended and without masses  Extremities: There is no clubbing or cyanosis  There is no edema  Symmetric  Neuro: Grossly nonfocal  Gait is normal      Lymphatic: No evidence of cervical adenopathy bilaterally  No evidence of axillary adenopathy bilaterally  No evidence of inguinal adenopathy bilaterally  Skin: Warm, anicteric  Psych:  Patient is pleasant and talkative  Breasts:        Pathology:      Labs:      Imaging  Nm Pet Ct Skull Base To Mid Thigh    Result Date: 6/18/2018  Narrative: PET/CT SCAN INDICATION: History of marginal zone lymphoma in a left axillary lymph node  Restaging exam  C83 38: Diffuse large b-cell lymphoma, lymph nodes of multiple sites MODIFIER: PS COMPARISON: PET/CT of 2/16/2018 CELL TYPE:  marginal zone lymphoma B-cell lymphoma (bx R orbital lesion 3/3/09) TECHNIQUE:   8 0 mCi F-18-FDG administered IV  Multiplanar attenuation corrected and non attenuation corrected PET images are available for interpretation, and contiguous, low dose, axial CT sections were obtained from the skull vertex through the femurs    Oral contrast material (7 5 cc Omnipaque-240 in 300 cc water) was administered  Intravenous contrast material was not utilized  This examination, like all CT scans performed in the [facility], was performed utilizing techniques to minimize radiation dose exposure, including the use of iterative reconstruction and automated exposure control  Fasting serum glucose: 81 mg/dl FINDINGS: VISUALIZED BRAIN:   No acute abnormalities are seen  HEAD/NECK:   Multiple new FDG avid lymph nodes are now noted along the bilateral cervical chains  A right upper cervical chain lymph node demonstrates a SUV max of 10 9  This lymph node measures 9 x 6 mm, image 80 series 3  A left lower cervical chain lymph node demonstrates SUV max of 9 4  This measures 1 2 x 0 7 cm, image 97 series 3  CT images: Thyroid gland is again heterogeneous with a stable 1 1 cm hypodense nodule on the right  CHEST:   There are now multiple FDG avid lymph nodes in the bilateral axillary and subpectoral regions more extensive on the left  New FDG avid lymph node just deep to the left pectoralis major demonstrates SUV max of 4 1  This measures 1 6 x 0 8 cm, image 116 series 3  A left axillary lymph node demonstrates SUV max of 3 6 previously without FDG uptake  This measures 3 2 x 1 2 cm, image 133 series 3  Previously this measured 2 4 x 0 5 cm  New mild FDG uptake noted in a right axillary lymph node, SUV max of 2 1  This measures 1 4 x 0 5 cm, image 125 series 3  There is a FDG avid subcarinal lymph node, SUV max of 16 6  This measures 4 1 x 1 7 cm, previously 3 7 x 0 9 cm and not FDG avid  A few scattered 2 to 3 mm pulmonary nodules again noted, stable, not FDG avid  CT images: Scattered coronary artery calcifications are noted  There is a left-sided Mediport line with tip in the right atrium  ABDOMEN:   There are new FDG avid lymph nodes in the portacaval region  A portal caval lymph node just superior to the gallbladder fossa demonstrates SUV max of 14 0  A more medial focus demonstrates SUV max of 6 3  These are difficult to define on the limited noncontrast CT images  Previously noted left para-aortic lymph node demonstrates SUV max of 2 1, unchanged  This is now larger however measuring 1 4 x 1 2 cm, image 200 series 3 previously 1 1 x 0 8 cm  Less numerous foci of FDG uptake along the anterior abdominal wall likely related to subcutaneous injections  CT images: No additional significant findings  PELVIS: New single FDG avid right external iliac chain lymph node demonstrates a SUV max of 5 6  This measures 1 5 x 1 0 cm, image 228 series 3  CT images: Unremarkable  OSSEOUS STRUCTURES: No FDG avid lesions are seen   Scattered mild foci of FDG uptake along the spine corresponding to degenerative changes on CT  CT images: Multilevel degenerative changes again noted of the spine  For reference: SUV max of the mediastinal blood pool: 2 4 SUV max of the liver: 3 3     Impression: 1  Findings compatible with disease recurrence  Multiple FDG avid lymph nodes in the bilateral neck, axilla, subpectoral regions, mediastinum, portacaval region and retroperitoneum compatible with hypermetabolic malignancy  Deauville score of 5  Workstation performed: [id number]     I reviewed the above laboratory and imaging data  Discussion/Summary:   72-year-old female with a history of lymphoma  She now has a PET scan that shows new FDG avid disease  Lymph nodes in the neck would be most accessible to biopsy  I would recommend that she undergo left neck lymph node biopsy with lymphoma protocol  I explained the risks including bleeding, infection, recurrence, need for further surgery and neurovascular injury  Informed consent was obtained  Since this notice not palpable, we will obtain a neck ultrasound to evaluate the adenopathy to make sure it is well visualized prior to surgery  Based on the ultrasound, this could either be localized preoperatively or intraoperatively  I will see her again at the time of surgery  She is agreeable to this  All her questions were answered

## 2018-07-09 PROBLEM — R59.1 LYMPHADENOPATHY OF HEAD AND NECK: Status: ACTIVE | Noted: 2018-07-09

## 2018-07-10 ENCOUNTER — HOSPITAL ENCOUNTER (OUTPATIENT)
Dept: ULTRASOUND IMAGING | Facility: HOSPITAL | Age: 78
Discharge: HOME | End: 2018-07-10
Payer: COMMERCIAL

## 2018-07-10 DIAGNOSIS — R59.1 LYMPHADENOPATHY OF HEAD AND NECK: ICD-10-CM

## 2018-07-10 PROCEDURE — 76536 US EXAM OF HEAD AND NECK: CPT

## 2018-07-11 NOTE — PRE-PROCEDURE INSTRUCTIONS
Pre-Surgery Instructions:   Medication Instructions    ascorbic acid (VITAMIN C) 500 mg tablet Instructed patient per Anesthesia Guidelines   Calcium Carb-Cholecalciferol (CALCIUM + D3) 600-200 MG-UNIT TABS Instructed patient per Anesthesia Guidelines   Cyanocobalamin (VITAMIN B 12 PO) Instructed patient per Anesthesia Guidelines   hydrochlorothiazide (HYDRODIURIL) 25 mg tablet Instructed patient per Anesthesia Guidelines   lisinopril (ZESTRIL) 5 mg tablet Instructed patient per Anesthesia Guidelines   loratadine (CLARITIN) 10 mg tablet Instructed patient per Anesthesia Guidelines   Multiple Vitamin (MULTIVITAMIN) capsule Instructed patient per Anesthesia Guidelines   Omega-3 Fatty Acids (FISH OIL) 1200 MG CAPS Instructed patient per Anesthesia Guidelines   omeprazole (PriLOSEC OTC) 20 MG tablet Instructed patient per Anesthesia Guidelines   potassium chloride (KLOR-CON M20) 20 mEq tablet Instructed patient per Anesthesia Guidelines      Pre op and showering instructions reviewed-patient getting hibiclens

## 2018-07-13 ENCOUNTER — HOSPITAL ENCOUNTER (OUTPATIENT)
Dept: INFUSION CENTER | Facility: CLINIC | Age: 78
Discharge: HOME | End: 2018-07-13
Payer: COMMERCIAL

## 2018-07-13 PROCEDURE — 96523 IRRIG DRUG DELIVERY DEVICE: CPT

## 2018-07-13 RX ADMIN — Medication 300 UNITS: at 09:52

## 2018-07-16 DIAGNOSIS — R59.1 LYMPHADENOPATHY: Primary | ICD-10-CM

## 2018-07-17 ENCOUNTER — TELEPHONE (OUTPATIENT)
Dept: SURGICAL ONCOLOGY | Facility: CLINIC | Age: 78
End: 2018-07-17

## 2018-07-17 DIAGNOSIS — R59.1 LYMPHADENOPATHY: Primary | ICD-10-CM

## 2018-07-17 RX ORDER — HYDROCODONE BITARTRATE AND ACETAMINOPHEN 5; 325 MG/1; MG/1
1 TABLET ORAL EVERY 6 HOURS PRN
Qty: 20 TABLET | Refills: 0 | Status: SHIPPED | OUTPATIENT
Start: 2018-07-17 | End: 2018-10-13 | Stop reason: HOSPADM

## 2018-07-17 NOTE — TELEPHONE ENCOUNTER
Pt realized she does not have any pain meds at home, and had previously told Dr Lani Neal she didn't need any for after surgery  She is now requesting a script for post-op pain

## 2018-07-23 ENCOUNTER — TELEPHONE (OUTPATIENT)
Dept: INTERVENTIONAL RADIOLOGY/VASCULAR | Facility: HOSPITAL | Age: 78
End: 2018-07-23

## 2018-07-23 RX ORDER — SODIUM CHLORIDE 9 MG/ML
75 INJECTION, SOLUTION INTRAVENOUS CONTINUOUS
Status: CANCELLED | OUTPATIENT
Start: 2018-07-23 | End: 2019-07-23

## 2018-07-24 ENCOUNTER — ANESTHESIA EVENT (OUTPATIENT)
Dept: PERIOP | Facility: HOSPITAL | Age: 78
End: 2018-07-24
Payer: COMMERCIAL

## 2018-07-25 ENCOUNTER — ANESTHESIA (OUTPATIENT)
Dept: PERIOP | Facility: HOSPITAL | Age: 78
End: 2018-07-25
Payer: COMMERCIAL

## 2018-07-25 ENCOUNTER — HOSPITAL ENCOUNTER (OUTPATIENT)
Dept: RADIOLOGY | Facility: HOSPITAL | Age: 78
Discharge: HOME | End: 2018-07-25
Attending: SURGERY
Payer: COMMERCIAL

## 2018-07-25 ENCOUNTER — HOSPITAL ENCOUNTER (OUTPATIENT)
Facility: HOSPITAL | Age: 78
Setting detail: OUTPATIENT SURGERY
Discharge: HOME | End: 2018-07-25
Attending: SURGERY | Admitting: SURGERY
Payer: COMMERCIAL

## 2018-07-25 VITALS
RESPIRATION RATE: 16 BRPM | HEIGHT: 63 IN | OXYGEN SATURATION: 94 % | SYSTOLIC BLOOD PRESSURE: 114 MMHG | HEART RATE: 77 BPM | TEMPERATURE: 97 F | BODY MASS INDEX: 37.74 KG/M2 | DIASTOLIC BLOOD PRESSURE: 58 MMHG | WEIGHT: 213 LBS

## 2018-07-25 DIAGNOSIS — R59.1 LYMPHADENOPATHY OF HEAD AND NECK: ICD-10-CM

## 2018-07-25 DIAGNOSIS — R59.1 LYMPHADENOPATHY: ICD-10-CM

## 2018-07-25 PROCEDURE — 88237 TISSUE CULTURE BONE MARROW: CPT | Performed by: SURGERY

## 2018-07-25 PROCEDURE — 88184 FLOWCYTOMETRY/ TC 1 MARKER: CPT | Performed by: SURGERY

## 2018-07-25 PROCEDURE — 88374 M/PHMTRC ALYS ISHQUANT/SEMIQ: CPT | Performed by: SURGERY

## 2018-07-25 PROCEDURE — 88185 FLOWCYTOMETRY/TC ADD-ON: CPT | Performed by: STUDENT IN AN ORGANIZED HEALTH CARE EDUCATION/TRAINING PROGRAM

## 2018-07-25 PROCEDURE — 88189 FLOWCYTOMETRY/READ 16 & >: CPT | Performed by: PATHOLOGY

## 2018-07-25 PROCEDURE — 88365 INSITU HYBRIDIZATION (FISH): CPT | Performed by: PATHOLOGY

## 2018-07-25 PROCEDURE — 10035 PLMT SFT TISS LOCLZJ DEV 1ST: CPT | Performed by: RADIOLOGY

## 2018-07-25 PROCEDURE — 76942 ECHO GUIDE FOR BIOPSY: CPT

## 2018-07-25 PROCEDURE — 88333 PATH CONSLTJ SURG CYTO XM 1: CPT | Performed by: PATHOLOGY

## 2018-07-25 PROCEDURE — 38510 BIOPSY/REMOVAL LYMPH NODES: CPT | Performed by: SURGERY

## 2018-07-25 PROCEDURE — 88305 TISSUE EXAM BY PATHOLOGIST: CPT | Performed by: PATHOLOGY

## 2018-07-25 PROCEDURE — 88341 IMHCHEM/IMCYTCHM EA ADD ANTB: CPT | Performed by: PATHOLOGY

## 2018-07-25 PROCEDURE — 38999 UNLISTD PX HEMIC/LYMPHTC SYS: CPT

## 2018-07-25 PROCEDURE — 88377 M/PHMTRC ALYS ISHQUANT/SEMIQ: CPT | Performed by: SURGERY

## 2018-07-25 PROCEDURE — 88342 IMHCHEM/IMCYTCHM 1ST ANTB: CPT | Performed by: PATHOLOGY

## 2018-07-25 PROCEDURE — 88262 CHROMOSOME ANALYSIS 15-20: CPT | Performed by: SURGERY

## 2018-07-25 RX ORDER — BUPIVACAINE HYDROCHLORIDE 2.5 MG/ML
INJECTION, SOLUTION INFILTRATION; PERINEURAL AS NEEDED
Status: DISCONTINUED | OUTPATIENT
Start: 2018-07-25 | End: 2018-07-25 | Stop reason: HOSPADM

## 2018-07-25 RX ORDER — ONDANSETRON 2 MG/ML
4 INJECTION INTRAMUSCULAR; INTRAVENOUS ONCE AS NEEDED
Status: DISCONTINUED | OUTPATIENT
Start: 2018-07-25 | End: 2018-07-25 | Stop reason: HOSPADM

## 2018-07-25 RX ORDER — LIDOCAINE HYDROCHLORIDE 10 MG/ML
INJECTION, SOLUTION INFILTRATION; PERINEURAL CODE/TRAUMA/SEDATION MEDICATION
Status: COMPLETED | OUTPATIENT
Start: 2018-07-25 | End: 2018-07-25

## 2018-07-25 RX ORDER — PROPOFOL 10 MG/ML
INJECTION, EMULSION INTRAVENOUS AS NEEDED
Status: DISCONTINUED | OUTPATIENT
Start: 2018-07-25 | End: 2018-07-25 | Stop reason: SURG

## 2018-07-25 RX ORDER — ONDANSETRON 2 MG/ML
INJECTION INTRAMUSCULAR; INTRAVENOUS AS NEEDED
Status: DISCONTINUED | OUTPATIENT
Start: 2018-07-25 | End: 2018-07-25 | Stop reason: SURG

## 2018-07-25 RX ORDER — SODIUM CHLORIDE, SODIUM LACTATE, POTASSIUM CHLORIDE, CALCIUM CHLORIDE 600; 310; 30; 20 MG/100ML; MG/100ML; MG/100ML; MG/100ML
125 INJECTION, SOLUTION INTRAVENOUS CONTINUOUS
Status: DISCONTINUED | OUTPATIENT
Start: 2018-07-25 | End: 2018-07-25 | Stop reason: HOSPADM

## 2018-07-25 RX ORDER — FENTANYL CITRATE 50 UG/ML
INJECTION, SOLUTION INTRAMUSCULAR; INTRAVENOUS AS NEEDED
Status: DISCONTINUED | OUTPATIENT
Start: 2018-07-25 | End: 2018-07-25 | Stop reason: SURG

## 2018-07-25 RX ORDER — MAGNESIUM HYDROXIDE 1200 MG/15ML
LIQUID ORAL AS NEEDED
Status: DISCONTINUED | OUTPATIENT
Start: 2018-07-25 | End: 2018-07-25 | Stop reason: HOSPADM

## 2018-07-25 RX ORDER — SODIUM CHLORIDE 9 MG/ML
75 INJECTION, SOLUTION INTRAVENOUS CONTINUOUS
Status: DISCONTINUED | OUTPATIENT
Start: 2018-07-25 | End: 2018-07-25 | Stop reason: HOSPADM

## 2018-07-25 RX ORDER — METOCLOPRAMIDE HYDROCHLORIDE 5 MG/ML
10 INJECTION INTRAMUSCULAR; INTRAVENOUS ONCE AS NEEDED
Status: DISCONTINUED | OUTPATIENT
Start: 2018-07-25 | End: 2018-07-25 | Stop reason: HOSPADM

## 2018-07-25 RX ORDER — FENTANYL CITRATE/PF 50 MCG/ML
25 SYRINGE (ML) INJECTION
Status: DISCONTINUED | OUTPATIENT
Start: 2018-07-25 | End: 2018-07-25 | Stop reason: HOSPADM

## 2018-07-25 RX ADMIN — PROPOFOL 150 MG: 10 INJECTION, EMULSION INTRAVENOUS at 08:47

## 2018-07-25 RX ADMIN — FENTANYL CITRATE 25 MCG: 50 INJECTION INTRAMUSCULAR; INTRAVENOUS at 08:45

## 2018-07-25 RX ADMIN — FENTANYL CITRATE 50 MCG: 50 INJECTION INTRAMUSCULAR; INTRAVENOUS at 08:57

## 2018-07-25 RX ADMIN — LIDOCAINE HYDROCHLORIDE 5 ML: 10 INJECTION, SOLUTION INFILTRATION; PERINEURAL at 08:37

## 2018-07-25 RX ADMIN — FENTANYL CITRATE 25 MCG: 50 INJECTION INTRAMUSCULAR; INTRAVENOUS at 10:10

## 2018-07-25 RX ADMIN — FENTANYL CITRATE 25 MCG: 50 INJECTION INTRAMUSCULAR; INTRAVENOUS at 10:06

## 2018-07-25 RX ADMIN — CEFAZOLIN SODIUM 2000 MG: 2 SOLUTION INTRAVENOUS at 08:42

## 2018-07-25 RX ADMIN — LIDOCAINE HYDROCHLORIDE 100 MG: 20 INJECTION, SOLUTION INTRAVENOUS at 08:47

## 2018-07-25 RX ADMIN — SODIUM CHLORIDE, SODIUM LACTATE, POTASSIUM CHLORIDE, AND CALCIUM CHLORIDE: .6; .31; .03; .02 INJECTION, SOLUTION INTRAVENOUS at 08:45

## 2018-07-25 RX ADMIN — FENTANYL CITRATE 25 MCG: 50 INJECTION INTRAMUSCULAR; INTRAVENOUS at 08:47

## 2018-07-25 RX ADMIN — DEXAMETHASONE SODIUM PHOSPHATE 4 MG: 10 INJECTION INTRAMUSCULAR; INTRAVENOUS at 08:54

## 2018-07-25 RX ADMIN — ONDANSETRON 4 MG: 2 INJECTION INTRAMUSCULAR; INTRAVENOUS at 08:54

## 2018-07-25 NOTE — H&P (VIEW-ONLY)
Surgical Oncology Follow Up       [address] [facility]  [address] [facility] [address]  [facility] [address] [facility] [address] [phone]    Stephanie Suresh  1940  [id number]  [address] [facility]  [address] [facility] [id number] [phone]    Diagnoses and all orders for this visit:    Lymphadenopathy of head and neck  -     Case request operating room: EXCISION BIOPSY LYMPH NODE SUPRACLAVICULAR with lymphoma protocol; Standing  -     Comprehensive metabolic panel; Future  -     CBC and differential; Future  -     APTT; Future  -     Protime-INR; Future  -     EKG 12 lead; Future  -     Case request operating room: EXCISION BIOPSY LYMPH NODE SUPRACLAVICULAR with lymphoma protocol  -     US head neck soft tissue; Future    Other orders  -     Incentive spirometry; Standing  -     Insert and maintain IV line; Standing  -     Void On-Call to O R ; Standing  -     Place sequential compression device; Standing  -     ceFAZolin (ANCEF) IVPB (premix) 2,000 mg; Infuse 2,000 mg into a venous catheter once         Chief Complaint   Patient presents with    Lymphadenopathy     Pt is here to discuss possible repeat lymph node biopsy  No Follow-up on file  No history exists  History of Present Illness:   72-year-old female with a history of extranodal marginal zone lymphoma  She completed chemotherapy in January of 2018  Follow-up PET-CT from June 18, 2018 reveals a right upper cervical lymph node with an SUV of 10 9  A left lower cervical lymph node had an SUV of 9 4  This measured 1 2 x 0 7 centimeters  There are now FDG avid lymph nodes in the axilla and subpectoral regions, although the SUV was much lower  A subcarinal lymph node had an SUV of 16 6 and measured 4 1 x 1 7 centimeters  There was new FDG avid lymph nodes in the portacaval region    I personally reviewed the films  Patient comes in now to discuss further therapy  She is seeing her medical oncologist later today  Her medical oncologist does request another biopsy  The patient denies any fevers, chills, night sweats  No fatigue  Her appetite is good  No weight loss  Review of Systems  Complete ROS Surg Onc:   Complete ROS Surg Onc:   Constitutional: The patient denies new or recent history of general fatigue, no recent weight loss, no change in appetite  Eyes: No complaints of visual problems, no scleral icterus  ENT: no complaints of ear pain, no hoarseness, no difficulty swallowing,  no tinnitus and no new masses in head, oral cavity, or neck  Cardiovascular: No complaints of chest pain, no palpitations, no ankle edema  Respiratory: No complaints of shortness of breath, no cough  Gastrointestinal: No complaints of jaundice, no bloody stools, no pale stools  Genitourinary: No complaints of dysuria, no hematuria, no nocturia, no frequent urination, no urethral discharge  Musculoskeletal: No complaints of weakness, paralysis, joint stiffness or arthralgias  Integumentary: No complaints of rash, no new lesions  Neurological: No complaints of convulsions, no seizures, no dizziness  Hematologic/Lymphatic: No complaints of easy bruising  Endocrine:  No hot or cold intolerance  No polydipsia, polyphagia, or polyuria  Allergy/immunology:  No environmental allergies  No food allergies  Not immunocompromised  Skin:  No pallor or rash  No wound          Patient Active Problem List   Diagnosis    Enlarged lymph node in neck    Lymphadenopathy, axillary    Lymphoma (HCC)    Arm DVT (deep venous thromboembolism), acute, left (HCC)    Essential hypertension    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 ([address] )    Constipation    Left bundle branch block (LBBB)    Infected insect bite of neck    Vitamin D deficiency    Vitamin B12 deficiency    Thrombosis of left subclavian vein (HCC)    Situational anxiety    Thrombophlebitis of deep veins of upper extremities    Gastroesophageal reflux disease    Dyslipidemia    Diffuse large B-cell lymphoma of lymph nodes of multiple regions (HCC)    Cataract    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    Esophageal stricture    [place] (hard of hearing)    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[address] )    Osteoarthritis     Past Medical History:   Diagnosis Date    Basal cell carcinoma (BCC)     Bruit of right carotid artery     resolved 5/14/15     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HCC)     radiation    Dyslipidemia     Esophageal stricture     last assessed 2/28/13       [place] (hard of hearing)     b/l ears    Osteoarthritis     Osteoarthrosis of ankle and foot     last assessed 5/20/13     Osteopenia     last assessed 2/28/13     Plantar fasciitis     Plantar fasciitis of left foot     Polyp of sigmoid colon     last assessed 2/28/13     Shortness of breath     when walking up stairs     Past Surgical History:   Procedure Laterality Date    ANKLE ARTHROPLASTY Left     BLEPHAROPLASTY      right eye     CATARACT EXTRACTION Right     ESOPHAGEAL DILATION      x2    ESOPHAGOGASTRODUODENOSCOPY      dilitation    FACIAL/NECK BIOPSY Right 9/8/2017    Procedure: NECK NODE BIOPSY WITH NEEDLE LOCALIZATION; LYMPHOMA PROTOCOL (NEEDLE LOC WITH I R  AT 1100);   Surgeon: Getachew Luna MD;  [facility];  Service: Surgical Oncology    HYSTERECTOMY      LYMPH NODE DISSECTION      right groin and neck    ORBITAL FLOOR EXPLORATION Right     for cancer     PORTACATH PLACEMENT      left chest     MN BX/REMV,LYMPH NODE,DEEP AXILL Left 8/14/2017    Procedure: Axillary lymph node excision with LYMPHOMA PROTOCOL;  Surgeon: Getachew Luna MD;  [facility];  Service: Surgical Oncology    MN BX/REMV,LYMPH NODE,DEEP CERV Right 5/2/2016    Procedure: NECK NODE BIOPSY;  Surgeon: Getachew Luna MD;  [facility];  Service: Surgical Oncology    SD INSJ TUNNELED CTR VAD W/SUBQ PORT AGE 5 YR/> N/A 10/3/2017    Procedure: PLACEMENT OF PORT-A-CATH DEVICE;  Surgeon: Keli Hawley MD;  [facility];  Service: Surgical Oncology     Family History   Problem Relation Age of Onset    Multiple myeloma Mother     Heart disease Father     Hypertension Father     Hypertension Sister     Heart disease Sister     Arthritis Family     Breast cancer Family     Osteoporosis Family      Social History     Social History    Marital status: /Civil Union     Spouse name: N/A    Number of children: N/A    Years of education: N/A     Occupational History    Not on file       Social History Main Topics    Smoking status: Former Smoker     Quit date: 1970    Smokeless tobacco: Never Used    Alcohol use No    Drug use: No    Sexual activity: Not on file     Other Topics Concern    Not on file     Social History Narrative    Uses safety equipment seatbelts        Current Outpatient Prescriptions:     ascorbic acid (VITAMIN C) 500 mg tablet, Take 500 mg by mouth daily in the early morning  , Disp: , Rfl:     Calcium Carb-Cholecalciferol (CALCIUM + D3) 600-200 MG-UNIT TABS, Take 1 tablet by mouth daily in the early morning  , Disp: , Rfl:     Cyanocobalamin (VITAMIN B 12 PO), Take by mouth, Disp: , Rfl:     hydrochlorothiazide (HYDRODIURIL) 25 mg tablet, Take 1 tablet (25 mg total) by mouth daily in the early morning, Disp: 90 tablet, Rfl: 1    lisinopril (ZESTRIL) 5 mg tablet, Take 1 tablet (5 mg total) by mouth daily, Disp: 90 tablet, Rfl: 3    loratadine (CLARITIN) 10 mg tablet, Take 10 mg by mouth daily, Disp: , Rfl:     metoprolol succinate (TOPROL-XL) 50 mg 24 hr tablet, Take 1 tablet (50 mg total) by mouth daily (Patient taking differently: Take 25 mg by mouth daily  ), Disp: 90 tablet, Rfl: 3    Multiple Vitamin (MULTIVITAMIN) capsule, Take 1 capsule by mouth daily in the early morning  , Disp: , Rfl:     Omega-3 Fatty Acids (FISH OIL) 1200 MG CAPS, Take 3 capsules by mouth daily with dinner  , Disp: , Rfl:     omeprazole (PriLOSEC OTC) 20 MG tablet, Take 1 tablet (20 mg total) by mouth daily, Disp: 90 tablet, Rfl: 1    potassium chloride (KLOR-CON M20) 20 mEq tablet, Take 1 tablet (20 mEq total) by mouth daily in the early morning, Disp: 90 tablet, Rfl: 1    carvedilol (COREG) 3 125 mg tablet, Take 1 tablet (3 125 mg total) by mouth 2 (two) times a day with meals, Disp: 30 tablet, Rfl: 11  Allergies   Allergen Reactions    Aspirin Anaphylaxis    Celebrex [Celecoxib] Anaphylaxis    Nsaids Anaphylaxis    Other Anaphylaxis     Pt states when she received a certain type of chemotherapy it caused her throat to close    Rituxan [Rituximab] Anaphylaxis     Swelling of tongue and closing of throat    Sulfa Antibiotics Other (See Comments) and Anaphylaxis     VERY EMOTIONAL CRYING    Bactrim [Sulfamethoxazole-Trimethoprim] Other (See Comments)     VERY EMOTIONAL/CRYING     Vitals:    07/06/18 1127   BP: 146/94   Pulse: 72   Resp: 16   Temp: 98 3 °F (36 8 °C)       Physical Exam  Constitutional: General appearance: The Patient is well-developed and well-nourished who appears the stated age in no acute distress  Patient is pleasant and talkative  HEENT:  Normocephalic  Sclerae are anicteric  Mucous membranes are moist  Neck is supple without adenopathy  No JVD  Chest: The lungs are clear to auscultation  Cardiac: Heart is regular rate  Abdomen: Abdomen is soft, non-tender, non-distended and without masses  Extremities: There is no clubbing or cyanosis  There is no edema  Symmetric  Neuro: Grossly nonfocal  Gait is normal      Lymphatic: No evidence of cervical adenopathy bilaterally  No evidence of axillary adenopathy bilaterally  No evidence of inguinal adenopathy bilaterally  Skin: Warm, anicteric  Psych:  Patient is pleasant and talkative    Breasts:        Pathology:      Labs:      Imaging  Nm Pet Ct Skull Base To Mid Thigh    Result Date: 6/18/2018  Narrative: PET/CT SCAN INDICATION: History of marginal zone lymphoma in a left axillary lymph node  Restaging exam  C83 38: Diffuse large b-cell lymphoma, lymph nodes of multiple sites MODIFIER: PS COMPARISON: PET/CT of 2/16/2018 CELL TYPE:  marginal zone lymphoma B-cell lymphoma (bx R orbital lesion 3/3/09) TECHNIQUE:   8 0 mCi F-18-FDG administered IV  Multiplanar attenuation corrected and non attenuation corrected PET images are available for interpretation, and contiguous, low dose, axial CT sections were obtained from the skull vertex through the femurs    Oral contrast material (7 5 cc Omnipaque-240 in 300 cc water) was administered  Intravenous contrast material was not utilized  This examination, like all CT scans performed in the [facility], was performed utilizing techniques to minimize radiation dose exposure, including the use of iterative reconstruction and automated exposure control  Fasting serum glucose: 81 mg/dl FINDINGS: VISUALIZED BRAIN:   No acute abnormalities are seen  HEAD/NECK:   Multiple new FDG avid lymph nodes are now noted along the bilateral cervical chains  A right upper cervical chain lymph node demonstrates a SUV max of 10 9  This lymph node measures 9 x 6 mm, image 80 series 3  A left lower cervical chain lymph node demonstrates SUV max of 9 4  This measures 1 2 x 0 7 cm, image 97 series 3  CT images: Thyroid gland is again heterogeneous with a stable 1 1 cm hypodense nodule on the right  CHEST:   There are now multiple FDG avid lymph nodes in the bilateral axillary and subpectoral regions more extensive on the left  New FDG avid lymph node just deep to the left pectoralis major demonstrates SUV max of 4 1  This measures 1 6 x 0 8 cm, image 116 series 3  A left axillary lymph node demonstrates SUV max of 3 6 previously without FDG uptake  This measures 3 2 x 1 2 cm, image 133 series 3    Previously this measured 2 4 x 0 5 cm  New mild FDG uptake noted in a right axillary lymph node, SUV max of 2 1  This measures 1 4 x 0 5 cm, image 125 series 3  There is a FDG avid subcarinal lymph node, SUV max of 16 6  This measures 4 1 x 1 7 cm, previously 3 7 x 0 9 cm and not FDG avid  A few scattered 2 to 3 mm pulmonary nodules again noted, stable, not FDG avid  CT images: Scattered coronary artery calcifications are noted  There is a left-sided Mediport line with tip in the right atrium  ABDOMEN:   There are new FDG avid lymph nodes in the portacaval region  A portal caval lymph node just superior to the gallbladder fossa demonstrates SUV max of 14 0  A more medial focus demonstrates SUV max of 6 3  These are difficult to define on the limited noncontrast CT images  Previously noted left para-aortic lymph node demonstrates SUV max of 2 1, unchanged  This is now larger however measuring 1 4 x 1 2 cm, image 200 series 3 previously 1 1 x 0 8 cm  Less numerous foci of FDG uptake along the anterior abdominal wall likely related to subcutaneous injections  CT images: No additional significant findings  PELVIS: New single FDG avid right external iliac chain lymph node demonstrates a SUV max of 5 6  This measures 1 5 x 1 0 cm, image 228 series 3  CT images: Unremarkable  OSSEOUS STRUCTURES: No FDG avid lesions are seen  Scattered mild foci of FDG uptake along the spine corresponding to degenerative changes on CT  CT images: Multilevel degenerative changes again noted of the spine  For reference: SUV max of the mediastinal blood pool: 2 4 SUV max of the liver: 3 3     Impression: 1  Findings compatible with disease recurrence  Multiple FDG avid lymph nodes in the bilateral neck, axilla, subpectoral regions, mediastinum, portacaval region and retroperitoneum compatible with hypermetabolic malignancy  Deauville score of 5    Workstation performed: [id number]     I reviewed the above laboratory and imaging data     Discussion/Summary:   72-year-old female with a history of lymphoma  She now has a PET scan that shows new FDG avid disease  Lymph nodes in the neck would be most accessible to biopsy  I would recommend that she undergo left neck lymph node biopsy with lymphoma protocol  I explained the risks including bleeding, infection, recurrence, need for further surgery and neurovascular injury  Informed consent was obtained  Since this notice not palpable, we will obtain a neck ultrasound to evaluate the adenopathy to make sure it is well visualized prior to surgery  Based on the ultrasound, this could either be localized preoperatively or intraoperatively  I will see her again at the time of surgery  She is agreeable to this  All her questions were answered

## 2018-07-25 NOTE — BRIEF OP NOTE (RAD/CATH)
IR OTHER - needle localization Procedure Note    PATIENT NAME: Jay Graves  : 1940  MRN: [id number]     Pre-op Diagnosis:   1  Lymphadenopathy      Post-op Diagnosis:   1  Lymphadenopathy        Surgeon:   Dixon Guevara MD  Assistants:     No qualified resident was available, Resident is only observing    Estimated Blood Loss: minimal     Findings:     Left cervical lymphadenopathy localized with wire, lateral approach  Patient is proceeding for surgical excision       Specimens: none    Complications:  none    Anesthesia: Roddy Ortiz MD     Date: 2018  Time: 9:12 AM

## 2018-07-25 NOTE — OP NOTE
OPERATIVE REPORT  PATIENT NAME: Jay Graves    :  1940  MRN: [id number]  Pt Location: [facility] ROOM 02    SURGERY DATE: 2018    Surgeon(s) and Role:     * Elmer Aleman MD - Primary     * Yobani Fox MD - Assisting    Preop Diagnosis:  Lymphadenopathy of head and neck [R59 1]    Post-Op Diagnosis Codes:     * Lymphadenopathy of head and neck [R59 1]    Procedure(s) (LRB):  NECK NODE BIOPYS WITH LYMPHOMA PROTOCOL; (ULTRASOUND GUIDED NEEDLE LOC IN IR AT 0800) (Left)    Specimen(s):  ID Type Source Tests Collected by Time Destination   1 : left neck lymph node Tissue Lymph Node CHROMOSOME ANALYSIS, LEUKEMIALYMPHOMA, TISSUE EXAM, LEUKEMIA/LYMPHOMA FLOW CYTOMETRY Elmer Aleman MD 2018 [id number]        Estimated Blood Loss:   Minimal    Drains:       Anesthesia Type:   General    Operative Indications:  Lymphadenopathy of head and neck [R611]  72-year-old female with a history of lymphoma  She has new FDG avid left neck lymph nodes  It was recommended that she have these excised  Risks and benefits were explained  Informed consent was obtained  Patient was brought to the operating room  Operative Findings:  Enlarged left cervical lymph nodes  Complications:   None    Procedure and Technique:  The procedure started in Interventional Radiology where the appropriate lymph node was localized under ultrasound guidance  The hook of the wire was adjacent to the jugular vein  Patient was then brought to the operating room and general anesthesia was achieved  Patient was prepped and draped in the usual sterile fashion  A time-out was performed  An incision was made overlying the sternocleidomastoid  Using sharp dissection this was taken down through the platysma  The posterior border of the sternocleidomastoid was dissected and the wire was identified  We then elevated the sternocleidomastoid and identified the wire going into several enlarged lymph nodes    The lymph nodes were adjacent to the jugular vein and the hook wire was removed  These lymph nodes were elevated and the lymphatics were clamped, divided and ligated  Wound was now copiously irrigated  There was excellent hemostasis  When we removed the retractor that was retracting the sternocleidomastoid, there was bleeding from the jugular vein  This was initially oversewn with 3 0 Prolene suture but, ultimately suture ligated with 3 0 Prolene suture as it appeared that the vein was in discontinuity  At this point the wound was now copiously irrigated and there was excellent hemostasis  The platysma was approximated with a running 3 0 Vicryl suture  Skin was approximated with a running 4 Monocryl suture  Histocryl was used on the skin  Patient was extubated having tolerated the procedure well  I was present and participated in all aspects of this procedure         I was present for the entire procedure    Patient Disposition:  PACU     SIGNATURE: Gene Neely MD  DATE: July 25, 2018  TIME: 9:35 AM

## 2018-07-25 NOTE — DISCHARGE INSTRUCTIONS
POST-OPERATIVE WOUND CARE INSTRUCTIONS    Your wound is closed with:   Dissolvable sutures/glue      Wound care: You may remove your dressing after 24 hours   You may shower using soap and water to clean your wound  Gently pat it dry  You may redress your wound for comfort as needed  Activity:   Did not perform any heavy lifting or strenuous physical activity for 7 days  Your activity restrictions will be reevaluated at your postoperative visit  Medications: You may resume all your preoperative medications and diet  Pain medication as directed on the prescription given in the office  Other:   May use ice on the wound for 24-48 hours as needed for comfort  May place warm compresses to the neck after 48 hours for comfort  Call the office at [phone] if you have any of the followin  Redness, swelling, heat, unusual drainage or heavy bleeding from the wound     2  Fever greater than 101°F    3  Pain not relieved by the prescribed pain medication

## 2018-07-25 NOTE — SEDATION DOCUMENTATION
Left lymph node with 1 wire covered with gauze and tegaderm prior to surgery   Patient has no complaints

## 2018-07-25 NOTE — ANESTHESIA PREPROCEDURE EVALUATION
Review of Systems/Medical History  Patient summary reviewed  Chart reviewed  No history of anesthetic complications     Cardiovascular  Negative cardio ROS Hyperlipidemia, Hypertension , DVT (hx left UE DVT from port)   Pulmonary  Negative pulmonary ROS        GI/Hepatic    GERD ,        Negative  ROS        Endo/Other    Obesity (bmi 40)    GYN  Negative gynecology ROS          Hematology    Lymphoma (prior chemo, XRT to eye)   Musculoskeletal  Negative musculoskeletal ROS        Neurology  Negative neurology ROS      Psychology   Anxiety,            Physical Exam    Airway    Mallampati score: II  TM Distance: >3 FB  Neck ROM: full     Dental       Cardiovascular  Comment: Negative ROS,     Pulmonary      Other Findings      Lab Results   Component Value Date    WBC 7 25 07/06/2018    HGB 13 9 07/06/2018     07/06/2018     Lab Results   Component Value Date     07/06/2018    K 3 7 07/06/2018    BUN 19 07/06/2018    CREATININE 0 88 07/06/2018    GLUCOSE 81 07/06/2018     Lab Results   Component Value Date    PTT 27 07/06/2018      Lab Results   Component Value Date    INR 1 00 07/06/2018     Anesthesia Plan  ASA Score- 3     Anesthesia Type- general with ASA Monitors  Additional Monitors:   Airway Plan: LMA  Plan Factors-    Induction- intravenous  Postoperative Plan-     Informed Consent- Anesthetic plan and risks discussed with patient  I personally reviewed this patient with the CRNA  Discussed and agreed on the Anesthesia Plan with the CRNA  Navid Osullivan

## 2018-07-25 NOTE — ANESTHESIA POSTPROCEDURE EVALUATION
Post-Op Assessment Note      CV Status:  Stable    Mental Status:  Alert and awake    Hydration Status:  Euvolemic    PONV Controlled:  Controlled    Airway Patency:  Patent    Post Op Vitals Reviewed: Yes          Staff: CRNA, Anesthesiologist           BP   109/63   Temp   97 1   Pulse  77   Resp   12   SpO2   100

## 2018-07-26 LAB — SCAN RESULT: NORMAL

## 2018-07-31 ENCOUNTER — HOSPITAL ENCOUNTER (OUTPATIENT)
Dept: NON INVASIVE DIAGNOSTICS | Facility: HOSPITAL | Age: 78
Discharge: HOME | End: 2018-07-31
Payer: COMMERCIAL

## 2018-07-31 DIAGNOSIS — I42.0 DILATED CARDIOMYOPATHY (HCC): ICD-10-CM

## 2018-07-31 PROCEDURE — 93306 TTE W/DOPPLER COMPLETE: CPT

## 2018-07-31 PROCEDURE — 93306 TTE W/DOPPLER COMPLETE: CPT | Performed by: INTERNAL MEDICINE

## 2018-08-02 ENCOUNTER — TRANSCRIBE ORDERS (OUTPATIENT)
Dept: LAB | Facility: CLINIC | Age: 78
End: 2018-08-02

## 2018-08-02 DIAGNOSIS — C83.38 DIFFUSE LARGE B-CELL LYMPHOMA OF LYMPH NODES OF MULTIPLE SITES (HCC): ICD-10-CM

## 2018-08-03 ENCOUNTER — OFFICE VISIT (OUTPATIENT)
Dept: HEMATOLOGY ONCOLOGY | Facility: CLINIC | Age: 78
End: 2018-08-03
Payer: COMMERCIAL

## 2018-08-03 VITALS
WEIGHT: 208.6 LBS | BODY MASS INDEX: 36.96 KG/M2 | TEMPERATURE: 97.9 F | SYSTOLIC BLOOD PRESSURE: 122 MMHG | HEART RATE: 97 BPM | DIASTOLIC BLOOD PRESSURE: 84 MMHG | OXYGEN SATURATION: 97 % | RESPIRATION RATE: 17 BRPM | HEIGHT: 63 IN

## 2018-08-03 DIAGNOSIS — C83.38 DIFFUSE LARGE B-CELL LYMPHOMA OF LYMPH NODES OF MULTIPLE REGIONS (HCC): Primary | ICD-10-CM

## 2018-08-03 LAB — SCAN RESULT: NORMAL

## 2018-08-03 PROCEDURE — 99214 OFFICE O/P EST MOD 30 MIN: CPT | Performed by: INTERNAL MEDICINE

## 2018-08-03 RX ORDER — ALLOPURINOL 100 MG/1
200 TABLET ORAL DAILY
Qty: 60 TABLET | Refills: 1 | Status: SHIPPED | OUTPATIENT
Start: 2018-08-03 | End: 2018-10-13 | Stop reason: HOSPADM

## 2018-08-03 NOTE — LETTER
August 4, 2018     Ginny Samuels, [facility]  [address]  [address] [place] [address]    Patient: Damon Asher   YOB: 1940   Date of Visit: 8/3/2018       Dear Dr Umberto Lui: Thank you for referring Mendel Song to me for evaluation  Below are my notes for this consultation  If you have questions, please do not hesitate to call me  I look forward to following your patient along with you  Sincerely,        Ileana Pedraza MD        CC: MD Ileana Badillo MD  8/4/2018 12:01 PM  Sign at close encounter    HPI:   Damon Asher is a 78 y o  female  She is here with her  and son  She has recurrent high-grade non-Hodgkin's lymphoma, biopsy proven from left lower neck lymph node  She does not have lymphoma related symptoms  She is allergic to Rituxan  She has low in the ejection fraction for Adriamycin  She had been treated with systemic therapy previously  She will have consultation from Dr Erik Barkley to recommend systemic therapy for her  Recent PET-CT scan showed  generalized hypermetabolic lymphadenopathy  No palpable lymph node on examination  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   It was a localized disease  Plan was radiation and Rituxan  She had radiation  She had only one dose of Rituxan and had reaction to Rituxan  She had swelling of the tongue and closing of throat  Rituxan was discontinued     In April 2015 patient had PET CT scan that showed hypermetabolic lymphadenopathy involving the neck, chest, abdomen and pelvis and also hypermetabolic right breast nodularity, hepatic steatosis, mild/borderline aneurysmal dilatation of ascending thoracic aorta  She had left inguinal excisional lymph node biopsy, right axillary lymph node biopsy and neck node biopsy and none of those 3 biopsies showed lymphoma or malignancy  She was being followed  Kelvin Bean 2017 she presented with lymphadenopathy again especially in left axilla that was surgically removed and final path report was inconclusive  She had another biopsy from right supraclavicular area and that 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, CD20 positive  Slides were reviewed at [facility] finally came after 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   Patient received 3 cycles of CHOP and 3 cycles of he EPOCH but the last 2 cycles did not have Adriamycin because of drop in ejection fraction   After completion of 3 cycles of CHOP pathologist from [address] suggested that 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 and for that reason treatment was changed to [facility]  Wilson Dumont Patient did not receive Rituxan because she is allergic to Rituxan  She received Neulasta  The last chemotherapy treatment was in January 2018     She was also given acyclovir and dapsone  [place]der was stopped  Jono Simpson continued to take acyclovir     PET-CT scan done in February 2018 did not show residual or recurrent lymphoma   Wilson Dumont Patient had PET-CT scan on 06/18/2018 and that showed hypermetabolic generalized lymphadenopathy  History of DVT left arm and she was given Lovenox followed by Eliquis for a total of 6 months or so  Follow-up Doppler study was negative and D-dimer test was negative  Patient is allergic to aspirin and she did not receive that post discontinuation of Lovenox     No bleeding, fever, chills, night sweats, weight loss and swollen glands     No B symptoms  Has some tiredness      Current Outpatient Prescriptions:     ascorbic acid (VITAMIN C) 500 mg tablet, Take 500 mg by mouth daily with dinner  , Disp: , Rfl:     Calcium Carb-Cholecalciferol (CALCIUM + D3) 600-200 MG-UNIT TABS, Take 1 tablet by mouth daily with dinner  , Disp: , Rfl:     Cyanocobalamin (VITAMIN B 12 PO), Take 1 tablet by mouth daily with dinner  , Disp: , Rfl:     hydrochlorothiazide (HYDRODIURIL) 25 mg tablet, Take 1 tablet (25 mg total) by mouth daily in the early morning, Disp: 90 tablet, Rfl: 1    HYDROcodone-acetaminophen (NORCO) 5-325 mg per tablet, Take 1 tablet by mouth every 6 (six) hours as needed for pain Max Daily Amount: 4 tablets, Disp: 20 tablet, Rfl: 0    lisinopril (ZESTRIL) 5 mg tablet, Take 1 tablet (5 mg total) by mouth daily (Patient taking differently: Take 5 mg by mouth daily in the early morning  ), Disp: 90 tablet, Rfl: 3    loratadine (CLARITIN) 10 mg tablet, Take 10 mg by mouth daily in the early morning  , Disp: , Rfl:     Multiple Vitamin (MULTIVITAMIN) capsule, Take 1 capsule by mouth daily with dinner  , Disp: , Rfl:     Omega-3 Fatty Acids (FISH OIL) 1200 MG CAPS, Take 3 capsules by mouth daily with dinner  , Disp: , Rfl:     omeprazole (PriLOSEC OTC) 20 MG tablet, Take 1 tablet (20 mg total) by mouth daily (Patient taking differently: Take 20 mg by mouth daily in the early morning  ), Disp: 90 tablet, Rfl: 1    potassium chloride (KLOR-CON M20) 20 mEq tablet, Take 1 tablet (20 mEq total) by mouth daily in the early morning, Disp: 90 tablet, Rfl: 1    Allergies   Allergen Reactions    Aspirin Anaphylaxis    Celebrex [Celecoxib] Anaphylaxis    Nsaids Anaphylaxis    Other Anaphylaxis     Pt states when she received a certain type of chemotherapy it caused her throat to close    Rituxan [Rituximab] Anaphylaxis     Swelling of tongue and closing of throat    Sulfa Antibiotics Other (See Comments) and Anaphylaxis     VERY EMOTIONAL CRYING    Bactrim [Sulfamethoxazole-Trimethoprim] Other (See Comments)     VERY EMOTIONAL/CRYING        No history exists         ROS:  08/03/18 Reviewed 13 systems:  Presently no headaches, seizures, dizziness, diplopia, dysphagia, hoarseness, chest pain, palpitations, shortness of breath, cough, hemoptysis, abdominal pain, nausea, vomiting, change in bowel habits, melena, hematuria, fever, chills, bleeding, bone pains, skin rash, weight loss, arthritic symptoms,  weakness, numbness,  claudication and gait problem  No frequent infections  Not unusually sensitive to heat or cold  No swelling of the ankles  No swollen glands  Patient is anxious  Other symptoms are in HPI        /84 (BP Location: Left arm, Cuff Size: Adult)   Pulse 97   Temp 97 9 °F (36 6 °C) (Tympanic)   Resp 17   Ht 5' 3" (1 6 m)   Wt 94 6 kg (208 lb 9 6 oz)   SpO2 97%   BMI 36 95 kg/m²      Physical Exam:  Alert, oriented, not in distress, no icterus, no oral thrush, no palpable neck mass, recent surgical scar left lower neck, clear lung fields, regular heart rate, abdomen  soft and non tender, no palpable abdominal mass, no ascites, no edema of ankles, no calf tenderness, no focal neurological deficit, no skin rash, no palpable lymphadenopathy, good arterial pulses, no clubbing  Patient is anxious  Performance status 1      IMAGING:      LABS:  Results for orders placed or performed during the hospital encounter of 07/25/18   Leukemia/Lymphoma flow cytometry   Result Value Ref Range    Scan Result SEE WRITTEN REPORT    Chromosome analysis, leukemia/lymphoma   Result Value Ref Range    Scan Result SEE WRITTEN REPORT    Tissue Exam   Result Value Ref Range    Case Report       Surgical Pathology Report                         Case: [id number]                                   Authorizing Provider:  Getachew Luna MD           Collected:           07/25/2018 0927              Ordering Location:     [facility]        Received:            07/25/2018 [address] [place] [address] Operating Room                                                      Pathologist:           Araseli Olivo MD                                                         Intraop:               Kareen Msoes MD                                                             Specimen:    Lymph Node, left neck lymph node                                                           Final Diagnosis       THE FOLLOWING IS THE SECOND PRELIMINARY REPORT - REARRANGEMENT ANALYSIS FOR MYC, BCL2 & BCL6 GENES WILL BE IN FINAL REPORT  A  Lymph node, left neck:  - High grade B-cell lymphoma - see Note  - Flow cytometry ([id number], evaluated by Dr Patricia Hood) reveals:   * Consistent with recurrent B-cell lymphoma, medium to large cell size, comprising 18% of total cells analyzed, with following immunophenotype:    -- positive: CD19(brighter than background polyclonal B-cells[6%]), CD20(brighter than background polyclonal B-cells[6%]), FMC7 (negative in background polyclonal B-cells)    -- negative: CD5, CD10, sKappa, sLambda   * Viability 7AAD: 82%  * The following antigens were evaluated & found to be expressed as described above or by appropriate cells:  CD2, CD3, CD4, CD5, CD7, CD8, CD10, CD11c, CD19, CD20, CD23, CD38, CD45, CD56, CD57, sKappa, sLambda  Interpretation performed at [facility], [address] [place] [address], [id number]      Note       Routine H&E sections demonstrate effacement of the usual lymph node architecture by sheets of medium to large size highly atypical lymphoid cells, with a vaguely nodular arrangement  Atypical cells display a brisk mitotic rate (up to 8 mitoses/HPF) as well as individual cell necrosis  The morphologic impression is of a high grade B-cell lymphoma  Immunohistochemical stains as well as rearrangement analyses for MYC, BCL2 & BCL6 genes is undertaken to further characterize lymphoma histotype and will be described in the final report  The phenotype of neoplastic B-cells by flow cytometry is identical to those found in the prior lymph node samples from left axilla ([id number]) and right neck ([id number]) in August and September 2017  Rearranged IgH gene was detected by [address] [place] [address] study in the left axillary specimen ([id number]), indicating these abnormal surface immunoglobulin-negative B-cells are clonal B-cells  Thus, the current left neck lymph node may represent a recurrence  Immunohistochemical stains & in situ hybridization (carlos enrique) performed on the present material indicate the following tumor cell immunophenotype:   * Positive: PAX5(nuclear, diffusely strong), CD20(cytoplasmic,diffusely strong), bcl-6(nuclear, moderate in ~ 35%), bcl-2(cytoplasmic, moderate in ~ 45%), MUM-1(nuclear, moderate to strong in ~ 35%),    * Ki-67 labeling index: ~85%   * Negative: CD5, CD10, bcl-1, CD3, ADITHYA (by carlos enrique)  These studies document a similar immunophenotype as in the left axillary lymphoma ([id number]) with exception of somewhat greater bcl-2 expression  Immunostains for c-myc expression as well as results of MYC, BCL2 & BCL6 gene rearrangement analysis will be in final report  Second preliminary report is faxed by Dr [place] Clair to Dr Janice Schumacher @ 10:40 AM, 7/30/2018  Additional Information       All controls performed with the immunohistochemical stains reported above reacted appropriately  These tests were developed and their performance characteristics determined by [facility] [place], operated by [facility] or [facility]  They may not be cleared or approved by the [place]  Food and Drug Administration  The FDA has determined that such clearance or approval is not necessary  These tests are used for clinical purposes  They should not be regarded as investigational or for research  This laboratory has been approved by [place] [address], designated as a high-complexity laboratory and is qualified to perform these tests  Gross Description       A  The specimen is received fresh, labeled with the patient's name and hospital number, and is designated "left neck lymph node  The specimen consists of a tan-yellow soft tissue fragment measuring 4 x 3 5 x 1 5 cm  Examination of the fragment reveals 6 densities suggestive of lymph nodes ranging in size from 1 1-2 5 cm  Touch preps are performed  A portion of the specimen is sent out for flow cytometry to: [facility], [address] [place] [address], [phone] [facility] [address] 22  Representative sections  Four cassettes  2 pieces in each cassette  Note: The estimated total formalin fixation time based upon information provided by the submitting clinician and the standard processing schedule is under 72 hours  MCrites       Labs, Imaging, & Other studies:   All pertinent labs and imaging studies were personally reviewed    Lab Results   Component Value Date     07/06/2018    K 3 7 07/06/2018     07/06/2018    CO2 30 07/06/2018    ANIONGAP 5 07/06/2018    BUN 19 07/06/2018    CREATININE 0 88 07/06/2018    GLUCOSE 81 07/06/2018    GLUF 94 06/29/2018    CALCIUM 9 5 07/06/2018    AST 22 07/06/2018    ALT 35 07/06/2018    ALKPHOS 98 07/06/2018    PROT 6 8 07/06/2018    BILITOT 0 45 07/06/2018    EGFR 64 07/06/2018     Lab Results   Component Value Date    WBC 7 25 07/06/2018    HGB 13 9 07/06/2018    HCT 43 6 07/06/2018    MCV 91 07/06/2018     07/06/2018     Reviewed and discussed with patient  Assessment and plan:  Phuc Ruvalcaba is a 78 y o  female  She is here with her  and son  She has recurrent high-grade non-Hodgkin's lymphoma, biopsy proven from left lower neck lymph node  She does not have lymphoma related symptoms  She is allergic to Rituxan  She has low in the ejection fraction for Adriamycin  She had been treated with systemic therapy previously  She will have consultation from Dr Anatoly Cervantes to recommend systemic therapy for her  Recent PET-CT scan showed  generalized hypermetabolic lymphadenopathy  No palpable lymph node on examination  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   It was a localized disease  Plan was radiation and Rituxan  She had radiation  She had only one dose of Rituxan and had reaction to Rituxan  She had swelling of the tongue and closing of throat   Rituxan was discontinued     In April 2015 patient had PET CT scan that showed hypermetabolic lymphadenopathy involving the neck, chest, abdomen and pelvis and also hypermetabolic right breast nodularity, hepatic steatosis, mild/borderline aneurysmal dilatation of ascending thoracic aorta  She had left inguinal excisional lymph node biopsy, right axillary lymph node biopsy and neck node biopsy and none of those 3 biopsies showed lymphoma or malignancy  She was being followed  [place] 2017 she presented with lymphadenopathy again especially in left axilla that was surgically removed and final path report was inconclusive  She had another biopsy from right supraclavicular area and that 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, CD20 positive  [facility] finally came after 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   Patient received 3 cycles of CHOP and 3 cycles of he EPOCH but the last 2 cycles did not have Adriamycin because of drop in ejection fraction   After completion of 3 cycles of CHOP pathologist from [address] [place] [address] suggested that 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 and for that reason treatment was changed to [facility]  Julia Patricio Patient did not receive Rituxan because she is allergic to Rituxan  She received Neulasta  The last chemotherapy treatment was in January 2018     She was also given acyclovir and dapsone  [place] was stopped  Breana Santiago continued to take acyclovir     PET-CT scan done in February 2018 did not show residual or recurrent lymphoma   Julia Patricio Patient had PET-CT scan on 06/18/2018 and that showed hypermetabolic generalized lymphadenopathy  History of DVT left arm and she was given Lovenox followed by Eliquis for a total of 6 months or so  Follow-up Doppler study was negative and D-dimer test was negative  Patient is allergic to aspirin and she did not receive that post discontinuation of Lovenox     No bleeding, fever, chills, night sweats, weight loss and swollen glands     No B symptoms  Has some tiredness  Julia Patricio   Physical examination and test results are as recorded and discussed  Patient has recurrent high-grade non-Hodgkin's lymphoma diffuse large B-cell, CD 20 positive  Requesting consultation from Dr Jorge Jennings as above  Patient has Port-A-Cath to get started on chemotherapy without Rituxan because of severe reaction to Rituxan  She will start taking allopurinol  Discussed in detail  Questions answered  Also discussed the importance of self-breast examination, eating healthy foods, staying active and health screening tests     1  Diffuse large B-cell lymphoma of lymph nodes of multiple regions (HCC)    - allopurinol (ZYLOPRIM) 100 mg tablet; Take 2 tablets (200 mg total) by mouth daily  Dispense: 60 tablet; Refill: 1            Patient voiced understanding and agreement in the discussion  Counseling / Coordination of Care   Greater than 50% of total time was spent with the patient and / or family counseling and / or coordination of care

## 2018-08-03 NOTE — PROGRESS NOTES
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   It was a localized disease  Plan was radiation and Rituxan  She had radiation  She had only one dose of Rituxan and had reaction to Rituxan  She had swelling of the tongue and closing of throat  Rituxan was discontinued     In April 2015 patient had PET CT scan that showed hypermetabolic lymphadenopathy involving the neck, chest, abdomen and pelvis and also hypermetabolic right breast nodularity, hepatic steatosis, mild/borderline aneurysmal dilatation of ascending thoracic aorta  She had left inguinal excisional lymph node biopsy, right axillary lymph node biopsy and neck node biopsy and none of those 3 biopsies showed lymphoma or malignancy  She was being followed  Sherwood Moritz 2017 she presented with lymphadenopathy again especially in left axilla that was surgically removed and final path report was inconclusive  She had another biopsy from right supraclavicular area and that 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, CD20 positive  Slides were reviewed at [facility] finally came after 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     Patient received 3 cycles of CHOP and 3 cycles of he EPOCH but the last 2 cycles did not have Adriamycin because of drop in ejection fraction   After completion of 3 cycles of CHOP pathologist from [facility] suggested that 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 and for that reason treatment was changed to [facility]  Christiane Squires Patient did not receive Rituxan because she is allergic to Rituxan  She received Neulasta  The last chemotherapy treatment was in January 2018     She was also given acyclovir and dapsone  Hodan Senna was stopped  Bettinaaleyda Burtr continued to take acyclovir     PET-CT scan done in February 2018 did not show residual or recurrent lymphoma   Christiane Squires Patient had PET-CT scan on 06/18/2018 and that showed hypermetabolic generalized lymphadenopathy  History of DVT left arm and she was given Lovenox followed by Eliquis for a total of 6 months or so  Follow-up Doppler study was negative and D-dimer test was negative  Patient is allergic to aspirin and she did not receive that post discontinuation of Lovenox     No bleeding, fever, chills, night sweats, weight loss and swollen glands     No B symptoms  Has some tiredness      Current Outpatient Prescriptions:     ascorbic acid (VITAMIN C) 500 mg tablet, Take 500 mg by mouth daily with dinner  , Disp: , Rfl:     Calcium Carb-Cholecalciferol (CALCIUM + D3) 600-200 MG-UNIT TABS, Take 1 tablet by mouth daily with dinner  , Disp: , Rfl:     Cyanocobalamin (VITAMIN B 12 PO), Take 1 tablet by mouth daily with dinner  , Disp: , Rfl:     hydrochlorothiazide (HYDRODIURIL) 25 mg tablet, Take 1 tablet (25 mg total) by mouth daily in the early morning, Disp: 90 tablet, Rfl: 1    HYDROcodone-acetaminophen (NORCO) 5-325 mg per tablet, Take 1 tablet by mouth every 6 (six) hours as needed for pain Max Daily Amount: 4 tablets, Disp: 20 tablet, Rfl: 0    lisinopril (ZESTRIL) 5 mg tablet, Take 1 tablet (5 mg total) by mouth daily (Patient taking differently: Take 5 mg by mouth daily in the early morning  ), Disp: 90 tablet, Rfl: 3    loratadine (CLARITIN) 10 mg tablet, Take 10 mg by mouth daily in the early morning  , Disp: , Rfl:     Multiple Vitamin (MULTIVITAMIN) capsule, Take 1 capsule by mouth daily with dinner  , Disp: , Rfl:     Omega-3 Fatty Acids (FISH OIL) 1200 MG CAPS, Take 3 capsules by mouth daily with dinner  , Disp: , Rfl:     omeprazole (PriLOSEC OTC) 20 MG tablet, Take 1 tablet (20 mg total) by mouth daily (Patient taking differently: Take 20 mg by mouth daily in the early morning  ), Disp: 90 tablet, Rfl: 1    potassium chloride (KLOR-CON M20) 20 mEq tablet, Take 1 tablet (20 mEq total) by mouth daily in the early morning, Disp: 90 tablet, Rfl: 1    Allergies   Allergen Reactions    Aspirin Anaphylaxis    Celebrex [Celecoxib] Anaphylaxis    Nsaids Anaphylaxis    Other Anaphylaxis     Pt states when she received a certain type of chemotherapy it caused her throat to close    Rituxan [Rituximab] Anaphylaxis     Swelling of tongue and closing of throat    Sulfa Antibiotics Other (See Comments) and Anaphylaxis     VERY EMOTIONAL CRYING    Bactrim [Sulfamethoxazole-Trimethoprim] Other (See Comments)     VERY EMOTIONAL/CRYING        No history exists  ROS:  08/03/18 Reviewed 13 systems:  Presently no headaches, seizures, dizziness, diplopia, dysphagia, hoarseness, chest pain, palpitations, shortness of breath, cough, hemoptysis, abdominal pain, nausea, vomiting, change in bowel habits, melena, hematuria, fever, chills, bleeding, bone pains, skin rash, weight loss, arthritic symptoms,  weakness, numbness,  claudication and gait problem  No frequent infections  Not unusually sensitive to heat or cold  No swelling of the ankles  No swollen glands  Patient is anxious   Other symptoms are in HPI        /84 (BP Location: Left arm, Cuff Size: Adult)   Pulse 97   Temp 97 9 °F (36 6 °C) (Tympanic)   Resp 17   Ht 5' 3" (1 6 m)   Wt 94 6 kg (208 lb 9 6 oz)   SpO2 97%   BMI 36 95 kg/m²     Physical Exam:  Alert, oriented, not in distress, no icterus, no oral thrush, no palpable neck mass, recent surgical scar left lower neck, clear lung fields, regular heart rate, abdomen soft and non tender, no palpable abdominal mass, no ascites, no edema of ankles, no calf tenderness, no focal neurological deficit, no skin rash, no palpable lymphadenopathy, good arterial pulses, no clubbing  Patient is anxious  Performance status 1  IMAGING:      LABS:  Results for orders placed or performed during the hospital encounter of 07/25/18   Leukemia/Lymphoma flow cytometry   Result Value Ref Range    Scan Result SEE WRITTEN REPORT    Chromosome analysis, leukemia/lymphoma   Result Value Ref Range    Scan Result SEE WRITTEN REPORT    Tissue Exam   Result Value Ref Range    Case Report       Surgical Pathology Report                         Case: [id number]                                   Authorizing Provider:  Ulises Costello MD           Collected:           07/25/2018 0927              Ordering Location:     [facility]        Received:            07/25/2018 [address] [place] [address] Operating Room                                                      Pathologist:           Pita Luna MD                                                         Intraop:               Lorraine Salas MD                                                             Specimen:    Lymph Node, left neck lymph node                                                           Final Diagnosis       THE FOLLOWING IS THE SECOND PRELIMINARY REPORT - REARRANGEMENT ANALYSIS FOR MYC, BCL2 & BCL6 GENES WILL BE IN FINAL REPORT  A  Lymph node, left neck:  - High grade B-cell lymphoma - see Note     - Flow cytometry ([id number], evaluated by Dr Skyla Ovalle) reveals:   * Consistent with recurrent B-cell lymphoma, medium to large cell size, comprising 18% of total cells analyzed, with following immunophenotype:    -- positive: CD19(brighter than background polyclonal B-cells[6%]), CD20(brighter than background polyclonal B-cells[6%]), FMC7 (negative in background polyclonal B-cells)    -- negative: CD5, CD10, sKappa, sLambda   * Viability 7AAD: 82%  * The following antigens were evaluated & found to be expressed as described above or by appropriate cells:  CD2, CD3, CD4, CD5, CD7, CD8, CD10, CD11c, CD19, CD20, CD23, CD38, CD45, CD56, CD57, sKappa, sLambda  Interpretation performed at [facility], [address] [place] [address], [address]      Note       Routine H&E sections demonstrate effacement of the usual lymph node architecture by sheets of medium to large size highly atypical lymphoid cells, with a vaguely nodular arrangement  Atypical cells display a brisk mitotic rate (up to 8 mitoses/HPF) as well as individual cell necrosis  The morphologic impression is of a high grade B-cell lymphoma  Immunohistochemical stains as well as rearrangement analyses for MYC, BCL2 & BCL6 genes is undertaken to further characterize lymphoma histotype and will be described in the final report  The phenotype of neoplastic B-cells by flow cytometry is identical to those found in the prior lymph node samples from left axilla ([id number]) and right neck ([id number]) in August and September 2017  Rearranged IgH gene was detected by [facility] study in the left axillary specimen ([id number]), indicating these abnormal surface immunoglobulin-negative B-cells are clonal B-cells  Thus, the current left neck lymph node may represent a recurrence  Immunohistochemical stains & in situ hybridization (carlos enrique) performed on the present material indicate the following tumor cell immunophenotype:   * Positive: PAX5(nuclear, diffusely strong), CD20(cytoplasmic,diffusely strong), bcl-6(nuclear, moderate in ~ 35%), bcl-2(cytoplasmic, moderate in ~ 45%), MUM-1(nuclear, moderate to strong in ~ 35%),    * Ki-67 labeling index: ~85%   * Negative: CD5, CD10, bcl-1, CD3, ADITHYA (by carlos enrique)  These studies document a similar immunophenotype as in the left axillary lymphoma ([id number]) with exception of somewhat greater bcl-2 expression  Immunostains for c-myc expression as well as results of MYC, BCL2 & BCL6 gene rearrangement analysis will be in final report  Second preliminary report is faxed by Dr Vamsi Hoffman to Dr Jax Fregoso @ 10:40 AM, 7/30/2018  Additional Information       All controls performed with the immunohistochemical stains reported above reacted appropriately  These tests were developed and their performance characteristics determined by [facility] or [address] [place] [address]  They may not be cleared or approved by the [place]  Food and Drug Administration  The FDA has determined that such clearance or approval is not necessary  These tests are used for clinical purposes  They should not be regarded as investigational or for research  This laboratory has been approved by [place] [address], designated as a high-complexity laboratory and is qualified to perform these tests  Gross Description       A  The specimen is received fresh, labeled with the patient's name and hospital number, and is designated "left neck lymph node  The specimen consists of a tan-yellow soft tissue fragment measuring 4 x 3 5 x 1 5 cm  Examination of the fragment reveals 6 densities suggestive of lymph nodes ranging in size from 1 1-2 5 cm  Touch preps are performed  A portion of the specimen is sent out for flow cytometry to: [facility], [address] [place] [address], [address]  [id number]  Representative sections  Four cassettes  2 pieces in each cassette  Note: The estimated total formalin fixation time based upon information provided by the submitting clinician and the standard processing schedule is under 72 hours    MCrites       Labs, Imaging, & Other studies:   All pertinent labs and imaging studies were personally reviewed    Lab Results   Component Value Date     07/06/2018    K 3 7 07/06/2018     07/06/2018    CO2 30 07/06/2018    ANIONGAP 5 07/06/2018    BUN 19 07/06/2018    CREATININE 0 88 07/06/2018    GLUCOSE 81 07/06/2018    GLUF 94 06/29/2018    CALCIUM 9 5 07/06/2018    AST 22 07/06/2018    ALT 35 07/06/2018    ALKPHOS 98 07/06/2018    PROT 6 8 07/06/2018    BILITOT 0 45 07/06/2018    EGFR 64 07/06/2018     Lab Results   Component Value Date    WBC 7 25 07/06/2018    HGB 13 9 07/06/2018    HCT 43 6 07/06/2018    MCV 91 07/06/2018     07/06/2018     Reviewed and discussed with patient  Assessment and plan:  Johanna Sanchez is a 78 y o  female  She is here with her  and son  She has recurrent high-grade non-Hodgkin's lymphoma, biopsy proven from left lower neck lymph node  She does not have lymphoma related symptoms  She is allergic to Rituxan  She has low in the ejection fraction for Adriamycin  She had been treated with systemic therapy previously  She will have consultation from Dr Kirby Lockett to recommend systemic therapy for her  Recent PET-CT scan showed  generalized hypermetabolic lymphadenopathy  No palpable lymph node on examination  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   It was a localized disease  Plan was radiation and Rituxan  She had radiation  She had only one dose of Rituxan and had reaction to Rituxan  She had swelling of the tongue and closing of throat  Rituxan was discontinued     In April 2015 patient had PET CT scan that showed hypermetabolic lymphadenopathy involving the neck, chest, abdomen and pelvis and also hypermetabolic right breast nodularity, hepatic steatosis, mild/borderline aneurysmal dilatation of ascending thoracic aorta  She had left inguinal excisional lymph node biopsy, right axillary lymph node biopsy and neck node biopsy and none of those 3 biopsies showed lymphoma or malignancy  She was being followed  Sanjay Morel 2017 she presented with lymphadenopathy again especially in left axilla that was surgically removed and final path report was inconclusive   She had another biopsy from right supraclavicular area and that 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, CD20 positive  Slides [facility] at [facility] finally came after 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   Patient received 3 cycles of CHOP and 3 cycles of he EPOCH but the last 2 cycles did not have Adriamycin because of drop in ejection fraction   After completion of 3 cycles of CHOP pathologist from [address] suggested that 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 and for that reason treatment was changed to [facility]  Walter Wright Patient did not receive Rituxan because she is allergic to Rituxan  She received Neulasta  The last chemotherapy treatment was in January 2018     She was also given acyclovir and dapsone  Ludy Plymouth was stopped  Evlyn Lilian continued to take acyclovir     PET-CT scan done in February 2018 did not show residual or recurrent lymphoma   Walter Wright Patient had PET-CT scan on 06/18/2018 and that showed hypermetabolic generalized lymphadenopathy  History of DVT left arm and she was given Lovenox followed by Eliquis for a total of 6 months or so  Follow-up Doppler study was negative and D-dimer test was negative  Patient is allergic to aspirin and she did not receive that post discontinuation of Lovenox     No bleeding, fever, chills, night sweats, weight loss and swollen glands     No B symptoms  Has some tiredness  Walter Wright Physical examination and test results are as recorded and discussed  Patient has recurrent high-grade non-Hodgkin's lymphoma diffuse large B-cell, CD 20 positive  Requesting consultation from Dr Sindhu Luna as above  Patient has Port-A-Cath to get started on chemotherapy without Rituxan because of severe reaction to Rituxan  She will start taking allopurinol  Discussed in detail  Questions answered  Also discussed the importance of self-breast examination, eating healthy foods, staying active and health screening tests     1   Diffuse large B-cell lymphoma of lymph nodes of multiple regions ([facility] [address] )    - allopurinol (ZYLOPRIM) 100 mg tablet; Take 2 tablets (200 mg total) by mouth daily  Dispense: 60 tablet; Refill: 1            Patient voiced understanding and agreement in the discussion  Counseling / Coordination of Care   Greater than 50% of total time was spent with the patient and / or family counseling and / or coordination of care

## 2018-08-06 LAB
MISCELLANEOUS LAB TEST RESULT: NORMAL

## 2018-08-10 ENCOUNTER — OFFICE VISIT (OUTPATIENT)
Dept: FAMILY MEDICINE CLINIC | Facility: CLINIC | Age: 78
End: 2018-08-10
Payer: COMMERCIAL

## 2018-08-10 ENCOUNTER — APPOINTMENT (OUTPATIENT)
Dept: LAB | Facility: CLINIC | Age: 78
End: 2018-08-10
Payer: COMMERCIAL

## 2018-08-10 VITALS
DIASTOLIC BLOOD PRESSURE: 80 MMHG | SYSTOLIC BLOOD PRESSURE: 130 MMHG | TEMPERATURE: 98.9 F | HEART RATE: 90 BPM | WEIGHT: 206 LBS | BODY MASS INDEX: 36.49 KG/M2 | OXYGEN SATURATION: 95 % | RESPIRATION RATE: 16 BRPM

## 2018-08-10 DIAGNOSIS — R53.83 OTHER FATIGUE: ICD-10-CM

## 2018-08-10 DIAGNOSIS — R10.9 ACUTE LEFT FLANK PAIN: ICD-10-CM

## 2018-08-10 DIAGNOSIS — R35.0 FREQUENCY OF URINATION: ICD-10-CM

## 2018-08-10 DIAGNOSIS — R35.0 FREQUENCY OF URINATION: Primary | ICD-10-CM

## 2018-08-10 DIAGNOSIS — N30.00 ACUTE CYSTITIS WITHOUT HEMATURIA: ICD-10-CM

## 2018-08-10 PROBLEM — C83.30 DLBCL (DIFFUSE LARGE B CELL LYMPHOMA) (HCC): Status: ACTIVE | Noted: 2017-09-26

## 2018-08-10 LAB
ALBUMIN SERPL BCP-MCNC: 3.5 G/DL (ref 3.5–5)
ALP SERPL-CCNC: 156 U/L (ref 46–116)
ALT SERPL W P-5'-P-CCNC: 28 U/L (ref 12–78)
ANION GAP SERPL CALCULATED.3IONS-SCNC: 7 MMOL/L (ref 4–13)
AST SERPL W P-5'-P-CCNC: 22 U/L (ref 5–45)
BASOPHILS # BLD AUTO: 0.03 THOUSANDS/ΜL (ref 0–0.1)
BASOPHILS NFR BLD AUTO: 0 % (ref 0–1)
BILIRUB SERPL-MCNC: 0.57 MG/DL (ref 0.2–1)
BUN SERPL-MCNC: 21 MG/DL (ref 5–25)
CALCIUM SERPL-MCNC: 10.1 MG/DL (ref 8.3–10.1)
CHLORIDE SERPL-SCNC: 101 MMOL/L (ref 100–108)
CO2 SERPL-SCNC: 29 MMOL/L (ref 21–32)
CREAT SERPL-MCNC: 0.96 MG/DL (ref 0.6–1.3)
EOSINOPHIL # BLD AUTO: 0.17 THOUSAND/ΜL (ref 0–0.61)
EOSINOPHIL NFR BLD AUTO: 2 % (ref 0–6)
ERYTHROCYTE [DISTWIDTH] IN BLOOD BY AUTOMATED COUNT: 13.5 % (ref 11.6–15.1)
GFR SERPL CREATININE-BSD FRML MDRD: 58 ML/MIN/1.73SQ M
GLUCOSE SERPL-MCNC: 75 MG/DL (ref 65–140)
HCT VFR BLD AUTO: 41.3 % (ref 34.8–46.1)
HGB BLD-MCNC: 13 G/DL (ref 11.5–15.4)
IMM GRANULOCYTES # BLD AUTO: 0.04 THOUSAND/UL (ref 0–0.2)
IMM GRANULOCYTES NFR BLD AUTO: 0 % (ref 0–2)
LYMPHOCYTES # BLD AUTO: 2 THOUSANDS/ΜL (ref 0.6–4.47)
LYMPHOCYTES NFR BLD AUTO: 22 % (ref 14–44)
MCH RBC QN AUTO: 28.8 PG (ref 26.8–34.3)
MCHC RBC AUTO-ENTMCNC: 31.5 G/DL (ref 31.4–37.4)
MCV RBC AUTO: 91 FL (ref 82–98)
MONOCYTES # BLD AUTO: 1.02 THOUSAND/ΜL (ref 0.17–1.22)
MONOCYTES NFR BLD AUTO: 11 % (ref 4–12)
NEUTROPHILS # BLD AUTO: 6 THOUSANDS/ΜL (ref 1.85–7.62)
NEUTS SEG NFR BLD AUTO: 65 % (ref 43–75)
NRBC BLD AUTO-RTO: 0 /100 WBCS
PLATELET # BLD AUTO: 286 THOUSANDS/UL (ref 149–390)
PMV BLD AUTO: 10.1 FL (ref 8.9–12.7)
POTASSIUM SERPL-SCNC: 3.4 MMOL/L (ref 3.5–5.3)
PROT SERPL-MCNC: 7 G/DL (ref 6.4–8.2)
RBC # BLD AUTO: 4.52 MILLION/UL (ref 3.81–5.12)
SL AMB  POCT GLUCOSE, UA: ABNORMAL
SL AMB LEUKOCYTE ESTERASE,UA: ABNORMAL
SL AMB POCT BILIRUBIN,UA: ABNORMAL
SL AMB POCT BLOOD,UA: ABNORMAL
SL AMB POCT CLARITY,UA: ABNORMAL
SL AMB POCT COLOR,UA: YELLOW
SL AMB POCT KETONES,UA: 1.5
SL AMB POCT NITRITE,UA: ABNORMAL
SL AMB POCT PH,UA: 6
SL AMB POCT SPECIFIC GRAVITY,UA: 1.02
SL AMB POCT URINE PROTEIN: ABNORMAL
SL AMB POCT UROBILINOGEN: ABNORMAL
SODIUM SERPL-SCNC: 137 MMOL/L (ref 136–145)
WBC # BLD AUTO: 9.26 THOUSAND/UL (ref 4.31–10.16)

## 2018-08-10 PROCEDURE — 80053 COMPREHEN METABOLIC PANEL: CPT

## 2018-08-10 PROCEDURE — 36415 COLL VENOUS BLD VENIPUNCTURE: CPT

## 2018-08-10 PROCEDURE — 87186 SC STD MICRODIL/AGAR DIL: CPT | Performed by: PHYSICIAN ASSISTANT

## 2018-08-10 PROCEDURE — 85025 COMPLETE CBC W/AUTO DIFF WBC: CPT

## 2018-08-10 PROCEDURE — 87077 CULTURE AEROBIC IDENTIFY: CPT | Performed by: PHYSICIAN ASSISTANT

## 2018-08-10 PROCEDURE — 81002 URINALYSIS NONAUTO W/O SCOPE: CPT | Performed by: PHYSICIAN ASSISTANT

## 2018-08-10 PROCEDURE — 87086 URINE CULTURE/COLONY COUNT: CPT | Performed by: PHYSICIAN ASSISTANT

## 2018-08-10 PROCEDURE — 99214 OFFICE O/P EST MOD 30 MIN: CPT | Performed by: PHYSICIAN ASSISTANT

## 2018-08-10 RX ORDER — CEPHALEXIN 500 MG/1
500 CAPSULE ORAL EVERY 8 HOURS SCHEDULED
Qty: 30 CAPSULE | Refills: 0 | Status: SHIPPED | OUTPATIENT
Start: 2018-08-10 | End: 2018-08-23 | Stop reason: HOSPADM

## 2018-08-10 NOTE — PATIENT INSTRUCTIONS
Urinary Tract Infection in Women   AMBULATORY CARE:   A urinary tract infection (UTI)  is caused by bacteria that get inside your urinary tract  Most bacteria that enter your urinary tract come out when you urinate  If the bacteria stay in your urinary tract, you may get an infection  Your urinary tract includes your kidneys, ureters, bladder, and urethra  Urine is made in your kidneys, and it flows from the ureters to the bladder  Urine leaves the bladder through the urethra  A UTI is more common in your lower urinary tract, which includes your bladder and urethra  Common symptoms include the following:   · Urinating more often or waking from sleep to urinate    · Pain or burning when you urinate    · Pain or pressure in your lower abdomen     · Urine that smells bad    · Blood in your urine    · Leaking urine  Seek care immediately if:   · You are urinating very little or not at all  · You have a high fever with shaking chills  · You have side or back pain that gets worse  Contact your healthcare provider if:   · You have a fever  · You do not feel better after 2 days of taking antibiotics  · You are vomiting  · You have questions or concerns about your condition or care  Treatment for a UTI  may include medicines to treat a bacterial infection  You may also need medicines to decrease pain and burning, or decrease the urge to urinate often  Prevent a UTI:   · Empty your bladder often  Urinate and empty your bladder as soon as you feel the need  Do not hold your urine for long periods of time  · Wipe from front to back after you urinate or have a bowel movement  This will help prevent germs from getting into your urinary tract through your urethra  · Drink liquids as directed  Ask how much liquid to drink each day and which liquids are best for you  You may need to drink more liquids than usual to help flush out the bacteria  Do not drink alcohol, caffeine, or citrus juices  These can irritate your bladder and increase your symptoms  Your healthcare provider may recommend cranberry juice to help prevent a UTI  · Urinate after you have sex  This can help flush out bacteria passed during sex  · Do not douche or use feminine deodorants  These can change the chemical balance in your vagina  · Change sanitary pads or tampons often  This will help prevent germs from getting into your urinary tract  · Do pelvic muscle exercises often  Pelvic muscle exercises may help you start and stop urinating  Strong pelvic muscles may help you empty your bladder easier  Squeeze these muscles tightly for 5 seconds like you are trying to hold back urine  Then relax for 5 seconds  Gradually work up to squeezing for 10 seconds  Do 3 sets of 15 repetitions a day, or as directed  Follow up with your healthcare provider as directed:  Write down your questions so you remember to ask them during your visits  © 2017 [employer] Information is for End User's use only and may not be sold, redistributed or otherwise used for commercial purposes  All illustrations and images included in CareNotes® are the copyrighted property of [employer]  or Dionte Alfaro  The above information is an  only  It is not intended as medical advice for individual conditions or treatments  Talk to your doctor, nurse or pharmacist before following any medical regimen to see if it is safe and effective for you

## 2018-08-10 NOTE — PROGRESS NOTES
Assessment/Plan:      Diagnoses and all orders for this visit:    Frequency of urination  -     POCT urine dip  -     Urine culture  -     CBC and differential; Future  -     Comprehensive metabolic panel; Future    Acute cystitis without hematuria  -     cephalexin (KEFLEX) 500 mg capsule; Take 1 capsule (500 mg total) by mouth every 8 (eight) hours for 10 days; treat with extended course due to concern for pyelo in setting of flank pain  -     CBC and differential; Future  -     Comprehensive metabolic panel; Future    Acute left flank pain  -     cephalexin (KEFLEX) 500 mg capsule; Take 1 capsule (500 mg total) by mouth every 8 (eight) hours for 10 days  -     CBC and differential; Future  -     Comprehensive metabolic panel; Future  - please present to ER with persistent fevers, n/v, dizziness, weakness  - VSS, non-toxic appearing     Other fatigue  -     CBC and differential; Future  -     Comprehensive metabolic panel; Future          Subjective:     Patient ID: Elisabeth Schumacher is a 78 y o  female  Chief Complaint   Patient presents with    Urinary Tract Infection     HPI   Patient is a 77 yo female with recurrent diffuse B-cell lymphoma who presents with dysuria, urgency, frequency x 3- days  She was increasing her fluid intake  However symptoms continue and she does have suprapubic discomfort and flank pain  She has been having fever, chills, night sweats and nausea which has been constant with the lymphoma  She denies weakness, dizziness, lightheaded, sob, chest pain, vomitting  Review of Systems   Constitutional: Positive for appetite change (poor appetite), chills and fatigue  Negative for activity change, diaphoresis and fever  HENT: Positive for trouble swallowing (hx of needing esophageal dilations, now with swelling sec to lymoh node rescections )  Negative for congestion, ear pain, postnasal drip, rhinorrhea, sneezing and sore throat  Eyes: Negative  Respiratory: Negative  Cardiovascular: Negative  Gastrointestinal: Positive for abdominal pain (as noted in hpi) and nausea  Negative for abdominal distention, blood in stool, constipation, diarrhea and vomiting  Genitourinary: Positive for dysuria, frequency and urgency  Negative for decreased urine volume, difficulty urinating, flank pain and hematuria  Skin: Negative  Neurological: Negative for dizziness, seizures, syncope, weakness, light-headedness and headaches  Hematological: Positive for adenopathy (lymphoma)  Does not bruise/bleed easily  Psychiatric/Behavioral: Negative for agitation and confusion  The patient is nervous/anxious (tearful with discussion of recurrent cancer )          Patient Active Problem List    Diagnosis Date Noted    Lymphadenopathy of head and neck 07/09/2018    Esophageal stricture 07/05/2018    [place] (hard of hearing) 07/05/2018    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/05/2018    Osteoarthritis 07/05/2018    Infected insect bite of neck 05/22/2018    Left bundle branch block (LBBB) 04/25/2018    Constipation 01/20/2018    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 ([place] [address] ) 12/06/2017    Thrombosis of left subclavian vein (HCC) 11/12/2017    Arm DVT (deep venous thromboembolism), acute, left ([place] [address] ) 11/03/2017    Essential hypertension 11/03/2017    Thrombophlebitis of deep veins of upper extremities 11/03/2017    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/18/2017    Lymphoma ([place] [address] ) 10/03/2017    DLBCL (diffuse large B cell lymphoma) ([place] [address] ) 09/26/2017    Cataract 08/16/2017    Lymphadenopathy, axillary 08/14/2017    Situational anxiety 09/15/2016    Enlarged lymph node in neck 05/02/2016    Vitamin D deficiency 10/06/2015    Vitamin B12 deficiency 10/06/2015    Gastroesophageal reflux disease 11/22/2013    Dyslipidemia 02/28/2013     Current Outpatient Prescriptions:     allopurinol (ZYLOPRIM) 100 mg tablet, Take 2 tablets (200 mg total) by mouth daily, Disp: 60 tablet, Rfl: 1    ascorbic acid (VITAMIN C) 500 mg tablet, Take 500 mg by mouth daily with dinner  , Disp: , Rfl:     Calcium Carb-Cholecalciferol (CALCIUM + D3) 600-200 MG-UNIT TABS, Take 1 tablet by mouth daily with dinner  , Disp: , Rfl:     Cyanocobalamin (VITAMIN B 12 PO), Take 1 tablet by mouth daily with dinner  , Disp: , Rfl:     hydrochlorothiazide (HYDRODIURIL) 25 mg tablet, Take 1 tablet (25 mg total) by mouth daily in the early morning, Disp: 90 tablet, Rfl: 1    HYDROcodone-acetaminophen (NORCO) 5-325 mg per tablet, Take 1 tablet by mouth every 6 (six) hours as needed for pain Max Daily Amount: 4 tablets, Disp: 20 tablet, Rfl: 0    lisinopril (ZESTRIL) 5 mg tablet, Take 1 tablet (5 mg total) by mouth daily (Patient taking differently: Take 5 mg by mouth daily in the early morning  ), Disp: 90 tablet, Rfl: 3    loratadine (CLARITIN) 10 mg tablet, Take 10 mg by mouth daily in the early morning  , Disp: , Rfl:     Multiple Vitamin (MULTIVITAMIN) capsule, Take 1 capsule by mouth daily with dinner  , Disp: , Rfl:     Omega-3 Fatty Acids (FISH OIL) 1200 MG CAPS, Take 3 capsules by mouth daily with dinner  , Disp: , Rfl:     omeprazole (PriLOSEC OTC) 20 MG tablet, Take 1 tablet (20 mg total) by mouth daily (Patient taking differently: Take 20 mg by mouth daily in the early morning  ), Disp: 90 tablet, Rfl: 1    potassium chloride (KLOR-CON M20) 20 mEq tablet, Take 1 tablet (20 mEq total) by mouth daily in the early morning, Disp: 90 tablet, Rfl: 1    cephalexin (KEFLEX) 500 mg capsule, Take 1 capsule (500 mg total) by mouth every 8 (eight) hours for 10 days, Disp: 30 capsule, Rfl: 0      Objective:  Vitals:    08/10/18 0947   BP: 130/80   Pulse: 90   Resp: 16   Temp: 98 9 °F (37 2 °C)   SpO2: 95%      Physical Exam   Constitutional: She is oriented to person, place, and time  She appears well-developed and well-nourished  No distress  HENT:   Head: Normocephalic and atraumatic     Right Ear: External ear normal    Left Ear: External ear normal    Mouth/Throat: Oropharynx is clear and moist  No oropharyngeal exudate  L anterior neck with vertical incision intact with surrounding swelling without warmth or erythematous      Cardiovascular: Normal rate, regular rhythm and intact distal pulses  Murmur (systolic ) heard  Pulmonary/Chest: Effort normal and breath sounds normal  No respiratory distress  She has no wheezes  She has no rales  Abdominal: Soft  Bowel sounds are normal  She exhibits no distension  There is tenderness (L CVA tenderness )  There is no rebound and no guarding  Musculoskeletal: Normal range of motion  She exhibits no edema  Neurological: She is alert and oriented to person, place, and time  No cranial nerve deficit  Skin: Skin is warm and dry  No rash noted  No pallor  Psychiatric: She has a normal mood and affect   Her behavior is normal    Tearful at times

## 2018-08-12 LAB
BACTERIA UR CULT: ABNORMAL
BACTERIA UR CULT: ABNORMAL

## 2018-08-13 ENCOUNTER — TELEPHONE (OUTPATIENT)
Dept: HEMATOLOGY ONCOLOGY | Facility: CLINIC | Age: 78
End: 2018-08-13

## 2018-08-13 NOTE — TELEPHONE ENCOUNTER
Patient requesting a call to discuss her medicine  Her cancer came back and Dr Ap Thompson put her on Allopurinol a week ago and patient feels nausea,abdominal pain,weight loss and her urine is dark   She went to see her PCP on Friday and they dipped her urine and sent it out for a culture please call pt

## 2018-08-13 NOTE — TELEPHONE ENCOUNTER
Spoke with patient  She states she was in to see her PCP Friday and diagnosed with a UTI  She states she has been having "severe" nausea which she is not taking anything for  She does have Zofran at home  Advised her that she can take 8mg every 8 hours  She states she is drinking and urinating  Advised her to call back with any new or worsening symptoms  Pt verbalized understanding

## 2018-08-15 ENCOUNTER — DOCUMENTATION (OUTPATIENT)
Dept: HEMATOLOGY ONCOLOGY | Facility: CLINIC | Age: 78
End: 2018-08-15

## 2018-08-15 ENCOUNTER — OFFICE VISIT (OUTPATIENT)
Dept: HEMATOLOGY ONCOLOGY | Facility: CLINIC | Age: 78
End: 2018-08-15
Payer: COMMERCIAL

## 2018-08-15 ENCOUNTER — HOSPITAL ENCOUNTER (OUTPATIENT)
Dept: INFUSION CENTER | Facility: CLINIC | Age: 78
Discharge: HOME | End: 2018-08-15
Payer: COMMERCIAL

## 2018-08-15 VITALS
BODY MASS INDEX: 36.14 KG/M2 | RESPIRATION RATE: 16 BRPM | OXYGEN SATURATION: 95 % | HEIGHT: 63 IN | SYSTOLIC BLOOD PRESSURE: 112 MMHG | TEMPERATURE: 98.7 F | DIASTOLIC BLOOD PRESSURE: 70 MMHG | HEART RATE: 113 BPM | WEIGHT: 204 LBS

## 2018-08-15 DIAGNOSIS — C83.38 DIFFUSE LARGE B-CELL LYMPHOMA OF LYMPH NODES OF MULTIPLE SITES (HCC): Primary | ICD-10-CM

## 2018-08-15 DIAGNOSIS — Z95.828 PORT CATHETER IN PLACE: Primary | ICD-10-CM

## 2018-08-15 DIAGNOSIS — R11.0 NAUSEA: ICD-10-CM

## 2018-08-15 PROCEDURE — 99215 OFFICE O/P EST HI 40 MIN: CPT | Performed by: PHYSICIAN ASSISTANT

## 2018-08-15 PROCEDURE — 96523 IRRIG DRUG DELIVERY DEVICE: CPT

## 2018-08-15 RX ORDER — PREDNISONE 20 MG/1
20 TABLET ORAL DAILY
Qty: 30 TABLET | Refills: 0 | Status: SHIPPED | OUTPATIENT
Start: 2018-08-15 | End: 2018-09-19 | Stop reason: SDUPTHER

## 2018-08-15 RX ORDER — ONDANSETRON 4 MG/1
4 TABLET, FILM COATED ORAL EVERY 8 HOURS PRN
Qty: 30 TABLET | Refills: 0 | Status: SHIPPED | OUTPATIENT
Start: 2018-08-15

## 2018-08-15 RX ADMIN — Medication 300 UNITS: at 15:47

## 2018-08-15 NOTE — PROGRESS NOTES
Patient arrived for port port flush  Patients port was accessed without difficulty  Able to flush but no blood return  Patient's neck is still swollen from her surgery three weeks ago  Patient denied any pain while port was being flushed  Unable to give TPA r/t allergy to rituxumab  Pt will be scheduled for a dye study

## 2018-08-15 NOTE — PATIENT INSTRUCTIONS
Prednisone:  50 mg daily ( two 20 mg tabs plus one 10 mg tab) x 5 days starting on 8/16/18  THEN 20 mg  daily     Allopurinol: 100 mg by mouth daily     Revlimid 20 mg weekly x 3 then 1 week off -- this will be coming in the mail

## 2018-08-15 NOTE — LETTER
August 15, 2018     Porfirio Reed PA-C  [address]  [id number]    Patient: Esa Borden   YOB: 1940   Date of Visit: 8/15/2018       Dear Dr Sharri Sen: Thank you for referring Tenisha Vargas to me for evaluation  Below are my notes for this consultation  If you have questions, please do not hesitate to call me  I look forward to following your patient along with you  Sincerely,        Toño Diego PA-C        CC: No Recipients  Toño Diego PA-C  8/15/2018  5:01 PM  Sign at close encounter  Hematology/Oncology Outpatient Follow-up  Esa Borden 78 y o  female 1940 [id number]    Date:  8/15/2018      Assessment and Plan:  1  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 [facility])  82-year-old female with history of marginal zone lymphoma, transformed into diffuse large B-cell lymphoma now with a early recurrence of diffuse large B-cell lymphoma, double hit  Patient was seen in consultation with hematological malignancy X per from [facility]  Recommendations included rituximab with lenalidomide  If this is not tolerated, then ibrutinib  Patient was previously treated with Rituxan approximately 8 years ago and had anaphylactic reaction  She had tongue swelling and closing of her throat  Therefore, discussion was made with Dr Smita Slater and Dr randolph in regards to treatment options  They were in agreement to proceed with lenalidomide alone  Patient will receive 20 mg lenalidomide daily weekly x3, 1 week off  I reviewed the REM[facility] program with patient  We completed questionnaire  Patient is aware that this drug is to teratogenetic  She is not of childbearing age  She will not be showing her medication with any other patient  Reviewed side effects include but are not limited to cytopenias, fatigue, constipation, diarrhea, nausea, stroke, heart attack, blood clots    She was agreeable to proceed  Dr Margarita Avery also reviewed the above with patient  Patient signed informed consent  She will have CBC and CMP every 2 weeks once Revlimid begins  She continues to have swelling over area where lymph node biopsy was on the left neck area  She has not been compliant with heat and cold as instructed by surgeon  Assessment of area with CT of the neck  This will be arranged for this week  We will provide patient with prednisone  50 mg once daily x5 days followed by 20 mg daily  This should help with swelling  Patient also has fatigue and decreased appetite  Prednisone should help with the symptoms as well  Also, consultation with radiation oncologist to radiate this area  She was having some nausea secondary to allopurinol  This has been improved with taking Zofran simultaneously  She may decrease allopurinol to 100 mg daily  Continue antiemetics p r n  Linda Settle - CT neck and chest w contrast; Future  - Ambulatory referral to Radiation Oncology; Future  - CBC and differential; Standing  - Comprehensive metabolic panel; Standing  - CBC and differential  - Comprehensive metabolic panel  - predniSONE 20 mg tablet; Take 1 tablet (20 mg total) by mouth daily with food  Dispense: 30 tablet; Refill: 0    2  Nausea  - ondansetron (ZOFRAN) 4 mg tablet; Take 1 tablet (4 mg total) by mouth every 8 (eight) hours as needed for nausea or vomiting  Dispense: 30 tablet; Refill: 0      HPI:  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  It was a localized disease  Plan was radiation and Rituxan  She had radiation  She had only one dose of Rituxan and had reaction to Rituxan  She had swelling of the tongue and closing of throat  Rituxan was discontinued      In April 2015 patient had PET CT scan that showed hypermetabolic lymphadenopathy involving the neck, chest, abdomen and pelvis and also hypermetabolic right breast nodularity, hepatic steatosis, mild/borderline aneurysmal dilatation of ascending thoracic aorta  She had left inguinal excisional lymph node biopsy, right axillary lymph node biopsy and neck node biopsy and none of those 3 biopsies showed lymphoma or malignancy  She was being followed  Aug 2017 she presented with axillary adenopathy  Left axillary lymph node biopsy showed marginal zone lymphoma with features suggestive of aggressive behavior  She then had additional biopsy which showed 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  Bone marrow biopsy was negative  She underwent 3 cycles of CHOP and 3 cycles of EPOCH (last 2 cycles dropped adriamycin due to decreased EF  Feb 2018 post treatment PET/CT showed complete metabolic response  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%  June 2018 PET/CT showed recurrent hypermetabolic generalized lymphadenopathy  She had a core biopsy of the left neck lymph node which showed high-grade B-cell lymphoma, positive PAX5, CD 20+, 35% BCL 6, 45% BCL2, 35% MUM-1, No MYC stained  Ki67 85%  FISH showed MYC-3 range of positive, 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, BCL to expressing approximately 80%    Patient was seen by Dr Clare Mera om 8/9/18, expert in hematological malignancies from [facility]  Dr Margaret Ernst explained to patient and her family that in the absence of intense salvage therapy, is unlikely to achieve remission  The goal of continued therapy will include disease control and improving progression-free survival and overall survival     Dr Margaret Ernst recommended rituximab with lenalidomide  This is not tolerated recommend treatment with ibrutinib  Due to patient's anaphylactic reaction with Rituxan therapy prior, Rituxan will not be administered  Dr Camelia Daniels and Dr Margaret Ernst did discuss this and were in agreement  Interval history:    ROS: Review of Systems   Constitutional: Positive for fatigue   Negative for appetite change, chills, fever and unexpected weight change  HENT: Negative for mouth sores and nosebleeds  Respiratory: Positive for cough (since biopsy )  Negative for chest tightness, shortness of breath and wheezing  Cardiovascular: Negative for chest pain, palpitations and leg swelling  Gastrointestinal: Positive for nausea  Negative for abdominal pain, blood in stool, constipation, diarrhea and vomiting  Genitourinary: Negative for difficulty urinating, dysuria and hematuria  Completing antibiotic for UTI   Musculoskeletal: Negative for arthralgias, joint swelling and myalgias  Skin: Negative  Neurological: Negative for dizziness, weakness, light-headedness, numbness and headaches  Hematological: Negative  Psychiatric/Behavioral: Negative  Past Medical History:   Diagnosis Date    Basal cell carcinoma (BCC)     Bruit of right carotid artery     resolved 5/14/15     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HCC)     radiation    Dyslipidemia     Esophageal stricture     last assessed 2/28/13       GERD (gastroesophageal reflux disease)     [place] (hard of hearing[place]     b/l ears    Hyperlipidemia     Hypertension     Osteoarthritis     Osteoarthrosis of ankle and foot     last assessed 5/20/13     Osteopenia     last assessed 2/28/13     Plantar fasciitis of left foot     Polyp of sigmoid colon     last assessed 2/28/13     Shortness of breath     when walking up stairs       Past Surgical History:   Procedure Laterality Date    ANKLE ARTHROPLASTY Left     BLEPHAROPLASTY      right eye     CATARACT EXTRACTION Right     COLONOSCOPY      ESOPHAGEAL DILATION      x2    ESOPHAGOGASTRODUODENOSCOPY      dilitation    FACIAL/NECK BIOPSY Right 9/8/2017    Procedure: NECK NODE BIOPSY WITH NEEDLE LOCALIZATION; LYMPHOMA PROTOCOL (NEEDLE LOC WITH I R  AT 1100);   Surgeon: Arabella Taylor MD;  [facility];  Service: Surgical Oncology    HYSTERECTOMY      LYMPH NODE DISSECTION      right groin and neck    ORBITAL FLOOR EXPLORATION Right     for cancer     PORTACATH PLACEMENT      left chest     TN BX/REMV,LYMPH NODE,DEEP AXILL Left 8/14/2017    Procedure: Axillary lymph node excision with LYMPHOMA PROTOCOL;  Surgeon: Huong Allen MD;  [facility];  Service: Surgical Oncology    TN BX/REMV,LYMPH NODE,DEEP CERV Right 5/2/2016    Procedure: NECK NODE BIOPSY;  Surgeon: Huong Allen MD;  [facility];  Service: Surgical Oncology    TN BX/REMV,LYMPH NODE,DEEP CERV Left 7/25/2018    Procedure: NECK NODE BIOPYS WITH LYMPHOMA PROTOCOL; (ULTRASOUND GUIDED NEEDLE LOC IN IR AT 0800);   Surgeon: Huong Allen MD;  [facility];  Service: Surgical Oncology    TN INSJ TUNNELED CTR VAD W/SUBQ PORT AGE 5 YR/> N/A 10/3/2017    Procedure: PLACEMENT OF PORT-A-CATH DEVICE;  Surgeon: Huong Allen MD;  [facility];  Service: Surgical Oncology       Social History     Social History    Marital status: /Civil Union     Spouse name: N/A    Number of children: N/A    Years of education: N/A     Social History Main Topics    Smoking status: Former Smoker     Quit date: 1970    Smokeless tobacco: Never Used    Alcohol use No    Drug use: No    Sexual activity: Not on file     Other Topics Concern    Not on file     Social History Narrative    Uses safety equipment seatbelts        Family History   Problem Relation Age of Onset    Multiple myeloma Mother     Heart disease Father     Hypertension Father     Hypertension Sister     Heart disease Sister     Arthritis Family     Breast cancer Family     Osteoporosis Family        Allergies   Allergen Reactions    Aspirin Anaphylaxis    Celebrex [Celecoxib] Anaphylaxis    Nsaids Anaphylaxis    Other Anaphylaxis     Pt states when she received a certain type of chemotherapy it caused her throat to close    Rituxan [Rituximab] Anaphylaxis     Swelling of tongue and closing of throat    Sulfa Antibiotics Other (See Comments) and Anaphylaxis VERY EMOTIONAL CRYING    Bactrim [Sulfamethoxazole-Trimethoprim] Other (See Comments)     VERY EMOTIONAL/CRYING         Current Outpatient Prescriptions:     allopurinol (ZYLOPRIM) 100 mg tablet, Take 2 tablets (200 mg total) by mouth daily, Disp: 60 tablet, Rfl: 1    ascorbic acid (VITAMIN C) 500 mg tablet, Take 500 mg by mouth daily with dinner  , Disp: , Rfl:     Calcium Carb-Cholecalciferol (CALCIUM + D3) 600-200 MG-UNIT TABS, Take 1 tablet by mouth daily with dinner  , Disp: , Rfl:     cephalexin (KEFLEX) 500 mg capsule, Take 1 capsule (500 mg total) by mouth every 8 (eight) hours for 10 days, Disp: 30 capsule, Rfl: 0    Cyanocobalamin (VITAMIN B 12 PO), Take 1 tablet by mouth daily with dinner  , Disp: , Rfl:     hydrochlorothiazide (HYDRODIURIL) 25 mg tablet, Take 1 tablet (25 mg total) by mouth daily in the early morning, Disp: 90 tablet, Rfl: 1    HYDROcodone-acetaminophen (NORCO) 5-325 mg per tablet, Take 1 tablet by mouth every 6 (six) hours as needed for pain Max Daily Amount: 4 tablets, Disp: 20 tablet, Rfl: 0    lisinopril (ZESTRIL) 5 mg tablet, Take 1 tablet (5 mg total) by mouth daily (Patient taking differently: Take 5 mg by mouth daily in the early morning  ), Disp: 90 tablet, Rfl: 3    loratadine (CLARITIN) 10 mg tablet, Take 10 mg by mouth daily in the early morning  , Disp: , Rfl:     Multiple Vitamin (MULTIVITAMIN) capsule, Take 1 capsule by mouth daily with dinner  , Disp: , Rfl:     Omega-3 Fatty Acids (FISH OIL) 1200 MG CAPS, Take 3 capsules by mouth daily with dinner  , Disp: , Rfl:     omeprazole (PriLOSEC OTC) 20 MG tablet, Take 1 tablet (20 mg total) by mouth daily (Patient taking differently: Take 20 mg by mouth daily in the early morning  ), Disp: 90 tablet, Rfl: 1    potassium chloride (KLOR-CON M20) 20 mEq tablet, Take 1 tablet (20 mEq total) by mouth daily in the early morning, Disp: 90 tablet, Rfl: 1      Physical Exam:  There were no vitals taken for this visit     Physical Exam   Constitutional: She is oriented to person, place, and time  She appears well-developed and well-nourished  No distress  Appears fatigued    HENT:   Head: Normocephalic and atraumatic  Eyes: Conjunctivae are normal  No scleral icterus  Neck: Normal range of motion  Neck supple  Incision lateral to left aspect of neck; swelling around the area; non-painful to palpation; no erythema/signs of infection      Cardiovascular: Normal rate, regular rhythm and normal heart sounds  No murmur heard  Pulmonary/Chest: Effort normal and breath sounds normal  No respiratory distress  Abdominal: Soft  There is no tenderness  Musculoskeletal: Normal range of motion  She exhibits no edema or tenderness  Neurological: She is alert and oriented to person, place, and time  No cranial nerve deficit  Skin: Skin is warm and dry  Psychiatric: She has a normal mood and affect  Vitals reviewed  Labs:  Lab Results   Component Value Date    WBC 9 26 08/10/2018    HGB 13 0 08/10/2018    HCT 41 3 08/10/2018    MCV 91 08/10/2018     08/10/2018     Lab Results   Component Value Date     08/10/2018    K 3 4 (L) 08/10/2018     08/10/2018    CO2 29 08/10/2018    ANIONGAP 7 08/10/2018    BUN 21 08/10/2018    CREATININE 0 96 08/10/2018    GLUCOSE 75 08/10/2018    GLUF 94 06/29/2018    CALCIUM 10 1 08/10/2018    AST 22 08/10/2018    ALT 28 08/10/2018    ALKPHOS 156 (H) 08/10/2018    PROT 7 0 08/10/2018    BILITOT 0 57 08/10/2018    EGFR 58 08/10/2018       Patient voiced understanding and agreement in the above discussion  Aware to contact our office with questions/symptoms in the interim

## 2018-08-15 NOTE — PROGRESS NOTES
Received call from infusion center RN, Johan Kumar  Stated patient came in for port maintenance  Port flushed without difficulty, but would not give blood return back  Per Jb Trevino, Pharmacist in infusion center, pt has Anaphylactic reaction with Rituximab, which can carries a small risk for a reaction with TPA  Spoke with Dr Giovanny Rivers who did not feel comfortable going forward with TPAing the line, a dye study order was placed in EP[facility]  Pt scheduled for Wednesday 8/22 at 0930  Left voicemail for patient with this information  Asked for a call back with any questions

## 2018-08-15 NOTE — PROGRESS NOTES
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 [facility])  74-year-old female with history of marginal zone lymphoma, transformed into diffuse large B-cell lymphoma now with a early recurrence of diffuse large B-cell lymphoma, double hit  Patient was seen in consultation with hematological malignancy X per from [facility]  Recommendations included rituximab with lenalidomide  If this is not tolerated, then ibrutinib  Patient was previously treated with Rituxan approximately 8 years ago and had anaphylactic reaction  She had tongue swelling and closing of her throat  Therefore, discussion was made with Dr Roni Espinosa and Dr randolph in regards to treatment options  They were in agreement to proceed with lenalidomide alone  Patient will receive 20 mg lenalidomide daily weekly x3, 1 week off  I reviewed the [facility] program with patient  We completed questionnaire  Patient is aware that this drug is to teratogenetic  She is not of childbearing age  She will not be showing her medication with any other patient  Reviewed side effects include but are not limited to cytopenias, fatigue, constipation, diarrhea, nausea, stroke, heart attack, blood clots  She was agreeable to proceed  Dr Roni Espinosa also reviewed the above with patient  Patient signed informed consent  She will have CBC and CMP every 2 weeks once Revlimid begins  She continues to have swelling over area where lymph node biopsy was on the left neck area  She has not been compliant with heat and cold as instructed by surgeon  Assessment of area with CT of the neck  This will be arranged for this week  We will provide patient with prednisone  50 mg once daily x5 days followed by 20 mg daily  This should help with swelling   Patient also has fatigue and decreased appetite  Prednisone should help with the symptoms as well  Also, consultation with radiation oncologist to radiate this area  She was having some nausea secondary to allopurinol  This has been improved with taking Zofran simultaneously  She may decrease allopurinol to 100 mg daily  Continue antiemetics p r n  Anette Sole - CT neck and chest w contrast; Future  - Ambulatory referral to Radiation Oncology; Future  - CBC and differential; Standing  - Comprehensive metabolic panel; Standing  - CBC and differential  - Comprehensive metabolic panel  - predniSONE 20 mg tablet; Take 1 tablet (20 mg total) by mouth daily with food  Dispense: 30 tablet; Refill: 0    2  Nausea  - ondansetron (ZOFRAN) 4 mg tablet; Take 1 tablet (4 mg total) by mouth every 8 (eight) hours as needed for nausea or vomiting  Dispense: 30 tablet; Refill: 0      HPI:  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  It was a localized disease  Plan was radiation and Rituxan  She had radiation  She had only one dose of Rituxan and had reaction to Rituxan  She had swelling of the tongue and closing of throat  Rituxan was discontinued  In April 2015 patient had PET CT scan that showed hypermetabolic lymphadenopathy involving the neck, chest, abdomen and pelvis and also hypermetabolic right breast nodularity, hepatic steatosis, mild/borderline aneurysmal dilatation of ascending thoracic aorta  She had left inguinal excisional lymph node biopsy, right axillary lymph node biopsy and neck node biopsy and none of those 3 biopsies showed lymphoma or malignancy  She was being followed  Aug 2017 she presented with axillary adenopathy  Left axillary lymph node biopsy showed marginal zone lymphoma with features suggestive of aggressive behavior  She then had additional biopsy which showed 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  Bone marrow biopsy was negative       She underwent 3 cycles of CHOP and 3 cycles of EPOCH (last 2 cycles dropped adriamycin due to decreased EF  Feb 2018 post treatment PET/CT showed complete metabolic response  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%  June 2018 PET/CT showed recurrent hypermetabolic generalized lymphadenopathy  She had a core biopsy of the left neck lymph node which showed high-grade B-cell lymphoma, positive PAX5, CD 20+, 35% BCL 6, 45% BCL2, 35% MUM-1, No MYC stained  Ki67 85%  FISH showed MYC-3 range of positive, 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, BCL to expressing approximately 80%    Patient was seen by Dr Laurence Canseco om 8/9/18, expert in hematological malignancies from [facility]  Dr Cody Domingo explained to patient and her family that in the absence of intense salvage therapy, is unlikely to achieve remission  The goal of continued therapy will include disease control and improving progression-free survival and overall survival     Dr Cody Domingo recommended rituximab with lenalidomide  This is not tolerated recommend treatment with ibrutinib  Due to patient's anaphylactic reaction with Rituxan therapy prior, Rituxan will not be administered  Dr Merlin Stevens and Dr Cody Domingo did discuss this and were in agreement  Interval history:    ROS: Review of Systems   Constitutional: Positive for fatigue  Negative for appetite change, chills, fever and unexpected weight change  HENT: Negative for mouth sores and nosebleeds  Respiratory: Positive for cough (since biopsy )  Negative for chest tightness, shortness of breath and wheezing  Cardiovascular: Negative for chest pain, palpitations and leg swelling  Gastrointestinal: Positive for nausea  Negative for abdominal pain, blood in stool, constipation, diarrhea and vomiting  Genitourinary: Negative for difficulty urinating, dysuria and hematuria  Completing antibiotic for UTI   Musculoskeletal: Negative for arthralgias, joint swelling and myalgias     Skin: Negative  Neurological: Negative for dizziness, weakness, light-headedness, numbness and headaches  Hematological: Negative  Psychiatric/Behavioral: Negative  Past Medical History:   Diagnosis Date    Basal cell carcinoma (BCC)     Bruit of right carotid artery     resolved 5/14/15     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HCC)     radiation    Dyslipidemia     Esophageal stricture     last assessed 2/28/13       GERD (gastroesophageal reflux disease)     [place] (hard of hearing)     b/l ears    Hyperlipidemia     Hypertension     Osteoarthritis     Osteoarthrosis of ankle and foot     last assessed 5/20/13     Osteopenia     last assessed 2/28/13     Plantar fasciitis of left foot     Polyp of sigmoid colon     last assessed 2/28/13     Shortness of breath     when walking up stairs       Past Surgical History:   Procedure Laterality Date    ANKLE ARTHROPLASTY Left     BLEPHAROPLASTY      right eye     CATARACT EXTRACTION Right     COLONOSCOPY      ESOPHAGEAL DILATION      x2    ESOPHAGOGASTRODUODENOSCOPY      dilitation    FACIAL/NECK BIOPSY Right 9/8/2017    Procedure: NECK NODE BIOPSY WITH NEEDLE LOCALIZATION; LYMPHOMA PROTOCOL (NEEDLE LOC WITH I R  AT 1100);   Surgeon: Jacob Mccloud MD;  [facility];  Service: Surgical Oncology    HYSTERECTOMY      LYMPH NODE DISSECTION      right groin and neck    ORBITAL FLOOR EXPLORATION Right     for cancer     PORTACATH PLACEMENT      left chest     NC BX/REMV,LYMPH NODE,DEEP AXILL Left 8/14/2017    Procedure: Axillary lymph node excision with LYMPHOMA PROTOCOL;  Surgeon: Jacob Mccloud MD;  [facility];  Service: Surgical Oncology    NC BX/REMV,LYMPH NODE,DEEP CERV Right 5/2/2016    Procedure: NECK NODE BIOPSY;  Surgeon: Jacob Mccloud MD;  [facility];  Service: Surgical Oncology    NC BX/REMV,LYMPH NODE,DEEP CERV Left 7/25/2018    Procedure: NECK NODE BIOPYS WITH LYMPHOMA PROTOCOL; (ULTRASOUND GUIDED NEEDLE LOC IN IR AT 0800);   Surgeon: Ranulfo Atkinson MD;  [facility];  Service: Surgical Oncology    [place] INSJ TUNNELED CTR VAD W/SUBQ PORT AGE 5 YR/> N/A 10/3/2017    Procedure: PLACEMENT OF PORT-A-CATH DEVICE;  Surgeon: Ranulfo Atkinson MD;  [facility];  Service: Surgical Oncology       Social History     Social History    Marital status: /Civil Union     Spouse name: N/A    Number of children: N/A    Years of education: N/A     Social History Main Topics    Smoking status: Former Smoker     Quit date: 1970    Smokeless tobacco: Never Used    Alcohol use No    Drug use: No    Sexual activity: Not on file     Other Topics Concern    Not on file     Social History Narrative    Uses safety equipment seatbelts        Family History   Problem Relation Age of Onset    Multiple myeloma Mother     Heart disease Father     Hypertension Father     Hypertension Sister     Heart disease Sister     Arthritis Family     Breast cancer Family     Osteoporosis Family        Allergies   Allergen Reactions    Aspirin Anaphylaxis    Celebrex [Celecoxib] Anaphylaxis    Nsaids Anaphylaxis    Other Anaphylaxis     Pt states when she received a certain type of chemotherapy it caused her throat to close    Rituxan [Rituximab] Anaphylaxis     Swelling of tongue and closing of throat    Sulfa Antibiotics Other (See Comments) and Anaphylaxis     VERY EMOTIONAL CRYING    Bactrim [Sulfamethoxazole-Trimethoprim] Other (See Comments)     VERY EMOTIONAL/CRYING         Current Outpatient Prescriptions:     allopurinol (ZYLOPRIM) 100 mg tablet, Take 2 tablets (200 mg total) by mouth daily, Disp: 60 tablet, Rfl: 1    ascorbic acid (VITAMIN C) 500 mg tablet, Take 500 mg by mouth daily with dinner  , Disp: , Rfl:     Calcium Carb-Cholecalciferol (CALCIUM + D3) 600-200 MG-UNIT TABS, Take 1 tablet by mouth daily with dinner  , Disp: , Rfl:     cephalexin (KEFLEX) 500 mg capsule, Take 1 capsule (500 mg total) by mouth every 8 (eight) hours for 10 days, Disp: 30 capsule, Rfl: 0    Cyanocobalamin (VITAMIN B 12 PO), Take 1 tablet by mouth daily with dinner  , Disp: , Rfl:     hydrochlorothiazide (HYDRODIURIL) 25 mg tablet, Take 1 tablet (25 mg total) by mouth daily in the early morning, Disp: 90 tablet, Rfl: 1    HYDROcodone-acetaminophen (NORCO) 5-325 mg per tablet, Take 1 tablet by mouth every 6 (six) hours as needed for pain Max Daily Amount: 4 tablets, Disp: 20 tablet, Rfl: 0    lisinopril (ZESTRIL) 5 mg tablet, Take 1 tablet (5 mg total) by mouth daily (Patient taking differently: Take 5 mg by mouth daily in the early morning  ), Disp: 90 tablet, Rfl: 3    loratadine (CLARITIN) 10 mg tablet, Take 10 mg by mouth daily in the early morning  , Disp: , Rfl:     Multiple Vitamin (MULTIVITAMIN) capsule, Take 1 capsule by mouth daily with dinner  , Disp: , Rfl:     Omega-3 Fatty Acids (FISH OIL) 1200 MG CAPS, Take 3 capsules by mouth daily with dinner  , Disp: , Rfl:     omeprazole (PriLOSEC OTC) 20 MG tablet, Take 1 tablet (20 mg total) by mouth daily (Patient taking differently: Take 20 mg by mouth daily in the early morning  ), Disp: 90 tablet, Rfl: 1    potassium chloride (KLOR-CON M20) 20 mEq tablet, Take 1 tablet (20 mEq total) by mouth daily in the early morning, Disp: 90 tablet, Rfl: 1      Physical Exam:  There were no vitals taken for this visit  Physical Exam   Constitutional: She is oriented to person, place, and time  She appears well-developed and well-nourished  No distress  Appears fatigued    HENT:   Head: Normocephalic and atraumatic  Eyes: Conjunctivae are normal  No scleral icterus  Neck: Normal range of motion  Neck supple  Incision lateral to left aspect of neck; swelling around the area; non-painful to palpation; no erythema/signs of infection      Cardiovascular: Normal rate, regular rhythm and normal heart sounds  No murmur heard    Pulmonary/Chest: Effort normal and breath sounds normal  No respiratory distress  Abdominal: Soft  There is no tenderness  Musculoskeletal: Normal range of motion  She exhibits no edema or tenderness  Neurological: She is alert and oriented to person, place, and time  No cranial nerve deficit  Skin: Skin is warm and dry  Psychiatric: She has a normal mood and affect  Vitals reviewed  Labs:  Lab Results   Component Value Date    WBC 9 26 08/10/2018    HGB 13 0 08/10/2018    HCT 41 3 08/10/2018    MCV 91 08/10/2018     08/10/2018     Lab Results   Component Value Date     08/10/2018    K 3 4 (L) 08/10/2018     08/10/2018    CO2 29 08/10/2018    ANIONGAP 7 08/10/2018    BUN 21 08/10/2018    CREATININE 0 96 08/10/2018    GLUCOSE 75 08/10/2018    GLUF 94 06/29/2018    CALCIUM 10 1 08/10/2018    AST 22 08/10/2018    ALT 28 08/10/2018    ALKPHOS 156 (H) 08/10/2018    PROT 7 0 08/10/2018    BILITOT 0 57 08/10/2018    EGFR 58 08/10/2018       Patient voiced understanding and agreement in the above discussion  Aware to contact our office with questions/symptoms in the interim

## 2018-08-15 NOTE — PLAN OF CARE

## 2018-08-16 DIAGNOSIS — C85.90 NON-HODGKIN'S LYMPHOMA, UNSPECIFIED BODY REGION, UNSPECIFIED NON-HODGKIN LYMPHOMA TYPE (HCC): Primary | ICD-10-CM

## 2018-08-18 ENCOUNTER — HOSPITAL ENCOUNTER (OUTPATIENT)
Dept: CT IMAGING | Facility: HOSPITAL | Age: 78
Discharge: HOME | End: 2018-08-18
Payer: COMMERCIAL

## 2018-08-18 DIAGNOSIS — R60.9 SWELLING: ICD-10-CM

## 2018-08-18 DIAGNOSIS — C83.38 DIFFUSE LARGE B-CELL LYMPHOMA OF LYMPH NODES OF MULTIPLE SITES (HCC): ICD-10-CM

## 2018-08-18 PROCEDURE — 70491 CT SOFT TISSUE NECK W/DYE: CPT

## 2018-08-18 PROCEDURE — 71260 CT THORAX DX C+: CPT

## 2018-08-18 RX ADMIN — IOHEXOL 85 ML: 350 INJECTION, SOLUTION INTRAVENOUS at 13:53

## 2018-08-20 ENCOUNTER — TELEPHONE (OUTPATIENT)
Dept: HEMATOLOGY ONCOLOGY | Facility: CLINIC | Age: 78
End: 2018-08-20

## 2018-08-20 DIAGNOSIS — I82.90 ACUTE DEEP VEIN THROMBOSIS (DVT) OF NON-EXTREMITY VEIN: Primary | ICD-10-CM

## 2018-08-20 DIAGNOSIS — I82.90 THROMBOSIS: Primary | ICD-10-CM

## 2018-08-20 NOTE — TELEPHONE ENCOUNTER
Called and made the patient aware that her neck CT scan showed a partial clot in the vein near her biopsy site  Jennifer Palomino wants the patient to have a PT/INR drawn tomorrow (order in EPIC) and then to start Eliquis 5 mg PO BID  Patient verbally understood

## 2018-08-21 ENCOUNTER — TELEPHONE (OUTPATIENT)
Dept: HEMATOLOGY ONCOLOGY | Facility: CLINIC | Age: 78
End: 2018-08-21

## 2018-08-21 ENCOUNTER — HOSPITAL ENCOUNTER (INPATIENT)
Facility: HOSPITAL | Age: 78
LOS: 2 days | Discharge: HOME | DRG: 392 | End: 2018-08-23
Attending: EMERGENCY MEDICINE | Admitting: INTERNAL MEDICINE
Payer: COMMERCIAL

## 2018-08-21 ENCOUNTER — APPOINTMENT (EMERGENCY)
Dept: RADIOLOGY | Facility: HOSPITAL | Age: 78
DRG: 392 | End: 2018-08-21
Payer: COMMERCIAL

## 2018-08-21 DIAGNOSIS — C83.38 DIFFUSE LARGE B-CELL LYMPHOMA OF LYMPH NODES OF MULTIPLE SITES (HCC): ICD-10-CM

## 2018-08-21 DIAGNOSIS — C85.80 MARGINAL ZONE LYMPHOMA (HCC): ICD-10-CM

## 2018-08-21 DIAGNOSIS — R10.9 FLANK PAIN: Primary | ICD-10-CM

## 2018-08-21 DIAGNOSIS — K59.00 CONSTIPATION: ICD-10-CM

## 2018-08-21 DIAGNOSIS — R10.9 ACUTE LEFT FLANK PAIN: ICD-10-CM

## 2018-08-21 PROBLEM — I82.890 LEFT JUGULAR VEIN THROMBOSIS: Status: ACTIVE | Noted: 2018-08-21

## 2018-08-21 LAB
ALBUMIN SERPL BCP-MCNC: 3.2 G/DL (ref 3.5–5)
ALP SERPL-CCNC: 97 U/L (ref 46–116)
ALT SERPL W P-5'-P-CCNC: 117 U/L (ref 12–78)
ANION GAP SERPL CALCULATED.3IONS-SCNC: 5 MMOL/L (ref 4–13)
AST SERPL W P-5'-P-CCNC: 85 U/L (ref 5–45)
ATRIAL RATE: 101 BPM
BACTERIA UR QL AUTO: ABNORMAL /HPF
BASOPHILS # BLD AUTO: 0.01 THOUSANDS/ΜL (ref 0–0.1)
BASOPHILS NFR BLD AUTO: 0 % (ref 0–1)
BILIRUB SERPL-MCNC: 0.47 MG/DL (ref 0.2–1)
BILIRUB UR QL STRIP: NEGATIVE
BUN SERPL-MCNC: 20 MG/DL (ref 5–25)
CALCIUM SERPL-MCNC: 9.8 MG/DL (ref 8.3–10.1)
CHLORIDE SERPL-SCNC: 102 MMOL/L (ref 100–108)
CLARITY UR: CLEAR
CO2 SERPL-SCNC: 29 MMOL/L (ref 21–32)
COLOR UR: YELLOW
COLOR, POC: YELLOW
CREAT SERPL-MCNC: 0.96 MG/DL (ref 0.6–1.3)
EOSINOPHIL # BLD AUTO: 0.09 THOUSAND/ΜL (ref 0–0.61)
EOSINOPHIL NFR BLD AUTO: 1 % (ref 0–6)
ERYTHROCYTE [DISTWIDTH] IN BLOOD BY AUTOMATED COUNT: 13.9 % (ref 11.6–15.1)
GFR SERPL CREATININE-BSD FRML MDRD: 58 ML/MIN/1.73SQ M
GLUCOSE SERPL-MCNC: 75 MG/DL (ref 65–140)
GLUCOSE UR STRIP-MCNC: NEGATIVE MG/DL
HCT VFR BLD AUTO: 41.1 % (ref 34.8–46.1)
HGB BLD-MCNC: 13 G/DL (ref 11.5–15.4)
HGB UR QL STRIP.AUTO: NEGATIVE
HYALINE CASTS #/AREA URNS LPF: ABNORMAL /LPF
IMM GRANULOCYTES # BLD AUTO: 0.17 THOUSAND/UL (ref 0–0.2)
IMM GRANULOCYTES NFR BLD AUTO: 2 % (ref 0–2)
KETONES UR STRIP-MCNC: NEGATIVE MG/DL
LEUKOCYTE ESTERASE UR QL STRIP: ABNORMAL
LIPASE SERPL-CCNC: 117 U/L (ref 73–393)
LYMPHOCYTES # BLD AUTO: 1.14 THOUSANDS/ΜL (ref 0.6–4.47)
LYMPHOCYTES NFR BLD AUTO: 10 % (ref 14–44)
MCH RBC QN AUTO: 29 PG (ref 26.8–34.3)
MCHC RBC AUTO-ENTMCNC: 31.6 G/DL (ref 31.4–37.4)
MCV RBC AUTO: 92 FL (ref 82–98)
MONOCYTES # BLD AUTO: 0.99 THOUSAND/ΜL (ref 0.17–1.22)
MONOCYTES NFR BLD AUTO: 9 % (ref 4–12)
NEUTROPHILS # BLD AUTO: 8.94 THOUSANDS/ΜL (ref 1.85–7.62)
NEUTS SEG NFR BLD AUTO: 78 % (ref 43–75)
NITRITE UR QL STRIP: NEGATIVE
NON-SQ EPI CELLS URNS QL MICRO: ABNORMAL /HPF
NRBC BLD AUTO-RTO: 0 /100 WBCS
P AXIS: 56 DEGREES
PH UR STRIP.AUTO: 6.5 [PH] (ref 4.5–8)
PLATELET # BLD AUTO: 218 THOUSANDS/UL (ref 149–390)
PMV BLD AUTO: 9.3 FL (ref 8.9–12.7)
POTASSIUM SERPL-SCNC: 4.2 MMOL/L (ref 3.5–5.3)
PR INTERVAL: 148 MS
PROT SERPL-MCNC: 6.4 G/DL (ref 6.4–8.2)
PROT UR STRIP-MCNC: NEGATIVE MG/DL
QRS AXIS: -37 DEGREES
QRSD INTERVAL: 166 MS
QT INTERVAL: 398 MS
QTC INTERVAL: 516 MS
RBC # BLD AUTO: 4.49 MILLION/UL (ref 3.81–5.12)
RBC #/AREA URNS AUTO: ABNORMAL /HPF
SODIUM SERPL-SCNC: 136 MMOL/L (ref 136–145)
SP GR UR STRIP.AUTO: 1.01 (ref 1–1.03)
T WAVE AXIS: 90 DEGREES
TROPONIN I SERPL-MCNC: <0.02 NG/ML
UROBILINOGEN UR QL STRIP.AUTO: 0.2 E.U./DL
VENTRICULAR RATE: 101 BPM
WBC # BLD AUTO: 11.34 THOUSAND/UL (ref 4.31–10.16)
WBC #/AREA URNS AUTO: ABNORMAL /HPF

## 2018-08-21 PROCEDURE — 99223 1ST HOSP IP/OBS HIGH 75: CPT | Performed by: INTERNAL MEDICINE

## 2018-08-21 PROCEDURE — 96376 TX/PRO/DX INJ SAME DRUG ADON: CPT

## 2018-08-21 PROCEDURE — 83690 ASSAY OF LIPASE: CPT | Performed by: EMERGENCY MEDICINE

## 2018-08-21 PROCEDURE — 85025 COMPLETE CBC W/AUTO DIFF WBC: CPT | Performed by: EMERGENCY MEDICINE

## 2018-08-21 PROCEDURE — 80053 COMPREHEN METABOLIC PANEL: CPT | Performed by: EMERGENCY MEDICINE

## 2018-08-21 PROCEDURE — 93005 ELECTROCARDIOGRAM TRACING: CPT

## 2018-08-21 PROCEDURE — 81001 URINALYSIS AUTO W/SCOPE: CPT

## 2018-08-21 PROCEDURE — 74177 CT ABD & PELVIS W/CONTRAST: CPT

## 2018-08-21 PROCEDURE — 93010 ELECTROCARDIOGRAM REPORT: CPT | Performed by: INTERNAL MEDICINE

## 2018-08-21 PROCEDURE — 71260 CT THORAX DX C+: CPT

## 2018-08-21 PROCEDURE — 99285 EMERGENCY DEPT VISIT HI MDM: CPT

## 2018-08-21 PROCEDURE — 84484 ASSAY OF TROPONIN QUANT: CPT | Performed by: EMERGENCY MEDICINE

## 2018-08-21 PROCEDURE — 36415 COLL VENOUS BLD VENIPUNCTURE: CPT

## 2018-08-21 PROCEDURE — 96374 THER/PROPH/DIAG INJ IV PUSH: CPT

## 2018-08-21 PROCEDURE — 87086 URINE CULTURE/COLONY COUNT: CPT

## 2018-08-21 RX ORDER — POLYETHYLENE GLYCOL 3350 17 G/17G
17 POWDER, FOR SOLUTION ORAL DAILY
Status: DISCONTINUED | OUTPATIENT
Start: 2018-08-21 | End: 2018-08-23 | Stop reason: HOSPADM

## 2018-08-21 RX ORDER — PANTOPRAZOLE SODIUM 40 MG/1
40 TABLET, DELAYED RELEASE ORAL
Status: DISCONTINUED | OUTPATIENT
Start: 2018-08-22 | End: 2018-08-23 | Stop reason: HOSPADM

## 2018-08-21 RX ORDER — MORPHINE SULFATE 4 MG/ML
4 INJECTION, SOLUTION INTRAMUSCULAR; INTRAVENOUS ONCE
Status: COMPLETED | OUTPATIENT
Start: 2018-08-21 | End: 2018-08-21

## 2018-08-21 RX ORDER — OXYCODONE HYDROCHLORIDE 10 MG/1
10 TABLET ORAL EVERY 4 HOURS PRN
Status: DISCONTINUED | OUTPATIENT
Start: 2018-08-21 | End: 2018-08-23 | Stop reason: HOSPADM

## 2018-08-21 RX ORDER — OXYCODONE HYDROCHLORIDE 5 MG/1
5 TABLET ORAL EVERY 4 HOURS PRN
Status: DISCONTINUED | OUTPATIENT
Start: 2018-08-21 | End: 2018-08-23 | Stop reason: HOSPADM

## 2018-08-21 RX ORDER — SENNOSIDES 8.6 MG
1 TABLET ORAL
Status: DISCONTINUED | OUTPATIENT
Start: 2018-08-21 | End: 2018-08-23 | Stop reason: HOSPADM

## 2018-08-21 RX ORDER — POTASSIUM CHLORIDE 20 MEQ/1
20 TABLET, EXTENDED RELEASE ORAL
Status: DISCONTINUED | OUTPATIENT
Start: 2018-08-22 | End: 2018-08-23 | Stop reason: HOSPADM

## 2018-08-21 RX ORDER — TRAMADOL HYDROCHLORIDE 50 MG/1
50 TABLET ORAL EVERY 6 HOURS PRN
Status: DISCONTINUED | OUTPATIENT
Start: 2018-08-21 | End: 2018-08-23 | Stop reason: HOSPADM

## 2018-08-21 RX ORDER — ASCORBIC ACID 500 MG
500 TABLET ORAL
Status: DISCONTINUED | OUTPATIENT
Start: 2018-08-21 | End: 2018-08-23 | Stop reason: HOSPADM

## 2018-08-21 RX ORDER — ONDANSETRON 4 MG/1
4 TABLET, ORALLY DISINTEGRATING ORAL EVERY 8 HOURS PRN
Status: DISCONTINUED | OUTPATIENT
Start: 2018-08-21 | End: 2018-08-23 | Stop reason: HOSPADM

## 2018-08-21 RX ORDER — CALCIUM CARBONATE/VITAMIN D3 250-3.125
1 TABLET ORAL
Status: DISCONTINUED | OUTPATIENT
Start: 2018-08-22 | End: 2018-08-23 | Stop reason: HOSPADM

## 2018-08-21 RX ORDER — LISINOPRIL 5 MG/1
5 TABLET ORAL DAILY
Status: DISCONTINUED | OUTPATIENT
Start: 2018-08-22 | End: 2018-08-23 | Stop reason: HOSPADM

## 2018-08-21 RX ORDER — HYDROCHLOROTHIAZIDE 25 MG/1
25 TABLET ORAL
Status: DISCONTINUED | OUTPATIENT
Start: 2018-08-22 | End: 2018-08-23 | Stop reason: HOSPADM

## 2018-08-21 RX ORDER — ALLOPURINOL 100 MG/1
200 TABLET ORAL DAILY
Status: DISCONTINUED | OUTPATIENT
Start: 2018-08-22 | End: 2018-08-23 | Stop reason: HOSPADM

## 2018-08-21 RX ORDER — GABAPENTIN 100 MG/1
100 CAPSULE ORAL
Status: DISCONTINUED | OUTPATIENT
Start: 2018-08-21 | End: 2018-08-23 | Stop reason: HOSPADM

## 2018-08-21 RX ORDER — PREDNISONE 20 MG/1
20 TABLET ORAL DAILY
Status: DISCONTINUED | OUTPATIENT
Start: 2018-08-21 | End: 2018-08-23 | Stop reason: HOSPADM

## 2018-08-21 RX ORDER — LORATADINE 10 MG/1
10 TABLET ORAL
Status: DISCONTINUED | OUTPATIENT
Start: 2018-08-22 | End: 2018-08-23 | Stop reason: HOSPADM

## 2018-08-21 RX ADMIN — GABAPENTIN 100 MG: 100 CAPSULE ORAL at 22:31

## 2018-08-21 RX ADMIN — SENNOSIDES 8.6 MG: 8.6 TABLET, FILM COATED ORAL at 22:31

## 2018-08-21 RX ADMIN — MORPHINE SULFATE 4 MG: 4 INJECTION INTRAVENOUS at 09:25

## 2018-08-21 RX ADMIN — APIXABAN 5 MG: 5 TABLET, FILM COATED ORAL at 17:25

## 2018-08-21 RX ADMIN — MORPHINE SULFATE 4 MG: 4 INJECTION INTRAVENOUS at 11:24

## 2018-08-21 RX ADMIN — PREDNISONE 20 MG: 20 TABLET ORAL at 15:50

## 2018-08-21 RX ADMIN — OXYCODONE HYDROCHLORIDE 10 MG: 10 TABLET ORAL at 15:50

## 2018-08-21 RX ADMIN — IOHEXOL 100 ML: 350 INJECTION, SOLUTION INTRAVENOUS at 11:04

## 2018-08-21 RX ADMIN — MORPHINE SULFATE 4 MG: 4 INJECTION INTRAVENOUS at 14:11

## 2018-08-21 RX ADMIN — POLYETHYLENE GLYCOL 3350 17 G: 17 POWDER, FOR SOLUTION ORAL at 17:27

## 2018-08-21 RX ADMIN — OXYCODONE HYDROCHLORIDE AND ACETAMINOPHEN 500 MG: 500 TABLET ORAL at 17:25

## 2018-08-21 NOTE — ASSESSMENT & PLAN NOTE
Diagnosis recently following removal of the neck lymph nodes  Currently on Eliquis - started yesterday as outpatient  Continue

## 2018-08-21 NOTE — TELEPHONE ENCOUNTER
Called patient's son Mariaelena Lentz  Patient was in the ED today for pain secondary to malignancy  Follow up in the office tomorrow at [facility] at 4:00 PM  Please call back to confirm this appointment time

## 2018-08-21 NOTE — ASSESSMENT & PLAN NOTE
As per patient and , patient has been constipated recently  Last bowel movement 2-3 days ago  Will start the patient on MiraLax and senna for now as she will be on opioids for pain  Continue to monitor

## 2018-08-21 NOTE — TELEPHONE ENCOUNTER
Her son calls saying that he has [place] paperwork that he would like us to complete  He will be faxing to us  Please look out for them  Sending to [phone]

## 2018-08-21 NOTE — ASSESSMENT & PLAN NOTE
Patient follows with Dr Fabrizio Dewitt as outpatient  Has been having worsening lymphadenopathy recently  Will consult Oncology  Continue prednisone which patient was started as outpatient

## 2018-08-21 NOTE — H&P
H&P- Stephanie Suresh 1940, 78 y o  female MRN: [id number]    Unit/Bed#: -01 Encounter: [id number]    Primary Care Provider: Yoel Wolfe PA-C   Date and time admitted to hospital: 8/21/2018  8:07 AM        * Acute left flank pain   Assessment & Plan    Most likely due to worsening lymphadenopathy as seen on the CT scan  Patient was recently treated for possible UTI as outpatient with Keflex which she completed yesterday  Currently denies any urinary symptoms  Patient tried Tylenol but it did not work  Patient is allergic to NSAIDs - anaphylaxis  Will start the patient on tramadol for mild, oxy 5 mg for moderate, 10 mg for severe and IV Dilaudid p r n  for breakthrough pain  In addition will also start gabapentin at bedtime  Continue to monitor  Consider palliative care consult if pain not well controlled  It has been very difficult for patient to ambulate because of the pain - pain worsens with movement of her back  So patient is not stable to go home currently  PT OT evaluation  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 [facility])   Assessment & Plan    Patient follows with Dr Obinna Metcalf as outpatient  Has been having worsening lymphadenopathy recently  Will consult Oncology  Continue prednisone which patient was started as outpatient  Constipation   Assessment & Plan    As per patient and , patient has been constipated recently  Last bowel movement 2-3 days ago  Will start the patient on MiraLax and senna for now as she will be on opioids for pain  Continue to monitor  Left jugular vein thrombosis   Assessment & Plan    Diagnosis recently following removal of the neck lymph nodes  Currently on Eliquis - started yesterday as outpatient  Continue  Essential hypertension   Assessment & Plan    Well controlled  Continue home medications - lisinopril and hydrochlorothiazide                  VTE Prophylaxis: Apixaban (Eliquis)  / sequential compression device   Code Status: DNR DNI  POLST: POLST form is not discussed and not completed at this time  Discussion with family:   and son at bedside    Anticipated Length of Stay:  Patient will be admitted on an Inpatient basis with an anticipated length of stay of  > 2 midnights  Justification for Hospital Stay: above    Total Time for Visit, including Counseling / Coordination of Care: 45 minutes  Greater than 50% of this total time spent on direct patient counseling and coordination of care  Chief Complaint:   Left flank/mid back pain x 2 days    History of Present Illness:    Chana Garrett is a 78 y o  female who presents with Left flank/mid back pain x 2 days  She has history of diffuse B-cell lymphoma with worsening lymphadenopathy recently seen on imaging  She comes in today with left-sided flank/mid back pain that started 2 days ago and has been gradually worsening to a point that she is not able to ambulate properly because of that  She describes the pain as dull achy sensation, that is worse with movements and radiates to the front and upper back  No aggravating or alleviating factors  She tried taking Tylenol at home but did not work at all  The pain got a little better yesterday morning but then again worsened  She was recently treated with 10 days of Keflex for UTI as outpatient because she was having urinary symptoms as well as flank pain  Last dose of Keflex was yesterday  Her urinary symptoms and the flank pain improved with antibiotic  Review of Systems:    Review of Systems   Constitutional: Positive for activity change  Negative for appetite change, chills, fatigue, fever and unexpected weight change  HENT: Negative for congestion and sore throat  Respiratory: Negative for cough and shortness of breath  Cardiovascular: Negative for chest pain and palpitations  Gastrointestinal: Positive for constipation   Negative for abdominal pain, diarrhea, nausea and vomiting  Genitourinary: Positive for flank pain  Negative for difficulty urinating, dysuria and frequency  Musculoskeletal: Positive for back pain  Negative for arthralgias  Skin: Negative for color change and rash  Neurological: Negative for dizziness and light-headedness  Psychiatric/Behavioral: Negative for agitation and behavioral problems  Past Medical and Surgical History:     Past Medical History:   Diagnosis Date    Basal cell carcinoma (BCC)     Bruit of right carotid artery     resolved 5/14/15     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HCC)     radiation    Dyslipidemia     Esophageal stricture     last assessed 2/28/13       GERD (gastroesophageal reflux disease)     History of chemotherapy     History of radiation therapy     [place] (hard of hearing)     b/l ears    Hyperlipidemia     Hypertension     Osteoarthritis     Osteoarthrosis of ankle and foot     last assessed 5/20/13     Osteopenia     last assessed 2/28/13     Plantar fasciitis of left foot     Polyp of sigmoid colon     last assessed 2/28/13     Shortness of breath     when walking up stairs       Past Surgical History:   Procedure Laterality Date    ANKLE ARTHROPLASTY Left     BLEPHAROPLASTY      right eye     CATARACT EXTRACTION Right     COLONOSCOPY      ESOPHAGEAL DILATION      x2    ESOPHAGOGASTRODUODENOSCOPY      dilitation    FACIAL/NECK BIOPSY Right 9/8/2017    Procedure: NECK NODE BIOPSY WITH NEEDLE LOCALIZATION; LYMPHOMA PROTOCOL (NEEDLE LOC WITH I R  AT 1100);   Surgeon: Rose Marie Perez MD;  [facility];  Service: Surgical Oncology    HYSTERECTOMY      LYMPH NODE DISSECTION      right groin and neck    ORBITAL FLOOR EXPLORATION Right     for cancer     PORTACATH PLACEMENT      left chest     [place] BX/REMV,LYMPH NODE,DEEP AXILL Left 8/14/2017    Procedure: Axillary lymph node excision with LYMPHOMA PROTOCOL;  Surgeon: Rose Marie Perez MD;  [facility];  Service: Surgical Oncology    [place] BX/REMV,LYMPH NODE,DEEP CERV Right 5/2/2016    Procedure: NECK NODE BIOPSY;  Surgeon: Madelyn Lopez MD;  [facility];  Service: Surgical Oncology    [place] BX/REMV,LYMPH NODE,DEEP CERV Left 7/25/2018    Procedure: NECK NODE BIOPYS WITH LYMPHOMA PROTOCOL; (ULTRASOUND GUIDED NEEDLE LOC IN IR AT 0800); Surgeon: Madelyn Lopez MD;  [facility];  Service: Surgical Oncology    [place] INSJ TUNNELED CTR VAD W/SUBQ PORT AGE 5 YR/> N/A 10/3/2017    Procedure: PLACEMENT OF PORT-A-CATH DEVICE;  Surgeon: Madelyn Lopez MD;  [facility];  Service: Surgical Oncology       Meds/Allergies:    Prior to Admission medications    Medication Sig Start Date End Date Taking? Authorizing Provider   allopurinol (ZYLOPRIM) 100 mg tablet Take 2 tablets (200 mg total) by mouth daily 8/3/18  Yes Taz Pederson MD   apixaban (ELIQUIS) 5 mg Take 1 tablet (5 mg total) by mouth 2 (two) times a day 8/20/18  Yes Taz Pederson MD   ascorbic acid (VITAMIN C) 500 mg tablet Take 500 mg by mouth daily with dinner     Yes Historical Provider, MD   Calcium Carb-Cholecalciferol (CALCIUM + D3) 600-200 MG-UNIT TABS Take 1 tablet by mouth daily with dinner     Yes Historical Provider, MD   Cyanocobalamin (VITAMIN B 12 PO) Take 1 tablet by mouth daily with dinner     Yes Historical Provider, MD   hydrochlorothiazide (HYDRODIURIL) 25 mg tablet Take 1 tablet (25 mg total) by mouth daily in the early morning 4/27/18  Yes Christine Malik DO   Lenalidomide (REVLIMID) 20 MG CAPS Take 1 capsule (20 mg total) by mouth daily Adult Female  Auth # [id number]  Take 1 capsule daily days 1-21   Followed by 7 days off  8/16/18  Yes Taz Pederson MD   lisinopril (ZESTRIL) 5 mg tablet Take 1 tablet (5 mg total) by mouth daily  Patient taking differently: Take 5 mg by mouth daily in the early morning   4/25/18  Yes Mariah Mccord MD   loratadine (CLARITIN) 10 mg tablet Take 10 mg by mouth daily in the early morning     Yes Historical Provider, MD   Multiple Vitamin (MULTIVITAMIN) capsule Take 1 capsule by mouth daily with dinner     Yes Historical Provider, MD   omeprazole (PriLOSEC OTC) 20 MG tablet Take 1 tablet (20 mg total) by mouth daily  Patient taking differently: Take 20 mg by mouth daily in the early morning   3/9/18  Yes Luca Canas MD   ondansetron (ZOFRAN) 4 mg tablet Take 1 tablet (4 mg total) by mouth every 8 (eight) hours as needed for nausea or vomiting 8/15/18  Yes Yvette Dallas PA-C   potassium chloride (KLOR-CON M20) 20 mEq tablet Take 1 tablet (20 mEq total) by mouth daily in the early morning 5/22/18  Yes Christine Malik DO   predniSONE 20 mg tablet Take 1 tablet (20 mg total) by mouth daily with food 8/15/18  Yes Yvette Dallas PA-C   cephalexin (KEFLEX) 500 mg capsule Take 1 capsule (500 mg total) by mouth every 8 (eight) hours for 10 days 8/10/18 8/20/18  Gayle Hein PA-C   HYDROcodone-acetaminophen [facility]) 5-325 mg per tablet Take 1 tablet by mouth every 6 (six) hours as needed for pain Max Daily Amount: 4 tablets 7/17/18   Kallie Marrufo MD   Omega-3 Fatty Acids (FISH OIL) 1200 MG CAPS Take 3 capsules by mouth daily with dinner      Historical Provider, MD     I have reviewed home medications with patient personally  Allergies:    Allergies   Allergen Reactions    Aspirin Anaphylaxis    Celebrex [Celecoxib] Anaphylaxis    Nsaids Anaphylaxis    Other Anaphylaxis     Pt states when she received a certain type of chemotherapy it caused her throat to close    Rituxan [Rituximab] Anaphylaxis     Swelling of tongue and closing of throat    Sulfa Antibiotics Other (See Comments) and Anaphylaxis     VERY EMOTIONAL CRYING    Bactrim [Sulfamethoxazole-Trimethoprim] Other (See Comments)     VERY EMOTIONAL/CRYING       Social History:     Marital Status: /Civil Union   Substance Use History:   History   Alcohol Use No     History   Smoking Status    Former Smoker    Quit date: 1970 Smokeless Tobacco    Never Used     History   Drug Use No       Family History:    Family History   Problem Relation Age of Onset    Multiple myeloma Mother     Heart disease Father     Hypertension Father     Hypertension Sister     Heart disease Sister     Arthritis Family     Breast cancer Family     Osteoporosis Family        Physical Exam:     Vitals:   Blood Pressure: 123/60 (08/21/18 1445)  Pulse: 98 (08/21/18 1445)  Temperature: 98 °F (36 7 °C) (08/21/18 0813)  Temp Source: Oral (08/21/18 0813)  Respirations: 18 (08/21/18 1445)  Weight - Scale: 92 5 kg (204 lb) (08/21/18 0813)  SpO2: 94 % (08/21/18 1445)    Physical Exam    Constitutional: Pt appears well-developed and well-nourished  in mild distress due to pain  HENT:   Head: Normocephalic and atraumatic  Eyes: EOM are normal    Neck:  Bilateral neck swelling noted  Cardiovascular: Normal rate, regular rhythm, normal heart sounds  Exam reveals no gallop and no friction rub  No murmur heard  Pulmonary/Chest: Effort normal and breath sounds normal  No respiratory distress  Pt has no wheezes or rales  Abdominal: Soft  Non-distended, Non-tender  Bowel sounds are normal    Musculoskeletal: Normal range of motion  Back:  Mild left flank/mid back tenderness  Neurological: alert and oriented to person, place, and time  Normal strength and sensations  Psychiatric: normal mood and affect  Additional Data:     Lab Results: I have personally reviewed pertinent reports          Results from last 7 days  Lab Units 08/21/18  0824   WBC Thousand/uL 11 34*   HEMOGLOBIN g/dL 13 0   HEMATOCRIT % 41 1   PLATELETS Thousands/uL 218   NEUTROS PCT % 78*   LYMPHS PCT % 10*   MONOS PCT % 9   EOS PCT % 1       Results from last 7 days  Lab Units 08/21/18  0824   SODIUM mmol/L 136   POTASSIUM mmol/L 4 2   CHLORIDE mmol/L 102   CO2 mmol/L 29   BUN mg/dL 20   CREATININE mg/dL 0 96   CALCIUM mg/dL 9 8   TOTAL PROTEIN g/dL 6 4   BILIRUBIN TOTAL mg/dL 0 47 ALK PHOS U/L 97   ALT U/L 117*   AST U/L 85*   GLUCOSE RANDOM mg/dL 75                   Imaging: I have personally reviewed pertinent reports  CT chest abdomen pelvis w contrast   Final Result by Lauris Essex, MD (08/21 2467)   Addendum 1 of 1 by Lauris Essex, MD (08/21 0757)   ADDENDUM:      For clarification, the lymphadenopathy has increased when compared to the    CT study 10/2/2017      Final      Supraclavicular, mediastinal, and retroperitoneal lymphadenopathy, progressed from prior  The mediastinal lymphadenopathy causes mass effect on the esophagus  Workstation performed: [id number]             EKG, Pathology, and Other Studies Reviewed on Admission:   · EKG:     Allscripts / Epic Records Reviewed: Yes     ** Please Note: This note has been constructed using a voice recognition system   **

## 2018-08-21 NOTE — ED ATTENDING ATTESTATION
Izzy Mon MD, saw and evaluated the patient  I have discussed the patient with the resident/non-physician practitioner and agree with the resident's/non-physician practitioner's findings, Plan of Care, and MDM as documented in the resident's/non-physician practitioner's note, except where noted  All available labs and Radiology studies were reviewed  At this point I agree with the current assessment done in the Emergency Department  I have conducted an independent evaluation of this patient a history and physical is as follows:    44-year-old female here with flank pain on the left  Patient has history of lymphoma, currently on allopurinol as treatment  Patient has had increase in her tumor burden, and recent DVT of her internal jugular as a surgical complication after biopsy of her neck lymph nodes  Patient has been having severe left-sided flank pain for the last couple of days  She states that the pain sometimes precludes her from sleeping, and is very severe  It is not particularly pleuritic in nature, and is not made worse with movement  Patient denies any urinary symptoms  She denies fevers or chills  She does have some nausea with the pain  She denies abdominal pain that is new, but says she has chronic abdominal pain  Denies changes in her bowels  The patient's review of systems is remarkable for global weakness as well  She denies significant shortness of breath, rashes, fevers, or chills  Her review of systems otherwise negative in 12 systems were reviewed  On exam the patient is elderly and frail appearing  She is mildly tachycardic with a heart rate of 90  Her vital signs are otherwise unremarkable  Her HEENT exam is only remarkable for tacky mucous membranes  The patient has neck fullness bilaterally, with a healing surgical scar and swelling  Her heart is tachycardic without murmurs  The patient's lungs are clear bilaterally with fair air movement    The patient has left-sided flank tenderness and CVA tenderness  She has no skin changes or bony abnormalities to her left thorax  Her abdomen is soft with no significant tenderness, rebound, or guarding  Her extremities are intact with good pulses  She is neurologically intact with back exam as described  Impression:  Flank pain, concern for kidney stone given recent allopurinol use, also possibility of process related to progression of malignancy    Will plan to treat symptoms, labs, CT chest abdomen and pelvis   Critical Care Time  CritCare Time    Procedures

## 2018-08-21 NOTE — TELEPHONE ENCOUNTER
PT son called to let us know his mom is being admitted to [facility]  She will not be making the appt for 4 pm tomorrow

## 2018-08-21 NOTE — ASSESSMENT & PLAN NOTE
Most likely due to worsening lymphadenopathy as seen on the CT scan  Patient was recently treated for possible UTI as outpatient with Keflex which she completed yesterday  Currently denies any urinary symptoms  Patient tried Tylenol but it did not work  Patient is allergic to NSAIDs - anaphylaxis  Will start the patient on tramadol for mild, oxy 5 mg for moderate, 10 mg for severe and IV Dilaudid p r n  for breakthrough pain  In addition will also start gabapentin at bedtime  Continue to monitor  Consider palliative care consult if pain not well controlled  It has been very difficult for patient to ambulate because of the pain - pain worsens with movement of her back  So patient is not stable to go home currently  PT OT evaluation

## 2018-08-22 LAB
ANION GAP SERPL CALCULATED.3IONS-SCNC: 5 MMOL/L (ref 4–13)
BACTERIA UR CULT: NORMAL
BASOPHILS # BLD AUTO: 0.01 THOUSANDS/ΜL (ref 0–0.1)
BASOPHILS NFR BLD AUTO: 0 % (ref 0–1)
BUN SERPL-MCNC: 18 MG/DL (ref 5–25)
CALCIUM SERPL-MCNC: 9.6 MG/DL (ref 8.3–10.1)
CHLORIDE SERPL-SCNC: 100 MMOL/L (ref 100–108)
CO2 SERPL-SCNC: 32 MMOL/L (ref 21–32)
CREAT SERPL-MCNC: 0.82 MG/DL (ref 0.6–1.3)
EOSINOPHIL # BLD AUTO: 0.04 THOUSAND/ΜL (ref 0–0.61)
EOSINOPHIL NFR BLD AUTO: 0 % (ref 0–6)
ERYTHROCYTE [DISTWIDTH] IN BLOOD BY AUTOMATED COUNT: 14.3 % (ref 11.6–15.1)
GFR SERPL CREATININE-BSD FRML MDRD: 70 ML/MIN/1.73SQ M
GLUCOSE SERPL-MCNC: 86 MG/DL (ref 65–140)
HCT VFR BLD AUTO: 38.3 % (ref 34.8–46.1)
HGB BLD-MCNC: 12.2 G/DL (ref 11.5–15.4)
IMM GRANULOCYTES # BLD AUTO: 0.07 THOUSAND/UL (ref 0–0.2)
IMM GRANULOCYTES NFR BLD AUTO: 1 % (ref 0–2)
LYMPHOCYTES # BLD AUTO: 1.07 THOUSANDS/ΜL (ref 0.6–4.47)
LYMPHOCYTES NFR BLD AUTO: 11 % (ref 14–44)
MCH RBC QN AUTO: 29.4 PG (ref 26.8–34.3)
MCHC RBC AUTO-ENTMCNC: 31.9 G/DL (ref 31.4–37.4)
MCV RBC AUTO: 92 FL (ref 82–98)
MONOCYTES # BLD AUTO: 0.7 THOUSAND/ΜL (ref 0.17–1.22)
MONOCYTES NFR BLD AUTO: 7 % (ref 4–12)
NEUTROPHILS # BLD AUTO: 7.9 THOUSANDS/ΜL (ref 1.85–7.62)
NEUTS SEG NFR BLD AUTO: 81 % (ref 43–75)
NRBC BLD AUTO-RTO: 0 /100 WBCS
PLATELET # BLD AUTO: 204 THOUSANDS/UL (ref 149–390)
PMV BLD AUTO: 9.3 FL (ref 8.9–12.7)
POTASSIUM SERPL-SCNC: 4.7 MMOL/L (ref 3.5–5.3)
RBC # BLD AUTO: 4.15 MILLION/UL (ref 3.81–5.12)
SODIUM SERPL-SCNC: 137 MMOL/L (ref 136–145)
WBC # BLD AUTO: 9.79 THOUSAND/UL (ref 4.31–10.16)

## 2018-08-22 PROCEDURE — G8980 MOBILITY D/C STATUS: HCPCS

## 2018-08-22 PROCEDURE — 99232 SBSQ HOSP IP/OBS MODERATE 35: CPT | Performed by: PHYSICIAN ASSISTANT

## 2018-08-22 PROCEDURE — 85025 COMPLETE CBC W/AUTO DIFF WBC: CPT | Performed by: INTERNAL MEDICINE

## 2018-08-22 PROCEDURE — 97161 PT EVAL LOW COMPLEX 20 MIN: CPT

## 2018-08-22 PROCEDURE — G8979 MOBILITY GOAL STATUS: HCPCS

## 2018-08-22 PROCEDURE — 80048 BASIC METABOLIC PNL TOTAL CA: CPT | Performed by: INTERNAL MEDICINE

## 2018-08-22 PROCEDURE — G8978 MOBILITY CURRENT STATUS: HCPCS

## 2018-08-22 RX ORDER — DOCUSATE SODIUM 100 MG/1
100 CAPSULE, LIQUID FILLED ORAL 2 TIMES DAILY
Status: DISCONTINUED | OUTPATIENT
Start: 2018-08-22 | End: 2018-08-23 | Stop reason: HOSPADM

## 2018-08-22 RX ORDER — SIMETHICONE 80 MG
80 TABLET,CHEWABLE ORAL EVERY 6 HOURS PRN
Status: DISCONTINUED | OUTPATIENT
Start: 2018-08-22 | End: 2018-08-23 | Stop reason: HOSPADM

## 2018-08-22 RX ADMIN — OXYCODONE HYDROCHLORIDE AND ACETAMINOPHEN 500 MG: 500 TABLET ORAL at 16:44

## 2018-08-22 RX ADMIN — Medication 1 TABLET: at 16:44

## 2018-08-22 RX ADMIN — PANTOPRAZOLE SODIUM 40 MG: 40 TABLET, DELAYED RELEASE ORAL at 05:34

## 2018-08-22 RX ADMIN — PREDNISONE 20 MG: 20 TABLET ORAL at 08:48

## 2018-08-22 RX ADMIN — APIXABAN 5 MG: 5 TABLET, FILM COATED ORAL at 08:50

## 2018-08-22 RX ADMIN — APIXABAN 5 MG: 5 TABLET, FILM COATED ORAL at 21:05

## 2018-08-22 RX ADMIN — ALLOPURINOL 200 MG: 100 TABLET ORAL at 09:00

## 2018-08-22 RX ADMIN — DOCUSATE SODIUM 100 MG: 100 CAPSULE, LIQUID FILLED ORAL at 21:04

## 2018-08-22 RX ADMIN — GABAPENTIN 100 MG: 100 CAPSULE ORAL at 21:05

## 2018-08-22 RX ADMIN — TRAMADOL HYDROCHLORIDE 50 MG: 50 TABLET, FILM COATED ORAL at 08:48

## 2018-08-22 RX ADMIN — HYDROCHLOROTHIAZIDE 25 MG: 25 TABLET ORAL at 05:34

## 2018-08-22 RX ADMIN — SENNOSIDES 8.6 MG: 8.6 TABLET, FILM COATED ORAL at 21:04

## 2018-08-22 RX ADMIN — POLYETHYLENE GLYCOL 3350 17 G: 17 POWDER, FOR SOLUTION ORAL at 08:49

## 2018-08-22 RX ADMIN — POTASSIUM CHLORIDE 20 MEQ: 1500 TABLET, EXTENDED RELEASE ORAL at 05:34

## 2018-08-22 RX ADMIN — LORATADINE 10 MG: 10 TABLET ORAL at 05:34

## 2018-08-22 NOTE — ASSESSMENT & PLAN NOTE
· Patient known to Dr Smita Slater as outpatient  · Has been having worsening lymphadenopathy recently      · CT of the chest showed mixed progression as there was progression of the mediastinal and retrocrural lymphadenopathy but significant improvement of the axillary lymphadenopathy compared to prior per the result report  · CT of the chest abdomen and pelvis showed progression of supraclavicular, mediastinal, and retroperitoneal lymphadenopathy with mediastinal lymphadenopathy causing mass effect on the esophagus per the result report  · Heme/onc consult pending  · She is on chronic steroids

## 2018-08-22 NOTE — PHYSICAL THERAPY NOTE
Physical Therapy Eval    Patient Name: Lianne Noyola    KOSBG'W Date: 8/22/2018     Problem List  Patient Active Problem List   Diagnosis    Enlarged lymph node in neck    Lymphadenopathy, axillary    Lymphoma [facility] [address] [facility]    Arm DVT (deep venous thromboembolism), acute, left (HCC)    Essential hypertension    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 ([facility] [address] )    Constipation    Left bundle branch block (LBBB)    Infected insect bite of neck    Vitamin D deficiency    Vitamin B12 deficiency    Thrombosis of left subclavian vein (HCC)    Situational anxiety    Thrombophlebitis of deep veins of upper extremities    Gastroesophageal reflux disease    Dyslipidemia    DLBCL (diffuse large B cell lymphoma) (HCC)    Cataract    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    Esophageal stricture    [place] (hard of hearing)    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[facility] [address] [facility]    Osteoarthritis    Lymphadenopathy of head and neck    Marginal zone lymphoma ([facility] [address] [facility]    Acute left flank pain    Left jugular vein thrombosis        Past Medical History  Past Medical History:   Diagnosis Date    Basal cell carcinoma (BCC)     Bruit of right carotid artery     resolved 5/14/15     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HCC)     radiation    Dyslipidemia     Esophageal stricture     last assessed 2/28/13       GERD (gastroesophageal reflux disease)     History of chemotherapy     History of radiation therapy     [place] (hard of hearing)     b/l ears    Hyperlipidemia     Hypertension     Osteoarthritis     Osteoarthrosis of ankle and foot     last assessed 5/20/13     Osteopenia     last assessed 2/28/13     Plantar fasciitis of left foot     Polyp of sigmoid colon     last assessed 2/28/13     Shortness of breath     when walking up stairs        Past Surgical History  Past Surgical History:   Procedure Laterality Date    ANKLE ARTHROPLASTY Left     BLEPHAROPLASTY      right eye  CATARACT EXTRACTION Right     COLONOSCOPY      ESOPHAGEAL DILATION      x2    ESOPHAGOGASTRODUODENOSCOPY      dilitation    FACIAL/NECK BIOPSY Right 9/8/2017    Procedure: NECK NODE BIOPSY WITH NEEDLE LOCALIZATION; LYMPHOMA PROTOCOL (NEEDLE LOC WITH I R  AT 1100); Surgeon: Marie Bearden MD;  [facility];  Service: Surgical Oncology    HYSTERECTOMY      LYMPH NODE DISSECTION      right groin and neck    ORBITAL FLOOR EXPLORATION Right     for cancer     PORTACATH PLACEMENT      left chest     [place] BX/REMV,LYMPH NODE,DEEP AXILL Left 8/14/2017    Procedure: Axillary lymph node excision with LYMPHOMA PROTOCOL;  Surgeon: Marie Bearden MD;  [facility];  Service: Surgical Oncology    [place] BX/REMV,LYMPH NODE,DEEP CERV Right 5/2/2016    Procedure: NECK NODE BIOPSY;  Surgeon: Marie Bearden MD;  [facility];  Service: Surgical Oncology    [place] BX/REMV,LYMPH NODE,DEEP CERV Left 7/25/2018    Procedure: NECK NODE BIOPYS WITH LYMPHOMA PROTOCOL; (ULTRASOUND GUIDED NEEDLE LOC IN IR AT 0800); Surgeon: Marie Bearden MD;  [facility];  Service: Surgical Oncology    [place] INSJ TUNNELED CTR VAD W/SUBQ PORT AGE 5 YR/> N/A 10/3/2017    Procedure: PLACEMENT OF PORT-A-CATH DEVICE;  Surgeon: Marie Bearden MD;  [facility];  Service: Surgical Oncology      08/22/18 0935   Note Type   Note type Eval only   Pain Assessment   Pain Assessment 0-10   Pain Score 3   Pain Type Acute pain   Pain Location Back   Pain Orientation Left; Lower   Pain Descriptors Aching   Pain Frequency Constant/continuous   Hospital Pain Intervention(s) Rest   Home Living   Type of [address] One level;Stairs to enter with rails   Bathroom Toilet Standard   Prior Function   Level of [place] Independent with ADLs and functional mobility   Lives With Spouse   ADL Assistance Independent   Falls in the last 6 months 0   General   Family/Caregiver Present No   Cognition   Overall Cognitive Status [facility] Arousal/Participation Alert   Orientation Level Oriented X4   Memory Within functional limits   Following Commands Follows all commands and directions without difficulty   RUE Assessment   RUE Assessment WFL   LUE Assessment   LUE Assessment WFL   RLE Assessment   RLE Assessment WFL   LLE Assessment   LLE Assessment WFL   Coordination   Movements are Fluid and Coordinated 1   Sensation WFL   Light Touch   RLE Light Touch Grossly intact   LLE Light Touch Grossly intact   Bed Mobility   Rolling R 7  Independent   Rolling L 7  Independent   Supine to Sit 6  Modified independent   Sit to Supine 6  Modified independent   Transfers   Sit to Stand 6  Modified independent   Stand to Sit 6  Modified independent   Stand pivot 7  Independent   Toilet transfer 6  Modified independent   Ambulation/Elevation   Gait pattern WNL   Gait Assistance 7  Independent   Assistive Device None   Distance 300   Stair Management Assistance 6  Modified independent   Stair Management Technique One rail R;Step to pattern; Foreward; Sideways   Number of Stairs 12   Balance   Static Sitting Normal   Dynamic Sitting Normal   Static Standing Good   Dynamic Standing Good   Ambulatory Good   Endurance Deficit   Endurance Deficit Yes   Endurance Deficit Description Pt reports prior she would fatigue easily   Activity Tolerance   Activity Tolerance Patient limited by fatigue   Medical Staff Made Aware RN   Nurse Made Aware yes  Maximiliano Walker RN   Assessment   Prognosis Excellent   Assessment Pt seen for PT eval to assess functional mobility and safety  Pt able to perform all functional transfers, ambulate and negotiate stairs at her PLOF  Pt will pause when she stands to make sure she is stable prior to ambulating  Pt cognitively intact and will ask for assistance if she is not feeling well  Pt has supportive  that can assist  Pt safe for d/c home when medically cleared  No further skilled PT recommended     Barriers to Discharge None   Recommendation Recommendation Home with family support   PT - OK to Discharge Yes   Modified Julio Scale   Modified [place] Scale 1   Barthel Index   Feeding 10   Bathing 5   Grooming Score 5   Dressing Score 5   Bladder Score 10   Bowels Score 10   Toilet Use Score 10   Transfers (Bed/Chair) Score 15   Mobility (Level Surface) Score 15   Stairs Score 10   Barthel Index Score 95

## 2018-08-22 NOTE — ASSESSMENT & PLAN NOTE
· POA  · Diagnosis recently following removal of the neck lymph nodes    · Continue on Eliquis which was started by here hematologist

## 2018-08-22 NOTE — ASSESSMENT & PLAN NOTE
· Unclear etiology, consider secondary to her worsening lymphadenopathy, however she also correlates her worst pain to nights where she ate dinner later than usual and had worsening pain with lying down and lots of gas  Will add simethicone to try to provide some relief this way - patient reports that she has taken and tolerated this medication in the past  · Of note, patient was recently treated for possible UTI with concern for Pyelo with prolonged course of Keflex  Kidneys were unremarkable on CT  She denies urinary symptoms  · Patient received 3x IV morphine in the ED  She was also started on gabapentin yesterday  She took 1x PO Ultram today  Pain better controlled  · On admission, her pain was not well controlled and was worse with movement, thus she was not able to be safe discharged home  Pain better controlled today and she was seen by PT who is recommending home with family support

## 2018-08-22 NOTE — SOCIAL WORK
Cm met w pt today & explained Cm role  Pt lives w  & son in [place] home  Was Gabby Kim, retired & drives  No dme  Has h/o vna, but unsure of name  No h/o rhb  Denies any MH, D&A tx  Uses [facility] in [place]  PCP MARY ELLEN Cunningham , Denise Dalton & requested copy  Emergency contact, 1st son Nathalie Wang [phone] or  2nd Raquel Best [phone]  Informed will take preferences into account if any services are needed  Will have transportation home available  Patient/caregiver received discharge checklist  Content reviewed  Patient/caregiver encouraged to participate in discharge plan of care prior to discharge home  CM reviewed d/c planning process including the following: identifying help at home, patient preference for d/c planning needs, Discharge Lounge, [facility] Meds to Bed program, availability of treatment team to discuss questions or concerns patient and/or family may have regarding understanding medications and recognizing signs and symptoms once discharged  CM also encouraged patient to follow up with all recommended appointments after discharge  Patient advised of importance for patient and family to participate in managing patients medical well being

## 2018-08-22 NOTE — OCCUPATIONAL THERAPY NOTE
OCCUPATIONAL THERAPY SCREEN:     Orders received  Chart review completed  Per physical therapy and PCA pt independently completing functional transfers/mobility and sponge bathing this AM  OTR/L also observed pt completing functional mobility in hallway independently  OTR/L spoke with pt who reports no concerns with D/C home from OT standpoint  No further immediate, skilled OT needs at this time  D/C OT         Lester Garcia, MS, OTR/L , CBIS

## 2018-08-22 NOTE — PROGRESS NOTES
Patient was informed that pharmacy does not carry Lenalidomide 20 mg capsule and that patient will need to have family members bring in medication from home

## 2018-08-22 NOTE — CASE MANAGEMENT
Thank you,  [address]  Utilization Review Department  Phone: [phone]; Fax [phone]  ATTENTION: Please call with any questions or concerns to [phone]  and carefully follow the prompts so that you are directed to the right person  Send all requests for admission clinical reviews, approved or denied determinations and any other requests to fax [phone]  All voicemails are confidential      ===============================================================================    Initial Clinical Review    Admission: Date/Time/Statement: 8/21/18 @ 1448   Orders Placed This Encounter   Procedures    Inpatient Admission (expected length of stay for this patient is greater than two midnights)     Standing Status:   Standing     Number of Occurrences:   1     Order Specific Question:   Admitting Physician     Answer:   Marian German     Order Specific Question:   Level of Care     Answer:   Med Surg [16]     Order Specific Question:   Estimated length of stay     Answer:   More than 2 Midnights     Order Specific Question:   Certification     Answer:   I certify that inpatient services are medically necessary for this patient for a duration of greater than two midnights  See H&P and MD Progress Notes for additional information about the patient's course of treatment  ED: Date/Time/Mode of Arrival:   ED Arrival Information     Expected Arrival Acuity Means of Arrival Escorted By Service Admission Type    - 8/21/2018 08:06 Urgent Ambulance [facility] General Medicine Urgent    Arrival Complaint    flank pain          Chief Complaint:   Chief Complaint   Patient presents with    Flank Pain     Pt has a c/o L flank pain since yesterday  History of Illness:   Lesa Chapin is a 78 y o  female who presents with Left flank/mid back pain x 2 days   Jens  has history of diffuse B-cell lymphoma with worsening lymphadenopathy recently seen on imaging  She comes in today with left-sided flank/mid back pain that started 2 days ago and has been gradually worsening to a point that she is not able to ambulate properly because of that  She describes the pain as dull achy sensation, that is worse with movements and radiates to the front and upper back  No aggravating or alleviating factors  She tried taking Tylenol at home but did not work at all  The pain got a little better yesterday morning but then again worsened      ED Vital Signs:   ED Triage Vitals [08/21/18 0813]   Temperature Pulse Respirations Blood Pressure SpO2   98 °F (36 7 °C) 104 18 148/74 96 %      Temp Source Heart Rate Source Patient Position - Orthostatic VS BP Location FiO2 (%)   Oral Monitor Sitting Right arm --      Pain Score       8        Wt Readings from Last 1 Encounters:   08/21/18 94 3 kg (207 lb 14 3 oz)     Abnormal Labs/Diagnostic Test Results:   WBC Thousand/uL 11 34*   HEMOGLOBIN g/dL 13 0   HEMATOCRIT % 41 1   PLATELETS Thousands/uL 218     SODIUM mmol/L 136   POTASSIUM mmol/L 4 2   CHLORIDE mmol/L 102   CO2 mmol/L 29   BUN mg/dL 20   CREATININE mg/dL 0 96   CALCIUM mg/dL 9 8   TOTAL PROTEIN g/dL 6 4   BILIRUBIN TOTAL mg/dL 0 47   ALK PHOS U/L 97   ALT U/L 117*   AST U/L 85*   GLUCOSE RANDOM mg/dL 75     CT abd/pel:  Supraclavicular, mediastinal, and retroperitoneal lymphadenopathy, progressed from prior  The mediastinal lymphadenopathy causes mass effect on the esophagus      Color, UA Yellow     Clarity, UA Clear    pH, UA 6 5 4 5 - 8 0     Leukocytes, UA Moderate (A) Negative     Nitrite, UA Negative Negative     Protein, UA Negative Negative mg/dl     Glucose, UA Negative Negative mg/dl     Ketones, UA Negative Negative mg/dl     Urobilinogen, UA 0 2 0 2, 1 0 E U /dl E U /dl     Bilirubin, UA Negative Negative     Blood, UA Negative Negative     Specific Gravity, UA 1 015 1 003 - 1 030          ED Treatment:   Medication Administration from 08/21/2018 0806 to 08/21/2018 1526 Date/Time Order Dose Route Action     08/21/2018 0925 morphine (PF) 4 mg/mL injection 4 mg 4 mg Intravenous Given     08/21/2018 1104 iohexol (OMNIPAQUE) 350 MG/ML injection (MULTI-DOSE) 100 mL 100 mL Intravenous Given     08/21/2018 1124 morphine (PF) 4 mg/mL injection 4 mg 4 mg Intravenous Given     08/21/2018 1411 morphine (PF) 4 mg/mL injection 4 mg 4 mg Intravenous Given          Past Medical/Surgical History:    Active Ambulatory Problems     Diagnosis Date Noted    Enlarged lymph node in neck 05/02/2016    Lymphadenopathy, axillary 08/14/2017    Lymphoma ([facility] ) 10/03/2017    Arm DVT (deep venous thromboembolism), acute, left ([facility] ) 11/03/2017    Essential hypertension 11/03/2017    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 ([facility] ) 12/06/2017    Constipation 01/20/2018    Left bundle branch block (LBBB) 04/25/2018    Infected insect bite of neck 05/22/2018    Vitamin D deficiency 10/06/2015    Vitamin B12 deficiency 10/06/2015    Thrombosis of left subclavian vein ([facility] ) 11/12/2017    Situational anxiety 09/15/2016    Thrombophlebitis of deep veins of upper extremities 11/03/2017    Gastroesophageal reflux disease 11/22/2013    Dyslipidemia 02/28/2013    DLBCL (diffuse large B cell lymphoma) ([facility] ) 09/26/2017    Cataract 08/16/2017    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/18/2017    Esophageal stricture 07/05/2018    [place] (hard of hearing) 07/05/2018    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    Osteoarthritis 07/05/2018    Lymphadenopathy of head and neck 07/09/2018    Marginal zone lymphoma [facility])      Resolved Ambulatory Problems     Diagnosis Date Noted    Polyp of sigmoid colon 07/05/2018     Past Medical History:   Diagnosis Date    Basal cell carcinoma (BCC)     Bruit of right carotid artery     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[facility] )     Dyslipidemia     Esophageal stricture     GERD (gastroesophageal reflux disease)     History of chemotherapy     History of radiation therapy     [place] (hard of hearing)     Hyperlipidemia     Hypertension     Osteoarthritis     Osteoarthrosis of ankle and foot     Osteopenia     Plantar fasciitis of left foot     Polyp of sigmoid colon     Shortness of breath        Admitting Diagnosis: Marginal zone lymphoma (HCC) [C85 80]  Flank pain [R10 9]  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 (HCC) [C83 38]    Age/Sex: 78 y o  female    Assessment/Plan:   * Acute left flank pain   Assessment & Plan     Most likely due to worsening lymphadenopathy as seen on the CT scan  Patient was recently treated for possible UTI as outpatient with Keflex which she completed yesterday  Currently denies any urinary symptoms  Patient tried Tylenol but it did not work  Patient is allergic to NSAIDs - anaphylaxis  Will start the patient on tramadol for mild, oxy 5 mg for moderate, 10 mg for severe and IV Dilaudid p r n  for breakthrough pain  In addition will also start gabapentin at bedtime  Continue to monitor  Consider palliative care consult if pain not well controlled  It has been very difficult for patient to ambulate because of the pain - pain worsens with movement of her back  So patient is not stable to go home currently  PT OT evaluation           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 [facility])   Assessment & Plan     Patient follows with Dr Kenya Mcgowan as outpatient  Has been having worsening lymphadenopathy recently  Will consult Oncology  Continue prednisone which patient was started as outpatient           Constipation   Assessment & Plan     As per patient and , patient has been constipated recently  Last bowel movement 2-3 days ago  Will start the patient on MiraLax and senna for now as she will be on opioids for pain  Continue to monitor           Left jugular vein thrombosis   Assessment & Plan     Diagnosis recently following removal of the neck lymph nodes    Currently on Eliquis - started yesterday as outpatient  Continue           Essential hypertension   Assessment & Plan     Well controlled  Continue home medications - lisinopril and hydrochlorothiazide                 VTE Prophylaxis: Apixaban (Eliquis)  / sequential compression device   Code Status: DNR DNI  POLST: POLST form is not discussed and not completed at this time      Anticipated Length of Stay:  Patient will be admitted on an Inpatient basis with an anticipated length of stay of  > 2 midnights     Justification for Hospital Stay: above         Admission Orders:  Scheduled Meds:   Current Facility-Administered Medications:  EMS replenish medication  Does not apply Once   allopurinol 200 mg Oral Daily   apixaban 5 mg Oral BID   ascorbic acid 500 mg Oral Daily With Dinner   calcium carbonate-vitamin D 1 tablet Oral Daily With Dinner   gabapentin 100 mg Oral HS   hydrochlorothiazide 25 mg Oral Early Morning   HYDROmorphone 0 5 mg Intravenous Q4H PRN   Lenalidomide 20 mg Oral Daily   lisinopril 5 mg Oral Daily   loratadine 10 mg Oral Early Morning   ondansetron 4 mg Oral Q8H PRN   oxyCODONE 10 mg Oral Q4H PRN   X 1 dose   oxyCODONE 5 mg Oral Q4H PRN   pantoprazole 40 mg Oral Early Morning   polyethylene glycol 17 g Oral Daily   potassium chloride 20 mEq Oral Early Morning   predniSONE 20 mg Oral Daily   senna 1 tablet Oral HS   traMADol 50 mg Oral Q6H PRN   X 1 dose     Consult Oncology

## 2018-08-22 NOTE — PLAN OF CARE
Problem: DISCHARGE PLANNING - CARE MANAGEMENT  Goal: Discharge to post-acute care or home with appropriate resources  INTERVENTIONS:  - Conduct assessment to determine patient/family and health care team treatment goals, and need for post-acute services based on payer coverage, community resources, and patient preferences, and barriers to discharge  - Address psychosocial, clinical, and financial barriers to discharge as identified in assessment in conjunction with the patient/family and health care team  - Arrange appropriate level of post-acute services according to patient's   needs and preference and payer coverage in collaboration with the physician and health care team  - Communicate with and update the patient/family, physician, and health care team regarding progress on the discharge plan  - Arrange appropriate transportation to post-acute venues  - Home with family support  Outcome: Progressing

## 2018-08-22 NOTE — PROGRESS NOTES
Progress Note - Armand Phipps 1940, 78 y o  female MRN: [id number]    Unit/Bed#: -01 Encounter: [id number]    Primary Care Provider: Makenna Huff PA-C   Date and time admitted to hospital: 8/21/2018  8:07 AM        * Acute left flank pain   Assessment & Plan    · Unclear etiology, consider secondary to her worsening lymphadenopathy, however she also correlates her worst pain to nights where she ate dinner later than usual and had worsening pain with lying down and lots of gas  Will add simethicone to try to provide some relief this way - patient reports that she has taken and tolerated this medication in the past  · Of note, patient was recently treated for possible UTI with concern for Pyelo with prolonged course of Keflex  Kidneys were unremarkable on CT  She denies urinary symptoms  · Patient received 3x IV morphine in the ED  She was also started on gabapentin yesterday  She took 1x PO Ultram today  Pain better controlled  · On admission, her pain was not well controlled and was worse with movement, thus she was not able to be safe discharged home  Pain better controlled today and she was seen by PT who is recommending home with family support  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 [facility])   Assessment & Plan    · Patient known to Dr Gordon Never as outpatient  · Has been having worsening lymphadenopathy recently      · CT of the chest showed mixed progression as there was progression of the mediastinal and retrocrural lymphadenopathy but significant improvement of the axillary lymphadenopathy compared to prior per the result report  · CT of the chest abdomen and pelvis showed progression of supraclavicular, mediastinal, and retroperitoneal lymphadenopathy with mediastinal lymphadenopathy causing mass effect on the esophagus per the result report  · Heme/onc consult pending  · She is on chronic steroids        Constipation   Assessment & Plan    · Added colace to her bowel regimen        Left jugular vein thrombosis   Assessment & Plan    · POA  · Diagnosis recently following removal of the neck lymph nodes  · Continue on Eliquis which was started by here hematologist        Essential hypertension   Assessment & Plan    · Continue home medications - lisinopril and hydrochlorothiazide  VTE Pharmacologic Prophylaxis:   Pharmacologic: Apixaban (Eliquis)  Mechanical VTE Prophylaxis in Place: Yes    Patient Centered Rounds: I have performed bedside rounds with nursing staff today  [address] with Specialists or Other Care Team Provider: Dr Marc Garvin    Education and Discussions with Family / Patient: Patient and her  and son present at bedside    Time Spent for Care: 30 minutes  More than 50% of total time spent on counseling and coordination of care as described above  Current Length of Stay: 1 day(s)    Current Patient Status: Inpatient   Certification Statement: The patient will continue to require additional inpatient hospital stay due to heme/onc consult pending    Discharge Plan: Pending heme/onc recs  Plan to d/c home    Code Status: Level 3 - DNAR and DNI      Subjective:   Ms Ganesh Veliz reports that she feels much better today than yesterday  Objective:     Vitals:   Temp (24hrs), Av 8 °F (36 6 °C), Min:97 5 °F (36 4 °C), Max:98 2 °F (36 8 °C)    HR:  [65-93] 76  Resp:  [16-18] 18  BP: ()/(53-65) 102/65  SpO2:  [93 %-95 %] 95 %  Body mass index is 35 68 kg/m²  Input and Output Summary (last 24 hours): Intake/Output Summary (Last 24 hours) at 18 1453  Last data filed at 18 1412   Gross per 24 hour   Intake              360 ml   Output                0 ml   Net              360 ml       Physical Exam:     Physical Exam   Constitutional: She is oriented to person, place, and time  No distress  Patient seen sitting upright comfortably in bed with her nurse, , and son present at bedside    She is very pleasant and cooperative  Cardiovascular: Normal rate and regular rhythm  Pulmonary/Chest: Effort normal and breath sounds normal  No respiratory distress  She has no wheezes  Abdominal: Soft  Bowel sounds are normal  There is no tenderness  Genitourinary:   Genitourinary Comments: No CVA tenderness bilaterally  No Mesa Krause sign   Musculoskeletal: She exhibits no edema  Denies calf tenderness to palpation bilaterally   Neurological: She is alert and oriented to person, place, and time  Skin: Skin is warm  Psychiatric: She has a normal mood and affect  Vitals reviewed  Additional Data:     Labs:      Results from last 7 days  Lab Units 08/22/18  0512   WBC Thousand/uL 9 79   HEMOGLOBIN g/dL 12 2   HEMATOCRIT % 38 3   PLATELETS Thousands/uL 204   NEUTROS PCT % 81*   LYMPHS PCT % 11*   MONOS PCT % 7   EOS PCT % 0       Results from last 7 days  Lab Units 08/22/18  0512 08/21/18  0824   SODIUM mmol/L 137 136   POTASSIUM mmol/L 4 7 4 2   CHLORIDE mmol/L 100 102   CO2 mmol/L 32 29   BUN mg/dL 18 20   CREATININE mg/dL 0 82 0 96   CALCIUM mg/dL 9 6 9 8   TOTAL PROTEIN g/dL  --  6 4   BILIRUBIN TOTAL mg/dL  --  0 47   ALK PHOS U/L  --  97   ALT U/L  --  117*   AST U/L  --  85*   GLUCOSE RANDOM mg/dL 86 75                     * I Have Reviewed All Lab Data Listed Above  * Additional Pertinent Lab Tests Reviewed:  All Labs Within Last 24 Hours Reviewed    Imaging:    Imaging Reports Reviewed Today Include: CT chest, CT C/A/P  Imaging Personally Reviewed by Myself Includes:  None    Recent Cultures (last 7 days):       Results from last 7 days  Lab Units 08/21/18  1015   URINE CULTURE  <10,000 cfu/ml        Last 24 Hours Medication List:     Current Facility-Administered Medications:  EMS replenish medication  Does not apply Once Khai Trevino MD   allopurinol 200 mg Oral Daily Colleen Thompson MD   apixaban 5 mg Oral BID Colleen Thompson MD   ascorbic acid 500 mg Oral Daily With Stefan Stone MD   calcium carbonate-vitamin D 1 tablet Oral Daily With Stefan Stone MD   docusate sodium 100 mg Oral BID Makenna Miranda PA-C   gabapentin 100 mg Oral HS Dot Dumas, MD   hydrochlorothiazide 25 mg Oral Early Morning Dot Dumas, MD   HYDROmorphone 0 5 mg Intravenous Q4H PRN Dot Dumas, MD   Lenalidomide 20 mg Oral Daily Dot Dumas, MD   lisinopril 5 mg Oral Daily Dot Dumas, MD   loratadine 10 mg Oral Early Morning Dot Dumas, MD   ondansetron 4 mg Oral Q8H PRN Dot Dumas, MD   oxyCODONE 10 mg Oral Q4H PRN Dot Dumas, MD   oxyCODONE 5 mg Oral Q4H PRN Dot Dumas, MD   pantoprazole 40 mg Oral Early Morning Dot Dumas, MD   polyethylene glycol 17 g Oral Daily Dot Dumas MD   potassium chloride 20 mEq Oral Early Morning Dot Dumas, MD   predniSONE 20 mg Oral Daily Dot uDmas, MD   senna 1 tablet Oral HS Dot Dumas, MD   simethicone 80 mg Oral Q6H PRN Makenna Miranda PA-C   traMADol 50 mg Oral Q6H PRN Dot Dumas MD        Today, Patient Was Seen By: Makenna Miranda PA-C    ** Please Note: Dictation voice to text software may have been used in the creation of this document   **

## 2018-08-23 ENCOUNTER — OFFICE VISIT (OUTPATIENT)
Dept: HEMATOLOGY ONCOLOGY | Facility: CLINIC | Age: 78
End: 2018-08-23
Payer: COMMERCIAL

## 2018-08-23 VITALS
SYSTOLIC BLOOD PRESSURE: 116 MMHG | BODY MASS INDEX: 35.85 KG/M2 | RESPIRATION RATE: 18 BRPM | DIASTOLIC BLOOD PRESSURE: 62 MMHG | TEMPERATURE: 97.3 F | HEIGHT: 64 IN | OXYGEN SATURATION: 95 % | HEART RATE: 106 BPM | WEIGHT: 210 LBS

## 2018-08-23 VITALS
RESPIRATION RATE: 18 BRPM | OXYGEN SATURATION: 92 % | SYSTOLIC BLOOD PRESSURE: 102 MMHG | TEMPERATURE: 98.7 F | BODY MASS INDEX: 35.49 KG/M2 | HEIGHT: 64 IN | HEART RATE: 88 BPM | DIASTOLIC BLOOD PRESSURE: 58 MMHG | WEIGHT: 207.89 LBS

## 2018-08-23 DIAGNOSIS — Z95.828 PORT-A-CATH IN PLACE: ICD-10-CM

## 2018-08-23 DIAGNOSIS — C83.10 MANTLE CELL LYMPHOMA, UNSPECIFIED BODY REGION (HCC): Primary | ICD-10-CM

## 2018-08-23 DIAGNOSIS — I82.890 LEFT JUGULAR VEIN THROMBOSIS: ICD-10-CM

## 2018-08-23 DIAGNOSIS — C83.38 DIFFUSE LARGE B-CELL LYMPHOMA OF LYMPH NODES OF MULTIPLE SITES (HCC): Primary | ICD-10-CM

## 2018-08-23 PROCEDURE — 99239 HOSP IP/OBS DSCHRG MGMT >30: CPT | Performed by: PHYSICIAN ASSISTANT

## 2018-08-23 PROCEDURE — 99214 OFFICE O/P EST MOD 30 MIN: CPT | Performed by: INTERNAL MEDICINE

## 2018-08-23 RX ORDER — POLYETHYLENE GLYCOL 3350 17 G/17G
17 POWDER, FOR SOLUTION ORAL DAILY
Qty: 14 EACH | Refills: 0 | Status: SHIPPED | OUTPATIENT
Start: 2018-08-24 | End: 2018-10-13 | Stop reason: HOSPADM

## 2018-08-23 RX ORDER — DOCUSATE SODIUM 100 MG/1
100 CAPSULE, LIQUID FILLED ORAL 2 TIMES DAILY
Qty: 10 CAPSULE | Refills: 0 | Status: SHIPPED | OUTPATIENT
Start: 2018-08-23 | End: 2018-09-07

## 2018-08-23 RX ORDER — GABAPENTIN 100 MG/1
100 CAPSULE ORAL
Qty: 30 CAPSULE | Refills: 0 | Status: SHIPPED | OUTPATIENT
Start: 2018-08-23 | End: 2018-09-07

## 2018-08-23 RX ORDER — SENNOSIDES 8.6 MG
1 TABLET ORAL
Qty: 120 EACH | Refills: 0 | Status: SHIPPED | OUTPATIENT
Start: 2018-08-23 | End: 2018-09-07 | Stop reason: SDUPTHER

## 2018-08-23 RX ORDER — SIMETHICONE 80 MG
80 TABLET,CHEWABLE ORAL EVERY 6 HOURS PRN
Qty: 30 TABLET | Refills: 0 | Status: SHIPPED | OUTPATIENT
Start: 2018-08-23 | End: 2018-10-13 | Stop reason: HOSPADM

## 2018-08-23 RX ORDER — TRAMADOL HYDROCHLORIDE 50 MG/1
50 TABLET ORAL EVERY 6 HOURS PRN
Qty: 15 TABLET | Refills: 0 | Status: SHIPPED | OUTPATIENT
Start: 2018-08-23 | End: 2018-09-07

## 2018-08-23 RX ADMIN — TRAMADOL HYDROCHLORIDE 50 MG: 50 TABLET, FILM COATED ORAL at 03:22

## 2018-08-23 RX ADMIN — TRAMADOL HYDROCHLORIDE 50 MG: 50 TABLET, FILM COATED ORAL at 12:04

## 2018-08-23 RX ADMIN — LORATADINE 10 MG: 10 TABLET ORAL at 06:02

## 2018-08-23 RX ADMIN — POLYETHYLENE GLYCOL 3350 17 G: 17 POWDER, FOR SOLUTION ORAL at 08:29

## 2018-08-23 RX ADMIN — SIMETHICONE CHEW TAB 80 MG 80 MG: 80 TABLET ORAL at 03:20

## 2018-08-23 RX ADMIN — PANTOPRAZOLE SODIUM 40 MG: 40 TABLET, DELAYED RELEASE ORAL at 06:02

## 2018-08-23 RX ADMIN — HYDROCHLOROTHIAZIDE 25 MG: 25 TABLET ORAL at 06:02

## 2018-08-23 RX ADMIN — PREDNISONE 20 MG: 20 TABLET ORAL at 08:27

## 2018-08-23 RX ADMIN — DOCUSATE SODIUM 100 MG: 100 CAPSULE, LIQUID FILLED ORAL at 08:28

## 2018-08-23 RX ADMIN — APIXABAN 5 MG: 5 TABLET, FILM COATED ORAL at 08:28

## 2018-08-23 RX ADMIN — POTASSIUM CHLORIDE 20 MEQ: 1500 TABLET, EXTENDED RELEASE ORAL at 06:02

## 2018-08-23 RX ADMIN — ALLOPURINOL 200 MG: 100 TABLET ORAL at 08:28

## 2018-08-23 NOTE — NURSING NOTE
IV d/c'd  D/C instructions discussed with pt  All questions answered  Prescriptions and D/C instructions handed to pt  All belongings packed and sent with pt

## 2018-08-23 NOTE — PLAN OF CARE
Discharge to home or other facility with appropriate resources Progressing      Discharge to post-acute care or home with appropriate resources Progressing      Absence or prevention of progression during hospitalization Progressing      Patient/family/caregiver demonstrates understanding of disease process, treatment plan, medications, and discharge instructions Progressing      Verbalizes/displays adequate comfort level or baseline comfort level Progressing      Skin integrity is maintained or improved Progressing      Patient will remain free of falls Progressing      Patient will remain free of falls Progressing      Maintain or return to baseline ADL function Progressing      Maintain or return mobility status to optimal level Progressing

## 2018-08-23 NOTE — DISCHARGE INSTRUCTIONS
Continue colase 100mg twice a day  Continue senna 8 6mg at bedtime  Take miralax as needed (this is a laxative)   I provided prescriptions for these meds which may be cheaper over the counter     Follow up with cardiology   Follow up with dr Geena Anna   Follow up with pcp 2 weeks     I have referred you to palliative care as outpatient for pain management     continue tramadol as needed for pain in addition to tylenol 1000mg three times a day         Constipation   WHAT YOU NEED TO KNOW:   Constipation is when you have hard, dry bowel movements, or you go longer than usual between bowel movements  DISCHARGE INSTRUCTIONS:   Seek care immediately if:   · You have blood in your bowel movements      · You have a fever and abdominal pain with the constipation  Contact your healthcare provider if:   · Your constipation gets worse       · You start to vomit      · You have questions or concerns about your condition or care  Medicines:   · Medicine or a fiber supplement may help make your bowel movement softer  A laxative may help relax and loosen your intestines to help you have a bowel movement  You may also be given medicine to increase fluid in your intestines  The fluid may help move bowel movements through your intestines      · Take your medicine as directed  Contact your healthcare provider if you think your medicine is not helping or if you have side effects  Tell him of her if you are allergic to any medicine  Keep a list of the medicines, vitamins, and herbs you take  Include the amounts, and when and why you take them  Bring the list or the pill bottles to follow-up visits  Carry your medicine list with you in case of an emergency  Manage your constipation:   · Drink liquids as directed  You may need to drink extra liquids to help soften and move your bowels  Ask how much liquid to drink each day and which liquids are best for you       · Eat high-fiber foods   This may help decrease constipation by adding bulk to your bowel movements  High-fiber foods include fruit, vegetables, whole-grain breads and cereals, and beans  Your healthcare provider or dietitian can help you create a high-fiber meal plan            · Exercise regularly  Regular physical activity can help stimulate your intestines  Ask which exercises are best for you      · Schedule a time each day to have a bowel movement  This may help train your body to have regular bowel movements  Bend forward while you are on the toilet to help move the bowel movement out  Sit on the toilet for at least 10 minutes, even if you do not have a bowel movement  Follow up with your healthcare provider as directed: Write down your questions so you remember to ask them during your visits  © 2017 [facility] Information is for End User's use only and may not be sold, redistributed or otherwise used for commercial purposes  All illustrations and images included in CareNotes® are the copyrighted property of A D A M , Inc  or Dionte Alfaro  The above information is an  only  It is not intended as medical advice for individual conditions or treatments   Talk to your doctor, nurse or pharmacist before following any medical regimen to see if it is safe and effective for you

## 2018-08-23 NOTE — PROGRESS NOTES
Pt c/o gas pain with no relief from simethicone & tramadol  Pt has been OOB  Suggested that patient lay on side with knees up, will monitor    Decline oxycodone

## 2018-08-23 NOTE — ASSESSMENT & PLAN NOTE
· Unclear etiology, consider secondary to her worsening lymphadenopathy, however she also correlates her worst pain to nights where she ate dinner later than usual and had worsening pain with lying down and lots of gas  Will add simethicone to try to provide some relief this way - patient reports that she has taken and tolerated this medication in the past  · Of note, patient was recently treated for possible UTI with concern for Pyelo with prolonged course of Keflex  Kidneys were unremarkable on CT  She denies urinary symptoms  · Started on gabapentin, ultram with improvement   · Possibly 2/2 constipation and gas pain and simethicone added PRN   · Pain better controlled today and she was seen by PT who is recommending home with family support

## 2018-08-23 NOTE — ASSESSMENT & PLAN NOTE
· POA  · Diagnosis recently following removal of the neck lymph nodes    · Continue on Eliquis which was started by her hematologist

## 2018-08-23 NOTE — ED PROVIDER NOTES
History  Chief Complaint   Patient presents with    Flank Pain     Pt has a c/o L flank pain since yesterday  28-year-old female presenting to the emergency department for evaluation of flank pain  She has a history of large diffuse B-cell lymphoma currently undergoing oral chemotherapy  She has developed left-sided shooting flank pain since yesterday midday  Worse with movement  Last night it was causing her difficulty sleeping  The pain persisted today is more severe and so she has come to the emergency department for evaluation  She denies fevers chills urinary symptoms nausea vomiting chest pain shortness of breath  Prior to Admission Medications   Prescriptions Last Dose Informant Patient Reported? Taking? Calcium Carb-Cholecalciferol (CALCIUM + D3) 600-200 MG-UNIT TABS 8/20/2018 at Unknown time Self Yes Yes   Sig: Take 1 tablet by mouth daily with dinner     Cyanocobalamin (VITAMIN B 12 PO) 8/20/2018 at Unknown time Self Yes Yes   Sig: Take 1 tablet by mouth daily with dinner     HYDROcodone-acetaminophen (NORCO) 5-325 mg per tablet Unknown at Unknown time Self No No   Sig: Take 1 tablet by mouth every 6 (six) hours as needed for pain Max Daily Amount: 4 tablets   Lenalidomide (REVLIMID) 20 MG CAPS   No Yes   Sig: Take 1 capsule (20 mg total) by mouth daily Adult Female  Auth # [id number]  Take 1 capsule daily days 1-21  Followed by 7 days off     Multiple Vitamin (MULTIVITAMIN) capsule 8/20/2018 at Unknown time Self Yes Yes   Sig: Take 1 capsule by mouth daily with dinner     Omega-3 Fatty Acids (FISH OIL) 1200 MG CAPS  Self Yes No   Sig: Take 3 capsules by mouth daily with dinner     allopurinol (ZYLOPRIM) 100 mg tablet 8/20/2018 at Unknown time Self No Yes   Sig: Take 2 tablets (200 mg total) by mouth daily   apixaban (ELIQUIS) 5 mg 8/20/2018 at Unknown time  No Yes   Sig: Take 1 tablet (5 mg total) by mouth 2 (two) times a day   ascorbic acid (VITAMIN C) 500 mg tablet 8/20/2018 at Unknown time Self Yes Yes   Sig: Take 500 mg by mouth daily with dinner     cephalexin (KEFLEX) 500 mg capsule  Self No No   Sig: Take 1 capsule (500 mg total) by mouth every 8 (eight) hours for 10 days   hydrochlorothiazide (HYDRODIURIL) 25 mg tablet 8/20/2018 at Unknown time Self No Yes   Sig: Take 1 tablet (25 mg total) by mouth daily in the early morning   lisinopril (ZESTRIL) 5 mg tablet 8/20/2018 at Unknown time Self No Yes   Sig: Take 1 tablet (5 mg total) by mouth daily   Patient taking differently: Take 5 mg by mouth daily in the early morning     loratadine (CLARITIN) 10 mg tablet 8/20/2018 at Unknown time Self Yes Yes   Sig: Take 10 mg by mouth daily in the early morning     omeprazole (PriLOSEC OTC) 20 MG tablet 8/20/2018 at Unknown time Self No Yes   Sig: Take 1 tablet (20 mg total) by mouth daily   Patient taking differently: Take 20 mg by mouth daily in the early morning     ondansetron (ZOFRAN) 4 mg tablet 8/21/2018 at Unknown time  No Yes   Sig: Take 1 tablet (4 mg total) by mouth every 8 (eight) hours as needed for nausea or vomiting   potassium chloride (KLOR-CON M20) 20 mEq tablet 8/20/2018 at Unknown time Self No Yes   Sig: Take 1 tablet (20 mEq total) by mouth daily in the early morning   predniSONE 20 mg tablet 8/20/2018 at Unknown time  No Yes   Sig: Take 1 tablet (20 mg total) by mouth daily with food      Facility-Administered Medications: None       Past Medical History:   Diagnosis Date    Basal cell carcinoma (BCC)     Bruit of right carotid artery     resolved 5/14/15     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HCC)     radiation    Dyslipidemia     Esophageal stricture     last assessed 2/28/13       GERD (gastroesophageal reflux disease)     History of chemotherapy     History of radiation therapy     [place] (hard of hearing)     b/l ears    Hyperlipidemia     Hypertension     Osteoarthritis     Osteoarthrosis of ankle and foot     last assessed 5/20/13     Osteopenia     last assessed 2/28/13     Plantar fasciitis of left foot     Polyp of sigmoid colon     last assessed 2/28/13     Shortness of breath     when walking up stairs       Past Surgical History:   Procedure Laterality Date    ANKLE ARTHROPLASTY Left     BLEPHAROPLASTY      right eye     CATARACT EXTRACTION Right     COLONOSCOPY      ESOPHAGEAL DILATION      x2    ESOPHAGOGASTRODUODENOSCOPY      dilitation    FACIAL/NECK BIOPSY Right 9/8/2017    Procedure: NECK NODE BIOPSY WITH NEEDLE LOCALIZATION; LYMPHOMA PROTOCOL (NEEDLE LOC WITH I R  AT 1100); Surgeon: Jazzmine Rucker MD;  [facility];  Service: Surgical Oncology    HYSTERECTOMY      LYMPH NODE DISSECTION      right groin and neck    ORBITAL FLOOR EXPLORATION Right     for cancer     PORTACATH PLACEMENT      left chest     CO BX/REMV,LYMPH NODE,DEEP AXILL Left 8/14/2017    Procedure: Axillary lymph node excision with LYMPHOMA PROTOCOL;  Surgeon: Jazzmine Rucker MD;  [facility];  Service: Surgical Oncology    CO BX/REMV,LYMPH NODE,DEEP CERV Right 5/2/2016    Procedure: NECK NODE BIOPSY;  Surgeon: Jazzmine Rucker MD;  [facility];  Service: Surgical Oncology    CO BX/REMV,LYMPH NODE,DEEP CERV Left 7/25/2018    Procedure: NECK NODE BIOPYS WITH LYMPHOMA PROTOCOL; (ULTRASOUND GUIDED NEEDLE LOC IN IR AT 0800); Surgeon: Jazzmine Rucker MD;  [facility];  Service: Surgical Oncology    CO INSJ TUNNELED CTR VAD W/SUBQ PORT AGE 5 YR/> N/A 10/3/2017    Procedure: Radha Bailey;  Surgeon: Jazzmine Rucker MD;  [facility];  Service: Surgical Oncology       Family History   Problem Relation Age of Onset    Multiple myeloma Mother     Heart disease Father     Hypertension Father     Hypertension Sister     Heart disease Sister     Arthritis Family     Breast cancer Family     Osteoporosis Family      I have reviewed and agree with the history as documented      Social History   Substance Use Topics    Smoking status: Former Smoker     Quit date: 1970    Smokeless tobacco: Never Used    Alcohol use No        Review of Systems   Constitutional: Negative for appetite change, chills, fatigue and fever  HENT: Negative for sneezing and sore throat  Eyes: Negative for visual disturbance  Respiratory: Negative for cough, choking, chest tightness, shortness of breath and wheezing  Cardiovascular: Negative for chest pain and palpitations  Gastrointestinal: Negative for abdominal pain, constipation, diarrhea, nausea and vomiting  Genitourinary: Positive for flank pain  Negative for difficulty urinating and dysuria  Neurological: Negative for dizziness, weakness, light-headedness, numbness and headaches  All other systems reviewed and are negative  Physical Exam  ED Triage Vitals [08/21/18 0813]   Temperature Pulse Respirations Blood Pressure SpO2   98 °F (36 7 °C) 104 18 148/74 96 %      Temp Source Heart Rate Source Patient Position - Orthostatic VS BP Location FiO2 (%)   Oral Monitor Sitting Right arm --      Pain Score       8           Orthostatic Vital Signs  Vitals:    08/22/18 0733 08/22/18 2333 08/23/18 0605 08/23/18 0719   BP: 102/65 111/64 110/54 102/58   Pulse: 76 73 91 88   Patient Position - Orthostatic VS: Lying Lying  Lying       Physical Exam   Constitutional: She is oriented to person, place, and time  She appears well-developed and well-nourished  No distress  HENT:   Head: Normocephalic and atraumatic  Mouth/Throat: Oropharynx is clear and moist    Eyes: EOM are normal  Pupils are equal, round, and reactive to light  Neck: No JVD present  No tracheal deviation present  Cardiovascular: Normal rate, regular rhythm, normal heart sounds and intact distal pulses  Exam reveals no gallop and no friction rub  No murmur heard  Pulmonary/Chest: Effort normal and breath sounds normal  No respiratory distress  She has no wheezes  She has no rales  Abdominal: Soft   Bowel sounds are normal  She exhibits no distension  There is no tenderness  There is CVA tenderness  There is no rebound and no guarding  Neurological: She is alert and oriented to person, place, and time  No cranial nerve deficit  She exhibits normal muscle tone  Skin: Skin is warm and dry  She is not diaphoretic  No pallor  Psychiatric: She has a normal mood and affect  Her behavior is normal    Nursing note and vitals reviewed        ED Medications  Medications   morphine (PF) 4 mg/mL injection 4 mg (4 mg Intravenous Given 8/21/18 0925)   iohexol (OMNIPAQUE) 350 MG/ML injection (MULTI-DOSE) 100 mL (100 mL Intravenous Given 8/21/18 1104)   morphine (PF) 4 mg/mL injection 4 mg (4 mg Intravenous Given 8/21/18 1124)   morphine (PF) 4 mg/mL injection 4 mg (4 mg Intravenous Given 8/21/18 1411)       Diagnostic Studies  Results Reviewed     Procedure Component Value Units Date/Time    Urine culture [[id number]] Collected:  08/21/18 1015    Lab Status:  Final result Specimen:  Urine from Urine, Clean Catch Updated:  08/22/18 1147     Urine Culture <10,000 cfu/ml     Urine Microscopic [[id number]]  (Abnormal) Collected:  08/21/18 1015    Lab Status:  Final result Specimen:  Urine from Urine, Clean Catch Updated:  08/21/18 1149     RBC, UA None Seen /hpf      WBC, UA 30-50 (A) /hpf      Epithelial Cells None Seen /hpf      Bacteria, UA None Seen /hpf      Hyaline Casts, UA None Seen /lpf     POCT urinalysis dipstick [[id number]]  (Normal) Resulted:  08/21/18 1003    Lab Status:  Final result Specimen:  Urine from Urine, Other Updated:  08/21/18 1004     Color, UA yellow    ED Urine Macroscopic [[id number]]  (Abnormal) Collected:  08/21/18 1015    Lab Status:  Final result Specimen:  Urine Updated:  08/21/18 1003     Color, UA Yellow     Clarity, UA Clear     pH, UA 6 5     Leukocytes, UA Moderate (A)     Nitrite, UA Negative     Protein, UA Negative mg/dl      Glucose, UA Negative mg/dl      Ketones, UA Negative mg/dl      Urobilinogen, UA 0 2 E U /dl      Bilirubin, UA Negative     Blood, UA Negative     Specific Gravity, UA 1 015    Narrative:       CLINITEK RESULT    Troponin I [[id number]]  (Normal) Collected:  08/21/18 0837    Lab Status:  Final result Specimen:  Blood from Arm, Left Updated:  08/21/18 0902     Troponin I <0 02 ng/mL     Comprehensive metabolic panel [[id number]]  (Abnormal) Collected:  08/21/18 0824    Lab Status:  Final result Specimen:  Blood from Arm, Left Updated:  08/21/18 9772     Sodium 136 mmol/L      Potassium 4 2 mmol/L      Chloride 102 mmol/L      CO2 29 mmol/L      ANION GAP 5 mmol/L      BUN 20 mg/dL      Creatinine 0 96 mg/dL      Glucose 75 mg/dL      Calcium 9 8 mg/dL      AST 85 (H) U/L       (H) U/L      Alkaline Phosphatase 97 U/L      Total Protein 6 4 g/dL      Albumin 3 2 (L) g/dL      Total Bilirubin 0 47 mg/dL      eGFR 58 ml/min/1 73sq m     Narrative:         National Kidney Disease Education Program recommendations are as follows:  GFR calculation is accurate only with a steady state creatinine  Chronic Kidney disease less than 60 ml/min/1 73 sq  meters  Kidney failure less than 15 ml/min/1 73 sq  meters      Lipase [[id number]]  (Normal) Collected:  08/21/18 0824    Lab Status:  Final result Specimen:  Blood from Arm, Left Updated:  08/21/18 0852     Lipase 117 u/L     CBC and differential [[id number]]  (Abnormal) Collected:  08/21/18 0824    Lab Status:  Final result Specimen:  Blood from Arm, Left Updated:  08/21/18 0831     WBC 11 34 (H) Thousand/uL      RBC 4 49 Million/uL      Hemoglobin 13 0 g/dL      Hematocrit 41 1 %      MCV 92 fL      MCH 29 0 pg      MCHC 31 6 g/dL      RDW 13 9 %      MPV 9 3 fL      Platelets 653 Thousands/uL      nRBC 0 /100 WBCs      Neutrophils Relative 78 (H) %      Immat GRANS % 2 %      Lymphocytes Relative 10 (L) %      Monocytes Relative 9 %      Eosinophils Relative 1 %      Basophils Relative 0 %      Neutrophils Absolute 8 94 (H) Thousands/µL      Immature Grans Absolute 0 17 Thousand/uL      Lymphocytes Absolute 1 14 Thousands/µL      Monocytes Absolute 0 99 Thousand/µL      Eosinophils Absolute 0 09 Thousand/µL      Basophils Absolute 0 01 Thousands/µL                  CT chest abdomen pelvis w contrast   Final Result by Neda Benitez MD (08/21 1143)   Addendum 1 of 1 by Neda Benitez MD (08/21 114)   ADDENDUM:      For clarification, the lymphadenopathy has increased when compared to the    CT study 10/2/2017      Final      Supraclavicular, mediastinal, and retroperitoneal lymphadenopathy, progressed from prior  The mediastinal lymphadenopathy causes mass effect on the esophagus  Workstation performed: [id number]               Procedures  Procedures      Phone Consults  ED Phone Contact    ED Course  ED Course as of Aug 27 1424   Tue Aug 21, 2018   1330 Reviewed the case with Dr  for routine of heme on could and he denied having other specific recommendations, rather pain control and inpatient versus outpatient management depending on the patient's comfort  Would be able to see in the office in 2 days if discharged  Presented patient and patient's family with options stable discuss and let me know what they are comfortable with            Identification of Seniors at Risk      Most Recent Value   (ISAR) Identification of Seniors at Risk   Before the illness or injury that brought you to the Emergency, did you need someone to help you on a regular basis? 0 Filed at: 08/21/2018 0815   In the last 24 hours, have you needed more help than usual?  0 Filed at: 08/21/2018 1691   Have you been hospitalized for one or more nights during the past 6 months? 1 Filed at: 08/21/2018 0815   In general, do you see well?  0 Filed at: 08/21/2018 0815   In general, do you have serious problems with your memory? 0 Filed at: 08/21/2018 1897   Do you take more than three different medications every day?   1 Filed at: 08/21/2018 0815   ISAR Score  2 Filed at: 08/21/2018 Alda Pierson                          [facility]  Number of Diagnoses or Management Options  Flank pain:   Diagnosis management comments:   74-year-old female with lymphoma on chemotherapy with left flank pain  Concern for pyelonephritis versus stone versus increased pain or new  Will give pain medications and check labs CT abdomen pelvis and reassess    CritCare Time    Disposition  Final diagnoses:   Flank pain     Time reflects when diagnosis was documented in both MDM as applicable and the Disposition within this note     Time User Action Codes Description Comment    8/21/2018  2:43 PM Samira Lew Add [R10 9] Flank pain     8/21/2018  3:18 PM Gisela Katie D Add [C83 38] 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 ([facility] )     8/21/2018  3:18 PM Gisela Katie D Modify [C83 38] 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 ([facility] )     8/21/2018  3:19 PM Gisela Katie D Add [C85 80] Marginal zone lymphoma ([facility] )     8/21/2018  3:19 PM Gisela Katie D Modify [C85 80] Marginal zone lymphoma ([facility] )     8/23/2018 11:53 AM Sandi Mcdonald L Add [K59 00] Constipation     8/23/2018 11:53 AM Sandi Mcdonald Moder L Add [R10 9] Acute left flank pain       ED Disposition     ED Disposition Condition Comment    Admit  Case was discussed with AMANDA and the patient's admission status was agreed to be Admission Status: inpatient status to the service of Dr Oma Rivers           Follow-up Information     Follow up With Specialties Details Why [address] [facility] Family Medicine, Physician Assistant Follow up  [address] [facility], Physician Assistant, Hematology, Oncology Go to appointment today at [address] [place] [address] [address] [place] [id number]  [phone]      Pam Giles MD Cardiology Follow up  [facility]  [phone]            Discharge Medication List as of 8/23/2018 12:03 PM      START taking these medications    Details   docusate sodium (COLACE) 100 mg capsule Take 1 capsule (100 mg total) by mouth 2 (two) times a day, Starting Thu 8/23/2018, Normal      gabapentin (NEURONTIN) 100 mg capsule Take 1 capsule (100 mg total) by mouth daily at bedtime, Starting Thu 8/23/2018, Print      polyethylene glycol (MIRALAX) 17 g packet Take 17 g by mouth daily, Starting Fri 8/24/2018, Print      senna (SENOKOT) 8 6 mg Take 1 tablet (8 6 mg total) by mouth daily at bedtime, Starting Thu 8/23/2018, Print      simethicone (MYLICON) 80 mg chewable tablet Chew 1 tablet (80 mg total) every 6 (six) hours as needed for flatulence, Starting Thu 8/23/2018, Print      traMADol (ULTRAM) 50 mg tablet Take 1 tablet (50 mg total) by mouth every 6 (six) hours as needed for moderate pain or severe pain for up to 15 days, Starting Thu 8/23/2018, Until Fri 9/7/2018, Print         CONTINUE these medications which have NOT CHANGED    Details   allopurinol (ZYLOPRIM) 100 mg tablet Take 2 tablets (200 mg total) by mouth daily, Starting Fri 8/3/2018, Normal      apixaban (ELIQUIS) 5 mg Take 1 tablet (5 mg total) by mouth 2 (two) times a day, Starting Mon 8/20/2018, Normal      ascorbic acid (VITAMIN C) 500 mg tablet Take 500 mg by mouth daily with dinner  , Historical Med      Calcium Carb-Cholecalciferol (CALCIUM + D3) 600-200 MG-UNIT TABS Take 1 tablet by mouth daily with dinner  , Historical Med      Cyanocobalamin (VITAMIN B 12 PO) Take 1 tablet by mouth daily with dinner  , Historical Med      hydrochlorothiazide (HYDRODIURIL) 25 mg tablet Take 1 tablet (25 mg total) by mouth daily in the early morning, Starting Fri 4/27/2018, Normal      HYDROcodone-acetaminophen (NORCO) 5-325 mg per tablet Take 1 tablet by mouth every 6 (six) hours as needed for pain Max Daily Amount: 4 tablets, Starting Tue 7/17/2018, Normal      Lenalidomide (REVLIMID) 20 MG CAPS Take 1 capsule (20 mg total) by mouth daily Adult Female  Auth # [id number]  Take 1 capsule daily days 1-21  Followed by 7 days off , Starting Thu 8/16/2018, Print      lisinopril (ZESTRIL) 5 mg tablet Take 1 tablet (5 mg total) by mouth daily, Starting Wed 4/25/2018, Normal      loratadine (CLARITIN) 10 mg tablet Take 10 mg by mouth daily in the early morning  , Historical Med      Multiple Vitamin (MULTIVITAMIN) capsule Take 1 capsule by mouth daily with dinner  , Historical Med      Omega-3 Fatty Acids (FISH OIL) 1200 MG CAPS Take 3 capsules by mouth daily with dinner  , Historical Med      omeprazole (PriLOSEC OTC) 20 MG tablet Take 1 tablet (20 mg total) by mouth daily, Starting Fri 3/9/2018, Normal      ondansetron (ZOFRAN) 4 mg tablet Take 1 tablet (4 mg total) by mouth every 8 (eight) hours as needed for nausea or vomiting, Starting Wed 8/15/2018, Normal      potassium chloride (KLOR-CON M20) 20 mEq tablet Take 1 tablet (20 mEq total) by mouth daily in the early morning, Starting Tue 5/22/2018, Normal      predniSONE 20 mg tablet Take 1 tablet (20 mg total) by mouth daily with food, Starting Wed 8/15/2018, Normal         STOP taking these medications       cephalexin (KEFLEX) 500 mg capsule Comments:   Reason for Stopping:               Outpatient Discharge Orders  Ambulatory referral to Palliative Care   Standing Status: Future  Standing Exp  Date: 02/23/19     Discharge Diet     Activity as tolerated         ED Provider  Attending physically available and evaluated Yuuma Boylerico JOEL managed the patient along with the ED Attending      Electronically Signed by         Sabrina Granados MD  08/23/18 3921       Sabrina Granados MD  08/27/18 2043

## 2018-08-23 NOTE — ASSESSMENT & PLAN NOTE
· Patient known to Dr Amelie Kyle as outpatient  · Has been having worsening lymphadenopathy recently  · CT of the chest showed mixed progression as there was progression of the mediastinal and retrocrural lymphadenopathy but significant improvement of the axillary lymphadenopathy compared to prior per the result report  · CT of the chest abdomen and pelvis showed progression of supraclavicular, mediastinal, and retroperitoneal lymphadenopathy with mediastinal lymphadenopathy causing mass effect on the esophagus per the result report  · Has follow up appt with heme/onc today at 1pm with Dr Amelie Kyle     · Family agreeable to palliative med referral for pain mangement/symptom management   · She is on chronic steroids

## 2018-08-23 NOTE — LETTER
August 23, 2018     Lazarus Floras, PA-C  [address]  [place]  [id number]    Patient: Sim Brunner   YOB: 1940   Date of Visit: 8/23/2018       Dear Dr Workman Quiet: Thank you for referring Tomeka Fernandez to me for evaluation  Below are my notes for this consultation  If you have questions, please do not hesitate to call me  I look forward to following your patient along with you  Sincerely,        Maddie Gonzalez MD        CC: No Recipients  Maddie Gonzalez MD  8/23/2018 11:00 PM  Sign at close encounter    HPI:    Patient is here with her  and 2 sons  She was discharge from the hospital today  She was there because of left flank pain  Also constipation  Patient was given Ultram and low dose gabapentin 100 mg daily and with that her pain improved  She had follow-up CT scans and that showed increased lymphadenopathy in the neck chest and abdomen/retroperitoneum area  Patient is waiting to get started on Revlimid 20 mg once daily, 3 weeks on and 1 week off, recommended by specialist Dr Cari Soto  She could not have Rituxan with because she had anaphylactic reaction secondary to Rituxan  Patient is already on allopurinol and also on prednisone 20 mg daily in addition to her other medications  She was again pulse dose of prednisone last week because of increased swelling in the lower neck causing dysphagia  She has less dysphagia now  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 on the left side  She has Port-A-Cath on the left side and that is not functioning properly  It flushes but no blood return  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  It was a localized disease  Plan was radiation and Rituxan  She had radiation  She had only one dose of Rituxan and had reaction to Rituxan  She had swelling of the tongue and closing of throat   Rituxan was discontinued      In April 2015 patient had PET CT scan that showed hypermetabolic lymphadenopathy involving the neck, chest, abdomen and pelvis and also hypermetabolic right breast nodularity, hepatic steatosis, mild/borderline aneurysmal dilatation of ascending thoracic aorta  She had left inguinal excisional lymph node biopsy, right axillary lymph node biopsy and neck node biopsy and none of those 3 biopsies showed lymphoma or malignancy  She was being followed       Aug 2017 she presented with axillary adenopathy  Left axillary lymph node biopsy showed marginal zone lymphoma with features suggestive of aggressive behavior  She then had additional biopsy which showed 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  Bone marrow biopsy was negative       She underwent 3 cycles of CHOP and 3 cycles of EPOCH (last 2 cycles dropped adriamycin due to decreased EF       Feb 2018 post treatment PET/CT showed complete metabolic response  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%       June 2018 PET/CT showed recurrent hypermetabolic generalized lymphadenopathy  She had a core biopsy of the left neck lymph node which showed high-grade B-cell lymphoma, positive PAX5, CD 20+, 35% BCL 6, 45% BCL2, 35% MUM-1, No MYC stained  Ki67 85%  FISH showed MYC-3 range of positive, 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, BCL to expressing approximately 80%     Patient was seen by Dr Jennelle Rinne om 8/9/18, expert in hematological malignancies from [facility]      Dr Jorge Jennings explained to patient and her family that in the absence of intense salvage therapy, is unlikely to achieve remission  The goal of continued therapy will include disease control and improving progression-free survival and overall survival      Dr Jorge Jennings recommended rituximab with lenalidomide  If that's not tolerated or not effective then he would recommend  ibrutinib       Due to patient's anaphylactic reaction with Rituxan therapy prior, Rituxan will not be administered  Myself and Dr Jorge Jennings did discuss this        Patient has tiredness, fullness left lower neck, some dysphagia in the lower neck area, constipation and arthritic symptoms        Current Outpatient Prescriptions:     allopurinol (ZYLOPRIM) 100 mg tablet, Take 2 tablets (200 mg total) by mouth daily, Disp: 60 tablet, Rfl: 1    apixaban (ELIQUIS) 5 mg, Take 1 tablet (5 mg total) by mouth 2 (two) times a day, Disp: 60 tablet, Rfl: 2    ascorbic acid (VITAMIN C) 500 mg tablet, Take 500 mg by mouth daily with dinner  , Disp: , Rfl:     Calcium Carb-Cholecalciferol (CALCIUM + D3) 600-200 MG-UNIT TABS, Take 1 tablet by mouth daily with dinner  , Disp: , Rfl:     Cyanocobalamin (VITAMIN B 12 PO), Take 1 tablet by mouth daily with dinner  , Disp: , Rfl:     docusate sodium (COLACE) 100 mg capsule, Take 1 capsule (100 mg total) by mouth 2 (two) times a day, Disp: 10 capsule, Rfl: 0    gabapentin (NEURONTIN) 100 mg capsule, Take 1 capsule (100 mg total) by mouth daily at bedtime, Disp: 30 capsule, Rfl: 0    hydrochlorothiazide (HYDRODIURIL) 25 mg tablet, Take 1 tablet (25 mg total) by mouth daily in the early morning, Disp: 90 tablet, Rfl: 1    HYDROcodone-acetaminophen (NORCO) 5-325 mg per tablet, Take 1 tablet by mouth every 6 (six) hours as needed for pain Max Daily Amount: 4 tablets, Disp: 20 tablet, Rfl: 0    Lenalidomide (REVLIMID) 20 MG CAPS, Take 1 capsule (20 mg total) by mouth daily Adult Female  Auth # [id number] Take 1 capsule daily days 1-21   Followed by 7 days off , Disp: 21 capsule, Rfl: 0    lisinopril (ZESTRIL) 5 mg tablet, Take 1 tablet (5 mg total) by mouth daily (Patient taking differently: Take 5 mg by mouth daily in the early morning  ), Disp: 90 tablet, Rfl: 3    loratadine (CLARITIN) 10 mg tablet, Take 10 mg by mouth daily in the early morning  , Disp: , Rfl:     Multiple Vitamin (MULTIVITAMIN) capsule, Take 1 capsule by mouth daily with dinner  , Disp: , Rfl:   Omega-3 Fatty Acids (FISH OIL) 1200 MG CAPS, Take 3 capsules by mouth daily with dinner  , Disp: , Rfl:     omeprazole (PriLOSEC OTC) 20 MG tablet, Take 1 tablet (20 mg total) by mouth daily (Patient taking differently: Take 20 mg by mouth daily in the early morning  ), Disp: 90 tablet, Rfl: 1    ondansetron (ZOFRAN) 4 mg tablet, Take 1 tablet (4 mg total) by mouth every 8 (eight) hours as needed for nausea or vomiting, Disp: 30 tablet, Rfl: 0    [START ON 8/24/2018] polyethylene glycol (MIRALAX) 17 g packet, Take 17 g by mouth daily, Disp: 14 each, Rfl: 0    potassium chloride (KLOR-CON M20) 20 mEq tablet, Take 1 tablet (20 mEq total) by mouth daily in the early morning, Disp: 90 tablet, Rfl: 1    predniSONE 20 mg tablet, Take 1 tablet (20 mg total) by mouth daily with food, Disp: 30 tablet, Rfl: 0    senna (SENOKOT) 8 6 mg, Take 1 tablet (8 6 mg total) by mouth daily at bedtime, Disp: 120 each, Rfl: 0    simethicone (MYLICON) 80 mg chewable tablet, Chew 1 tablet (80 mg total) every 6 (six) hours as needed for flatulence, Disp: 30 tablet, Rfl: 0    traMADol (ULTRAM) 50 mg tablet, Take 1 tablet (50 mg total) by mouth every 6 (six) hours as needed for moderate pain or severe pain for up to 15 days, Disp: 15 tablet, Rfl: 0  No current facility-administered medications for this visit       Allergies   Allergen Reactions    Aspirin Anaphylaxis    Celebrex [Celecoxib] Anaphylaxis    Nsaids Anaphylaxis    Other Anaphylaxis     Pt states when she received a certain type of chemotherapy it caused her throat to close    Rituxan [Rituximab] Anaphylaxis     Swelling of tongue and closing of throat    Sulfa Antibiotics Other (See Comments) and Anaphylaxis     VERY EMOTIONAL CRYING    Bactrim [Sulfamethoxazole-Trimethoprim] Other (See Comments)     VERY EMOTIONAL/CRYING       Oncology History    78year old patient with history of marginal zone lymphoma, transformed into diffuse large B-cell lymphoma now with a early recurrence of diffuse large B-cell lymphoma  She is here today for initial radiation therapy consult  Referred by Dr James Pappas  In 2009 she was diagnosed with marginal zone lymphoma of the right orbit  She was treated to the right orbit with radiation therapy at [facility] and received rituxan x1 dose, developing an anaphylactic reaction and medication was discontinued  2015 PET CT scan that showed hypermetabolic lymphadenopathy of multiple sites  Biopsy of 3 different sites showed no lymphoma or malignancy  She continued to be followed  2017 presented with axillary adenopathy, left axillary lymph node revealed marginal zone lymphoma with features suggestive of aggressive behavior  She then had additional biopsy which showed 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  Bone marrow biopsy was negative  She underwent 3 cycles of CHOP and 3 cycles of EPOCH (last 2 cycles dropped adriamycin due to decreased EF)       2/201/8 PET CT   IMPRESSION:   1  Overall stable findings compared to the prior exam   Mild areas of FDG uptake remaining in the left hilar region and in a small left para-aortic lymph node  No new findings suspicious for hypermetabolic malignancy  Deauville score of 2       6/18/18 PET CT:  IMPRESSION:   1   Findings compatible with disease recurrence  Multiple FDG avid lymph nodes in the bilateral neck, axilla, subpectoral regions, mediastinum, portacaval region and retroperitoneum compatible with hypermetabolic malignancy  Deauville score of 5     7/10/18 US Head/Neck soft tissue:  IMPRESSION:  Enlarged  lymph nodes bilaterally measuring up to 3 4 x 0 6 x 1 6 cm  Recommend CT with contrast for further evaluation      7/25/18 Ultrasound-guided left neck lymph node biospy: - Consistent with recurrent B-cell lymphoma    8/9/18 seen by Dr Janie Simons, expert in hematological malignancies from [facility] -  Recommended rituximab with lenalidomide and if not tolerated recommend treatment with ibrutinib         8/15/18 seen by Brandon Mckeon PA-C, medical oncology office  **Previous anaphylactic reaction to rituximab, will be treating with lenalidomide alone  Continues to have swelling over area where lymph node biopsy was on the left neck area, CT ordered to assess area  Start prednisone 50 mg once daily x5 days followed by 20 mg daily for swelling  Refer to radiation oncology to treat area  8/18/18 scheduled for CT neck and chest            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 ([facility] [address] )    12/6/2017 Initial Diagnosis     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 [facility])       2017 Biopsy     Left axillary lymph node biopsy showed marginal zone lymphoma with features suggestive of aggressive behavior  She then had additional biopsy which showed 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  Bone marrow biopsy was negative  2017 -  Chemotherapy     3 cycles of CHOP and 3 cycles of EPOCH (last 2 cycles dropped adriamycin due to decreased EF)  6/18/2018 Progression     PET CT: Findings compatible with disease recurrence  Multiple FDG avid lymph nodes in the bilateral neck, axilla, subpectoral regions, mediastinum, portacaval region and retroperitoneum compatible with hypermetabolic malignancy  Deauville score of 5            7/25/2018 Biopsy     Left neck lymph node: High grade B-cell lymphoma, MYC-rearrangement positive, 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"), bcl-2 expressing (~80%), 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 - see Note  2018 -  Chemotherapy     Plan to start lenalidomide (Revlimid) 20 mg daily weekly x3, 1 week off                Marginal zone lymphoma ([address] )    2008 Initial Diagnosis     Marginal zone lymphoma ([address] )         2008 -  Chemotherapy     2008/2009 Rituxan x1 dose - anaphylactic reaction with swelling of the tongue and closing of throat  Rituxan was discontinued  5/12/2009 - 6/16/2009 Radiation     Right orbit IMRT; Total dose: 3600 cGy   - Dr Maurisio Arndt at [place]:  08/23/18 Reviewed 13 systems:  Presently no headaches, seizures, dizziness, diplopia,  hoarseness, chest pain, palpitations, shortness of breath, cough, hemoptysis, vomiting,  melena, hematuria, fever, chills, bleeding, bone pains, skin rash, weight loss,   weakness, numbness,  claudication and gait problem  No frequent infections  Not unusually sensitive to heat or cold  No swelling of the ankles  Has swollen neck glands  Patient is anxious  No GYN symptoms Other symptoms are in HPI        /62 (BP Location: Left arm, Cuff Size: Large)   Pulse (!) 106   Temp (!) 97 3 °F (36 3 °C) (Tympanic)   Resp 18   Ht 5' 4" (1 626 m)   Wt 95 3 kg (210 lb)   SpO2 95%   BMI 36 05 kg/m²      Physical Exam:    Alert oriented and not in distress  No icterus  No oral thrush  Fullness left lower neck  Clear lung fields  Regular heart rate  Soft nontender abdomen  No ascites  No palpable abdominal mass  No edema of ankles  No calf tenderness  No peripheral palpable lymphadenopathy other than fullness left supraclavicular area  No focal neurological deficit  No skin rash  Good arterial pulses  No clubbing  Performance status 1  IMAGING:  Increased lymphadenopathy in the neck chest and abdomen/retroperitoneal area  Left jugular vein thrombosis      LABS:  Results for orders placed or performed during the hospital encounter of 08/21/18   Urine culture   Result Value Ref Range    Urine Culture <10,000 cfu/ml     CBC and differential   Result Value Ref Range    WBC 11 34 (H) 4 31 - 10 16 Thousand/uL    RBC 4 49 3 81 - 5 12 Million/uL    Hemoglobin 13 0 11 5 - 15 4 g/dL    Hematocrit 41 1 34 8 - 46 1 %    MCV 92 82 - 98 fL    MCH 29 0 26 8 - 34 3 pg    MCHC 31 6 31 4 - 37 4 g/dL    RDW 13 9 11 6 - 15 1 %    MPV 9 3 8 9 - 12 7 fL    Platelets 053 883 - 246 Thousands/uL    nRBC 0 /100 WBCs    Neutrophils Relative 78 (H) 43 - 75 %    Immat GRANS % 2 0 - 2 %    Lymphocytes Relative 10 (L) 14 - 44 %    Monocytes Relative 9 4 - 12 %    Eosinophils Relative 1 0 - 6 %    Basophils Relative 0 0 - 1 %    Neutrophils Absolute 8 94 (H) 1 85 - 7 62 Thousands/µL    Immature Grans Absolute 0 17 0 00 - 0 20 Thousand/uL    Lymphocytes Absolute 1 14 0 60 - 4 47 Thousands/µL    Monocytes Absolute 0 99 0 17 - 1 22 Thousand/µL    Eosinophils Absolute 0 09 0 00 - 0 61 Thousand/µL    Basophils Absolute 0 01 0 00 - 0 10 Thousands/µL   Comprehensive metabolic panel   Result Value Ref Range    Sodium 136 136 - 145 mmol/L    Potassium 4 2 3 5 - 5 3 mmol/L    Chloride 102 100 - 108 mmol/L    CO2 29 21 - 32 mmol/L    Anion Gap 5 4 - 13 mmol/L    BUN 20 5 - 25 mg/dL    Creatinine 0 96 0 60 - 1 30 mg/dL    Glucose 75 65 - 140 mg/dL    Calcium 9 8 8 3 - 10 1 mg/dL    AST 85 (H) 5 - 45 U/L     (H) 12 - 78 U/L    Alkaline Phosphatase 97 46 - 116 U/L    Total Protein 6 4 6 4 - 8 2 g/dL    Albumin 3 2 (L) 3 5 - 5 0 g/dL    Total Bilirubin 0 47 0 20 - 1 00 mg/dL    eGFR 58 ml/min/1 73sq m   Lipase   Result Value Ref Range    Lipase 117 73 - 393 u/L   Troponin I   Result Value Ref Range    Troponin I <0 02 <=0 04 ng/mL   Urine Microscopic   Result Value Ref Range    RBC, UA None Seen None Seen, 0-5 /hpf    WBC, UA 30-50 (A) None Seen, 0-5, 5-55, 5-65 /hpf    Epithelial Cells None Seen None Seen, Occasional /hpf    Bacteria, UA None Seen None Seen, Occasional /hpf    Hyaline Casts, UA None Seen None Seen /lpf   Basic metabolic panel   Result Value Ref Range    Sodium 137 136 - 145 mmol/L    Potassium 4 7 3 5 - 5 3 mmol/L    Chloride 100 100 - 108 mmol/L    CO2 32 21 - 32 mmol/L    Anion Gap 5 4 - 13 mmol/L    BUN 18 5 - 25 mg/dL    Creatinine 0 82 0 60 - 1 30 mg/dL    Glucose 86 65 - 140 mg/dL    Calcium 9 6 8 3 - 10 1 mg/dL    eGFR 70 ml/min/1 73sq m   CBC and differential   Result Value Ref Range WBC 9 79 4 31 - 10 16 Thousand/uL    RBC 4 15 3 81 - 5 12 Million/uL    Hemoglobin 12 2 11 5 - 15 4 g/dL    Hematocrit 38 3 34 8 - 46 1 %    MCV 92 82 - 98 fL    MCH 29 4 26 8 - 34 3 pg    MCHC 31 9 31 4 - 37 4 g/dL    RDW 14 3 11 6 - 15 1 %    MPV 9 3 8 9 - 12 7 fL    Platelets 192 072 - 977 Thousands/uL    nRBC 0 /100 WBCs    Neutrophils Relative 81 (H) 43 - 75 %    Immat GRANS % 1 0 - 2 %    Lymphocytes Relative 11 (L) 14 - 44 %    Monocytes Relative 7 4 - 12 %    Eosinophils Relative 0 0 - 6 %    Basophils Relative 0 0 - 1 %    Neutrophils Absolute 7 90 (H) 1 85 - 7 62 Thousands/µL    Immature Grans Absolute 0 07 0 00 - 0 20 Thousand/uL    Lymphocytes Absolute 1 07 0 60 - 4 47 Thousands/µL    Monocytes Absolute 0 70 0 17 - 1 22 Thousand/µL    Eosinophils Absolute 0 04 0 00 - 0 61 Thousand/µL    Basophils Absolute 0 01 0 00 - 0 10 Thousands/µL   POCT urinalysis dipstick   Result Value Ref Range    Color, UA yellow    ECG 12 lead   Result Value Ref Range    Ventricular Rate 101 BPM    Atrial Rate 101 BPM    MN Interval 148 ms    QRSD Interval 166 ms    QT Interval 398 ms    QTC Interval 516 ms    P Axis 56 degrees    QRS Axis -37 degrees    T Wave Axis 90 degrees   ED Urine Macroscopic   Result Value Ref Range    Color, UA Yellow     Clarity, UA Clear     pH, UA 6 5 4 5 - 8 0    Leukocytes, UA Moderate (A) Negative    Nitrite, UA Negative Negative    Protein, UA Negative Negative mg/dl    Glucose, UA Negative Negative mg/dl    Ketones, UA Negative Negative mg/dl    Urobilinogen, UA 0 2 0 2, 1 0 E U /dl E U /dl    Bilirubin, UA Negative Negative    Blood, UA Negative Negative    Specific Gravity, UA 1 015 1 003 - 1 030     Labs, Imaging, & Other studies:   All pertinent labs and imaging studies were personally reviewed    Lab Results   Component Value Date     08/22/2018    K 4 7 08/22/2018     08/22/2018    CO2 32 08/22/2018    ANIONGAP 5 08/22/2018    BUN 18 08/22/2018    CREATININE 0 82 08/22/2018 GLUCOSE 86 08/22/2018    GLUF 94 06/29/2018    CALCIUM 9 6 08/22/2018    AST 85 (H) 08/21/2018     (H) 08/21/2018    ALKPHOS 97 08/21/2018    PROT 6 4 08/21/2018    BILITOT 0 47 08/21/2018    EGFR 70 08/22/2018     Lab Results   Component Value Date    WBC 9 79 08/22/2018    HGB 12 2 08/22/2018    HCT 38 3 08/22/2018    MCV 92 08/22/2018     08/22/2018       Reviewed and discussed with patient  Assessment and plan:  Reviewed hospital records   Patient is here with her  and 2 sons  She was discharge from the hospital today  She was there because of left flank pain  Also constipation  Patient was given Ultram and low dose gabapentin 100 mg daily and with that her pain improved  She had follow-up CT scans and that showed increased lymphadenopathy in the neck chest and abdomen/retroperitoneum area  Patient is waiting to get started on Revlimid 20 mg once daily, 3 weeks on and 1 week off, recommended by specialist Dr Maryam Melo  She could not have Rituxan with because she had anaphylactic reaction secondary to Rituxan  Patient is already on allopurinol and also on prednisone 20 mg daily in addition to her other medications  She was again pulse dose of prednisone last week because of increased swelling in the lower neck causing dysphagia  She has less dysphagia now  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 on the left side  She has Port-A-Cath on the left side and that is not functioning properly  It flushes but no blood return  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  It was a localized disease  Plan was radiation and Rituxan  She had radiation  She had only one dose of Rituxan and had reaction to Rituxan  She had swelling of the tongue and closing of throat   Rituxan was discontinued      In April 2015 patient had PET CT scan that showed hypermetabolic lymphadenopathy involving the neck, chest, abdomen and pelvis and also hypermetabolic right breast nodularity, hepatic steatosis, mild/borderline aneurysmal dilatation of ascending thoracic aorta  She had left inguinal excisional lymph node biopsy, right axillary lymph node biopsy and neck node biopsy and none of those 3 biopsies showed lymphoma or malignancy  She was being followed       Aug 2017 she presented with axillary adenopathy  Left axillary lymph node biopsy showed marginal zone lymphoma with features suggestive of aggressive behavior  She then had additional biopsy which showed 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  Bone marrow biopsy was negative       She underwent 3 cycles of CHOP and 3 cycles of EPOCH (last 2 cycles dropped adriamycin due to decreased EF       Feb 2018 post treatment PET/CT showed complete metabolic response  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%       June 2018 PET/CT showed recurrent hypermetabolic generalized lymphadenopathy  She had a core biopsy of the left neck lymph node which showed high-grade B-cell lymphoma, positive PAX5, CD 20+, 35% BCL 6, 45% BCL2, 35% MUM-1, No MYC stained  Ki67 85%  FISH showed MYC-3 range of positive, 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, BCL to expressing approximately 80%     Patient was seen by Dr Chioma Harper om 8/9/18, expert in hematological malignancies from [facility]      Dr Ludmila Peter explained to patient and her family that in the absence of intense salvage therapy, is unlikely to achieve remission  The goal of continued therapy will include disease control and improving progression-free survival and overall survival      Dr Ludmila Peter recommended rituximab with lenalidomide  If that's not tolerated or not effective then he would recommend  ibrutinib       Due to patient's anaphylactic reaction with Rituxan therapy prior, Rituxan will not be administered     Myself and Dr Ludmila Peter did discuss this        Patient has tiredness, fullness left lower neck, some dysphagia in the lower neck area, constipation and arthritic symptoms      1  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 ([address] )  Plan is Revlimid, local radiation to lower neck and upper mediastinal area  Patient already has left jugular vein thrombosis and there is increased risk of thrombosis secondary to Revlimid especially when given with Decadron, happened in patients with multiple myeloma  Patient understands the added risk of thrombosis  She has some protection because she is on Eliquis  Patient wants to take the chance and get going with Revlimid  She is already on allopurinol and prednisone  - Ambulatory referral to Radiation Oncology; Future  - Ambulatory referral to Palliative Care; Future    2  Left jugular vein thrombosis  Patient is on Eliquis      3  Port-a-cath in place  Consulting IR because Port-A-Cath is nonfunctional, flushes but does not give blood return  Patient has pain medications as above  She has instructions and medications for constipation  There is consultation for palliative care  Family also discussed medical marijuana and they will discuss that with palliative care team   All discussed in detail  Questions answered    Patient voiced understanding and agreement in the discussion  Counseling / Coordination of Care: 50 min   Greater than 50% of total time was spent with the patient and / or family counseling and / or coordination of care

## 2018-08-24 ENCOUNTER — DOCUMENTATION (OUTPATIENT)
Dept: HEMATOLOGY ONCOLOGY | Facility: CLINIC | Age: 78
End: 2018-08-24

## 2018-08-24 ENCOUNTER — TELEPHONE (OUTPATIENT)
Dept: HEMATOLOGY ONCOLOGY | Facility: CLINIC | Age: 78
End: 2018-08-24

## 2018-08-24 NOTE — PROGRESS NOTES
Received notification from [facility] at [place] who stated that they are non-par with [employer]  Requested clinical to forward everything over to [facility]

## 2018-08-24 NOTE — PROGRESS NOTES
HPI:    Patient is here with her  and 2 sons  She was discharge from the hospital today  She was there because of left flank pain  Also constipation  Patient was given Ultram and low dose gabapentin 100 mg daily and with that her pain improved  She had follow-up CT scans and that showed increased lymphadenopathy in the neck chest and abdomen/retroperitoneum area  Patient is waiting to get started on Revlimid 20 mg once daily, 3 weeks on and 1 week off, recommended by specialist Dr Jeny Buckley  She could not have Rituxan with because she had anaphylactic reaction secondary to Rituxan  Patient is already on allopurinol and also on prednisone 20 mg daily in addition to her other medications  She was again pulse dose of prednisone last week because of increased swelling in the lower neck causing dysphagia  She has less dysphagia now  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 on the left side  She has Port-A-Cath on the left side and that is not functioning properly  It flushes but no blood return  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  It was a localized disease  Plan was radiation and Rituxan  She had radiation  She had only one dose of Rituxan and had reaction to Rituxan  She had swelling of the tongue and closing of throat  Rituxan was discontinued      In April 2015 patient had PET CT scan that showed hypermetabolic lymphadenopathy involving the neck, chest, abdomen and pelvis and also hypermetabolic right breast nodularity, hepatic steatosis, mild/borderline aneurysmal dilatation of ascending thoracic aorta  She had left inguinal excisional lymph node biopsy, right axillary lymph node biopsy and neck node biopsy and none of those 3 biopsies showed lymphoma or malignancy  She was being followed       Aug 2017 she presented with axillary adenopathy   Left axillary lymph node biopsy showed marginal zone lymphoma with features suggestive of aggressive behavior  She then had additional biopsy which showed 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  Bone marrow biopsy was negative       She underwent 3 cycles of CHOP and 3 cycles of EPOCH (last 2 cycles dropped adriamycin due to decreased EF       Feb 2018 post treatment PET/CT showed complete metabolic response  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%       June 2018 PET/CT showed recurrent hypermetabolic generalized lymphadenopathy  She had a core biopsy of the left neck lymph node which showed high-grade B-cell lymphoma, positive PAX5, CD 20+, 35% BCL 6, 45% BCL2, 35% MUM-1, No MYC stained  Ki67 85%  FISH showed MYC-3 range of positive, 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, BCL to expressing approximately 80%     Patient was seen by Dr Ryan Ramos om 8/9/18, expert in hematological malignancies from [facility]      Dr Jeny Buckley explained to patient and her family that in the absence of intense salvage therapy, is unlikely to achieve remission  The goal of continued therapy will include disease control and improving progression-free survival and overall survival      Dr Jeny Buckley recommended rituximab with lenalidomide  If that's not tolerated or not effective then he would recommend  ibrutinib       Due to patient's anaphylactic reaction with Rituxan therapy prior, Rituxan will not be administered     Myself and Dr Jeny Buckley did discuss this        Patient has tiredness, fullness left lower neck, some dysphagia in the lower neck area, constipation and arthritic symptoms        Current Outpatient Prescriptions:     allopurinol (ZYLOPRIM) 100 mg tablet, Take 2 tablets (200 mg total) by mouth daily, Disp: 60 tablet, Rfl: 1    apixaban (ELIQUIS) 5 mg, Take 1 tablet (5 mg total) by mouth 2 (two) times a day, Disp: 60 tablet, Rfl: 2    ascorbic acid (VITAMIN C) 500 mg tablet, Take 500 mg by mouth daily with dinner  , Disp: , Rfl:    Calcium Carb-Cholecalciferol (CALCIUM + D3) 600-200 MG-UNIT TABS, Take 1 tablet by mouth daily with dinner  , Disp: , Rfl:     Cyanocobalamin (VITAMIN B 12 PO), Take 1 tablet by mouth daily with dinner  , Disp: , Rfl:     docusate sodium (COLACE) 100 mg capsule, Take 1 capsule (100 mg total) by mouth 2 (two) times a day, Disp: 10 capsule, Rfl: 0    gabapentin (NEURONTIN) 100 mg capsule, Take 1 capsule (100 mg total) by mouth daily at bedtime, Disp: 30 capsule, Rfl: 0    hydrochlorothiazide (HYDRODIURIL) 25 mg tablet, Take 1 tablet (25 mg total) by mouth daily in the early morning, Disp: 90 tablet, Rfl: 1    HYDROcodone-acetaminophen (NORCO) 5-325 mg per tablet, Take 1 tablet by mouth every 6 (six) hours as needed for pain Max Daily Amount: 4 tablets, Disp: 20 tablet, Rfl: 0    Lenalidomide (REVLIMID) 20 MG CAPS, Take 1 capsule (20 mg total) by mouth daily Adult Female  Auth # [id number] Take 1 capsule daily days 1-21   Followed by 7 days off , Disp: 21 capsule, Rfl: 0    lisinopril (ZESTRIL) 5 mg tablet, Take 1 tablet (5 mg total) by mouth daily (Patient taking differently: Take 5 mg by mouth daily in the early morning  ), Disp: 90 tablet, Rfl: 3    loratadine (CLARITIN) 10 mg tablet, Take 10 mg by mouth daily in the early morning  , Disp: , Rfl:     Multiple Vitamin (MULTIVITAMIN) capsule, Take 1 capsule by mouth daily with dinner  , Disp: , Rfl:     Omega-3 Fatty Acids (FISH OIL) 1200 MG CAPS, Take 3 capsules by mouth daily with dinner  , Disp: , Rfl:     omeprazole (PriLOSEC OTC) 20 MG tablet, Take 1 tablet (20 mg total) by mouth daily (Patient taking differently: Take 20 mg by mouth daily in the early morning  ), Disp: 90 tablet, Rfl: 1    ondansetron (ZOFRAN) 4 mg tablet, Take 1 tablet (4 mg total) by mouth every 8 (eight) hours as needed for nausea or vomiting, Disp: 30 tablet, Rfl: 0    [START ON 8/24/2018] polyethylene glycol (MIRALAX) 17 g packet, Take 17 g by mouth daily, Disp: 14 each, Rfl: 0   potassium chloride (KLOR-CON M20) 20 mEq tablet, Take 1 tablet (20 mEq total) by mouth daily in the early morning, Disp: 90 tablet, Rfl: 1    predniSONE 20 mg tablet, Take 1 tablet (20 mg total) by mouth daily with food, Disp: 30 tablet, Rfl: 0    senna (SENOKOT) 8 6 mg, Take 1 tablet (8 6 mg total) by mouth daily at bedtime, Disp: 120 each, Rfl: 0    simethicone (MYLICON) 80 mg chewable tablet, Chew 1 tablet (80 mg total) every 6 (six) hours as needed for flatulence, Disp: 30 tablet, Rfl: 0    traMADol (ULTRAM) 50 mg tablet, Take 1 tablet (50 mg total) by mouth every 6 (six) hours as needed for moderate pain or severe pain for up to 15 days, Disp: 15 tablet, Rfl: 0  No current facility-administered medications for this visit  Allergies   Allergen Reactions    Aspirin Anaphylaxis    Celebrex [Celecoxib] Anaphylaxis    Nsaids Anaphylaxis    Other Anaphylaxis     Pt states when she received a certain type of chemotherapy it caused her throat to close    Rituxan [Rituximab] Anaphylaxis     Swelling of tongue and closing of throat    Sulfa Antibiotics Other (See Comments) and Anaphylaxis     VERY EMOTIONAL CRYING    Bactrim [Sulfamethoxazole-Trimethoprim] Other (See Comments)     VERY EMOTIONAL/CRYING       Oncology History    78year old patient with history of marginal zone lymphoma, transformed into diffuse large B-cell lymphoma now with a early recurrence of diffuse large B-cell lymphoma  She is here today for initial radiation therapy consult  Referred by Dr Shashi Camp  In 2009 she was diagnosed with marginal zone lymphoma of the right orbit  She was treated to the right orbit with radiation therapy at [facility] and received rituxan x1 dose, developing an anaphylactic reaction and medication was discontinued  2015 PET CT scan that showed hypermetabolic lymphadenopathy of multiple sites  Biopsy of 3 different sites showed no lymphoma or malignancy  She continued to be followed         2017 presented with axillary adenopathy, left axillary lymph node revealed marginal zone lymphoma with features suggestive of aggressive behavior  She then had additional biopsy which showed 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  Bone marrow biopsy was negative  She underwent 3 cycles of CHOP and 3 cycles of EPOCH (last 2 cycles dropped adriamycin due to decreased EF)       2/201/8 PET CT   IMPRESSION:   1  Overall stable findings compared to the prior exam   Mild areas of FDG uptake remaining in the left hilar region and in a small left para-aortic lymph node  No new findings suspicious for hypermetabolic malignancy  Deauville score of 2       6/18/18 PET CT:  IMPRESSION:   1   Findings compatible with disease recurrence  Multiple FDG avid lymph nodes in the bilateral neck, axilla, subpectoral regions, mediastinum, portacaval region and retroperitoneum compatible with hypermetabolic malignancy  Deauville score of 5     7/10/18 US Head/Neck soft tissue:  IMPRESSION:  Enlarged  lymph nodes bilaterally measuring up to 3 4 x 0 6 x 1 6 cm  Recommend CT with contrast for further evaluation  7/25/18 Ultrasound-guided left neck lymph node biospy: - Consistent with recurrent B-cell lymphoma    8/9/18 seen by Dr Rocio Henriquez, expert in hematological malignancies from [facility] -  Recommended rituximab with lenalidomide and if not tolerated recommend treatment with ibrutinib         8/15/18 seen by Martha Baltazar PA-C, medical oncology office  **Previous anaphylactic reaction to rituximab, will be treating with lenalidomide alone  Continues to have swelling over area where lymph node biopsy was on the left neck area, CT ordered to assess area  Start prednisone 50 mg once daily x5 days followed by 20 mg daily for swelling  Refer to radiation oncology to treat area        8/18/18 scheduled for CT neck and chest            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 ([facility] [address] )    12/6/2017 Initial Diagnosis     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 [facility])       2017 Biopsy     Left axillary lymph node biopsy showed marginal zone lymphoma with features suggestive of aggressive behavior  She then had additional biopsy which showed 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  Bone marrow biopsy was negative  2017 -  Chemotherapy     3 cycles of CHOP and 3 cycles of EPOCH (last 2 cycles dropped adriamycin due to decreased EF)  6/18/2018 Progression     PET CT: Findings compatible with disease recurrence  Multiple FDG avid lymph nodes in the bilateral neck, axilla, subpectoral regions, mediastinum, portacaval region and retroperitoneum compatible with hypermetabolic malignancy  Deauville score of 5            7/25/2018 Biopsy     Left neck lymph node: High grade B-cell lymphoma, MYC-rearrangement positive, 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"), bcl-2 expressing (~80%), 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 - see Note  2018 -  Chemotherapy     Plan to start lenalidomide (Revlimid) 20 mg daily weekly x3, 1 week off                Marginal zone lymphoma ([address] )    2008 Initial Diagnosis     Marginal zone lymphoma ([address] )         2008 -  Chemotherapy     2008/2009 Rituxan x1 dose - anaphylactic reaction with swelling of the tongue and closing of throat  Rituxan was discontinued  5/12/2009 - 6/16/2009 Radiation     Right orbit IMRT; Total dose: 3600 cGy   - Dr Amber Zhu at [place]:  08/23/18 Reviewed 13 systems:  Presently no headaches, seizures, dizziness, diplopia,  hoarseness, chest pain, palpitations, shortness of breath, cough, hemoptysis, vomiting,  melena, hematuria, fever, chills, bleeding, bone pains, skin rash, weight loss,   weakness, numbness,  claudication and gait problem  No frequent infections  Not unusually sensitive to heat or cold  No swelling of the ankles   Has swollen neck glands  Patient is anxious  No GYN symptoms Other symptoms are in HPI        /62 (BP Location: Left arm, Cuff Size: Large)   Pulse (!) 106   Temp (!) 97 3 °F (36 3 °C) (Tympanic)   Resp 18   Ht 5' 4" (1 626 m)   Wt 95 3 kg (210 lb)   SpO2 95%   BMI 36 05 kg/m²     Physical Exam:    Alert oriented and not in distress  No icterus  No oral thrush  Fullness left lower neck  Clear lung fields  Regular heart rate  Soft nontender abdomen  No ascites  No palpable abdominal mass  No edema of ankles  No calf tenderness  No peripheral palpable lymphadenopathy other than fullness left supraclavicular area  No focal neurological deficit  No skin rash  Good arterial pulses  No clubbing  Performance status 1  IMAGING:  Increased lymphadenopathy in the neck chest and abdomen/retroperitoneal area  Left jugular vein thrombosis      LABS:  Results for orders placed or performed during the hospital encounter of 08/21/18   Urine culture   Result Value Ref Range    Urine Culture <10,000 cfu/ml     CBC and differential   Result Value Ref Range    WBC 11 34 (H) 4 31 - 10 16 Thousand/uL    RBC 4 49 3 81 - 5 12 Million/uL    Hemoglobin 13 0 11 5 - 15 4 g/dL    Hematocrit 41 1 34 8 - 46 1 %    MCV 92 82 - 98 fL    MCH 29 0 26 8 - 34 3 pg    MCHC 31 6 31 4 - 37 4 g/dL    RDW 13 9 11 6 - 15 1 %    MPV 9 3 8 9 - 12 7 fL    Platelets 227 748 - 853 Thousands/uL    nRBC 0 /100 WBCs    Neutrophils Relative 78 (H) 43 - 75 %    Immat GRANS % 2 0 - 2 %    Lymphocytes Relative 10 (L) 14 - 44 %    Monocytes Relative 9 4 - 12 %    Eosinophils Relative 1 0 - 6 %    Basophils Relative 0 0 - 1 %    Neutrophils Absolute 8 94 (H) 1 85 - 7 62 Thousands/µL    Immature Grans Absolute 0 17 0 00 - 0 20 Thousand/uL    Lymphocytes Absolute 1 14 0 60 - 4 47 Thousands/µL    Monocytes Absolute 0 99 0 17 - 1 22 Thousand/µL    Eosinophils Absolute 0 09 0 00 - 0 61 Thousand/µL    Basophils Absolute 0 01 0 00 - 0 10 Thousands/µL Comprehensive metabolic panel   Result Value Ref Range    Sodium 136 136 - 145 mmol/L    Potassium 4 2 3 5 - 5 3 mmol/L    Chloride 102 100 - 108 mmol/L    CO2 29 21 - 32 mmol/L    Anion Gap 5 4 - 13 mmol/L    BUN 20 5 - 25 mg/dL    Creatinine 0 96 0 60 - 1 30 mg/dL    Glucose 75 65 - 140 mg/dL    Calcium 9 8 8 3 - 10 1 mg/dL    AST 85 (H) 5 - 45 U/L     (H) 12 - 78 U/L    Alkaline Phosphatase 97 46 - 116 U/L    Total Protein 6 4 6 4 - 8 2 g/dL    Albumin 3 2 (L) 3 5 - 5 0 g/dL    Total Bilirubin 0 47 0 20 - 1 00 mg/dL    eGFR 58 ml/min/1 73sq m   Lipase   Result Value Ref Range    Lipase 117 73 - 393 u/L   Troponin I   Result Value Ref Range    Troponin I <0 02 <=0 04 ng/mL   Urine Microscopic   Result Value Ref Range    RBC, UA None Seen None Seen, 0-5 /hpf    WBC, UA 30-50 (A) None Seen, 0-5, 5-55, 5-65 /hpf    Epithelial Cells None Seen None Seen, Occasional /hpf    Bacteria, UA None Seen None Seen, Occasional /hpf    Hyaline Casts, UA None Seen None Seen /lpf   Basic metabolic panel   Result Value Ref Range    Sodium 137 136 - 145 mmol/L    Potassium 4 7 3 5 - 5 3 mmol/L    Chloride 100 100 - 108 mmol/L    CO2 32 21 - 32 mmol/L    Anion Gap 5 4 - 13 mmol/L    BUN 18 5 - 25 mg/dL    Creatinine 0 82 0 60 - 1 30 mg/dL    Glucose 86 65 - 140 mg/dL    Calcium 9 6 8 3 - 10 1 mg/dL    eGFR 70 ml/min/1 73sq m   CBC and differential   Result Value Ref Range    WBC 9 79 4 31 - 10 16 Thousand/uL    RBC 4 15 3 81 - 5 12 Million/uL    Hemoglobin 12 2 11 5 - 15 4 g/dL    Hematocrit 38 3 34 8 - 46 1 %    MCV 92 82 - 98 fL    MCH 29 4 26 8 - 34 3 pg    MCHC 31 9 31 4 - 37 4 g/dL    RDW 14 3 11 6 - 15 1 %    MPV 9 3 8 9 - 12 7 fL    Platelets 317 569 - 112 Thousands/uL    nRBC 0 /100 WBCs    Neutrophils Relative 81 (H) 43 - 75 %    Immat GRANS % 1 0 - 2 %    Lymphocytes Relative 11 (L) 14 - 44 %    Monocytes Relative 7 4 - 12 %    Eosinophils Relative 0 0 - 6 %    Basophils Relative 0 0 - 1 %    Neutrophils Absolute 7 90 (H) 1 85 - 7 62 Thousands/µL    Immature Grans Absolute 0 07 0 00 - 0 20 Thousand/uL    Lymphocytes Absolute 1 07 0 60 - 4 47 Thousands/µL    Monocytes Absolute 0 70 0 17 - 1 22 Thousand/µL    Eosinophils Absolute 0 04 0 00 - 0 61 Thousand/µL    Basophils Absolute 0 01 0 00 - 0 10 Thousands/µL   POCT urinalysis dipstick   Result Value Ref Range    Color, UA yellow    ECG 12 lead   Result Value Ref Range    Ventricular Rate 101 BPM    Atrial Rate 101 BPM    AK Interval 148 ms    QRSD Interval 166 ms    QT Interval 398 ms    QTC Interval 516 ms    P Axis 56 degrees    QRS Axis -37 degrees    T Wave Axis 90 degrees   ED Urine Macroscopic   Result Value Ref Range    Color, UA Yellow     Clarity, UA Clear     pH, UA 6 5 4 5 - 8 0    Leukocytes, UA Moderate (A) Negative    Nitrite, UA Negative Negative    Protein, UA Negative Negative mg/dl    Glucose, UA Negative Negative mg/dl    Ketones, UA Negative Negative mg/dl    Urobilinogen, UA 0 2 0 2, 1 0 E U /dl E U /dl    Bilirubin, UA Negative Negative    Blood, UA Negative Negative    Specific Gravity, UA 1 015 1 003 - 1 030     Labs, Imaging, & Other studies:   All pertinent labs and imaging studies were personally reviewed    Lab Results   Component Value Date     08/22/2018    K 4 7 08/22/2018     08/22/2018    CO2 32 08/22/2018    ANIONGAP 5 08/22/2018    BUN 18 08/22/2018    CREATININE 0 82 08/22/2018    GLUCOSE 86 08/22/2018    GLUF 94 06/29/2018    CALCIUM 9 6 08/22/2018    AST 85 (H) 08/21/2018     (H) 08/21/2018    ALKPHOS 97 08/21/2018    PROT 6 4 08/21/2018    BILITOT 0 47 08/21/2018    EGFR 70 08/22/2018     Lab Results   Component Value Date    WBC 9 79 08/22/2018    HGB 12 2 08/22/2018    HCT 38 3 08/22/2018    MCV 92 08/22/2018     08/22/2018       Reviewed and discussed with patient  Assessment and plan:  Reviewed hospital records   Patient is here with her  and 2 sons  She was discharge from the hospital today    She was there because of left flank pain  Also constipation  Patient was given Ultram and low dose gabapentin 100 mg daily and with that her pain improved  She had follow-up CT scans and that showed increased lymphadenopathy in the neck chest and abdomen/retroperitoneum area  Patient is waiting to get started on Revlimid 20 mg once daily, 3 weeks on and 1 week off, recommended by specialist Dr Kirby Lockett  She could not have Rituxan with because she had anaphylactic reaction secondary to Rituxan  Patient is already on allopurinol and also on prednisone 20 mg daily in addition to her other medications  She was again pulse dose of prednisone last week because of increased swelling in the lower neck causing dysphagia  She has less dysphagia now  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 on the left side  She has Port-A-Cath on the left side and that is not functioning properly  It flushes but no blood return  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  It was a localized disease  Plan was radiation and Rituxan  She had radiation  She had only one dose of Rituxan and had reaction to Rituxan  She had swelling of the tongue and closing of throat  Rituxan was discontinued      In April 2015 patient had PET CT scan that showed hypermetabolic lymphadenopathy involving the neck, chest, abdomen and pelvis and also hypermetabolic right breast nodularity, hepatic steatosis, mild/borderline aneurysmal dilatation of ascending thoracic aorta  She had left inguinal excisional lymph node biopsy, right axillary lymph node biopsy and neck node biopsy and none of those 3 biopsies showed lymphoma or malignancy  She was being followed       Aug 2017 she presented with axillary adenopathy  Left axillary lymph node biopsy showed marginal zone lymphoma with features suggestive of aggressive behavior   She then had additional biopsy which showed 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  Bone marrow biopsy was negative       She underwent 3 cycles of CHOP and 3 cycles of EPOCH (last 2 cycles dropped adriamycin due to decreased EF       Feb 2018 post treatment PET/CT showed complete metabolic response  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%       June 2018 PET/CT showed recurrent hypermetabolic generalized lymphadenopathy  She had a core biopsy of the left neck lymph node which showed high-grade B-cell lymphoma, positive PAX5, CD 20+, 35% BCL 6, 45% BCL2, 35% MUM-1, No MYC stained  Ki67 85%  FISH showed MYC-3 range of positive, 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, BCL to expressing approximately 80%     Patient was seen by Dr Merlinda Mote om 8/9/18, expert in hematological malignancies from [facility]      Dr Sinduh Luna explained to patient and her family that in the absence of intense salvage therapy, is unlikely to achieve remission  The goal of continued therapy will include disease control and improving progression-free survival and overall survival      Dr Sindhu Luna recommended rituximab with lenalidomide  If that's not tolerated or not effective then he would recommend  ibrutinib       Due to patient's anaphylactic reaction with Rituxan therapy prior, Rituxan will not be administered  Myself and Dr Sindhu Luna did discuss this        Patient has tiredness, fullness left lower neck, some dysphagia in the lower neck area, constipation and arthritic symptoms      1  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 ([facility] [address] )  Plan is Revlimid, local radiation to lower neck and upper mediastinal area  Patient already has left jugular vein thrombosis and there is increased risk of thrombosis secondary to Revlimid especially when given with Decadron, happened in patients with multiple myeloma  Patient understands the added risk of thrombosis  She has some protection because she is on Eliquis    Patient wants to take the chance and get going with Revlimid  She is already on allopurinol and prednisone  - Ambulatory referral to Radiation Oncology; Future  - Ambulatory referral to Palliative Care; Future    2  Left jugular vein thrombosis  Patient is on Eliquis      3  Port-a-cath in place  Consulting IR because Port-A-Cath is nonfunctional, flushes but does not give blood return  Patient has pain medications as above  She has instructions and medications for constipation  There is consultation for palliative care  Family also discussed medical marijuana and they will discuss that with palliative care team   All discussed in detail  Questions answered    Patient voiced understanding and agreement in the discussion  Counseling / Coordination of Care: 50 min   Greater than 50% of total time was spent with the patient and / or family counseling and / or coordination of care

## 2018-08-24 NOTE — PROGRESS NOTES
8/24/2018 received stat order for Revlimid  Patient has [employer] rx ins  ID #[id number]  CEW #[id number]  PCN # [id number]  GRP # [id number]    Phone number [phone]    Tried to submit for auth through cover my meds but it kept getting rejected  Called [employer] ins @11:50 ([phone]) spoke with Renzo Quintero  She took all the clinical information and sent it over to the pharmacy for review  Adelaida Vega also stated the pt is not capitated to a specific pharmacy  Pending auth # [id number]    Received approval letter from [address] [place] [address],[address] #[id number] is valid from 8/24/2018 through 8/24/2019  The approval letter states the pt can fill this rx at any par pharmacy      Notified clinical, [facility], financial   Since [facility] is unable to fill, requested order be sent to [facility] along with the [employer] auth, pt demographic info, ins info, med list, and allergy list

## 2018-08-24 NOTE — TELEPHONE ENCOUNTER
Pt called about her Revlimid  She said she was in the hospital when her specialty pharmacy delivered the drug therefore she did not get it  Pt mentioned that she got a message about the drug stating that she can now pick it up at any pharmacy  I told pt that does not seem right as those type of drugs are delivered directly to the pts house  Please call pt back as she asked to speak with you about this

## 2018-08-24 NOTE — TELEPHONE ENCOUNTER
Called and left a voice message on the patient's home answering machine in regards to her Revlimid prescription  Explained to the patient that the Revlimid needs to come from a Specialty pharmacy not a retail pharmacy (like [facility], [facility])  The specialty pharmacy would reach out to her to schedule the shipment of the Revlimid directly to her house  Patient is to call the office back if she has any other questions

## 2018-08-27 ENCOUNTER — TRANSITIONAL CARE MANAGEMENT (OUTPATIENT)
Dept: FAMILY MEDICINE CLINIC | Facility: CLINIC | Age: 78
End: 2018-08-27

## 2018-08-28 ENCOUNTER — HOSPITAL ENCOUNTER (OUTPATIENT)
Dept: RADIOLOGY | Facility: HOSPITAL | Age: 78
Discharge: HOME | End: 2018-08-28
Attending: INTERNAL MEDICINE
Payer: COMMERCIAL

## 2018-08-28 ENCOUNTER — TELEPHONE (OUTPATIENT)
Dept: HEMATOLOGY ONCOLOGY | Facility: CLINIC | Age: 78
End: 2018-08-28

## 2018-08-28 DIAGNOSIS — C83.10 MANTLE CELL LYMPHOMA, UNSPECIFIED BODY REGION (HCC): ICD-10-CM

## 2018-08-28 DIAGNOSIS — Z95.828 PORT-A-CATH IN PLACE: ICD-10-CM

## 2018-08-28 RX ORDER — HEPARIN SODIUM (PORCINE) LOCK FLUSH IV SOLN 100 UNIT/ML 100 UNIT/ML
SOLUTION INTRAVENOUS CODE/TRAUMA/SEDATION MEDICATION
Status: DISCONTINUED | OUTPATIENT
Start: 2018-08-28 | End: 2018-08-29 | Stop reason: HOSPADM

## 2018-08-28 RX ADMIN — HEPARIN SODIUM (PORCINE) LOCK FLUSH IV SOLN 100 UNIT/ML 300 UNITS: 100 SOLUTION at 16:35

## 2018-08-28 NOTE — TELEPHONE ENCOUNTER
Diplomat also stated that patient will be receiving the Revlimid through [facility] if she is approved for the [employer] free drug program

## 2018-08-28 NOTE — TELEPHONE ENCOUNTER
Called and spoke with a representative from [employer]  Confirmed that an application to [employer] free drug program was received

## 2018-08-28 NOTE — SEDATION DOCUMENTATION
Patient here for left chest port a cathetergram   Patient previously had port accessed ,flushed freely but did not aspirated blood  Today I accessed the port the port flushed and aspirated briskly with blood   The port was flushed and capped with 300 units heparin (deaccessed) and left department

## 2018-08-28 NOTE — TELEPHONE ENCOUNTER
Diplomat calls to confirm that the fax they sent yesterday was received  It is for the [facility]

## 2018-08-29 ENCOUNTER — TELEPHONE (OUTPATIENT)
Dept: HEMATOLOGY ONCOLOGY | Facility: CLINIC | Age: 78
End: 2018-08-29

## 2018-08-29 NOTE — TELEPHONE ENCOUNTER
Patient called in regards to her low BP over the past 3 days  BP this AM was 95/60  She has some lightheadedness this morning  Advise to increase fluid intake  She has been drinking water as well as Gatorade and Pedialyte  Advised to continue and increase fluids  Continue to hold BP meds  Do not go outside into th humidity  Stay inside with air conditioning  Add salt to foods  Call back with symptoms

## 2018-08-30 ENCOUNTER — CLINICAL SUPPORT (OUTPATIENT)
Dept: RADIATION ONCOLOGY | Facility: CLINIC | Age: 78
End: 2018-08-30
Payer: COMMERCIAL

## 2018-08-30 ENCOUNTER — TELEPHONE (OUTPATIENT)
Dept: HEMATOLOGY ONCOLOGY | Facility: CLINIC | Age: 78
End: 2018-08-30

## 2018-08-30 ENCOUNTER — RADIATION ONCOLOGY CONSULT (OUTPATIENT)
Dept: RADIATION ONCOLOGY | Facility: CLINIC | Age: 78
End: 2018-08-30
Attending: RADIOLOGY
Payer: COMMERCIAL

## 2018-08-30 VITALS
SYSTOLIC BLOOD PRESSURE: 96 MMHG | WEIGHT: 206.4 LBS | DIASTOLIC BLOOD PRESSURE: 62 MMHG | RESPIRATION RATE: 18 BRPM | OXYGEN SATURATION: 94 % | TEMPERATURE: 98.2 F | BODY MASS INDEX: 35.24 KG/M2 | HEART RATE: 104 BPM | HEIGHT: 64 IN

## 2018-08-30 DIAGNOSIS — C83.38 DIFFUSE LARGE B-CELL LYMPHOMA OF LYMPH NODES OF MULTIPLE SITES (HCC): Primary | ICD-10-CM

## 2018-08-30 DIAGNOSIS — R59.1 LYMPHADENOPATHY OF HEAD AND NECK: ICD-10-CM

## 2018-08-30 PROCEDURE — G0463 HOSPITAL OUTPT CLINIC VISIT: HCPCS | Performed by: RADIOLOGY

## 2018-08-30 PROCEDURE — 99215 OFFICE O/P EST HI 40 MIN: CPT | Performed by: RADIOLOGY

## 2018-08-30 NOTE — PROGRESS NOTES
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  She was treated to the right orbit with radiation therapy at [facility] and received rituxan x1 dose, developing an anaphylactic reaction and medication was discontinued  2015 PET CT scan that showed hypermetabolic lymphadenopathy of multiple sites  Biopsy of 3 different sites showed no lymphoma or malignancy  She continued to be followed  2017 presented with axillary adenopathy, left axillary lymph node revealed marginal zone lymphoma with features suggestive of aggressive behavior  She then had additional biopsy which showed 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  Bone marrow biopsy was negative  She underwent 3 cycles of CHOP and 3 cycles of EPOCH (last 2 cycles dropped adriamycin due to decreased EF)       2/201/8 PET CT   IMPRESSION:   1  Overall stable findings compared to the prior exam   Mild areas of FDG uptake remaining in the left hilar region and in a small left para-aortic lymph node  No new findings suspicious for hypermetabolic malignancy  Deauville score of 2       6/18/18 PET CT:  IMPRESSION:   1   Findings compatible with disease recurrence  Multiple FDG avid lymph nodes in the bilateral neck, axilla, subpectoral regions, mediastinum, portacaval region and retroperitoneum compatible with hypermetabolic malignancy    Deauville score of 5     7/10/18 US Head/Neck soft tissue:  IMPRESSION:  Enlarged  lymph nodes bilaterally measuring up to 3 4 x 0 6 x 1 6 cm  Recommend CT with contrast for further evaluation  7/25/18 Ultrasound-guided left neck lymph node biospy: - Consistent with recurrent B-cell lymphoma    8/9/18 seen by Dr Ryan Ramos, expert in hematological malignancies from [facility] -  Recommended rituximab with lenalidomide and if not tolerated recommend treatment with ibrutinib         8/15/18 seen by Norberto Brenner PA-C, medical oncology office  **Previous anaphylactic reaction to rituximab, will be treating with lenalidomide alone  Continues to have swelling over area where lymph node biopsy was on the left neck area, CT ordered to assess area  Start prednisone 50 mg once daily x5 days followed by 20 mg daily for swelling  Refer to radiation oncology to treat area  8/18/18 CT soft tissue neck:   IMPRESSION:   Worsening cervical and mediastinal adenopathy  The largest nodes are noted on the left side within the left posterior jugular chain and below the thyroid gland in the paratracheal region  The enlarging left-sided adenopathy results in compression of the jugular vein inferiorly and there is new thrombosis of the left jugular vein compared to the prior examination      Although there is an enlarging adenopathy as described above, some of the adenopathy is improving including axillary adenopathy and right posterior jugular chain nodes  Improvement in the axillary adenopathy compared to the prior study  8/18/18 CT chest:  IMPRESSION:   1   Mixed findings for disease progression  2  Extensive mediastinal and retrocrural lymphadenopathy which has progressed from the prior exam   3  Significant improvement in the axillary lymphadenopathy from the prior exam         8/21/18 presented to hospital with left flank pain constipation and gas pain  CT showed progression of lymphadenopathy   She was treated medically and discharged 8/23/18 8/21/18 CT abdomen and pelvis:  IMPRESSION:    Supraclavicular, mediastinal, and retroperitoneal lymphadenopathy, progressed from prior  The mediastinal lymphadenopathy causes mass effect on the esophagus  8/23/18 seen by Dr Madelin Quinonez following discharge from hospital -   Patient waiting to start Revlimid 20 mg once daily, 3 weeks on and 1 week off, recommended by specialist Dr Susan Monterroso  History of esophageal dilatations with Dr Myles Tomlinson  Last dilation done approximately 3 years ago  Complains difficulty swallowing, feels like foods get stuck in throat  Denies sore throat  States she has had swelling around neck, left side greater than right, since recent biopsy  Weight down approximately 10 pounds since May 2018         9/4/18 returns to medical oncology             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 ([facility] )    12/6/2017 Initial Diagnosis     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 [facility])       2017 Biopsy     Left axillary lymph node biopsy showed marginal zone lymphoma with features suggestive of aggressive behavior  She then had additional biopsy which showed 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  Bone marrow biopsy was negative  2017 -  Chemotherapy     3 cycles of CHOP and 3 cycles of EPOCH (last 2 cycles dropped adriamycin due to decreased EF)  6/18/2018 Progression     PET CT: Findings compatible with disease recurrence  Multiple FDG avid lymph nodes in the bilateral neck, axilla, subpectoral regions, mediastinum, portacaval region and retroperitoneum compatible with hypermetabolic malignancy  Deauville score of 5            7/25/2018 Biopsy     Left neck lymph node: High grade B-cell lymphoma, MYC-rearrangement positive, 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"), bcl-2 expressing (~80%), 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 - see Note            2018 -  Chemotherapy     Plan to start lenalidomide (Revlimid) 20 mg daily weekly x3, 1 week off      Marginal [facility] lymphoma ([facility] )    2008 Initial Diagnosis     Marginal zone lymphoma ([facility] )         2008 -  Chemotherapy     2008/2009 Rituxan x1 dose - anaphylactic reaction with swelling of the tongue and closing of throat  Rituxan was discontinued  5/12/2009 - 6/16/2009 Radiation     Right orbit IMRT; Total dose: 3600 cGy   - Dr Cherelle Roman at [facility]            Clinical Trial: no      Screening  Tobacco  Current tobacco user: no  If yes, brief counseling provided: NA    Hypertension  Hypertension screening performed: yes  Normotensive:  yes  If no, referred to PCP: n/a    Depression Screening  Screened for depression using PHQ-2: yes    Screened for depression using PHQ-9:  no  Screening positive or negative:  negative  If score >4, was any of the following actions taken?    Additional evaluation for depression, suicide risk assesment, referral to PCP or psychiatry, medication started:  n/a    Advanced Care Planning for Patients >65 years  Advanced Care Planning Discussed:  yes  Patient named surrogate decision maker or care plan in chart: yes      Health Maintenance   Topic Date Due    SLP PLAN OF CARE  1940    DTaP,Tdap,and Td Vaccines (1 - Tdap) 06/26/2008    INFLUENZA VACCINE  09/01/2018    Fall Risk  05/30/2019    Depression Screening PHQ  05/30/2019    Urinary Incontinence Screening  05/30/2019    CRC Screening: Colonoscopy  10/23/2020    Pneumococcal PPSV23/PCV13 65+ Years / High and Highest Risk  Completed       Patient Active Problem List   Diagnosis    Enlarged lymph node in neck    Lymphadenopathy, axillary    Lymphoma ([facility]    Arm DVT (deep venous thromboembolism), acute, left ([facility] )    Essential hypertension    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 ([facility] )    Constipation    Left bundle branch block (LBBB)    Infected insect bite of neck    Vitamin D deficiency    Vitamin B12 deficiency    Thrombosis of left subclavian vein ([facility] )    Situational anxiety    Thrombophlebitis of deep veins of upper extremities    Gastroesophageal reflux disease    Dyslipidemia    DLBCL (diffuse large B cell lymphoma) (HCC)    Cataract    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    Esophageal stricture    [place] (hard of hearing)    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HCC)    Osteoarthritis    Lymphadenopathy of head and neck    Marginal zone lymphoma (HCC)    Acute left flank pain    Left jugular vein thrombosis     Past Medical History:   Diagnosis Date    Basal cell carcinoma (BCC)     Bruit of right carotid artery     resolved 5/14/15     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HCC)     radiation    Dyslipidemia     Esophageal stricture     last assessed 2/28/13       GERD (gastroesophageal reflux disease)     History of chemotherapy     History of radiation therapy     [place] (hard of hearing)     b/l ears    Hyperlipidemia     Hypertension     Osteoarthritis     Osteoarthrosis of ankle and foot     last assessed 5/20/13     Osteopenia     last assessed 2/28/13     Plantar fasciitis of left foot     Polyp of sigmoid colon     last assessed 2/28/13     Shortness of breath     when walking up stairs     Past Surgical History:   Procedure Laterality Date    ANKLE ARTHROPLASTY Left     BLEPHAROPLASTY      right eye     CATARACT EXTRACTION Right     COLONOSCOPY      ESOPHAGEAL DILATION      x2    ESOPHAGOGASTRODUODENOSCOPY      dilitation    FACIAL/NECK BIOPSY Right 9/8/2017    Procedure: NECK NODE BIOPSY WITH NEEDLE LOCALIZATION; LYMPHOMA PROTOCOL (NEEDLE LOC WITH I R  AT 1100);   Surgeon: Ranulfo Atkinson MD;  [facility];  Service: Surgical Oncology    HYSTERECTOMY      LYMPH NODE DISSECTION      right groin and neck    ORBITAL FLOOR EXPLORATION Right     for cancer     PORTACATH PLACEMENT      left chest     AK BX/REMV,LYMPH NODE,DEEP AXILL Left 8/14/2017    Procedure: Axillary lymph node excision with LYMPHOMA PROTOCOL;  Surgeon: Ranulfo Atkinson MD; [facility];  Service: Surgical Oncology    UT BX/REMV,LYMPH NODE,DEEP CERV Right 5/2/2016    Procedure: NECK NODE BIOPSY;  Surgeon: Shaylee Handy MD;  [facility];  Service: Surgical Oncology    UT BX/REMV,LYMPH NODE,DEEP CERV Left 7/25/2018    Procedure: NECK NODE BIOPYS WITH LYMPHOMA PROTOCOL; (ULTRASOUND GUIDED NEEDLE LOC IN IR AT 0800); Surgeon: Shaylee Handy MD;  [facility];  Service: Surgical Oncology    UT INSJ TUNNELED CTR VAD W/SUBQ PORT AGE 5 YR/> N/A 10/3/2017    Procedure: Wells Fiddler;  Surgeon: Shaylee Handy MD;  [facility];  Service: Surgical Oncology     Family History   Problem Relation Age of Onset    Multiple myeloma Mother     Heart disease Father     Hypertension Father     Hypertension Sister     Heart disease Sister     Arthritis Family     Osteoporosis Family     Breast cancer Maternal Aunt      Social History     Social History    Marital status: /Civil Union     Spouse name: N/A    Number of children: N/A    Years of education: N/A     Occupational History    Not on file       Social History Main Topics    Smoking status: Former Smoker     Quit date: 1970    Smokeless tobacco: Never Used    Alcohol use No    Drug use: No    Sexual activity: Not on file     Other Topics Concern    Not on file     Social History Narrative    Uses safety equipment seatbelts        Current Outpatient Prescriptions:     allopurinol (ZYLOPRIM) 100 mg tablet, Take 2 tablets (200 mg total) by mouth daily, Disp: 60 tablet, Rfl: 1    apixaban (ELIQUIS) 5 mg, Take 1 tablet (5 mg total) by mouth 2 (two) times a day, Disp: 60 tablet, Rfl: 2    ascorbic acid (VITAMIN C) 500 mg tablet, Take 500 mg by mouth daily with dinner  , Disp: , Rfl:     Calcium Carb-Cholecalciferol (CALCIUM + D3) 600-200 MG-UNIT TABS, Take 1 tablet by mouth daily with dinner  , Disp: , Rfl:     Cyanocobalamin (VITAMIN B 12 PO), Take 1 tablet by mouth daily with dinner  , Disp: , Rfl:     docusate sodium (COLACE) 100 mg capsule, Take 1 capsule (100 mg total) by mouth 2 (two) times a day, Disp: 10 capsule, Rfl: 0    gabapentin (NEURONTIN) 100 mg capsule, Take 1 capsule (100 mg total) by mouth daily at bedtime, Disp: 30 capsule, Rfl: 0    hydrochlorothiazide (HYDRODIURIL) 25 mg tablet, Take 1 tablet (25 mg total) by mouth daily in the early morning, Disp: 90 tablet, Rfl: 1    lisinopril (ZESTRIL) 5 mg tablet, Take 1 tablet (5 mg total) by mouth daily (Patient taking differently: Take 5 mg by mouth daily in the early morning  ), Disp: 90 tablet, Rfl: 3    loratadine (CLARITIN) 10 mg tablet, Take 10 mg by mouth daily in the early morning  , Disp: , Rfl:     Multiple Vitamin (MULTIVITAMIN) capsule, Take 1 capsule by mouth daily with dinner  , Disp: , Rfl:     omeprazole (PriLOSEC OTC) 20 MG tablet, Take 1 tablet (20 mg total) by mouth daily (Patient taking differently: Take 20 mg by mouth daily in the early morning  ), Disp: 90 tablet, Rfl: 1    ondansetron (ZOFRAN) 4 mg tablet, Take 1 tablet (4 mg total) by mouth every 8 (eight) hours as needed for nausea or vomiting, Disp: 30 tablet, Rfl: 0    polyethylene glycol (MIRALAX) 17 g packet, Take 17 g by mouth daily, Disp: 14 each, Rfl: 0    potassium chloride (KLOR-CON M20) 20 mEq tablet, Take 1 tablet (20 mEq total) by mouth daily in the early morning, Disp: 90 tablet, Rfl: 1    predniSONE 20 mg tablet, Take 1 tablet (20 mg total) by mouth daily with food, Disp: 30 tablet, Rfl: 0    senna (SENOKOT) 8 6 mg, Take 1 tablet (8 6 mg total) by mouth daily at bedtime, Disp: 120 each, Rfl: 0    simethicone (MYLICON) 80 mg chewable tablet, Chew 1 tablet (80 mg total) every 6 (six) hours as needed for flatulence, Disp: 30 tablet, Rfl: 0    traMADol (ULTRAM) 50 mg tablet, Take 1 tablet (50 mg total) by mouth every 6 (six) hours as needed for moderate pain or severe pain for up to 15 days, Disp: 15 tablet, Rfl: 0   HYDROcodone-acetaminophen (NORCO) 5-325 mg per tablet, Take 1 tablet by mouth every 6 (six) hours as needed for pain Max Daily Amount: 4 tablets (Patient not taking: Reported on 8/30/2018 ), Disp: 20 tablet, Rfl: 0    Lenalidomide (REVLIMID) 20 MG CAPS, Take 1 capsule (20 mg total) by mouth daily Adult Female  Auth # [id number] Take 1 capsule daily days 1-21  Followed by 7 days off  (Patient not taking: Reported on 8/30/2018 ), Disp: 21 capsule, Rfl: 0    Omega-3 Fatty Acids (FISH OIL) 1200 MG CAPS, Take 3 capsules by mouth daily with dinner  , Disp: , Rfl:     Allergies   Allergen Reactions    Alteplase Anaphylaxis    Aspirin Anaphylaxis    Celebrex [Celecoxib] Anaphylaxis    Nsaids Anaphylaxis    Other Anaphylaxis     Pt states when she received a certain type of chemotherapy it caused her throat to close    Rituxan [Rituximab] Anaphylaxis     Swelling of tongue and closing of throat    Sulfa Antibiotics Other (See Comments) and Anaphylaxis     VERY EMOTIONAL CRYING    Bactrim [Sulfamethoxazole-Trimethoprim] Other (See Comments)     VERY EMOTIONAL/CRYING       Review of Systems:  Review of Systems   Constitutional: Positive for fatigue (5/10, varies day to day)  HENT: Positive for trouble swallowing  Eyes: Negative  Respiratory: Positive for cough (occasionally, expectorating clear mucous) and shortness of breath (sometimes with exertion)  Cardiovascular: Positive for palpitations (seldom, usually at night)  Gastrointestinal: Negative  Frequent belching, feels bloated after eating, increased flatus after eating   Endocrine: Negative  Genitourinary: Negative  Musculoskeletal: Negative  Skin: Negative  Allergic/Immunologic: Negative  Neurological: Positive for dizziness (yesterday) and headaches (occasional dull headaches)  Hematological: Positive for adenopathy (b/l neck (L>R))  Bruises/bleeds easily  Psychiatric/Behavioral: Positive for sleep disturbance  Vitals:    08/30/18 0953   BP: 96/62   Pulse: 104   Resp: 18   Temp: 98 2 °F (36 8 °C)   TempSrc: Temporal   SpO2: 94%   Weight: 93 6 kg (206 lb 6 4 oz)   Height: 5' 4" (1 626 m)            Imaging:Ct Soft Tissue Neck W Contrast    Result Date: 8/20/2018  Narrative: CT NECK WITH CONTRAST INDICATION:   C83 38: Diffuse large b-cell lymphoma, lymph nodes of multiple sites  COMPARISON:  10/2/2017 TECHNIQUE:  Contiguous 2 5 mm images were obtained through the neck after administration of intravenous contrast  Radiation dose length product (DLP) for this visit:  533 mGy-cm   This examination, like all CT scans performed in the [facility], was performed utilizing techniques to minimize radiation dose exposure, including the use of iterative reconstruction and automated exposure control  IV Contrast:  85 mL of iohexol (OMNIPAQUE) IMAGE QUALITY:  Diagnostic  FINDINGS: VISUALIZED BRAIN PARENCHYMA:  No acute intracranial pathology of the visualized brain parenchyma  VISUALIZED ORBITS AND PARANASAL SINUSES:  Normal  NASAL CAVITY AND NASOPHARYNX:  Normal  SUPRAHYOID NECK:  Normal oral cavity, tongue base, tonsillar fossa and epiglottis  INFRAHYOID NECK:  Partial effacement of the right piriform sinus  Normal vocal cords and ventricle  No discrete mass identified  THYROID GLAND:  Heterogeneously enhancing nodule within the right lobe of the thyroid gland appears to be enlarging compared to the prior examination  This measures 1 1 cm in AP diameter and approximately 1 3 cm in craniocaudad dimension  Subcentimeter  nodule identified within the left lobe  PAROTID AND SUBMANDIBULAR GLANDS:  Normal parotid glands  Asymmetry of the submandibular glands, right side larger than the left with no discrete mass  LYMPH NODES:  Prominent cervical adenopathy primarily within the left neck below the hyoid bone and posterior to the sternocleidomastoid muscle    Large supraclavicular node identified on the left, markedly increased in size compared to the prior examination now measuring 5 1 x 2 9 x 5 3 cm, compared to 4 8 x 2 3 x 4 7 cm previously  Multiple additional supraclavicular and infraclavicular nodes are noted bilaterally  There is paratracheal adenopathy to the left of midline immediately below the thyroid gland which has mildly increased in size as well  This displaces the trachea towards the right  Moderate, partially visualized upper mediastinal adenopathy is noted as well primarily paratracheal  Although much of the adenopathy is an enlarging, there is some adenopathy which is improving including right posterior jugular chain adenopathy and bilateral axillary adenopathy  VASCULAR STRUCTURES:  The enlarging left-sided adenopathy results in compression of the left jugular vein and there is now thrombosis of the jugular vein beginning at the level of the angle of the mandible and extending inferiorly to the jugular/subclavian confluence  This measures nearly 4 cm in craniocaudad dimension  Left subclavian central line is present and the left subclavian vein enhances appropriately  THORACIC INLET:  Lung apices and upper mediastinum are unremarkable  BONY STRUCTURES: Stable mild cervical spondylitic degenerative change  Impression: Worsening cervical and mediastinal adenopathy  The largest nodes are noted on the left side within the left posterior jugular chain and below the thyroid gland in the paratracheal region  The enlarging left-sided adenopathy results in compression of the jugular vein inferiorly and there is new thrombosis of the left jugular vein compared to the prior examination  Although there is an enlarging adenopathy as described above, some of the adenopathy is improving including axillary adenopathy and right posterior jugular chain nodes  Improvement in the axillary adenopathy compared to the prior study    I personally discussed this study with Joseph Sorenson on 8/20/2018 at 2:24 PM  Workstation performed: [id number]     Ct Chest W Contrast    Result Date: 8/22/2018  Narrative: CT CHEST WITH IV CONTRAST INDICATION:   R60 9: Edema, unspecified  History of lymphoma  COMPARISON:  CT neck of 8/18/2018 TECHNIQUE: CT examination of the chest was performed  Axial, sagittal, and coronal 2D reformatted images were created from the source data and submitted for interpretation  Radiation dose length product (DLP) for this visit:  245 mGy-cm   This examination, like all CT scans performed in the [facility], was performed utilizing techniques to minimize radiation dose exposure, including the use of iterative reconstruction and automated exposure control  IV Contrast:  85 mL of iohexol (OMNIPAQUE) FINDINGS: LUNGS:  Mild scattered atelectatic changes noted of the bilateral lung fields  There is no tracheal or endobronchial lesion  PLEURA:  Unremarkable  HEART/GREAT VESSELS:  Left-sided Mediport line tip terminates in the right atrium  Heart is normal size  No pericardial effusion  Thoracic aorta is normal caliber  MEDIASTINUM AND MARCIO:  Extensive mediastinal lymphadenopathy is again noted  A superior mediastinal lymph node anterior to the trachea on the right measures 2 5 x 1 8 cm, image 17 series 2, previously 2 1 x 1 3 cm  Lymph node posterior to the trachea measures 3 5 x 2 6 cm, image 19 series 2, previously 3 0 x 1 4 cm  Left para-aortic lymph node adjacent to the aortic arch measures 2 8 x 1 3 cm, image 17 series 2, previously, 1 8 x 0 7 cm  A subcarinal lymph node measures 4 3 x 1 9 cm, image 29 series 2, previously 5 0 x 2 2 cm  CHEST WALL AND LOWER NECK:   For neck findings, please see the dedicated CT of the neck report of the same day  The axillary lymphadenopathy has significantly improved since the prior exam   Remaining left subpectoral lymph node measures 2 5 x 0 8 cm, image 14 series 2, previously 3 1 x 1 6 cm    No significant enlarged axillary lymph nodes remaining  VISUALIZED STRUCTURES IN THE UPPER ABDOMEN:  Prominent retrocrural lymph nodes noted bilaterally  A left retrocrural lymph node measures 1 8 x 1 6 cm, image 48 series 2, previously 1 3 x 1 2 cm  OSSEOUS STRUCTURES:  No acute fracture or destructive osseous lesion  Impression: 1  Mixed findings for disease progression  2  Extensive mediastinal and retrocrural lymphadenopathy which has progressed from the prior exam  3  Significant improvement in the axillary lymphadenopathy from the prior exam  Workstation performed: [id number]     Ct Chest Abdomen Pelvis W Contrast    Addendum Date: 8/21/2018 Addendum:   ADDENDUM: For clarification, the lymphadenopathy has increased when compared to the CT study 10/2/2017    Result Date: 8/21/2018  Narrative: CT CHEST, ABDOMEN AND PELVIS WITH IV CONTRAST INDICATION:   chest pain  COMPARISON:  Chest CTs on 8/18/2018 and 10/2/2017 TECHNIQUE: CT examination of the chest, abdomen and pelvis was performed  Axial, sagittal, and coronal 2D reformatted images were created from the source data and submitted for interpretation  Radiation dose length product (DLP) for this visit:  1185 64 mGy-cm   This examination, like all CT scans performed in the [facility], was performed utilizing techniques to minimize radiation dose exposure, including the use of iterative reconstruction and automated exposure control  IV Contrast:  100 mL of iohexol (OMNIPAQUE) Enteric Contrast: Enteric contrast was administered  FINDINGS: CHEST LUNGS:  Lungs are clear  There is no tracheal or endobronchial lesion  PLEURA:  Unremarkable  HEART/GREAT VESSELS:  Left Mediport MEDIASTINUM AND MARCIO:  Again seen is lymphadenopathy throughout the mediastinum, with index node in the left neck base measuring 5 2 x 2 6 cm  Mediastinal lymphadenopathy causes mass effect on the esophagus CHEST WALL AND LOWER NECK:   Unremarkable  ABDOMEN LIVER/BILIARY TREE:  Unremarkable   GALLBLADDER:  No calcified gallstones  No pericholecystic inflammatory change  SPLEEN:  Unremarkable  PANCREAS:  Unremarkable  ADRENAL GLANDS:  Unremarkable  KIDNEYS/URETERS:  Unremarkable  No hydronephrosis  STOMACH AND BOWEL:  Unremarkable  APPENDIX:  No findings to suggest appendicitis  ABDOMINOPELVIC CAVITY:  No ascites or free intraperitoneal air  No lymphadenopathy  VESSELS:  Again seen is extensive retroperitoneal lymphadenopathy with index node in the left para-aortic region measuring 5 7 x 4 3 cm PELVIS REPRODUCTIVE ORGANS:  Unremarkable for patient's age  URINARY BLADDER:  Unremarkable  ABDOMINAL WALL/INGUINAL REGIONS:  Unremarkable  OSSEOUS STRUCTURES:  There are age appropriate degenerative changes  No acute fracture or destructive osseous lesion  Impression: Supraclavicular, mediastinal, and retroperitoneal lymphadenopathy, progressed from prior  The mediastinal lymphadenopathy causes mass effect on the esophagus  Workstation performed: [id number]       Teaching:  [employer] RT packet given

## 2018-08-30 NOTE — TELEPHONE ENCOUNTER
Diplomat is calling to check on the application for free drug  They checked with Nancy yesterday and it had not been received yet

## 2018-08-30 NOTE — PROGRESS NOTES
Consultation - Radiation Oncology     KWU:[id number] : 1940  Encounter: [id number]  Patient Information: Kaz Nye      CHIEF COMPLAINT  Chief Complaint   Patient presents with    Consult     radiation oncology          History of Present Illness   Kaz Nye is a 78y o  year old female who presents with history of marginal zone lymphoma, transformed into diffuse large B-cell lymphoma now with a early recurrence of diffuse large B-cell lymphoma  She is here today for initial radiation therapy consult  Referred by Dr Chun Krueger      In  she was diagnosed with marginal zone lymphoma of the right orbit  She was treated to the right orbit with radiation therapy at [facility] and received rituxan x1 dose, developing an anaphylactic reaction and medication was discontinued           PET CT scan that showed hypermetabolic lymphadenopathy of multiple sites  Biopsy of 3 different sites showed no lymphoma or malignancy  She continued to be followed          2017 presented with axillary adenopathy, left axillary lymph node revealed marginal zone lymphoma with features suggestive of aggressive behavior  She then had additional biopsy which showed 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  Bone marrow biopsy was negative  She underwent 3 cycles of CHOP and 3 cycles of EPOCH (last 2 cycles dropped adriamycin due to decreased EF)  Completed in  PET CT   IMPRESSION:   1  Overall stable findings compared to the prior exam   Mild areas of FDG uptake remaining in the left hilar region and in a small left para-aortic lymph node   No new findings suspicious for hypermetabolic malignancy   Deauville score of 2       18 PET CT:  IMPRESSION:   1   Findings compatible with disease recurrence   Multiple FDG avid lymph nodes in the bilateral neck, axilla, subpectoral regions, mediastinum, portacaval region and retroperitoneum compatible with hypermetabolic malignancy   Deauville score of 5      7/10/18 US Head/Neck soft tissue:  IMPRESSION:  Enlarged  lymph nodes bilaterally measuring up to 3 4 x 0 6 x 1 6 cm  Recommend CT with contrast for further evaluation      7/25/18 Ultrasound-guided left neck lymph node biospy: - Consistent with recurrent B-cell lymphoma     8/9/18 seen by Dr Franklin Aden, expert in hematological malignancies from [facility] -  Recommended rituximab with lenalidomide and if not tolerated recommend treatment with ibrutinib       8/15/18 seen by Louis Roberts PA-C, medical oncology office  **Previous anaphylactic reaction to rituximab, will be treating with lenalidomide alone  Continues to have swelling over area where lymph node biopsy was on the left neck area, CT ordered to assess area  Start prednisone 50 mg once daily x5 days followed by 20 mg daily for swelling  Refer to radiation oncology to treat area       8/18/18 CT soft tissue neck:   IMPRESSION:   Worsening cervical and mediastinal adenopathy   The largest nodes are noted on the left side within the left posterior jugular chain and below the thyroid gland in the paratracheal region   The enlarging left-sided adenopathy results in compression of the jugular vein inferiorly and there is new thrombosis of the left jugular vein compared to the prior examination      Although there is an enlarging adenopathy as described above, some of the adenopathy is improving including axillary adenopathy and right posterior jugular chain nodes   Improvement in the axillary adenopathy compared to the prior study      8/18/18 CT chest:  IMPRESSION:   1   Mixed findings for disease progression  2  Extensive mediastinal and retrocrural lymphadenopathy which has progressed from the prior exam   3  Significant improvement in the axillary lymphadenopathy from the prior exam      8/21/18 presented to hospital with left flank pain constipation and gas pain  CT showed progression of lymphadenopathy   She was treated medically and discharged 8/23/18 8/21/18 CT abdomen and pelvis:  IMPRESSION:    Supraclavicular, mediastinal, and retroperitoneal lymphadenopathy, progressed from prior   The mediastinal lymphadenopathy causes mass effect on the esophagus      8/23/18 seen by Dr Fabrizio Dewitt following discharge from hospital -   Patient waiting to start Revlimid 20 mg once daily, 3 weeks on and 1 week off, recommended by specialist Dr Anatoly Cervantes  History of esophageal dilatations with Dr Marguerite Royal  Last dilation done approximately 3 years ago  Complains difficulty swallowing, feels like foods get stuck in throat  Denies sore throat  States she has had swelling around neck, left side greater than right, since recent biopsy  Weight down approximately 10 pounds since May 2018  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)    10/2017 - 1/2018 Chemotherapy     3 cycles of CHOP and 3 cycles of EPOCH (last 2 cycles dropped adriamycin due to decreased EF)  12/6/2017 Initial Diagnosis     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 [facility])       2017 Biopsy     Left axillary lymph node biopsy showed marginal zone lymphoma with features suggestive of aggressive behavior  She then had additional biopsy which showed 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  Bone marrow biopsy was negative  6/18/2018 Progression     PET CT: Findings compatible with disease recurrence  Multiple FDG avid lymph nodes in the bilateral neck, axilla, subpectoral regions, mediastinum, portacaval region and retroperitoneum compatible with hypermetabolic malignancy    Deauville score of 5            7/25/2018 Biopsy     Left neck lymph node: High grade B-cell lymphoma, MYC-rearrangement positive, 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"), bcl-2 expressing (~80%), 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 - see Note  2018 -  Chemotherapy     Plan to start lenalidomide (Revlimid) 20 mg daily weekly x3, 1 week off                Marginal zone lymphoma ([facility] [address] )    2008 Initial Diagnosis     Marginal zone lymphoma ([facility] [address] )         2008 -  Chemotherapy     2008/2009 Rituxan x1 dose - anaphylactic reaction with swelling of the tongue and closing of throat  Rituxan was discontinued  5/12/2009 - 6/16/2009 Radiation     Right orbit IMRT; Total dose: 3600 cGy   - Dr Rosa Mcwilliams at [facility]          Clinical Trial: no     Screening  Tobacco  Current tobacco user: no  If yes, brief counseling provided: NA     Hypertension  Hypertension screening performed: yes  Normotensive:  yes  If no, referred to PCP: n/a     Depression Screening  Screened for depression using PHQ-2: yes     Screened for depression using PHQ-9:  no  Screening positive or negative:  negative  If score >4, was any of the following actions taken?    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[facility] [address] )     radiation    Dyslipidemia     Esophageal stricture     last assessed 2/28/13       GERD (gastroesophageal reflux disease)     History of chemotherapy     History of radiation therapy     [place]hard of hearing[place]     b/l ears    Hyperlipidemia     Hypertension     Osteoarthritis     Osteoarthrosis of ankle and foot     last assessed 5/20/13     Osteopenia     last assessed 2/28/13     Plantar fasciitis of left foot     Polyp of sigmoid colon     last assessed 2/28/13     Shortness of breath     when walking up stairs     Past Surgical History:   Procedure Laterality Date    ANKLE ARTHROPLASTY Left     BLEPHAROPLASTY      right eye     CATARACT EXTRACTION Right     COLONOSCOPY      ESOPHAGEAL DILATION      x2    ESOPHAGOGASTRODUODENOSCOPY      dilitation    FACIAL/NECK BIOPSY Right 9/8/2017    Procedure: NECK NODE BIOPSY WITH NEEDLE LOCALIZATION; LYMPHOMA PROTOCOL (NEEDLE LOC WITH I R  AT 1100); Surgeon: Jazzmine Rucker MD;  [facility];  Service: Surgical Oncology    HYSTERECTOMY      LYMPH NODE DISSECTION      right groin and neck    ORBITAL FLOOR EXPLORATION Right     for cancer     PORTACATH PLACEMENT      left chest     OH BX/REMV,LYMPH NODE,DEEP AXILL Left 8/14/2017    Procedure: Axillary lymph node excision with LYMPHOMA PROTOCOL;  Surgeon: Jazzmine Rucker MD;  [facility];  Service: Surgical Oncology    OH BX/REMV,LYMPH NODE,DEEP CERV Right 5/2/2016    Procedure: NECK NODE BIOPSY;  Surgeon: Jazzmine Rucker MD;  [facility];  Service: Surgical Oncology    OH BX/REMV,LYMPH NODE,DEEP CERV Left 7/25/2018    Procedure: NECK NODE BIOPYS WITH LYMPHOMA PROTOCOL; (ULTRASOUND GUIDED NEEDLE LOC IN IR AT 0800);   Surgeon: Jazzmine Rcuker MD;  [facility];  Service: Surgical Oncology    OH INSJ TUNNELED CTR VAD W/SUBQ PORT AGE 5 YR/> N/A 10/3/2017    Procedure: Radha Bailey;  Surgeon: Jazzmine Rucker MD;  [facility];  Service: Surgical Oncology       Family History   Problem Relation Age of Onset    Multiple myeloma Mother     Heart disease Father     Hypertension Father     Hypertension Sister     Heart disease Sister     Arthritis Family     Osteoporosis Family     Breast cancer Maternal Aunt        Social History   History   Alcohol Use No     History   Drug Use No     History   Smoking Status    Former Smoker    Quit date: 1970   Smokeless Tobacco    Never Used     Meds/Allergies     Current Outpatient Prescriptions:     allopurinol (ZYLOPRIM) 100 mg tablet, Take 2 tablets (200 mg total) by mouth daily, Disp: 60 tablet, Rfl: 1    apixaban (ELIQUIS) 5 mg, Take 1 tablet (5 mg total) by mouth 2 (two) times a day, Disp: 60 tablet, Rfl: 2    ascorbic acid (VITAMIN C) 500 mg tablet, Take 500 mg by mouth daily with dinner  , Disp: , Rfl:     Calcium Carb-Cholecalciferol (CALCIUM + D3) 600-200 MG-UNIT TABS, Take 1 tablet by mouth daily with dinner  , Disp: , Rfl:     Cyanocobalamin (VITAMIN B 12 PO), Take 1 tablet by mouth daily with dinner  , Disp: , Rfl:     docusate sodium (COLACE) 100 mg capsule, Take 1 capsule (100 mg total) by mouth 2 (two) times a day, Disp: 10 capsule, Rfl: 0    gabapentin (NEURONTIN) 100 mg capsule, Take 1 capsule (100 mg total) by mouth daily at bedtime, Disp: 30 capsule, Rfl: 0    hydrochlorothiazide (HYDRODIURIL) 25 mg tablet, Take 1 tablet (25 mg total) by mouth daily in the early morning, Disp: 90 tablet, Rfl: 1    lisinopril (ZESTRIL) 5 mg tablet, Take 1 tablet (5 mg total) by mouth daily (Patient taking differently: Take 5 mg by mouth daily in the early morning  ), Disp: 90 tablet, Rfl: 3    loratadine (CLARITIN) 10 mg tablet, Take 10 mg by mouth daily in the early morning  , Disp: , Rfl:     Multiple Vitamin (MULTIVITAMIN) capsule, Take 1 capsule by mouth daily with dinner  , Disp: , Rfl:     omeprazole (PriLOSEC OTC) 20 MG tablet, Take 1 tablet (20 mg total) by mouth daily (Patient taking differently: Take 20 mg by mouth daily in the early morning  ), Disp: 90 tablet, Rfl: 1    ondansetron (ZOFRAN) 4 mg tablet, Take 1 tablet (4 mg total) by mouth every 8 (eight) hours as needed for nausea or vomiting, Disp: 30 tablet, Rfl: 0    polyethylene glycol (MIRALAX) 17 g packet, Take 17 g by mouth daily, Disp: 14 each, Rfl: 0    potassium chloride (KLOR-CON M20) 20 mEq tablet, Take 1 tablet (20 mEq total) by mouth daily in the early morning, Disp: 90 tablet, Rfl: 1    predniSONE 20 mg tablet, Take 1 tablet (20 mg total) by mouth daily with food, Disp: 30 tablet, Rfl: 0    senna (SENOKOT) 8 6 mg, Take 1 tablet (8 6 mg total) by mouth daily at bedtime, Disp: 120 each, Rfl: 0    simethicone (MYLICON) 80 mg chewable tablet, Chew 1 tablet (80 mg total) every 6 (six) hours as needed for flatulence, Disp: 30 tablet, Rfl: 0    traMADol (ULTRAM) 50 mg tablet, Take 1 tablet (50 mg total) by mouth every 6 (six) hours as needed for moderate pain or severe pain for up to 15 days, Disp: 15 tablet, Rfl: 0    HYDROcodone-acetaminophen (NORCO) 5-325 mg per tablet, Take 1 tablet by mouth every 6 (six) hours as needed for pain Max Daily Amount: 4 tablets (Patient not taking: Reported on 8/30/2018 ), Disp: 20 tablet, Rfl: 0    Lenalidomide (REVLIMID) 20 MG CAPS, Take 1 capsule (20 mg total) by mouth daily Adult Female  Auth # [id number] Take 1 capsule daily days 1-21  Followed by 7 days off  (Patient not taking: Reported on 8/30/2018 ), Disp: 21 capsule, Rfl: 0    Omega-3 Fatty Acids (FISH OIL) 1200 MG CAPS, Take 3 capsules by mouth daily with dinner  , Disp: , Rfl:   Allergies   Allergen Reactions    Alteplase Anaphylaxis    Aspirin Anaphylaxis    Celebrex [Celecoxib] Anaphylaxis    Nsaids Anaphylaxis    Other Anaphylaxis     Pt states when she received a certain type of chemotherapy it caused her throat to close    Rituxan [Rituximab] Anaphylaxis     Swelling of tongue and closing of throat    Sulfa Antibiotics Other (See Comments) and Anaphylaxis     VERY EMOTIONAL CRYING    Bactrim [Sulfamethoxazole-Trimethoprim] Other (See Comments)     VERY EMOTIONAL/CRYING     Review of Systems  Constitutional: Positive for fatigue (5/10, varies day to day)  HENT: Positive for trouble swallowing  Eyes: Negative  Respiratory: Positive for cough (occasionally, expectorating clear mucous) and shortness of breath (sometimes with exertion)  Cardiovascular: Positive for palpitations (seldom, usually at night)     Gastrointestinal: Negative  Frequent belching, feels bloated after eating, increased flatus after eating   Endocrine: Negative  Genitourinary: Negative  Musculoskeletal: Negative  Skin: Negative  Allergic/Immunologic: Negative  Neurological: Positive for dizziness (yesterday) and headaches (occasional dull headaches)  Hematological: Positive for adenopathy (b/l neck (L>R))  Bruises/bleeds easily  Psychiatric/Behavioral: Positive for sleep disturbance  OBJECTIVE:   BP 96/62   Pulse 104   Temp 98 2 °F (36 8 °C) (Temporal)   Resp 18   Ht 5' 4" (1 626 m)   Wt 93 6 kg (206 lb 6 4 oz)   SpO2 94%   BMI 35 43 kg/m²   Pain Assessment:  3  Performance Status: ECOG/Zubrod/WHO: 1 - Symptomatic but completely ambulatory    Physical Exam   Constitutional: She is oriented to person, place, and time  She appears well-developed and well-nourished  No distress  HENT:   Head: Normocephalic and atraumatic  Nose: Nose normal    Mouth/Throat: Oropharynx is clear and moist  No oropharyngeal exudate  Eyes: Conjunctivae and EOM are normal  Pupils are equal, round, and reactive to light  No scleral icterus  Neck: Normal range of motion  Neck supple  No tracheal deviation present  No thyromegaly present  Cardiovascular: Normal rate, regular rhythm and normal heart sounds  Pulmonary/Chest: Effort normal and breath sounds normal  No respiratory distress  She has no wheezes  She has no rales  She exhibits no tenderness  Abdominal: Soft  Bowel sounds are normal  She exhibits no distension and no mass  There is no tenderness  Musculoskeletal: Normal range of motion  She exhibits no edema or tenderness  Lymphadenopathy:     She has cervical adenopathy  Right cervical: No superficial cervical, no deep cervical and no posterior cervical adenopathy present  Left cervical: Superficial cervical and deep cervical adenopathy present  No posterior cervical adenopathy present       She has no axillary adenopathy  Right: No supraclavicular adenopathy present  Left: Supraclavicular adenopathy present  Neurological: She is alert and oriented to person, place, and time  No cranial nerve deficit  Coordination normal    Skin: Skin is warm and dry  No rash noted  She is not diaphoretic  No erythema  No pallor  Well-healed left neck incision from biopsy  Psychiatric: She has a normal mood and affect  Her behavior is normal  Judgment and thought content normal    Nursing note and vitals reviewed  RESULTS  Lab Results    Chemistry        Component Value Date/Time     08/22/2018 0512     04/19/2017 0825    K 4 7 08/22/2018 0512    K 3 9 04/19/2017 0825     08/22/2018 0512     04/19/2017 0825    CO2 32 08/22/2018 0512    CO2 28 04/19/2017 0825    BUN 18 08/22/2018 0512    BUN 14 04/19/2017 0825    CREATININE 0 82 08/22/2018 0512    CREATININE 0 81 04/19/2017 0825        Component Value Date/Time    CALCIUM 9 6 08/22/2018 0512    CALCIUM 9 7 04/19/2017 0825    ALKPHOS 97 08/21/2018 0824    ALKPHOS 88 04/19/2017 0825    AST 85 (H) 08/21/2018 0824    AST 19 04/19/2017 0825     (H) 08/21/2018 0824    ALT 19 04/19/2017 0825    BILITOT 0 6 04/19/2017 0825            Lab Results   Component Value Date    WBC 9 79 08/22/2018    HGB 12 2 08/22/2018    HCT 38 3 08/22/2018    MCV 92 08/22/2018     08/22/2018         Imaging Studies  Ct Soft Tissue Neck W Contrast    Result Date: 8/20/2018  Narrative: CT NECK WITH CONTRAST INDICATION:   C83 38: Diffuse large b-cell lymphoma, lymph nodes of multiple sites  COMPARISON:  10/2/2017 TECHNIQUE:  Contiguous 2 5 mm images were obtained through the neck after administration of intravenous contrast  Radiation dose length product (DLP) for this visit:  533 mGy-cm     This examination, like all CT scans performed in the [facility], was performed utilizing techniques to minimize radiation dose exposure, including the use of iterative reconstruction and automated exposure control  IV Contrast:  85 mL of iohexol (OMNIPAQUE) IMAGE QUALITY:  Diagnostic  FINDINGS: VISUALIZED BRAIN PARENCHYMA:  No acute intracranial pathology of the visualized brain parenchyma  VISUALIZED ORBITS AND PARANASAL SINUSES:  Normal  NASAL CAVITY AND NASOPHARYNX:  Normal  SUPRAHYOID NECK:  Normal oral cavity, tongue base, tonsillar fossa and epiglottis  INFRAHYOID NECK:  Partial effacement of the right piriform sinus  Normal vocal cords and ventricle  No discrete mass identified  THYROID GLAND:  Heterogeneously enhancing nodule within the right lobe of the thyroid gland appears to be enlarging compared to the prior examination  This measures 1 1 cm in AP diameter and approximately 1 3 cm in craniocaudad dimension  Subcentimeter  nodule identified within the left lobe  PAROTID AND SUBMANDIBULAR GLANDS:  Normal parotid glands  Asymmetry of the submandibular glands, right side larger than the left with no discrete mass  LYMPH NODES:  Prominent cervical adenopathy primarily within the left neck below the hyoid bone and posterior to the sternocleidomastoid muscle  Large supraclavicular node identified on the left, markedly increased in size compared to the prior examination now measuring 5 1 x 2 9 x 5 3 cm, compared to 4 8 x 2 3 x 4 7 cm previously  Multiple additional supraclavicular and infraclavicular nodes are noted bilaterally  There is paratracheal adenopathy to the left of midline immediately below the thyroid gland which has mildly increased in size as well  This displaces the trachea towards the right  Moderate, partially visualized upper mediastinal adenopathy is noted as well primarily paratracheal  Although much of the adenopathy is an enlarging, there is some adenopathy which is improving including right posterior jugular chain adenopathy and bilateral axillary adenopathy   VASCULAR STRUCTURES:  The enlarging left-sided adenopathy results in compression of the left jugular vein and there is now thrombosis of the jugular vein beginning at the level of the angle of the mandible and extending inferiorly to the jugular/subclavian confluence  This measures nearly 4 cm in craniocaudad dimension  Left subclavian central line is present and the left subclavian vein enhances appropriately  THORACIC INLET:  Lung apices and upper mediastinum are unremarkable  BONY STRUCTURES: Stable mild cervical spondylitic degenerative change  Impression: Worsening cervical and mediastinal adenopathy  The largest nodes are noted on the left side within the left posterior jugular chain and below the thyroid gland in the paratracheal region  The enlarging left-sided adenopathy results in compression of the jugular vein inferiorly and there is new thrombosis of the left jugular vein compared to the prior examination  Although there is an enlarging adenopathy as described above, some of the adenopathy is improving including axillary adenopathy and right posterior jugular chain nodes  Improvement in the axillary adenopathy compared to the prior study  I personally discussed this study with Keyanna Egan on 8/20/2018 at 2:24 PM  Workstation performed: [id number]     Ct Chest W Contrast    Result Date: 8/22/2018  Narrative: CT CHEST WITH IV CONTRAST INDICATION:   R60 9: Edema, unspecified  History of lymphoma  COMPARISON:  CT neck of 8/18/2018 TECHNIQUE: CT examination of the chest was performed  Axial, sagittal, and coronal 2D reformatted images were created from the source data and submitted for interpretation  Radiation dose length product (DLP) for this visit:  245 mGy-cm   This examination, like all CT scans performed in the [facility], was performed utilizing techniques to minimize radiation dose exposure, including the use of iterative reconstruction and automated exposure control   IV Contrast:  85 mL of iohexol (OMNIPAQUE) FINDINGS: LUNGS: Mild scattered atelectatic changes noted of the bilateral lung fields  There is no tracheal or endobronchial lesion  PLEURA:  Unremarkable  HEART/GREAT VESSELS:  Left-sided Mediport line tip terminates in the right atrium  Heart is normal size  No pericardial effusion  Thoracic aorta is normal caliber  MEDIASTINUM AND MARCIO:  Extensive mediastinal lymphadenopathy is again noted  A superior mediastinal lymph node anterior to the trachea on the right measures 2 5 x 1 8 cm, image 17 series 2, previously 2 1 x 1 3 cm  Lymph node posterior to the trachea measures 3 5 x 2 6 cm, image 19 series 2, previously 3 0 x 1 4 cm  Left para-aortic lymph node adjacent to the aortic arch measures 2 8 x 1 3 cm, image 17 series 2, previously, 1 8 x 0 7 cm  A subcarinal lymph node measures 4 3 x 1 9 cm, image 29 series 2, previously 5 0 x 2 2 cm  CHEST WALL AND LOWER NECK:   For neck findings, please see the dedicated CT of the neck report of the same day  The axillary lymphadenopathy has significantly improved since the prior exam   Remaining left subpectoral lymph node measures 2 5 x 0 8 cm, image 14 series 2, previously 3 1 x 1 6 cm  No significant enlarged axillary lymph nodes remaining  VISUALIZED STRUCTURES IN THE UPPER ABDOMEN:  Prominent retrocrural lymph nodes noted bilaterally  A left retrocrural lymph node measures 1 8 x 1 6 cm, image 48 series 2, previously 1 3 x 1 2 cm  OSSEOUS STRUCTURES:  No acute fracture or destructive osseous lesion  Impression: 1  Mixed findings for disease progression   2  Extensive mediastinal and retrocrural lymphadenopathy which has progressed from the prior exam  3  Significant improvement in the axillary lymphadenopathy from the prior exam  Workstation performed: [id number]     Ct Chest Abdomen Pelvis W Contrast    Addendum Date: 8/21/2018 Addendum:   ADDENDUM: For clarification, the lymphadenopathy has increased when compared to the CT study 10/2/2017    Result Date: 8/21/2018  Narrative: CT CHEST, ABDOMEN AND PELVIS WITH IV CONTRAST INDICATION:   chest pain  COMPARISON:  Chest CTs on 8/18/2018 and 10/2/2017 TECHNIQUE: CT examination of the chest, abdomen and pelvis was performed  Axial, sagittal, and coronal 2D reformatted images were created from the source data and submitted for interpretation  Radiation dose length product (DLP) for this visit:  1185 64 mGy-cm   This examination, like all CT scans performed in the [facility], was performed utilizing techniques to minimize radiation dose exposure, including the use of iterative reconstruction and automated exposure control  IV Contrast:  100 mL of iohexol (OMNIPAQUE) Enteric Contrast: Enteric contrast was administered  FINDINGS: CHEST LUNGS:  Lungs are clear  There is no tracheal or endobronchial lesion  PLEURA:  Unremarkable  HEART/GREAT VESSELS:  Left Mediport MEDIASTINUM AND MARCIO:  Again seen is lymphadenopathy throughout the mediastinum, with index node in the left neck base measuring 5 2 x 2 6 cm  Mediastinal lymphadenopathy causes mass effect on the esophagus CHEST WALL AND LOWER NECK:   Unremarkable  ABDOMEN LIVER/BILIARY TREE:  Unremarkable  GALLBLADDER:  No calcified gallstones  No pericholecystic inflammatory change  SPLEEN:  Unremarkable  PANCREAS:  Unremarkable  ADRENAL GLANDS:  Unremarkable  KIDNEYS/URETERS:  Unremarkable  No hydronephrosis  STOMACH AND BOWEL:  Unremarkable  APPENDIX:  No findings to suggest appendicitis  ABDOMINOPELVIC CAVITY:  No ascites or free intraperitoneal air  No lymphadenopathy  VESSELS:  Again seen is extensive retroperitoneal lymphadenopathy with index node in the left para-aortic region measuring 5 7 x 4 3 cm PELVIS REPRODUCTIVE ORGANS:  Unremarkable for patient's age  URINARY BLADDER:  Unremarkable  ABDOMINAL WALL/INGUINAL REGIONS:  Unremarkable  OSSEOUS STRUCTURES:  There are age appropriate degenerative changes  No acute fracture or destructive osseous lesion  Impression: Supraclavicular, mediastinal, and retroperitoneal lymphadenopathy, progressed from prior  The mediastinal lymphadenopathy causes mass effect on the esophagus  Workstation performed: [id number]     Pathology: See Above    ASSESSMENT  1  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 [facility])  Radiation Simulation Treatment   2  Lymphadenopathy of head and neck  Radiation Simulation Treatment         PLAN/DISCUSSION  Orders Placed This Encounter   Procedures   Thien Peña is a 78y o  year old female who presents with history of marginal zone lymphoma, transformed into diffuse large B-cell lymphoma now with a recurrence of diffuse large B-cell lymphoma  She is here today for initial radiation therapy consult  Her imaging studies with PET-CT June 18, 2018 revealed multiple avid lymph nodes in the bilateral neck, axilla, subpectoral regions, mediastinum and retroperitoneum compatible with recurrent disease  Biopsy of a left neck lymph node confirmed recurrent B-cell lymphoma on July 25, 2018  CT scans of the neck and chest from August 18, 2018 showed progression of cervical and mediastinal lymphadenopathy  The largest lymph nodes are in the left side of the neck and left supraclavicular area extending into the upper mediastinum  There was also extensive mediastinal and retrocrural lymphadenopathy  There is some compression of the superior esophagus from her enlarging left side lymphadenopathy that is symptomatic  There are plans to start her on systemic treatment with Revlimid once daily, 3 weeks on and 1 week off  Due to previous reaction to Rituxan she is not a candidate for additional Rituxan  We would recommend palliative radiation therapy to the bulky is sites of her recurrent disease involving the lower neck/cervical region, supraclavicular area, and upper mediastinum    We would recommend accelerated course of treatment over 2 weeks of 3000 cGy in 10 fractions  Treatment plan discussed with Dr Miller Goncalves who is in agreement  He wants her to complete radiation therapy 1st and then he will start her on Revlimid  We discussed the rationale for radiation therapy including the acute side effects and the potential chronic complications with patient, her , and her son  She agrees to proceed with treatment and will be scheduled for simulation next week  Laurence Lang MD  8/30/2018,11:11 AM      Portions of the record may have been created with voice recognition software   Occasional wrong word or "sound a like" substitutions may have occurred due to the inherent limitations of voice recognition software   Read the chart carefully and recognize, using context, where substitutions have occurred

## 2018-08-31 ENCOUNTER — APPOINTMENT (OUTPATIENT)
Dept: LAB | Facility: CLINIC | Age: 78
End: 2018-08-31
Payer: COMMERCIAL

## 2018-08-31 LAB
ALBUMIN SERPL BCP-MCNC: 3 G/DL (ref 3.5–5)
ALP SERPL-CCNC: 138 U/L (ref 46–116)
ALT SERPL W P-5'-P-CCNC: 93 U/L (ref 12–78)
ANION GAP SERPL CALCULATED.3IONS-SCNC: 9 MMOL/L (ref 4–13)
AST SERPL W P-5'-P-CCNC: 35 U/L (ref 5–45)
BASOPHILS # BLD AUTO: 0.03 THOUSANDS/ΜL (ref 0–0.1)
BASOPHILS NFR BLD AUTO: 0 % (ref 0–1)
BILIRUB SERPL-MCNC: 0.49 MG/DL (ref 0.2–1)
BUN SERPL-MCNC: 15 MG/DL (ref 5–25)
CALCIUM SERPL-MCNC: 9.9 MG/DL (ref 8.3–10.1)
CHLORIDE SERPL-SCNC: 101 MMOL/L (ref 100–108)
CO2 SERPL-SCNC: 29 MMOL/L (ref 21–32)
CREAT SERPL-MCNC: 0.79 MG/DL (ref 0.6–1.3)
EOSINOPHIL # BLD AUTO: 0.12 THOUSAND/ΜL (ref 0–0.61)
EOSINOPHIL NFR BLD AUTO: 1 % (ref 0–6)
ERYTHROCYTE [DISTWIDTH] IN BLOOD BY AUTOMATED COUNT: 14.9 % (ref 11.6–15.1)
GFR SERPL CREATININE-BSD FRML MDRD: 73 ML/MIN/1.73SQ M
GLUCOSE P FAST SERPL-MCNC: 69 MG/DL (ref 65–99)
HCT VFR BLD AUTO: 41.3 % (ref 34.8–46.1)
HGB BLD-MCNC: 12.7 G/DL (ref 11.5–15.4)
IMM GRANULOCYTES # BLD AUTO: 0.08 THOUSAND/UL (ref 0–0.2)
IMM GRANULOCYTES NFR BLD AUTO: 1 % (ref 0–2)
LYMPHOCYTES # BLD AUTO: 1.42 THOUSANDS/ΜL (ref 0.6–4.47)
LYMPHOCYTES NFR BLD AUTO: 14 % (ref 14–44)
MCH RBC QN AUTO: 28.5 PG (ref 26.8–34.3)
MCHC RBC AUTO-ENTMCNC: 30.8 G/DL (ref 31.4–37.4)
MCV RBC AUTO: 93 FL (ref 82–98)
MONOCYTES # BLD AUTO: 0.94 THOUSAND/ΜL (ref 0.17–1.22)
MONOCYTES NFR BLD AUTO: 9 % (ref 4–12)
NEUTROPHILS # BLD AUTO: 7.42 THOUSANDS/ΜL (ref 1.85–7.62)
NEUTS SEG NFR BLD AUTO: 75 % (ref 43–75)
NRBC BLD AUTO-RTO: 0 /100 WBCS
PLATELET # BLD AUTO: 183 THOUSANDS/UL (ref 149–390)
PMV BLD AUTO: 10.2 FL (ref 8.9–12.7)
POTASSIUM SERPL-SCNC: 3.7 MMOL/L (ref 3.5–5.3)
PROT SERPL-MCNC: 6 G/DL (ref 6.4–8.2)
RBC # BLD AUTO: 4.46 MILLION/UL (ref 3.81–5.12)
SODIUM SERPL-SCNC: 139 MMOL/L (ref 136–145)
WBC # BLD AUTO: 10.01 THOUSAND/UL (ref 4.31–10.16)

## 2018-08-31 PROCEDURE — 85025 COMPLETE CBC W/AUTO DIFF WBC: CPT

## 2018-08-31 PROCEDURE — 80053 COMPREHEN METABOLIC PANEL: CPT

## 2018-08-31 PROCEDURE — 36415 COLL VENOUS BLD VENIPUNCTURE: CPT

## 2018-09-02 DIAGNOSIS — K21.9 GASTROESOPHAGEAL REFLUX DISEASE WITHOUT ESOPHAGITIS: ICD-10-CM

## 2018-09-03 RX ORDER — OMEPRAZOLE 20 MG/1
CAPSULE, DELAYED RELEASE ORAL
Qty: 90 CAPSULE | Refills: 1 | Status: SHIPPED | OUTPATIENT
Start: 2018-09-03 | End: 2018-09-06 | Stop reason: SDUPTHER

## 2018-09-04 ENCOUNTER — OFFICE VISIT (OUTPATIENT)
Dept: HEMATOLOGY ONCOLOGY | Facility: CLINIC | Age: 78
End: 2018-09-04
Payer: COMMERCIAL

## 2018-09-04 ENCOUNTER — RADIATION THERAPY TREATMENT (OUTPATIENT)
Dept: RADIATION ONCOLOGY | Facility: CLINIC | Age: 78
End: 2018-09-04
Attending: RADIOLOGY
Payer: COMMERCIAL

## 2018-09-04 ENCOUNTER — TELEPHONE (OUTPATIENT)
Dept: HEMATOLOGY ONCOLOGY | Facility: CLINIC | Age: 78
End: 2018-09-04

## 2018-09-04 VITALS
RESPIRATION RATE: 18 BRPM | BODY MASS INDEX: 35.51 KG/M2 | HEART RATE: 107 BPM | SYSTOLIC BLOOD PRESSURE: 126 MMHG | TEMPERATURE: 99.1 F | OXYGEN SATURATION: 95 % | HEIGHT: 64 IN | WEIGHT: 208 LBS | DIASTOLIC BLOOD PRESSURE: 84 MMHG

## 2018-09-04 DIAGNOSIS — C83.38 DIFFUSE LARGE B-CELL LYMPHOMA OF LYMPH NODES OF MULTIPLE SITES (HCC): Primary | ICD-10-CM

## 2018-09-04 DIAGNOSIS — I82.890 LEFT JUGULAR VEIN THROMBOSIS: ICD-10-CM

## 2018-09-04 DIAGNOSIS — I95.9 HYPOTENSION, UNSPECIFIED HYPOTENSION TYPE: ICD-10-CM

## 2018-09-04 DIAGNOSIS — K59.00 CONSTIPATION, UNSPECIFIED CONSTIPATION TYPE: ICD-10-CM

## 2018-09-04 PROCEDURE — 77290 THER RAD SIMULAJ FIELD CPLX: CPT | Performed by: RADIOLOGY

## 2018-09-04 PROCEDURE — 77334 RADIATION TREATMENT AID(S): CPT | Performed by: RADIOLOGY

## 2018-09-04 PROCEDURE — 77370 RADIATION PHYSICS CONSULT: CPT | Performed by: RADIOLOGY

## 2018-09-04 PROCEDURE — 99214 OFFICE O/P EST MOD 30 MIN: CPT | Performed by: PHYSICIAN ASSISTANT

## 2018-09-04 NOTE — TELEPHONE ENCOUNTER
Shelia, see the phone note from 8/28  [place] confirmed with them she received the fax  She can probably help locate the form

## 2018-09-04 NOTE — TELEPHONE ENCOUNTER
Louisa called from [phone] wanting to know if you faxed over a form that was suppose to be filled out and returned so that the patient is able to get free medication due to the current cost  I was OUT of office so I do not have the form or know who was handling this  Please contact her to get more information so that this can be completed

## 2018-09-04 NOTE — LETTER
September 4, 2018     Suellen Moody, [address] [facility]  [place] [facility]  [phone]    Patient: Sivlerio Lunsford   YOB: 1940   Date of Visit: 9/4/2018       Dear Dr Eddie Hercules: Thank you for referring Yulia Mckeon to me for evaluation  Below are my notes for this consultation  If you have questions, please do not hesitate to call me  I look forward to following your patient along with you  Sincerely,        Clarita Hein PA-C        CC: No Recipients  Clarita Hein PA-C  9/4/2018 11:18 AM  Sign at close encounter  Hematology/Oncology Outpatient Follow-up  Silverio Lunsford 78 y o  female 1940 [id number]    Date:  9/4/2018      Assessment and Plan:  1  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 [facility])   51-year-old female with history of recurrent diffuse large B-cell lymphoma  At this time, plan is for Revlimid weekly x3 with 1 week off  Patient has not yet received Revlimid  She was seen by Radiation Oncology who plans to administer radiation therapy to areas of large disease burden  She is going for mapping today  Plan for 10 treatments  She will start Revlimid after completion of radiation therapy  We will be seeing her 09/19/2018, which likely will be after the completion of her radiation therapy  She will continue on prednisone 20 mg daily in the morning with food  2  Left jugular vein thrombosis    Continue Eliquis 5 mg b i d     3  Constipation, unspecified constipation type   patient has had constipation over the past few weeks  She had been using Colace as well as Senokot, on average 2 per day  This has not been sufficient  Advised to increase dose  Also, addition of MiraLax if needed  If this does not work, she should proceed with magnesium citrate  She did used this once last week with benefit  4  Hypotension, unspecified hypotension type   Patient continues to have low blood pressure readings at home    She has not been taking her lisinopril but has continued to take  Hydrochlorothiazide 25 mg  She had symptoms of lightheadedness and dizziness last Wednesday  She has increased her fluid intake as well as adding salt to food  However, she persists to have low blood pressures averaging /60-70  Repeat blood pressure in the office today was 100/70  Advised her to hold her hydrochlorothiazide over the next few days to see if there is improvement in blood pressure  I also asked her to bring her blood pressure machine to her next appointment with radiation oncology or PCP to see if blood pressure cuff is obtaining correct reading  when compared to manual reading  HPI:       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 [facility])    10/2017 - 1/2018 Chemotherapy     3 cycles of CHOP and 3 cycles of EPOCH (last 2 cycles dropped adriamycin due to decreased EF)  12/6/2017 Initial Diagnosis     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 [facility])       2017 Biopsy     Left axillary lymph node biopsy showed marginal zone lymphoma with features suggestive of aggressive behavior  She then had additional biopsy which showed 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  Bone marrow biopsy was negative  6/18/2018 Progression     PET CT: Findings compatible with disease recurrence  Multiple FDG avid lymph nodes in the bilateral neck, axilla, subpectoral regions, mediastinum, portacaval region and retroperitoneum compatible with hypermetabolic malignancy  Deauville score of 5            7/25/2018 Biopsy     Left neck lymph node: High grade B-cell lymphoma, MYC-rearrangement positive, 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"), bcl-2 expressing (~80%), 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 - see Note            2018 -  Chemotherapy     Plan to start lenalidomide (Revlimid) 20 mg daily weekly x3, 1 week off                Marginal zone lymphoma [facility])    2008 Initial Diagnosis     Marginal zone lymphoma ([address] )         2008 -  Chemotherapy     2008/2009 Rituxan x1 dose - anaphylactic reaction with swelling of the tongue and closing of throat  Rituxan was discontinued  5/12/2009 - 6/16/2009 Radiation     Right orbit IMRT; Total dose: 3600 cGy   - Dr Cinthya Muller at [facility]          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  It was a localized disease  Plan was radiation and Rituxan  She had radiation  She had only one dose of Rituxan and had reaction to Rituxan  She had swelling of the tongue and closing of throat  Rituxan was discontinued      In April 2015 patient had PET CT scan that showed hypermetabolic lymphadenopathy involving the neck, chest, abdomen and pelvis and also hypermetabolic right breast nodularity, hepatic steatosis, mild/borderline aneurysmal dilatation of ascending thoracic aorta  She had left inguinal excisional lymph node biopsy, right axillary lymph node biopsy and neck node biopsy and none of those 3 biopsies showed lymphoma or malignancy  She was being followed       Aug 2017 she presented with axillary adenopathy  Left axillary lymph node biopsy showed marginal zone lymphoma with features suggestive of aggressive behavior  She then had additional biopsy which showed 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  Bone marrow biopsy was negative       She underwent 3 cycles of CHOP and 3 cycles of EPOCH (last 2 cycles dropped adriamycin due to decreased EF       Feb 2018 post treatment PET/CT showed complete metabolic response  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%       June 2018 PET/CT showed recurrent hypermetabolic generalized lymphadenopathy  She had a core biopsy of the left neck lymph node which showed high-grade B-cell lymphoma, positive PAX5, CD 20+, 35% BCL 6, 45% BCL2, 35% MUM-1, No MYC stained  Ki67 85%  FISH showed MYC-3 range of positive, 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, BCL to expressing approximately 80%     Patient was seen by Dr Laurence Canseco om 8/9/18, expert in hematological malignancies from [facility]      Dr Cody Domingo explained to patient and her family that in the absence of intense salvage therapy, is unlikely to achieve remission  The goal of continued therapy will include disease control and improving progression-free survival and overall survival      Dr Cody Domingo recommended rituximab with lenalidomide  This is not tolerated recommend treatment with ibrutinib       Due to patient's anaphylactic reaction with Rituxan therapy prior, Rituxan will not be administered  Dr Merlin Stevens and Dr Cody Domingo did discuss this and were in agreement  ROS: Review of Systems   Constitutional: Negative for appetite change, chills, fatigue (Feeling much better), fever and unexpected weight change  HENT: Positive for trouble swallowing  Negative for mouth sores and nosebleeds  Respiratory: Negative for cough and shortness of breath  Cardiovascular: Negative for chest pain, palpitations and leg swelling  Gastrointestinal: Positive for constipation  Negative for abdominal pain, blood in stool, diarrhea, nausea and vomiting  Gas pains have resolved    Genitourinary: Negative for difficulty urinating, dysuria and hematuria  Musculoskeletal: Negative for arthralgias and myalgias  Skin: Negative  Neurological: Negative for dizziness, weakness, light-headedness, numbness and headaches  Hematological: Negative  Psychiatric/Behavioral: Negative          Past Medical History:   Diagnosis Date    Basal cell carcinoma (BCC)     Bruit of right carotid artery     resolved 5/14/15     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HCC)     radiation    Dyslipidemia     Esophageal stricture     last assessed 2/28/13       GERD (gastroesophageal reflux disease)     History of chemotherapy     History of radiation therapy     [facility]     b/l ears    Hyperlipidemia     Hypertension     Osteoarthritis     Osteoarthrosis of ankle and foot     last assessed 5/20/13     Osteopenia     last assessed 2/28/13     Plantar fasciitis of left foot     Polyp of sigmoid colon     last assessed 2/28/13     Shortness of breath     when walking up stairs       Past Surgical History:   Procedure Laterality Date    ANKLE ARTHROPLASTY Left     BLEPHAROPLASTY      right eye     CATARACT EXTRACTION Right     COLONOSCOPY      ESOPHAGEAL DILATION      x2    ESOPHAGOGASTRODUODENOSCOPY      dilitation    FACIAL/NECK BIOPSY Right 9/8/2017    Procedure: NECK NODE BIOPSY WITH NEEDLE LOCALIZATION; LYMPHOMA PROTOCOL (NEEDLE LOC WITH I R  AT 1100); Surgeon: Ranulfo Atkinson MD;  [facility];  Service: Surgical Oncology    HYSTERECTOMY      LYMPH NODE DISSECTION      right groin and neck    ORBITAL FLOOR EXPLORATION Right     for cancer     PORTACATH PLACEMENT      left chest     NV BX/REMV,LYMPH NODE,DEEP AXILL Left 8/14/2017    Procedure: Axillary lymph node excision with LYMPHOMA PROTOCOL;  Surgeon: Ranulfo Atkinson MD;  [facility];  Service: Surgical Oncology    NV BX/REMV,LYMPH NODE,DEEP CERV Right 5/2/2016    Procedure: NECK NODE BIOPSY;  Surgeon: Ranulfo Atkinson MD;  [facility];  Service: Surgical Oncology    NV BX/REMV,LYMPH NODE,DEEP CERV Left 7/25/2018    Procedure: NECK NODE BIOPYS WITH LYMPHOMA PROTOCOL; (ULTRASOUND GUIDED NEEDLE LOC IN IR AT 0800);   Surgeon: Ranulfo Atkinson MD;  [facility];  Service: Surgical Oncology    NV INSJ TUNNELED CTR VAD W/SUBQ PORT AGE 5 YR/> N/A 10/3/2017    Procedure: PLACEMENT OF PORT-A-CATH DEVICE;  Surgeon: Ranulfo Atkinson MD;  [facility];  Service: Surgical Oncology       Social History     Social History    Marital status: /Civil Union     Spouse name: N/A    Number of children: N/A    Years of education: N/A     Social History Main Topics    Smoking status: Former Smoker     Quit date: 1970    Smokeless tobacco: Never Used    Alcohol use No    Drug use: No    Sexual activity: Not on file     Other Topics Concern    Not on file     Social History Narrative    Uses safety equipment seatbelts        Family History   Problem Relation Age of Onset    Multiple myeloma Mother     Heart disease Father     Hypertension Father     Hypertension Sister     Heart disease Sister     Arthritis Family     Osteoporosis Family     Breast cancer Maternal Aunt        Allergies   Allergen Reactions    Alteplase Anaphylaxis    Aspirin Anaphylaxis    Celebrex [Celecoxib] Anaphylaxis    Nsaids Anaphylaxis    Other Anaphylaxis     Pt states when she received a certain type of chemotherapy it caused her throat to close    Rituxan [Rituximab] Anaphylaxis     Swelling of tongue and closing of throat    Sulfa Antibiotics Other (See Comments) and Anaphylaxis     VERY EMOTIONAL CRYING    Bactrim [Sulfamethoxazole-Trimethoprim] Other (See Comments)     VERY EMOTIONAL/CRYING         Current Outpatient Prescriptions:     allopurinol (ZYLOPRIM) 100 mg tablet, Take 2 tablets (200 mg total) by mouth daily, Disp: 60 tablet, Rfl: 1    apixaban (ELIQUIS) 5 mg, Take 1 tablet (5 mg total) by mouth 2 (two) times a day, Disp: 60 tablet, Rfl: 2    ascorbic acid (VITAMIN C) 500 mg tablet, Take 500 mg by mouth daily with dinner  , Disp: , Rfl:     Calcium Carb-Cholecalciferol (CALCIUM + D3) 600-200 MG-UNIT TABS, Take 1 tablet by mouth daily with dinner  , Disp: , Rfl:     Cyanocobalamin (VITAMIN B 12 PO), Take 1 tablet by mouth daily with dinner  , Disp: , Rfl:     docusate sodium (COLACE) 100 mg capsule, Take 1 capsule (100 mg total) by mouth 2 (two) times a day, Disp: 10 capsule, Rfl: 0    gabapentin (NEURONTIN) 100 mg capsule, Take 1 capsule (100 mg total) by mouth daily at bedtime, Disp: 30 capsule, Rfl: 0    hydrochlorothiazide (HYDRODIURIL) 25 mg tablet, Take 1 tablet (25 mg total) by mouth daily in the early morning, Disp: 90 tablet, Rfl: 1    HYDROcodone-acetaminophen (NORCO) 5-325 mg per tablet, Take 1 tablet by mouth every 6 (six) hours as needed for pain Max Daily Amount: 4 tablets (Patient not taking: Reported on 8/30/2018 ), Disp: 20 tablet, Rfl: 0    Lenalidomide (REVLIMID) 20 MG CAPS, Take 1 capsule (20 mg total) by mouth daily Adult Female  Auth # [id number] Take 1 capsule daily days 1-21  Followed by 7 days off   (Patient not taking: Reported on 8/30/2018 ), Disp: 21 capsule, Rfl: 0    lisinopril (ZESTRIL) 5 mg tablet, Take 1 tablet (5 mg total) by mouth daily (Patient taking differently: Take 5 mg by mouth daily in the early morning  ), Disp: 90 tablet, Rfl: 3    loratadine (CLARITIN) 10 mg tablet, Take 10 mg by mouth daily in the early morning  , Disp: , Rfl:     Multiple Vitamin (MULTIVITAMIN) capsule, Take 1 capsule by mouth daily with dinner  , Disp: , Rfl:     Omega-3 Fatty Acids (FISH OIL) 1200 MG CAPS, Take 3 capsules by mouth daily with dinner  , Disp: , Rfl:     omeprazole (PriLOSEC) 20 mg delayed release capsule, TAKE ONE CAPSULE BY MOUTH EVERY DAY, Disp: 90 capsule, Rfl: 1    ondansetron (ZOFRAN) 4 mg tablet, Take 1 tablet (4 mg total) by mouth every 8 (eight) hours as needed for nausea or vomiting, Disp: 30 tablet, Rfl: 0    polyethylene glycol (MIRALAX) 17 g packet, Take 17 g by mouth daily, Disp: 14 each, Rfl: 0    potassium chloride (KLOR-CON M20) 20 mEq tablet, Take 1 tablet (20 mEq total) by mouth daily in the early morning, Disp: 90 tablet, Rfl: 1    predniSONE 20 mg tablet, Take 1 tablet (20 mg total) by mouth daily with food, Disp: 30 tablet, Rfl: 0    senna (SENOKOT) 8 6 mg, Take 1 tablet (8 6 mg total) by mouth daily at bedtime, Disp: 120 each, Rfl: 0    simethicone (MYLICON) 80 mg chewable tablet, Chew 1 tablet (80 mg total) every 6 (six) hours as needed for flatulence, Disp: 30 tablet, Rfl: 0    traMADol (ULTRAM) 50 mg tablet, Take 1 tablet (50 mg total) by mouth every 6 (six) hours as needed for moderate pain or severe pain for up to 15 days, Disp: 15 tablet, Rfl: 0      Physical Exam:  /84   Pulse (!) 107   Temp 99 1 °F (37 3 °C)   Resp 18   Ht 5' 3 8" (1 621 m)   Wt 94 3 kg (208 lb)   SpO2 95%   BMI 35 93 kg/m²      Physical Exam   Constitutional: She is oriented to person, place, and time  She appears well-developed and well-nourished  No distress  HENT:   Head: Normocephalic and atraumatic  Eyes: No scleral icterus  Neck: Normal range of motion  Neck supple  Cardiovascular: Normal rate, regular rhythm and normal heart sounds  No murmur heard  Pulmonary/Chest: Effort normal and breath sounds normal  No respiratory distress  Abdominal: Soft  There is no tenderness  Musculoskeletal: Normal range of motion  She exhibits no edema or tenderness  Lymphadenopathy:     She has cervical adenopathy  Left cervical: Posterior cervical adenopathy present  Left: Supraclavicular adenopathy present  Neurological: She is alert and oriented to person, place, and time  No cranial nerve deficit  Skin: Skin is warm and dry  Psychiatric: She has a normal mood and affect  Vitals reviewed  Repeat /70     Labs:  Lab Results   Component Value Date    WBC 10 01 08/31/2018    HGB 12 7 08/31/2018    HCT 41 3 08/31/2018    MCV 93 08/31/2018     08/31/2018     Lab Results   Component Value Date     08/31/2018    K 3 7 08/31/2018     08/31/2018    CO2 29 08/31/2018    ANIONGAP 6 12/15/2015    BUN 15 08/31/2018    CREATININE 0 79 08/31/2018    GLUCOSE 91 12/15/2015    GLUF 69 08/31/2018    CALCIUM 9 9 08/31/2018    AST 35 08/31/2018    ALT 93 (H) 08/31/2018    ALKPHOS 138 (H) 08/31/2018    PROT 6 2 04/19/2017    BILITOT 0 6 04/19/2017    EGFR 73 08/31/2018     Patient voiced understanding and agreement in the above discussion   Aware to contact our office with questions/symptoms in the interim

## 2018-09-04 NOTE — PROGRESS NOTES
Hematology/Oncology Outpatient Follow-up  Joy Huertas 78 y o  female 1940 [id number]    Date:  9/4/2018      Assessment and Plan:  1  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 [facility])   72-year-old female with history of recurrent diffuse large B-cell lymphoma  At this time, plan is for Revlimid weekly x3 with 1 week off  Patient has not yet received Revlimid  She was seen by Radiation Oncology who plans to administer radiation therapy to areas of large disease burden  She is going for mapping today  Plan for 10 treatments  She will start Revlimid after completion of radiation therapy  We will be seeing her 09/19/2018, which likely will be after the completion of her radiation therapy  She will continue on prednisone 20 mg daily in the morning with food  2  Left jugular vein thrombosis    Continue Eliquis 5 mg b i d     3  Constipation, unspecified constipation type   patient has had constipation over the past few weeks  She had been using Colace as well as Senokot, on average 2 per day  This has not been sufficient  Advised to increase dose  Also, addition of MiraLax if needed  If this does not work, she should proceed with magnesium citrate  She did used this once last week with benefit  4  Hypotension, unspecified hypotension type   Patient continues to have low blood pressure readings at home  She has not been taking her lisinopril but has continued to take  Hydrochlorothiazide 25 mg  She had symptoms of lightheadedness and dizziness last Wednesday  She has increased her fluid intake as well as adding salt to food  However, she persists to have low blood pressures averaging /60-70  Repeat blood pressure in the office today was 100/70  Advised her to hold her hydrochlorothiazide over the next few days to see if there is improvement in blood pressure    I also asked her to bring her blood pressure machine to her next appointment with radiation oncology or PCP to see if blood pressure cuff is obtaining correct reading  when compared to manual reading  HPI:       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 [facility])    10/2017 - 1/2018 Chemotherapy     3 cycles of CHOP and 3 cycles of EPOCH (last 2 cycles dropped adriamycin due to decreased EF)  12/6/2017 Initial Diagnosis     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 [facility])       2017 Biopsy     Left axillary lymph node biopsy showed marginal zone lymphoma with features suggestive of aggressive behavior  She then had additional biopsy which showed 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  Bone marrow biopsy was negative  6/18/2018 Progression     PET CT: Findings compatible with disease recurrence  Multiple FDG avid lymph nodes in the bilateral neck, axilla, subpectoral regions, mediastinum, portacaval region and retroperitoneum compatible with hypermetabolic malignancy  Deauville score of 5            7/25/2018 Biopsy     Left neck lymph node: High grade B-cell lymphoma, MYC-rearrangement positive, 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"), bcl-2 expressing (~80%), 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 - see Note  2018 -  Chemotherapy     Plan to start lenalidomide (Revlimid) 20 mg daily weekly x3, 1 week off                Marginal zone lymphoma ([address] )    2008 Initial Diagnosis     Marginal zone lymphoma ([address] )         2008 -  Chemotherapy     2008/2009 Rituxan x1 dose - anaphylactic reaction with swelling of the tongue and closing of throat  Rituxan was discontinued  5/12/2009 - 6/16/2009 Radiation     Right orbit IMRT; Total dose: 3600 cGy   - Dr Eavn Monet at [facility]          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  It was a localized disease  Plan was radiation and Rituxan  She had radiation   She had only one dose of Rituxan and had reaction to Rituxan  She had swelling of the tongue and closing of throat  Rituxan was discontinued      In April 2015 patient had PET CT scan that showed hypermetabolic lymphadenopathy involving the neck, chest, abdomen and pelvis and also hypermetabolic right breast nodularity, hepatic steatosis, mild/borderline aneurysmal dilatation of ascending thoracic aorta  She had left inguinal excisional lymph node biopsy, right axillary lymph node biopsy and neck node biopsy and none of those 3 biopsies showed lymphoma or malignancy  She was being followed       Aug 2017 she presented with axillary adenopathy  Left axillary lymph node biopsy showed marginal zone lymphoma with features suggestive of aggressive behavior  She then had additional biopsy which showed 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  Bone marrow biopsy was negative       She underwent 3 cycles of CHOP and 3 cycles of EPOCH (last 2 cycles dropped adriamycin due to decreased EF       Feb 2018 post treatment PET/CT showed complete metabolic response  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%       June 2018 PET/CT showed recurrent hypermetabolic generalized lymphadenopathy  She had a core biopsy of the left neck lymph node which showed high-grade B-cell lymphoma, positive PAX5, CD 20+, 35% BCL 6, 45% BCL2, 35% MUM-1, No MYC stained  Ki67 85%  FISH showed MYC-3 range of positive, 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, BCL to expressing approximately 80%     Patient was seen by Dr Cesar Wu om 8/9/18, expert in hematological malignancies from [facility]      Dr Kirby Lockett explained to patient and her family that in the absence of intense salvage therapy, is unlikely to achieve remission  The goal of continued therapy will include disease control and improving progression-free survival and overall survival      Dr Kirby Lockett recommended rituximab with lenalidomide  This is not tolerated recommend treatment with ibrutinib     Due to patient's anaphylactic reaction with Rituxan therapy prior, Rituxan will not be administered  Dr Dinh Heads and Dr Anatoly Cervantes did discuss this and were in agreement  ROS: Review of Systems   Constitutional: Negative for appetite change, chills, fatigue (Feeling much better), fever and unexpected weight change  HENT: Positive for trouble swallowing  Negative for mouth sores and nosebleeds  Respiratory: Negative for cough and shortness of breath  Cardiovascular: Negative for chest pain, palpitations and leg swelling  Gastrointestinal: Positive for constipation  Negative for abdominal pain, blood in stool, diarrhea, nausea and vomiting  Gas pains have resolved    Genitourinary: Negative for difficulty urinating, dysuria and hematuria  Musculoskeletal: Negative for arthralgias and myalgias  Skin: Negative  Neurological: Negative for dizziness, weakness, light-headedness, numbness and headaches  Hematological: Negative  Psychiatric/Behavioral: Negative          Past Medical History:   Diagnosis Date    Basal cell carcinoma (BCC)     Bruit of right carotid artery     resolved 5/14/15     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HCC)     radiation    Dyslipidemia     Esophageal stricture     last assessed 2/28/13       GERD (gastroesophageal reflux disease)     History of chemotherapy     History of radiation therapy     [place] (hard of hearing)     b/l ears    Hyperlipidemia     Hypertension     Osteoarthritis     Osteoarthrosis of ankle and foot     last assessed 5/20/13     Osteopenia     last assessed 2/28/13     Plantar fasciitis of left foot     Polyp of sigmoid colon     last assessed 2/28/13     Shortness of breath     when walking up stairs       Past Surgical History:   Procedure Laterality Date    ANKLE ARTHROPLASTY Left     BLEPHAROPLASTY      right eye     CATARACT EXTRACTION Right     COLONOSCOPY      ESOPHAGEAL DILATION      x2    ESOPHAGOGASTRODUODENOSCOPY dilitation    FACIAL/NECK BIOPSY Right 9/8/2017    Procedure: NECK NODE BIOPSY WITH NEEDLE LOCALIZATION; LYMPHOMA PROTOCOL (NEEDLE LOC WITH I R  AT 1100); Surgeon: Joseph Beach MD;  [facility];  Service: Surgical Oncology    HYSTERECTOMY      LYMPH NODE DISSECTION      right groin and neck    ORBITAL FLOOR EXPLORATION Right     for cancer     PORTACATH PLACEMENT      left chest     MA BX/REMV,LYMPH NODE,DEEP AXILL Left 8/14/2017    Procedure: Axillary lymph node excision with LYMPHOMA PROTOCOL;  Surgeon: Joseph Beach MD;  [facility];  Service: Surgical Oncology    MA BX/REMV,LYMPH NODE,DEEP CERV Right 5/2/2016    Procedure: NECK NODE BIOPSY;  Surgeon: Joseph Beach MD;  [facility];  Service: Surgical Oncology    MA BX/REMV,LYMPH NODE,DEEP CERV Left 7/25/2018    Procedure: NECK NODE BIOPYS WITH LYMPHOMA PROTOCOL; (ULTRASOUND GUIDED NEEDLE LOC IN IR AT 0800);   Surgeon: Joseph Beach MD;  [facility];  Service: Surgical Oncology    MA INSJ TUNNELED CTR VAD W/SUBQ PORT AGE 5 YR/> N/A 10/3/2017    Procedure: PLACEMENT OF PORT-A-CATH DEVICE;  Surgeon: Joseph Beach MD;  [facility];  Service: Surgical Oncology       Social History     Social History    Marital status: /Civil Union     Spouse name: N/A    Number of children: N/A    Years of education: N/A     Social History Main Topics    Smoking status: Former Smoker     Quit date: 1970    Smokeless tobacco: Never Used    Alcohol use No    Drug use: No    Sexual activity: Not on file     Other Topics Concern    Not on file     Social History Narrative    Uses safety equipment seatbelts        Family History   Problem Relation Age of Onset    Multiple myeloma Mother     Heart disease Father     Hypertension Father     Hypertension Sister     Heart disease Sister     Arthritis Family     Osteoporosis Family     Breast cancer Maternal Aunt        Allergies   Allergen Reactions    Alteplase Anaphylaxis    Aspirin Anaphylaxis    Celebrex [Celecoxib] Anaphylaxis    Nsaids Anaphylaxis    Other Anaphylaxis     Pt states when she received a certain type of chemotherapy it caused her throat to close    Rituxan [Rituximab] Anaphylaxis     Swelling of tongue and closing of throat    Sulfa Antibiotics Other (See Comments) and Anaphylaxis     VERY EMOTIONAL CRYING    Bactrim [Sulfamethoxazole-Trimethoprim] Other (See Comments)     VERY EMOTIONAL/CRYING         Current Outpatient Prescriptions:     allopurinol (ZYLOPRIM) 100 mg tablet, Take 2 tablets (200 mg total) by mouth daily, Disp: 60 tablet, Rfl: 1    apixaban (ELIQUIS) 5 mg, Take 1 tablet (5 mg total) by mouth 2 (two) times a day, Disp: 60 tablet, Rfl: 2    ascorbic acid (VITAMIN C) 500 mg tablet, Take 500 mg by mouth daily with dinner  , Disp: , Rfl:     Calcium Carb-Cholecalciferol (CALCIUM + D3) 600-200 MG-UNIT TABS, Take 1 tablet by mouth daily with dinner  , Disp: , Rfl:     Cyanocobalamin (VITAMIN B 12 PO), Take 1 tablet by mouth daily with dinner  , Disp: , Rfl:     docusate sodium (COLACE) 100 mg capsule, Take 1 capsule (100 mg total) by mouth 2 (two) times a day, Disp: 10 capsule, Rfl: 0    gabapentin (NEURONTIN) 100 mg capsule, Take 1 capsule (100 mg total) by mouth daily at bedtime, Disp: 30 capsule, Rfl: 0    hydrochlorothiazide (HYDRODIURIL) 25 mg tablet, Take 1 tablet (25 mg total) by mouth daily in the early morning, Disp: 90 tablet, Rfl: 1    HYDROcodone-acetaminophen (NORCO) 5-325 mg per tablet, Take 1 tablet by mouth every 6 (six) hours as needed for pain Max Daily Amount: 4 tablets (Patient not taking: Reported on 8/30/2018 ), Disp: 20 tablet, Rfl: 0    Lenalidomide (REVLIMID) 20 MG CAPS, Take 1 capsule (20 mg total) by mouth daily Adult Female  Auth # [id number] Take 1 capsule daily days 1-21  Followed by 7 days off   (Patient not taking: Reported on 8/30/2018 ), Disp: 21 capsule, Rfl: 0    lisinopril (ZESTRIL) 5 mg tablet, Take 1 tablet (5 mg total) by mouth daily (Patient taking differently: Take 5 mg by mouth daily in the early morning  ), Disp: 90 tablet, Rfl: 3    loratadine (CLARITIN) 10 mg tablet, Take 10 mg by mouth daily in the early morning  , Disp: , Rfl:     Multiple Vitamin (MULTIVITAMIN) capsule, Take 1 capsule by mouth daily with dinner  , Disp: , Rfl:     Omega-3 Fatty Acids (FISH OIL) 1200 MG CAPS, Take 3 capsules by mouth daily with dinner  , Disp: , Rfl:     omeprazole (PriLOSEC) 20 mg delayed release capsule, TAKE ONE CAPSULE BY MOUTH EVERY DAY, Disp: 90 capsule, Rfl: 1    ondansetron (ZOFRAN) 4 mg tablet, Take 1 tablet (4 mg total) by mouth every 8 (eight) hours as needed for nausea or vomiting, Disp: 30 tablet, Rfl: 0    polyethylene glycol (MIRALAX) 17 g packet, Take 17 g by mouth daily, Disp: 14 each, Rfl: 0    potassium chloride (KLOR-CON M20) 20 mEq tablet, Take 1 tablet (20 mEq total) by mouth daily in the early morning, Disp: 90 tablet, Rfl: 1    predniSONE 20 mg tablet, Take 1 tablet (20 mg total) by mouth daily with food, Disp: 30 tablet, Rfl: 0    senna (SENOKOT) 8 6 mg, Take 1 tablet (8 6 mg total) by mouth daily at bedtime, Disp: 120 each, Rfl: 0    simethicone (MYLICON) 80 mg chewable tablet, Chew 1 tablet (80 mg total) every 6 (six) hours as needed for flatulence, Disp: 30 tablet, Rfl: 0    traMADol (ULTRAM) 50 mg tablet, Take 1 tablet (50 mg total) by mouth every 6 (six) hours as needed for moderate pain or severe pain for up to 15 days, Disp: 15 tablet, Rfl: 0      Physical Exam:  /84   Pulse (!) 107   Temp 99 1 °F (37 3 °C)   Resp 18   Ht 5' 3 8" (1 621 m)   Wt 94 3 kg (208 lb)   SpO2 95%   BMI 35 93 kg/m²     Physical Exam   Constitutional: She is oriented to person, place, and time  She appears well-developed and well-nourished  No distress  HENT:   Head: Normocephalic and atraumatic  Eyes: No scleral icterus  Neck: Normal range of motion  Neck supple  Cardiovascular: Normal rate, regular rhythm and normal heart sounds  No murmur heard  Pulmonary/Chest: Effort normal and breath sounds normal  No respiratory distress  Abdominal: Soft  There is no tenderness  Musculoskeletal: Normal range of motion  She exhibits no edema or tenderness  Lymphadenopathy:     She has cervical adenopathy  Left cervical: Posterior cervical adenopathy present  Left: Supraclavicular adenopathy present  Neurological: She is alert and oriented to person, place, and time  No cranial nerve deficit  Skin: Skin is warm and dry  Psychiatric: She has a normal mood and affect  Vitals reviewed  Repeat /70     Labs:  Lab Results   Component Value Date    WBC 10 01 08/31/2018    HGB 12 7 08/31/2018    HCT 41 3 08/31/2018    MCV 93 08/31/2018     08/31/2018     Lab Results   Component Value Date     08/31/2018    K 3 7 08/31/2018     08/31/2018    CO2 29 08/31/2018    ANIONGAP 6 12/15/2015    BUN 15 08/31/2018    CREATININE 0 79 08/31/2018    GLUCOSE 91 12/15/2015    GLUF 69 08/31/2018    CALCIUM 9 9 08/31/2018    AST 35 08/31/2018    ALT 93 (H) 08/31/2018    ALKPHOS 138 (H) 08/31/2018    PROT 6 2 04/19/2017    BILITOT 0 6 04/19/2017    EGFR 73 08/31/2018     Patient voiced understanding and agreement in the above discussion  Aware to contact our office with questions/symptoms in the interim

## 2018-09-04 NOTE — TELEPHONE ENCOUNTER
Please resend [employer] application to [facility]  They are stating that never review the application for medication  Please send it to Fax #: [phone]  If you have additional question please contact they at [phone]

## 2018-09-05 NOTE — TELEPHONE ENCOUNTER
Call to [employer] regarding patient application  Louisa Louise should not be submitting any application on behalf of the patient  Application submitted from  was incomplete  They have since received my application which was forwarded to them and is in review  Will contact Pj

## 2018-09-06 ENCOUNTER — TELEPHONE (OUTPATIENT)
Dept: HEMATOLOGY ONCOLOGY | Facility: CLINIC | Age: 78
End: 2018-09-06

## 2018-09-06 ENCOUNTER — OFFICE VISIT (OUTPATIENT)
Dept: FAMILY MEDICINE CLINIC | Facility: CLINIC | Age: 78
End: 2018-09-06
Payer: COMMERCIAL

## 2018-09-06 VITALS
RESPIRATION RATE: 17 BRPM | WEIGHT: 208 LBS | BODY MASS INDEX: 35.93 KG/M2 | SYSTOLIC BLOOD PRESSURE: 118 MMHG | HEART RATE: 109 BPM | OXYGEN SATURATION: 96 % | TEMPERATURE: 98.1 F | DIASTOLIC BLOOD PRESSURE: 76 MMHG

## 2018-09-06 DIAGNOSIS — Z09 HOSPITAL DISCHARGE FOLLOW-UP: Primary | ICD-10-CM

## 2018-09-06 DIAGNOSIS — K21.9 GASTROESOPHAGEAL REFLUX DISEASE WITHOUT ESOPHAGITIS: ICD-10-CM

## 2018-09-06 DIAGNOSIS — I95.9 HYPOTENSION, UNSPECIFIED HYPOTENSION TYPE: ICD-10-CM

## 2018-09-06 PROCEDURE — 99214 OFFICE O/P EST MOD 30 MIN: CPT | Performed by: PHYSICIAN ASSISTANT

## 2018-09-06 RX ORDER — OMEPRAZOLE 20 MG/1
20 CAPSULE, DELAYED RELEASE ORAL DAILY
Qty: 90 CAPSULE | Refills: 1 | Status: SHIPPED | OUTPATIENT
Start: 2018-09-06 | End: 2018-10-13 | Stop reason: HOSPADM

## 2018-09-06 NOTE — PROGRESS NOTES
Transition of Care  Follow-up After Hospitalization    Stephanie Suresh 78 y o  female   Date:  9/6/2018    Date and time hospital follow up call was made:  8/27/2018 10:22 AM  Patient was hopsitalized at:  [facility]  Date of admission:  8/21/18  Date of discharge:  8/23/18  Diagnosis:  acute left flank pain, diffuse large B Cell lymphoma  Disposition:  Home  Were the patients medicaitons reviewed and updated:  Yes  Current symptoms:  Back pain - left side  Back pain, left side, severity:  Moderate  Back pain, left side, onset:  Ongoing  Post hospital issues:  None  Should patient be enrolled in anticoag monitoring?:  No  Scheduled for follow up?:  Yes  Patients specialists:  Other (comment)  Other specialists Name:  Dr Obinna Metcalf  Did you obtain your prescribed medications:  Yes  Do you need help managing your perscriptions or medications:  No  Is transportation to your appointments needed:  No  I have advised the patient to call PCP with any new or worsening symptoms (please type in name along with any credentials):  Aaron Kincaid  [facility] records were reviewed  Medications upon discharge reviewed/updated  Discharge Disposition:  Home with family support  Follow up visits with other specialists: Hem/Onc, Rad Onc, palliative medicine      Assessment and Plan:    Vick Boxer was seen today for transition of care management  Diagnoses and all orders for this visit:    Hospital discharge follow-up  - continue follow up with hem/onc, rad/onc and palliative medicine     Hypotension, unspecified hypotension type  -     Comprehensive metabolic panel; Future  - hold all BP medications, increase fluids and PO intake, add salt to food  - may d/c potassium replacement and repeat labs in 1 week   - monitor for fevers, chills, dizziness, lightheadedness, syncope     Gastroesophageal reflux disease without esophagitis  -     omeprazole (PriLOSEC) 20 mg delayed release capsule;  Take 1 capsule (20 mg total) by mouth daily    HPI:  HPI   Patient is a 79 yo female with PMH below who presents for follow up after recent hospitalization 8/21-8/23 after presenting to ED with L flank pain with associated constipation and gas pain  She had CT imaging which shows worsening LAD with reccurent diffuse B Cell lymphoma  She was given bowel regimen and simethicone for constipaiton and gas with improvement  She has negative infectious work up  She is currently following with hem/onc, rad/onc and will establishing with palliative medicine tomorrow  Her BP has been low since D/C and she has been withholding her BP medications  She has been able to keep fluids down, weakeded appetite though  Her flank pain is resolved  She still has constipation and has been using daily miralax and colace  She continues to have regular BMs, but did have relief with prn mag citrate last week  ROS: Review of Systems   Constitutional: Positive for appetite change and fatigue  Negative for activity change, chills, diaphoresis, fever and unexpected weight change  HENT: Negative  Respiratory: Negative  Cardiovascular: Negative  Gastrointestinal: Positive for constipation  Negative for blood in stool, diarrhea, nausea, rectal pain and vomiting  Abdominal pain: flank pain resolved  Genitourinary: Negative for dysuria, frequency, hematuria and pelvic pain  Skin: Negative  Neurological: Negative for dizziness, tremors, syncope, weakness and headaches  Hematological: Negative for adenopathy          Recurrent DVT on NOAC       Past Medical History:   Diagnosis Date    Basal cell carcinoma (BCC)     Bruit of right carotid artery     resolved 5/14/15     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HCC)     radiation    Dyslipidemia     Esophageal stricture     last assessed 2/28/13       GERD (gastroesophageal reflux disease)     History of chemotherapy     History of radiation therapy     [place] (hard of hearing)     b/l ears    Hyperlipidemia     Hypertension     Osteoarthritis     Osteoarthrosis of ankle and foot     last assessed 5/20/13     Osteopenia     last assessed 2/28/13     Plantar fasciitis of left foot     Polyp of sigmoid colon     last assessed 2/28/13     Shortness of breath     when walking up stairs       Past Surgical History:   Procedure Laterality Date    ANKLE ARTHROPLASTY Left     BLEPHAROPLASTY      right eye     CATARACT EXTRACTION Right     COLONOSCOPY      ESOPHAGEAL DILATION      x2    ESOPHAGOGASTRODUODENOSCOPY      dilitation    FACIAL/NECK BIOPSY Right 9/8/2017    Procedure: NECK NODE BIOPSY WITH NEEDLE LOCALIZATION; LYMPHOMA PROTOCOL (NEEDLE LOC WITH I R  AT 1100); Surgeon: Arabella Taylor MD;  [facility];  Service: Surgical Oncology    HYSTERECTOMY      LYMPH NODE DISSECTION      right groin and neck    ORBITAL FLOOR EXPLORATION Right     for cancer     PORTACATH PLACEMENT      left chest     AL BX/REMV,LYMPH NODE,DEEP AXILL Left 8/14/2017    Procedure: Axillary lymph node excision with LYMPHOMA PROTOCOL;  Surgeon: Arabella Taylor MD;  [facility];  Service: Surgical Oncology    AL BX/REMV,LYMPH NODE,DEEP CERV Right 5/2/2016    Procedure: NECK NODE BIOPSY;  Surgeon: Arabella Taylor MD;  [facility];  Service: Surgical Oncology    AL BX/REMV,LYMPH NODE,DEEP CERV Left 7/25/2018    Procedure: NECK NODE BIOPYS WITH LYMPHOMA PROTOCOL; (ULTRASOUND GUIDED NEEDLE LOC IN IR AT 0800);   Surgeon: Arabella Taylor MD;  [facility];  Service: Surgical Oncology    AL INSJ TUNNELED CTR VAD W/SUBQ PORT AGE 5 YR/> N/A 10/3/2017    Procedure: PLACEMENT OF PORT-A-CATH DEVICE;  Surgeon: Arabella Taylor MD;  [facility];  Service: Surgical Oncology       Social History     Social History    Marital status: /Civil Union     Spouse name: N/A    Number of children: N/A    Years of education: N/A     Social History Main Topics    Smoking status: Former Smoker     Quit date: 1970    Smokeless tobacco: Never Used    Alcohol use No    Drug use: No    Sexual activity: Not on file     Other Topics Concern    Not on file     Social History Narrative    Uses safety equipment seatbelts        Family History   Problem Relation Age of Onset    Multiple myeloma Mother     Heart disease Father     Hypertension Father     Hypertension Sister     Heart disease Sister     Arthritis Family     Osteoporosis Family     Breast cancer Maternal Aunt        Allergies   Allergen Reactions    Alteplase Anaphylaxis    Aspirin Anaphylaxis    Celebrex [Celecoxib] Anaphylaxis    Nsaids Anaphylaxis    Other Anaphylaxis     Pt states when she received a certain type of chemotherapy it caused her throat to close    Rituxan [Rituximab] Anaphylaxis     Swelling of tongue and closing of throat    Sulfa Antibiotics Other (See Comments) and Anaphylaxis     VERY EMOTIONAL CRYING    Bactrim [Sulfamethoxazole-Trimethoprim] Other (See Comments)     VERY EMOTIONAL/CRYING         Current Outpatient Prescriptions:     allopurinol (ZYLOPRIM) 100 mg tablet, Take 2 tablets (200 mg total) by mouth daily, Disp: 60 tablet, Rfl: 1    apixaban (ELIQUIS) 5 mg, Take 1 tablet (5 mg total) by mouth 2 (two) times a day, Disp: 60 tablet, Rfl: 2    ascorbic acid (VITAMIN C) 500 mg tablet, Take 500 mg by mouth daily with dinner  , Disp: , Rfl:     Calcium Carb-Cholecalciferol (CALCIUM + D3) 600-200 MG-UNIT TABS, Take 1 tablet by mouth daily with dinner  , Disp: , Rfl:     Cyanocobalamin (VITAMIN B 12 PO), Take 1 tablet by mouth daily with dinner  , Disp: , Rfl:     HYDROcodone-acetaminophen (NORCO) 5-325 mg per tablet, Take 1 tablet by mouth every 6 (six) hours as needed for pain Max Daily Amount: 4 tablets, Disp: 20 tablet, Rfl: 0    Lenalidomide (REVLIMID) 20 MG CAPS, Take 1 capsule (20 mg total) by mouth daily Adult Female  Auth # [id number] Take 1 capsule daily days 1-21   Followed by 7 days off , Disp: 21 capsule, Rfl: 0   loratadine (CLARITIN) 10 mg tablet, Take 10 mg by mouth daily in the early morning  , Disp: , Rfl:     Multiple Vitamin (MULTIVITAMIN) capsule, Take 1 capsule by mouth daily with dinner  , Disp: , Rfl:     omeprazole (PriLOSEC) 20 mg delayed release capsule, Take 1 capsule (20 mg total) by mouth daily, Disp: 90 capsule, Rfl: 1    ondansetron (ZOFRAN) 4 mg tablet, Take 1 tablet (4 mg total) by mouth every 8 (eight) hours as needed for nausea or vomiting, Disp: 30 tablet, Rfl: 0    polyethylene glycol (MIRALAX) 17 g packet, Take 17 g by mouth daily, Disp: 14 each, Rfl: 0    predniSONE 20 mg tablet, Take 1 tablet (20 mg total) by mouth daily with food, Disp: 30 tablet, Rfl: 0    simethicone (MYLICON) 80 mg chewable tablet, Chew 1 tablet (80 mg total) every 6 (six) hours as needed for flatulence, Disp: 30 tablet, Rfl: 0    LORazepam (ATIVAN) 0 5 mg tablet, Take 1 tablet (0 5 mg total) by mouth daily at bedtime as needed (difficulty sleeping), Disp: 15 tablet, Rfl: 0    senna (SENOKOT) 8 6 mg, Take 1 tablet (8 6 mg total) by mouth 2 (two) times a day, Disp: , Rfl: 0      Physical Exam:  /76   Pulse (!) 109   Temp 98 1 °F (36 7 °C)   Resp 17   Wt 94 3 kg (208 lb)   SpO2 96%   BMI 35 93 kg/m²     Physical Exam   Constitutional: She is oriented to person, place, and time  She appears well-developed and well-nourished  No distress  HENT:   Head: Normocephalic and atraumatic  Eyes: Conjunctivae are normal  Pupils are equal, round, and reactive to light  Neck: Normal range of motion  Neck supple  Cardiovascular: Normal rate and regular rhythm  Murmur heard  Pulmonary/Chest: Effort normal and breath sounds normal  No respiratory distress  She has no wheezes  Abdominal: Soft  Bowel sounds are normal  She exhibits no distension  There is no tenderness (no CVA tenderness)  Lymphadenopathy:     She has cervical adenopathy  Neurological: She is alert and oriented to person, place, and time   No cranial nerve deficit  Skin: Skin is warm and dry  No rash noted  Psychiatric: She has a normal mood and affect   Her behavior is normal    In good spirits           Labs:  Lab Results   Component Value Date    WBC 10 01 08/31/2018    HGB 12 7 08/31/2018    HCT 41 3 08/31/2018    MCV 93 08/31/2018     08/31/2018     Lab Results   Component Value Date     08/31/2018    K 3 7 08/31/2018     08/31/2018    CO2 29 08/31/2018    ANIONGAP 6 12/15/2015    BUN 15 08/31/2018    CREATININE 0 79 08/31/2018    GLUCOSE 91 12/15/2015    GLUF 69 08/31/2018    CALCIUM 9 9 08/31/2018    AST 35 08/31/2018    ALT 93 (H) 08/31/2018    ALKPHOS 138 (H) 08/31/2018    PROT 6 2 04/19/2017    BILITOT 0 6 04/19/2017    EGFR 73 08/31/2018

## 2018-09-06 NOTE — TELEPHONE ENCOUNTER
DIO Morales Called to verify quantity of Revlimid    Script said 20  Informed 3 weeks on and 1 week off  Quantity 21

## 2018-09-06 NOTE — PATIENT INSTRUCTIONS
Please keep off all blood pressure medicines  Please drink plenty of fluids and you may add salt to diet  You may stop klor-con and we will check electrolytes next week  You may try enema or suppository for constipation  You may also try mag citrate as needed in addition to miralax and sennakot  You may use gas-x as needed for gas pains  Hypotension   WHAT YOU NEED TO KNOW:   Hypotension is a condition that causes your blood pressure (BP) to drop lower than it should be  Hypotension may be mild, serious, or life-threatening  DISCHARGE INSTRUCTIONS:   Medicines:   · Alpha-adrenoreceptor agonists: These medicines may increase your BP and decrease your symptoms  · Steroids: This medicine helps prevent salt loss from your body  Steroids may also help increase the amount of fluid in your body and raise your BP  · Vasopressors: These medicines help constrict (make smaller) your blood vessels and increase your BP  Vasopressor medicines may increase the blood flow to your brain and help decrease your symptoms  · Antidiuretic hormone: This medicine helps control your BP and helps decrease your need to urinate during the night  · Antiparkinson medicine: This medicine may help increase your standing BP and decrease your symptoms  · Take your medicine as directed  Contact your healthcare provider if you think your medicine is not helping or if you have side effects  Tell him or her if you are allergic to any medicine  Keep a list of the medicines, vitamins, and herbs you take  Include the amounts, and when and why you take them  Bring the list or the pill bottles to follow-up visits  Carry your medicine list with you in case of an emergency  Follow up with your healthcare provider or specialist as directed:  Write down your questions so you remember to ask them during your visits  Check your blood pressure: You may need to check your BP at home   Record the results and bring them with you to follow-up visits  Ask when and how often to check your BP  You may need to wear a BP monitor for up to 24 hours  This will record your blood pressure during your normal daily activities  The monitor will take your BP every 15 to 30 minutes  Try to keep still while your BP is taken  Avoid heavy activity, such as exercise, while you are wearing the monitor  Manage your symptoms:   · Change positions slowly:  When you get out of bed, sit up first, then slowly move your legs to the side of the bed  If you are not having any symptoms, slowly stand up  If you have symptoms, sit down right away  · Exercise and do physical counter maneuvers:  Ask your healthcare provider or specialist about the best exercise plan for you  Physical counter maneuvers may help to increase your BP and increase blood flow to your heart  They include crossing your legs, squatting, and bending at the waist  You can also rise up on your toes while you are standing, and tighten your thigh muscles  · Drink liquids as directed:  Ask your healthcare provider or specialist how much liquid to drink each day and which liquids are best for you  Drink 500 milliliters (½ liter) of liquid quickly in the morning or before meals to help increase your BP  Your healthcare provider may tell you to drink 2 cups of coffee with, or after, breakfast and lunch  The caffeine in the coffee can help prevent a drop in your BP  Your healthcare provider may also give you caffeine pills  · Change how you eat meals: If your BP drops after eating large meals, try to eat smaller meals more often  Eat foods low in carbohydrates and cholesterol to help prevent BP drops after you eat  Ask if you need to increase the amount of sodium (salt) you eat each day  · Raise the head of your bed:  Raise the head of your bed 4 to 8 inches  This may help prevent morning BP drops and decrease the need to urinate during the night    Avoid things that make your hypotension worse:   · Do not drink alcohol:  Alcohol can make your symptoms worse  Ask for information if you need help quitting  · Avoid straining:  Activities and movements that cause you to strain can cause a drop in your BP  Activities to avoid include lifting, coughing, and other movements that increase the feeling of pressure in your chest     · Avoid the heat: This can cause a decrease in your BP  Stay inside during very hot days, or limit the amount of time you are outside  Do not take hot baths  Contact your healthcare provider or specialist if:   · You vomit several times or have diarrhea, and you cannot drink liquid  · You have a fever  · You have new or increased symptoms, such as dizziness, weakness, or fainting  · Your legs, ankles, and feet are swollen, or you gain weight for no known reason  · You have questions or concerns about your condition or care  Seek care immediately or call 911 if:   · You become confused or cannot speak  · You urinate very little or not at all  · You have a seizure  · You have chest pain or trouble breathing  · You have changes in vision or cannot see  © 2017 [facility] Information is for End User's use only and may not be sold, redistributed or otherwise used for commercial purposes  All illustrations and images included in CareNotes® are the copyrighted property of [employer]  or [employer]  The above information is an  only  It is not intended as medical advice for individual conditions or treatments  Talk to your doctor, nurse or pharmacist before following any medical regimen to see if it is safe and effective for you  Constipation   AMBULATORY CARE:   Constipation  is when you have hard, dry bowel movements, or you go longer than usual between bowel movements  Constipation may be caused by a lack of water or high-fiber foods   Medicines used to treat pain or depression, or a lack of physical activity may also cause constipation  Common symptoms include the following:   · Trouble pushing out your bowel movement    · Pain or bleeding during your bowel movement    · A feeling that you did not finish having your bowel movement    · Nausea    · Bloating    · Headache  Seek immediate care for the following symptoms:   · Blood in your bowel movement    · A fever and abdominal pain with the constipation  Contact your healthcare provider if:   · Your constipation gets worse  · You start to vomit  · You have questions or concerns about your condition or care  Medicines:   · Medicine or a fiber supplement  may help make your bowel movement softer  A laxative may help relax and loosen your intestines to help you have a bowel movement  You may also be given medicine to increase fluid in your intestines  The fluid may help move bowel movements through your intestines  · Take your medicine as directed  Contact your healthcare provider if you think your medicine is not helping or if you have side effects  Tell him of her if you are allergic to any medicine  Keep a list of the medicines, vitamins, and herbs you take  Include the amounts, and when and why you take them  Bring the list or the pill bottles to follow-up visits  Carry your medicine list with you in case of an emergency  Manage or prevent constipation:   · Drink liquids as directed  You may need to drink extra liquids to help soften and move your bowels  Ask how much liquid to drink each day and which liquids are best for you  · Eat high-fiber foods  This may help decrease constipation by adding bulk to your bowel movements  High-fiber foods include fruit, vegetables, whole-grain breads and cereals, and beans  Your healthcare provider or dietitian can help you create a high-fiber meal plan  · Exercise regularly  Regular physical activity can help stimulate your intestines  Ask which exercises are best for you      · Schedule a time each day to have a bowel movement  This may help train your body to have regular bowel movements  Bend forward while you are on the toilet to help move the bowel movement out  Sit on the toilet for at least 10 minutes, even if you do not have a bowel movement  Follow up with your healthcare provider as directed:  Write down your questions so you remember to ask them during your visits  © 2017 [employer] [employer] Information is for End User's use only and may not be sold, redistributed or otherwise used for commercial purposes  All illustrations and images included in CareNotes® are the copyrighted property of [employer]  or [employer]  The above information is an  only  It is not intended as medical advice for individual conditions or treatments  Talk to your doctor, nurse or pharmacist before following any medical regimen to see if it is safe and effective for you  High Fiber Diet   AMBULATORY CARE:   A high-fiber diet  includes foods that have a high amount of fiber  Fiber is the part of fruits, vegetables, and grains that is not broken down by your body  Fiber keeps your bowel movements regular  Fiber can also help lower your cholesterol level, control blood sugar in people with diabetes, and relieve constipation  Fiber can also help you control your weight because it helps you feel full faster  Most adults should eat 25 to 35 grams of fiber each day  Talk to your dietitian or healthcare provider about the amount of fiber you need    Good sources of fiber:   · Foods with at least 4 grams of fiber per serving:      ¨ ? to ½ cup of high-fiber cereal (check the nutrition label on the box)    ¨ ½ cup of blackberries or raspberries    ¨ 4 dried prunes    ¨ 1 cooked artichoke    ¨ ½ cup of cooked legumes, such as lentils, or red, kidney, and oconnor beans    · Foods with 1 to 3 grams of fiber per serving:      ¨ 1 slice of whole-wheat, pumpernickel, or rye bread    ¨ ½ cup of cooked brown rice    ¨ 4 whole-wheat crackers    ¨ 1 cup of oatmeal    ¨ ½ cup of cereal with 1 to 3 grams of fiber per serving (check the nutrition label on the box)    ¨ 1 small piece of fruit, such as an apple, banana, pear, kiwi, or orange    ¨ 3 dates    ¨ ½ cup of canned apricots, fruit cocktail, peaches, or pears    ¨ ½ cup of raw or cooked vegetables, such as carrots, cauliflower, cabbage, spinach, squash, or corn  Ways that you can increase fiber in your diet:   · Choose brown or wild rice instead of white rice  · Use whole wheat flour in recipes instead of white or all-purpose flour  · Add beans and peas to casseroles or soups  · Choose fresh fruit and vegetables with peels or skins on instead of juices  Other diet guidelines to follow:   · Add fiber to your diet slowly  You may have abdominal discomfort, bloating, and gas if you add fiber to your diet too quickly  · Drink plenty of liquids as you add fiber to your diet  You may have nausea or develop constipation if you do not drink enough water  Ask how much liquid to drink each day and which liquids are best for you  © 2017 [employer] [employer]  Information is for End User's use only and may not be sold, redistributed or otherwise used for commercial purposes  All illustrations and images included in CareNotes® are the copyrighted property of [employer]  or [employer]  The above information is an  only  It is not intended as medical advice for individual conditions or treatments  Talk to your doctor, nurse or pharmacist before following any medical regimen to see if it is safe and effective for you

## 2018-09-07 ENCOUNTER — OFFICE VISIT (OUTPATIENT)
Dept: PALLIATIVE MEDICINE | Facility: CLINIC | Age: 78
End: 2018-09-07
Payer: COMMERCIAL

## 2018-09-07 VITALS
WEIGHT: 207.8 LBS | HEART RATE: 105 BPM | TEMPERATURE: 98.3 F | DIASTOLIC BLOOD PRESSURE: 58 MMHG | RESPIRATION RATE: 18 BRPM | SYSTOLIC BLOOD PRESSURE: 99 MMHG | BODY MASS INDEX: 35.89 KG/M2

## 2018-09-07 DIAGNOSIS — G47.9 DIFFICULTY SLEEPING: Primary | ICD-10-CM

## 2018-09-07 DIAGNOSIS — C83.30 DIFFUSE LARGE B-CELL LYMPHOMA, UNSPECIFIED BODY REGION (HCC): Primary | ICD-10-CM

## 2018-09-07 DIAGNOSIS — K59.00 CONSTIPATION, UNSPECIFIED CONSTIPATION TYPE: ICD-10-CM

## 2018-09-07 DIAGNOSIS — C83.38 DIFFUSE LARGE B-CELL LYMPHOMA OF LYMPH NODES OF MULTIPLE SITES (HCC): ICD-10-CM

## 2018-09-07 PROCEDURE — 77334 RADIATION TREATMENT AID(S): CPT | Performed by: RADIOLOGY

## 2018-09-07 PROCEDURE — 77300 RADIATION THERAPY DOSE PLAN: CPT | Performed by: RADIOLOGY

## 2018-09-07 PROCEDURE — 77295 3-D RADIOTHERAPY PLAN: CPT | Performed by: RADIOLOGY

## 2018-09-07 PROCEDURE — 99204 OFFICE O/P NEW MOD 45 MIN: CPT | Performed by: NURSE PRACTITIONER

## 2018-09-07 RX ORDER — SENNOSIDES 8.6 MG
1 TABLET ORAL 2 TIMES DAILY
Refills: 0
Start: 2018-09-07 | End: 2018-10-10

## 2018-09-07 RX ORDER — LORAZEPAM 0.5 MG/1
0.5 TABLET ORAL
Qty: 15 TABLET | Refills: 0 | Status: SHIPPED | OUTPATIENT
Start: 2018-09-07 | End: 2018-09-24

## 2018-09-07 NOTE — PROGRESS NOTES
Palliative and Supportive Care   Cecilia Recio 78 y o  female [id number]    Assessment/Plan:  1  Difficulty sleeping    2  Constipation, unspecified constipation type    3  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 [facility])        Requested Prescriptions     Signed Prescriptions Disp Refills    senna (SENOKOT) 8 6 mg  0     Sig: Take 1 tablet (8 6 mg total) by mouth 2 (two) times a day    LORazepam (ATIVAN) 0 5 mg tablet 15 tablet 0     Sig: Take 1 tablet (0 5 mg total) by mouth daily at bedtime as needed (difficulty sleeping)     Difficulty sleeping: component of anxiety  - start lorazepam 0 5mg PO q HS  She has tolerated benzodiazepines in the past  Will prescribe for 2 weeks and discuss effectiveness over phone    - ok to increase melatonin to 6mg nightly    Pain - improved  No longer needing pain meds  If this changes, advised not to take tramadol and hydrocodone together    - ok to stop gabapentin as pain is improved and she believes this was related to gas pains and constipation    Constipation:   - continue senna BID  - add Miralax daily  - call if constipation worsens      Follow up as needed  Advised to call in one week to discuss lorazepam and sleeping  Subjective    Chief Concern  New patient visit for:  Symptom management; recurrent diffuse large B-cell lymphoma         History of Present Illness  Patient ID: Cecilia Recio is a 78 y o  female With recurrent diffuse large B-cell lymphoma  Will be starting palliative radiation next week to sites with bulky disease including lower neck/ cervical region, supraclavicular area, upper mediastinum in 10 fractions  After completion, will start Revlimid  She is on daily prednisone  She presents today with  Jana Garber and son Bean Taylor  Patient had extranodal marginal zone lymphoma that was localized in [id number]  Had radiation; had anaphylactic reaction to rituximab   In 2015 found to have hypermetabolic lymphadenopathy but biopsies were negative for malignancy  In 2017, biopsy of left axillary lymph node demonstrated high-grade B-cell lymphoma, s/p 3 cycles of CHOP and 3 cycles of EPOCH; PET in 02/2018 demonstrated response to treatment however 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%  In 06/2018, found to have recurrent hypermetabolic generalized lymphadenopathy; biopsy revealed high-grade B-cell lymphoma  Current plan for Revlimid alone (d/t previous anaphylactic reaction to rituximab)  Goal of therapy is preventing progression and improving overall survival; remission is not expected  Patient was recently hospitalized 8//21 - 8/23 with acute left flank pain  Etiology was unclear and thought possibly due to worsening lymphadenopathy  However patient noted that seemed to be related to diet and gas pains  She is now feeling much better and is not having pain  She was discharged with gabapentin, tramadol, simethicone  Also has hydrocodone-acetaminophen  Has not been needing to take pain meds  Discharged to home  She has been having difficulty sleeping- no improvement with melatonin  Notes that she sleeps for a couple hours then wakes to use bathroom and can't fall asleep  Mind races at night  She is fatigued  She does not nap during day  She is not having pain  She is not experiencing nausea or vomiting  She has been constipated  She is otherwise feeling well  The following portions of the patient's history were reviewed and updated as appropriate: allergies, current medications, past family history, past medical history, past social history, past surgical history and problem list       Visit Information    Accompanied By: Spouse, son  Source of History: Self, Family member  History Limitations: None    ROS  Review of Systems   Constitutional: Positive for fatigue  Gastrointestinal: Positive for constipation  Hematological: Positive for adenopathy  Psychiatric/Behavioral: Positive for sleep disturbance     All other systems reviewed and are negative  Objective     Physical Exam   Constitutional: She is oriented to person, place, and time  She appears well-developed and well-nourished  She is cooperative  No distress  HENT:   Head: Normocephalic and atraumatic  Right Ear: Hearing and external ear normal    Left Ear: Hearing and external ear normal    Mouth/Throat: Mucous membranes are normal    Eyes: Conjunctivae, EOM and lids are normal    Neck: Trachea normal  Neck supple  Cardiovascular: Tachycardia present  Pulmonary/Chest: Effort normal    Neurological: She is alert and oriented to person, place, and time  Skin: Skin is warm and dry  Psychiatric: She has a normal mood and affect  Her behavior is normal  Cognition and memory are normal          BP 99/58 (BP Location: Right arm, Patient Position: Sitting, Cuff Size: Standard)   Pulse 105   Temp 98 3 °F (36 8 °C) (Oral)   Resp 18   Wt 94 3 kg (207 lb 12 8 oz)   BMI 35 89 kg/m²    Recheck of pulsox 94% on room air        Current Outpatient Prescriptions:     allopurinol (ZYLOPRIM) 100 mg tablet, Take 2 tablets (200 mg total) by mouth daily, Disp: 60 tablet, Rfl: 1    apixaban (ELIQUIS) 5 mg, Take 1 tablet (5 mg total) by mouth 2 (two) times a day, Disp: 60 tablet, Rfl: 2    ascorbic acid (VITAMIN C) 500 mg tablet, Take 500 mg by mouth daily with dinner  , Disp: , Rfl:     Calcium Carb-Cholecalciferol (CALCIUM + D3) 600-200 MG-UNIT TABS, Take 1 tablet by mouth daily with dinner  , Disp: , Rfl:     Cyanocobalamin (VITAMIN B 12 PO), Take 1 tablet by mouth daily with dinner  , Disp: , Rfl:     HYDROcodone-acetaminophen (NORCO) 5-325 mg per tablet, Take 1 tablet by mouth every 6 (six) hours as needed for pain Max Daily Amount: 4 tablets, Disp: 20 tablet, Rfl: 0    Lenalidomide (REVLIMID) 20 MG CAPS, Take 1 capsule (20 mg total) by mouth daily Adult Female  Auth # [id number] Take 1 capsule daily days 1-21   Followed by 7 days off , Disp: 21 capsule, Rfl: 0    loratadine (CLARITIN) 10 mg tablet, Take 10 mg by mouth daily in the early morning  , Disp: , Rfl:     LORazepam (ATIVAN) 0 5 mg tablet, Take 1 tablet (0 5 mg total) by mouth daily at bedtime as needed (difficulty sleeping), Disp: 15 tablet, Rfl: 0    Multiple Vitamin (MULTIVITAMIN) capsule, Take 1 capsule by mouth daily with dinner  , Disp: , Rfl:     omeprazole (PriLOSEC) 20 mg delayed release capsule, Take 1 capsule (20 mg total) by mouth daily, Disp: 90 capsule, Rfl: 1    ondansetron (ZOFRAN) 4 mg tablet, Take 1 tablet (4 mg total) by mouth every 8 (eight) hours as needed for nausea or vomiting, Disp: 30 tablet, Rfl: 0    polyethylene glycol (MIRALAX) 17 g packet, Take 17 g by mouth daily, Disp: 14 each, Rfl: 0    predniSONE 20 mg tablet, Take 1 tablet (20 mg total) by mouth daily with food, Disp: 30 tablet, Rfl: 0    senna (SENOKOT) 8 6 mg, Take 1 tablet (8 6 mg total) by mouth 2 (two) times a day, Disp: , Rfl: 0    simethicone (MYLICON) 80 mg chewable tablet, Chew 1 tablet (80 mg total) every 6 (six) hours as needed for flatulence, Disp: 30 tablet, Rfl: 0      Bj Leon [address] [place] [address] S and Supportive Care  [phone]        Representatives have queried the patient's controlled substance dispensing history in the Prescription Drug Monitoring Program in compliance with the [facility] regulations before I have prescribed any controlled substances  The prescription history is consistent with prescribed therapy and our practice policies

## 2018-09-07 NOTE — PATIENT INSTRUCTIONS
Stop gabapentin  Start lorazepam (Ativan) - 1 tab at bedtime  Ok to try 1/2 tab first night   - don't take with pain medicines    Ok to try melatonin 6mg at bedtime    Don't take Norco and tramadol together    Continue senna twice daily  Add Miralax daily     Call if constipation worsens or other symptoms arise    Call in one week to let me know how you are sleeping

## 2018-09-10 PROCEDURE — 77280 THER RAD SIMULAJ FIELD SMPL: CPT | Performed by: RADIOLOGY

## 2018-09-11 ENCOUNTER — TELEPHONE (OUTPATIENT)
Dept: NUTRITION | Facility: CLINIC | Age: 78
End: 2018-09-11

## 2018-09-11 PROCEDURE — 77387 GUIDANCE FOR RADJ TX DLVR: CPT | Performed by: RADIOLOGY

## 2018-09-11 PROCEDURE — 77331 SPECIAL RADIATION DOSIMETRY: CPT | Performed by: RADIOLOGY

## 2018-09-11 PROCEDURE — 77412 RADIATION TX DELIVERY LVL 3: CPT | Performed by: RADIOLOGY

## 2018-09-11 NOTE — TELEPHONE ENCOUNTER
Called pt today to offer oncology nutrition counseling given patient's head and neck cancer diagnosis and pending radiation treatment  Pt declined offer at this time, stated she received nutrition information from palliative care and has a son who works in health care who is helping her with her nutrition  She reports she is drinking Boost regularly and eating a soft diet and doing well with these interventions  She is scheduled to undergo 10 fractions of palliative radiation  I did give her my contact information and let her know I was available to discuss nutrition care at any time should she change her mind or should nutrition-related questions arise during or after treatment

## 2018-09-12 ENCOUNTER — TELEPHONE (OUTPATIENT)
Dept: HEMATOLOGY ONCOLOGY | Facility: CLINIC | Age: 78
End: 2018-09-12

## 2018-09-12 PROCEDURE — 77387 GUIDANCE FOR RADJ TX DLVR: CPT | Performed by: RADIOLOGY

## 2018-09-12 PROCEDURE — 77412 RADIATION TX DELIVERY LVL 3: CPT | Performed by: RADIOLOGY

## 2018-09-12 NOTE — TELEPHONE ENCOUNTER
Called and LVM informing Laverne Aguilera, Kylee's son, to call our office to get additional information about his work schedule and how many days a week he would need to be off due to assisting his mother

## 2018-09-13 PROCEDURE — 77387 GUIDANCE FOR RADJ TX DLVR: CPT | Performed by: RADIOLOGY

## 2018-09-13 PROCEDURE — 77412 RADIATION TX DELIVERY LVL 3: CPT | Performed by: RADIOLOGY

## 2018-09-14 PROCEDURE — 77412 RADIATION TX DELIVERY LVL 3: CPT | Performed by: RADIOLOGY

## 2018-09-14 PROCEDURE — 77387 GUIDANCE FOR RADJ TX DLVR: CPT | Performed by: RADIOLOGY

## 2018-09-17 ENCOUNTER — APPOINTMENT (OUTPATIENT)
Dept: LAB | Facility: CLINIC | Age: 78
End: 2018-09-17
Payer: COMMERCIAL

## 2018-09-17 ENCOUNTER — TRANSCRIBE ORDERS (OUTPATIENT)
Dept: LAB | Facility: CLINIC | Age: 78
End: 2018-09-17

## 2018-09-17 PROCEDURE — 77417 THER RADIOLOGY PORT IMAGE(S): CPT | Performed by: RADIOLOGY

## 2018-09-17 PROCEDURE — 77336 RADIATION PHYSICS CONSULT: CPT | Performed by: RADIOLOGY

## 2018-09-17 PROCEDURE — 77412 RADIATION TX DELIVERY LVL 3: CPT | Performed by: RADIOLOGY

## 2018-09-17 PROCEDURE — 77387 GUIDANCE FOR RADJ TX DLVR: CPT | Performed by: RADIOLOGY

## 2018-09-18 PROCEDURE — 77412 RADIATION TX DELIVERY LVL 3: CPT | Performed by: RADIOLOGY

## 2018-09-18 PROCEDURE — 77387 GUIDANCE FOR RADJ TX DLVR: CPT | Performed by: RADIOLOGY

## 2018-09-19 ENCOUNTER — OFFICE VISIT (OUTPATIENT)
Dept: HEMATOLOGY ONCOLOGY | Facility: CLINIC | Age: 78
End: 2018-09-19
Payer: COMMERCIAL

## 2018-09-19 VITALS
HEART RATE: 131 BPM | DIASTOLIC BLOOD PRESSURE: 72 MMHG | SYSTOLIC BLOOD PRESSURE: 118 MMHG | WEIGHT: 204.8 LBS | TEMPERATURE: 98.8 F | BODY MASS INDEX: 34.97 KG/M2 | OXYGEN SATURATION: 94 % | HEIGHT: 64 IN | RESPIRATION RATE: 18 BRPM

## 2018-09-19 DIAGNOSIS — B37.0 THRUSH: Primary | ICD-10-CM

## 2018-09-19 DIAGNOSIS — R53.0 NEOPLASTIC MALIGNANT RELATED FATIGUE: ICD-10-CM

## 2018-09-19 DIAGNOSIS — C83.38 DIFFUSE LARGE B-CELL LYMPHOMA OF LYMPH NODES OF MULTIPLE SITES (HCC): ICD-10-CM

## 2018-09-19 DIAGNOSIS — R00.0 TACHYCARDIA: ICD-10-CM

## 2018-09-19 PROCEDURE — 77412 RADIATION TX DELIVERY LVL 3: CPT | Performed by: RADIOLOGY

## 2018-09-19 PROCEDURE — 77387 GUIDANCE FOR RADJ TX DLVR: CPT | Performed by: RADIOLOGY

## 2018-09-19 PROCEDURE — 99214 OFFICE O/P EST MOD 30 MIN: CPT | Performed by: PHYSICIAN ASSISTANT

## 2018-09-19 RX ORDER — PREDNISONE 20 MG/1
20 TABLET ORAL DAILY
Qty: 30 TABLET | Refills: 3 | Status: ON HOLD | OUTPATIENT
Start: 2018-09-19 | End: 2018-10-03

## 2018-09-19 NOTE — PATIENT INSTRUCTIONS
Discontinue calcium  Start Revlimid (chemo pill) on Monday  Any time of day is fine  Continue prednisone 20 mg daily  Refill called into the pharmacy  Continue allopurinol 100 mg daily  Start mouth rinse that is called into pharmacy for thrush in the mouth  Continue blood work weekly  Call if pain is worsening  Call add another medicine  Use nausea medication as needed  If taking a lot of nausea medication or pain medication, remember to remain regular with bowel movements as this can cause constipation  Increase melatonin to 6 mg at bedtime

## 2018-09-19 NOTE — LETTER
September 19, 2018     Sissy Rosas PA-C  [address]  [place] [facility]  [phone]    Patient: Phuc Ruvalcaba   YOB: 1940   Date of Visit: 9/19/2018       Dear Dr Tonny Hewitt: Thank you for referring Kayleigh Bah to me for evaluation  Below are my notes for this consultation  If you have questions, please do not hesitate to call me  I look forward to following your patient along with you  Sincerely,        Naty Porter PA-C        CC: No Recipients  Naty Porter PA-C  9/19/2018 11:30 AM  Sign at close encounter  Hematology/Oncology Outpatient Follow-up  Phuc Ruvalcaba 78 y o  female 1940 [id number]    Date:  9/19/2018      Assessment and Plan:  1  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 [facility])   56-year-old female with history of recurrent diffuse large B-cell lymphoma  At this time, plan is for Revlimid weekly x3 with 1 week off  Patient will be completing radiation therapy on 9/24/18  She will begin Revlimid that day as well  She has had improvement of symptoms with radiation  Neck swelling is almost resolved  Posterior cervical chain lymph nodes are no longer palpable  She continues to have some difficulty with swallowing  Discussed that this likely is due to mass pressing on her esophagus  Hopefully this diminishes in size with radiation and Revlimid therapy  Continue allopurinol 100 mg PO daily  Continue prednisone 20 mg PO daily  Calcium was elevated  Discontinue oral calcium  Continue CBC and CMP weekly  - predniSONE 20 mg tablet; Take 1 tablet (20 mg total) by mouth daily with food  Dispense: 30 tablet; Refill: 3    2  Gustavo Riedel due to immunocompromised state and steroid therapy  She is advised to take Nystatin swish and swallow  - nystatin (MYCOSTATIN) 100,000 units/mL suspension;  Apply 5 mL (500,000 Units total) to the mouth or throat 4 (four) times a day Swish and swallow 4 times daily for 1 week  Dispense: 473 mL; Refill: 0    3  Tachycardia  Patient is not hydrating enough  Encourage to increase fluid intake  4  Neoplastic malignant related fatigue  Fatigue is worse at this time secondary to radiation therapy  Encouraged resting  Do not overdue it with activities  Detailed written information was given to patient regarding her medications  HPI:       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 [facility])    10/2017 - 1/2018 Chemotherapy     3 cycles of CHOP and 3 cycles of EPOCH (last 2 cycles dropped adriamycin due to decreased EF)  12/6/2017 Initial Diagnosis     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 [facility])       2017 Biopsy     Left axillary lymph node biopsy showed marginal zone lymphoma with features suggestive of aggressive behavior  She then had additional biopsy which showed 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  Bone marrow biopsy was negative  6/18/2018 Progression     PET CT: Findings compatible with disease recurrence  Multiple FDG avid lymph nodes in the bilateral neck, axilla, subpectoral regions, mediastinum, portacaval region and retroperitoneum compatible with hypermetabolic malignancy  Deauville score of 5            7/25/2018 Biopsy     Left neck lymph node: High grade B-cell lymphoma, MYC-rearrangement positive, 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"), bcl-2 expressing (~80%), 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 - see Note  2018 -  Chemotherapy     Plan to start lenalidomide (Revlimid) 20 mg daily weekly x3, 1 week off                Marginal zone lymphoma ([address] )    2008 Initial Diagnosis     Marginal zone lymphoma ([address] )         2008 -  Chemotherapy     2008/2009 Rituxan x1 dose - anaphylactic reaction with swelling of the tongue and closing of throat  Rituxan was discontinued  5/12/2009 - 6/16/2009 Radiation     Right orbit IMRT;  Total dose: 3600 cGy   - Dr Frank Jimenez at [facility]            Interval history:    ROS: Review of Systems   Constitutional: Positive for appetite change (on and off ) and fatigue  Negative for chills, fever and unexpected weight change  HENT: Negative for mouth sores and nosebleeds  Respiratory: Negative for cough and shortness of breath  Cardiovascular: Negative for chest pain, palpitations and leg swelling  Gastrointestinal: Negative for abdominal pain, blood in stool, constipation (managed with Miralax at present), diarrhea, nausea and vomiting  Genitourinary: Negative for difficulty urinating, dysuria and hematuria  Musculoskeletal: Negative for arthralgias, joint swelling and myalgias  Skin: Negative  Neurological: Negative for dizziness, weakness, light-headedness, numbness and headaches  Hematological: Negative  Psychiatric/Behavioral: Negative          Past Medical History:   Diagnosis Date    Basal cell carcinoma (BCC)     Bruit of right carotid artery     resolved 5/14/15     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HCC)     radiation    Dyslipidemia     Esophageal stricture     last assessed 2/28/13       GERD (gastroesophageal reflux disease)     History of chemotherapy     History of radiation therapy     [place] (hard of hearing)     b/l ears    Hyperlipidemia     Hypertension     Osteoarthritis     Osteoarthrosis of ankle and foot     last assessed 5/20/13     Osteopenia     last assessed 2/28/13     Plantar fasciitis of left foot     Polyp of sigmoid colon     last assessed 2/28/13     Shortness of breath     when walking up stairs       Past Surgical History:   Procedure Laterality Date    ANKLE ARTHROPLASTY Left     BLEPHAROPLASTY      right eye     CATARACT EXTRACTION Right     COLONOSCOPY      ESOPHAGEAL DILATION      x2    ESOPHAGOGASTRODUODENOSCOPY      dilitation    FACIAL/NECK BIOPSY Right 9/8/2017    Procedure: NECK NODE BIOPSY WITH NEEDLE LOCALIZATION; LYMPHOMA PROTOCOL (NEEDLE LOC WITH I R  AT 1100); Surgeon: Jana Mott MD;  [facility];  Service: Surgical Oncology    HYSTERECTOMY      LYMPH NODE DISSECTION      right groin and neck    ORBITAL FLOOR EXPLORATION Right     for cancer     PORTACATH PLACEMENT      left chest     WY BX/REMV,LYMPH NODE,DEEP AXILL Left 8/14/2017    Procedure: Axillary lymph node excision with LYMPHOMA PROTOCOL;  Surgeon: Jana Mott MD;  [facility];  Service: Surgical Oncology    W[place] BX/REMV,LYMPH NODE,DEEP CERV Right 5/2/2016    Procedure: NECK NODE BIOPSY;  Surgeon: Jana Mott MD;  [facility];  Service: Surgical Oncology    W[place] BX/REMV,LYMPH NODE,DEEP CERV Left 7/25/2018    Procedure: NECK NODE BIOPYS WITH LYMPHOMA PROTOCOL; (ULTRASOUND GUIDED NEEDLE LOC IN IR AT 0800);   Surgeon: Jana Mott MD;  [facility];  Service: Surgical Oncology    WY INSJ TUNNELED CTR VAD W/SUBQ PORT AGE 5 YR/> N/A 10/3/2017    Procedure: PLACEMENT OF PORT-A-CATH DEVICE;  Surgeon: Jana Mott MD;  [facility];  Service: Surgical Oncology       Social History     Social History    Marital status: /Civil Union     Spouse name: N/A    Number of children: N/A    Years of education: N/A     Social History Main Topics    Smoking status: Former Smoker     Quit date: 1970    Smokeless tobacco: Never Used    Alcohol use No    Drug use: No    Sexual activity: Not on file     Other Topics Concern    Not on file     Social History Narrative    Uses safety equipment seatbelts        Family History   Problem Relation Age of Onset    Multiple myeloma Mother     Heart disease Father     Hypertension Father     Hypertension Sister     Heart disease Sister     Arthritis Family     Osteoporosis Family     Breast cancer Maternal Aunt        Allergies   Allergen Reactions    Alteplase Anaphylaxis    Aspirin Anaphylaxis    Celebrex [Celecoxib] Anaphylaxis    Nsaids Anaphylaxis    Other Anaphylaxis     Pt states when she received a certain type of chemotherapy it caused her throat to close    Rituxan [Rituximab] Anaphylaxis     Swelling of tongue and closing of throat    Sulfa Antibiotics Other (See Comments) and Anaphylaxis     VERY EMOTIONAL CRYING    Bactrim [Sulfamethoxazole-Trimethoprim] Other (See Comments)     VERY EMOTIONAL/CRYING         Current Outpatient Prescriptions:     allopurinol (ZYLOPRIM) 100 mg tablet, Take 2 tablets (200 mg total) by mouth daily, Disp: 60 tablet, Rfl: 1    apixaban (ELIQUIS) 5 mg, Take 1 tablet (5 mg total) by mouth 2 (two) times a day, Disp: 60 tablet, Rfl: 2    ascorbic acid (VITAMIN C) 500 mg tablet, Take 500 mg by mouth daily with dinner  , Disp: , Rfl:     Calcium Carb-Cholecalciferol (CALCIUM + D3) 600-200 MG-UNIT TABS, Take 1 tablet by mouth daily with dinner  , Disp: , Rfl:     Cyanocobalamin (VITAMIN B 12 PO), Take 1 tablet by mouth daily with dinner  , Disp: , Rfl:     HYDROcodone-acetaminophen (NORCO) 5-325 mg per tablet, Take 1 tablet by mouth every 6 (six) hours as needed for pain Max Daily Amount: 4 tablets, Disp: 20 tablet, Rfl: 0    Lenalidomide (REVLIMID) 20 MG CAPS, Take 1 capsule (20 mg total) by mouth daily Adult Female  Auth # [id number] Take 1 capsule daily days 1-21   Followed by 7 days off , Disp: 21 capsule, Rfl: 0    loratadine (CLARITIN) 10 mg tablet, Take 10 mg by mouth daily in the early morning  , Disp: , Rfl:     LORazepam (ATIVAN) 0 5 mg tablet, Take 1 tablet (0 5 mg total) by mouth daily at bedtime as needed (difficulty sleeping), Disp: 15 tablet, Rfl: 0    Multiple Vitamin (MULTIVITAMIN) capsule, Take 1 capsule by mouth daily with dinner  , Disp: , Rfl:     omeprazole (PriLOSEC) 20 mg delayed release capsule, Take 1 capsule (20 mg total) by mouth daily, Disp: 90 capsule, Rfl: 1    ondansetron (ZOFRAN) 4 mg tablet, Take 1 tablet (4 mg total) by mouth every 8 (eight) hours as needed for nausea or vomiting, Disp: 30 tablet, Rfl: 0   polyethylene glycol (MIRALAX) 17 g packet, Take 17 g by mouth daily, Disp: 14 each, Rfl: 0    predniSONE 20 mg tablet, Take 1 tablet (20 mg total) by mouth daily with food, Disp: 30 tablet, Rfl: 3    senna (SENOKOT) 8 6 mg, Take 1 tablet (8 6 mg total) by mouth 2 (two) times a day, Disp: , Rfl: 0    simethicone (MYLICON) 80 mg chewable tablet, Chew 1 tablet (80 mg total) every 6 (six) hours as needed for flatulence, Disp: 30 tablet, Rfl: 0    nystatin (MYCOSTATIN) 100,000 units/mL suspension, Apply 5 mL (500,000 Units total) to the mouth or throat 4 (four) times a day Swish and swallow 4 times daily for 1 week , Disp: 473 mL, Rfl: 0      Physical Exam:  /72 (BP Location: Left arm, Patient Position: Sitting, Cuff Size: Standard)   Pulse (!) 131   Temp 98 8 °F (37 1 °C) (Tympanic)   Resp 18   Ht 5' 3 6" (1 615 m)   Wt 92 9 kg (204 lb 12 8 oz)   SpO2 94%   BMI 35 60 kg/m²      Physical Exam   Constitutional: She is oriented to person, place, and time  She appears well-developed and well-nourished  No distress  HENT:   Head: Normocephalic and atraumatic  Eyes: Conjunctivae are normal    Neck: Normal range of motion  Neck supple  Cardiovascular: Normal rate, regular rhythm and normal heart sounds  No murmur heard  Pulmonary/Chest: Effort normal and breath sounds normal  No respiratory distress  Port a cath present    Abdominal: Soft  There is no tenderness  Musculoskeletal: Normal range of motion  She exhibits no edema or tenderness  Lymphadenopathy:     She has no cervical adenopathy  Neurological: She is alert and oriented to person, place, and time  No cranial nerve deficit  Skin: Skin is warm and dry  Psychiatric: She has a normal mood and affect  Vitals reviewed          Labs:  Lab Results   Component Value Date    WBC 8 22 09/17/2018    HGB 13 2 09/17/2018    HCT 41 8 09/17/2018    MCV 91 09/17/2018     09/17/2018     Lab Results   Component Value Date     09/17/2018    K 3 9 09/17/2018    CL 99 (L) 09/17/2018    CO2 30 09/17/2018    ANIONGAP 6 12/15/2015    BUN 16 09/17/2018    CREATININE 0 95 09/17/2018    GLUCOSE 91 12/15/2015    GLUF 69 08/31/2018    CALCIUM 11 0 (H) 09/17/2018    CORRECTEDCA 11 8 (H) 09/17/2018    AST 33 09/17/2018    ALT 33 09/17/2018    ALKPHOS 117 (H) 09/17/2018    PROT 6 2 04/19/2017    BILITOT 0 6 04/19/2017    EGFR 59 09/17/2018         Patient voiced understanding and agreement in the above discussion  Aware to contact our office with questions/symptoms in the interim

## 2018-09-19 NOTE — PROGRESS NOTES
Hematology/Oncology Outpatient Follow-up  Juan Jasso 78 y o  female [id number]    Date:  9/19/2018      Assessment and Plan:  1  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 [facility])   42-year-old female with history of recurrent diffuse large B-cell lymphoma  At this time, plan is for Revlimid weekly x3 with 1 week off  Patient will be completing radiation therapy on 9/24/18  She will begin Revlimid that day as well  She has had improvement of symptoms with radiation  Neck swelling is almost resolved  Posterior cervical chain lymph nodes are no longer palpable  She continues to have some difficulty with swallowing  Discussed that this likely is due to mass pressing on her esophagus  Hopefully this diminishes in size with radiation and Revlimid therapy  Continue allopurinol 100 mg PO daily  Continue prednisone 20 mg PO daily  Calcium was elevated  Discontinue oral calcium  Continue CBC and CMP weekly  - predniSONE 20 mg tablet; Take 1 tablet (20 mg total) by mouth daily with food  Dispense: 30 tablet; Refill: 3    2  Pedro Ask due to immunocompromised state and steroid therapy  She is advised to take Nystatin swish and swallow  - nystatin (MYCOSTATIN) 100,000 units/mL suspension; Apply 5 mL (500,000 Units total) to the mouth or throat 4 (four) times a day Swish and swallow 4 times daily for 1 week  Dispense: 473 mL; Refill: 0    3  Tachycardia  Patient is not hydrating enough  Encourage to increase fluid intake  4  Neoplastic malignant related fatigue  Fatigue is worse at this time secondary to radiation therapy  Encouraged resting  Do not overdue it with activities  Detailed written information was given to patient regarding her medications         HPI:       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 with features suggestive of aggressive behavior  She then had additional biopsy which showed 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  Bone marrow biopsy was negative  6/18/2018 Progression     PET CT: Findings compatible with disease recurrence  Multiple FDG avid lymph nodes in the bilateral neck, axilla, subpectoral regions, mediastinum, portacaval region and retroperitoneum compatible with hypermetabolic malignancy  Deauville score of 5            7/25/2018 Biopsy     Left neck lymph node: High grade B-cell lymphoma, MYC-rearrangement positive, 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"), bcl-2 expressing (~80%), 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 - see Note  2018 -  Chemotherapy     Plan to start lenalidomide (Revlimid) 20 mg daily weekly x3, 1 week off                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[address] )    2008 Initial Diagnosis     Marginal zone lymphoma ([address] )         2008 -  Chemotherapy     2008/2009 Rituxan x1 dose - anaphylactic reaction with swelling of the tongue and closing of throat  Rituxan was discontinued  5/12/2009 - 6/16/2009 Radiation     Right orbit IMRT; Total dose: 3600 cGy   - Dr Shannon Booker at [facility]            Interval history:    ROS: Review of Systems   Constitutional: Positive for appetite change (on and off ) and fatigue  Negative for chills, fever and unexpected weight change  HENT: Negative for mouth sores and nosebleeds  Respiratory: Negative for cough and shortness of breath  Cardiovascular: Negative for chest pain, palpitations and leg swelling  Gastrointestinal: Negative for abdominal pain, blood in stool, constipation (managed with Miralax at present), diarrhea, nausea and vomiting  Genitourinary: Negative for difficulty urinating, dysuria and hematuria  Musculoskeletal: Negative for arthralgias, joint swelling and myalgias  Skin: Negative  Neurological: Negative for dizziness, weakness, light-headedness, numbness and headaches  Hematological: Negative  Psychiatric/Behavioral: Negative  Past Medical History:   Diagnosis Date    Basal cell carcinoma (BCC)     Bruit of right carotid artery     resolved 5/14/15     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HCC)     radiation    Dyslipidemia     Esophageal stricture     last assessed 2/28/13       GERD (gastroesophageal reflux disease)     History of chemotherapy     History of radiation therapy     [place] (hard of hearing)     b/l ears    Hyperlipidemia     Hypertension     Osteoarthritis     Osteoarthrosis of ankle and foot     last assessed 5/20/13     Osteopenia     last assessed 2/28/13     Plantar fasciitis of left foot     Polyp of sigmoid colon     last assessed 2/28/13     Shortness of breath     when walking up stairs       Past Surgical History:   Procedure Laterality Date    ANKLE ARTHROPLASTY Left     BLEPHAROPLASTY      right eye     CATARACT EXTRACTION Right     COLONOSCOPY      ESOPHAGEAL DILATION      x2    ESOPHAGOGASTRODUODENOSCOPY      dilitation    FACIAL/NECK BIOPSY Right 9/8/2017    Procedure: NECK NODE BIOPSY WITH NEEDLE LOCALIZATION; LYMPHOMA PROTOCOL (NEEDLE LOC WITH I R  AT 1100);   Surgeon: Jazzmine Rucker MD;  [facility];  Service: Surgical Oncology    HYSTERECTOMY      LYMPH NODE DISSECTION      right groin and neck    ORBITAL FLOOR EXPLORATION Right     for cancer     PORTACATH PLACEMENT      left chest     NJ BX/REMV,LYMPH NODE,DEEP AXILL Left 8/14/2017    Procedure: Axillary lymph node excision with LYMPHOMA PROTOCOL;  Surgeon: Jazzmine Rucker MD;  [facility];  Service: Surgical Oncology    NJ BX/REMV,LYMPH NODE,DEEP CERV Right 5/2/2016    Procedure: NECK NODE BIOPSY; Surgeon: Tami Alan MD;  [facility];  Service: Surgical Oncology    VA BX/REMV,LYMPH NODE,DEEP CERV Left 7/25/2018    Procedure: NECK NODE BIOPYS WITH LYMPHOMA PROTOCOL; (ULTRASOUND GUIDED NEEDLE LOC IN IR AT 0800);   Surgeon: Tami Alan MD;  [facility];  Service: Surgical Oncology    VA INSJ TUNNELED CTR VAD W/SUBQ PORT AGE 5 YR/> N/A 10/3/2017    Procedure: PLACEMENT OF PORT-A-CATH DEVICE;  Surgeon: Tami Alan MD;  [facility];  Service: Surgical Oncology       Social History     Social History    Marital status: /Civil Union     Spouse name: N/A    Number of children: N/A    Years of education: N/A     Social History Main Topics    Smoking status: Former Smoker     Quit date: 1970    Smokeless tobacco: Never Used    Alcohol use No    Drug use: No    Sexual activity: Not on file     Other Topics Concern    Not on file     Social History Narrative    Uses safety equipment seatbelts        Family History   Problem Relation Age of Onset    Multiple myeloma Mother     Heart disease Father     Hypertension Father     Hypertension Sister     Heart disease Sister     Arthritis Family     Osteoporosis Family     Breast cancer Maternal Aunt        Allergies   Allergen Reactions    Alteplase Anaphylaxis    Aspirin Anaphylaxis    Celebrex [Celecoxib] Anaphylaxis    Nsaids Anaphylaxis    Other Anaphylaxis     Pt states when she received a certain type of chemotherapy it caused her throat to close    Rituxan [Rituximab] Anaphylaxis     Swelling of tongue and closing of throat    Sulfa Antibiotics Other (See Comments) and Anaphylaxis     VERY EMOTIONAL CRYING    Bactrim [Sulfamethoxazole-Trimethoprim] Other (See Comments)     VERY EMOTIONAL/CRYING         Current Outpatient Prescriptions:     allopurinol (ZYLOPRIM) 100 mg tablet, Take 2 tablets (200 mg total) by mouth daily, Disp: 60 tablet, Rfl: 1    apixaban (ELIQUIS) 5 mg, Take 1 tablet (5 mg total) by mouth 2 (two) times a day, Disp: 60 tablet, Rfl: 2    ascorbic acid (VITAMIN C) 500 mg tablet, Take 500 mg by mouth daily with dinner  , Disp: , Rfl:     Calcium Carb-Cholecalciferol (CALCIUM + D3) 600-200 MG-UNIT TABS, Take 1 tablet by mouth daily with dinner  , Disp: , Rfl:     Cyanocobalamin (VITAMIN B 12 PO), Take 1 tablet by mouth daily with dinner  , Disp: , Rfl:     HYDROcodone-acetaminophen (NORCO) 5-325 mg per tablet, Take 1 tablet by mouth every 6 (six) hours as needed for pain Max Daily Amount: 4 tablets, Disp: 20 tablet, Rfl: 0    Lenalidomide (REVLIMID) 20 MG CAPS, Take 1 capsule (20 mg total) by mouth daily Adult Female  Auth # [id number] Take 1 capsule daily days 1-21   Followed by 7 days off , Disp: 21 capsule, Rfl: 0    loratadine (CLARITIN) 10 mg tablet, Take 10 mg by mouth daily in the early morning  , Disp: , Rfl:     LORazepam (ATIVAN) 0 5 mg tablet, Take 1 tablet (0 5 mg total) by mouth daily at bedtime as needed (difficulty sleeping), Disp: 15 tablet, Rfl: 0    Multiple Vitamin (MULTIVITAMIN) capsule, Take 1 capsule by mouth daily with dinner  , Disp: , Rfl:     omeprazole (PriLOSEC) 20 mg delayed release capsule, Take 1 capsule (20 mg total) by mouth daily, Disp: 90 capsule, Rfl: 1    ondansetron (ZOFRAN) 4 mg tablet, Take 1 tablet (4 mg total) by mouth every 8 (eight) hours as needed for nausea or vomiting, Disp: 30 tablet, Rfl: 0    polyethylene glycol (MIRALAX) 17 g packet, Take 17 g by mouth daily, Disp: 14 each, Rfl: 0    predniSONE 20 mg tablet, Take 1 tablet (20 mg total) by mouth daily with food, Disp: 30 tablet, Rfl: 3    senna (SENOKOT) 8 6 mg, Take 1 tablet (8 6 mg total) by mouth 2 (two) times a day, Disp: , Rfl: 0    simethicone (MYLICON) 80 mg chewable tablet, Chew 1 tablet (80 mg total) every 6 (six) hours as needed for flatulence, Disp: 30 tablet, Rfl: 0    nystatin (MYCOSTATIN) 100,000 units/mL suspension, Apply 5 mL (500,000 Units total) to the mouth or throat 4 (four) times a day Swish and swallow 4 times daily for 1 week , Disp: 473 mL, Rfl: 0      Physical Exam:  /72 (BP Location: Left arm, Patient Position: Sitting, Cuff Size: Standard)   Pulse (!) 131   Temp 98 8 °F (37 1 °C) (Tympanic)   Resp 18   Ht 5' 3 6" (1 615 m)   Wt 92 9 kg (204 lb 12 8 oz)   SpO2 94%   BMI 35 60 kg/m²     Physical Exam   Constitutional: She is oriented to person, place, and time  She appears well-developed and well-nourished  No distress  HENT:   Head: Normocephalic and atraumatic  Eyes: Conjunctivae are normal    Neck: Normal range of motion  Neck supple  Cardiovascular: Normal rate, regular rhythm and normal heart sounds  No murmur heard  Pulmonary/Chest: Effort normal and breath sounds normal  No respiratory distress  Port a cath present    Abdominal: Soft  There is no tenderness  Musculoskeletal: Normal range of motion  She exhibits no edema or tenderness  Lymphadenopathy:     She has no cervical adenopathy  Neurological: She is alert and oriented to person, place, and time  No cranial nerve deficit  Skin: Skin is warm and dry  Psychiatric: She has a normal mood and affect  Vitals reviewed  Labs:  Lab Results   Component Value Date    WBC 8 22 09/17/2018    HGB 13 2 09/17/2018    HCT 41 8 09/17/2018    MCV 91 09/17/2018     09/17/2018     Lab Results   Component Value Date     09/17/2018    K 3 9 09/17/2018    CL 99 (L) 09/17/2018    CO2 30 09/17/2018    ANIONGAP 6 12/15/2015    BUN 16 09/17/2018    CREATININE 0 95 09/17/2018    GLUCOSE 91 12/15/2015    GLUF 69 08/31/2018    CALCIUM 11 0 (H) 09/17/2018    CORRECTEDCA 11 8 (H) 09/17/2018    AST 33 09/17/2018    ALT 33 09/17/2018    ALKPHOS 117 (H) 09/17/2018    PROT 6 2 04/19/2017    BILITOT 0 6 04/19/2017    EGFR 59 09/17/2018         Patient voiced understanding and agreement in the above discussion  Aware to contact our office with questions/symptoms in the interim

## 2018-09-20 PROCEDURE — 77387 GUIDANCE FOR RADJ TX DLVR: CPT | Performed by: RADIOLOGY

## 2018-09-20 PROCEDURE — 77412 RADIATION TX DELIVERY LVL 3: CPT | Performed by: RADIOLOGY

## 2018-09-20 RX ORDER — CHOLECALCIFEROL (VITAMIN D3) 125 MCG
1 CAPSULE ORAL
COMMUNITY

## 2018-09-21 PROCEDURE — 77412 RADIATION TX DELIVERY LVL 3: CPT | Performed by: RADIOLOGY

## 2018-09-21 PROCEDURE — 77387 GUIDANCE FOR RADJ TX DLVR: CPT | Performed by: RADIOLOGY

## 2018-09-24 ENCOUNTER — APPOINTMENT (OUTPATIENT)
Dept: LAB | Facility: CLINIC | Age: 78
End: 2018-09-24
Payer: COMMERCIAL

## 2018-09-24 ENCOUNTER — TELEPHONE (OUTPATIENT)
Dept: HEMATOLOGY ONCOLOGY | Facility: CLINIC | Age: 78
End: 2018-09-24

## 2018-09-24 ENCOUNTER — TELEPHONE (OUTPATIENT)
Dept: PALLIATIVE MEDICINE | Facility: CLINIC | Age: 78
End: 2018-09-24

## 2018-09-24 DIAGNOSIS — T66.XXXA RADIATION ESOPHAGITIS: Primary | ICD-10-CM

## 2018-09-24 DIAGNOSIS — F51.01 PRIMARY INSOMNIA: Primary | ICD-10-CM

## 2018-09-24 DIAGNOSIS — K20.80 RADIATION ESOPHAGITIS: Primary | ICD-10-CM

## 2018-09-24 DIAGNOSIS — C83.38 DIFFUSE LARGE B-CELL LYMPHOMA OF LYMPH NODES OF MULTIPLE SITES (HCC): ICD-10-CM

## 2018-09-24 LAB
ALBUMIN SERPL BCP-MCNC: 2.8 G/DL (ref 3.5–5)
ALP SERPL-CCNC: 95 U/L (ref 46–116)
ALT SERPL W P-5'-P-CCNC: 26 U/L (ref 12–78)
ANION GAP SERPL CALCULATED.3IONS-SCNC: 9 MMOL/L (ref 4–13)
AST SERPL W P-5'-P-CCNC: 35 U/L (ref 5–45)
BASOPHILS # BLD AUTO: 0.01 THOUSANDS/ΜL (ref 0–0.1)
BASOPHILS NFR BLD AUTO: 0 % (ref 0–1)
BILIRUB SERPL-MCNC: 0.68 MG/DL (ref 0.2–1)
BUN SERPL-MCNC: 18 MG/DL (ref 5–25)
CALCIUM ALBUM COR SERPL-MCNC: 11.2 MG/DL (ref 8.3–10.1)
CALCIUM SERPL-MCNC: 10.2 MG/DL (ref 8.3–10.1)
CHLORIDE SERPL-SCNC: 101 MMOL/L (ref 100–108)
CO2 SERPL-SCNC: 29 MMOL/L (ref 21–32)
CREAT SERPL-MCNC: 0.98 MG/DL (ref 0.6–1.3)
EOSINOPHIL # BLD AUTO: 0.06 THOUSAND/ΜL (ref 0–0.61)
EOSINOPHIL NFR BLD AUTO: 1 % (ref 0–6)
ERYTHROCYTE [DISTWIDTH] IN BLOOD BY AUTOMATED COUNT: 15.4 % (ref 11.6–15.1)
GFR SERPL CREATININE-BSD FRML MDRD: 57 ML/MIN/1.73SQ M
GLUCOSE SERPL-MCNC: 117 MG/DL (ref 65–140)
HCT VFR BLD AUTO: 38.7 % (ref 34.8–46.1)
HGB BLD-MCNC: 12 G/DL (ref 11.5–15.4)
IMM GRANULOCYTES # BLD AUTO: 0.07 THOUSAND/UL (ref 0–0.2)
IMM GRANULOCYTES NFR BLD AUTO: 1 % (ref 0–2)
LYMPHOCYTES # BLD AUTO: 0.24 THOUSANDS/ΜL (ref 0.6–4.47)
LYMPHOCYTES NFR BLD AUTO: 3 % (ref 14–44)
MCH RBC QN AUTO: 28.4 PG (ref 26.8–34.3)
MCHC RBC AUTO-ENTMCNC: 31 G/DL (ref 31.4–37.4)
MCV RBC AUTO: 92 FL (ref 82–98)
MONOCYTES # BLD AUTO: 0.56 THOUSAND/ΜL (ref 0.17–1.22)
MONOCYTES NFR BLD AUTO: 7 % (ref 4–12)
NEUTROPHILS # BLD AUTO: 7.44 THOUSANDS/ΜL (ref 1.85–7.62)
NEUTS SEG NFR BLD AUTO: 88 % (ref 43–75)
NRBC BLD AUTO-RTO: 0 /100 WBCS
PLATELET # BLD AUTO: 162 THOUSANDS/UL (ref 149–390)
PMV BLD AUTO: 11 FL (ref 8.9–12.7)
POTASSIUM SERPL-SCNC: 3.9 MMOL/L (ref 3.5–5.3)
PROT SERPL-MCNC: 5.8 G/DL (ref 6.4–8.2)
RBC # BLD AUTO: 4.22 MILLION/UL (ref 3.81–5.12)
SODIUM SERPL-SCNC: 139 MMOL/L (ref 136–145)
WBC # BLD AUTO: 8.38 THOUSAND/UL (ref 4.31–10.16)

## 2018-09-24 PROCEDURE — 36415 COLL VENOUS BLD VENIPUNCTURE: CPT

## 2018-09-24 PROCEDURE — 80053 COMPREHEN METABOLIC PANEL: CPT

## 2018-09-24 PROCEDURE — 85025 COMPLETE CBC W/AUTO DIFF WBC: CPT

## 2018-09-24 RX ORDER — SUCRALFATE 1 G/1
1 TABLET ORAL 4 TIMES DAILY
Qty: 60 TABLET | Refills: 0 | Status: SHIPPED | OUTPATIENT
Start: 2018-09-24

## 2018-09-24 RX ORDER — TRAZODONE HYDROCHLORIDE 50 MG/1
25 TABLET ORAL
Qty: 15 TABLET | Refills: 0 | Status: SHIPPED | OUTPATIENT
Start: 2018-09-24 | End: 2018-10-13 | Stop reason: HOSPADM

## 2018-09-24 NOTE — TELEPHONE ENCOUNTER
Pt's son states pt is undergoing radiation tx and would like to consider a liquid to ease throat pain  Please advise

## 2018-09-24 NOTE — TELEPHONE ENCOUNTER
Patient having burning pain in throat  This is resulting in difficulty swallowing  Carafate called into her pharmacy

## 2018-09-24 NOTE — TELEPHONE ENCOUNTER
CALL FROM PT    SAYING SHE HAS A REALLY BAD SORE THROAT AND WANTS TO KNOW IF YOU CAN PLEASE CALL SOMETHING INTO HER PHARMACY   [address]

## 2018-09-25 ENCOUNTER — OFFICE VISIT (OUTPATIENT)
Dept: CARDIOLOGY CLINIC | Facility: CLINIC | Age: 78
End: 2018-09-25
Payer: COMMERCIAL

## 2018-09-25 ENCOUNTER — APPOINTMENT (EMERGENCY)
Dept: RADIOLOGY | Facility: HOSPITAL | Age: 78
DRG: 392 | End: 2018-09-25
Payer: COMMERCIAL

## 2018-09-25 ENCOUNTER — HOSPITAL ENCOUNTER (INPATIENT)
Facility: HOSPITAL | Age: 78
LOS: 8 days | Discharge: HOME HEALTH | DRG: 392 | End: 2018-10-03
Attending: EMERGENCY MEDICINE | Admitting: INTERNAL MEDICINE
Payer: COMMERCIAL

## 2018-09-25 VITALS
BODY MASS INDEX: 34.94 KG/M2 | WEIGHT: 201 LBS | OXYGEN SATURATION: 97 % | DIASTOLIC BLOOD PRESSURE: 40 MMHG | SYSTOLIC BLOOD PRESSURE: 60 MMHG | HEART RATE: 133 BPM

## 2018-09-25 DIAGNOSIS — R00.0 SINUS TACHYCARDIA: ICD-10-CM

## 2018-09-25 DIAGNOSIS — R13.10 ODYNOPHAGIA: ICD-10-CM

## 2018-09-25 DIAGNOSIS — I44.7 LEFT BUNDLE BRANCH BLOCK (LBBB): ICD-10-CM

## 2018-09-25 DIAGNOSIS — I42.7 CARDIOMYOPATHY SECONDARY TO DRUG (HCC): ICD-10-CM

## 2018-09-25 DIAGNOSIS — R13.19 ESOPHAGEAL DYSPHAGIA: ICD-10-CM

## 2018-09-25 DIAGNOSIS — E86.0 DEHYDRATION: Primary | ICD-10-CM

## 2018-09-25 DIAGNOSIS — I95.89 OTHER SPECIFIED HYPOTENSION: Primary | ICD-10-CM

## 2018-09-25 DIAGNOSIS — C83.38 DIFFUSE LARGE B-CELL LYMPHOMA OF LYMPH NODES OF MULTIPLE SITES (HCC): ICD-10-CM

## 2018-09-25 LAB
ALBUMIN SERPL BCP-MCNC: 2.7 G/DL (ref 3.5–5)
ALP SERPL-CCNC: 92 U/L (ref 46–116)
ALT SERPL W P-5'-P-CCNC: 27 U/L (ref 12–78)
ANION GAP SERPL CALCULATED.3IONS-SCNC: 9 MMOL/L (ref 4–13)
AST SERPL W P-5'-P-CCNC: 50 U/L (ref 5–45)
BACTERIA UR QL AUTO: ABNORMAL /HPF
BASOPHILS # BLD AUTO: 0.02 THOUSANDS/ΜL (ref 0–0.1)
BASOPHILS NFR BLD AUTO: 0 % (ref 0–1)
BILIRUB SERPL-MCNC: 0.66 MG/DL (ref 0.2–1)
BILIRUB UR QL STRIP: NEGATIVE
BUN SERPL-MCNC: 25 MG/DL (ref 5–25)
CALCIUM SERPL-MCNC: 10.3 MG/DL (ref 8.3–10.1)
CHLORIDE SERPL-SCNC: 100 MMOL/L (ref 100–108)
CLARITY UR: CLEAR
CLARITY, POC: CLEAR
CO2 SERPL-SCNC: 27 MMOL/L (ref 21–32)
COLOR UR: YELLOW
COLOR, POC: YELLOW
CREAT SERPL-MCNC: 1.22 MG/DL (ref 0.6–1.3)
EOSINOPHIL # BLD AUTO: 0.05 THOUSAND/ΜL (ref 0–0.61)
EOSINOPHIL NFR BLD AUTO: 1 % (ref 0–6)
ERYTHROCYTE [DISTWIDTH] IN BLOOD BY AUTOMATED COUNT: 15.4 % (ref 11.6–15.1)
GFR SERPL CREATININE-BSD FRML MDRD: 43 ML/MIN/1.73SQ M
GLUCOSE SERPL-MCNC: 104 MG/DL (ref 65–140)
GLUCOSE UR STRIP-MCNC: NEGATIVE MG/DL
HCT VFR BLD AUTO: 38.6 % (ref 34.8–46.1)
HGB BLD-MCNC: 12.2 G/DL (ref 11.5–15.4)
HGB UR QL STRIP.AUTO: ABNORMAL
HOLD SPECIMEN: NORMAL
HYALINE CASTS #/AREA URNS LPF: ABNORMAL /LPF
IMM GRANULOCYTES # BLD AUTO: 0.09 THOUSAND/UL (ref 0–0.2)
IMM GRANULOCYTES NFR BLD AUTO: 1 % (ref 0–2)
KETONES UR STRIP-MCNC: NEGATIVE MG/DL
LEUKOCYTE ESTERASE UR QL STRIP: ABNORMAL
LYMPHOCYTES # BLD AUTO: 0.21 THOUSANDS/ΜL (ref 0.6–4.47)
LYMPHOCYTES NFR BLD AUTO: 2 % (ref 14–44)
MCH RBC QN AUTO: 28.8 PG (ref 26.8–34.3)
MCHC RBC AUTO-ENTMCNC: 31.6 G/DL (ref 31.4–37.4)
MCV RBC AUTO: 91 FL (ref 82–98)
MONOCYTES # BLD AUTO: 1.01 THOUSAND/ΜL (ref 0.17–1.22)
MONOCYTES NFR BLD AUTO: 10 % (ref 4–12)
NEUTROPHILS # BLD AUTO: 8.7 THOUSANDS/ΜL (ref 1.85–7.62)
NEUTS SEG NFR BLD AUTO: 86 % (ref 43–75)
NITRITE UR QL STRIP: NEGATIVE
NON-SQ EPI CELLS URNS QL MICRO: ABNORMAL /HPF
NRBC BLD AUTO-RTO: 0 /100 WBCS
PH UR STRIP.AUTO: 6.5 [PH] (ref 4.5–8)
PLATELET # BLD AUTO: 160 THOUSANDS/UL (ref 149–390)
PMV BLD AUTO: 10.5 FL (ref 8.9–12.7)
POTASSIUM SERPL-SCNC: 4.8 MMOL/L (ref 3.5–5.3)
PROT SERPL-MCNC: 6.3 G/DL (ref 6.4–8.2)
PROT UR STRIP-MCNC: ABNORMAL MG/DL
RBC # BLD AUTO: 4.24 MILLION/UL (ref 3.81–5.12)
RBC #/AREA URNS AUTO: ABNORMAL /HPF
SODIUM SERPL-SCNC: 136 MMOL/L (ref 136–145)
SP GR UR STRIP.AUTO: 1.02 (ref 1–1.03)
TSH SERPL DL<=0.05 MIU/L-ACNC: 0.17 UIU/ML (ref 0.36–3.74)
UROBILINOGEN UR QL STRIP.AUTO: 0.2 E.U./DL
WBC # BLD AUTO: 10.08 THOUSAND/UL (ref 4.31–10.16)
WBC #/AREA URNS AUTO: ABNORMAL /HPF

## 2018-09-25 PROCEDURE — 87086 URINE CULTURE/COLONY COUNT: CPT

## 2018-09-25 PROCEDURE — 99214 OFFICE O/P EST MOD 30 MIN: CPT | Performed by: INTERNAL MEDICINE

## 2018-09-25 PROCEDURE — 99223 1ST HOSP IP/OBS HIGH 75: CPT | Performed by: INTERNAL MEDICINE

## 2018-09-25 PROCEDURE — 71045 X-RAY EXAM CHEST 1 VIEW: CPT

## 2018-09-25 PROCEDURE — 80053 COMPREHEN METABOLIC PANEL: CPT | Performed by: EMERGENCY MEDICINE

## 2018-09-25 PROCEDURE — 36415 COLL VENOUS BLD VENIPUNCTURE: CPT | Performed by: EMERGENCY MEDICINE

## 2018-09-25 PROCEDURE — 96360 HYDRATION IV INFUSION INIT: CPT

## 2018-09-25 PROCEDURE — 99222 1ST HOSP IP/OBS MODERATE 55: CPT | Performed by: INTERNAL MEDICINE

## 2018-09-25 PROCEDURE — 71275 CT ANGIOGRAPHY CHEST: CPT

## 2018-09-25 PROCEDURE — 81001 URINALYSIS AUTO W/SCOPE: CPT

## 2018-09-25 PROCEDURE — 99285 EMERGENCY DEPT VISIT HI MDM: CPT

## 2018-09-25 PROCEDURE — 85025 COMPLETE CBC W/AUTO DIFF WBC: CPT | Performed by: EMERGENCY MEDICINE

## 2018-09-25 PROCEDURE — 84443 ASSAY THYROID STIM HORMONE: CPT | Performed by: EMERGENCY MEDICINE

## 2018-09-25 RX ORDER — LANOLIN ALCOHOL/MO/W.PET/CERES
6 CREAM (GRAM) TOPICAL
Status: DISCONTINUED | OUTPATIENT
Start: 2018-09-25 | End: 2018-10-03 | Stop reason: HOSPADM

## 2018-09-25 RX ORDER — LORATADINE 10 MG/1
10 TABLET ORAL
Status: DISCONTINUED | OUTPATIENT
Start: 2018-09-26 | End: 2018-10-03 | Stop reason: HOSPADM

## 2018-09-25 RX ORDER — SIMETHICONE 80 MG
80 TABLET,CHEWABLE ORAL EVERY 6 HOURS PRN
Status: DISCONTINUED | OUTPATIENT
Start: 2018-09-25 | End: 2018-10-03 | Stop reason: HOSPADM

## 2018-09-25 RX ORDER — TRAZODONE HYDROCHLORIDE 50 MG/1
25 TABLET ORAL
Status: DISCONTINUED | OUTPATIENT
Start: 2018-09-25 | End: 2018-10-03 | Stop reason: HOSPADM

## 2018-09-25 RX ORDER — ONDANSETRON 2 MG/ML
4 INJECTION INTRAMUSCULAR; INTRAVENOUS EVERY 6 HOURS PRN
Status: DISCONTINUED | OUTPATIENT
Start: 2018-09-25 | End: 2018-10-03 | Stop reason: HOSPADM

## 2018-09-25 RX ORDER — ALLOPURINOL 100 MG/1
200 TABLET ORAL DAILY
Status: DISCONTINUED | OUTPATIENT
Start: 2018-09-25 | End: 2018-10-03 | Stop reason: HOSPADM

## 2018-09-25 RX ORDER — SUCRALFATE 1 G/1
1 TABLET ORAL 4 TIMES DAILY
Status: DISCONTINUED | OUTPATIENT
Start: 2018-09-25 | End: 2018-09-26

## 2018-09-25 RX ORDER — ONDANSETRON 4 MG/1
4 TABLET, ORALLY DISINTEGRATING ORAL EVERY 6 HOURS PRN
Status: DISCONTINUED | OUTPATIENT
Start: 2018-09-25 | End: 2018-10-03 | Stop reason: HOSPADM

## 2018-09-25 RX ORDER — POLYETHYLENE GLYCOL 3350 17 G/17G
17 POWDER, FOR SOLUTION ORAL DAILY
Status: DISCONTINUED | OUTPATIENT
Start: 2018-09-25 | End: 2018-10-03 | Stop reason: HOSPADM

## 2018-09-25 RX ORDER — SENNOSIDES 8.6 MG
1 TABLET ORAL 2 TIMES DAILY
Status: DISCONTINUED | OUTPATIENT
Start: 2018-09-25 | End: 2018-10-03 | Stop reason: HOSPADM

## 2018-09-25 RX ORDER — PANTOPRAZOLE SODIUM 40 MG/1
40 TABLET, DELAYED RELEASE ORAL
Status: DISCONTINUED | OUTPATIENT
Start: 2018-09-25 | End: 2018-10-03 | Stop reason: HOSPADM

## 2018-09-25 RX ORDER — ASCORBIC ACID 500 MG
500 TABLET ORAL
Status: DISCONTINUED | OUTPATIENT
Start: 2018-09-25 | End: 2018-10-03 | Stop reason: HOSPADM

## 2018-09-25 RX ORDER — PREDNISONE 20 MG/1
20 TABLET ORAL DAILY
Status: DISCONTINUED | OUTPATIENT
Start: 2018-09-25 | End: 2018-09-30

## 2018-09-25 RX ORDER — HYDROCODONE BITARTRATE AND ACETAMINOPHEN 5; 325 MG/1; MG/1
1 TABLET ORAL EVERY 6 HOURS PRN
Status: DISCONTINUED | OUTPATIENT
Start: 2018-09-25 | End: 2018-09-29

## 2018-09-25 RX ORDER — B-COMPLEX WITH VITAMIN C
1 TABLET ORAL
Status: DISCONTINUED | OUTPATIENT
Start: 2018-09-26 | End: 2018-09-27

## 2018-09-25 RX ORDER — SODIUM CHLORIDE 9 MG/ML
50 INJECTION, SOLUTION INTRAVENOUS CONTINUOUS
Status: DISCONTINUED | OUTPATIENT
Start: 2018-09-25 | End: 2018-09-29

## 2018-09-25 RX ADMIN — SODIUM CHLORIDE 500 ML: 0.9 INJECTION, SOLUTION INTRAVENOUS at 12:15

## 2018-09-25 RX ADMIN — IODIXANOL 85 ML: 320 INJECTION, SOLUTION INTRAVASCULAR at 11:56

## 2018-09-25 RX ADMIN — SODIUM CHLORIDE 50 ML/HR: 0.9 INJECTION, SOLUTION INTRAVENOUS at 17:24

## 2018-09-25 RX ADMIN — SENNOSIDES 8.6 MG: 8.6 TABLET, FILM COATED ORAL at 17:22

## 2018-09-25 RX ADMIN — SUCRALFATE 1 G: 1 TABLET ORAL at 17:22

## 2018-09-25 RX ADMIN — TRAZODONE HYDROCHLORIDE 25 MG: 50 TABLET ORAL at 23:11

## 2018-09-25 RX ADMIN — NYSTATIN 500000 UNITS: 100000 SUSPENSION ORAL at 17:21

## 2018-09-25 RX ADMIN — SODIUM CHLORIDE 500 ML: 0.9 INJECTION, SOLUTION INTRAVENOUS at 10:00

## 2018-09-25 RX ADMIN — PANTOPRAZOLE SODIUM 40 MG: 40 TABLET, DELAYED RELEASE ORAL at 17:22

## 2018-09-25 RX ADMIN — SUCRALFATE 1 G: 1 TABLET ORAL at 23:02

## 2018-09-25 RX ADMIN — APIXABAN 5 MG: 5 TABLET, FILM COATED ORAL at 17:22

## 2018-09-25 RX ADMIN — NYSTATIN 500000 UNITS: 100000 SUSPENSION ORAL at 23:01

## 2018-09-25 NOTE — ASSESSMENT & PLAN NOTE
Will consult Oncology  Patient noted Dr soto  Patient recently started on Revlimid as per son  Will need follow through on plans for chemotherapy  For now will hold off until infection is definitively ruled out  Await CT scan  Continue supportive care  Patient received 6 cycles of chemotherapy followed by radiation therapy  Patient unfortunately had a complicated course with Adriamycin therapy leading to 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%  Will need to be very judicious with fluid resuscitation  Fortunately patient appears currently nontoxic in appearance

## 2018-09-25 NOTE — H&P
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 versus esophageal stricture  Patient was previously seen by Denise Grant for esophageal strictures  PE less likely given patient is currently on Eliquis  For now will observe off of antibiotics given nontoxic status  Follow up on cultures  Follow up on CT imaging  Monitor symptoms closely  Follow-up on procalcitonin level  Judicious fluid resuscitation            Dysphagia   Assessment & Plan    Potential etiologies include:  ·   extrinsic compression of lymphadenopathy on the esophagus  Patient with abnormal CT imaging in August suggestive extrinsic compression  · Other consideration would be esophageal stricture  Patient with prior strictures in the past limiting oral intake requiring ballooning of stricture  · Other consideration would be 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 given recent radiation treatment  For now will consult GI  Family reports that she was recently started on Carafate therapy for swallowing problems with 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  Continue with supportive care  Continue with PPI therapy as well  Patient is asking for a surgical soft diet          Constipation   Assessment & Plan    Patient was given Mag citrate for constipation as an outpatient  Patient reportedly had severe constipation approximately 1 week ago  Family reports that she did have some significant diarrhea symptoms after the magnesium citrate  therapy    They report that they feel that there may be some dehydration associated with the volume of diarrhea secondary to the medications  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 [facility])   Assessment & Plan    Will consult Oncology  Patient recently started on Revlimid as per son  Will need follow through on plans for chemotherapy  For now will hold off until infection is definitively ruled out  Await CT scan  Continue supportive care  Patient received 6 cycles of chemotherapy followed by radiation therapy back in January of this year     Patient unfortunately had a complicated course with Adriamycin therapy leading to 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%  Will need to be very judicious with fluid resuscitation  Fortunately patient appears currently nontoxic in appearance  Addendum:  I reviewed the CT scan findings with Radiology  At this point time, there is no obvious signs of infection on CT imaging  Will hold off on empiric treatment for pneumonia  There is no obvious pulmonary embolism noted as well  VTE Prophylaxis: Apixaban (Eliquis)  / sequential compression device   Code Status:  Level 3 DNR  POLST: There is no POLST form on file for this patient (pre-hospital)  Discussion with family:  Discussed with family at bedside    Anticipated Length of Stay:  Patient will be admitted on an Inpatient basis with an anticipated length of stay of  greater than 2 midnights  Justification for Hospital Stay:  Needs further evaluation poor oral intake and hydration issues  Total Time for Visit, including Counseling / Coordination of Care: 45 minutes  Greater than 50% of this total time spent on direct patient counseling and coordination of care      Chief Complaint:   Chest discomfort    History of Present Illness:    Devorah Kyle is a 78 y o  female who presents with symptoms of increasing fatigue, shortness of breath weakness and decreased oral intake with difficulty swallowing solids  She presented initially with a heart rate in the 140s with a blood pressure in the 90s in the ER after being sent in by Cardiology in follow-up with an outpatient visit for Adriamycin induced cardiomyopathy  She received her 5th and last treatment for chemotherapy back on January 17th 2018  She is followed by Dr Raúl Armas from Oncology for diffuse large B-cell lymphoma  Of note, she was previously on Adriamycin however this was stopped with cycle 5 due to a reduced ejection fraction on echocardiogram   A echocardiogram in follow-up on July 31st, 2018 showed an ejection fraction of 97% with systolic function severely reduced  She presents from the cardiology office with increasing symptoms of fatigue, decreased oral intake with associated tachycardia and low blood pressure in the office   Initial workup shows an x-ray with left lower lobe infiltrate in the ED  A recent CT scan on August 21st, 2018 illustrated evidence of supraclavicular mediastinal and retroperitoneal lymphadenopathy which has progressed from prior imaging  The mediastinal lymphadenopathy appears to have causes mass effect on the esophagus  Patient presents with decreased oral intake with poor nutrition  She reports that she has not been feeling well since July status post intervention for lymphadenopathy  She reports that after being intubated she has had persistent issues with cough and shortness of breath  Family reports that she has been feeling weak and having difficulty swallowing during this time  However appears to have been more pronounced over the last 1 to 2 weeks  She has been having difficulty with swallowing food with dysphagia type symptoms with food feeling like it is getting stuck  She reports that she has difficulty passing solids     She has been seen by Radiation therapy and was told that there may be a component of radiation induced esophagitis    Of note, the  does acknowledge a history of esophageal strictures as well and has had prior scopes with dilatation of the stricture in the past   She presents for increasing symptoms of fatigue, tachycardia and poor oral intake  Review of Systems:    Review of Systems   Constitutional: Negative for chills, diaphoresis, fatigue and fever (Patient presenting with hot flashes associated with lymphoma chronically )  HENT: Negative for congestion, facial swelling and postnasal drip  Respiratory: Positive for cough and shortness of breath  Negative for chest tightness and stridor  Gastrointestinal: Negative for abdominal distention, diarrhea, nausea and vomiting  Genitourinary: Negative for pelvic pain and urgency  Musculoskeletal: Negative for arthralgias and back pain  Neurological: Negative for dizziness  Hematological: Negative for adenopathy  Psychiatric/Behavioral: Negative for agitation and confusion  All other systems reviewed and are negative        Past Medical and Surgical History:     Past Medical History:   Diagnosis Date    Basal cell carcinoma (BCC)     Bruit of right carotid artery     resolved 5/14/15     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HCC)     radiation    Dyslipidemia     Esophageal stricture     last assessed 2/28/13       GERD (gastroesophageal reflux disease)     History of chemotherapy     History of radiation therapy     [place] (hard of hearing)     b/l ears    Hyperlipidemia     Hypertension     Osteoarthritis     Osteoarthrosis of ankle and foot     last assessed 5/20/13     Osteopenia     last assessed 2/28/13     Plantar fasciitis of left foot     Polyp of sigmoid colon     last assessed 2/28/13     Shortness of breath     when walking up stairs       Past Surgical History:   Procedure Laterality Date    ANKLE ARTHROPLASTY Left     BLEPHAROPLASTY      right eye     CATARACT EXTRACTION Right     COLONOSCOPY      ESOPHAGEAL DILATION      x2    ESOPHAGOGASTRODUODENOSCOPY      dilitation    FACIAL/NECK BIOPSY Right 9/8/2017    Procedure: NECK NODE BIOPSY WITH NEEDLE LOCALIZATION; LYMPHOMA PROTOCOL (NEEDLE LOC WITH I R  AT 1100); Surgeon: Camryn Mulligan MD;  [facility];  Service: Surgical Oncology    HYSTERECTOMY      LYMPH NODE DISSECTION      right groin and neck    ORBITAL FLOOR EXPLORATION Right     for cancer     PORTACATH PLACEMENT      left chest     MA BX/REMV,LYMPH NODE,DEEP AXILL Left 8/14/2017    Procedure: Axillary lymph node excision with LYMPHOMA PROTOCOL;  Surgeon: Camryn Mulligan MD;  [facility];  Service: Surgical Oncology    MA BX/REMV,LYMPH NODE,DEEP CERV Right 5/2/2016    Procedure: NECK NODE BIOPSY;  Surgeon: Camryn Mulligan MD;  [facility];  Service: Surgical Oncology    MA BX/REMV,LYMPH NODE,DEEP CERV Left 7/25/2018    Procedure: NECK NODE BIOPYS WITH LYMPHOMA PROTOCOL; (ULTRASOUND GUIDED NEEDLE LOC IN IR AT 0800); Surgeon: Camryn Mulligan MD;  [facility];  Service: Surgical Oncology    MA INSJ TUNNELED CTR VAD W/SUBQ PORT AGE 5 YR/> N/A 10/3/2017    Procedure: PLACEMENT OF PORT-A-CATH DEVICE;  Surgeon: Camryn Mulligan MD;  [facility];  Service: Surgical Oncology       Meds/Allergies:    Prior to Admission medications    Medication Sig Start Date End Date Taking?  Authorizing Provider   allopurinol (ZYLOPRIM) 100 mg tablet Take 2 tablets (200 mg total) by mouth daily 8/3/18  Yes Radha Russell MD   apixaban (ELIQUIS) 5 mg Take 1 tablet (5 mg total) by mouth 2 (two) times a day 8/20/18  Yes Radha Russell MD   ascorbic acid (VITAMIN C) 500 mg tablet Take 500 mg by mouth daily with dinner     Yes Historical Provider, MD   Calcium Carb-Cholecalciferol (CALCIUM + D3) 600-200 MG-UNIT TABS Take 1 tablet by mouth daily with dinner     Yes Historical Provider, MD   Cyanocobalamin (VITAMIN B 12 PO) Take 1 tablet by mouth daily with dinner     Yes Historical Provider, MD   HYDROcodone-acetaminophen (NORCO) 5-325 mg per tablet Take 1 tablet by mouth every 6 (six) hours as needed for pain Max Daily Amount: 4 tablets 7/17/18  Yes Weston Wilkerson MD   Lenalidomide (REVLIMID) 20 MG CAPS Take 1 capsule (20 mg total) by mouth daily Adult Female  Auth # [id number]  Take 1 capsule daily days 1-21  Followed by 7 days off  8/16/18  Yes Tran Reyes MD   loratadine (CLARITIN) 10 mg tablet Take 10 mg by mouth daily in the early morning     Yes Historical Provider, MD   Melatonin 5 MG TABS Take 1 tablet by mouth daily at bedtime as needed   Yes Historical Provider, MD   Multiple Vitamin (MULTIVITAMIN) capsule Take 1 capsule by mouth daily with dinner     Yes Historical Provider, MD   nystatin (MYCOSTATIN) 100,000 units/mL suspension Apply 5 mL (500,000 Units total) to the mouth or throat 4 (four) times a day Swish and swallow 4 times daily for 1 week   9/19/18  Yes Denisse Solo PA-C   omeprazole (PriLOSEC) 20 mg delayed release capsule Take 1 capsule (20 mg total) by mouth daily 9/6/18  Yes Vasyl Delvalle PA-C   ondansetron [facility]) 4 mg tablet Take 1 tablet (4 mg total) by mouth every 8 (eight) hours as needed for nausea or vomiting 8/15/18  Yes Denisse Solo PA-C   polyethylene glycol (MIRALAX) 17 g packet Take 17 g by mouth daily 8/24/18  Yes Kushal Mcdonald PA-C   predniSONE 20 mg tablet Take 1 tablet (20 mg total) by mouth daily with food 9/19/18  Yes Denisse Solo PA-C   senna (SENOKOT) 8 6 mg Take 1 tablet (8 6 mg total) by mouth 2 (two) times a day 9/7/18  Yes ANNEMARIE Berg   simethicone (MYLICON) 80 mg chewable tablet Chew 1 tablet (80 mg total) every 6 (six) hours as needed for flatulence 8/23/18  Yes Kushal Mcdonald PA-C   sucralfate (CARAFATE) 1 g tablet Take 1 tablet (1 g total) by mouth 4 (four) times a day 9/24/18  Yes Denisse Solo PA-C   traZODone (DESYREL) 50 mg tablet Take 0 5 tablets (25 mg total) by mouth daily at bedtime 9/24/18  Yes Avila Verdugo, ANNEMARIE     I have reviewed home medications with patient personally  Allergies: Allergies   Allergen Reactions    Alteplase Anaphylaxis    Aspirin Anaphylaxis    Celebrex [Celecoxib] Anaphylaxis    Nsaids Anaphylaxis    Other Anaphylaxis     Pt states when she received a certain type of chemotherapy it caused her throat to close    Rituxan [Rituximab] Anaphylaxis     Swelling of tongue and closing of throat    Sulfa Antibiotics Other (See Comments) and Anaphylaxis     VERY EMOTIONAL CRYING    Bactrim [Sulfamethoxazole-Trimethoprim] Other (See Comments)     VERY EMOTIONAL/CRYING       Social History:     Marital Status: /Civil Union   Occupation:  Retired  Patient Pre-hospital Living Situation:  Home with family  Patient Pre-hospital Level of Mobility:  Ambulatory  Patient Pre-hospital Diet Restrictions:  Denies  Substance Use History:   History   Alcohol Use No     History   Smoking Status    Former Smoker    Quit date: 1970   Smokeless Tobacco    Never Used     History   Drug Use No       Family History:    Family History   Problem Relation Age of Onset    Multiple myeloma Mother     Heart disease Father     Hypertension Father     Hypertension Sister     Heart disease Sister     Arthritis Family     Osteoporosis Family     Breast cancer Maternal Aunt        Physical Exam:     Vitals:   Blood Pressure: 137/58 (09/25/18 1224)  Pulse: (!) 110 (09/25/18 1224)  Temperature: 98 °F (36 7 °C) (09/25/18 0913)  Temp Source: Tympanic (09/25/18 0913)  Respirations: 18 (09/25/18 1224)  Height: 5' 3" (160 cm) (09/25/18 0913)  Weight - Scale: 91 2 kg (201 lb) (09/25/18 0913)  SpO2: 93 % (09/25/18 1224)    Physical Exam      Additional Data:     Lab Results: I have personally reviewed pertinent reports          Results from last 7 days  Lab Units 09/25/18  0959   WBC Thousand/uL 10 08   HEMOGLOBIN g/dL 12 2   HEMATOCRIT % 38 6   PLATELETS Thousands/uL 160   NEUTROS PCT % 86*   LYMPHS PCT % 2*   MONOS PCT % 10   EOS PCT % 1       Results from last 7 days  Lab Units 09/25/18  0959   SODIUM mmol/L 136   POTASSIUM mmol/L 4 8   CHLORIDE mmol/L 100   CO2 mmol/L 27   BUN mg/dL 25   CREATININE mg/dL 1 22   CALCIUM mg/dL 10 3*   ALK PHOS U/L 92   ALT U/L 27   AST U/L 50*                   Imaging: I have personally reviewed pertinent reports  CTA ED chest PE study   Final Result by Gay Denver, MD (09/25 1245)   Addendum 1 of 1 by Gay Denver, MD (09/25 1257)   Please note: There is atelectasis at the left lung base both in the    lingula and in the costophrenic angle, and this atelectatic change    probably accounts for the airspace opacity in that location on frontal    chest x-ray  Final      No pulmonary embolus  Profound increase in bulky rodger adenopathy in bilateral axilla and in the chest walls  Increase in the size of nodes in the upper abdomen, incompletely evaluated, and at the esophageal hiatus  Slight interval decrease in the size of mediastinal nodes  Workstation performed: [id number]         XR chest 1 view   Final Result by Shaunna Stoner MD (09/25 1054)      Interval development of infiltrate at lateral left lung base            Workstation performed: [id number]                 ** Please Note: This note has been constructed using a voice recognition system   **

## 2018-09-25 NOTE — ASSESSMENT & PLAN NOTE
Patient was given Mag citrate for constipation as an outpatient  Patient reportedly had severe constipation approximately 1 week ago  Family reports that she did have some significant diarrhea symptoms after the magnesium site treat therapy  The report that they feel that there may be some dehydration associated with the volume of diarrhea secondary to the medications

## 2018-09-25 NOTE — ASSESSMENT & PLAN NOTE
· Patient had bowel movement yesterday  · Given severe dysphagia secondary to pain will hold on any further intervention unless patient can tolerate oral intake  · Continue bowel regimen as tolerated

## 2018-09-25 NOTE — PROGRESS NOTES
Cardiology Follow Up    Amrik Courtney  1940  [address]  [facility] [address]  [address]    1  Other specified hypotension  Transfer to other facility   2  Left bundle branch block (LBBB)     3  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[facility] [address] )     4  Sinus tachycardia           Discussion/Summary: She sees Dr Bridget Milan for her NHL and I discussed the case with him  She will be admitted to [facility] under [facility]  She will need a consult with cardiology and with Dr Bridget Milan  I feel that she is dehydrated and not in heart failure  Cardiology will need to decide on her appropriate medications for her severe CM  T/C Corlanor  Interval History: I last saw her 4/25/2018 for a decrease in her EF from 55% to 40%   She had been on Adriamycin for NHL which was stopped  She was started on Toprol XL 50 mg daily and Lisinopril 5 mg daily and was f/up in 3 months for medications titration and repeat ECHO  She follows up today and complaints of severe weakness, lightheadedness  She has thrush and is not eating or drinking well  She is now getting XRT  Recent ECHO now shows her EF to be about 25-30%  She denies CP, LE edema  Her weight is down 20 LBS since 4/2018  She stopped taking her Toprol and Lisinopril several weeks ago  ECG shows ST with  BPM   SBP is ~ 70 mmHg  She has had a LBBB on previous ECGs but it is not present today      Patient Active Problem List   Diagnosis    Enlarged lymph node in neck    Lymphadenopathy, axillary    Lymphoma (HCC)    Arm DVT (deep venous thromboembolism), acute, left (HCC)    Essential hypertension    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 ([facility] [address] )    Constipation    Left bundle branch block (LBBB)    Infected insect bite of neck    Vitamin D deficiency    Vitamin B12 deficiency    Thrombosis of left subclavian vein ([facility] [address] )    Situational anxiety    Thrombophlebitis of deep veins of upper extremities    Gastroesophageal reflux disease    Dyslipidemia    DLBCL (diffuse large B cell lymphoma) (HCC)    Cataract    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    Esophageal stricture    [place] (hard of hearing)    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HCC)    Osteoarthritis    Lymphadenopathy of head and neck    Marginal zone lymphoma (HCC)    Acute left flank pain    Left jugular vein thrombosis    Primary insomnia    Sinus tachycardia    Cardiomyopathy secondary to drug [facility])     Past Medical History:   Diagnosis Date    Basal cell carcinoma (BCC)     Bruit of right carotid artery     resolved 5/14/15     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HCC)     radiation    Dyslipidemia     Esophageal stricture     last assessed 2/28/13       GERD (gastroesophageal reflux disease)     History of chemotherapy     History of radiation therapy     [place] (hard of hearing)     b/l ears    Hyperlipidemia     Hypertension     Osteoarthritis     Osteoarthrosis of ankle and foot     last assessed 5/20/13     Osteopenia     last assessed 2/28/13     Plantar fasciitis of left foot     Polyp of sigmoid colon     last assessed 2/28/13     Shortness of breath     when walking up stairs     Social History     Social History    Marital status: /Civil Union     Spouse name: N/A    Number of children: N/A    Years of education: N/A     Occupational History    Not on file       Social History Main Topics    Smoking status: Former Smoker     Quit date: 1970    Smokeless tobacco: Never Used    Alcohol use No    Drug use: No    Sexual activity: Not on file     Other Topics Concern    Not on file     Social History Narrative    Uses safety equipment seatbelts       Family History   Problem Relation Age of Onset    Multiple myeloma Mother     Heart disease Father     Hypertension Father     Hypertension Sister     Heart disease Sister     Arthritis Family     Osteoporosis Family     Breast cancer Maternal Aunt      Past Surgical History:   Procedure Laterality Date    ANKLE ARTHROPLASTY Left     BLEPHAROPLASTY      right eye     CATARACT EXTRACTION Right     COLONOSCOPY      ESOPHAGEAL DILATION      x2    ESOPHAGOGASTRODUODENOSCOPY      dilitation    FACIAL/NECK BIOPSY Right 9/8/2017    Procedure: NECK NODE BIOPSY WITH NEEDLE LOCALIZATION; LYMPHOMA PROTOCOL (NEEDLE LOC WITH I R  AT 1100); Surgeon: Taiwo Odom MD;  [facility];  Service: Surgical Oncology    HYSTERECTOMY      LYMPH NODE DISSECTION      right groin and neck    ORBITAL FLOOR EXPLORATION Right     for cancer     PORTACATH PLACEMENT      left chest     UT BX/REMV,LYMPH NODE,DEEP AXILL Left 8/14/2017    Procedure: Axillary lymph node excision with LYMPHOMA PROTOCOL;  Surgeon: Taiwo Odom MD;  [facility];  Service: Surgical Oncology    UT BX/REMV,LYMPH NODE,DEEP CERV Right 5/2/2016    Procedure: NECK NODE BIOPSY;  Surgeon: Taiwo Odom MD;  [facility];  Service: Surgical Oncology    UT BX/REMV,LYMPH NODE,DEEP CERV Left 7/25/2018    Procedure: NECK NODE BIOPYS WITH LYMPHOMA PROTOCOL; (ULTRASOUND GUIDED NEEDLE LOC IN IR AT 0800);   Surgeon: Taiwo Odom MD;  [facility];  Service: Surgical Oncology    UT INSJ TUNNELED CTR VAD W/SUBQ PORT AGE 5 YR/> N/A 10/3/2017    Procedure: PLACEMENT OF PORT-A-CATH DEVICE;  Surgeon: Taiwo Odom MD;  [facility];  Service: Surgical Oncology       Current Outpatient Prescriptions:     allopurinol (ZYLOPRIM) 100 mg tablet, Take 2 tablets (200 mg total) by mouth daily, Disp: 60 tablet, Rfl: 1    apixaban (ELIQUIS) 5 mg, Take 1 tablet (5 mg total) by mouth 2 (two) times a day, Disp: 60 tablet, Rfl: 2    ascorbic acid (VITAMIN C) 500 mg tablet, Take 500 mg by mouth daily with dinner  , Disp: , Rfl:     Calcium Carb-Cholecalciferol (CALCIUM + D3) 600-200 MG-UNIT TABS, Take 1 tablet by mouth daily with dinner  , Disp: , Rfl:     Cyanocobalamin (VITAMIN B 12 PO), Take 1 tablet by mouth daily with dinner  , Disp: , Rfl:     Lenalidomide (REVLIMID) 20 MG CAPS, Take 1 capsule (20 mg total) by mouth daily Adult Female  Auth # [id number] Take 1 capsule daily days 1-21   Followed by 7 days off , Disp: 21 capsule, Rfl: 0    loratadine (CLARITIN) 10 mg tablet, Take 10 mg by mouth daily in the early morning  , Disp: , Rfl:     Melatonin 5 MG TABS, Take 1 tablet by mouth daily at bedtime as needed, Disp: , Rfl:     Multiple Vitamin (MULTIVITAMIN) capsule, Take 1 capsule by mouth daily with dinner  , Disp: , Rfl:     omeprazole (PriLOSEC) 20 mg delayed release capsule, Take 1 capsule (20 mg total) by mouth daily, Disp: 90 capsule, Rfl: 1    ondansetron (ZOFRAN) 4 mg tablet, Take 1 tablet (4 mg total) by mouth every 8 (eight) hours as needed for nausea or vomiting, Disp: 30 tablet, Rfl: 0    polyethylene glycol (MIRALAX) 17 g packet, Take 17 g by mouth daily, Disp: 14 each, Rfl: 0    predniSONE 20 mg tablet, Take 1 tablet (20 mg total) by mouth daily with food, Disp: 30 tablet, Rfl: 3    senna (SENOKOT) 8 6 mg, Take 1 tablet (8 6 mg total) by mouth 2 (two) times a day, Disp: , Rfl: 0    simethicone (MYLICON) 80 mg chewable tablet, Chew 1 tablet (80 mg total) every 6 (six) hours as needed for flatulence, Disp: 30 tablet, Rfl: 0    sucralfate (CARAFATE) 1 g tablet, Take 1 tablet (1 g total) by mouth 4 (four) times a day, Disp: 60 tablet, Rfl: 0    traZODone (DESYREL) 50 mg tablet, Take 0 5 tablets (25 mg total) by mouth daily at bedtime, Disp: 15 tablet, Rfl: 0    HYDROcodone-acetaminophen (NORCO) 5-325 mg per tablet, Take 1 tablet by mouth every 6 (six) hours as needed for pain Max Daily Amount: 4 tablets (Patient not taking: Reported on 9/25/2018 ), Disp: 20 tablet, Rfl: 0    nystatin (MYCOSTATIN) 100,000 units/mL suspension, Apply 5 mL (500,000 Units total) to the mouth or throat 4 (four) times a day Swish and swallow 4 times daily for 1 week  (Patient not taking: Reported on 9/25/2018 ), Disp: 473 mL, Rfl: 0  Allergies   Allergen Reactions    Alteplase Anaphylaxis    Aspirin Anaphylaxis    Celebrex [Celecoxib] Anaphylaxis    Nsaids Anaphylaxis    Other Anaphylaxis     Pt states when she received a certain type of chemotherapy it caused her throat to close    Rituxan [Rituximab] Anaphylaxis     Swelling of tongue and closing of throat    Sulfa Antibiotics Other (See Comments) and Anaphylaxis     VERY EMOTIONAL CRYING    Bactrim [Sulfamethoxazole-Trimethoprim] Other (See Comments)     VERY EMOTIONAL/CRYING     Vitals:    09/25/18 0754   BP: (!) 60/40   BP Location: Left arm   Patient Position: Sitting   Cuff Size: Standard   Pulse: (!) 133   SpO2: 97%   Weight: 91 2 kg (201 lb)     Weight (last 2 days)     Date/Time   Weight    09/25/18 0754  91 2 (201)             Blood pressure (!) 60/40, pulse (!) 133, weight 91 2 kg (201 lb), SpO2 97 %, not currently breastfeeding , Body mass index is 34 94 kg/m²  Imaging: No results found  EKG: Sinus tachycardia  LVH with repolarization abnl  Review of Systems:  Review of Systems   Constitutional: Negative for diaphoresis, fatigue, fever and unexpected weight change  HENT: Negative  Respiratory: Positive for shortness of breath  Negative for cough and wheezing  Cardiovascular: Negative for chest pain, palpitations and leg swelling  Gastrointestinal: Negative for abdominal pain, diarrhea and nausea  Musculoskeletal: Negative for gait problem and myalgias  Skin: Negative for rash  Neurological: Positive for weakness and light-headedness  Negative for dizziness and numbness  Psychiatric/Behavioral: Negative  Physical Exam:  Physical Exam   Constitutional: She is oriented to person, place, and time  She appears well-developed and well-nourished  HENT:   Head: Normocephalic and atraumatic  Eyes: Pupils are equal, round, and reactive to light     Neck: Normal range of motion  Neck supple  No JVD present  Cardiovascular: Regular rhythm, S1 normal, S2 normal and normal pulses  Tachycardia present  Pulses:       Carotid pulses are 2+ on the right side, and 2+ on the left side  Pulmonary/Chest: Effort normal and breath sounds normal  She has no wheezes  She has no rales  Abdominal: Soft  Bowel sounds are normal  There is no tenderness  Musculoskeletal: Normal range of motion  She exhibits no edema or tenderness  Neurological: She is alert and oriented to person, place, and time  She has normal reflexes  No cranial nerve deficit  Skin: Skin is warm  Psychiatric: She has a normal mood and affect

## 2018-09-25 NOTE — ASSESSMENT & PLAN NOTE
· [address] [facility] consultation, recommendations noted, heart rate low 100s, patient asymptomatic  · Plan to start metoprolol 25 mg b i d   · Continue to monitor on telemetry, monitor BP closely as patient has been hypotensive  · Given significant dysphagia and esophageal pain will continue with IV hydration as dehydration may also be playing a significant role with her tachycardia

## 2018-09-25 NOTE — ASSESSMENT & PLAN NOTE
· Appreciate Oncology consultation  · Patient with relapsed diffuse large B-cell lymphoma, today is her last day of palliative radiation therapy to the neck and chest  · Currently with significant esophageal pain and dysphagia, 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 in addition to dehydration  · Continue IV hydration, encourage oral intake as tolerated  · Currently holding on Lenalidomide  · CTA of the chest with negative pulmonary embolism, profound increase in bulky rodger adenopathy in bilateral axilla and chest walls, increase in the size of the nodes in the upper abdomen incompletely evaluated and at the esophageal hiatus, slight interval decrease in the size of the mediastinal nodes  · Chest x-ray with interval development of infiltrate at lateral left lung base  · Procalcitonin 0 13  · Patient afebrile  · No leukocytosis

## 2018-09-25 NOTE — CONSULTS
Heart Failure Consult Note - Jay Mccord 78 y o  female MRN: [id number]    Unit/Bed#: ED 05 Encounter: [id number]      Assessment:    Dilated Cardiomyopathy  LBBB  Hypertension  Non Hodgkin's Lymphoma  GERD  Thrush  DVT  L Jugular vein thrombosis  Anxiety        Plan: 1  Dilated Cardiomyopathy, HFrEF, not in exacerbation  Etiology likely 2/2 IV chemotherapy with Adriamycin for NonHodgkin's Lymphoma  TTE from 8/2017 demonstrating LVEF of 55% now demonstrating significant decline in systolic function to 52%  NYHA Class III/IV, ACC/AHA Stage C  Patient hypovolemic on PE with tachycardia and hypotension  Now improved with 500 cc of NSS  Chest xray demonstrates new left lower lobe infiltrate  Would hold beta blocker and ACEI for now until patient's fluid status improves  Will need to continue with GDMT including beta blocker, ACEI, ARB, ARNI, diuretic if needed when able  Continue to monitor daily weights, strict I's and O's, BMP      2 LBBB  Currently sinus tach with narrow QRS  3 Hypertension  Antihypertensives on hold 2/2 hypotension, hypovolemia    4  History of DVT, left Jugular vein thrombosis  On Eliquis  5  Lymphoma  Currently receiving radiation and oral chemotherapy  Defer to hem/onc Follows with Dr Mila Begum  6 Oral Joanna infection  Continue with Nystatin  HPI:  This is a 78year old female with a past medical history as stated above who was sent to the ED this AM by her cardiologist Dr Gayle Almendarez with complaints of weakness, shortness of breath, and lightheadedness  Patient was seen in follow up today in Dr Phoebe Reno office and found to be hypotensive with SBP in the 70s and tachycardic with heart rate in the 130's, felt to be secondary to dehydration  Patient has been constipated and took stool softener yesterday which has since caused ongoing diarrhea  Patient was being treated for Non Hodgkin's Lymphoma with IV Adriamycin  around September through December 2017   Treatments were then stopped due to a decrease in her LVEF from 55% in August 2017 to 40% in December 2017  She then saw Dr Urvashi Caceres for the first time in April of this year and was started on a beta blocker and ACEI  At that time had been feeling well with no significant change in functional status or CHF exacerbations  A repeat TTE was performed in July of this year which demonstrated a significant drop in her EF to 25%  She did have  a hospitalization for left jugular thrombosis, for which she was started on Eliquis and most recently a lymph node biopsy, which confirmed return of her lymphoma  Currently she is undergoing radiation treatments along with Revlimid which is her new oral chemotherapy agent  She tells me today that up until about August, she had been feeling well  Was able to work outside in her garden and cut grass  However, over the past month has noted significant fatigue, shortness of breath, and lightheadedness  Takes her vital signs at home and has noted her blood pressure to be on the low side and running in the 100-110 range  She reports poor PO intake due to throat pain associated with radiation as well as an oral  Candida infection for which she is being treated with Nystatin  Of note, patient has lost a total of 20 pounds since seen by Dr Urvashi Caceres in April  She denies chest pain, syncopal episodes, orthopnea, lower extremity edema, abdominal distention, or PND  Her renal function and hemoglobin are stable  She has received 500 cc of NSS so far and feeling a bit better  She is a less tachycardic in the low 100s with a SBP of 100  Current EKG demonstrates narrow QRS, no LBBB pattern  Chest xray is pending  She is sitting up comfortably with family at bedside          Past Medical History:   Diagnosis Date    Basal cell carcinoma (BCC)     Bruit of right carotid artery     resolved 5/14/15     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HCC)     radiation    Dyslipidemia     Esophageal stricture     last assessed 2/28/13  GERD (gastroesophageal reflux disease)     History of chemotherapy     History of radiation therapy     [place] (hard of hearing)     b/l ears    Hyperlipidemia     Hypertension     Osteoarthritis     Osteoarthrosis of ankle and foot     last assessed 5/20/13     Osteopenia     last assessed 2/28/13     Plantar fasciitis of left foot     Polyp of sigmoid colon     last assessed 2/28/13     Shortness of breath     when walking up stairs     12 point ROS negative except that stated in HPI    Allergies   Allergen Reactions    Alteplase Anaphylaxis    Aspirin Anaphylaxis    Celebrex [Celecoxib] Anaphylaxis    Nsaids Anaphylaxis    Other Anaphylaxis     Pt states when she received a certain type of chemotherapy it caused her throat to close    Rituxan [Rituximab] Anaphylaxis     Swelling of tongue and closing of throat    Sulfa Antibiotics Other (See Comments) and Anaphylaxis     VERY EMOTIONAL CRYING    Bactrim [Sulfamethoxazole-Trimethoprim] Other (See Comments)     VERY EMOTIONAL/CRYING           Current Facility-Administered Medications:     sodium chloride 0 9 % bolus 500 mL, 500 mL, Intravenous, Once, Latasha Wahl MD, Last Rate: 500 mL/hr at 09/25/18 1000, 500 mL at 09/25/18 1000    Current Outpatient Prescriptions:     allopurinol (ZYLOPRIM) 100 mg tablet, Take 2 tablets (200 mg total) by mouth daily, Disp: 60 tablet, Rfl: 1    apixaban (ELIQUIS) 5 mg, Take 1 tablet (5 mg total) by mouth 2 (two) times a day, Disp: 60 tablet, Rfl: 2    ascorbic acid (VITAMIN C) 500 mg tablet, Take 500 mg by mouth daily with dinner  , Disp: , Rfl:     Calcium Carb-Cholecalciferol (CALCIUM + D3) 600-200 MG-UNIT TABS, Take 1 tablet by mouth daily with dinner  , Disp: , Rfl:     Cyanocobalamin (VITAMIN B 12 PO), Take 1 tablet by mouth daily with dinner  , Disp: , Rfl:     HYDROcodone-acetaminophen (NORCO) 5-325 mg per tablet, Take 1 tablet by mouth every 6 (six) hours as needed for pain Max Daily Amount: 4 tablets, Disp: 20 tablet, Rfl: 0    Lenalidomide (REVLIMID) 20 MG CAPS, Take 1 capsule (20 mg total) by mouth daily Adult Female  Auth # [id number] Take 1 capsule daily days 1-21  Followed by 7 days off , Disp: 21 capsule, Rfl: 0    loratadine (CLARITIN) 10 mg tablet, Take 10 mg by mouth daily in the early morning  , Disp: , Rfl:     Melatonin 5 MG TABS, Take 1 tablet by mouth daily at bedtime as needed, Disp: , Rfl:     Multiple Vitamin (MULTIVITAMIN) capsule, Take 1 capsule by mouth daily with dinner  , Disp: , Rfl:     nystatin (MYCOSTATIN) 100,000 units/mL suspension, Apply 5 mL (500,000 Units total) to the mouth or throat 4 (four) times a day Swish and swallow 4 times daily for 1 week , Disp: 473 mL, Rfl: 0    omeprazole (PriLOSEC) 20 mg delayed release capsule, Take 1 capsule (20 mg total) by mouth daily, Disp: 90 capsule, Rfl: 1    ondansetron (ZOFRAN) 4 mg tablet, Take 1 tablet (4 mg total) by mouth every 8 (eight) hours as needed for nausea or vomiting, Disp: 30 tablet, Rfl: 0    polyethylene glycol (MIRALAX) 17 g packet, Take 17 g by mouth daily, Disp: 14 each, Rfl: 0    predniSONE 20 mg tablet, Take 1 tablet (20 mg total) by mouth daily with food, Disp: 30 tablet, Rfl: 3    senna (SENOKOT) 8 6 mg, Take 1 tablet (8 6 mg total) by mouth 2 (two) times a day, Disp: , Rfl: 0    simethicone (MYLICON) 80 mg chewable tablet, Chew 1 tablet (80 mg total) every 6 (six) hours as needed for flatulence, Disp: 30 tablet, Rfl: 0    sucralfate (CARAFATE) 1 g tablet, Take 1 tablet (1 g total) by mouth 4 (four) times a day, Disp: 60 tablet, Rfl: 0    traZODone (DESYREL) 50 mg tablet, Take 0 5 tablets (25 mg total) by mouth daily at bedtime, Disp: 15 tablet, Rfl: 0    Social History     Social History    Marital status: /Civil Union     Spouse name: N/A    Number of children: N/A    Years of education: N/A     Occupational History    Not on file       Social History Main Topics    Smoking status: Former Smoker     Quit date: 1970    Smokeless tobacco: Never Used    Alcohol use No    Drug use: No    Sexual activity: Not on file     Other Topics Concern    Not on file     Social History Narrative    Uses safety equipment seatbelts        Family History   Problem Relation Age of Onset    Multiple myeloma Mother     Heart disease Father     Hypertension Father     Hypertension Sister     Heart disease Sister     Arthritis Family     Osteoporosis Family     Breast cancer Maternal Aunt        Physical Exam:  [employer] (day, reason): Mckeon catheter (day, reason):    Vitals: Blood pressure 110/58, pulse (!) 118, temperature 98 °F (36 7 °C), temperature source Tympanic, resp  rate (!) 23, height 5' 3" (1 6 m), weight 91 2 kg (201 lb), SpO2 93 %, not currently breastfeeding , Body mass index is 35 61 kg/m² , No intake/output data recorded  I/O this shift: In: 500 [IV Piggyback:500]  Out: -   Wt Readings from Last 3 Encounters:   09/25/18 91 2 kg (201 lb)   09/25/18 91 2 kg (201 lb)   09/19/18 92 9 kg (204 lb 12 8 oz)       Intake/Output Summary (Last 24 hours) at 09/25/18 1028  Last data filed at 09/25/18 1000   Gross per 24 hour   Intake              500 ml   Output                0 ml   Net              500 ml     No intake/output data recorded  No significant arrhythmias seen on telemetry review   ST    Vitals:    09/25/18 0913 09/25/18 0916 09/25/18 0945   BP:  114/57 110/58   BP Location:   Left arm   Pulse: (!) 131  (!) 118   Resp: 16  (!) 23   Temp: 98 °F (36 7 °C)     TempSrc: Tympanic     SpO2: 99%  93%   Weight: 91 2 kg (201 lb)     Height: 5' 3" (1 6 m)         GEN: Brandy Borjas appears well, alert and oriented x 3, pleasant and cooperative   HEENT: pupils equal, round, and reactive to light; extraocular muscles intact  NECK: supple, no carotid bruits   HEART: regular rhythm, tachy, normal S1 and S2, no murmurs, clicks, gallops or rubs, no JVD    LUNGS: clear to auscultation bilaterally; no wheezes, rales, or rhonchi   ABDOMEN: normal bowel sounds, soft, no tenderness, no distention  EXTREMITIES: peripheral pulses normal; no clubbing, cyanosis, or edema  NEURO: no focal findings   SKIN: normal without suspicious lesions on exposed skin    Labs & Results:        Results from last 7 days  Lab Units 09/25/18  0959 09/24/18  1219   WBC Thousand/uL 10 08 8 38   HEMOGLOBIN g/dL 12 2 12 0   HEMATOCRIT % 38 6 38 7   PLATELETS Thousands/uL 160 162           Results from last 7 days  Lab Units 09/24/18  1219   SODIUM mmol/L 139   POTASSIUM mmol/L 3 9   CHLORIDE mmol/L 101   CO2 mmol/L 29   BUN mg/dL 18   CREATININE mg/dL 0 98   CALCIUM mg/dL 10 2*   ALK PHOS U/L 95   ALT U/L 26   AST U/L 35         Chest X-Ray is obtained; result - left lower lobe infiltrate  7/2018  LVEF: 25%  LVIDd:4 73  RV: normal size and systolic function  MR:mild  PASP:  RVOT:   Other:    EKG personally reviewed by ANNEMARIE Mcconnell  Counseling / Coordination of Care  Total floor / unit time spent today 30 minutes  Greater than 50% of total time was spent with the patient and / or family counseling and / or coordination of care  A description of the counseling / coordination of care: 20  Thank you for the opportunity to participate in the care of this patient

## 2018-09-25 NOTE — CONSULTS
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, diagnosed in 2017  She was initially treated with 3 cycle of R-chop  After this was found to be double hit, treatment regimen was changed to [facility]  Due to the cardiotoxicity, doxorubicin was removed from the last cycle of chemotherapy  She had total 5 cycle of treatment  Unfortunately, she had recurrence lymphoma in June 2018  Biopsy confirmed 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 with translocation of BCL-6 and MYC  She has been under the care of Dr Wolf Staff who recommended palliative radiation therapy, because she had difficulty of swallowing due to the lymphadenopathy  She had 9/10 radiation treatments so far  She was recommended to start lenalidomide, after the completion of radiation treatment  Therefore, she has not started lenalidomide a yet  She was hospitalized since she was found to have tachycardia and dehydrated at cardiologist's office today  She has not been able to take adequate fluid due to the odynophagia and dysphagia  She was given hydration in the ER and felt well  She is afebrile  She has occasional night sweats  She denied respiratory symptoms  She is ambulatory  Review of Systems - As stated in the HPI otherwise the fourteen point review of systems was negative      Past Medical History:   Diagnosis Date    Basal cell carcinoma (BCC)     Bruit of right carotid artery     resolved 5/14/15     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HCC)     radiation    Dyslipidemia     Esophageal stricture     last assessed 2/28/13       GERD (gastroesophageal reflux disease)     History of chemotherapy     History of radiation therapy     [place] (hard of hearing)     b/l ears    Hyperlipidemia     Hypertension     Osteoarthritis     Osteoarthrosis of ankle and foot     last assessed 5/20/13     Osteopenia     last assessed 2/28/13     Plantar fasciitis of left foot     Polyp of sigmoid colon     last assessed 2/28/13     Shortness of breath     when walking up stairs       Social History     Social History    Marital status: /Civil Union     Spouse name: N/A    Number of children: N/A    Years of education: N/A     Social History Main Topics    Smoking status: Former Smoker     Quit date: 1970    Smokeless tobacco: Never Used    Alcohol use No    Drug use: No    Sexual activity: Not Asked     Other Topics Concern    None     Social History Narrative    Uses safety equipment seatbelts        Family History   Problem Relation Age of Onset    Multiple myeloma Mother     Heart disease Father     Hypertension Father     Hypertension Sister     Heart disease Sister     Arthritis Family     Osteoporosis Family     Breast cancer Maternal Aunt        Allergies   Allergen Reactions    Alteplase Anaphylaxis    Aspirin Anaphylaxis    Celebrex [Celecoxib] Anaphylaxis    Nsaids Anaphylaxis    Other Anaphylaxis     Pt states when she received a certain type of chemotherapy it caused her throat to close    Rituxan [Rituximab] Anaphylaxis     Swelling of tongue and closing of throat    Sulfa Antibiotics Other (See Comments) and Anaphylaxis     VERY EMOTIONAL CRYING    Bactrim [Sulfamethoxazole-Trimethoprim] Other (See Comments)     VERY EMOTIONAL/CRYING         Current Facility-Administered Medications:     allopurinol (ZYLOPRIM) tablet 200 mg, 200 mg, Oral, Daily, Hetul Mcclellan, DO    apixaban (ELIQUIS) tablet 5 mg, 5 mg, Oral, BID, Hetul Mcclellan, DO    ascorbic acid (VITAMIN C) tablet 500 mg, 500 mg, Oral, Daily With Dinner, [facility], DO    [START ON 9/26/2018] calcium carbonate-vitamin D (OSCAL-D) 500 mg-200 units per tablet 1 tablet, 1 tablet, Oral, Daily With Breakfast, Hetul Mcclellan, DO    HYDROcodone-acetaminophen (NORCO) 5-325 mg per tablet 1 tablet, 1 tablet, Oral, Q6H PRN, Stefanyul Mcclellan, DO    [START ON 9/26/2018] loratadine (CLARITIN) tablet 10 mg, 10 mg, Oral, Early Morning, Hetul Mcclellan, DO    Melatonin TABS 5 mg, 1 tablet, Oral, HS PRN, Hetul Mcclellan, DO    nystatin (MYCOSTATIN) oral suspension 500,000 Units, 500,000 Units, Swish & Swallow, 4x Daily, Hetul Mcclellan, DO    ondansetron (ZOFRAN) injection 4 mg, 4 mg, Intravenous, Q6H PRN, Hetul Mcclellan, DO    ondansetron (ZOFRAN-ODT) dispersible tablet 4 mg, 4 mg, Oral, Q6H PRN, Hetul Mcclellan, DO    pantoprazole (PROTONIX) EC tablet 40 mg, 40 mg, Oral, BID AC, Hetul Mcclellan, DO    polyethylene glycol (MIRALAX) packet 17 g, 17 g, Oral, Daily, Hetul Mcclellan, DO    predniSONE tablet 20 mg, 20 mg, Oral, Daily, Hetul Mcclellan, DO    senna (SENOKOT) tablet 8 6 mg, 1 tablet, Oral, BID, Hetul Mcclellan, DO    simethicone (MYLICON) chewable tablet 80 mg, 80 mg, Oral, Q6H PRN, Hetul Mcclellan, DO    sodium chloride 0 9 % infusion, 50 mL/hr, Intravenous, Continuous, Hetul Mcclellan, DO    sucralfate (CARAFATE) tablet 1 g, 1 g, Oral, 4x Daily, Hetul Mcclellan, DO    traZODone (DESYREL) tablet 25 mg, 25 mg, Oral, HS, Hetul Mcclellan, DO      /55 (BP Location: Left arm)   Pulse 102   Temp 98 °F (36 7 °C) (Tympanic)   Resp 22   Ht 5' 3" (1 6 m)   Wt 91 2 kg (201 lb)   SpO2 91%   BMI 35 61 kg/m²     General Appearance:    Alert, oriented        Eyes:    PERRL   Ears:    Normal external ear canals, both ears   Nose:   Nares normal, septum midline   Throat:   Mucosa moist  Pharynx without injection  Neck:   Supple       Lungs:     Clear to auscultation bilaterally   Chest Wall:    No tenderness or deformity    Heart:    Regular rate and rhythm       Abdomen:     Soft, non-tender, bowel sounds +, no organomegaly           Extremities:   Extremities no cyanosis or edema       Skin:   no rash or icterus      Lymph nodes:   Cervical, supraclavicular, and axillary nodes normal   Neurologic:   CNII-XII intact, normal strength, sensation and reflexes     Throughout               Recent Results (from the past 48 hour(s))   CBC and differential    Collection Time: 09/24/18 12:19 PM   Result Value Ref Range    WBC 8 38 4 31 - 10 16 Thousand/uL    RBC 4 22 3 81 - 5 12 Million/uL    Hemoglobin 12 0 11 5 - 15 4 g/dL    Hematocrit 38 7 34 8 - 46 1 %    MCV 92 82 - 98 fL    MCH 28 4 26 8 - 34 3 pg    MCHC 31 0 (L) 31 4 - 37 4 g/dL    RDW 15 4 (H) 11 6 - 15 1 %    MPV 11 0 8 9 - 12 7 fL    Platelets 361 109 - 450 Thousands/uL    nRBC 0 /100 WBCs    Neutrophils Relative 88 (H) 43 - 75 %    Immat GRANS % 1 0 - 2 %    Lymphocytes Relative 3 (L) 14 - 44 %    Monocytes Relative 7 4 - 12 %    Eosinophils Relative 1 0 - 6 %    Basophils Relative 0 0 - 1 %    Neutrophils Absolute 7 44 1 85 - 7 62 Thousands/µL    Immature Grans Absolute 0 07 0 00 - 0 20 Thousand/uL    Lymphocytes Absolute 0 24 (L) 0 60 - 4 47 Thousands/µL    Monocytes Absolute 0 56 0 17 - 1 22 Thousand/µL    Eosinophils Absolute 0 06 0 00 - 0 61 Thousand/µL    Basophils Absolute 0 01 0 00 - 0 10 Thousands/µL   Comprehensive metabolic panel    Collection Time: 09/24/18 12:19 PM   Result Value Ref Range    Sodium 139 136 - 145 mmol/L    Potassium 3 9 3 5 - 5 3 mmol/L    Chloride 101 100 - 108 mmol/L    CO2 29 21 - 32 mmol/L    ANION GAP 9 4 - 13 mmol/L    BUN 18 5 - 25 mg/dL    Creatinine 0 98 0 60 - 1 30 mg/dL    Glucose 117 65 - 140 mg/dL    Calcium 10 2 (H) 8 3 - 10 1 mg/dL    Corrected Calcium 11 2 (H) 8 3 - 10 1 mg/dL    AST 35 5 - 45 U/L    ALT 26 12 - 78 U/L    Alkaline Phosphatase 95 46 - 116 U/L    Total Protein 5 8 (L) 6 4 - 8 2 g/dL    Albumin 2 8 (L) 3 5 - 5 0 g/dL    Total Bilirubin 0 68 0 20 - 1 00 mg/dL    eGFR 57 ml/min/1 73sq m   CBC and differential    Collection Time: 09/25/18  9:59 AM   Result Value Ref Range    WBC 10 08 4 31 - 10 16 Thousand/uL    RBC 4 24 3 81 - 5 12 Million/uL Hemoglobin 12 2 11 5 - 15 4 g/dL    Hematocrit 38 6 34 8 - 46 1 %    MCV 91 82 - 98 fL    MCH 28 8 26 8 - 34 3 pg    MCHC 31 6 31 4 - 37 4 g/dL    RDW 15 4 (H) 11 6 - 15 1 %    MPV 10 5 8 9 - 12 7 fL    Platelets 713 314 - 503 Thousands/uL    nRBC 0 /100 WBCs    Neutrophils Relative 86 (H) 43 - 75 %    Immat GRANS % 1 0 - 2 %    Lymphocytes Relative 2 (L) 14 - 44 %    Monocytes Relative 10 4 - 12 %    Eosinophils Relative 1 0 - 6 %    Basophils Relative 0 0 - 1 %    Neutrophils Absolute 8 70 (H) 1 85 - 7 62 Thousands/µL    Immature Grans Absolute 0 09 0 00 - 0 20 Thousand/uL    Lymphocytes Absolute 0 21 (L) 0 60 - 4 47 Thousands/µL    Monocytes Absolute 1 01 0 17 - 1 22 Thousand/µL    Eosinophils Absolute 0 05 0 00 - 0 61 Thousand/µL    Basophils Absolute 0 02 0 00 - 0 10 Thousands/µL   Comprehensive metabolic panel    Collection Time: 09/25/18  9:59 AM   Result Value Ref Range    Sodium 136 136 - 145 mmol/L    Potassium 4 8 3 5 - 5 3 mmol/L    Chloride 100 100 - 108 mmol/L    CO2 27 21 - 32 mmol/L    ANION GAP 9 4 - 13 mmol/L    BUN 25 5 - 25 mg/dL    Creatinine 1 22 0 60 - 1 30 mg/dL    Glucose 104 65 - 140 mg/dL    Calcium 10 3 (H) 8 3 - 10 1 mg/dL    AST 50 (H) 5 - 45 U/L    ALT 27 12 - 78 U/L    Alkaline Phosphatase 92 46 - 116 U/L    Total Protein 6 3 (L) 6 4 - 8 2 g/dL    Albumin 2 7 (L) 3 5 - 5 0 g/dL    Total Bilirubin 0 66 0 20 - 1 00 mg/dL    eGFR 43 ml/min/1 73sq m   TSH    Collection Time: 09/25/18  9:59 AM   Result Value Ref Range    TSH 3RD GENERATON 0 170 (L) 0 358 - 3 740 uIU/mL   Green / Black tube on hold    Collection Time: 09/25/18  9:59 AM   Result Value Ref Range    Extra Tube Hold for add-ons      POCT urinalysis dipstick    Collection Time: 09/25/18 12:30 PM   Result Value Ref Range    Color, UA yellow     Clarity, UA clear    ED Urine Macroscopic    Collection Time: 09/25/18 12:30 PM   Result Value Ref Range    Color, UA Yellow     Clarity, UA Clear     pH, UA 6 5 4 5 - 8 0    Leukocytes, UA Small (A) Negative    Nitrite, UA Negative Negative    Protein, UA Trace (A) Negative mg/dl    Glucose, UA Negative Negative mg/dl    Ketones, UA Negative Negative mg/dl    Urobilinogen, UA 0 2 0 2, 1 0 E U /dl E U /dl    Bilirubin, UA Negative Negative    Blood, UA Small (A) Negative    Specific Gravity, UA 1 020 1 003 - 1 030   Urine Microscopic    Collection Time: 09/25/18 12:30 PM   Result Value Ref Range    RBC, UA 4-10 (A) None Seen, 0-5 /hpf    WBC, UA Innumerable (A) None Seen, 0-5, 5-55, 5-65 /hpf    Epithelial Cells None Seen None Seen, Occasional /hpf    Bacteria, UA Occasional None Seen, Occasional /hpf    Hyaline Casts, UA 10-25 (A) None Seen /lpf         Xr Chest 1 View    Result Date: 9/25/2018  Narrative: CHEST INDICATION:   sob  COMPARISON:  1/18/2018 chest x-ray EXAM PERFORMED/VIEWS:  XR CHEST 1 VIEW Single view FINDINGS: Persistent typical appearing left-sided portacatheter, terminating at mid right atrium Cardiomediastinal silhouette appears unremarkable  Lateral left lung base infiltrate, not previously evident No pneumothorax or pleural effusion  Osseous structures appear within normal limits for patient age  Impression: Interval development of infiltrate at lateral left lung base Workstation performed: [id number]     Cta Ed Chest Pe Study    Addendum Date: 9/25/2018 Addendum:   Please note: There is atelectasis at the left lung base both in the lingula and in the costophrenic angle, and this atelectatic change probably accounts for the airspace opacity in that location on frontal chest x-ray  Result Date: 9/25/2018  Narrative: CTA - CHEST WITH IV CONTRAST - PULMONARY ANGIOGRAM INDICATION:   Shortness of breath  Lymphoma  Lethargy  Patient is being treated with chemotherapy and radiation therapy  COMPARISON: August 21, 2018   TECHNIQUE: CTA examination of the chest was performed using angiographic technique according to a protocol specifically tailored to evaluate for pulmonary embolism  Axial, sagittal, and coronal 2D reformatted images were created from the source data and  submitted for interpretation  In addition, coronal 3D MIP postprocessing was performed on the acquisition scanner  Radiation dose length product (DLP) for this visit:  620 92 mGy-cm   This examination, like all CT scans performed in the [facility], was performed utilizing techniques to minimize radiation dose exposure, including the use of iterative  reconstruction and automated exposure control  IV Contrast:  85 mL of iodixanol (VISIPAQUE)  Because of borderline renal function, standard department hydration protocol was recommended to minimize risk of contrast induced nephropathy  FINDINGS: PULMONARY ARTERIAL TREE:  No pulmonary embolus is seen  LUNGS:  Atelectatic changes noted in the lung bases more notable on the left than on the right  No consolidative airspace opacity to this pneumonia  No suspicious pulmonary parenchymal mass present  PLEURA:  Trace left costophrenic angle effusion is slightly increased when compared to August 2018  No pleural-based mass  No right effusion  No pneumothorax  HEART/AORTA:  Intrinsically unremarkable  MEDIASTINUM AND MARCIO:  Extensive lymphadenopathy in the lower neck, throughout the mediastinum, and to a lesser degree in the marcio is reidentified  Lymphadenopathy in the mediastinum is difficult to measure as many of the nodes are indistinct margins or  a conglomerate appearance however, a superior mediastinal node in the left paratracheal region at the level of the thoracic inlet has discrete appearance and measures 2 9 x 2 2 cm on image 34 of series 3 compared with 3 3 x 2 5 cm when measured using similar technique on previous examination  This is representative of the slight interval decrease in the overall bulk and size of most of the mediastinal and hilar lymph nodes identified when compared to August 21, 2018    However, some mediastinal nodes  have increased in size or developed since the prior examination with the specific example including a paraesophageal node just above the esophageal hiatus in the lower mediastinum measuring 1 9 x 1 4 cm on image 148 of series 3, which was not visible on  previous examination  CHEST WALL AND LOWER NECK:   There has been marked interval progression of bilateral axillary and chest wall rodger disease  A representative node in the low right axilla measuring 3 3 x 2 2 cm is increased from 10 x 5 mm when measured using similar technique on the previous examination  A representative left axillary rodger mass measuring 2 9 x 2 1 cm on image 63 of series 3 is increased from 11 x 7 mm when measured using similar technique on previous examination  There is a 2 7 x 1 4 cm rodger mass in the lower outer right breast on image 101 of series 3 which is increased from 11 x 8 mm on the prior exam  VISUALIZED STRUCTURES IN THE UPPER ABDOMEN:  Enlarged retrocrural nodes are reidentified bilaterally, similar from prior examination  Though incompletely evaluated, upper retroperitoneal lymphadenopathy especially surrounding the celiac axis appears significantly increased from previous examination  OSSEOUS STRUCTURES:  No acute fracture or destructive osseous lesion  Impression: No pulmonary embolus  Profound increase in bulky rodger adenopathy in bilateral axilla and in the chest walls  Increase in the size of nodes in the upper abdomen, incompletely evaluated, and at the esophageal hiatus  Slight interval decrease in the size of mediastinal nodes  Workstation performed: [id number]       Assessment:  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  Status post 9/10 palliative radiation therapy to the neck and chest for dysphagia due to the adenopathy  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

## 2018-09-25 NOTE — ASSESSMENT & PLAN NOTE
Potential etiologies include:  ·   extrinsic compression of lymphadenopathy on the esophagus  Patient with abnormal CT imaging in August suggestive extremity intake compression  · Other consideration would be esophageal stricture  Patient with prior strictures in the past limiting oral intake requiring bleeding of stricture  · Other consideration would be 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 given recent radiation treatment  For now will consult GI  Failure ports that she was placed on Carafate therapy for swallowing problems  Continue with supportive care  Continue with PPI therapy as well    Patient is asking for a surgical soft diet

## 2018-09-25 NOTE — ASSESSMENT & PLAN NOTE
Some of this may be physiologic due to severe lymphadenopathy  Etiology could be secondary to dehydration secondary to diarrhea associated with Mag citrate versus occult infection/pneumonia dehydration associated with poor oral intake due to 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 versus esophageal stricture  Patient was previously seen by Chino Gant for esophageal strictures  PE less likely given patient is currently on Eliquis  For now will empirically cover for presumed pneumonia  Follow up on cultures  Follow up on CT imaging  Monitor symptoms closely  Follow-up on procalcitonin level    Judicious fluid resuscitation

## 2018-09-25 NOTE — ASSESSMENT & PLAN NOTE
· 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 versus extrinsic compression of lymphadenopathy on the esophagus  · Patient describes as a burning pain is having difficulty with swallowing secondary to pain  · Try Magic mouthwash, change Carafate to liquid, encourage oral intake  · Continue IV hydration  · Continue nystatin  · Continue PPI   · Monitor closely

## 2018-09-25 NOTE — ED PROVIDER NOTES
History  Chief Complaint   Patient presents with    Weakness - Generalized     Hx lymphoma  receiving radiatin  presents today with generalized weakness, dehydration     68-year-old female with history of giant B-cell lymphoma presenting to the emergency department with complaint of fatigue  Patient was at her cardiologist appointment today when she was found to be tachycardic greater than 120 and having blood pressure is around 800 systolic, she has had decreased p o  intake for the past few days to weeks due to esophagitis from her radiation treatments  Patient states she was able to get 1 bottle of ensure down today however is very painful  She is able to swallow her pills by cutting them up she states she does not have any trouble breathing she does not have any chest pain but believes she has trouble getting air in at times no nausea or vomiting associated with this no headaches or vision changes she has no other risk factors for pulmonary embolism besides active cancer treatment  Her remaining ROS is negative  History provided by:  Patient and EMS personnel   used: No    Fatigue   Severity:  Moderate  Onset quality:  Gradual  Timing:  Constant  Progression:  Worsening  Chronicity:  New  Context: dehydration    Relieved by:  Drinking fluids and eating  Ineffective treatments:  None tried  Associated symptoms: no abdominal pain, no arthralgias, no chest pain, no diarrhea, no dysuria, no fever, no myalgias, no nausea, no shortness of breath and no vomiting        Prior to Admission Medications   Prescriptions Last Dose Informant Patient Reported? Taking?    Calcium Carb-Cholecalciferol (CALCIUM + D3) 600-200 MG-UNIT TABS  Self Yes Yes   Sig: Take 1 tablet by mouth daily with dinner     Cyanocobalamin (VITAMIN B 12 PO)  Self Yes Yes   Sig: Take 1 tablet by mouth daily with dinner     HYDROcodone-acetaminophen (NORCO) 5-325 mg per tablet  Self No Yes   Sig: Take 1 tablet by mouth every 6 (six) hours as needed for pain Max Daily Amount: 4 tablets   Lenalidomide (REVLIMID) 20 MG CAPS  Self No Yes   Sig: Take 1 capsule (20 mg total) by mouth daily Adult Female  Auth # [id number]  Take 1 capsule daily days 1-21  Followed by 7 days off  Melatonin 5 MG TABS   Yes Yes   Sig: Take 1 tablet by mouth daily at bedtime as needed   Multiple Vitamin (MULTIVITAMIN) capsule  Self Yes Yes   Sig: Take 1 capsule by mouth daily with dinner     allopurinol (ZYLOPRIM) 100 mg tablet  Self No Yes   Sig: Take 2 tablets (200 mg total) by mouth daily   apixaban (ELIQUIS) 5 mg  Self No Yes   Sig: Take 1 tablet (5 mg total) by mouth 2 (two) times a day   ascorbic acid (VITAMIN C) 500 mg tablet  Self Yes Yes   Sig: Take 500 mg by mouth daily with dinner     loratadine (CLARITIN) 10 mg tablet  Self Yes Yes   Sig: Take 10 mg by mouth daily in the early morning     nystatin (MYCOSTATIN) 100,000 units/mL suspension   No Yes   Sig: Apply 5 mL (500,000 Units total) to the mouth or throat 4 (four) times a day Swish and swallow 4 times daily for 1 week     omeprazole (PriLOSEC) 20 mg delayed release capsule  Self No Yes   Sig: Take 1 capsule (20 mg total) by mouth daily   ondansetron (ZOFRAN) 4 mg tablet  Self No Yes   Sig: Take 1 tablet (4 mg total) by mouth every 8 (eight) hours as needed for nausea or vomiting   polyethylene glycol (MIRALAX) 17 g packet  Self No Yes   Sig: Take 17 g by mouth daily   predniSONE 20 mg tablet   No Yes   Sig: Take 1 tablet (20 mg total) by mouth daily with food   senna (SENOKOT) 8 6 mg  Self No Yes   Sig: Take 1 tablet (8 6 mg total) by mouth 2 (two) times a day   simethicone (MYLICON) 80 mg chewable tablet  Self No Yes   Sig: Chew 1 tablet (80 mg total) every 6 (six) hours as needed for flatulence   sucralfate (CARAFATE) 1 g tablet   No Yes   Sig: Take 1 tablet (1 g total) by mouth 4 (four) times a day   traZODone (DESYREL) 50 mg tablet   No Yes   Sig: Take 0 5 tablets (25 mg total) by mouth daily at bedtime      Facility-Administered Medications: None       Past Medical History:   Diagnosis Date    Basal cell carcinoma (BCC)     Bruit of right carotid artery     resolved 5/14/15     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HCC)     radiation    Dyslipidemia     Esophageal stricture     last assessed 2/28/13       GERD (gastroesophageal reflux disease)     History of chemotherapy     History of radiation therapy     [place] (hard of hearing)     b/l ears    Hyperlipidemia     Hypertension     Osteoarthritis     Osteoarthrosis of ankle and foot     last assessed 5/20/13     Osteopenia     last assessed 2/28/13     Plantar fasciitis of left foot     Polyp of sigmoid colon     last assessed 2/28/13     Shortness of breath     when walking up stairs       Past Surgical History:   Procedure Laterality Date    ANKLE ARTHROPLASTY Left     BLEPHAROPLASTY      right eye     CATARACT EXTRACTION Right     COLONOSCOPY      ESOPHAGEAL DILATION      x2    ESOPHAGOGASTRODUODENOSCOPY      dilitation    FACIAL/NECK BIOPSY Right 9/8/2017    Procedure: NECK NODE BIOPSY WITH NEEDLE LOCALIZATION; LYMPHOMA PROTOCOL (NEEDLE LOC WITH I R  AT 1100); Surgeon: Keith Thomas MD;  [facility];  Service: Surgical Oncology    HYSTERECTOMY      LYMPH NODE DISSECTION      right groin and neck    ORBITAL FLOOR EXPLORATION Right     for cancer     PORTACATH PLACEMENT      left chest     IN BX/REMV,LYMPH NODE,DEEP AXILL Left 8/14/2017    Procedure: Axillary lymph node excision with LYMPHOMA PROTOCOL;  Surgeon: Keith Thomas MD;  [facility];  Service: Surgical Oncology    IN BX/REMV,LYMPH NODE,DEEP CERV Right 5/2/2016    Procedure: NECK NODE BIOPSY;  Surgeon: Keith Thomas MD;  [facility];  Service: Surgical Oncology    IN BX/REMV,LYMPH NODE,DEEP CERV Left 7/25/2018    Procedure: NECK NODE BIOPYS WITH LYMPHOMA PROTOCOL; (ULTRASOUND GUIDED NEEDLE LOC IN IR AT 0800);   Surgeon: Keith Thomas MD;  [facility];  Service: Surgical Oncology    MT INSJ TUNNELED CTR VAD W/SUBQ PORT AGE 5 YR/> N/A 10/3/2017    Procedure: PLACEMENT OF PORT-A-CATH DEVICE;  Surgeon: Gorge Nesbitt MD;  [facility];  Service: Surgical Oncology       Family History   Problem Relation Age of Onset    Multiple myeloma Mother     Heart disease Father     Hypertension Father     Hypertension Sister     Heart disease Sister     Arthritis Family     Osteoporosis Family     Breast cancer Maternal Aunt      I have reviewed and agree with the history as documented  Social History   Substance Use Topics    Smoking status: Former Smoker     Quit date: 1970    Smokeless tobacco: Never Used    Alcohol use No        Review of Systems   Constitutional: Positive for fatigue  Negative for chills and fever  HENT: Negative for sore throat  Eyes: Negative for visual disturbance  Respiratory: Negative for chest tightness, shortness of breath and wheezing  Cardiovascular: Negative for chest pain  Gastrointestinal: Negative for abdominal pain, constipation, diarrhea, nausea and vomiting  Genitourinary: Negative for dysuria and hematuria  Musculoskeletal: Negative for arthralgias and myalgias  Skin: Negative for color change  Neurological: Negative for light-headedness  Hematological: Negative for adenopathy  Psychiatric/Behavioral: Negative for agitation and behavioral problems  All other systems reviewed and are negative        Physical Exam  ED Triage Vitals   Temperature Pulse Respirations Blood Pressure SpO2   09/25/18 0913 09/25/18 0913 09/25/18 0913 09/25/18 0916 09/25/18 0913   98 °F (36 7 °C) (!) 131 16 114/57 99 %      Temp Source Heart Rate Source Patient Position - Orthostatic VS BP Location FiO2 (%)   09/25/18 0913 09/25/18 0913 09/25/18 0945 09/25/18 0945 --   Tympanic Monitor Lying Left arm       Pain Score       09/25/18 0913       No Pain           Orthostatic Vital Signs  Vitals:    09/25/18 0945 09/25/18 1100 09/25/18 1224 09/25/18 1315   BP: 110/58 107/62 137/58 108/55   Pulse: (!) 118 104 (!) 110 102   Patient Position - Orthostatic VS: Lying Lying Lying Lying       Physical Exam   Constitutional: She is oriented to person, place, and time  She appears well-developed and well-nourished  No distress  HENT:   Head: Normocephalic and atraumatic  Eyes: Conjunctivae and EOM are normal  No scleral icterus  Neck: Normal range of motion  Neck supple  Cardiovascular: Normal rate, regular rhythm and normal heart sounds  No murmur heard  Pulmonary/Chest: Effort normal and breath sounds normal  No respiratory distress  Abdominal: Soft  Bowel sounds are normal  There is no tenderness  Musculoskeletal: Normal range of motion  Neurological: She is alert and oriented to person, place, and time  Skin: Skin is warm and dry  Psychiatric: She has a normal mood and affect  Her behavior is normal    Nursing note and vitals reviewed        ED Medications  Medications   allopurinol (ZYLOPRIM) tablet 200 mg (200 mg Oral Not Given 9/25/18 1729)   apixaban (ELIQUIS) tablet 5 mg (5 mg Oral Given 9/25/18 1722)   ascorbic acid (VITAMIN C) tablet 500 mg (500 mg Oral Not Given 9/25/18 1722)   calcium carbonate-vitamin D (OSCAL-D) 500 mg-200 units per tablet 1 tablet (not administered)   HYDROcodone-acetaminophen (NORCO) 5-325 mg per tablet 1 tablet (not administered)   loratadine (CLARITIN) tablet 10 mg (not administered)   melatonin tablet 6 mg (not administered)   nystatin (MYCOSTATIN) oral suspension 500,000 Units (500,000 Units Swish & Swallow Given 9/25/18 1721)   pantoprazole (PROTONIX) EC tablet 40 mg (40 mg Oral Given 9/25/18 1722)   ondansetron (ZOFRAN-ODT) dispersible tablet 4 mg (not administered)   polyethylene glycol (MIRALAX) packet 17 g (17 g Oral Not Given 9/25/18 1730)   predniSONE tablet 20 mg (20 mg Oral Not Given 9/25/18 1730)   senna (SENOKOT) tablet 8 6 mg (8 6 mg Oral Given 9/25/18 1722) simethicone (MYLICON) chewable tablet 80 mg (not administered)   sucralfate (CARAFATE) tablet 1 g (1 g Oral Given 9/25/18 1722)   traZODone (DESYREL) tablet 25 mg (not administered)   sodium chloride 0 9 % infusion (not administered)   ondansetron (ZOFRAN) injection 4 mg (not administered)   sodium chloride 0 9 % bolus 500 mL (0 mL Intravenous Stopped 9/25/18 1100)   sodium chloride 0 9 % bolus 500 mL (0 mL Intravenous Stopped 9/25/18 1731)   iodixanol (VISIPAQUE) 320 MG/ML injection 85 mL (85 mL Intravenous Given 9/25/18 1156)       Diagnostic Studies  Results Reviewed     Procedure Component Value Units Date/Time    Urine Microscopic [[id number]]  (Abnormal) Collected:  09/25/18 1230    Lab Status:  Final result Specimen:  Urine from Urine, Clean Catch Updated:  09/25/18 1311     RBC, UA 4-10 (A) /hpf      WBC, UA Innumerable (A) /hpf      Epithelial Cells None Seen /hpf      Bacteria, UA Occasional /hpf      Hyaline Casts, UA 10-25 (A) /lpf     Urine culture [[id number]] Collected:  09/25/18 1230    Lab Status: In process Specimen:  Urine from Urine, Clean Catch Updated:  09/25/18 1310    POCT urinalysis dipstick [[id number]]  (Normal) Resulted:  09/25/18 1230    Lab Status:  Final result Specimen:  Urine Updated:  09/25/18 1231     Color, UA yellow     Clarity, UA clear    ED Urine Macroscopic [[id number]]  (Abnormal) Collected:  09/25/18 1230    Lab Status:  Final result Specimen:  Urine Updated:  09/25/18 1229     Color, UA Yellow     Clarity, UA Clear     pH, UA 6 5     Leukocytes, UA Small (A)     Nitrite, UA Negative     Protein, UA Trace (A) mg/dl      Glucose, UA Negative mg/dl      Ketones, UA Negative mg/dl      Urobilinogen, UA 0 2 E U /dl      Bilirubin, UA Negative     Blood, UA Small (A)     Specific [place], UA 1 020    Narrative:       CLINITEK RESULT    [place] draw [[id number]] Collected:  09/25/18 0959    Lab Status:  Final result Specimen:  Blood Updated:  09/25/18 1202    Narrative:        The following orders were created for panel order [place] draw  Procedure                               Abnormality         Status                     ---------                               -----------         ------                     [place] Console / Black tube on HKTA[[id number]]                        Final result                 Please view results for these tests on the individual orders  Comprehensive metabolic panel [[id number]]  (Abnormal) Collected:  09/25/18 0959    Lab Status:  Final result Specimen:  Blood from Arm, Left Updated:  09/25/18 1038     Sodium 136 mmol/L      Potassium 4 8 mmol/L      Chloride 100 mmol/L      CO2 27 mmol/L      ANION GAP 9 mmol/L      BUN 25 mg/dL      Creatinine 1 22 mg/dL      Glucose 104 mg/dL      Calcium 10 3 (H) mg/dL      AST 50 (H) U/L      ALT 27 U/L      Alkaline Phosphatase 92 U/L      Total Protein 6 3 (L) g/dL      Albumin 2 7 (L) g/dL      Total Bilirubin 0 66 mg/dL      eGFR 43 ml/min/1 73sq m     Narrative:         National Kidney Disease Education Program recommendations are as follows:  GFR calculation is accurate only with a steady state creatinine  Chronic Kidney disease less than 60 ml/min/1 73 sq  meters  Kidney failure less than 15 ml/min/1 73 sq  meters  TSH [[id number]]  (Abnormal) Collected:  09/25/18 0959    Lab Status:  Final result Specimen:  Blood from Arm, Left Updated:  09/25/18 1038     TSH 3RD GENERATON 0 170 (L) uIU/mL     Narrative:         Patients undergoing fluorescein dye angiography may retain small amounts of fluorescein in the body for 48-72 hours post procedure  Samples containing fluorescein can produce falsely depressed TSH values  If the patient had this procedure,a specimen should be resubmitted post fluorescein clearance            The recommended reference ranges for TSH during pregnancy are as follows:  First trimester 0 1 to 2 5 uIU/mL  Second trimester  0 2 to 3 0 uIU/mL  Third trimester 0 3 to 3 0 uIU/m      CBC and differential [[id number]]  (Abnormal) Collected:  09/25/18 0959    Lab Status:  Final result Specimen:  Blood from Arm, Left Updated:  09/25/18 1012     WBC 10 08 Thousand/uL      RBC 4 24 Million/uL      Hemoglobin 12 2 g/dL      Hematocrit 38 6 %      MCV 91 fL      MCH 28 8 pg      MCHC 31 6 g/dL      RDW 15 4 (H) %      MPV 10 5 fL      Platelets 840 Thousands/uL      nRBC 0 /100 WBCs      Neutrophils Relative 86 (H) %      Immat GRANS % 1 %      Lymphocytes Relative 2 (L) %      Monocytes Relative 10 %      Eosinophils Relative 1 %      Basophils Relative 0 %      Neutrophils Absolute 8 70 (H) Thousands/µL      Immature Grans Absolute 0 09 Thousand/uL      Lymphocytes Absolute 0 21 (L) Thousands/µL      Monocytes Absolute 1 01 Thousand/µL      Eosinophils Absolute 0 05 Thousand/µL      Basophils Absolute 0 02 Thousands/µL                  CTA ED chest PE study   Final Result by Karol Roberto MD (09/25 1245)   Addendum 1 of 1 by Karol Roberto MD (09/25 1257)   Please note: There is atelectasis at the left lung base both in the    lingula and in the costophrenic angle, and this atelectatic change    probably accounts for the airspace opacity in that location on frontal    chest x-ray  Final      No pulmonary embolus  Profound increase in bulky rodger adenopathy in bilateral axilla and in the chest walls  Increase in the size of nodes in the upper abdomen, incompletely evaluated, and at the esophageal hiatus  Slight interval decrease in the size of mediastinal nodes        Workstation performed: [id number]         XR chest 1 view   Final Result by Edi Campos MD (09/25 1056)      Interval development of infiltrate at lateral left lung base            Workstation performed: [id number]               Procedures  ECG 12 Lead Documentation  Date/Time: 9/25/2018 10:29 AM  Performed by: Eduar Garcia by: Gabino Garcia     Patient location:  ED  Previous ECG:     Previous ECG:  Compared to current    Comparison ECG info:  Previous had wider LBBB    Similarity:  Changes noted    Comparison to cardiac monitor: Yes    Interpretation:     Interpretation: normal    Rate:     ECG rate assessment: tachycardic    Rhythm:     Rhythm: sinus rhythm    Ectopy:     Ectopy: none    QRS:     QRS axis:  Left    QRS intervals:  Normal  Conduction:     Conduction: normal    ST segments:     ST segments:  Normal  T waves:     T waves: normal            Phone Consults  ED Phone Contact    ED Course  ED Course as of Sep 25 1913   Tue Sep 25, 2018   1112   Patient's chest x-ray read as infiltrate per Radiology however patient has no infectious symptoms and does not have an elevated white blood cell count, concern for possible pulmonary embolism, will order CT PE study to evaluate  12   Per Radiology reviewed, patient has atelectasis in left lower lung not a pneumonia  MDM  Number of Diagnoses or Management Options  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  Dehydration: new and requires workup  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 [facility]): established and worsening  Diagnosis management comments:   81-year-old female presenting with is fatigue after radiation therapy, most likely due to dehydration and poor p o  Intake, will slowly rehydrate patient as she has CHF will also obtain laboratories and a chest x-ray to further evaluate  Patient's chest x-ray showed possible infiltrate, obtained a CT PE study to rule out PE which showed infiltrate was actually atelectasis, will admit to Medicine for further hydration and management         Amount and/or Complexity of Data Reviewed  Clinical lab tests: ordered and reviewed  Tests in the medicine section of CPT®: ordered and reviewed  Obtain history from someone other than the patient: yes  Review and summarize past medical records: yes  Discuss the patient with other providers: yes      [place] Time    Disposition  Final diagnoses:   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 [facility])   Cardiomyopathy secondary to drug [facility])   Dehydration     Time reflects when diagnosis was documented in both MDM as applicable and the Disposition within this note     Time User Action Codes Description Comment    9/25/2018 12:04 PM Yari Rodriguez Add [C83 38] 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 ([facility] )     9/25/2018 12:04 PM Yari Rodriguez Modify [C83 38] 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 ([facility] )     9/25/2018 12:04 PM Yari Rodriguez Modify [C83 38] 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 ([facility] )     9/25/2018 12:04 PM Yari Rodriguez Add [I42 7] 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[facility] )     9/25/2018 12:04 PM Yari Rodriguez Modify [I42 7] 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[facility] 75 )     9/25/2018  7:12 PM Yari Rodriguez Add [E86 0] Dehydration     9/25/2018  7:12 PM Yari Rodriguez Modify [C83 38] 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 ([facility] )     9/25/2018  7:12 PM Yari Rodriguez Modify [E86 0] Dehydration       ED Disposition     ED Disposition Condition Comment    Admit  Case was discussed with AMANDA and the patient's admission status was agreed to be Admission Status: inpatient status to the service of Dr Jhon Rolon           Follow-up Information    None         Current Discharge Medication List      CONTINUE these medications which have NOT CHANGED    Details   allopurinol (ZYLOPRIM) 100 mg tablet Take 2 tablets (200 mg total) by mouth daily  Qty: 60 tablet, Refills: 1    Associated Diagnoses: Diffuse large B-cell lymphoma of lymph nodes of multiple regions (HCC)      apixaban (ELIQUIS) 5 mg Take 1 tablet (5 mg total) by mouth 2 (two) times a day  Qty: 60 tablet, Refills: 2    Associated Diagnoses: Acute deep vein thrombosis (DVT) of non-extremity vein      ascorbic acid (VITAMIN C) 500 mg tablet Take 500 mg by mouth daily with dinner        Calcium Carb-Cholecalciferol (CALCIUM + D3) 600-200 MG-UNIT TABS Take 1 tablet by mouth daily with dinner        Cyanocobalamin (VITAMIN B 12 PO) Take 1 tablet by mouth daily with dinner        HYDROcodone-acetaminophen (NORCO) 5-325 mg per tablet Take 1 tablet by mouth every 6 (six) hours as needed for pain Max Daily Amount: 4 tablets  Qty: 20 tablet, Refills: 0    Associated Diagnoses: Lymphadenopathy      Lenalidomide (REVLIMID) 20 MG CAPS Take 1 capsule (20 mg total) by mouth daily Adult Female  Auth # [id number]  Take 1 capsule daily days 1-21  Followed by 7 days off  Qty: 21 capsule, Refills: 0    Associated Diagnoses: Non-Hodgkin's lymphoma, unspecified body region, unspecified non-Hodgkin lymphoma type (HCC)      loratadine (CLARITIN) 10 mg tablet Take 10 mg by mouth daily in the early morning        Melatonin 5 MG TABS Take 1 tablet by mouth daily at bedtime as needed      Multiple Vitamin (MULTIVITAMIN) capsule Take 1 capsule by mouth daily with dinner        nystatin (MYCOSTATIN) 100,000 units/mL suspension Apply 5 mL (500,000 Units total) to the mouth or throat 4 (four) times a day Swish and swallow 4 times daily for 1 week    Qty: 473 mL, Refills: 0    Associated Diagnoses: 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 (HCC)      omeprazole (PriLOSEC) 20 mg delayed release capsule Take 1 capsule (20 mg total) by mouth daily  Qty: 90 capsule, Refills: 1    Associated Diagnoses: Gastroesophageal reflux disease without esophagitis      ondansetron (ZOFRAN) 4 mg tablet Take 1 tablet (4 mg total) by mouth every 8 (eight) hours as needed for nausea or vomiting  Qty: 30 tablet, Refills: 0    Associated Diagnoses: Nausea      polyethylene glycol (MIRALAX) 17 g packet Take 17 g by mouth daily  Qty: 14 each, Refills: 0    Associated Diagnoses: Constipation      predniSONE 20 mg tablet Take 1 tablet (20 mg total) by mouth daily with food  Qty: 30 tablet, Refills: 3    Associated Diagnoses: 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 (HCC)      senna (SENOKOT) 8 6 mg Take 1 tablet (8 6 mg total) by mouth 2 (two) times a day  Refills: 0    Associated Diagnoses: Constipation, unspecified constipation type      simethicone (MYLICON) 80 mg chewable tablet Chew 1 tablet (80 mg total) every 6 (six) hours as needed for flatulence  Qty: 30 tablet, Refills: 0    Associated Diagnoses: Constipation      sucralfate (CARAFATE) 1 g tablet Take 1 tablet (1 g total) by mouth 4 (four) times a day  Qty: 60 tablet, Refills: 0    Associated Diagnoses: Radiation esophagitis      traZODone (DESYREL) 50 mg tablet Take 0 5 tablets (25 mg total) by mouth daily at bedtime  Qty: 15 tablet, Refills: 0    Associated Diagnoses: Primary insomnia           No discharge procedures on file  ED Provider  Attending physically available and [facility] managed the patient along with the ED Attending      Electronically Signed by         Dashawn Guevara MD  09/25/18 5813

## 2018-09-26 PROBLEM — I50.22 CHRONIC SYSTOLIC CONGESTIVE HEART FAILURE (HCC): Status: ACTIVE | Noted: 2018-09-26

## 2018-09-26 LAB
ALBUMIN SERPL BCP-MCNC: 2.5 G/DL (ref 3.5–5)
ALP SERPL-CCNC: 81 U/L (ref 46–116)
ALT SERPL W P-5'-P-CCNC: 22 U/L (ref 12–78)
ANION GAP SERPL CALCULATED.3IONS-SCNC: 7 MMOL/L (ref 4–13)
AST SERPL W P-5'-P-CCNC: 29 U/L (ref 5–45)
BACTERIA UR CULT: NORMAL
BILIRUB SERPL-MCNC: 0.82 MG/DL (ref 0.2–1)
BUN SERPL-MCNC: 19 MG/DL (ref 5–25)
CALCIUM SERPL-MCNC: 10.3 MG/DL (ref 8.3–10.1)
CHLORIDE SERPL-SCNC: 102 MMOL/L (ref 100–108)
CO2 SERPL-SCNC: 29 MMOL/L (ref 21–32)
CREAT SERPL-MCNC: 0.97 MG/DL (ref 0.6–1.3)
ERYTHROCYTE [DISTWIDTH] IN BLOOD BY AUTOMATED COUNT: 15.6 % (ref 11.6–15.1)
GFR SERPL CREATININE-BSD FRML MDRD: 57 ML/MIN/1.73SQ M
GLUCOSE SERPL-MCNC: 74 MG/DL (ref 65–140)
HCT VFR BLD AUTO: 36.5 % (ref 34.8–46.1)
HGB BLD-MCNC: 11.2 G/DL (ref 11.5–15.4)
MAGNESIUM SERPL-MCNC: 2.2 MG/DL (ref 1.6–2.6)
MCH RBC QN AUTO: 28.4 PG (ref 26.8–34.3)
MCHC RBC AUTO-ENTMCNC: 30.7 G/DL (ref 31.4–37.4)
MCV RBC AUTO: 92 FL (ref 82–98)
PHOSPHATE SERPL-MCNC: 2.2 MG/DL (ref 2.3–4.1)
PLATELET # BLD AUTO: 160 THOUSANDS/UL (ref 149–390)
PMV BLD AUTO: 10.3 FL (ref 8.9–12.7)
POTASSIUM SERPL-SCNC: 3.8 MMOL/L (ref 3.5–5.3)
PROCALCITONIN SERPL-MCNC: 0.13 NG/ML
PROT SERPL-MCNC: 5.6 G/DL (ref 6.4–8.2)
RBC # BLD AUTO: 3.95 MILLION/UL (ref 3.81–5.12)
SODIUM SERPL-SCNC: 138 MMOL/L (ref 136–145)
T4 FREE SERPL-MCNC: 1.39 NG/DL (ref 0.76–1.46)
TSH SERPL DL<=0.05 MIU/L-ACNC: 0.22 UIU/ML (ref 0.36–3.74)
WBC # BLD AUTO: 6.89 THOUSAND/UL (ref 4.31–10.16)

## 2018-09-26 PROCEDURE — 99232 SBSQ HOSP IP/OBS MODERATE 35: CPT | Performed by: INTERNAL MEDICINE

## 2018-09-26 PROCEDURE — 99232 SBSQ HOSP IP/OBS MODERATE 35: CPT | Performed by: NURSE PRACTITIONER

## 2018-09-26 PROCEDURE — 84100 ASSAY OF PHOSPHORUS: CPT | Performed by: INTERNAL MEDICINE

## 2018-09-26 PROCEDURE — 84145 PROCALCITONIN (PCT): CPT | Performed by: INTERNAL MEDICINE

## 2018-09-26 PROCEDURE — 77412 RADIATION TX DELIVERY LVL 3: CPT | Performed by: RADIOLOGY

## 2018-09-26 PROCEDURE — 83735 ASSAY OF MAGNESIUM: CPT | Performed by: INTERNAL MEDICINE

## 2018-09-26 PROCEDURE — 85027 COMPLETE CBC AUTOMATED: CPT | Performed by: INTERNAL MEDICINE

## 2018-09-26 PROCEDURE — 77387 GUIDANCE FOR RADJ TX DLVR: CPT | Performed by: RADIOLOGY

## 2018-09-26 PROCEDURE — 84443 ASSAY THYROID STIM HORMONE: CPT | Performed by: INTERNAL MEDICINE

## 2018-09-26 PROCEDURE — 80053 COMPREHEN METABOLIC PANEL: CPT | Performed by: INTERNAL MEDICINE

## 2018-09-26 PROCEDURE — 84439 ASSAY OF FREE THYROXINE: CPT | Performed by: NURSE PRACTITIONER

## 2018-09-26 RX ORDER — LORAZEPAM 2 MG/ML
0.5 INJECTION INTRAMUSCULAR EVERY 6 HOURS PRN
Status: DISCONTINUED | OUTPATIENT
Start: 2018-09-26 | End: 2018-10-03 | Stop reason: HOSPADM

## 2018-09-26 RX ORDER — SUCRALFATE ORAL 1 G/10ML
1000 SUSPENSION ORAL EVERY 6 HOURS SCHEDULED
Status: DISCONTINUED | OUTPATIENT
Start: 2018-09-26 | End: 2018-10-03 | Stop reason: HOSPADM

## 2018-09-26 RX ADMIN — CALCIUM CARBONATE 500 MG (1,250 MG)-VITAMIN D3 200 UNIT TABLET 1 TABLET: at 10:00

## 2018-09-26 RX ADMIN — POLYETHYLENE GLYCOL 3350 17 G: 17 POWDER, FOR SOLUTION ORAL at 10:00

## 2018-09-26 RX ADMIN — PANTOPRAZOLE SODIUM 40 MG: 40 TABLET, DELAYED RELEASE ORAL at 16:26

## 2018-09-26 RX ADMIN — PANTOPRAZOLE SODIUM 40 MG: 40 TABLET, DELAYED RELEASE ORAL at 05:35

## 2018-09-26 RX ADMIN — NYSTATIN 500000 UNITS: 100000 SUSPENSION ORAL at 12:53

## 2018-09-26 RX ADMIN — SODIUM CHLORIDE 50 ML/HR: 0.9 INJECTION, SOLUTION INTRAVENOUS at 16:15

## 2018-09-26 RX ADMIN — SENNOSIDES 8.6 MG: 8.6 TABLET, FILM COATED ORAL at 19:24

## 2018-09-26 RX ADMIN — ALLOPURINOL 200 MG: 100 TABLET ORAL at 10:00

## 2018-09-26 RX ADMIN — METOPROLOL TARTRATE 25 MG: 25 TABLET ORAL at 12:53

## 2018-09-26 RX ADMIN — PREDNISONE 20 MG: 20 TABLET ORAL at 10:01

## 2018-09-26 RX ADMIN — SENNOSIDES 8.6 MG: 8.6 TABLET, FILM COATED ORAL at 10:00

## 2018-09-26 RX ADMIN — METOPROLOL TARTRATE 25 MG: 25 TABLET ORAL at 21:43

## 2018-09-26 RX ADMIN — NYSTATIN 500000 UNITS: 100000 SUSPENSION ORAL at 10:00

## 2018-09-26 RX ADMIN — LORATADINE 10 MG: 10 TABLET ORAL at 05:35

## 2018-09-26 RX ADMIN — LIDOCAINE HYDROCHLORIDE 10 ML: 20 SOLUTION ORAL; TOPICAL at 21:43

## 2018-09-26 RX ADMIN — APIXABAN 5 MG: 5 TABLET, FILM COATED ORAL at 10:01

## 2018-09-26 RX ADMIN — NYSTATIN 500000 UNITS: 100000 SUSPENSION ORAL at 19:24

## 2018-09-26 RX ADMIN — SUCRALFATE 1000 MG: 1 SUSPENSION ORAL at 12:53

## 2018-09-26 RX ADMIN — LORAZEPAM 0.5 MG: 2 INJECTION INTRAMUSCULAR; INTRAVENOUS at 14:15

## 2018-09-26 RX ADMIN — SUCRALFATE 1 G: 1 TABLET ORAL at 10:00

## 2018-09-26 RX ADMIN — APIXABAN 5 MG: 5 TABLET, FILM COATED ORAL at 19:24

## 2018-09-26 RX ADMIN — SUCRALFATE 1000 MG: 1 SUSPENSION ORAL at 19:24

## 2018-09-26 RX ADMIN — TRAZODONE HYDROCHLORIDE 25 MG: 50 TABLET ORAL at 21:43

## 2018-09-26 NOTE — PROGRESS NOTES
Heart Failure Service Progress Note - Randy Armenta 78 y o  female MRN: [id number]    Unit/Bed#: [facility] 622-01 Encounter: [id number]      Assessment:    Principal Problem:    Sinus tachycardia  Active Problems:    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 [facility])    Constipation    Dysphagia    7/2018  LVEF: 25%  LVIDd:4 73  RV: normal size and systolic function  MR:mild  PASP:  RVOT:   Other:    Neurohormonal Blockade:  --Beta-Blocker: Metoprolol tartrate 25 mg BID starting today  --ACEi, ARB or ARNi:     (or SVR reduction)  --Aldosterone Receptor Blocker:  --Diuretic:     Sudden Cardiac Death Risk Reduction:  --ICD: Life Vest prior to discharge  Interrogation:     Electrical Resynchronization:  -Intermittent LBBB    Interrogation: N/A      Plan: 1  Dilated Cardiomyopathy, HFrEF, not in exacerbation  Etiology likely 2/2 IV chemotherapy with Adriamycin for NonHodgkin's Lymphoma  TTE from 8/2017 demonstrating LVEF of 55% now demonstrating significant decline in systolic function to 97%  NYHA Class III/IV, ACC/AHA Stage C  Patient hypovolemic on PE with tachycardia and hypotension on admission  Now improved with gentle IV hydration    Will need to continue with GDMT including beta blocker, ACEI, ARB, ARNI, diuretic if needed when able  Will start low dose Metoprolol tartrate  today for better HR control    Continue to monitor daily weights, strict I's and O's, BMP       2  LBBB  Currently sinus tach with narrow QRS     3  Hypertension  Was actually hypotensive, now improving with IVF  Will continue to hold antihypertensives for now        4 History of DVT, left Jugular vein thrombosis  On Eliquis       5  Lymphoma  Currently receiving radiation and oral chemotherapy  Defer to hem/onc Follows with Dr Alvin Perdomo       6  Oral Candida infection  Continue with Nystatin         Subjective:   Patient seen and examined  No significant events overnight    Continues with significant throat pain and mild cough  Still tachycardic  BP stable with gentle IV hydration  Objective: Intake/ Output:?? 1818/950  Weight: 201 lbs  Tele: Sinus tach in the [employer] (day, reason): Mckeon catheter (day, reason):    Vitals: Blood pressure 118/61, pulse (!) 113, temperature 98 06 °F (36 7 °C), resp  rate 20, height 5' 3" (1 6 m), weight 91 2 kg (201 lb), SpO2 91 %, not currently breastfeeding , Body mass index is 35 61 kg/m² , I/O last 3 completed shifts: In: 1818 3 [P O :150; I V :668 3; IV Piggyback:1000]  Out: 950 [Urine:950]  I/O this shift:  In: 120 [P O :120]  Out: 150 [Urine:150]  Wt Readings from Last 3 Encounters:   09/25/18 91 2 kg (201 lb)   09/25/18 91 2 kg (201 lb)   09/19/18 92 9 kg (204 lb 12 8 oz)       Intake/Output Summary (Last 24 hours) at 09/26/18 1328  Last data filed at 09/26/18 0925   Gross per 24 hour   Intake          1438 33 ml   Output             1100 ml   Net           338 33 ml     I/O last 3 completed shifts: In: 1818 3 [P O :150; I V :668 3; IV Piggyback:1000]  Out: 950 [Urine:950]    No significant arrhythmias seen on telemetry review         Physical Exam:  Vitals:    09/26/18 0725 09/26/18 0726 09/26/18 0727 09/26/18 0728   BP:   118/61    BP Location:       Pulse: (!) 112 (!) 108 (!) 109 (!) 113   Resp:   20    Temp: 98 06 °F (36 7 °C)  98 06 °F (36 7 °C)    TempSrc:       SpO2: 93% 93% 92% 91%   Weight:       Height:           GEN: Alan Kamari appears well, alert and oriented x 3, pleasant and cooperative   HEENT: pupils equal, round, and reactive to light; extraocular muscles intact  NECK: supple, no carotid bruits   HEART: regular rhythm, normal S1 and S2, no murmurs, clicks, gallops or rubs, JVP is down    LUNGS: clear to auscultation bilaterally; no wheezes, rales, or rhonchi   ABDOMEN: normal bowel sounds, soft, no tenderness, no distention  EXTREMITIES: peripheral pulses normal; no clubbing, cyanosis, or edema  NEURO: no focal findings   SKIN: normal without suspicious lesions on exposed skin      Current Facility-Administered Medications:     allopurinol (ZYLOPRIM) tablet 200 mg, 200 mg, Oral, Daily, Hetul Mcclellan, DO, 200 mg at 09/26/18 1000    apixaban (ELIQUIS) tablet 5 mg, 5 mg, Oral, BID, Hetul Mcclellan, DO, 5 mg at 09/26/18 1001    ascorbic acid (VITAMIN C) tablet 500 mg, 500 mg, Oral, Daily With Dinner, Sabianist-Sherwood, DO    calcium carbonate-vitamin D (OSCAL-D) 500 mg-200 units per tablet 1 tablet, 1 tablet, Oral, Daily With Breakfast, Hetul Mcclellan, DO, 1 tablet at 09/26/18 1000    HYDROcodone-acetaminophen (NORCO) 5-325 mg per tablet 1 tablet, 1 tablet, Oral, Q6H PRN, Hetul Mcclellan, DO    loratadine (CLARITIN) tablet 10 mg, 10 mg, Oral, Early Morning, Hetul Mcclellan, DO, 10 mg at 09/26/18 0535    melatonin tablet 6 mg, 6 mg, Oral, HS PRN, Hetul Mcclellan, DO    metoprolol tartrate (LOPRESSOR) tablet 25 mg, 25 mg, Oral, Q12H [address], Sujatha ANCA Websterer, CRNP, 25 mg at 09/26/18 1253    nystatin (MYCOSTATIN) oral suspension 500,000 Units, 500,000 Units, Swish & Swallow, 4x Daily, Hetul Mcclellan, DO, 500,000 Units at 09/26/18 1253    ondansetron (ZOFRAN) injection 4 mg, 4 mg, Intravenous, Q6H PRN, Hetul Mcclellan, DO    ondansetron (ZOFRAN-ODT) dispersible tablet 4 mg, 4 mg, Oral, Q6H PRN, Hetul Mcclellan, DO    pantoprazole (PROTONIX) EC tablet 40 mg, 40 mg, Oral, BID AC, Hetul Mcclellan, DO, 40 mg at 09/26/18 0535    polyethylene glycol (MIRALAX) packet 17 g, 17 g, Oral, Daily, Hetul Mcclellan, DO, 17 g at 09/26/18 1000    predniSONE tablet 20 mg, 20 mg, Oral, Daily, Hetul Mcclellan, DO, 20 mg at 09/26/18 1001    senna (SENOKOT) tablet 8 6 mg, 1 tablet, Oral, BID, Hetul Mcclellan, DO, 8 6 mg at 09/26/18 1000    simethicone (MYLICON) chewable tablet 80 mg, 80 mg, Oral, Q6H PRN, Hetul Mcclellan, DO    sodium chloride 0 9 % infusion, 50 mL/hr, Intravenous, Continuous, Hetul Mcclellan, DO, Last Rate: 50 mL/hr at 09/25/18 1724, 50 mL/hr at 09/25/18 1724    sucralfate (CARAFATE) oral suspension 1,000 mg, 1,000 mg, Oral, Q6H [address], Sadie Arellano, MEAGANNP, 1,000 mg at 09/26/18 1253    traZODone (DESYREL) tablet 25 mg, 25 mg, Oral, HS, Martina Paula, DO, 25 mg at 09/25/18 2311      Labs & Results:        Results from last 7 days  Lab Units 09/26/18  0555 09/25/18  0959 09/24/18  1219   WBC Thousand/uL 6 89 10 08 8 38   HEMOGLOBIN g/dL 11 2* 12 2 12 0   HEMATOCRIT % 36 5 38 6 38 7   PLATELETS Thousands/uL 160 160 162           Results from last 7 days  Lab Units 09/26/18  0555 09/25/18  0959 09/24/18  1219   SODIUM mmol/L 138 136 139   POTASSIUM mmol/L 3 8 4 8 3 9   CHLORIDE mmol/L 102 100 101   CO2 mmol/L 29 27 29   BUN mg/dL 19 25 18   CREATININE mg/dL 0 97 1 22 0 98   CALCIUM mg/dL 10 3* 10 3* 10 2*   ALK PHOS U/L 81 92 95   ALT U/L 22 27 26   AST U/L 29 50* 35             Counseling / Coordination of Care  Total floor / unit time spent today 20 minutes  Greater than 50% of total time was spent with the patient and / or family counseling and / or coordination of care  A description of the counseling / coordination of care: 10  Thank you for the opportunity to participate in the care of this patient  Philip GAO    Director Heart Failure/ Medical Director [address] [place] [address]

## 2018-09-26 NOTE — SOCIAL WORK
Met with pt and discussed role of CM  Pt lives with her  and son in a 1-story home with 2 steps at entrance  Pt is independent in ADLs  Pt has DME including: cane  Pt has hx HHC--unsure of agency used  Preference for pharmacy is [facility] in [place]  No MH/D&A tx hx  Pt reports  as POA  Main contact: HusbandMarybel Lyon ([phone])  CM reviewed d/c planning process including the following: identifying help at home, patient preference for d/c planning needs, Discharge Lounge, [facility] Meds to Bed program, availability of treatment team to discuss questions or concerns patient and/or family may have regarding understanding medications and recognizing signs and symptoms once discharged  CM also encouraged patient to follow up with all recommended appointments after discharge  Patient advised of importance for patient and family to participate in managing patients medical well being  Patient/caregiver received discharge checklist  Content reviewed  Patient/caregiver encouraged to participate in discharge plan of care prior to discharge home

## 2018-09-26 NOTE — PROGRESS NOTES
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 versus extrinsic compression of lymphadenopathy on the esophagus  · Patient describes as a burning pain is having difficulty with swallowing secondary to pain  · Try Magic mouthwash, change Carafate to liquid, encourage oral intake  · Continue IV hydration  · Continue nystatin  · Continue PPI   · Monitor closely          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 [facility])   Assessment & Plan    · Appreciate Oncology consultation  · Patient with relapsed diffuse large B-cell lymphoma, today is her last day of palliative radiation therapy to the neck and chest  · Currently with significant esophageal pain and dysphagia, 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 in addition to dehydration  · Continue IV hydration, encourage oral intake as tolerated  · Currently holding on Lenalidomide  · CTA of the chest with negative pulmonary embolism, profound increase in bulky rodger adenopathy in bilateral axilla and chest walls, increase in the size of the nodes in the upper abdomen incompletely evaluated and at the esophageal hiatus, slight interval decrease in the size of the mediastinal nodes  · Chest x-ray with interval development of infiltrate at lateral left lung base  · Procalcitonin 0 13  · Patient afebrile  · No leukocytosis        * Sinus tachycardia   Assessment & Plan    · [address] Cardiology consultation, recommendations noted, heart rate low 100s, patient asymptomatic  · Plan to start metoprolol 25 mg b i d   · Continue to monitor on telemetry, monitor BP closely as patient has been hypotensive  · Given significant dysphagia and esophageal pain will continue with IV hydration as dehydration may also be playing a significant role with her tachycardia            Chronic systolic congestive heart failure (HCC)   Assessment & Plan    · Without acute exacerbation  · Patient with EF of 35% as per echocardiogram in 2018  · Suspect secondary to medication induced  · Outpatient follow-up with Cardiology  · Monitor daily weights monitor intake and output        Constipation   Assessment & Plan    · Patient had bowel movement yesterday  · Given severe dysphagia secondary to pain will hold on any further intervention unless patient can tolerate oral intake  · Continue bowel regimen as tolerated        Arm DVT (deep venous thromboembolism), acute, left (HCC)   Assessment & Plan    · History of left jugular vein thrombosis continue Eliquis            VTE Pharmacologic Prophylaxis:   Pharmacologic: Apixaban (Eliquis)  Mechanical VTE Prophylaxis in Place: Yes    Patient Centered Rounds: I have performed bedside rounds with nursing staff today  Discussions with Specialists or Other Care Team Provider:     Education and Discussions with Family / Patient:  Patient and family at bedside    Time Spent for Care: 30 minutes  More than 50% of total time spent on counseling and coordination of care as described above  Current Length of Stay: 1 day(s)    Current Patient Status: Inpatient   Certification Statement: The patient will continue to require additional inpatient hospital stay due to Pain, dysphagia, tachycardia need for continued telemetry monitoring    Discharge Plan:  Home when medically stable and cleared by Cardiology    Code Status: Level 3 - DNAR and DNI      Subjective:   Reports pain in esophagus and stomach  No vomiting  Occasional nausea  No abdominal pain  No headache or dizziness  No cough or shortness of breath    No palpitations or chest pain    Objective:     Vitals:   Temp (24hrs), Av 1 °F (36 7 °C), Min:98 06 °F (36 7 °C), Max:98 1 °F (36 7 °C)    HR:  [108-113] 113  Resp:  [20] 20  BP: (118-125)/(61-62) 118/61  SpO2:  [91 %-94 %] 91 %  Body mass index is 35 61 kg/m²  Input and Output Summary (last 24 hours): Intake/Output Summary (Last 24 hours) at 09/26/18 1744  Last data filed at 09/26/18 1615   Gross per 24 hour   Intake           1532 5 ml   Output              950 ml   Net            582 5 ml       Physical Exam:     Physical Exam   Constitutional: She is oriented to person, place, and time  She appears well-developed and well-nourished  No distress  HENT:   Head: Normocephalic  Mouth/Throat: Oropharynx is clear and moist    Neck: Neck supple  Cardiovascular: Regular rhythm and intact distal pulses  No murmur heard  Heart rate tachycardic   Pulmonary/Chest: Effort normal  No respiratory distress  Lung sounds decreased throughout   Abdominal: Soft  Bowel sounds are normal  She exhibits no distension  There is no tenderness  Musculoskeletal: Normal range of motion  She exhibits no edema  Neurological: She is alert and oriented to person, place, and time  No cranial nerve deficit  Skin: Skin is warm and dry  There is erythema  Neck and upper chest erythema   Psychiatric: She has a normal mood and affect  Her behavior is normal    Vitals reviewed  Additional Data:     Labs:      Results from last 7 days  Lab Units 09/26/18  0555 09/25/18  0959   WBC Thousand/uL 6 89 10 08   HEMOGLOBIN g/dL 11 2* 12 2   HEMATOCRIT % 36 5 38 6   PLATELETS Thousands/uL 160 160   NEUTROS PCT %  --  86*   LYMPHS PCT %  --  2*   MONOS PCT %  --  10   EOS PCT %  --  1       Results from last 7 days  Lab Units 09/26/18  0555   SODIUM mmol/L 138   POTASSIUM mmol/L 3 8   CHLORIDE mmol/L 102   CO2 mmol/L 29   BUN mg/dL 19   CREATININE mg/dL 0 97   CALCIUM mg/dL 10 3*   ALK PHOS U/L 81   ALT U/L 22   AST U/L 29           * I Have Reviewed All Lab Data Listed Above  * Additional Pertinent Lab Tests Reviewed:  Frankie 68 Admission Reviewed    Imaging:    Imaging Reports Reviewed Today Include:  Chest x-ray, CTA chest  Imaging Personally Reviewed by Myself Includes:  None    Recent Cultures (last 7 days):       Results from last 7 days  Lab Units 09/25/18  1230   URINE CULTURE  20,000-29,000 cfu/ml        Last 24 Hours Medication List:     Current Facility-Administered Medications:  al mag oxide-diphenhydramine-lidocaine viscous 10 mL Swish & Swallow Q6H PRN ANNEMARIE Christensen    allopurinol 200 mg Oral Daily Hetul Mcclellan, DO    apixaban 5 mg Oral BID Hetul Mcclellan, DO    ascorbic acid 500 mg Oral Daily With [employer], DO    calcium carbonate-vitamin D 1 tablet Oral Daily With Breakfast Hetul Mcclellan, DO    HYDROcodone-acetaminophen 1 tablet Oral Q6H PRN Hetul Mcclellan, DO    loratadine 10 mg Oral Early Morning Hetul Mcclellan, DO    LORazepam 0 5 mg Intravenous Q6H PRN ANNEMARIE Christensen    melatonin 6 mg Oral HS PRN Hetul Mcclellan, DO    metoprolol tartrate 25 mg Oral Q12H [address] ANNEMARIE Amezquita    nystatin 500,000 Units Swish & Swallow 4x Daily Hetul Mcclellan, DO    ondansetron 4 mg Intravenous Q6H PRN Hetul Mcclellan, DO    ondansetron 4 mg Oral Q6H PRN Hetul Mcclellan, DO    pantoprazole 40 mg Oral BID AC Hetul Mcclellan, DO    polyethylene glycol 17 g Oral Daily Hetul Mcclellan, DO    predniSONE 20 mg Oral Daily Hetul Mcclellan, DO    senna 1 tablet Oral BID Hetul Mcclellan, DO    simethicone 80 mg Oral Q6H PRN Hetul Mcclellan, DO    sodium chloride 50 mL/hr Intravenous Continuous Hetul Mcclellan, DO Last Rate: 50 mL/hr (09/26/18 1615)   sucralfate 1,000 mg Oral Q6H [address] ANNEMARIE Cooper    traZODone 25 mg Oral HS Hetul Mcclellan, DO         Today, Patient Was Seen By: ANNEMARIE Christensen    ** Please Note: Dictation voice to text software may have been used in the creation of this document   **

## 2018-09-26 NOTE — RESTORATIVE TECHNICIAN NOTE
Restorative Specialist Mobility Note       Activity: Ambulate in pandya, Ambulate in room, Bathroom privileges, Chair, Stand at bedside (Educated/encouraged pt to ambulate with assistance 3-4 x's/day   Pt callbell, phone/tray within reach )     Assistive Device: Other (Comment) (HHA x1)          Hodan MEEKS, Restorative Technician,

## 2018-09-26 NOTE — ASSESSMENT & PLAN NOTE
· Without acute exacerbation  · Patient with EF of 35% as per echocardiogram in July of 2018  · Suspect secondary to medication induced  · Outpatient follow-up with Cardiology  · Monitor daily weights monitor intake and output

## 2018-09-26 NOTE — CASE MANAGEMENT
Initial Clinical Review    Thank you,  [address] [facility] Review Department  Phone: [phone]; Fax [phone]  ATTENTION: Please call with any questions or concerns to [phone]  and carefully follow the prompts so that you are directed to the right person  Send all requests for admission clinical reviews, approved or denied determinations and any other requests to fax [phone]  All voicemails are confidential      Admission: Date/Time/Statement: 9/25/18 @ 1205     Orders Placed This Encounter   Procedures    Inpatient Admission (expected length of stay for this patient is greater than two midnights)     Standing Status:   Standing     Number of Occurrences:   1     Order Specific Question:   Admitting Physician     Answer:   Genia Vora [[id number]]     Order Specific Question:   Level of Care     Answer:   Med Surg [[id number]]     Order Specific Question:   Estimated length of stay     Answer:   More than 2 Midnights     Order Specific Question:   Certification     Answer:   I certify that inpatient services are medically necessary for this patient for a duration of greater than two midnights  See H&P and MD Progress Notes for additional information about the patient's course of treatment  ED: Date/Time/Mode of Arrival:   ED Arrival Information     Expected Arrival Acuity Means of Arrival Escorted By Service Admission Type    9/25/2018 08:52 9/25/2018 09:01 Emergent Walk-In Self General Medicine Emergency    Arrival Complaint    hypotension          Chief Complaint:   Chief Complaint   Patient presents with    Weakness - Generalized     Hx lymphoma  receiving radiatin  presents today with generalized weakness, dehydration       History of Illness: Matt Aviles is a 78 y o  female who presents with symptoms of increasing fatigue, shortness of breath weakness and decreased oral intake with difficulty swallowing solids    She presented initially with a heart rate in the 140s with a blood pressure in the 90s in the ER after being sent in by Cardiology in follow-up with an outpatient visit for Adriamycin induced cardiomyopathy  She received her 5th and last treatment for chemotherapy back on January 17th 2018  She is followed by Dr Janice Begum from Oncology for diffuse large B-cell lymphoma  Of note, she was previously on Adriamycin however this was stopped with cycle 5 due to a reduced ejection fraction on echocardiogram   A echocardiogram in follow-up on July 31st, 2018 showed an ejection fraction of 64% with systolic function severely reduced  She presents from the cardiology office with increasing symptoms of fatigue, decreased oral intake with associated tachycardia and low blood pressure in the office   Initial workup shows an x-ray with left lower lobe infiltrate in the ED  A recent CT scan on August 21st, 2018 illustrated evidence of supraclavicular mediastinal and retroperitoneal lymphadenopathy which has progressed from prior imaging  The mediastinal lymphadenopathy appears to have causes mass effect on the esophagus  Patient presents with decreased oral intake with poor nutrition  She reports that she has not been feeling well since July status post intervention for lymphadenopathy  She reports that after being intubated she has had persistent issues with cough and shortness of breath  Family reports that she has been feeling weak and having difficulty swallowing during this time  However appears to have been more pronounced over the last 1 to 2 weeks  She has been having difficulty with swallowing food with dysphagia type symptoms with food feeling like it is getting stuck  She reports that she has difficulty passing solids     She has been seen by Radiation therapy and was told that there may be a component of radiation induced esophagitis    Of note, the  does acknowledge a history of esophageal strictures as well and has had prior scopes with dilatation of the stricture in the past   She presents for increasing symptoms of fatigue, tachycardia and poor oral intake        ED Vital Signs:   ED Triage Vitals   Temperature Pulse Respirations Blood Pressure SpO2   09/25/18 0913 09/25/18 0913 09/25/18 0913 09/25/18 0916 09/25/18 0913   98 °F (36 7 °C) (!) 131 16 114/57 99 %      Temp Source Heart Rate Source Patient Position - Orthostatic VS BP Location FiO2 (%)   09/25/18 0913 09/25/18 0913 09/25/18 0945 09/25/18 0945 --   Tympanic Monitor Lying Left arm       Pain Score       09/25/18 0913       No Pain        Wt Readings from Last 1 Encounters:   09/25/18 91 2 kg (201 lb)       Vital Signs (abnormal):         09/25 0701  09/26 0700 09/26 0701  09/26 1139  Most Recent    Temperature (°F) 9898 1 98 06  98 06 (36 7)    Pulse 102133 108113  113    Respirations 1623 20  20    Blood Pressure 60/40137/58 118/61  118/61    SpO2 (%) 9099 9193   RA sat 91        Abnormal Labs/Diagnostic Test Results:     9/25 - Rell 10 3 - T Protein 6 3 - Albumin 2 7 - Wbc 10 08   Urine - Blood- Small - Leuk - small - Protein - trace - WBC - innumerable     CTA Chest - 9/25 - No pulmonary embolus  Profound increase in bulky rodger adenopathy in bilateral axilla and in the chest walls   Increase in the size of nodes in the upper abdomen, incompletely evaluated, and at the esophageal hiatus  Slight interval decrease in the size of mediastinal nodes      CXR -9/25 - Interval development of infiltrate at lateral left lung base    ED Treatment:   Medication Administration from 09/25/2018 0852 to 09/25/2018 1420       Date/Time Order Dose Route Action     09/25/2018 1000 sodium chloride 0 9 % bolus 500 mL 500 mL Intravenous New Bag     09/25/2018 1215 sodium chloride 0 9 % bolus 500 mL 500 mL Intravenous New Bag     09/25/2018 1156 iodixanol (VISIPAQUE) 320 MG/ML injection 85 mL 85 mL Intravenous Given       Past Medical/Surgical History:   Diagnosis    Basal cell carcinoma (BCC)    Bruit of right carotid artery    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HCC)    Dyslipidemia    Esophageal stricture    GERD (gastroesophageal reflux disease)    History of chemotherapy    History of radiation therapy    [place] (hard of hearing)    Hyperlipidemia    Hypertension    Osteoarthritis    Osteoarthrosis of ankle and foot    Osteopenia    Plantar fasciitis of left foot    Polyp of sigmoid colon    Shortness of breath       Admitting Diagnosis: Hypotension [I95 9]  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[address] ) [I42 7]  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 [facility]) [C83 38]    Age/Sex: 78 y o  female    Assessment/Plan:   Sinus tachycardia   Assessment & Plan     Some of this may be physiologic due to severe lymphadenopathy  Etiology could be secondary to dehydration secondary to diarrhea associated with Mag citrate versus occult infection/pneumonia dehydration associated with poor oral intake due to 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 versus esophageal stricture  Patient was previously seen by Chino Gant for esophageal strictures  PE less likely given patient is currently on Eliquis  For now will observe off of antibiotics given nontoxic status  Follow up on cultures  Follow up on CT imaging  Monitor symptoms closely  Follow-up on procalcitonin level  Judicious fluid resuscitation                Dysphagia   Assessment & Plan     Potential etiologies include:  ·   extrinsic compression of lymphadenopathy on the esophagus  Patient with abnormal CT imaging in August suggestive extrinsic compression  · Other consideration would be esophageal stricture  Patient with prior strictures in the past limiting oral intake requiring ballooning of stricture  · Other consideration would be 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 given recent radiation treatment      For now will consult GI    Family reports that she was recently started on Carafate therapy for swallowing problems with 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  Continue with supportive care  Continue with PPI therapy as well  Patient is asking for a surgical soft diet             Constipation   Assessment & Plan     Patient was given Mag citrate for constipation as an outpatient  Patient reportedly had severe constipation approximately 1 week ago  Family reports that she did have some significant diarrhea symptoms after the magnesium citrate  therapy  They report that they feel that there may be some dehydration associated with the volume of diarrhea secondary to the medications           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 [facility])   Assessment & Plan     Will consult Oncology  Patient recently started on Revlimid as per son  Will need follow through on plans for chemotherapy  For now will hold off until infection is definitively ruled out  Await CT scan  Continue supportive care  Patient received 6 cycles of chemotherapy followed by radiation therapy back in January of this year     Patient unfortunately had a complicated course with Adriamycin therapy leading to 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%  Will need to be very judicious with fluid resuscitation    Fortunately patient appears currently nontoxic in appearance                 Admission Orders:  Scheduled Meds:   Current Facility-Administered Medications:  allopurinol 200 mg Oral Daily    apixaban 5 mg Oral BID    ascorbic acid 500 mg Oral Daily With Dinner    calcium carbonate-vitamin D 1 tablet Oral Daily With Breakfast    HYDROcodone-acetaminophen 1 tablet Oral Q6H PRN    loratadine 10 mg Oral Early Morning    melatonin 6 mg Oral HS PRN    metoprolol tartrate 25 mg Oral Q12H CANDACE    nystatin 500,000 Units Swish & Swallow 4x Daily    ondansetron 4 mg Intravenous Q6H PRN    ondansetron 4 mg Oral Q6H PRN    pantoprazole 40 mg Oral BID AC    polyethylene glycol 17 g Oral Daily    predniSONE 20 mg Oral Daily    senna 1 tablet Oral BID    simethicone 80 mg Oral Q6H PRN    sucralfate 1,000 mg Oral Q6H [facility]    traZODone 25 mg Oral HS      Continuous Infusions:   sodium chloride 50 mL/hr     Nursing Orders - Telem - Ambulate TID - SCD's to le's- Diet surgical soft - lite meal     Cardiology  -  Dehydration, clinically she is dry and responding to IV fluids     # Chronic Systolic CHF, Stage B?, NYHA 2- Pt with reduced EF though no clinical manifestation of heart failure  Etiology: anthracycline induced myocardial damage- total amount of adriamycin is not specified      Echo 7/31/18  LVEF: 35%  LVIDd: 4 7 cm  RV: normal  MR:  PASP:     Nuclear stress test 8/8/17: There was a moderate to large, moderately severe, fixed myocardial perfusion defect of the entire anterior wall likely due to attenuation from breast tissue  However, cannot exclude a small region of myocardial ischemia  This abnormal nuclear correlated with a normal echo on the same day with normal function in this territory     # Recurrent large B cell lymphoma  Revlimid and radiation therapy  She has received Adriamycin- 3 cycles of CHOP     Plan:  Role of HF specific Rx is pending hemodynamic improvement in BP and pulse  Not clinically in HF  Already not going to receive any further adriamycin therapy  CMR may be reasonable to look for any delayed ALONSO in the anterior apical segments    Oncology  -  70-year-old female  This is likely to be radiation induced mucositis, esophagitis  As a result, she has decreased oral fluid intake, leading to dehydration  I would recommend supportive care with IV hydration  Radiation Oncology Department was notified to decide the last dose of radiation to be given or not  I told her to hold off the lenalidomide for now    She is in agreement with my recommendations

## 2018-09-26 NOTE — MALNUTRITION/BMI
This medical record reflects one or more clinical indicators suggestive of malnutrition  Malnutrition Findings:   Malnutrition type: Acute illness (in the context of radiation esophagitis; as evidenced by intake meeting less than 75% estimated needs for greater than 7 days and slightly depressed temples suggesting mild muscle loss; treated with diet as tolerated and oral nutrition supplements)  Degree of Malnutrition: Malnutrition of moderate degree  Malnutrition Characteristics: Muscle loss, Inadequate energy         See Nutrition note dated 9/26/18 for additional details  Completed nutrition assessment is viewable in the nutrition documentation

## 2018-09-27 ENCOUNTER — APPOINTMENT (INPATIENT)
Dept: RADIOLOGY | Facility: HOSPITAL | Age: 78
DRG: 392 | End: 2018-09-27
Payer: COMMERCIAL

## 2018-09-27 PROBLEM — R09.02 HYPOXIA: Status: ACTIVE | Noted: 2018-09-27

## 2018-09-27 PROCEDURE — 71045 X-RAY EXAM CHEST 1 VIEW: CPT

## 2018-09-27 PROCEDURE — 99231 SBSQ HOSP IP/OBS SF/LOW 25: CPT | Performed by: INTERNAL MEDICINE

## 2018-09-27 PROCEDURE — 99232 SBSQ HOSP IP/OBS MODERATE 35: CPT | Performed by: INTERNAL MEDICINE

## 2018-09-27 PROCEDURE — 77336 RADIATION PHYSICS CONSULT: CPT | Performed by: RADIOLOGY

## 2018-09-27 RX ORDER — SUCRALFATE ORAL 1 G/10ML
SUSPENSION ORAL
Status: COMPLETED
Start: 2018-09-27 | End: 2018-09-27

## 2018-09-27 RX ADMIN — SENNOSIDES 8.6 MG: 8.6 TABLET, FILM COATED ORAL at 17:28

## 2018-09-27 RX ADMIN — SUCRALFATE 1000 MG: 1 SUSPENSION ORAL at 06:19

## 2018-09-27 RX ADMIN — SODIUM CHLORIDE 50 ML/HR: 0.9 INJECTION, SOLUTION INTRAVENOUS at 12:56

## 2018-09-27 RX ADMIN — CALCIUM CARBONATE 500 MG (1,250 MG)-VITAMIN D3 200 UNIT TABLET 1 TABLET: at 08:42

## 2018-09-27 RX ADMIN — APIXABAN 5 MG: 5 TABLET, FILM COATED ORAL at 08:43

## 2018-09-27 RX ADMIN — PANTOPRAZOLE SODIUM 40 MG: 40 TABLET, DELAYED RELEASE ORAL at 06:19

## 2018-09-27 RX ADMIN — LIDOCAINE HYDROCHLORIDE 10 ML: 20 SOLUTION ORAL; TOPICAL at 12:54

## 2018-09-27 RX ADMIN — LIDOCAINE HYDROCHLORIDE 10 ML: 20 SOLUTION ORAL; TOPICAL at 17:28

## 2018-09-27 RX ADMIN — METOPROLOL TARTRATE 25 MG: 25 TABLET ORAL at 21:47

## 2018-09-27 RX ADMIN — NYSTATIN 500000 UNITS: 100000 SUSPENSION ORAL at 21:48

## 2018-09-27 RX ADMIN — OXYCODONE HYDROCHLORIDE AND ACETAMINOPHEN 500 MG: 500 TABLET ORAL at 17:28

## 2018-09-27 RX ADMIN — SUCRALFATE 1000 MG: 1 SUSPENSION ORAL at 00:38

## 2018-09-27 RX ADMIN — APIXABAN 5 MG: 5 TABLET, FILM COATED ORAL at 17:28

## 2018-09-27 RX ADMIN — DIBASIC SODIUM PHOSPHATE, MONOBASIC POTASSIUM PHOSPHATE AND MONOBASIC SODIUM PHOSPHATE 1 TABLET: 852; 155; 130 TABLET ORAL at 17:28

## 2018-09-27 RX ADMIN — SUCRALFATE 1000 MG: 1 SUSPENSION ORAL at 12:52

## 2018-09-27 RX ADMIN — PANTOPRAZOLE SODIUM 40 MG: 40 TABLET, DELAYED RELEASE ORAL at 17:28

## 2018-09-27 RX ADMIN — SENNOSIDES 8.6 MG: 8.6 TABLET, FILM COATED ORAL at 08:42

## 2018-09-27 RX ADMIN — METOPROLOL TARTRATE 25 MG: 25 TABLET ORAL at 08:43

## 2018-09-27 RX ADMIN — LORATADINE 10 MG: 10 TABLET ORAL at 06:19

## 2018-09-27 RX ADMIN — NYSTATIN 500000 UNITS: 100000 SUSPENSION ORAL at 08:43

## 2018-09-27 RX ADMIN — LIDOCAINE HYDROCHLORIDE 10 ML: 20 SOLUTION ORAL; TOPICAL at 21:48

## 2018-09-27 RX ADMIN — SUCRALFATE 1000 MG: 1 SUSPENSION ORAL at 17:28

## 2018-09-27 RX ADMIN — PREDNISONE 20 MG: 20 TABLET ORAL at 08:42

## 2018-09-27 RX ADMIN — NYSTATIN 500000 UNITS: 100000 SUSPENSION ORAL at 17:28

## 2018-09-27 RX ADMIN — ALLOPURINOL 200 MG: 100 TABLET ORAL at 08:42

## 2018-09-27 RX ADMIN — TRAZODONE HYDROCHLORIDE 25 MG: 50 TABLET ORAL at 21:47

## 2018-09-27 RX ADMIN — NYSTATIN 500000 UNITS: 100000 SUSPENSION ORAL at 12:52

## 2018-09-27 NOTE — ASSESSMENT & PLAN NOTE
· Patient had bowel movement 2 days ago  · Given severe dysphagia secondary to pain appetite poor, poor oral hydration also  · Continue bowel regimen as tolerated

## 2018-09-27 NOTE — ASSESSMENT & PLAN NOTE
· 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 versus extrinsic compression of lymphadenopathy on the esophagus  · Patient describes as a burning pain is having difficulty with swallowing secondary to pain, states today feels better but still unable to take in enough fluids to stay adequately hydrated or enough calories   · Continue magic mouth wash  · Continue nystatin  · Continue gentle IV hydration  · Continue PPI   · Monitor closely

## 2018-09-27 NOTE — PROGRESS NOTES
Progress Note - Roberto Beam 1940, 78 y o  female MRN: [id number]    Unit/Bed#: [facility] 622-01 Encounter: [id number]    Primary Care Provider: Joe Galvan PA-C   Date and time admitted to hospital: 9/25/2018  9:17 AM        Hypoxia   Assessment & Plan    · Patient noted at rest with saturations of 89% (resp nonlabored, but cough with deep breathing, talking_  · Check PCXR  · CTA on admission with increased mediastinal nodes, negative PE  · Titrate oxygen maintain sats >92%  · Airway clearance protocol, due to pain patient with difficulty clearing secretions and cough and deep breathing is too painful  · Encourage I/S  · Consider flutter valve  · Oral meds not an option due to painful swallowing     Dysphagia   Assessment & Plan    · 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 versus extrinsic compression of lymphadenopathy on the esophagus  · Patient describes as a burning pain is having difficulty with swallowing secondary to pain, states today feels better but still unable to take in enough fluids to stay adequately hydrated or enough calories   · Continue magic mouth wash  · Continue nystatin  · Continue gentle IV hydration  · Continue PPI   · Monitor closely       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 [facility])   Assessment & Plan    · Patient with relapsed diffuse large B-cell lymphoma, had last palliative radiation tx yesterday,  · Currently with esophageal pain and dysphagia, but reports improvement 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 in addition to dehydration  · Continue IV hydration, encourage oral intake as tolerated  · Currently holding on Lenalidomide  · CTA of the chest with negative pulmonary embolism, profound increase in bulky rodger adenopathy in bilateral axilla and chest walls, increase in the size of the nodes in the upper abdomen incompletely evaluated and at the esophageal hiatus, slight interval decrease in the size of the mediastinal nodes  · Chest x-ray with interval development of infiltrate at lateral left lung base, noted hypoxia on Masimo, check PCXR, cough worse  · On admission, Procalcitonin 0 13  · Patient afebrile  · No leukocytosis     * Sinus tachycardia   Assessment & Plan    · Appreciate Cardiology consultation, recommendations noted, heart rate 90s- low 100s, patient asymptomatic  · New start metoprolol 25 mg BID  · Can d/c telem, has been stable, no alarms, continue mosimo monitoring  · Given significant dysphagia and esophageal pain will continue with IV hydration as dehydration may also be playing a significant role with her tachycardia         Chronic systolic congestive heart failure (HCC)   Assessment & Plan    · Without acute exacerbation  · Patient with EF of 35% as per echocardiogram in July of 2018  · Suspect secondary to medication induced  · Outpatient follow-up with Cardiology  · Monitor daily weights monitor intake and output, d/w check todays weight     Constipation   Assessment & Plan    · Patient had bowel movement 2 days ago  · Given severe dysphagia secondary to pain appetite poor, poor oral hydration also  · Continue bowel regimen as tolerated     Arm DVT (deep venous thromboembolism), acute, left (HCC)   Assessment & Plan    · History of left jugular vein thrombosis continue Eliquis       VTE Pharmacologic Prophylaxis:   Pharmacologic: Apixaban (Eliquis)  Mechanical VTE Prophylaxis in Place: Yes    Patient Centered Rounds: I have performed bedside rounds with nursing staff today  Discussions with Specialists or Other Care Team Provider:  Oncology    Education and Discussions with Family / Patient:  Patient    Time Spent for Care: 30 minutes  More than 50% of total time spent on counseling and coordination of care as described above      Current Length of Stay: 2 day(s)    Current Patient Status: Inpatient   Certification Statement: The patient will continue to require additional inpatient hospital stay due to Continued poor appetite need for hydration via IV, hypoxia need for further workup    Discharge Plan:  Home when medically stable    Code Status: Level 3 - DNAR and DNI      Subjective:   Reports pain with swallowing but overall states she is slightly improved today when compared to yesterday  Still continues with cough when breathing periods still unable to take in enough hydration orally or eat solid foods due to the pain with swallowing  Voiding  No abdominal pain or diarrhea  No chest pain or shortness of breath  Objective:     Vitals:   Temp (24hrs), Av 8 °F (37 1 °C), Min:98 6 °F (37 °C), Max:99 °F (37 2 °C)    HR:  [101-117] 103  Resp:  [20] 20  BP: (105-118)/(61-65) 105/61  SpO2:  [90 %-95 %] 90 %  Body mass index is 35 61 kg/m²  Input and Output Summary (last 24 hours): Intake/Output Summary (Last 24 hours) at 18 1240  Last data filed at 18 0945   Gross per 24 hour   Intake           914 17 ml   Output              550 ml   Net           364 17 ml       Physical Exam:     Physical Exam   Constitutional: She is oriented to person, place, and time  She appears well-developed and well-nourished  No distress  HENT:   Head: Normocephalic and atraumatic  Mouth/Throat: Oropharynx is clear and moist    Neck: Neck supple  Cardiovascular: Regular rhythm and intact distal pulses  Tachycardia present  No murmur heard  Pulmonary/Chest: Effort normal  No respiratory distress  Diminished breath sounds throughout with frequent dry cough present with inspiration   Abdominal: Soft  Bowel sounds are normal  She exhibits no distension  There is no tenderness  Musculoskeletal: Normal range of motion  She exhibits no edema  Neurological: She is alert and oriented to person, place, and time  No cranial nerve deficit  Skin: Skin is warm and dry  There is erythema  Neck and chest erythema   Psychiatric: She has a normal mood and affect  Her behavior is normal    Vitals reviewed        Additional Data: Labs:      Results from last 7 days  Lab Units 09/26/18  0555 09/25/18  0959   WBC Thousand/uL 6 89 10 08   HEMOGLOBIN g/dL 11 2* 12 2   HEMATOCRIT % 36 5 38 6   PLATELETS Thousands/uL 160 160   NEUTROS PCT %  --  86*   LYMPHS PCT %  --  2*   MONOS PCT %  --  10   EOS PCT %  --  1       Results from last 7 days  Lab Units 09/26/18  0555   SODIUM mmol/L 138   POTASSIUM mmol/L 3 8   CHLORIDE mmol/L 102   CO2 mmol/L 29   BUN mg/dL 19   CREATININE mg/dL 0 97   CALCIUM mg/dL 10 3*   ALK PHOS U/L 81   ALT U/L 22   AST U/L 29           * I Have Reviewed All Lab Data Listed Above  * Additional Pertinent Lab Tests Reviewed:  All Labs Within Last 24 Hours Reviewed    Imaging:    Imaging Reports Reviewed Today Include:  Chest x-ray  Imaging Personally Reviewed by Myself Includes:  Chest x-ray    Recent Cultures (last 7 days):       Results from last 7 days  Lab Units 09/25/18  1230   URINE CULTURE  20,000-29,000 cfu/ml        Last 24 Hours Medication List:     Current Facility-Administered Medications:  al mag oxide-diphenhydramine-lidocaine viscous 10 mL Swish & Swallow Q6H PRN ANNEMARIE Valiente    allopurinol 200 mg Oral Daily Hetul Mcclellan, DO    apixaban 5 mg Oral BID Hetul Mcclellan, DO    ascorbic acid 500 mg Oral Daily With [employer], DO    calcium carbonate-vitamin D 1 tablet Oral Daily With Breakfast Hetul Mcclellan, DO    HYDROcodone-acetaminophen 1 tablet Oral Q6H PRN Hetul Mcclellan, DO    loratadine 10 mg Oral Early Morning Hetul Mcclellan, DO    LORazepam 0 5 mg Intravenous Q6H PRN ANNEMARIE Valiente    melatonin 6 mg Oral HS PRN Hetul Mcclellan, DO    metoprolol tartrate 25 mg Oral Q12H [address] ANNEMARIE Amezquita    nystatin 500,000 Units Swish & Swallow 4x Daily Hetul Mcclellan, DO    ondansetron 4 mg Intravenous Q6H PRN Hetul Mcclellan, DO    ondansetron 4 mg Oral Q6H PRN Hetul Mcclellan, DO    pantoprazole 40 mg Oral BID AC Hetul Mcclellan, DO    polyethylene glycol 17 g Oral Daily Hetul Mcclellan, DO    predniSONE 20 mg Oral Daily Hetul Mcclellan, DO    senna 1 tablet Oral BID Hetul Mcclellan, DO    simethicone 80 mg Oral Q6H PRN Hetul Mcclellan, DO    sodium chloride 50 mL/hr Intravenous Continuous Hetul Mcclellan, DO Last Rate: 50 mL/hr (09/26/18 1615)   sucralfate 1,000 mg Oral Q6H [address] ANNEMARIE Cooper    traZODone 25 mg Oral HS Hetul Mcclellan, DO         Today, Patient Was Seen By: ANNEMARIE Anne    ** Please Note: Dictation voice to text software may have been used in the creation of this document   **

## 2018-09-27 NOTE — ASSESSMENT & PLAN NOTE
· [address] Cardiology consultation, recommendations noted, heart rate 90s- low 100s, patient asymptomatic  · New start metoprolol 25 mg BID  · Can d/c telem, has been stable, no alarms, continue mosimo monitoring  · Given significant dysphagia and esophageal pain will continue with IV hydration as dehydration may also be playing a significant role with her tachycardia

## 2018-09-27 NOTE — RESTORATIVE TECHNICIAN NOTE
Restorative Specialist Mobility Note       Activity: Ambulate in pandya, Ambulate in room, Bathroom privileges, Chair, Stand at bedside, Dangle (Educated/encouraged pt to ambulate with assistance 3-4 x's/day   Pt callbell, phone/tray within reach )     Assistive Device: Front wheel walker (NC O2 on 2L)          Kevin MEEKS, Restorative Technician, [employer]

## 2018-09-27 NOTE — ASSESSMENT & PLAN NOTE
· Without acute exacerbation  · Patient with EF of 35% as per echocardiogram in July of 2018  · Suspect secondary to medication induced  · Outpatient follow-up with Cardiology  · Monitor daily weights monitor intake and output, d/w check todays weight

## 2018-09-27 NOTE — ASSESSMENT & PLAN NOTE
· Patient noted at rest with saturations of 89% (resp nonlabored, but cough with deep breathing, talking_  · Check PCXR  · CTA on admission with increased mediastinal nodes, negative PE  · Titrate oxygen maintain sats >92%  · Airway clearance protocol, due to pain patient with difficulty clearing secretions and cough and deep breathing is too painful  · Encourage I/S  · Consider flutter valve  · Oral meds not an option due to painful swallowing

## 2018-09-27 NOTE — ASSESSMENT & PLAN NOTE
· Patient with relapsed diffuse large B-cell lymphoma, had last palliative radiation tx yesterday,  · Currently with esophageal pain and dysphagia, but reports improvement 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 in addition to dehydration  · Continue IV hydration, encourage oral intake as tolerated  · Currently holding on Lenalidomide  · CTA of the chest with negative pulmonary embolism, profound increase in bulky rodger adenopathy in bilateral axilla and chest walls, increase in the size of the nodes in the upper abdomen incompletely evaluated and at the esophageal hiatus, slight interval decrease in the size of the mediastinal nodes  · Chest x-ray with interval development of infiltrate at lateral left lung base, noted hypoxia on Masimo, check PCXR, cough worse  · On admission, Procalcitonin 0 13  · Patient afebrile  · No leukocytosis

## 2018-09-27 NOTE — PROGRESS NOTES
Heart Failure Service Progress Note - Qi Diop 78 y o  female MRN: [id number]    Unit/Bed#: [facility] 622-01 Encounter: [id number]      Assessment:    Principal Problem:    Sinus tachycardia  Active Problems:    Arm DVT (deep venous thromboembolism), acute, left (HCC)    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 [facility])    Constipation    Dysphagia    Chronic systolic congestive heart failure ([facility] [address] )    7/2018  LVEF: 25%  LVIDd:4 73  RV: normal size and systolic function  MR:mild  PASP:  RVOT:   Other:    Neurohormonal Blockade:  --Beta-Blocker: Metoprolol tartrate 25 mg BID starting today  --ACEi, ARB or ARNi:     (or SVR reduction)  --Aldosterone Receptor Blocker:  --Diuretic:     Sudden Cardiac Death Risk Reduction:  --ICD: Life Vest prior to discharge  Interrogation:     Electrical Resynchronization:  -Intermittent LBBB    Interrogation: N/A      Plan: 1  Dilated Cardiomyopathy, HFrEF, not in exacerbation  Etiology likely 2/2 IV chemotherapy with Adriamycin for NonHodgkin's Lymphoma  TTE from 8/2017 demonstrating LVEF of 55% now with a significant decline in systolic function to 89%  NYHA Class III/IV, ACC/AHA Stage C  Patient hypovolemic on PE with tachycardia and hypotension on admission  Now improved with gentle IV hydration  Still mildly tachycardic despite addition of Metoprolol  Would still continue for now  Should include GDMT including ACEI, ARB, ARNI, prior to discharge if able  Continue to monitor daily weights, strict I's and O's, BMP       2  LBBB  Currently sinus tach with narrow QRS     3  Hypertension  Was actually hypotensive, now improving with IVF, better PO intake       4 History of DVT, left Jugular vein thrombosis  On Eliquis       5  Lymphoma  Currently receiving radiation and oral chemotherapy  Defer to hem/onc Follows with Dr Maggie Bacon       6  Oral Candida infection  Continue with Nystatin         Subjective:   Patient seen and examined    No significant events overnight  Continues with significant throat pain and mild cough  has difficulty swallowing  Still mildly tachycardic  BP stable with gentle IV hydration  Objective: Intake/ Output:974/700  Weight: 201 lbs, no daily weights  Tele: Sinus tach in the 110-120s    Central Line (day, reason): Mckeon catheter (day, reason):    Vitals: Blood pressure 105/61, pulse 103, temperature 99 °F (37 2 °C), resp  rate 20, height 5' 3" (1 6 m), weight 91 2 kg (201 lb), SpO2 90 %, not currently breastfeeding , Body mass index is 35 61 kg/m² , I/O last 3 completed shifts: In: 1712 5 [P O :650; I V :1062 5]  Out: 1350 [Urine:1350]  No intake/output data recorded  Wt Readings from Last 3 Encounters:   09/25/18 91 2 kg (201 lb)   09/25/18 91 2 kg (201 lb)   09/19/18 92 9 kg (204 lb 12 8 oz)       Intake/Output Summary (Last 24 hours) at 09/27/18 0920  Last data filed at 09/27/18 0307   Gross per 24 hour   Intake           974 17 ml   Output              700 ml   Net           274 17 ml     I/O last 3 completed shifts: In: 1712 5 [P O :650; I V :1062 5]  Out: 1350 [Urine:1350]    No significant arrhythmias seen on telemetry review         Physical Exam:  Vitals:    09/26/18 2135 09/26/18 2143 09/26/18 2305 09/27/18 0640   BP:  118/65 107/61 105/61   Pulse:  (!) 109 101 103   Resp:   20 20   Temp:   98 6 °F (37 °C) 99 °F (37 2 °C)   TempSrc:       SpO2: 93%  92% 90%   Weight:       Height:           GEN: Drea Conroy appears well, alert and oriented x 3, pleasant and cooperative   HEENT: pupils equal, round, and reactive to light; extraocular muscles intact  NECK: supple, no carotid bruits   HEART: regular rhythm, normal S1 and S2, no murmurs, clicks, gallops or rubs, JVP is down    LUNGS: clear to auscultation bilaterally; no wheezes, rales, or rhonchi   ABDOMEN: normal bowel sounds, soft, no tenderness, no distention  EXTREMITIES: peripheral pulses normal; no clubbing, cyanosis, or edema  NEURO: no focal findings   SKIN: normal without suspicious lesions on exposed skin      Current Facility-Administered Medications:     al mag oxide-diphenhydramine-lidocaine viscous (MAGIC MOUTHWASH) suspension 10 mL, 10 mL, Swish & Swallow, Q6H PRN, ANNEMARIE Pugh, 10 mL at 09/26/18 2143    allopurinol (ZYLOPRIM) tablet 200 mg, 200 mg, Oral, Daily, Hetul Mcclellan, DO, 200 mg at 09/27/18 9874    apixaban (ELIQUIS) tablet 5 mg, 5 mg, Oral, BID, Hetul Mcclellan, DO, 5 mg at 09/27/18 5161    ascorbic acid (VITAMIN C) tablet 500 mg, 500 mg, Oral, Daily With Dinner, Adi Mckeon DO, Stopped at 09/26/18 1618    calcium carbonate-vitamin D (OSCAL-D) 500 mg-200 units per tablet 1 tablet, 1 tablet, Oral, Daily With Breakfast, Stefanyul Mcclellan, DO, 1 tablet at 09/27/18 0842    HYDROcodone-acetaminophen (NORCO) 5-325 mg per tablet 1 tablet, 1 tablet, Oral, Q6H PRN, Hetul Mcclellan, DO    loratadine (CLARITIN) tablet 10 mg, 10 mg, Oral, Early Morning, Hetul Mcclellan, DO, 10 mg at 09/27/18 0619    LORazepam (ATIVAN) 2 mg/mL injection 0 5 mg, 0 5 mg, Intravenous, Q6H PRN, ANNEMARIE Pugh, 0 5 mg at 09/26/18 1415    melatonin tablet 6 mg, 6 mg, Oral, HS PRN, Hetul Mcclellan, DO    metoprolol tartrate (LOPRESSOR) tablet 25 mg, 25 mg, Oral, Q12H [address], Sujatha L MEAGAN MullinsNP, 25 mg at 09/27/18 0843    nystatin (MYCOSTATIN) oral suspension 500,000 Units, 500,000 Units, Swish & Swallow, 4x Daily, Hetul Mcclellan, DO, 500,000 Units at 09/27/18 0843    ondansetron (ZOFRAN) injection 4 mg, 4 mg, Intravenous, Q6H PRN, Hetul Mcclellan, DO    ondansetron (ZOFRAN-ODT) dispersible tablet 4 mg, 4 mg, Oral, Q6H PRN, Hetul Mcclellan, DO    pantoprazole (PROTONIX) EC tablet 40 mg, 40 mg, Oral, BID AC, Hetul Mcclellan, DO, 40 mg at 09/27/18 0619    polyethylene glycol (MIRALAX) packet 17 g, 17 g, Oral, Daily, Hetul Mcclellan, DO, 17 g at 09/26/18 1000    predniSONE tablet 20 mg, 20 mg, Oral, Daily, Hetul Mcclellan, DO, 20 mg at 09/27/18 0842    senna (SENOKOT) tablet 8 6 mg, 1 tablet, Oral, BID, Hetul Mcclellan, DO, 8 6 mg at 09/27/18 0842    simethicone (MYLICON) chewable tablet 80 mg, 80 mg, Oral, Q6H PRN, Hetul Mcclellan, DO    sodium chloride 0 9 % infusion, 50 mL/hr, Intravenous, Continuous, Hetul Mcclellan, DO, Last Rate: 50 mL/hr at 09/26/18 1615, 50 mL/hr at 09/26/18 1615    sucralfate (CARAFATE) oral suspension 1,000 mg, 1,000 mg, Oral, Q6H [address], Sadie Arellano, CRNP, 1,000 mg at 09/27/18 9659    traZODone (DESYREL) tablet 25 mg, 25 mg, Oral, HS, Hetul Mcclellan, DO, 25 mg at 09/26/18 2143      Labs & Results:          Results from last 7 days  Lab Units 09/26/18  0555 09/25/18  0959 09/24/18  1219   WBC Thousand/uL 6 89 10 08 8 38   HEMOGLOBIN g/dL 11 2* 12 2 12 0   HEMATOCRIT % 36 5 38 6 38 7   PLATELETS Thousands/uL 160 160 162           Results from last 7 days  Lab Units 09/26/18  0555 09/25/18  0959 09/24/18  1219   SODIUM mmol/L 138 136 139   POTASSIUM mmol/L 3 8 4 8 3 9   CHLORIDE mmol/L 102 100 101   CO2 mmol/L 29 27 29   BUN mg/dL 19 25 18   CREATININE mg/dL 0 97 1 22 0 98   CALCIUM mg/dL 10 3* 10 3* 10 2*   ALK PHOS U/L 81 92 95   ALT U/L 22 27 26   AST U/L 29 50* 35             Counseling / Coordination of Care  Total floor / unit time spent today 20 minutes  Greater than 50% of total time was spent with the patient and / or family counseling and / or coordination of care  A description of the counseling / coordination of care: 10  Thank you for the opportunity to participate in the care of this patient  Alva GAO    Director Heart Failure/ Medical Director [address] [place] [address]

## 2018-09-28 LAB
ANION GAP SERPL CALCULATED.3IONS-SCNC: 7 MMOL/L (ref 4–13)
BUN SERPL-MCNC: 17 MG/DL (ref 5–25)
CALCIUM SERPL-MCNC: 9.5 MG/DL (ref 8.3–10.1)
CHLORIDE SERPL-SCNC: 106 MMOL/L (ref 100–108)
CO2 SERPL-SCNC: 28 MMOL/L (ref 21–32)
CREAT SERPL-MCNC: 0.83 MG/DL (ref 0.6–1.3)
GFR SERPL CREATININE-BSD FRML MDRD: 69 ML/MIN/1.73SQ M
GLUCOSE SERPL-MCNC: 72 MG/DL (ref 65–140)
POTASSIUM SERPL-SCNC: 3.6 MMOL/L (ref 3.5–5.3)
SODIUM SERPL-SCNC: 141 MMOL/L (ref 136–145)

## 2018-09-28 PROCEDURE — 99232 SBSQ HOSP IP/OBS MODERATE 35: CPT | Performed by: INTERNAL MEDICINE

## 2018-09-28 PROCEDURE — 80048 BASIC METABOLIC PNL TOTAL CA: CPT | Performed by: NURSE PRACTITIONER

## 2018-09-28 RX ADMIN — NYSTATIN 500000 UNITS: 100000 SUSPENSION ORAL at 09:16

## 2018-09-28 RX ADMIN — PANTOPRAZOLE SODIUM 40 MG: 40 TABLET, DELAYED RELEASE ORAL at 17:34

## 2018-09-28 RX ADMIN — METOPROLOL TARTRATE 25 MG: 25 TABLET ORAL at 23:41

## 2018-09-28 RX ADMIN — SUCRALFATE 1000 MG: 1 SUSPENSION ORAL at 06:38

## 2018-09-28 RX ADMIN — LORATADINE 10 MG: 10 TABLET ORAL at 06:38

## 2018-09-28 RX ADMIN — SENNOSIDES 8.6 MG: 8.6 TABLET, FILM COATED ORAL at 17:35

## 2018-09-28 RX ADMIN — SUCRALFATE 1000 MG: 1 SUSPENSION ORAL at 00:09

## 2018-09-28 RX ADMIN — ONDANSETRON 4 MG: 2 INJECTION INTRAMUSCULAR; INTRAVENOUS at 23:37

## 2018-09-28 RX ADMIN — PANTOPRAZOLE SODIUM 40 MG: 40 TABLET, DELAYED RELEASE ORAL at 06:38

## 2018-09-28 RX ADMIN — NYSTATIN 500000 UNITS: 100000 SUSPENSION ORAL at 12:33

## 2018-09-28 RX ADMIN — TRAZODONE HYDROCHLORIDE 25 MG: 50 TABLET ORAL at 23:41

## 2018-09-28 RX ADMIN — NYSTATIN 500000 UNITS: 100000 SUSPENSION ORAL at 23:44

## 2018-09-28 RX ADMIN — OXYCODONE HYDROCHLORIDE AND ACETAMINOPHEN 500 MG: 500 TABLET ORAL at 17:35

## 2018-09-28 RX ADMIN — ALLOPURINOL 200 MG: 100 TABLET ORAL at 09:16

## 2018-09-28 RX ADMIN — APIXABAN 5 MG: 5 TABLET, FILM COATED ORAL at 09:16

## 2018-09-28 RX ADMIN — SENNOSIDES 8.6 MG: 8.6 TABLET, FILM COATED ORAL at 09:16

## 2018-09-28 RX ADMIN — PREDNISONE 20 MG: 20 TABLET ORAL at 09:16

## 2018-09-28 RX ADMIN — LIDOCAINE HYDROCHLORIDE 10 ML: 20 SOLUTION ORAL; TOPICAL at 09:16

## 2018-09-28 RX ADMIN — NYSTATIN 500000 UNITS: 100000 SUSPENSION ORAL at 17:35

## 2018-09-28 RX ADMIN — DIBASIC SODIUM PHOSPHATE, MONOBASIC POTASSIUM PHOSPHATE AND MONOBASIC SODIUM PHOSPHATE 1 TABLET: 852; 155; 130 TABLET ORAL at 06:38

## 2018-09-28 RX ADMIN — SUCRALFATE 1000 MG: 1 SUSPENSION ORAL at 17:35

## 2018-09-28 RX ADMIN — METOPROLOL TARTRATE 25 MG: 25 TABLET ORAL at 09:17

## 2018-09-28 RX ADMIN — APIXABAN 5 MG: 5 TABLET, FILM COATED ORAL at 17:35

## 2018-09-28 RX ADMIN — LIDOCAINE HYDROCHLORIDE 10 ML: 20 SOLUTION ORAL; TOPICAL at 17:34

## 2018-09-28 RX ADMIN — SUCRALFATE 1000 MG: 1 SUSPENSION ORAL at 23:43

## 2018-09-28 RX ADMIN — DIBASIC SODIUM PHOSPHATE, MONOBASIC POTASSIUM PHOSPHATE AND MONOBASIC SODIUM PHOSPHATE 1 TABLET: 852; 155; 130 TABLET ORAL at 17:35

## 2018-09-28 RX ADMIN — POLYETHYLENE GLYCOL 3350 17 G: 17 POWDER, FOR SOLUTION ORAL at 09:17

## 2018-09-28 RX ADMIN — LIDOCAINE HYDROCHLORIDE 10 ML: 20 SOLUTION ORAL; TOPICAL at 23:44

## 2018-09-28 RX ADMIN — SUCRALFATE 1000 MG: 1 SUSPENSION ORAL at 12:33

## 2018-09-28 NOTE — RESTORATIVE TECHNICIAN NOTE
Restorative Specialist Mobility Note       Activity: Ambulate in pandya, Ambulate in room, Bathroom privileges, Chair, Dangle, Stand at bedside (Educated/encouraged pt to ambulate with assistance 3-4 x's/day   Pt callbell, phone/tray within reach )     Assistive Device: Other (Comment) (HHA x1)              Orval Or BS, Restorative Technician,

## 2018-09-28 NOTE — PROGRESS NOTES
Heart Failure Service Progress Note - Brandy Borjas 78 y o  female MRN: [id number]    Unit/Bed#: [facility] 622-01 Encounter: [id number]      Assessment:    Principal Problem:    Sinus tachycardia  Active Problems:    Arm DVT (deep venous thromboembolism), acute, left (HCC)    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 [facility])    Constipation    Dysphagia    Chronic systolic congestive heart failure ([address] )    Hypoxia    Sent in by Dr aNthaniel Gonzalez for hypotension and tachycardia  Her SBP was in the 70's mmHg with HR to 130's  Pt has received treatment for non hodgkins lymphoma with IV adriamycin from September to Dec  Treatment stopped when EF dropped from normal to 40% in Dec 2017  Then started on beta blocker and ACE  TTE in July showed EF down to 25%     # Dehydration, clinically she is dry and responding to IV fluids,   HR still elevated but BP improved  HR better now with low dose metoprolol tartrate  Ins and outs: +274  Dehydration due to odynophagia and dysphagia     # Chronic Systolic CHF, Stage B?, NYHA 2- Pt with reduced EF though no clinical manifestation of heart failure  Etiology: anthracycline induced myocardial damage- total amount of adriamycin is not specified     Neurohormonal modulation:   Beta blocker: metoprolol tartrate 25 mg BID  No plan for ACE out of concern for ongoing hypotensive and oral intake concerns        Echo 7/31/18  LVEF: 35%  LVIDd: 4 7 cm  RV: normal  MR:  PASP:     Nuclear stress test 8/8/17: There was a moderate to large, moderately severe, fixed myocardial perfusion defect of the entire anterior wall likely due to attenuation from breast tissue  However, cannot exclude a small region of myocardial ischemia    This abnormal nuclear correlated with a normal echo on the same day with normal function in this territory    Interim:  Ins and outs: +1236  CXR- NAD      # Recurrent large B cell lymphoma  Revlimid and radiation therapy  She has received Adriamycin- 3 cycles of CHOP     Plan:  Started metoprolol tartrate 25 mg BID with apple sauce   Future considerations:  Already not going to receive any further adriamycin therapy  CMR may be reasonable to look for any delayed ALONSO in the anterior apical segments       Central Line (day, reason): Mckeon catheter (day, reason):    Vitals: Blood pressure 132/73, pulse 103, temperature 98 6 °F (37 °C), resp  rate 20, height 5' 3" (1 6 m), weight 91 2 kg (201 lb 1 oz), SpO2 91 %, not currently breastfeeding , Body mass index is 35 62 kg/m² , I/O last 3 completed shifts: In: 2396 7 [P O :560; I V :1836 7]  Out: 1300 [Urine:1300]  I/O this shift:  In: -   Out: 250 [Urine:250]  Wt Readings from Last 3 Encounters:   09/28/18 91 2 kg (201 lb 1 oz)   09/25/18 91 2 kg (201 lb)   09/19/18 92 9 kg (204 lb 12 8 oz)       Intake/Output Summary (Last 24 hours) at 09/28/18 1018  Last data filed at 09/28/18 0932   Gross per 24 hour   Intake          2076 67 ml   Output             1150 ml   Net           926 67 ml     I/O last 3 completed shifts: In: 2396 7 [P O :560; I V :1836 7]  Out: 1300 [Urine:1300]    No significant arrhythmias seen on telemetry review         Physical Exam:  Vitals:    09/27/18 1520 09/27/18 2335 09/28/18 0600 09/28/18 0650   BP: 107/61 117/63  132/73   Pulse: (!) 108 93  103   Resp:       Temp: 98 1 °F (36 7 °C) 98 2 °F (36 8 °C)  98 6 °F (37 °C)   TempSrc:       SpO2: 94% 94%  91%   Weight:   91 2 kg (201 lb 1 oz)    Height:           GEN: Bunny Nettles appears well, alert and oriented x 3, pleasant and cooperative   HEENT: pupils equal, round, and reactive to light; extraocular muscles intact  NECK: supple, no carotid bruits   HEART: regular rhythm and tachycardic, normal S1 and S2, no murmurs, clicks, gallops or rubs, JVP is down   LUNGS: clear to auscultation bilaterally; no wheezes, rales, or rhonchi   ABDOMEN: normal bowel sounds, soft, no tenderness, no distention  EXTREMITIES: peripheral pulses normal; no clubbing, cyanosis, or edema  NEURO: no focal findings   SKIN: normal without suspicious lesions on exposed skin      Current Facility-Administered Medications:     al mag oxide-diphenhydramine-lidocaine viscous (MAGIC MOUTHWASH) suspension 10 mL, 10 mL, Swish & Swallow, Q6H, Summerk Lipoma, DO, 10 mL at 09/28/18 0916    allopurinol (ZYLOPRIM) tablet 200 mg, 200 mg, Oral, Daily, Hetul Mcclellan, DO, 200 mg at 09/28/18 0916    apixaban (ELIQUIS) tablet 5 mg, 5 mg, Oral, BID, Hetul Mcclellan, DO, 5 mg at 09/28/18 6482    ascorbic acid (VITAMIN C) tablet 500 mg, 500 mg, Oral, Daily With Dinner, Hetul Mcclellan, DO, 500 mg at 09/27/18 1728    HYDROcodone-acetaminophen (NORCO) 5-325 mg per tablet 1 tablet, 1 tablet, Oral, Q6H PRN, Hetul Mcclellan, DO    loratadine (CLARITIN) tablet 10 mg, 10 mg, Oral, Early Morning, Hetul Mcclellan, DO, 10 mg at 09/28/18 9203    LORazepam (ATIVAN) 2 mg/mL injection 0 5 mg, 0 5 mg, Intravenous, Q6H PRN, ANNEMARIE Abdalla, 0 5 mg at 09/26/18 1415    melatonin tablet 6 mg, 6 mg, Oral, HS PRN, Hetul Mcclellan, DO    metoprolol tartrate (LOPRESSOR) tablet 25 mg, 25 mg, Oral, Q12H [facility], ANNEMARIE Amezquita, 25 mg at 09/28/18 0917    nystatin (MYCOSTATIN) oral suspension 500,000 Units, 500,000 Units, Swish & Swallow, 4x Daily, Hetul Mcclellan, DO, 500,000 Units at 09/28/18 0916    ondansetron (ZOFRAN) injection 4 mg, 4 mg, Intravenous, Q6H PRN, Hetul Mcclellan, DO    ondansetron (ZOFRAN-ODT) dispersible tablet 4 mg, 4 mg, Oral, Q6H PRN, Hetul Mcclellan, DO    pantoprazole (PROTONIX) EC tablet 40 mg, 40 mg, Oral, BID AC, Hetul Mcclellan, DO, 40 mg at 09/28/18 2956    polyethylene glycol (MIRALAX) packet 17 g, 17 g, Oral, Daily, Hetul Mcclellan, DO, 17 g at 09/28/18 8606    potassium-sodium phosphateS (K-PHOS,PHOSPHA 250) -250 mg tablet 1 tablet, 1 tablet, Oral, BID With Meals, Rhoderick Lipoma, DO, 1 tablet at 09/28/18 9597    predniSONE tablet 20 mg, 20 mg, Oral, Daily, Hetul Mcclellan, DO, 20 mg at 09/28/18 0916    senna (SENOKOT) tablet 8 6 mg, 1 tablet, Oral, BID, Hetul Mcclellan, DO, 8 6 mg at 09/28/18 0916    simethicone (MYLICON) chewable tablet 80 mg, 80 mg, Oral, Q6H PRN, Hetul Mcclellan, DO    sodium chloride 0 9 % infusion, 50 mL/hr, Intravenous, Continuous, Hetul Mcclellan, DO, Last Rate: 50 mL/hr at 09/27/18 1256, 50 mL/hr at 09/27/18 1256    sucralfate (CARAFATE) oral suspension 1,000 mg, 1,000 mg, Oral, Q6H [address], Sadie Arellano, CRNP, 1,000 mg at 09/28/18 2886    traZODone (DESYREL) tablet 25 mg, 25 mg, Oral, HS, Hetul Mcclellan, DO, 25 mg at 09/27/18 2147      Labs & Results:        Results from last 7 days  Lab Units 09/26/18  0555 09/25/18  0959 09/24/18  1219   WBC Thousand/uL 6 89 10 08 8 38   HEMOGLOBIN g/dL 11 2* 12 2 12 0   HEMATOCRIT % 36 5 38 6 38 7   PLATELETS Thousands/uL 160 160 162           Results from last 7 days  Lab Units 09/28/18  0508 09/26/18  0555 09/25/18  0959 09/24/18  1219   SODIUM mmol/L 141 138 136 139   POTASSIUM mmol/L 3 6 3 8 4 8 3 9   CHLORIDE mmol/L 106 102 100 101   CO2 mmol/L 28 29 27 29   BUN mg/dL 17 19 25 18   CREATININE mg/dL 0 83 0 97 1 22 0 98   CALCIUM mg/dL 9 5 10 3* 10 3* 10 2*   ALK PHOS U/L  --  81 92 95   ALT U/L  --  22 27 26   AST U/L  --  29 50* 35         Counseling / Coordination of Care  Total floor / unit time spent today 25 minutes  Greater than 50% of total time was spent with the patient and / or family counseling and / or coordination of care  A description of the counseling / coordination of care: 15      Thank you for the opportunity to participate in the care of this patient  Sabine GAO    Director Heart Failure/ Medical Director [address] [place] [address]

## 2018-09-28 NOTE — ASSESSMENT & PLAN NOTE
· Patient with relapsed diffuse large B-cell lymphoma, had last palliative radiation tx yesterday,  · Currently with esophageal pain and dysphagia, but reports continued improvement 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 in addition to mild dehydration  · Continue IV hydration, encourage oral intake as tolerated  · Currently holding on Lenalidomide  · CTA of the chest with negative pulmonary embolism, profound increase in bulky rodger adenopathy in bilateral axilla and chest walls, increase in the size of the nodes in the upper abdomen incompletely evaluated and at the esophageal hiatus, slight interval decrease in the size of the mediastinal nodes  · Chest x-ray with interval development of infiltrate at lateral left lung base, noted hypoxia on Masimo, repeat chest x-ray clear continues with dry cough but overall improved  · On admission, Procalcitonin 0 13  · Patient afebrile  · No leukocytosis  · Encourage incentive spirometry  · Encourage out of bed ambulation

## 2018-09-28 NOTE — ASSESSMENT & PLAN NOTE
· 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 versus extrinsic compression of lymphadenopathy on the esophagus  · Patient describes as a burning pain is having difficulty with swallowing secondary to pain, states today feels slightly better taking in more fluids but solid foods are still difficult and get stuck still unable to keep herself adequately hydrated or ingest enough calories   · Continue magic mouth wash  · Continue nystatin  · Continue gentle IV hydration  · Continue PPI   · Monitor closely

## 2018-09-28 NOTE — ASSESSMENT & PLAN NOTE
· Patient had bowel movement 3 days ago  · Given severe dysphagia secondary to pain appetite poor, poor oral hydration also  · Continue bowel regimen as tolerated

## 2018-09-28 NOTE — ASSESSMENT & PLAN NOTE
· Without acute exacerbation  · Patient with EF of 35% as per echocardiogram in July of 2018  · Suspect secondary to chemotherapy medication induced  · Outpatient follow-up with Cardiology  · Monitor daily weights monitor intake and output, weight is stable

## 2018-09-28 NOTE — PROGRESS NOTES
Progress Note - Alicia Catherine 1940, 78 y o  female MRN: [id number]    Unit/Bed#: [facility] 622-01 Encounter: [id number]    Primary Care Provider: Ele Yun PA-C   Date and time admitted to hospital: 9/25/2018  9:17 AM        Hypoxia   Assessment & Plan    · Patient noted at rest with saturations of 89% (resp nonlabored, but cough with deep breathing, talking) yesterday  · Clinically patient is improved today  · Portable chest x-ray without acute findings  · CTA on admission with increased mediastinal nodes, negative PE  · Oxygen on room air 93% she occasionally desats to 89%  · Airway clearance protocol, due to pain patient with difficulty clearing secretions and cough and deep breathing is too painful  · Encourage I/S  · Consider flutter valve     Dysphagia   Assessment & Plan    · 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 versus extrinsic compression of lymphadenopathy on the esophagus  · Patient describes as a burning pain is having difficulty with swallowing secondary to pain, states today feels slightly better taking in more fluids but solid foods are still difficult and get stuck still unable to keep herself adequately hydrated or ingest enough calories   · Continue magic mouth wash  · Continue nystatin  · Continue gentle IV hydration  · Continue PPI   · Monitor closely       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 [facility])   Assessment & Plan    · Patient with relapsed diffuse large B-cell lymphoma, had last palliative radiation tx yesterday,  · Currently with esophageal pain and dysphagia, but reports continued improvement 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 in addition to mild dehydration  · Continue IV hydration, encourage oral intake as tolerated  · Currently holding on Lenalidomide  · CTA of the chest with negative pulmonary embolism, profound increase in bulky rodger adenopathy in bilateral axilla and chest walls, increase in the size of the nodes in the upper abdomen incompletely evaluated and at the esophageal hiatus, slight interval decrease in the size of the mediastinal nodes  · Chest x-ray with interval development of infiltrate at lateral left lung base, noted hypoxia on Masimo, repeat chest x-ray clear continues with dry cough but overall improved  · On admission, Procalcitonin 0 13  · Patient afebrile  · No leukocytosis  · Encourage incentive spirometry  · Encourage out of bed ambulation     * Sinus tachycardia   Assessment & Plan    · Cardiology follow, recommendations noted, heart rate 90s- low 100s, patient asymptomatic  · New start metoprolol 25 mg BID, heart rate improved  · Continue to monitor on Masimo  · Given ongoing dysphagia and esophageal pain will continue with IV hydration as dehydration may also be playing a significant role with her tachycardia         Chronic systolic congestive heart failure (HCC)   Assessment & Plan    · Without acute exacerbation  · Patient with EF of 35% as per echocardiogram in July of 2018  · Suspect secondary to chemotherapy medication induced  · Outpatient follow-up with Cardiology  · Monitor daily weights monitor intake and output, weight is stable     Constipation   Assessment & Plan    · Patient had bowel movement 3 days ago  · Given severe dysphagia secondary to pain appetite poor, poor oral hydration also  · Continue bowel regimen as tolerated     Arm DVT (deep venous thromboembolism), acute, left (HCC)   Assessment & Plan    · History of left jugular vein thrombosis continue Eliquis       VTE Pharmacologic Prophylaxis:   Pharmacologic: Apixaban (Eliquis)  Mechanical VTE Prophylaxis in Place: Yes    Patient Centered Rounds: I have performed bedside rounds with nursing staff today  Discussions with Specialists or Other Care Team Provider:     Education and Discussions with Family / Patient:   Patient, she declined call to family    Time Spent for Care: 30 minutes    More than 50% of total time spent on counseling and coordination of care as described above  Current Length of Stay: 3 day(s)    Current Patient Status: Inpatient   Certification Statement: The patient will continue to require additional inpatient hospital stay due to Antonioton with poor oral intake secondary to dysphagia as a result of radiation esophagitis continues to require IV hydration for dehydration    Discharge Plan:   Reassess daily if tolerating oral intake well consider discharge tomorrow    Code Status: Level 3 - DNAR and DNI      Subjective:   Reports improvement in swallowing minimal though, no nausea or vomiting  Has dry cough  No shortness of breath  No abdominal pain  No bowel movement for 3 days voiding dark velia    Objective:     Vitals:   Temp (24hrs), Av 4 °F (36 9 °C), Min:98 2 °F (36 8 °C), Max:98 6 °F (37 °C)    HR:  [] 103  BP: (117-132)/(63-73) 132/73  SpO2:  [91 %-94 %] 91 %  Body mass index is 35 62 kg/m²  Input and Output Summary (last 24 hours): Intake/Output Summary (Last 24 hours) at 18 1623  Last data filed at 18 1431   Gross per 24 hour   Intake          2076 67 ml   Output              750 ml   Net          1326 67 ml       Physical Exam:     Physical Exam   Constitutional: She is oriented to person, place, and time  She appears well-developed  No distress  Weak and tired appearing   HENT:   Head: Normocephalic and atraumatic  Mouth/Throat: Oropharynx is clear and moist    Eyes: Pupils are equal, round, and reactive to light  EOM are normal    Neck: Neck supple  Cardiovascular: Regular rhythm and intact distal pulses  Tachycardia present  No murmur heard  Pulmonary/Chest: Effort normal and breath sounds normal  No respiratory distress  Abdominal: Soft  Bowel sounds are normal  She exhibits no distension  There is no tenderness  Musculoskeletal: Normal range of motion  She exhibits edema  Trace ankle edema   Neurological: She is alert and oriented to person, place, and time   No cranial nerve deficit  Skin: Skin is warm and dry  There is pallor  Psychiatric: She has a normal mood and affect  Her behavior is normal    Vitals reviewed  Additional Data:     Labs:      Results from last 7 days  Lab Units 09/26/18  0555 09/25/18  0959   WBC Thousand/uL 6 89 10 08   HEMOGLOBIN g/dL 11 2* 12 2   HEMATOCRIT % 36 5 38 6   PLATELETS Thousands/uL 160 160   NEUTROS PCT %  --  86*   LYMPHS PCT %  --  2*   MONOS PCT %  --  10   EOS PCT %  --  1       Results from last 7 days  Lab Units 09/28/18  0508 09/26/18  0555   SODIUM mmol/L 141 138   POTASSIUM mmol/L 3 6 3 8   CHLORIDE mmol/L 106 102   CO2 mmol/L 28 29   BUN mg/dL 17 19   CREATININE mg/dL 0 83 0 97   CALCIUM mg/dL 9 5 10 3*   ALK PHOS U/L  --  81   ALT U/L  --  22   AST U/L  --  29           * I Have Reviewed All Lab Data Listed Above  * Additional Pertinent Lab Tests Reviewed:  All Labs Within Last 24 Hours Reviewed    Imaging:    Imaging Reports Reviewed Today Include:   None  Imaging Personally Reviewed by Myself Includes:  None    Recent Cultures (last 7 days):       Results from last 7 days  Lab Units 09/25/18  1230   URINE CULTURE  20,000-29,000 cfu/ml        Last 24 Hours Medication List:     Current Facility-Administered Medications:  al mag oxide-diphenhydramine-lidocaine viscous 10 mL Swish & Swallow Q6H Mickeal Marcio, DO    allopurinol 200 mg Oral Daily Hetul Mcclellan, DO    apixaban 5 mg Oral BID Hetul Mcclellan, DO    ascorbic acid 500 mg Oral Daily With [employer], DO    HYDROcodone-acetaminophen 1 tablet Oral Q6H PRN Hetul Mcclellan, DO    loratadine 10 mg Oral Early Morning Hetul Mcclellan, DO    LORazepam 0 5 mg Intravenous Q6H PRN Richard Cristine, CRNP    melatonin 6 mg Oral HS PRN Hetul Mcclellan, DO    metoprolol tartrate 25 mg Oral Q12H [address] Sujatha ANCA Mullins, ANNEMARIE    nystatin 500,000 Units Swish & Swallow 4x Daily Hetul Mcclellan, DO    ondansetron 4 mg Intravenous Q6H PRN Hetul Mcclellan, DO    ondansetron 4 mg Oral Q6H PRN Hetul Mcclellan, DO pantoprazole 40 mg Oral BID AC Hetul Mcclellan, DO    polyethylene glycol 17 g Oral Daily Hetul Mcclellan, DO    potassium-sodium phosphateS 1 tablet Oral BID With Meals Genita Dearth, DO    predniSONE 20 mg Oral Daily Hetul Mcclellan, DO    senna 1 tablet Oral BID Hetul Mcclellan, DO    simethicone 80 mg Oral Q6H PRN Hetul Mcclellan, DO    sodium chloride 50 mL/hr Intravenous Continuous Hetul Mcclellan, DO Last Rate: 50 mL/hr (09/27/18 1256)   sucralfate 1,000 mg Oral Q6H [address] ANNEMARIE Cooper    traZODone 25 mg Oral HS Hetul Mcclellan, DO         Today, Patient Was Seen By: ANNEMARIE Maguire    ** Please Note: Dictation voice to text software may have been used in the creation of this document   **

## 2018-09-28 NOTE — ASSESSMENT & PLAN NOTE
· Patient noted at rest with saturations of 89% (resp nonlabored, but cough with deep breathing, talking) yesterday  · Clinically patient is improved today  · Portable chest x-ray without acute findings  · CTA on admission with increased mediastinal nodes, negative PE  · Oxygen on room air 93% she occasionally desats to 89%  · Airway clearance protocol, due to pain patient with difficulty clearing secretions and cough and deep breathing is too painful  · Encourage I/S  · Consider flutter valve

## 2018-09-28 NOTE — ASSESSMENT & PLAN NOTE
· Cardiology follow, recommendations noted, heart rate 90s- low 100s, patient asymptomatic  · New start metoprolol 25 mg BID, heart rate improved  · Continue to monitor on Masimo  · Given ongoing dysphagia and esophageal pain will continue with IV hydration as dehydration may also be playing a significant role with her tachycardia

## 2018-09-28 NOTE — PROGRESS NOTES
09/28/18 1500   Clinical Encounter Type   Visited With Patient and family together   Routine Visit Introduction   Patient Spiritual Encounters   Spiritual Encounter Notes Pt and family Islam    Seem to draw strength from [facility] and from family connectiosn

## 2018-09-29 PROCEDURE — 99232 SBSQ HOSP IP/OBS MODERATE 35: CPT | Performed by: INTERNAL MEDICINE

## 2018-09-29 PROCEDURE — 99222 1ST HOSP IP/OBS MODERATE 55: CPT | Performed by: INTERNAL MEDICINE

## 2018-09-29 RX ORDER — HYDROCODONE BITARTRATE AND ACETAMINOPHEN 5; 325 MG/1; MG/1
1 TABLET ORAL EVERY 6 HOURS PRN
Status: DISCONTINUED | OUTPATIENT
Start: 2018-09-29 | End: 2018-10-03 | Stop reason: HOSPADM

## 2018-09-29 RX ORDER — ACETAMINOPHEN 325 MG/1
650 TABLET ORAL EVERY 6 HOURS PRN
Status: DISCONTINUED | OUTPATIENT
Start: 2018-09-29 | End: 2018-10-03 | Stop reason: HOSPADM

## 2018-09-29 RX ADMIN — NYSTATIN 500000 UNITS: 100000 SUSPENSION ORAL at 22:29

## 2018-09-29 RX ADMIN — NYSTATIN 500000 UNITS: 100000 SUSPENSION ORAL at 17:31

## 2018-09-29 RX ADMIN — DIBASIC SODIUM PHOSPHATE, MONOBASIC POTASSIUM PHOSPHATE AND MONOBASIC SODIUM PHOSPHATE 1 TABLET: 852; 155; 130 TABLET ORAL at 09:04

## 2018-09-29 RX ADMIN — Medication 10 ML: at 22:29

## 2018-09-29 RX ADMIN — APIXABAN 5 MG: 5 TABLET, FILM COATED ORAL at 17:31

## 2018-09-29 RX ADMIN — LORATADINE 10 MG: 10 TABLET ORAL at 05:58

## 2018-09-29 RX ADMIN — PANTOPRAZOLE SODIUM 40 MG: 40 TABLET, DELAYED RELEASE ORAL at 05:58

## 2018-09-29 RX ADMIN — NYSTATIN 500000 UNITS: 100000 SUSPENSION ORAL at 09:03

## 2018-09-29 RX ADMIN — Medication 10 ML: at 15:00

## 2018-09-29 RX ADMIN — Medication 10 ML: at 17:32

## 2018-09-29 RX ADMIN — NYSTATIN 500000 UNITS: 100000 SUSPENSION ORAL at 11:56

## 2018-09-29 RX ADMIN — PANTOPRAZOLE SODIUM 40 MG: 40 TABLET, DELAYED RELEASE ORAL at 17:31

## 2018-09-29 RX ADMIN — LIDOCAINE HYDROCHLORIDE 10 ML: 20 SOLUTION ORAL; TOPICAL at 05:58

## 2018-09-29 RX ADMIN — SUCRALFATE 1000 MG: 1 SUSPENSION ORAL at 11:56

## 2018-09-29 RX ADMIN — PREDNISONE 20 MG: 20 TABLET ORAL at 09:03

## 2018-09-29 RX ADMIN — LIDOCAINE HYDROCHLORIDE 10 ML: 20 SOLUTION ORAL; TOPICAL at 09:05

## 2018-09-29 RX ADMIN — POLYETHYLENE GLYCOL 3350 17 G: 17 POWDER, FOR SOLUTION ORAL at 09:04

## 2018-09-29 RX ADMIN — TRAZODONE HYDROCHLORIDE 25 MG: 50 TABLET ORAL at 22:29

## 2018-09-29 RX ADMIN — OXYCODONE HYDROCHLORIDE AND ACETAMINOPHEN 500 MG: 500 TABLET ORAL at 17:31

## 2018-09-29 RX ADMIN — SUCRALFATE 1000 MG: 1 SUSPENSION ORAL at 05:58

## 2018-09-29 RX ADMIN — SUCRALFATE 1000 MG: 1 SUSPENSION ORAL at 17:31

## 2018-09-29 RX ADMIN — ACETAMINOPHEN 650 MG: 325 TABLET, FILM COATED ORAL at 11:56

## 2018-09-29 RX ADMIN — SENNOSIDES 8.6 MG: 8.6 TABLET, FILM COATED ORAL at 09:03

## 2018-09-29 RX ADMIN — ALLOPURINOL 200 MG: 100 TABLET ORAL at 09:03

## 2018-09-29 RX ADMIN — APIXABAN 5 MG: 5 TABLET, FILM COATED ORAL at 09:04

## 2018-09-29 RX ADMIN — SENNOSIDES 8.6 MG: 8.6 TABLET, FILM COATED ORAL at 17:32

## 2018-09-29 RX ADMIN — METOPROLOL TARTRATE 25 MG: 25 TABLET ORAL at 09:04

## 2018-09-29 NOTE — CONSULTS
Consultation - [facility]  Zander Victor 78 y o  female MRN: [id number]  Unit/Bed#: [facility] 622-01 Encounter: [id number]             Inpatient consult to gastroenterology     Performed by  Anh Rosario     Authorized by Maximo Gamez              Reason for Consult / Principal Problem:     Dysphagia and odynophagia      ASSESSMENT AND PLAN:      Dysphagia   Patient with chronic dysphagia, history of esophageal stricture with previous dilation by Dr Linda Forde 3 years ago  Currently having worsening dysphagia with odynophagia after radiotherapy  Patient is able to tolerate fluids without pain but solids cause pain, will switch to liquid diet  Was started on nystatin and Magic mouthwash and is having mild improvement of the symptoms, patient has risk factor for Candida given ongoing use of steroids  Continue current treatment  Will schedule upper endoscopy on Monday  Will get video barium swallow    ______________________________________________________________________    HPI:    16-year-old female patient with past medical history significant for lymphoma currently receiving radiotherapy, patient says she started having symptoms about 1 month ago after she was intubated for a biopsy, currently she is complaining about pain during swallowing and she is also having mid chest pain and feels like food could be getting stuck on her esophagus, denies any episodes of vomiting, she disclose having nausea  Otherwise the patient denies abdominal pain, fever, weight loss    REVIEW OF SYSTEMS:    CONSTITUTIONAL: Denies any fever, chills, rigors, and weight loss  HEENT: No earache or tinnitus  Denies hearing loss or visual disturbances  CARDIOVASCULAR:   Mid chest pain no palpitations  RESPIRATORY: Denies any cough, hemoptysis, shortness of breath or dyspnea on exertion  GASTROINTESTINAL: As noted in the History of Present Illness  GENITOURINARY: No problems with urination   Denies any hematuria or dysuria  NEUROLOGIC: No dizziness or vertigo, denies headaches  MUSCULOSKELETAL: Denies any muscle or joint pain  SKIN: Denies skin rashes or itching  ENDOCRINE: Denies excessive thirst  Denies intolerance to heat or cold  PSYCHOSOCIAL: Denies depression or anxiety  Denies any recent memory loss  Historical Information   Past Medical History:   Diagnosis Date    Basal cell carcinoma (BCC)     Bruit of right carotid artery     resolved 5/14/15     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HCC)     radiation    Dyslipidemia     Esophageal stricture     last assessed 2/28/13       GERD (gastroesophageal reflux disease)     History of chemotherapy     History of radiation therapy     [place] (hard of hearing)     b/l ears    Hyperlipidemia     Hypertension     Osteoarthritis     Osteoarthrosis of ankle and foot     last assessed 5/20/13     Osteopenia     last assessed 2/28/13     Plantar fasciitis of left foot     Polyp of sigmoid colon     last assessed 2/28/13     Shortness of breath     when walking up stairs     Past Surgical History:   Procedure Laterality Date    ANKLE ARTHROPLASTY Left     BLEPHAROPLASTY      right eye     CATARACT EXTRACTION Right     COLONOSCOPY      ESOPHAGEAL DILATION      x2    ESOPHAGOGASTRODUODENOSCOPY      dilitation    FACIAL/NECK BIOPSY Right 9/8/2017    Procedure: NECK NODE BIOPSY WITH NEEDLE LOCALIZATION; LYMPHOMA PROTOCOL (NEEDLE LOC WITH I R  AT 1100);   Surgeon: Rodrigue Cartagena MD;  [facility];  Service: Surgical Oncology    HYSTERECTOMY      LYMPH NODE DISSECTION      right groin and neck    ORBITAL FLOOR EXPLORATION Right     for cancer     PORTACATH PLACEMENT      left chest     KS BX/REMV,LYMPH NODE,DEEP AXILL Left 8/14/2017    Procedure: Axillary lymph node excision with LYMPHOMA PROTOCOL;  Surgeon: Rodrigue Cartagena MD;  [facility];  Service: Surgical Oncology    KS BX/REMV,LYMPH NODE,DEEP CERV Right 5/2/2016    Procedure: NECK NODE BIOPSY; Surgeon: Rodrigue Cartagena MD;  [facility];  Service: Surgical Oncology    IL BX/REMV,LYMPH NODE,DEEP CERV Left 7/25/2018    Procedure: NECK NODE BIOPYS WITH LYMPHOMA PROTOCOL; (ULTRASOUND GUIDED NEEDLE LOC IN IR AT 0800);   Surgeon: Rodrigue Cartagena MD;  [facility];  Service: Surgical Oncology    IL INSJ TUNNELED CTR VAD W/SUBQ PORT AGE 5 YR/> N/A 10/3/2017    Procedure: PLACEMENT OF PORT-A-CATH DEVICE;  Surgeon: Rodrigue Cartagena MD;  [facility];  Service: Surgical Oncology     Social History   History   Alcohol Use No     History   Drug Use No     History   Smoking Status    Former Smoker    Quit date: 1970   Smokeless Tobacco    Never Used     Family History   Problem Relation Age of Onset    Multiple myeloma Mother     Heart disease Father     Hypertension Father     Hypertension Sister     Heart disease Sister     Arthritis Family     Osteoporosis Family     Breast cancer Maternal Aunt        Meds/Allergies     Prescriptions Prior to Admission   Medication    allopurinol (ZYLOPRIM) 100 mg tablet    apixaban (ELIQUIS) 5 mg    ascorbic acid (VITAMIN C) 500 mg tablet    Calcium Carb-Cholecalciferol (CALCIUM + D3) 600-200 MG-UNIT TABS    Cyanocobalamin (VITAMIN B 12 PO)    HYDROcodone-acetaminophen (NORCO) 5-325 mg per tablet    Lenalidomide (REVLIMID) 20 MG CAPS    loratadine (CLARITIN) 10 mg tablet    Melatonin 5 MG TABS    Multiple Vitamin (MULTIVITAMIN) capsule    nystatin (MYCOSTATIN) 100,000 units/mL suspension    omeprazole (PriLOSEC) 20 mg delayed release capsule    ondansetron (ZOFRAN) 4 mg tablet    polyethylene glycol (MIRALAX) 17 g packet    predniSONE 20 mg tablet    senna (SENOKOT) 8 6 mg    simethicone (MYLICON) 80 mg chewable tablet    sucralfate (CARAFATE) 1 g tablet    traZODone (DESYREL) 50 mg tablet     Current Facility-Administered Medications   Medication Dose Route Frequency    acetaminophen (TYLENOL) tablet 650 mg  650 mg Oral Q6H PRN    al mag oxide-diphenhydramine-lidocaine viscous (MAGIC MOUTHWASH) suspension 10 mL  10 mL Swish & Swallow 4x Daily (with meals and at bedtime)    allopurinol (ZYLOPRIM) tablet 200 mg  200 mg Oral Daily    apixaban (ELIQUIS) tablet 5 mg  5 mg Oral BID    ascorbic acid (VITAMIN C) tablet 500 mg  500 mg Oral Daily With [employer]    HYDROcodone-acetaminophen (NORCO) 5-325 mg per tablet 1 tablet  1 tablet Oral Q6H PRN    loratadine (CLARITIN) tablet 10 mg  10 mg Oral Early Morning    LORazepam (ATIVAN) 2 mg/mL injection 0 5 mg  0 5 mg Intravenous Q6H PRN    melatonin tablet 6 mg  6 mg Oral HS PRN    metoprolol tartrate (LOPRESSOR) tablet 25 mg  25 mg Oral Q12H CANDACE    nystatin (MYCOSTATIN) oral suspension 500,000 Units  500,000 Units Swish & Swallow 4x Daily    ondansetron (ZOFRAN) injection 4 mg  4 mg Intravenous Q6H PRN    ondansetron (ZOFRAN-ODT) dispersible tablet 4 mg  4 mg Oral Q6H PRN    pantoprazole (PROTONIX) EC tablet 40 mg  40 mg Oral BID AC    polyethylene glycol (MIRALAX) packet 17 g  17 g Oral Daily    predniSONE tablet 20 mg  20 mg Oral Daily    senna (SENOKOT) tablet 8 6 mg  1 tablet Oral BID    simethicone (MYLICON) chewable tablet 80 mg  80 mg Oral Q6H PRN    sucralfate (CARAFATE) oral suspension 1,000 mg  1,000 mg Oral Q6H CANDACE    traZODone (DESYREL) tablet 25 mg  25 mg Oral HS       Allergies   Allergen Reactions    Alteplase Anaphylaxis    Aspirin Anaphylaxis    Celebrex [Celecoxib] Anaphylaxis    Nsaids Anaphylaxis    Other Anaphylaxis     Pt states when she received a certain type of chemotherapy it caused her throat to close    Rituxan [Rituximab] Anaphylaxis     Swelling of tongue and closing of throat    Sulfa Antibiotics Other (See Comments) and Anaphylaxis     VERY EMOTIONAL CRYING    Bactrim [Sulfamethoxazole-Trimethoprim] Other (See Comments)     VERY EMOTIONAL/CRYING           Objective     Blood pressure 96/59, pulse (!) 107, temperature 98 24 °F (36 8 °C), resp   rate 20, height 5' 3" (1 6 m), weight 91 2 kg (201 lb 1 oz), SpO2 96 %, not currently breastfeeding  Body mass index is 35 62 kg/m²  Intake/Output Summary (Last 24 hours) at 09/29/18 1412  Last data filed at 09/29/18 0908   Gross per 24 hour   Intake           1727 5 ml   Output              725 ml   Net           1002 5 ml         PHYSICAL EXAM:      General Appearance:   Alert, cooperative, no distress   HEENT:   Normocephalic, atraumatic, anicteric      Neck:  Supple, symmetrical, trachea midline   Lungs:   Clear to auscultation bilaterally; no rales, rhonchi or wheezing; respirations unlabored    Heart[de-identified]   Regular rate and rhythm; no murmur, rub, or gallop     Abdomen:   Soft, non-tender, non-distended; normal bowel sounds; no masses, no organomegaly    Genitalia:   Deferred    Rectal:   Deferred    Extremities:  No cyanosis, clubbing or edema    Pulses:  2+ and symmetric all extremities    Skin:  No jaundice, rashes, or lesions    Lymph nodes:  No palpable cervical lymphadenopathy        Lab Results:   Admission on 09/25/2018   Component Date Value    WBC 09/25/2018 10 08     RBC 09/25/2018 4 24     Hemoglobin 09/25/2018 12 2     Hematocrit 09/25/2018 38 6     MCV 09/25/2018 91     MCH 09/25/2018 28 8     MCHC 09/25/2018 31 6     RDW 09/25/2018 15 4*    MPV 09/25/2018 10 5     Platelets 69/00/3585 160     nRBC 09/25/2018 0     Neutrophils Relative 09/25/2018 86*    Immat GRANS % 09/25/2018 1     Lymphocytes Relative 09/25/2018 2*    Monocytes Relative 09/25/2018 10     Eosinophils Relative 09/25/2018 1     Basophils Relative 09/25/2018 0     Neutrophils Absolute 09/25/2018 8 70*    Immature Grans Absolute 09/25/2018 0 09     Lymphocytes Absolute 09/25/2018 0 21*    Monocytes Absolute 09/25/2018 1 01     Eosinophils Absolute 09/25/2018 0 05     Basophils Absolute 09/25/2018 0 02     Sodium 09/25/2018 136     Potassium 09/25/2018 4 8     Chloride 09/25/2018 100     CO2 09/25/2018 27     ANION GAP 09/25/2018 9     BUN 09/25/2018 25     Creatinine 09/25/2018 1 22     Glucose 09/25/2018 104     Calcium 09/25/2018 10 3*    AST 09/25/2018 50*    ALT 09/25/2018 27     Alkaline Phosphatase 09/25/2018 92     Total Protein 09/25/2018 6 3*    Albumin 09/25/2018 2 7*    Total Bilirubin 09/25/2018 0 66     eGFR 09/25/2018 43     TSH 3RD GENERATON 09/25/2018 0 170*    Color, UA 09/25/2018 yellow     Clarity, UA 09/25/2018 clear     Extra Tube 09/25/2018 Hold for add-ons       Color, UA 09/25/2018 Yellow     Clarity, UA 09/25/2018 Clear     pH, UA 09/25/2018 6 5     Leukocytes, UA 09/25/2018 Small*    Nitrite, UA 09/25/2018 Negative     Protein, UA 09/25/2018 Trace*    Glucose, UA 09/25/2018 Negative     Ketones, UA 09/25/2018 Negative     Urobilinogen, UA 09/25/2018 0 2     Bilirubin, UA 09/25/2018 Negative     Blood, UA 09/25/2018 Small*    Specific Gravity, UA 09/25/2018 1 020     RBC, UA 09/25/2018 4-10*    WBC, UA 09/25/2018 Innumerable*    Epithelial Cells 09/25/2018 None Seen     Bacteria, UA 09/25/2018 Occasional     Hyaline Casts, UA 09/25/2018 10-25*    Urine Culture 09/25/2018 20,000-29,000 cfu/ml      Sodium 09/26/2018 138     Potassium 09/26/2018 3 8     Chloride 09/26/2018 102     CO2 09/26/2018 29     ANION GAP 09/26/2018 7     BUN 09/26/2018 19     Creatinine 09/26/2018 0 97     Glucose 09/26/2018 74     Calcium 09/26/2018 10 3*    AST 09/26/2018 29     ALT 09/26/2018 22     Alkaline Phosphatase 09/26/2018 81     Total Protein 09/26/2018 5 6*    Albumin 09/26/2018 2 5*    Total Bilirubin 09/26/2018 0 82     eGFR 09/26/2018 57     Magnesium 09/26/2018 2 2     Phosphorus 09/26/2018 2 2*    WBC 09/26/2018 6 89     RBC 09/26/2018 3 95     Hemoglobin 09/26/2018 11 2*    Hematocrit 09/26/2018 36 5     MCV 09/26/2018 92     MCH 09/26/2018 28 4     MCHC 09/26/2018 30 7*    RDW 09/26/2018 15 6*    Platelets 36/73/0581 160     MPV 09/26/2018 10 3  Procalcitonin 09/26/2018 0 13     TSH 3RD GENERATON 09/26/2018 0 224*    Free T4 09/26/2018 1 39     Sodium 09/28/2018 141     Potassium 09/28/2018 3 6     Chloride 09/28/2018 106     CO2 09/28/2018 28     ANION GAP 09/28/2018 7     BUN 09/28/2018 17     Creatinine 09/28/2018 0 83     Glucose 09/28/2018 72     Calcium 09/28/2018 9 5     eGFR 09/28/2018 69        Imaging Studies: I have personally reviewed pertinent imaging studies

## 2018-09-29 NOTE — PROGRESS NOTES
[facility] Internal Medicine Progress Note  Patient: Warner Villafana 78 y o  female   MRN: [id number]  PCP: Juan Antonio Hernandez PA-C  Unit/Bed#: [facility] 895-29 Encounter: [id number]  Date Of Visit: 09/29/18    Assessment:    Principal Problem:    Sinus tachycardia  Active Problems:    Arm DVT (deep venous thromboembolism), acute, left (HCC)    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 [facility])    Constipation    Dysphagia    Chronic systolic congestive heart failure ([facility] [address] )    Hypoxia      Plan:    1  Odynophagia , odynophagia  secondary to radiation esophagitis/extrinsic compression on the esophagus by lymphadenopathy, previous history of esophageal stricture continue PPI, nystatin and Carafate and  Magic mouthwash, consult GI for further management  2  Sinus tachycardia secondary to dehydration and decreasing oral intake, positive fluid balance since admission, monitor off IV fluid, continue Lopressor  3  Dilated cardiomyopathy with chronic systolic CHF, likely secondary to chemotherapy,  EF 25%, on beta-blocker,  cardiology is following  4  Recurrent large B-cell lymphoma, with cervical and mediastinal adenopathy, completed radiation treatment, Lenalidomide on hold, patient on prednisone consider to taper   5  History of left subclavian vein thrombosis, continue Eliquis  6  Hypercalcemia likely secondary dehydration and decreasing oral intake, calcium supplement on hold    VTE Pharmacologic Prophylaxis:   Pharmacologic: Apixaban (Eliquis)  Mechanical VTE Prophylaxis in Place: Yes    Patient Centered Rounds: I have performed bedside rounds with nursing staff today  Discussions with Specialists or Other Care Team Provider:     Education and Discussions with Family / Patient:   Patient, family at bedside    Time Spent for Care: 30 minutes  More than 50% of total time spent on counseling and coordination of care as described above      Current Length of Stay: 4 day(s)    Current Patient Status: Inpatient   Certification Statement: The patient will continue to require additional inpatient hospital stay due to Management of dysphagia    Discharge Plan / Estimated Discharge Date: not ready yet    Code Status: Level 3 - DNAR and DNI      Subjective:   Patient is awake alert in no acute distress  Still complaining of severe dysphagia and pain with swallowing  No nausea vomiting or diarrhea    Objective:     Vitals:   Temp (24hrs), Av 2 °F (36 8 °C), Min:97 2 °F (36 2 °C), Max:99 1 °F (37 3 °C)    HR:  [] 106  Resp:  [20-22] 22  BP: (117-137)/(58-74) 137/74  SpO2:  [87 %-96 %] 96 %  Body mass index is 35 62 kg/m²  Input and Output Summary (last 24 hours): Intake/Output Summary (Last 24 hours) at 18 1105  Last data filed at 18 0557   Gross per 24 hour   Intake           1607 5 ml   Output              725 ml   Net            882 5 ml       Physical Exam:     Physical Exam  Patient is awake alert in no acute distress  Looks weak  Lung with decreased breath sounds bilateral at the bases  Heart positive S1-S2 no murmur  Abdomen soft nontender positive bowel sounds  Lower extremities trace edema    Additional Data:     Labs:      Results from last 7 days  Lab Units 18  0555 18  0959   WBC Thousand/uL 6 89 10 08   HEMOGLOBIN g/dL 11 2* 12 2   HEMATOCRIT % 36 5 38 6   PLATELETS Thousands/uL 160 160   NEUTROS PCT %  --  86*   LYMPHS PCT %  --  2*   MONOS PCT %  --  10   EOS PCT %  --  1       Results from last 7 days  Lab Units 18  0508 18  0555   SODIUM mmol/L 141 138   POTASSIUM mmol/L 3 6 3 8   CHLORIDE mmol/L 106 102   CO2 mmol/L 28 29   BUN mg/dL 17 19   CREATININE mg/dL 0 83 0 97   CALCIUM mg/dL 9 5 10 3*   ALK PHOS U/L  --  81   ALT U/L  --  22   AST U/L  --  29           * I Have Reviewed All Lab Data Listed Above  * Additional Pertinent Lab Tests Reviewed:  [facility] Admission Reviewed    Imaging:    Imaging Reports Reviewed Today Include:   Imaging Personally Reviewed by Myself Includes:      Recent Cultures (last 7 days):       Results from last 7 days  Lab Units 09/25/18  1230   URINE CULTURE  20,000-29,000 cfu/ml        Last 24 Hours Medication List:     Current Facility-Administered Medications:  al mag oxide-diphenhydramine-lidocaine viscous 10 mL Swish & Swallow Q6H Jack Rust,     allopurinol 200 mg Oral Daily Hetul Mcclellan, DO    apixaban 5 mg Oral BID Hetul Mcclellan, DO    ascorbic acid 500 mg Oral Daily With [employer], DO    HYDROcodone-acetaminophen 1 tablet Oral Q6H PRN Hetul Mcclellan, DO    loratadine 10 mg Oral Early Morning Hetul Mcclellan, DO    LORazepam 0 5 mg Intravenous Q6H PRN ANNEMARIE Lopez    melatonin 6 mg Oral HS PRN Hetul Mcclellan, DO    metoprolol tartrate 25 mg Oral Q12H [address] ANNEMARIE Amezquita    nystatin 500,000 Units Swish & Swallow 4x Daily Hetul Mcclellan, DO    ondansetron 4 mg Intravenous Q6H PRN Hetul Mcclellan, DO    ondansetron 4 mg Oral Q6H PRN Hetul Mcclellan, DO    pantoprazole 40 mg Oral BID AC Hetul Mcclellan, DO    polyethylene glycol 17 g Oral Daily Hetul Mcclellan, DO    predniSONE 20 mg Oral Daily Hetul Mcclellan, DO    senna 1 tablet Oral BID Hetul Mcclellan, DO    simethicone 80 mg Oral Q6H PRN Hetul Mcclellan, DO    sodium chloride 50 mL/hr Intravenous Continuous Hetul Mcclellan, DO Last Rate: 50 mL/hr (09/27/18 1256)   sucralfate 1,000 mg Oral Q6H [address] ANNEMARIE Cooper    traZODone 25 mg Oral HS Hetul Mcclellan, DO         Today, Patient Was Seen By: Jack Rust DO    ** Please Note: This note has been constructed using a voice recognition system   **

## 2018-09-29 NOTE — PROGRESS NOTES
Cardiology Progress Note - Matt Aviles 78 y o  female MRN: [id number]    Unit/Bed#: [facility] 622-01 Encounter: [id number]      Assessment/Recommendations:  1  Dehydration: continues to have tachycardia (mild) suggesting that she remains volume depleted  Continue IVF another day and reassess  2   Cardiomyopathy: related to adriamycin - gentle rate for IVF to prevent volume overload  Continued on B-blocker, but ACE-I not being given for worry of hypotension and poor PO intake  3   Chronic systolic CHF: with volume depletion, as above  4   Diffuse B cell lymphoma  5  Sinus tachycardia: related to #1  Subjective:   Patient seen and examined  No significant events overnight   ; pertinent negatives - chest pain, chest pressure/discomfort, dyspnea, irregular heart beat and palpitations  Objective:     Vitals: Blood pressure 137/74, pulse (!) 106, temperature 99 1 °F (37 3 °C), temperature source Oral, resp  rate 22, height 5' 3" (1 6 m), weight 91 2 kg (201 lb 1 oz), SpO2 96 %, not currently breastfeeding , Body mass index is 35 62 kg/m² , Orthostatic Blood Pressures      Most Recent Value   Blood Pressure  137/74 filed at 09/28/2018 2341   Patient Position - Orthostatic VS  Lying filed at 09/25/2018 1315            Intake/Output Summary (Last 24 hours) at 09/29/18 0835  Last data filed at 09/29/18 0557   Gross per 24 hour   Intake           1607 5 ml   Output              975 ml   Net            632 5 ml       TELE: No significant arrhythmias seen      Physical Exam:    GEN: Matt Aviles appears well, alert and oriented x 3, pleasant and cooperative   HEENT: pupils equal, round, and reactive to light; extraocular muscles intact  NECK: supple, no carotid bruits   HEART: regular rhythm, normal S1 and S2, no murmurs, clicks, gallops or rubs   LUNGS: clear to auscultation bilaterally; no wheezes, rales, or rhonchi   ABDOMEN: normal bowel sounds, soft, no tenderness, no distention  EXTREMITIES: peripheral pulses normal; no clubbing, cyanosis, or edema  NEURO: no focal findings   SKIN: normal without suspicious lesions on exposed skin    Medications:      Current Facility-Administered Medications:     al mag oxide-diphenhydramine-lidocaine viscous (MAGIC MOUTHWASH) suspension 10 mL, 10 mL, Swish & Swallow, Q6H, Mandy Kwadwo, DO, 10 mL at 09/29/18 0558    allopurinol (ZYLOPRIM) tablet 200 mg, 200 mg, Oral, Daily, Hetul Mcclellan, DO, 200 mg at 09/28/18 2838    apixaban (ELIQUIS) tablet 5 mg, 5 mg, Oral, BID, Hetul Mcclellan, DO, 5 mg at 09/28/18 1735    ascorbic acid (VITAMIN C) tablet 500 mg, 500 mg, Oral, Daily With Dinner, Hetul Mcclellan, DO, 500 mg at 09/28/18 1735    HYDROcodone-acetaminophen (NORCO) 5-325 mg per tablet 1 tablet, 1 tablet, Oral, Q6H PRN, Hetul Mcclellan, DO    loratadine (CLARITIN) tablet 10 mg, 10 mg, Oral, Early Morning, Hetul Mcclellan, DO, 10 mg at 09/29/18 0558    LORazepam (ATIVAN) 2 mg/mL injection 0 5 mg, 0 5 mg, Intravenous, Q6H PRN, ANNEMARIE Quiros, 0 5 mg at 09/26/18 1415    melatonin tablet 6 mg, 6 mg, Oral, HS PRN, Hetul Mcclellan, DO    metoprolol tartrate (LOPRESSOR) tablet 25 mg, 25 mg, Oral, Q12H [address], Sujatha Mullins CRNP, 25 mg at 09/28/18 2341    nystatin (MYCOSTATIN) oral suspension 500,000 Units, 500,000 Units, Swish & Swallow, 4x Daily, Hetul Mcclellan, DO, 500,000 Units at 09/28/18 2344    ondansetron (ZOFRAN) injection 4 mg, 4 mg, Intravenous, Q6H PRN, Hetul Mcclellan, DO, 4 mg at 09/28/18 2337    ondansetron (ZOFRAN-ODT) dispersible tablet 4 mg, 4 mg, Oral, Q6H PRN, Hetul Mcclellan, DO    pantoprazole (PROTONIX) EC tablet 40 mg, 40 mg, Oral, BID AC, Hetul Mcclellan, DO, 40 mg at 09/29/18 0558    polyethylene glycol (MIRALAX) packet 17 g, 17 g, Oral, Daily, Hetul Mcclellan, DO, 17 g at 09/28/18 0847    potassium-sodium phosphateS (K-PHOS,PHOSPHA 250) -250 mg tablet 1 tablet, 1 tablet, Oral, BID With Meals, Mandy Burkett DO, 1 tablet at 09/28/18 1739    predniSONE tablet 20 mg, 20 mg, Oral, Daily, Hetul Mcclellan, DO, 20 mg at 09/28/18 0916    senna (SENOKOT) tablet 8 6 mg, 1 tablet, Oral, BID, Hetul Mcclellan, DO, 8 6 mg at 09/28/18 1735    simethicone (MYLICON) chewable tablet 80 mg, 80 mg, Oral, Q6H PRN, Hetul Mcclellan, DO    sodium chloride 0 9 % infusion, 50 mL/hr, Intravenous, Continuous, Hetul Mcclellan, DO, Last Rate: 50 mL/hr at 09/27/18 1256, 50 mL/hr at 09/27/18 1256    sucralfate (CARAFATE) oral suspension 1,000 mg, 1,000 mg, Oral, Q6H [facility], Sadie Arellano, CRNP, 1,000 mg at 09/29/18 0558    traZODone (DESYREL) tablet 25 mg, 25 mg, Oral, HS, Hetul Mcclellan, DO, 25 mg at 09/28/18 2341     Labs & Results:          Results from last 7 days  Lab Units 09/26/18  0555 09/25/18  0959 09/24/18  1219   WBC Thousand/uL 6 89 10 08 8 38   HEMOGLOBIN g/dL 11 2* 12 2 12 0   HEMATOCRIT % 36 5 38 6 38 7   PLATELETS Thousands/uL 160 160 162           Results from last 7 days  Lab Units 09/28/18  0508 09/26/18  0555 09/25/18  0959 09/24/18  1219   SODIUM mmol/L 141 138 136 139   POTASSIUM mmol/L 3 6 3 8 4 8 3 9   CHLORIDE mmol/L 106 102 100 101   CO2 mmol/L 28 29 27 29   BUN mg/dL 17 19 25 18   CREATININE mg/dL 0 83 0 97 1 22 0 98   CALCIUM mg/dL 9 5 10 3* 10 3* 10 2*   ALK PHOS U/L  --  81 92 95   ALT U/L  --  22 27 26   AST U/L  --  29 50* 35           Results from last 7 days  Lab Units 09/26/18  0555   MAGNESIUM mg/dL 2 2       Echo: personally reviewed - EF 35%    EKG personally reviewed by Keegan Lei MD

## 2018-09-30 PROBLEM — R13.19 ESOPHAGEAL DYSPHAGIA: Status: ACTIVE | Noted: 2018-09-25

## 2018-09-30 LAB
ANION GAP SERPL CALCULATED.3IONS-SCNC: 6 MMOL/L (ref 4–13)
BASOPHILS # BLD AUTO: 0.01 THOUSANDS/ΜL (ref 0–0.1)
BASOPHILS NFR BLD AUTO: 0 % (ref 0–1)
BUN SERPL-MCNC: 18 MG/DL (ref 5–25)
CALCIUM SERPL-MCNC: 9.8 MG/DL (ref 8.3–10.1)
CHLORIDE SERPL-SCNC: 104 MMOL/L (ref 100–108)
CO2 SERPL-SCNC: 30 MMOL/L (ref 21–32)
CREAT SERPL-MCNC: 0.87 MG/DL (ref 0.6–1.3)
EOSINOPHIL # BLD AUTO: 0.05 THOUSAND/ΜL (ref 0–0.61)
EOSINOPHIL NFR BLD AUTO: 1 % (ref 0–6)
ERYTHROCYTE [DISTWIDTH] IN BLOOD BY AUTOMATED COUNT: 15.8 % (ref 11.6–15.1)
GFR SERPL CREATININE-BSD FRML MDRD: 65 ML/MIN/1.73SQ M
GLUCOSE SERPL-MCNC: 70 MG/DL (ref 65–140)
HCT VFR BLD AUTO: 34.5 % (ref 34.8–46.1)
HGB BLD-MCNC: 10.8 G/DL (ref 11.5–15.4)
IMM GRANULOCYTES # BLD AUTO: 0.05 THOUSAND/UL (ref 0–0.2)
IMM GRANULOCYTES NFR BLD AUTO: 1 % (ref 0–2)
LYMPHOCYTES # BLD AUTO: 0.11 THOUSANDS/ΜL (ref 0.6–4.47)
LYMPHOCYTES NFR BLD AUTO: 2 % (ref 14–44)
MAGNESIUM SERPL-MCNC: 1.9 MG/DL (ref 1.6–2.6)
MCH RBC QN AUTO: 28.7 PG (ref 26.8–34.3)
MCHC RBC AUTO-ENTMCNC: 31.3 G/DL (ref 31.4–37.4)
MCV RBC AUTO: 92 FL (ref 82–98)
MONOCYTES # BLD AUTO: 0.56 THOUSAND/ΜL (ref 0.17–1.22)
MONOCYTES NFR BLD AUTO: 11 % (ref 4–12)
NEUTROPHILS # BLD AUTO: 4.21 THOUSANDS/ΜL (ref 1.85–7.62)
NEUTS SEG NFR BLD AUTO: 85 % (ref 43–75)
NRBC BLD AUTO-RTO: 0 /100 WBCS
PHOSPHATE SERPL-MCNC: 2 MG/DL (ref 2.3–4.1)
PLATELET # BLD AUTO: 124 THOUSANDS/UL (ref 149–390)
PMV BLD AUTO: 10.5 FL (ref 8.9–12.7)
POTASSIUM SERPL-SCNC: 3.7 MMOL/L (ref 3.5–5.3)
RBC # BLD AUTO: 3.76 MILLION/UL (ref 3.81–5.12)
SODIUM SERPL-SCNC: 140 MMOL/L (ref 136–145)
WBC # BLD AUTO: 4.99 THOUSAND/UL (ref 4.31–10.16)

## 2018-09-30 PROCEDURE — G8996 SWALLOW CURRENT STATUS: HCPCS

## 2018-09-30 PROCEDURE — 83735 ASSAY OF MAGNESIUM: CPT | Performed by: INTERNAL MEDICINE

## 2018-09-30 PROCEDURE — 85025 COMPLETE CBC W/AUTO DIFF WBC: CPT | Performed by: INTERNAL MEDICINE

## 2018-09-30 PROCEDURE — 99232 SBSQ HOSP IP/OBS MODERATE 35: CPT | Performed by: INTERNAL MEDICINE

## 2018-09-30 PROCEDURE — 99231 SBSQ HOSP IP/OBS SF/LOW 25: CPT | Performed by: INTERNAL MEDICINE

## 2018-09-30 PROCEDURE — G8997 SWALLOW GOAL STATUS: HCPCS

## 2018-09-30 PROCEDURE — 84100 ASSAY OF PHOSPHORUS: CPT | Performed by: INTERNAL MEDICINE

## 2018-09-30 PROCEDURE — 92610 EVALUATE SWALLOWING FUNCTION: CPT

## 2018-09-30 PROCEDURE — 80048 BASIC METABOLIC PNL TOTAL CA: CPT | Performed by: INTERNAL MEDICINE

## 2018-09-30 RX ADMIN — APIXABAN 5 MG: 5 TABLET, FILM COATED ORAL at 08:54

## 2018-09-30 RX ADMIN — APIXABAN 5 MG: 5 TABLET, FILM COATED ORAL at 17:26

## 2018-09-30 RX ADMIN — Medication 10 ML: at 22:02

## 2018-09-30 RX ADMIN — SUCRALFATE 1000 MG: 1 SUSPENSION ORAL at 05:59

## 2018-09-30 RX ADMIN — SENNOSIDES 8.6 MG: 8.6 TABLET, FILM COATED ORAL at 08:54

## 2018-09-30 RX ADMIN — SENNOSIDES 8.6 MG: 8.6 TABLET, FILM COATED ORAL at 17:25

## 2018-09-30 RX ADMIN — Medication 10 ML: at 12:18

## 2018-09-30 RX ADMIN — PANTOPRAZOLE SODIUM 40 MG: 40 TABLET, DELAYED RELEASE ORAL at 17:26

## 2018-09-30 RX ADMIN — ALLOPURINOL 200 MG: 100 TABLET ORAL at 08:54

## 2018-09-30 RX ADMIN — Medication 10 ML: at 17:26

## 2018-09-30 RX ADMIN — PREDNISONE 20 MG: 20 TABLET ORAL at 08:54

## 2018-09-30 RX ADMIN — NYSTATIN 500000 UNITS: 100000 SUSPENSION ORAL at 17:25

## 2018-09-30 RX ADMIN — SUCRALFATE 1000 MG: 1 SUSPENSION ORAL at 00:38

## 2018-09-30 RX ADMIN — NYSTATIN 500000 UNITS: 100000 SUSPENSION ORAL at 08:00

## 2018-09-30 RX ADMIN — LORATADINE 10 MG: 10 TABLET ORAL at 05:59

## 2018-09-30 RX ADMIN — NYSTATIN 500000 UNITS: 100000 SUSPENSION ORAL at 22:02

## 2018-09-30 RX ADMIN — POLYETHYLENE GLYCOL 3350 17 G: 17 POWDER, FOR SOLUTION ORAL at 08:54

## 2018-09-30 RX ADMIN — METOPROLOL TARTRATE 25 MG: 25 TABLET ORAL at 08:54

## 2018-09-30 RX ADMIN — SUCRALFATE 1000 MG: 1 SUSPENSION ORAL at 17:25

## 2018-09-30 RX ADMIN — Medication 10 ML: at 08:53

## 2018-09-30 RX ADMIN — NYSTATIN 500000 UNITS: 100000 SUSPENSION ORAL at 12:18

## 2018-09-30 RX ADMIN — METOPROLOL TARTRATE 25 MG: 25 TABLET ORAL at 22:04

## 2018-09-30 RX ADMIN — PANTOPRAZOLE SODIUM 40 MG: 40 TABLET, DELAYED RELEASE ORAL at 06:00

## 2018-09-30 RX ADMIN — TRAZODONE HYDROCHLORIDE 25 MG: 50 TABLET ORAL at 22:03

## 2018-09-30 RX ADMIN — SUCRALFATE 1000 MG: 1 SUSPENSION ORAL at 12:18

## 2018-09-30 RX ADMIN — DIBASIC SODIUM PHOSPHATE, MONOBASIC POTASSIUM PHOSPHATE AND MONOBASIC SODIUM PHOSPHATE 1 TABLET: 852; 155; 130 TABLET ORAL at 17:25

## 2018-09-30 NOTE — NURSING NOTE
SBP 90's since AM   Pt asymptomatic  BP currently 98/58  HR 93  IVF d/c'd on dayshift  SLIM PA-PHILOMENA aware  No new orders at this time  Will continue to monitor

## 2018-09-30 NOTE — PLAN OF CARE
Problem: SLP ADULT - SWALLOWING, IMPAIRED  Goal: Initial SLP swallow eval performed  Outcome: Completed Date Met: 09/30/18

## 2018-09-30 NOTE — SPEECH THERAPY NOTE
Speech-Language Pathology Bedside Swallow Evaluation      Patient Name: Nathan Donovan    [id number] Date: 9/30/2018     Problem List  Patient Active Problem List   Diagnosis    Enlarged lymph node in neck    Lymphadenopathy, axillary    Lymphoma ([address] )    Arm DVT (deep venous thromboembolism), acute, left (HCC)    Essential hypertension    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 ([address] )    Constipation    Left bundle branch block (LBBB)    Infected insect bite of neck    Vitamin D deficiency    Vitamin B12 deficiency    Thrombosis of left subclavian vein (HCC)    Situational anxiety    Thrombophlebitis of deep veins of upper extremities    Gastroesophageal reflux disease    Dyslipidemia    DLBCL (diffuse large B cell lymphoma) (HCC)    Cataract    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    Esophageal stricture    [place] (hard of hearing)    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[address] )    Osteoarthritis    Lymphadenopathy of head and neck    Marginal zone lymphoma ([address] )    Acute left flank pain    Left jugular vein thrombosis    Primary insomnia    Sinus tachycardia    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[address] )    Dysphagia    Chronic systolic congestive heart failure ([address] )    Hypoxia       Past Medical History  Past Medical History:   Diagnosis Date    Basal cell carcinoma (BCC)     Bruit of right carotid artery     resolved 5/14/15     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HCC)     radiation    Dyslipidemia     Esophageal stricture     last assessed 2/28/13       GERD (gastroesophageal reflux disease)     History of chemotherapy     History of radiation therapy     [place] (hard of hearing)     b/l ears    Hyperlipidemia     Hypertension     Osteoarthritis     Osteoarthrosis of ankle and foot     last assessed 5/20/13     Osteopenia     last assessed 2/28/13     Plantar fasciitis of left foot     Polyp of sigmoid colon     last assessed 2/28/13     Shortness of breath     when walking up stairs Past Surgical History  Past Surgical History:   Procedure Laterality Date    ANKLE ARTHROPLASTY Left     BLEPHAROPLASTY      right eye     CATARACT EXTRACTION Right     COLONOSCOPY      ESOPHAGEAL DILATION      x2    ESOPHAGOGASTRODUODENOSCOPY      dilitation    FACIAL/NECK BIOPSY Right 9/8/2017    Procedure: NECK NODE BIOPSY WITH NEEDLE LOCALIZATION; LYMPHOMA PROTOCOL (NEEDLE LOC WITH I R  AT 1100); Surgeon: Cecelia Bates MD;  [facility];  Service: Surgical Oncology    HYSTERECTOMY      LYMPH NODE DISSECTION      right groin and neck    ORBITAL FLOOR EXPLORATION Right     for cancer     PORTACATH PLACEMENT      left chest     NV BX/REMV,LYMPH NODE,DEEP AXILL Left 8/14/2017    Procedure: Axillary lymph node excision with LYMPHOMA PROTOCOL;  Surgeon: Cecelia Bates MD;  [facility];  Service: Surgical Oncology    NV BX/REMV,LYMPH NODE,DEEP CERV Right 5/2/2016    Procedure: NECK NODE BIOPSY;  Surgeon: Cecelia Bates MD;  [facility];  Service: Surgical Oncology    NV BX/REMV,LYMPH NODE,DEEP CERV Left 7/25/2018    Procedure: NECK NODE BIOPYS WITH LYMPHOMA PROTOCOL; (ULTRASOUND GUIDED NEEDLE LOC IN IR AT 0800); Surgeon: Cecelia Bates MD;  [facility];  Service: Surgical Oncology    NV INSJ TUNNELED CTR VAD W/SUBQ PORT AGE 5 YR/> N/A 10/3/2017    Procedure: PLACEMENT OF PORT-A-CATH DEVICE;  Surgeon: Cecelia Bates MD;  [facility];  Service: Surgical Oncology       Summary   Pt presented with functional appearing oral and pharyngeal stage swallowing skills with materials administered today  Risk for Aspiration: Minimal, secondary to c/o some discomfort when swallowing      Recommendations: regular diet and thin liquids     Recommended Form of Meds: whole with liquid     Aspiration precautions and compensatory swallowing strategies: slow rate of feeding, small bites/sips and alternating bites and sips          Current Medical Status  Pt is a 78 y o  female who presented to [facility] with increased fatigue, SOB and decreased oral intake with difficulty swallowing  HR was elevated at cardiologist, and patient brought to ED for f/u care  A recent CT scan on August 21st, 2018 illustrated evidence of supraclavicular mediastinal and retroperitoneal lymphadenopathy which has progressed from prior imaging  The mediastinal lymphadenopathy appears to have causes mass effect on the esophagus  Patient presents with decreased oral intake with poor nutrition  She reports that she has not been feeling well since July status post intervention for lymphadenopathy  She reports that after being intubated she has had persistent issues with cough and shortness of breath  Family reports that she has been feeling weak and having difficulty swallowing during this time  However appears to have been more pronounced over the last 1 to 2 weeks  She has been having difficulty with swallowing food with dysphagia type symptoms with food feeling like it is getting stuck  She reports that she has difficulty passing solids     She has been seen by Radiation therapy and was told that there may be a component of radiation induced esophagitis  Of note, the  does acknowledge a history of esophageal strictures as well and has had prior scopes with dilatation of the stricture in the past She presents for increasing symptoms of fatigue, tachycardia and poor oral intake  Video swallow study has been ordered  However, bedside evaluation completed today  Past medical history:  Please see H&P for details      Special Studies:  9/27 chest x-ray: No acute cardiopulmonary disease      Social/Education/Vocational Hx:  Pt lives with family      Swallow Information   Current Risks for Dysphagia & Aspiration: known history of dysphagia, recent intubation and radiation therapy for lymphoma      Current Symptoms/Concerns: pain with swallowing     Current Diet: clear liquids Baseline Diet: regular diet and thin liquids      Baseline Assessment   Behavior/Cognition: alert    Speech/Language Status: able to participate in conversation and able to follow commands    Patient Positioning: upright in bed    Pain Status/Interventions/Response to Interventions: Patient reports decreased pain with swallowing during PO trials today  Swallow Mechanism Exam   Oral-motor structures and function are WNL for symmetry, strength, ROM & coordination  Facial: symmetrical  Labial: WFL  Lingual: WFL  Velum: symmetrical  Mandible: adequate ROM  Dentition: adequate  Vocal quality:clear/adequate   Volitional Cough: strong/productive     Consistencies Assessed and Performance   Consistencies Administered: thin liquids and mechanical soft solids  Specific materials administered included mixed consistency cold cereal with milk and water     Oral Stage: WFL  Mastication was adequate with the materials administered today  Bolus formation and transfer were functional with no significant oral residue noted  No overt s/s reduced oral control  Pharyngeal Stage: WFL    Swallow Mechanics:  Swallowing initiation appeared prompt  Laryngeal rise was palpated and judged to be within functional limits  No coughing, throat clearing, change in vocal quality or respiratory status noted today  Esophageal Concerns: none reported  Patient with history of esophageal stricture requiring stretching       Summary and Recommendations (see above)      Results Reviewed with: patient and RN     Consider referral to: Consider completion of VBS if signs of aspiration or complaints of food sticking worsen     Treatment Recommended: Dysphagia therapy      Frequency of treatment: F/U sessions 2-3  Consider VBS as ordered     Patient Stated Goal: "I so wanted my meatloaf last night!"     Dysphagia Goals per SLP: pt will tolerate regular diet  with thin liquids  without s/s of aspiration x3    Pt/Family Education: initiated  Pt and caregivers would benefit from/require continued education      Speech Therapy Prognosis   Prognosis: good    Prognosis Considerations: medical status

## 2018-09-30 NOTE — PROGRESS NOTES
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 - gentle rate for IVF to prevent volume overload - now euvolemic  Continued on B-blocker, but ACE-I not being given for worry of hypotension and poor PO intake  3   Chronic systolic CHF: with volume depletion, as above  4   Diffuse B cell lymphoma  5  Sinus tachycardia: related to #1, improved  Subjective:   Patient seen and examined  No significant events overnight   ; pertinent negatives - chest pain, chest pressure/discomfort, dyspnea, irregular heart beat and palpitations  Objective:     Vitals: Blood pressure 105/59, pulse 91, temperature 98 2 °F (36 8 °C), resp  rate 19, height 5' 3" (1 6 m), weight 91 3 kg (201 lb 4 5 oz), SpO2 92 %, not currently breastfeeding , Body mass index is 35 66 kg/m² ,   Orthostatic Blood Pressures      Most Recent Value   Blood Pressure  105/59 filed at 09/29/2018 2246   Patient Position - Orthostatic VS  Lying filed at 09/25/2018 1315            Intake/Output Summary (Last 24 hours) at 09/30/18 0855  Last data filed at 09/30/18 0601   Gross per 24 hour   Intake              320 ml   Output             1300 ml   Net             -980 ml       TELE: No significant arrhythmias seen      Physical Exam:    GEN: Saintclair Hines appears well, alert and oriented x 3, pleasant and cooperative   HEENT: pupils equal, round, and reactive to light; extraocular muscles intact  NECK: supple, no carotid bruits   HEART: regular rhythm, normal S1 and S2, no murmurs, clicks, gallops or rubs   LUNGS: clear to auscultation bilaterally; no wheezes, rales, or rhonchi   ABDOMEN: normal bowel sounds, soft, no tenderness, no distention  EXTREMITIES: peripheral pulses normal; no clubbing, cyanosis, or edema  NEURO: no focal findings   SKIN: normal without suspicious lesions on exposed skin    Medications:      Current Facility-Administered Medications:     acetaminophen (TYLENOL) tablet 650 mg, 650 mg, Oral, Q6H PRN, Chacho Craig, DO, 650 mg at 09/29/18 1156    al mag oxide-diphenhydramine-lidocaine viscous (MAGIC MOUTHWASH) suspension 10 mL, 10 mL, Swish & Swallow, 4x Daily (with meals and at bedtime), Bladimir Mathew MD, 10 mL at 09/30/18 0853    allopurinol (ZYLOPRIM) tablet 200 mg, 200 mg, Oral, Daily, Hetul Mcclellan, DO, 200 mg at 09/30/18 0854    apixaban (ELIQUIS) tablet 5 mg, 5 mg, Oral, BID, Hetul Mcclellan, DO, 5 mg at 09/30/18 0854    ascorbic acid (VITAMIN C) tablet 500 mg, 500 mg, Oral, Daily With Dinner, Hetul Mcclellan, DO, 500 mg at 09/29/18 1731    HYDROcodone-acetaminophen (NORCO) 5-325 mg per tablet 1 tablet, 1 tablet, Oral, Q6H PRN, Chacho Craig DO    loratadine (CLARITIN) tablet 10 mg, 10 mg, Oral, Early Morning, Hetul Mcclellan, DO, 10 mg at 09/30/18 0559    LORazepam (ATIVAN) 2 mg/mL injection 0 5 mg, 0 5 mg, Intravenous, Q6H PRN, Chhaya Christianson, CRNP, 0 5 mg at 09/26/18 1415    melatonin tablet 6 mg, 6 mg, Oral, HS PRN, Hetul Mcclellan, DO    metoprolol tartrate (LOPRESSOR) tablet 25 mg, 25 mg, Oral, Q12H CANDACE, MEAGAN AmezquitaNP, 25 mg at 09/30/18 0854    nystatin (MYCOSTATIN) oral suspension 500,000 Units, 500,000 Units, Swish & Swallow, 4x Daily, Hetul Mcclellan, DO, 500,000 Units at 09/30/18 0800    ondansetron (ZOFRAN) injection 4 mg, 4 mg, Intravenous, Q6H PRN, Hetul Mcclellan, DO, 4 mg at 09/28/18 2337    ondansetron (ZOFRAN-ODT) dispersible tablet 4 mg, 4 mg, Oral, Q6H PRN, Hetul Mcclellan, DO    pantoprazole (PROTONIX) EC tablet 40 mg, 40 mg, Oral, BID AC, Hetul Mcclellan, DO, 40 mg at 09/30/18 0600    polyethylene glycol (MIRALAX) packet 17 g, 17 g, Oral, Daily, Hetul Mcclellan, DO, 17 g at 09/30/18 0854    predniSONE tablet 20 mg, 20 mg, Oral, Daily, Hetul Mcclellan, DO, 20 mg at 09/30/18 0854   senna (SENOKOT) tablet 8 6 mg, 1 tablet, Oral, BID, Hetul Mcclellan, DO, 8 6 mg at 09/30/18 0854    simethicone (MYLICON) chewable tablet 80 mg, 80 mg, Oral, Q6H PRN, Hetul Mcclellan, DO    sucralfate (CARAFATE) oral suspension 1,000 mg, 1,000 mg, Oral, Q6H [address], Sadie Arellano, MEAGANNP, 1,000 mg at 09/30/18 0559    traZODone (DESYREL) tablet 25 mg, 25 mg, Oral, HS, Hetul Mcclellan, DO, 25 mg at 09/29/18 2229     Labs & Results:          Results from last 7 days  Lab Units 09/30/18  0612 09/26/18  0555 09/25/18  0959   WBC Thousand/uL 4 99 6 89 10 08   HEMOGLOBIN g/dL 10 8* 11 2* 12 2   HEMATOCRIT % 34 5* 36 5 38 6   PLATELETS Thousands/uL 124* 160 160           Results from last 7 days  Lab Units 09/30/18  0612 09/28/18  0508 09/26/18  0555 09/25/18  0959 09/24/18  1219   SODIUM mmol/L 140 141 138 136 139   POTASSIUM mmol/L 3 7 3 6 3 8 4 8 3 9   CHLORIDE mmol/L 104 106 102 100 101   CO2 mmol/L 30 28 29 27 29   BUN mg/dL 18 17 19 25 18   CREATININE mg/dL 0 87 0 83 0 97 1 22 0 98   CALCIUM mg/dL 9 8 9 5 10 3* 10 3* 10 2*   ALK PHOS U/L  --   --  81 92 95   ALT U/L  --   --  22 27 26   AST U/L  --   --  29 50* 35           Results from last 7 days  Lab Units 09/30/18  0612 09/26/18  0555   MAGNESIUM mg/dL 1 9 2 2       Echo: personally reviewed - EF 35%    EKG personally reviewed by Gerry Skinner MD

## 2018-09-30 NOTE — PROGRESS NOTES
[facility] Internal Medicine Progress Note  Patient: Henok Pac 78 y o  female   MRN: [id number]  PCP: Tim De La Torre PA-C  Unit/Bed#: [facility] 027-72 Encounter: [id number]  Date Of Visit: 09/30/18    Assessment:    Principal Problem:    Sinus tachycardia  Active Problems:    Arm DVT (deep venous thromboembolism), acute, left (HCC)    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 ([facility] )    Constipation    Dysphagia    Chronic systolic congestive heart failure ([facility] [address] )    Hypoxia    Esophageal dysphagia      Plan:    1  Odynophagia, 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/extrinsic compression on the esophagus by lymphadenopathy, previous history of esophageal stricture, GI input appreciated, plan for EGD on Monday continue PPI, nystatin,  Carafate and  Magic mouthwash  2  Sinus tachycardia secondary to dehydration and decreasing oral intake, improving,  positive fluid balance since admission, monitor off IV fluid, continue Lopressor  3  Dilated cardiomyopathy with chronic systolic CHF, likely secondary to chemotherapy,  EF 25%, on beta-blocker,  cardiology is following  4  Recurrent large B-cell lymphoma, with cervical and mediastinal adenopathy, completed radiation treatment, Lenalidomide on hold, patient on prednisone consider to taper   5  History of L SCV thrombosis, continue Eliquis  6  Hypercalcemia, improving,  likely secondary dehydration and decreasing oral intake, calcium supplement on hold  7  Hypophosphatemia, replace  8  Thrombocytopenia, monitor    VTE Pharmacologic Prophylaxis:   Pharmacologic: Apixaban (Eliquis)  Mechanical VTE Prophylaxis in Place: Yes    Patient Centered Rounds: I have performed bedside rounds with nursing staff today  Discussions with Specialists or Other Care Team Provider:     Education and Discussions with Family / Patient:   Patient,  at bedside    Time Spent for Care: 30 minutes    More than 50% of total time spent on counseling and coordination of care as described above  Current Length of Stay: 5 day(s)    Current Patient Status: Inpatient   Certification Statement: The patient will continue to require additional inpatient hospital stay due to Management of dysphagia    Discharge Plan / Estimated Discharge Date: not ready yet    Code Status: Level 3 - DNAR and DNI      Subjective:   Patient is seen and examined  Still with dysphagia and dry mouth  No nausea vomiting or diarrhea    Objective:     Vitals:   Temp (24hrs), Av 2 °F (36 8 °C), Min:98 06 °F (36 7 °C), Max:98 42 °F (36 9 °C)    HR:  [90-97] 97  Resp:  [19-20] 20  BP: ()/(58-60) 108/60  SpO2:  [92 %-96 %] 94 %  Body mass index is 35 66 kg/m²  Input and Output Summary (last 24 hours): Intake/Output Summary (Last 24 hours) at 18 1104  Last data filed at 18 0601   Gross per 24 hour   Intake              200 ml   Output             1300 ml   Net            -1100 ml       Physical Exam:     Physical Exam  Patient is awake alert in no acute distress  Lung with decreased breath sounds bilateral at the bases  Heart positive S1-S2 no murmur  Abdomen soft nontender positive bowel sounds  Lower extremities trace edema    Additional Data:     Labs:      Results from last 7 days  Lab Units 18  0612   WBC Thousand/uL 4 99   HEMOGLOBIN g/dL 10 8*   HEMATOCRIT % 34 5*   PLATELETS Thousands/uL 124*   NEUTROS PCT % 85*   LYMPHS PCT % 2*   MONOS PCT % 11   EOS PCT % 1       Results from last 7 days  Lab Units 18  0612  18  0555   SODIUM mmol/L 140  < > 138   POTASSIUM mmol/L 3 7  < > 3 8   CHLORIDE mmol/L 104  < > 102   CO2 mmol/L 30  < > 29   BUN mg/dL 18  < > 19   CREATININE mg/dL 0 87  < > 0 97   CALCIUM mg/dL 9 8  < > 10 3*   ALK PHOS U/L  --   --  81   ALT U/L  --   --  22   AST U/L  --   --  29   < > = values in this interval not displayed  * I Have Reviewed All Lab Data Listed Above  * Additional Pertinent Lab Tests Reviewed:  All Labs For Current Hospital Admission Reviewed    Imaging:    Imaging Reports Reviewed Today Include:   Imaging Personally Reviewed by Myself Includes:      Recent Cultures (last 7 days):       Results from last 7 days  Lab Units 09/25/18  1230   URINE CULTURE  20,000-29,000 cfu/ml        Last 24 Hours Medication List:     Current Facility-Administered Medications:  acetaminophen 650 mg Oral Q6H PRN Shaylee Anne,    al mag oxide-diphenhydramine-lidocaine viscous 10 mL Swish & Swallow 4x Daily (with meals and at bedtime) John Olvera MD   allopurinol 200 mg Oral Daily Hetul Mcclellan, DO   apixaban 5 mg Oral BID Hetul Mcclellan, DO   ascorbic acid 500 mg Oral Daily With [employer], DO   HYDROcodone-acetaminophen 1 tablet Oral Q6H PRN Shaylee Anne, DO   loratadine 10 mg Oral Early Morning Hetul Mcclellan, DO   LORazepam 0 5 mg Intravenous Q6H PRN ANNEMARIE Smith   melatonin 6 mg Oral HS PRN Hetul Mcclellan, DO   metoprolol tartrate 25 mg Oral Q12H [address] ANNEMARIE Amezquita   nystatin 500,000 Units Swish & Swallow 4x Daily Hetul Mcclellan, DO   ondansetron 4 mg Intravenous Q6H PRN Hetul Mcclellan, DO   ondansetron 4 mg Oral Q6H PRN Hetul Mcclellan, DO   pantoprazole 40 mg Oral BID AC Hetul Mcclellan, DO   polyethylene glycol 17 g Oral Daily Hetul Mcclellan, DO   predniSONE 20 mg Oral Daily Hetul Mcclellan, DO   senna 1 tablet Oral BID Hetul Mcclellan, DO   simethicone 80 mg Oral Q6H PRN Hetul Mcclellan, DO   sucralfate 1,000 mg Oral Q6H [address] ANNEMARIE Cooper   traZODone 25 mg Oral HS Hetul Mcclellan, DO        Today, Patient Was Seen By: Shaylee Anne DO    ** Please Note: This note has been constructed using a voice recognition system   **

## 2018-10-01 ENCOUNTER — ANESTHESIA (INPATIENT)
Dept: GASTROENTEROLOGY | Facility: HOSPITAL | Age: 78
DRG: 392 | End: 2018-10-01
Payer: COMMERCIAL

## 2018-10-01 ENCOUNTER — ANESTHESIA EVENT (INPATIENT)
Dept: GASTROENTEROLOGY | Facility: HOSPITAL | Age: 78
DRG: 392 | End: 2018-10-01
Payer: COMMERCIAL

## 2018-10-01 PROBLEM — R09.02 HYPOXIA: Status: RESOLVED | Noted: 2018-09-27 | Resolved: 2018-10-01

## 2018-10-01 PROBLEM — B37.81 CANDIDA ESOPHAGITIS (HCC): Status: ACTIVE | Noted: 2018-10-01

## 2018-10-01 PROCEDURE — 43235 EGD DIAGNOSTIC BRUSH WASH: CPT | Performed by: INTERNAL MEDICINE

## 2018-10-01 PROCEDURE — 99232 SBSQ HOSP IP/OBS MODERATE 35: CPT | Performed by: INTERNAL MEDICINE

## 2018-10-01 PROCEDURE — 88305 TISSUE EXAM BY PATHOLOGIST: CPT | Performed by: PATHOLOGY

## 2018-10-01 PROCEDURE — 88112 CYTOPATH CELL ENHANCE TECH: CPT | Performed by: PATHOLOGY

## 2018-10-01 PROCEDURE — 0DD58ZX EXTRACTION OF ESOPHAGUS, VIA NATURAL OR ARTIFICIAL OPENING ENDOSCOPIC, DIAGNOSTIC: ICD-10-PCS | Performed by: INTERNAL MEDICINE

## 2018-10-01 PROCEDURE — 99231 SBSQ HOSP IP/OBS SF/LOW 25: CPT | Performed by: INTERNAL MEDICINE

## 2018-10-01 RX ORDER — FLUCONAZOLE 200 MG/1
200 TABLET ORAL ONCE
Status: DISCONTINUED | OUTPATIENT
Start: 2018-10-01 | End: 2018-10-01

## 2018-10-01 RX ORDER — FLUCONAZOLE 200 MG/1
200 TABLET ORAL ONCE
Status: COMPLETED | OUTPATIENT
Start: 2018-10-01 | End: 2018-10-01

## 2018-10-01 RX ORDER — SODIUM CHLORIDE 9 MG/ML
INJECTION, SOLUTION INTRAVENOUS CONTINUOUS PRN
Status: DISCONTINUED | OUTPATIENT
Start: 2018-10-01 | End: 2018-10-01 | Stop reason: SURG

## 2018-10-01 RX ORDER — PROPOFOL 10 MG/ML
INJECTION, EMULSION INTRAVENOUS AS NEEDED
Status: DISCONTINUED | OUTPATIENT
Start: 2018-10-01 | End: 2018-10-01 | Stop reason: SURG

## 2018-10-01 RX ORDER — FLUCONAZOLE 200 MG/1
200 TABLET ORAL DAILY
Status: DISCONTINUED | OUTPATIENT
Start: 2018-10-01 | End: 2018-10-03 | Stop reason: HOSPADM

## 2018-10-01 RX ORDER — FLUCONAZOLE 100 MG/1
100 TABLET ORAL DAILY
Status: DISCONTINUED | OUTPATIENT
Start: 2018-10-02 | End: 2018-10-01

## 2018-10-01 RX ORDER — FLUCONAZOLE 100 MG/1
100 TABLET ORAL DAILY
Status: DISCONTINUED | OUTPATIENT
Start: 2018-10-02 | End: 2018-10-01 | Stop reason: SDUPTHER

## 2018-10-01 RX ADMIN — Medication 10 ML: at 16:21

## 2018-10-01 RX ADMIN — PROPOFOL 40 MG: 10 INJECTION, EMULSION INTRAVENOUS at 09:28

## 2018-10-01 RX ADMIN — Medication 10 ML: at 21:11

## 2018-10-01 RX ADMIN — PROPOFOL 30 MG: 10 INJECTION, EMULSION INTRAVENOUS at 09:33

## 2018-10-01 RX ADMIN — SENNOSIDES 8.6 MG: 8.6 TABLET, FILM COATED ORAL at 17:30

## 2018-10-01 RX ADMIN — PANTOPRAZOLE SODIUM 40 MG: 40 TABLET, DELAYED RELEASE ORAL at 16:21

## 2018-10-01 RX ADMIN — LIDOCAINE HYDROCHLORIDE 100 MG: 20 INJECTION, SOLUTION INTRAVENOUS at 09:13

## 2018-10-01 RX ADMIN — APIXABAN 5 MG: 5 TABLET, FILM COATED ORAL at 17:30

## 2018-10-01 RX ADMIN — FLUCONAZOLE 200 MG: 200 TABLET ORAL at 15:11

## 2018-10-01 RX ADMIN — METOPROLOL TARTRATE 25 MG: 25 TABLET ORAL at 21:11

## 2018-10-01 RX ADMIN — PROPOFOL 40 MG: 10 INJECTION, EMULSION INTRAVENOUS at 09:20

## 2018-10-01 RX ADMIN — NYSTATIN 500000 UNITS: 100000 SUSPENSION ORAL at 21:10

## 2018-10-01 RX ADMIN — TRAZODONE HYDROCHLORIDE 25 MG: 50 TABLET ORAL at 21:11

## 2018-10-01 RX ADMIN — PANTOPRAZOLE SODIUM 40 MG: 40 TABLET, DELAYED RELEASE ORAL at 06:43

## 2018-10-01 RX ADMIN — FLUCONAZOLE 200 MG: 200 TABLET ORAL at 11:38

## 2018-10-01 RX ADMIN — SUCRALFATE 1000 MG: 1 SUSPENSION ORAL at 11:41

## 2018-10-01 RX ADMIN — Medication 10 ML: at 11:39

## 2018-10-01 RX ADMIN — SIMETHICONE CHEW TAB 80 MG 80 MG: 80 TABLET ORAL at 11:38

## 2018-10-01 RX ADMIN — SUCRALFATE 1000 MG: 1 SUSPENSION ORAL at 17:29

## 2018-10-01 RX ADMIN — PROPOFOL 20 MG: 10 INJECTION, EMULSION INTRAVENOUS at 09:24

## 2018-10-01 RX ADMIN — OXYCODONE HYDROCHLORIDE AND ACETAMINOPHEN 500 MG: 500 TABLET ORAL at 16:21

## 2018-10-01 RX ADMIN — SODIUM CHLORIDE: 0.9 INJECTION, SOLUTION INTRAVENOUS at 09:01

## 2018-10-01 RX ADMIN — DIBASIC SODIUM PHOSPHATE, MONOBASIC POTASSIUM PHOSPHATE AND MONOBASIC SODIUM PHOSPHATE 1 TABLET: 852; 155; 130 TABLET ORAL at 16:21

## 2018-10-01 RX ADMIN — SUCRALFATE 1000 MG: 1 SUSPENSION ORAL at 06:43

## 2018-10-01 RX ADMIN — FLUCONAZOLE 200 MG: 200 TABLET ORAL at 17:30

## 2018-10-01 RX ADMIN — PROPOFOL 50 MG: 10 INJECTION, EMULSION INTRAVENOUS at 09:15

## 2018-10-01 RX ADMIN — NYSTATIN 500000 UNITS: 100000 SUSPENSION ORAL at 17:29

## 2018-10-01 RX ADMIN — NYSTATIN 500000 UNITS: 100000 SUSPENSION ORAL at 11:39

## 2018-10-01 NOTE — ANESTHESIA POSTPROCEDURE EVALUATION
Post-Op Assessment Note      CV Status:  Stable    Mental Status:  Alert and awake    Hydration Status:  Euvolemic    PONV Controlled:  Controlled    Airway Patency:  Patent    Post Op Vitals Reviewed: Yes          Staff: CRNA           BP   138/69   Temp      Pulse  85   Resp   14   SpO2   98

## 2018-10-01 NOTE — OP NOTE
ESOPHAGOGASTRODUODENOSCOPY    PROCEDURE: EGD/ Brush biopsy    INDICATIONS: Odynophagia    POST-OP DIAGNOSIS: See the impression below    SEDATION: Monitored anesthesia care, check anesthesia records    PHYSICAL EXAM:    Vitals:    10/01/18 0830   BP: 120/57   Pulse: 99   Resp: 16   Temp: 98 4 °F (36 9 °C)   SpO2: 94%    Body mass index is 35 61 kg/m²  General: NAD  Heart: S1 & S2 normal, RRR  Lungs: CTA, No rales or rhonchi  Abdomen: Soft, nontender, nondistended, good bowel sounds    CONSENT:  Informed consent was obtained for the procedure, including sedation after explaining the risks and benefits of the procedure  Risks including but not limited to bleeding, perforation, infection, aspiration were discussed in detail  Also explained about less than 100% sensitivity with the exam and other alternatives  PREPARATION:   EKG tracing, pulse oximetry, blood pressure were monitored throughout the procedure  Patient was identified by myself both verbally and by visual inspection of ID band  DESCRIPTION:   Patient was placed in the left lateral decubitus position and was sedated with the above medication  The gastroscope was introduced in to the oropharynx and the esophagus was intubated under direct visualization  Scope was passed down the esophagus up to 2nd part of the duodenum  A careful inspection was made as the gastroscope was withdrawn, including a retroflexed view of the stomach; findings and interventions are described below  FINDINGS:    #1  Esophagus and GEJ- Esophagitis from the oropharynx down to distal end at 29cm, with white thick mucosa, brush biopsy obtained  GE junction measured at 37cm    #2  Stomach- diminutive to small polyps in gastric body suggestive of fundic gland polyps-which were not biopsied    #3  Duodenum- multiple diminutive small polyp seen duodenal bulb suggestive of Brunner's gland hyperplasia and normal 2nd portion of duodenum         IMPRESSIONS:      1   Pharyngitis and esophagitis suggestive of Candida extending from oropharynx to distal end of esophagus-brush biopsy obtained  2  Diminutive small polyps and gastric body suggestive of fundic gland polyps  3  Brunner's gland hyperplasia and duodenal bulb, normal 2nd portion of duodenum    RECOMMENDATIONS:     1  Empirically start fluconazole to treat for suspected candida esophagitis  2  Follow-up on biopsy results  3  May resume regular diet as tolerated  4  Return to floor      COMPLICATIONS:  None; patient tolerated the procedure well            DISPOSITION: PACU           CONDITION: Stable

## 2018-10-01 NOTE — PROGRESS NOTES
[facility] Internal Medicine Progress Note  Patient: Mesha Alvarez 78 y o  female   MRN: [id number]  PCP: Gabby Romano PA-C  Unit/Bed#: [facility] 760-77 Encounter: [id number]  Date Of Visit: 10/01/18    Assessment:    Principal Problem:    Sinus tachycardia  Active Problems:    Arm DVT (deep venous thromboembolism), acute, left (HCC)    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    Constipation    Dysphagia    Chronic systolic congestive heart failure ([facility] [facility] )    Hypoxia    Esophageal dysphagia      Plan:    1  Odynophagia, dysphagia, status post EGD today, with positive finding for pharyngitis and esophagitis suggestive Candida, patient with underlying  radiation esophagitis/extrinsic compression on the esophagus by lymphadenopathy, start fluconazole, follow on biopsy continue PPI, nystatin,  Carafate and  Magic mouthwash  2  Sinus tachycardia secondary to dehydration and decreasing oral intake, improving,  positive fluid balance since admission, monitor off IV fluid, continue Lopressor  3  Dilated cardiomyopathy with chronic systolic CHF, likely secondary to chemotherapy,  EF 25%, on beta-blocker,  cardiology is following  4  Recurrent large B-cell lymphoma, with cervical and mediastinal adenopathy, completed radiation treatment, Lenalidomide on hold, patient on prednisone, start tapering   5  History of L SCV thrombosis, continue Eliquis  6  Hypercalcemia, improving,  likely secondary dehydration and decreasing oral intake, calcium supplement on hold  7  Hypophosphatemia, on replacement  8  Thrombocytopenia, monitor    VTE Pharmacologic Prophylaxis:   Pharmacologic: Apixaban (Eliquis)  Mechanical VTE Prophylaxis in Place: Yes    Patient Centered Rounds: I have performed bedside rounds with nursing staff today  Discussions with Specialists or Other Care Team Provider:     Education and Discussions with Family / Patient:   Patient    Time Spent for Care: 30 minutes    More than 50% of total time spent on counseling and coordination of care as described above  Current Length of Stay: 6 day(s)    Current Patient Status: Inpatient   Certification Statement: The patient will continue to require additional inpatient hospital stay due to Management of dysphagia    Discharge Plan / Estimated Discharge Date:  Likely home tomorrow    Code Status: Level 3 - DNAR and DNI      Subjective:   Patient is seen and examined  Still with dysphagia and dry mouth  No nausea vomiting or diarrhea  Just came back from EGD    Objective:     Vitals:   Temp (24hrs), Av 2 °F (36 8 °C), Min:98 °F (36 7 °C), Max:98 4 °F (36 9 °C)    HR:  [85-99] 94  Resp:  [16-22] 20  BP: ()/(42-90) 159/90  SpO2:  [85 %-97 %] 95 %  Body mass index is 35 61 kg/m²  Input and Output Summary (last 24 hours): Intake/Output Summary (Last 24 hours) at 10/01/18 1114  Last data filed at 10/01/18 0918   Gross per 24 hour   Intake              600 ml   Output             1100 ml   Net             -500 ml       Physical Exam:     Physical Exam     Patient is awake alert in no acute distress  Lung with decreased breath sounds bilateral at the bases  Heart positive S1-S2 no murmur  Abdomen soft nontender positive bowel sounds  Lower extremities trace edema    Additional Data:     Labs:      Results from last 7 days  Lab Units 18  0612   WBC Thousand/uL 4 99   HEMOGLOBIN g/dL 10 8*   HEMATOCRIT % 34 5*   PLATELETS Thousands/uL 124*   NEUTROS PCT % 85*   LYMPHS PCT % 2*   MONOS PCT % 11   EOS PCT % 1       Results from last 7 days  Lab Units 18  0612  18  0555   SODIUM mmol/L 140  < > 138   POTASSIUM mmol/L 3 7  < > 3 8   CHLORIDE mmol/L 104  < > 102   CO2 mmol/L 30  < > 29   BUN mg/dL 18  < > 19   CREATININE mg/dL 0 87  < > 0 97   CALCIUM mg/dL 9 8  < > 10 3*   ALK PHOS U/L  --   --  81   ALT U/L  --   --  22   AST U/L  --   --  29   < > = values in this interval not displayed          * I Have Reviewed All Lab Data Listed Above  * Additional Pertinent Lab Tests Reviewed: [facility] Admission Reviewed    Imaging:    Imaging Reports Reviewed Today Include:   Imaging Personally Reviewed by Myself Includes:      Recent Cultures (last 7 days):       Results from last 7 days  Lab Units 09/25/18  1230   URINE CULTURE  20,000-29,000 cfu/ml        Last 24 Hours Medication List:     Current Facility-Administered Medications:  acetaminophen 650 mg Oral Q6H PRN Mandy Burkett, DO   al mag oxide-diphenhydramine-lidocaine viscous 10 mL Swish & Swallow 4x Daily (with meals and at bedtime) Aida Ruiz MD   allopurinol 200 mg Oral Daily Hetul Mcclellan, DO   apixaban 5 mg Oral BID Hetul Mcclellan, DO   ascorbic acid 500 mg Oral Daily With [employer], DO   HYDROcodone-acetaminophen 1 tablet Oral Q6H PRN Mandykathi Burkett, DO   loratadine 10 mg Oral Early Morning Hetul Mcclellan, DO   LORazepam 0 5 mg Intravenous Q6H PRN ANNEMARIE Quiros   melatonin 6 mg Oral HS PRN Hetul Mcclellan, DO   metoprolol tartrate 25 mg Oral Q12H [facility] ANNEMARIE Amezquita   nystatin 500,000 Units Swish & Swallow 4x Daily Hetul Mcclellan, DO   ondansetron 4 mg Intravenous Q6H PRN Hetul Mcclellan, DO   ondansetron 4 mg Oral Q6H PRN Hetul Mcclellan, DO   pantoprazole 40 mg Oral BID AC Hetul Mcclellan, DO   polyethylene glycol 17 g Oral Daily Hetul Mcclellan, DO   potassium-sodium phosphateS 1 tablet Oral BID With Meals Mandykathi Burkett, DO   predniSONE 15 mg Oral Daily Mandykathi Burkett, DO   senna 1 tablet Oral BID Hetul Mcclellan, DO   simethicone 80 mg Oral Q6H PRN Hetul Mcclellan, DO   sucralfate 1,000 mg Oral Q6H [facility] ANNEMARIE Cooper   traZODone 25 mg Oral HS Hetul Mcclellan, DO        Today, Patient Was Seen By: Mandy Burkett DO    ** Please Note: This note has been constructed using a voice recognition system   **

## 2018-10-01 NOTE — ANESTHESIA PREPROCEDURE EVALUATION
Review of Systems/Medical History  Patient summary reviewed  Chart reviewed  No history of anesthetic complications     Cardiovascular  EKG reviewed, Exercise tolerance (METS): <4, Exercise comment: LIRA with 1 flight of stairs Hyperlipidemia, Hypertension (last dose of metoprolol 9/30 at 22:00) , Dysrhythmias (LBBB) , CHF (adriamycin induced dilated cardiomyopathy) , DVT  Comment: TTE 7/2018:  SUMMARY  LEFT VENTRICLE:  There is severe global hypokinesia but as well, the apical septum is thinner and akinetic  The ventricle was mildly dilated  Systolic function was severely reduced  Ejection fraction was estimated to be 25 %  LEFT ATRIUM:  The atrium was mildly dilated  MITRAL VALVE:  There was mild regurgitation  ,  Pulmonary  Not a smoker , Shortness of breath, No recent URI ,        GI/Hepatic  Dysphagia (hx of previous esophageal dilations), Dysphagia type: liquids and solids  GERD (no symptoms when takes pepcid) ,   Comment: Confirmed NPO appropriate     Negative  ROS        Endo/Other  Negative endo/other ROS   Comment: Admitted 9/25 with fatigue, SOB and found to be tachycardic and hypotensive  Had poor PO intake 2/2 dysphagia    Had been receiving cautious IVF hydration since admission through yesterday    GYN    Hysterectomy,        Hematology  Anemia ,  Lymphoma (diffuse B cell lymphoma, s/p chemo (last cycle ~1 year ago) and radiation (recently completed 10 cycles))   Musculoskeletal    Arthritis     Neurology  Negative neurology ROS      Psychology   Anxiety,              Physical Exam    Airway    Mallampati score: II  TM Distance: >3 FB  Neck ROM: limited     Dental   Comment: Upper partial,     Cardiovascular  Rhythm: regular, Rate: abnormal,     Pulmonary  Comment: Breathing comfortably on room air, but O2 saturation 89-91% with conversation, placed on 2L nc in preop holding (has been on and off O2 this admission but does not use at home), No wheezes,     Other Findings        Anesthesia Plan  ASA Score- 3     Anesthesia Type- IV sedation with anesthesia with ASA Monitors  Additional Monitors:   Airway Plan:     Comment: I discussed the risks and benefits of IV sedation anesthesia including the possibility of the need to convert to general anesthesia and the potential risk of awareness  The patient was given the opportunity to ask questions, which were answered        Plan Factors-    Induction- intravenous  Postoperative Plan-     Informed Consent- Anesthetic plan and risks discussed with patient              Lab Results   Component Value Date    WBC 4 99 09/30/2018    HGB 10 8 (L) 09/30/2018    HCT 34 5 (L) 09/30/2018    MCV 92 09/30/2018     (L) 09/30/2018     Lab Results   Component Value Date    GLUCOSE 91 12/15/2015    CALCIUM 9 8 09/30/2018     09/30/2018    K 3 7 09/30/2018    CO2 30 09/30/2018     09/30/2018    BUN 18 09/30/2018    CREATININE 0 87 09/30/2018     Lab Results   Component Value Date    ALT 22 09/26/2018    AST 29 09/26/2018    ALKPHOS 81 09/26/2018    BILITOT 0 6 04/19/2017     Lab Results   Component Value Date    INR 1 00 07/06/2018    INR 1 04 02/09/2018    INR 0 90 11/03/2017    PROTIME 13 3 07/06/2018    PROTIME 13 6 02/09/2018    PROTIME 12 4 11/03/2017     No results found for: HGBA1C

## 2018-10-01 NOTE — SPEECH THERAPY NOTE
Speech Language/Pathology    Speech/Language Pathology Progress Note    Patient Name: Morro Turcios  OUIVB'J Date: 10/1/2018     Attempted to see pt for f/u c regular diet now that pt is s/p EGD  Pt refused lunch tray reporting she does not like the new food here but also reported difficulty c swallowing  Pt refused currently available snacks  Pt agreeable to try shortbread cookies but when arrived with them, pt back in bed sleeping  Will f/u c pt tomorrow for diet tolerance  Recommend downgrade to level 3 dental soft diet if pt cont to have c/o difficulty swallowing

## 2018-10-01 NOTE — CASE MANAGEMENT
Continued Stay Review    Thank you,  [address]  Utilization Review Department  Phone: [phone]; Fax [phone]  ATTENTION: Please call with any questions or concerns to [phone]  and carefully follow the prompts so that you are directed to the right person  Send all requests for admission clinical reviews, approved or denied determinations and any other requests to fax [phone]  All voicemails are confidential      Date: 10/1/2018    Vital Signs: /90   Pulse 94   Temp 98 4 °F (36 9 °C) (Tympanic)   Resp 20   Ht 5' 3" (1 6 m)   Wt 91 2 kg (201 lb)   SpO2 95%   BMI 35 61 kg/m²     Medications:   Scheduled Meds:   Current Facility-Administered Medications:  acetaminophen 650 mg Oral Q6H PRN    al mag oxide-diphenhydramine-lidocaine viscous 10 mL Swish & Swallow 4x Daily (with meals and at bedtime)    allopurinol 200 mg Oral Daily    apixaban 5 mg Oral BID    ascorbic acid 500 mg Oral Daily With Dinner    [START ON 10/2/2018] fluconazole 100 mg Oral Daily    HYDROcodone-acetaminophen 1 tablet Oral Q6H PRN    loratadine 10 mg Oral Early Morning    LORazepam 0 5 mg Intravenous Q6H PRN    melatonin 6 mg Oral HS PRN    metoprolol tartrate 25 mg Oral Q12H CANDACE    nystatin 500,000 Units Swish & Swallow 4x Daily    ondansetron 4 mg Intravenous Q6H PRN    ondansetron 4 mg Oral Q6H PRN    pantoprazole 40 mg Oral BID AC    polyethylene glycol 17 g Oral Daily    potassium-sodium phosphateS 1 tablet Oral BID With Meals    predniSONE 15 mg Oral Daily    senna 1 tablet Oral BID    simethicone 80 mg Oral Q6H PRN 10/1 x 1    sucralfate 1,000 mg Oral Q6H [address]    traZODone 25 mg Oral HS      Nursing orders - VS q 4 - Daily weights - Ambulate tid - SCD's to le's - Diet NPO on 10/1 -  advance to regular House     Abnormal Labs/Diagnostic Results: 9/30 - Phos 2 0 - H/H 10 8/34 5 - Plt 124    EGD 10/1 - FINDINGS:     #1   Esophagus and GEJ- Esophagitis from the oropharynx down to distal end at 29cm, with white thick mucosa, brush biopsy obtained  GE junction measured at 37cm     #2  Stomach- diminutive to small polyps in gastric body suggestive of fundic gland polyps-which were not biopsied     #3  Duodenum- multiple diminutive small polyp seen duodenal bulb suggestive of Brunner's gland hyperplasia and normal 2nd portion of duodenum         IMPRESSIONS:    1  Pharyngitis and esophagitis suggestive of Candida extending from oropharynx to distal end of esophagus-brush biopsy obtained  2  Diminutive small polyps and gastric body suggestive of fundic gland polyps  3  Brunner's gland hyperplasia and duodenal bulb, normal 2nd portion of duodenum     RECOMMENDATIONS:   1  Empirically start fluconazole to treat for suspected candida esophagitis  2  Follow-up on biopsy results  3  May resume regular diet as tolerated  4  Return to floor     Age/Sex: 78 y o  female     Assessment/Plan:    Attending Note 10/1   1  Odynophagia, dysphagia, status post EGD today, with positive finding for pharyngitis and esophagitis suggestive Candida, patient with underlying  radiation esophagitis/extrinsic compression on the esophagus by lymphadenopathy, start fluconazole, follow on biopsy continue PPI, nystatin,  Carafate and  Magic mouthwash  2  Sinus tachycardia secondary to dehydration and decreasing oral intake, improving,  positive fluid balance since admission, monitor off IV fluid, continue Lopressor  3  Dilated cardiomyopathy with chronic systolic CHF, likely secondary to chemotherapy,  EF 25%, on beta-blocker,  cardiology is following  4  Recurrent large B-cell lymphoma, with cervical and mediastinal adenopathy, completed radiation treatment, Lenalidomide on hold, patient on prednisone, start tapering   5  History of L SCV thrombosis, continue Eliquis  6  Hypercalcemia, improving,  likely secondary dehydration and decreasing oral intake, calcium supplement on hold  7  Hypophosphatemia, on replacement  8  Thrombocytopenia, monitor    Cardiology  note 10/1 - # Dehydration, clinically she is dry and responding to IV fluids,   HR still elevated but BP improved  HR better now with low dose metoprolol tartrate  Ins and outs: +60, + 1841 since admit  Weight: 201 lbs  Dehydration due to odynophagia and dysphagia     # Chronic Systolic CHF, Stage B?, NYHA 2- Pt with reduced EF though no clinical manifestation of heart failure  Etiology: anthracycline induced myocardial damage- total amount of adriamycin is not specified     Neurohormonal modulation:   Beta blocker: metoprolol tartrate 25 mg BID  No plan for ACE out of concern for ongoing hypotensive and oral intake concerns       Discharge Plan:  TBD

## 2018-10-01 NOTE — RESTORATIVE TECHNICIAN NOTE
Restorative Specialist Mobility Note       Activity: Ambulate in room, Bathroom privileges, Chair, Stand at bedside, Dangle (Educated/encouraged pt to ambulate with assistance 3-4 x's/day   Pt callbell, phone/tray within reach )     Assistive Device: Avni MEEKS, Restorative Technician, [employer]

## 2018-10-01 NOTE — PROGRESS NOTES
Heart Failure Service Progress Note - Lisbet Olmos 78 y o  female MRN: [id number]    Unit/Bed#: LIVE POOL Encounter: [id number]      Assessment:    Principal Problem:    Sinus tachycardia  Active Problems:    Arm DVT (deep venous thromboembolism), acute, left (HCC)    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 [facility])    Constipation    Dysphagia    Chronic systolic congestive heart failure (HCC)    Hypoxia    Esophageal dysphagia    Sent in by Dr Tristen Gibbs for hypotension and tachycardia  Her SBP was in the 70's mmHg with HR to 130's  Pt has received treatment for non hodgkins lymphoma with IV adriamycin from September to Dec  Treatment stopped when EF dropped from normal to 40% in Dec 2017  Then started on beta blocker and ACE  TTE in July showed EF down to 25%     # Dehydration, clinically she is dry and responding to IV fluids,   HR still elevated but BP improved  HR better now with low dose metoprolol tartrate  Ins and outs: +60, + 1841 since admit  Weight: 201 lbs  Dehydration due to odynophagia and dysphagia     # Chronic Systolic CHF, Stage B?, NYHA 2- Pt with reduced EF though no clinical manifestation of heart failure  Etiology: anthracycline induced myocardial damage- total amount of adriamycin is not specified     Neurohormonal modulation:   Beta blocker: metoprolol tartrate 25 mg BID  No plan for ACE out of concern for ongoing hypotensive and oral intake concerns        Echo 7/31/18  LVEF: 35%  LVIDd: 4 7 cm  RV: normal  MR:  PASP:     Nuclear stress test 8/8/17: There was a moderate to large, moderately severe, fixed myocardial perfusion defect of the entire anterior wall likely due to attenuation from breast tissue  However, cannot exclude a small region of myocardial ischemia    This abnormal nuclear correlated with a normal echo on the same day with normal function in this territory    Interim:  Ins and outs: +1236  CXR- NAD      # Recurrent large B cell lymphoma  Revlimid and radiation therapy  She has received Adriamycin- 3 cycles of CHOP     Plan:  Started metoprolol tartrate 25 mg BID with apple sauce   Future considerations:  Already not going to receive any further adriamycin therapy  CMR may be reasonable to look for any delayed ALONSO in the anterior apical segments       Central Line (day, reason): Mckeon catheter (day, reason):    Vitals: Blood pressure 159/90, pulse 94, temperature 98 4 °F (36 9 °C), temperature source Tympanic, resp  rate 20, height 5' 3" (1 6 m), weight 91 2 kg (201 lb), SpO2 95 %, not currently breastfeeding , Body mass index is 35 61 kg/m² , I/O last 3 completed shifts: In: 1160 [P O :1160]  Out: 1700 [Urine:1700]  I/O this shift:  In: -   Out: 300 [Urine:300]  Wt Readings from Last 3 Encounters:   10/01/18 91 2 kg (201 lb)   09/25/18 91 2 kg (201 lb)   09/19/18 92 9 kg (204 lb 12 8 oz)       Intake/Output Summary (Last 24 hours) at 10/01/18 1034  Last data filed at 10/01/18 0918   Gross per 24 hour   Intake              600 ml   Output             1100 ml   Net             -500 ml     I/O last 3 completed shifts: In: 1160 [P O :1160]  Out: 65 [Urine:1700]    No significant arrhythmias seen on telemetry review         Physical Exam:  Vitals:    10/01/18 0942 10/01/18 0944 10/01/18 0954 10/01/18 1001   BP: (!) 79/42 142/69 (!) 101/49 159/90   Pulse:  85 89 94   Resp:  20 20 20   Temp:       TempSrc:       SpO2:  97% 95% 95%   Weight:       Height:           GEN: Birgit Nagel appears well, alert and oriented x 3, pleasant and cooperative   HEENT: pupils equal, round, and reactive to light; extraocular muscles intact  NECK: supple, no carotid bruits   HEART: regular rhythm and tachycardic, normal S1 and S2, no murmurs, clicks, gallops or rubs, JVP is down   LUNGS: clear to auscultation bilaterally; no wheezes, rales, or rhonchi   ABDOMEN: normal bowel sounds, soft, no tenderness, no distention  EXTREMITIES: peripheral pulses normal; no clubbing, cyanosis, or edema  NEURO: no focal findings   SKIN: normal without suspicious lesions on exposed skin      Current Facility-Administered Medications:     acetaminophen (TYLENOL) tablet 650 mg, 650 mg, Oral, Q6H PRN, [place], DO, 650 mg at 09/29/18 1156    al mag oxide-diphenhydramine-lidocaine viscous (MAGIC MOUTHWASH) suspension 10 mL, 10 mL, Swish & Swallow, 4x Daily (with meals and at bedtime), Pieter Alejandro MD, 10 mL at 09/30/18 2202    allopurinol (ZYLOPRIM) tablet 200 mg, 200 mg, Oral, Daily, Hetul Mcclellan, DO, 200 mg at 09/30/18 0854    apixaban (ELIQUIS) tablet 5 mg, 5 mg, Oral, BID, Hetul Mcclellan, DO, 5 mg at 09/30/18 1726    ascorbic acid (VITAMIN C) tablet 500 mg, 500 mg, Oral, Daily With Dinner, Hetul Mcclellan, DO, 500 mg at 09/29/18 1731    HYDROcodone-acetaminophen (NORCO) 5-325 mg per tablet 1 tablet, 1 tablet, Oral, Q6H PRN, [place], DO    loratadine (CLARITIN) tablet 10 mg, 10 mg, Oral, Early Morning, Hetul Mcclellan, DO, 10 mg at 09/30/18 0559    LORazepam (ATIVAN) 2 mg/mL injection 0 5 mg, 0 5 mg, Intravenous, Q6H PRN, Sheree Lang CRNP, 0 5 mg at 09/26/18 1415    melatonin tablet 6 mg, 6 mg, Oral, HS PRN, Hetul Mcclellan, DO    metoprolol tartrate (LOPRESSOR) tablet 25 mg, 25 mg, Oral, Q12H [address], Sujatha ANCA Mullins, CRNP, 25 mg at 09/30/18 2204    nystatin (MYCOSTATIN) oral suspension 500,000 Units, 500,000 Units, Swish & Swallow, 4x Daily, Hetul Mcclellan, DO, 500,000 Units at 09/30/18 2202    ondansetron (ZOFRAN) injection 4 mg, 4 mg, Intravenous, Q6H PRN, Hetul Mcclellan, DO, 4 mg at 09/28/18 2337    ondansetron (ZOFRAN-ODT) dispersible tablet 4 mg, 4 mg, Oral, Q6H PRN, Hetul Mcclellan, DO    pantoprazole (PROTONIX) EC tablet 40 mg, 40 mg, Oral, BID AC, Hetul Mcclellan, DO, 40 mg at 10/01/18 0643    polyethylene glycol (MIRALAX) packet 17 g, 17 g, Oral, Daily, Hetul Mcclellan, DO, 17 g at 09/30/18 0854    potassium-sodium phosphateS (K-PHOS,PHOSPHA 250) -250 mg tablet 1 tablet, 1 tablet, Oral, BID With Meals, [place] Silk, DO, 1 tablet at 09/30/18 1725    predniSONE tablet 15 mg, 15 mg, Oral, Daily, Wong Silk, DO    senna (SENOKOT) tablet 8 6 mg, 1 tablet, Oral, BID, Hetul Mcclellan, DO, 8 6 mg at 09/30/18 1725    simethicone (MYLICON) chewable tablet 80 mg, 80 mg, Oral, Q6H PRN, Hetul Mcclellan, DO    sucralfate (CARAFATE) oral suspension 1,000 mg, 1,000 mg, Oral, Q6H [address], Sadie Arellano, CRNP, 1,000 mg at 10/01/18 5718    traZODone (DESYREL) tablet 25 mg, 25 mg, Oral, HS, Hetul Mcclellan, DO, 25 mg at 09/30/18 2203      Labs & Results:          Results from last 7 days  Lab Units 09/30/18  0612 09/26/18  0555 09/25/18  0959   WBC Thousand/uL 4 99 6 89 10 08   HEMOGLOBIN g/dL 10 8* 11 2* 12 2   HEMATOCRIT % 34 5* 36 5 38 6   PLATELETS Thousands/uL 124* 160 160           Results from last 7 days  Lab Units 09/30/18  0612 09/28/18  0508 09/26/18  0555 09/25/18  0959 09/24/18  1219   SODIUM mmol/L 140 141 138 136 139   POTASSIUM mmol/L 3 7 3 6 3 8 4 8 3 9   CHLORIDE mmol/L 104 106 102 100 101   CO2 mmol/L 30 28 29 27 29   BUN mg/dL 18 17 19 25 18   CREATININE mg/dL 0 87 0 83 0 97 1 22 0 98   CALCIUM mg/dL 9 8 9 5 10 3* 10 3* 10 2*   ALK PHOS U/L  --   --  81 92 95   ALT U/L  --   --  22 27 26   AST U/L  --   --  29 50* 35         Counseling / Coordination of Care  Total floor / unit time spent today 25 minutes  Greater than 50% of total time was spent with the patient and / or family counseling and / or coordination of care  A description of the counseling / coordination of care: 15      Thank you for the opportunity to participate in the care of this patient  Zak GAO    Director Heart Failure/ Medical Director [address] [facility]

## 2018-10-02 LAB — PHOSPHATE SERPL-MCNC: 2.6 MG/DL (ref 2.3–4.1)

## 2018-10-02 PROCEDURE — 99232 SBSQ HOSP IP/OBS MODERATE 35: CPT | Performed by: INTERNAL MEDICINE

## 2018-10-02 PROCEDURE — 92526 ORAL FUNCTION THERAPY: CPT

## 2018-10-02 PROCEDURE — 84100 ASSAY OF PHOSPHORUS: CPT | Performed by: INTERNAL MEDICINE

## 2018-10-02 PROCEDURE — G8996 SWALLOW CURRENT STATUS: HCPCS

## 2018-10-02 PROCEDURE — 99231 SBSQ HOSP IP/OBS SF/LOW 25: CPT | Performed by: INTERNAL MEDICINE

## 2018-10-02 PROCEDURE — G8998 SWALLOW D/C STATUS: HCPCS

## 2018-10-02 RX ORDER — LACTULOSE 20 G/30ML
10 SOLUTION ORAL 3 TIMES DAILY
Status: DISCONTINUED | OUTPATIENT
Start: 2018-10-02 | End: 2018-10-03 | Stop reason: HOSPADM

## 2018-10-02 RX ORDER — BISACODYL 10 MG
10 SUPPOSITORY, RECTAL RECTAL DAILY
Status: DISCONTINUED | OUTPATIENT
Start: 2018-10-02 | End: 2018-10-03 | Stop reason: HOSPADM

## 2018-10-02 RX ADMIN — NYSTATIN 500000 UNITS: 100000 SUSPENSION ORAL at 21:47

## 2018-10-02 RX ADMIN — METOPROLOL TARTRATE 25 MG: 25 TABLET ORAL at 10:41

## 2018-10-02 RX ADMIN — NYSTATIN 500000 UNITS: 100000 SUSPENSION ORAL at 14:39

## 2018-10-02 RX ADMIN — SUCRALFATE 1000 MG: 1 SUSPENSION ORAL at 14:39

## 2018-10-02 RX ADMIN — APIXABAN 5 MG: 5 TABLET, FILM COATED ORAL at 10:39

## 2018-10-02 RX ADMIN — POLYETHYLENE GLYCOL 3350 17 G: 17 POWDER, FOR SOLUTION ORAL at 10:42

## 2018-10-02 RX ADMIN — PREDNISONE 15 MG: 5 TABLET ORAL at 10:39

## 2018-10-02 RX ADMIN — FLUCONAZOLE 200 MG: 200 TABLET ORAL at 18:22

## 2018-10-02 RX ADMIN — LORATADINE 10 MG: 10 TABLET ORAL at 06:20

## 2018-10-02 RX ADMIN — PANTOPRAZOLE SODIUM 40 MG: 40 TABLET, DELAYED RELEASE ORAL at 06:20

## 2018-10-02 RX ADMIN — SUCRALFATE 1000 MG: 1 SUSPENSION ORAL at 06:20

## 2018-10-02 RX ADMIN — TRAZODONE HYDROCHLORIDE 25 MG: 50 TABLET ORAL at 21:48

## 2018-10-02 RX ADMIN — ALLOPURINOL 200 MG: 100 TABLET ORAL at 10:39

## 2018-10-02 RX ADMIN — Medication 10 ML: at 08:00

## 2018-10-02 RX ADMIN — NYSTATIN 500000 UNITS: 100000 SUSPENSION ORAL at 18:22

## 2018-10-02 RX ADMIN — Medication 10 ML: at 12:03

## 2018-10-02 RX ADMIN — NYSTATIN 500000 UNITS: 100000 SUSPENSION ORAL at 10:42

## 2018-10-02 RX ADMIN — Medication 10 ML: at 21:49

## 2018-10-02 RX ADMIN — LACTULOSE 10 G: 20 SOLUTION ORAL at 18:22

## 2018-10-02 RX ADMIN — Medication 10 ML: at 18:25

## 2018-10-02 RX ADMIN — PANTOPRAZOLE SODIUM 40 MG: 40 TABLET, DELAYED RELEASE ORAL at 18:22

## 2018-10-02 RX ADMIN — SUCRALFATE 1000 MG: 1 SUSPENSION ORAL at 18:22

## 2018-10-02 RX ADMIN — DIBASIC SODIUM PHOSPHATE, MONOBASIC POTASSIUM PHOSPHATE AND MONOBASIC SODIUM PHOSPHATE 1 TABLET: 852; 155; 130 TABLET ORAL at 10:40

## 2018-10-02 RX ADMIN — SENNOSIDES 8.6 MG: 8.6 TABLET, FILM COATED ORAL at 18:22

## 2018-10-02 RX ADMIN — BISACODYL 10 MG: 10 SUPPOSITORY RECTAL at 14:39

## 2018-10-02 RX ADMIN — APIXABAN 5 MG: 5 TABLET, FILM COATED ORAL at 18:22

## 2018-10-02 RX ADMIN — LACTULOSE 10 G: 20 SOLUTION ORAL at 21:47

## 2018-10-02 RX ADMIN — METOPROLOL TARTRATE 25 MG: 25 TABLET ORAL at 21:47

## 2018-10-02 RX ADMIN — SENNOSIDES 8.6 MG: 8.6 TABLET, FILM COATED ORAL at 10:40

## 2018-10-02 NOTE — ASSESSMENT & PLAN NOTE
Continue with fluconazole, to complete 21 days, today 2/21  Advanced diet as tolerated, to try regular food today, if tolerated discharge home tomorrow

## 2018-10-02 NOTE — ASSESSMENT & PLAN NOTE
· Patient with relapsed diffuse large B-cell lymphoma, had last palliative radiation Friday September 28  · Will continue follow-up as outpatient with Oncology

## 2018-10-02 NOTE — PROGRESS NOTES
Gastroenterology Progress Note   - Mesha Alvarez 78 y o  female MRN: [id number]    Unit/Bed#: [facility] 622-01 Encounter: [id number]      Assessment and Plan:   Dysphagia  Patient presented with dysphagia and odynophagia in the setting of recent radiotherapy  Patient was treated with Magic mouthwash and mildly improved her symptoms  Had EGD yesterday finding esophagitis with possible Candida, cytology still pending  Patient was started empirically on fluconazole  Currently her symptoms are improved, currently on normal diet    Constipation  Patient complains about chronic constipation  This has been a problem ever since she started radiotherapy  States she drinks good amount of water  While being at the hospital she has tried senna and MiraLax without improvement  Will add lactulose and Dulcolax suppository  Patient can be started on lubiprostone on discharge      Subjective:  Patient seen and examined at the bed, denies any events overnight, currently is tolerating PO route currently regular diet, persists with mild dysphagia, denies nausea or vomiting, is passing gases but no bowel movements, denies chest pain or shortness of breath, no abdominal pain, patient is ambulating     Objective:     Vitals: Blood pressure 112/64, pulse 102, temperature 98 78 °F (37 1 °C), resp  rate 20, height 5' 3" (1 6 m), weight 95 3 kg (210 lb 1 6 oz), SpO2 96 %, not currently breastfeeding  ,Body mass index is 37 22 kg/m²        Intake/Output Summary (Last 24 hours) at 10/02/18 1248  Last data filed at 10/02/18 1148   Gross per 24 hour   Intake              710 ml   Output              850 ml   Net             -140 ml       Physical Exam:     General Appearance: Alert, appears stated age and cooperative  Lungs: Clear to auscultation bilaterally, no rales or rhonchi, no labored breathing/accessory muscle use  Heart: Regular rate and rhythm, S1, S2 normal, no murmur, click, rub or gallop  Abdomen: Soft, non-tender, non-distended; bowel sounds normal; no masses or no organomegaly  Extremities: No cyanosis, edema    Invasive Devices     Peripheral Intravenous Line            Peripheral IV 10/01/18 Right Hand 1 day                Lab Results:    Results from last 7 days  Lab Units 09/30/18  0612   WBC Thousand/uL 4 99   HEMOGLOBIN g/dL 10 8*   HEMATOCRIT % 34 5*   PLATELETS Thousands/uL 124*   NEUTROS PCT % 85*   LYMPHS PCT % 2*   MONOS PCT % 11   EOS PCT % 1       Results from last 7 days  Lab Units 09/30/18  0612  09/26/18  0555   SODIUM mmol/L 140  < > 138   POTASSIUM mmol/L 3 7  < > 3 8   CHLORIDE mmol/L 104  < > 102   CO2 mmol/L 30  < > 29   BUN mg/dL 18  < > 19   CREATININE mg/dL 0 87  < > 0 97   CALCIUM mg/dL 9 8  < > 10 3*   ALK PHOS U/L  --   --  81   ALT U/L  --   --  22   AST U/L  --   --  29   < > = values in this interval not displayed  Imaging Studies: I have personally reviewed pertinent imaging studies  Xr Chest Portable    Result Date: 9/27/2018  Impression: No acute cardiopulmonary disease  Workstation performed: [id number]     Xr Chest 1 View    Result Date: 9/25/2018  Impression: Interval development of infiltrate at lateral left lung base Workstation performed: [id number]     Cta Ed Chest Pe Study    Addendum Date: 9/25/2018    Please note: There is atelectasis at the left lung base both in the lingula and in the costophrenic angle, and this atelectatic change probably accounts for the airspace opacity in that location on frontal chest x-ray  Result Date: 9/25/2018  Impression: No pulmonary embolus  Profound increase in bulky rodger adenopathy in bilateral axilla and in the chest walls  Increase in the size of nodes in the upper abdomen, incompletely evaluated, and at the esophageal hiatus  Slight interval decrease in the size of mediastinal nodes   Workstation performed: [id number]

## 2018-10-02 NOTE — PROGRESS NOTES
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 [facility])    Constipation    Dysphagia    Chronic systolic congestive heart failure (HCC)    Esophageal dysphagia    Candida esophagitis (HCC)    Sent in by Dr Lawyer Palencia for hypotension and tachycardia  Her SBP was in the 70's mmHg with HR to 130's  Pt has received treatment for non hodgkins lymphoma with IV adriamycin from September to Dec  Treatment stopped when EF dropped from normal to 40% in Dec 2017  Then started on beta blocker and ACE  TTE in July showed EF down to 25%    Interim:  Ins and outs: -680  CXR- NAD    # Dehydration, clinically she is dry and responding to IV fluids,   HR still elevated but BP improved  HR better now with low dose metoprolol tartrate  Ins and outs: +60, + 1841 since admit  Weight: 201 lbs  Dehydration due to odynophagia and dysphagia     # Chronic Systolic CHF, Stage B?, NYHA 2- Pt with reduced EF though no clinical manifestation of heart failure  Etiology: anthracycline induced myocardial damage- total amount of adriamycin is not specified     Neurohormonal modulation:   Beta blocker: metoprolol tartrate 25 mg BID  No plan for ACE out of concern for ongoing hypotensive and oral intake concerns        Echo 7/31/18  LVEF: 35%  LVIDd: 4 7 cm  RV: normal  MR:  PASP:     Nuclear stress test 8/8/17: There was a moderate to large, moderately severe, fixed myocardial perfusion defect of the entire anterior wall likely due to attenuation from breast tissue  However, cannot exclude a small region of myocardial ischemia    This abnormal nuclear correlated with a normal echo on the same day with normal function in this territory    # Recurrent large B cell lymphoma  Revlimid and radiation therapy  She has received Adriamycin- 3 cycles of CHOP     Plan:  Metoprolol tartrate 25 mg BID  No ACE out of ongoing concern regarding oral intake, future dehydration concerns and/ or hypotension  Will follow up in a couple of weeks     Central Line (day, reason): Mckeon catheter (day, reason):    Vitals: Blood pressure 112/64, pulse 102, temperature 98 78 °F (37 1 °C), resp  rate 20, height 5' 3" (1 6 m), weight 95 3 kg (210 lb 1 6 oz), SpO2 96 %, not currently breastfeeding , Body mass index is 37 22 kg/m² , I/O last 3 completed shifts: In: 710 [P O :710]  Out: 1750 [Urine:1750]  No intake/output data recorded  Wt Readings from Last 3 Encounters:   10/02/18 95 3 kg (210 lb 1 6 oz)   09/25/18 91 2 kg (201 lb)   09/19/18 92 9 kg (204 lb 12 8 oz)       Intake/Output Summary (Last 24 hours) at 10/02/18 1120  Last data filed at 10/02/18 9876   Gross per 24 hour   Intake              470 ml   Output              850 ml   Net             -380 ml     I/O last 3 completed shifts: In: 80 [P O :710]  Out: 1750 [Urine:1750]    No significant arrhythmias seen on telemetry review         Physical Exam:  Vitals:    10/01/18 2215 10/02/18 0600 10/02/18 0724 10/02/18 0725   BP: 116/63  112/64    Pulse: 94  100 102   Resp: 18  20    Temp: 99 1 °F (37 3 °C)  98 78 °F (37 1 °C)    TempSrc:       SpO2: 94%  94% 96%   Weight:  95 3 kg (210 lb 1 6 oz)     Height:           GEN: Randy Armenta appears well, alert and oriented x 3, pleasant and cooperative   HEENT: pupils equal, round, and reactive to light; extraocular muscles intact  NECK: supple, no carotid bruits   HEART: regular rhythm and tachycardic, normal S1 and S2, no murmurs, clicks, gallops or rubs, JVP is down   LUNGS: clear to auscultation bilaterally; no wheezes, rales, or rhonchi   ABDOMEN: normal bowel sounds, soft, no tenderness, no distention  EXTREMITIES: peripheral pulses normal; no clubbing, cyanosis, or edema  NEURO: no focal findings   SKIN: normal without suspicious lesions on exposed skin      Current Facility-Administered Medications:     acetaminophen (TYLENOL) tablet 650 mg, 650 mg, Oral, Q6H PRN, Marine Shukri, DO, 650 mg at 09/29/18 1156    al mag oxide-diphenhydramine-lidocaine viscous (MAGIC MOUTHWASH) suspension 10 mL, 10 mL, Swish & Swallow, 4x Daily (with meals and at bedtime), Funmi Johnson MD, 10 mL at 10/01/18 2111    allopurinol (ZYLOPRIM) tablet 200 mg, 200 mg, Oral, Daily, Hetul Mcclellan, DO, 200 mg at 10/02/18 1039    apixaban (ELIQUIS) tablet 5 mg, 5 mg, Oral, BID, Hetul Mcclellan, DO, 5 mg at 10/02/18 1039    ascorbic acid (VITAMIN C) tablet 500 mg, 500 mg, Oral, Daily With Dinner, Hetul Mcclellan, DO, 500 mg at 10/01/18 1621    fluconazole (DIFLUCAN) tablet 200 mg, 200 mg, Oral, Daily, Roman Casanova MD, 200 mg at 10/01/18 1730    HYDROcodone-acetaminophen (NORCO) 5-325 mg per tablet 1 tablet, 1 tablet, Oral, Q6H PRN, Marine Shukri, DO    loratadine (CLARITIN) tablet 10 mg, 10 mg, Oral, Early Morning, Hetul Mcclellan, DO, 10 mg at 10/02/18 0620    LORazepam (ATIVAN) 2 mg/mL injection 0 5 mg, 0 5 mg, Intravenous, Q6H PRN, ANNEMARIE Lewis, 0 5 mg at 09/26/18 1415    melatonin tablet 6 mg, 6 mg, Oral, HS PRN, Hetul Mcclellan, DO    metoprolol tartrate (LOPRESSOR) tablet 25 mg, 25 mg, Oral, Q12H [facility], ANNEMARIE Amezquita, 25 mg at 10/02/18 1041    nystatin (MYCOSTATIN) oral suspension 500,000 Units, 500,000 Units, Swish & Swallow, 4x Daily, Hetul Mcclellan, DO, 500,000 Units at 10/02/18 1042    ondansetron (ZOFRAN) injection 4 mg, 4 mg, Intravenous, Q6H PRN, Hetul Mcclellan, DO, 4 mg at 09/28/18 4207    ondansetron (ZOFRAN-ODT) dispersible tablet 4 mg, 4 mg, Oral, Q6H PRN, Hetul Mcclellan, DO    pantoprazole (PROTONIX) EC tablet 40 mg, 40 mg, Oral, BID AC, Hetul Mcclellan, DO, 40 mg at 10/02/18 0620    polyethylene glycol (MIRALAX) packet 17 g, 17 g, Oral, Daily, Hetul Mcclellan, DO, 17 g at 10/02/18 1042    potassium-sodium phosphateS (K-PHOS,PHOSPHA 250) -250 mg tablet 1 tablet, 1 tablet, Oral, BID With Meals, Mickey Shekhar, DO, 1 tablet at 10/02/18 1040    predniSONE tablet 15 mg, 15 mg, Oral, Daily, Mickey Shekhar, DO, 15 mg at 10/02/18 1039    senna (SENOKOT) tablet 8 6 mg, 1 tablet, Oral, BID, Hetul Mcclellan, DO, 8 6 mg at 10/02/18 1040    simethicone (MYLICON) chewable tablet 80 mg, 80 mg, Oral, Q6H PRN, Hetul Mcclellan, DO, 80 mg at 10/01/18 1138    sucralfate (CARAFATE) oral suspension 1,000 mg, 1,000 mg, Oral, Q6H [facility], Sadie Arellano MEAGANNP, 1,000 mg at 10/02/18 0620    traZODone (DESYREL) tablet 25 mg, 25 mg, Oral, HS, Hetul Mcclellan, DO, 25 mg at 10/01/18 2111      Labs & Results:          Results from last 7 days  Lab Units 09/30/18  0612 09/26/18  0555   WBC Thousand/uL 4 99 6 89   HEMOGLOBIN g/dL 10 8* 11 2*   HEMATOCRIT % 34 5* 36 5   PLATELETS Thousands/uL 124* 160           Results from last 7 days  Lab Units 09/30/18  0612 09/28/18  0508 09/26/18  0555   SODIUM mmol/L 140 141 138   POTASSIUM mmol/L 3 7 3 6 3 8   CHLORIDE mmol/L 104 106 102   CO2 mmol/L 30 28 29   BUN mg/dL 18 17 19   CREATININE mg/dL 0 87 0 83 0 97   CALCIUM mg/dL 9 8 9 5 10 3*   ALK PHOS U/L  --   --  81   ALT U/L  --   --  22   AST U/L  --   --  29         Counseling / Coordination of Care  Total floor / unit time spent today 25 minutes  Greater than 50% of total time was spent with the patient and / or family counseling and / or coordination of care  A description of the counseling / coordination of care: 15      Thank you for the opportunity to participate in the care of this patient  Michael GAO    Director Heart Failure/ Medical Director [id number]

## 2018-10-02 NOTE — PROGRESS NOTES
Progress Note - [facility] 1940, 78 y o  female MRN: [id number]    Unit/Bed#: [facility] 622-01 Encounter: [id number]    Primary Care Provider: Juan Antonio Hernandez PA-C   Date and time admitted to hospital: 9/25/2018  9:17 AM        Esophageal dysphagia   Assessment & Plan    Secondary to esophagitis, please refer to above     Chronic systolic congestive heart failure ([address] )   Assessment & Plan    · Without acute exacerbation  · Patient with EF of 35% as per echocardiogram in July of 2018  · Suspect secondary to chemotherapy medication induced  · Outpatient follow-up with Cardiology  · Monitor daily weights monitor intake and output, weight is stable     Sinus tachycardia   Assessment & Plan    · Continue with beta-blocker  · Heart rate within acceptable range  · Cardiology following         Constipation   Assessment & Plan    · BM today x 2  · C/w bowel regimen     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 [facility])   Assessment & Plan    · Patient with relapsed diffuse large B-cell lymphoma, had last palliative radiation Friday September 28  · Will continue follow-up as outpatient with Oncology     Arm DVT (deep venous thromboembolism), acute, left (HCC)   Assessment & Plan    · History of left jugular vein thrombosis continue Eliquis     * Candida esophagitis (HCC)   Assessment & Plan    Continue with fluconazole, to complete 21 days, today 2/21  Advanced diet as tolerated, to try regular food today, if tolerated discharge home tomorrow       VTE Pharmacologic Prophylaxis: yes  Pharmacologic: Apixaban (Eliquis)  Mechanical VTE Prophylaxis in Place: Yes     Patient Centered Rounds: I have performed bedside rounds with nursing staff today      Discussions with Specialists or Other Care Team Provider:  none     Education and Discussions with Family / Patient:   Patient, son,      Time Spent for Care: 30 minutes    More than 50% of total time spent on counseling and coordination of care as described above      Current Length of Stay: 7 day(s)     Current Patient Status: Inpatient   Certification Statement: The patient will continue to require additional inpatient hospital stay due to Management of dysphagia     Discharge Plan / Estimated Discharge Date:  Likely home tomorrow if able to tolerate p o      Code Status: Level 3 - DNAR and DNI    Subjective:   Feeling better today  Still some odynophagia reported  Largely improved although  Objective:     Vitals:   Temp (24hrs), Av 9 °F (37 2 °C), Min:98 78 °F (37 1 °C), Max:99 1 °F (37 3 °C)    HR:  [] 102  Resp:  [18-20] 20  BP: (112-116)/(63-64) 112/64  SpO2:  [94 %-96 %] 96 %  Body mass index is 37 22 kg/m²  Input and Output Summary (last 24 hours): Intake/Output Summary (Last 24 hours) at 10/02/18 1646  Last data filed at 10/02/18 1439   Gross per 24 hour   Intake              710 ml   Output             1100 ml   Net             -390 ml       Physical Exam:     Physical Exam   Constitutional: She is oriented to person, place, and time  She appears well-developed  Cardiovascular: Normal rate, regular rhythm and normal heart sounds  Exam reveals no friction rub  No murmur heard  Pulmonary/Chest: Effort normal  No respiratory distress  She has no wheezes  She has no rales  Abdominal: Soft  She exhibits no distension  There is no tenderness  There is no rebound  Musculoskeletal: She exhibits no edema  Neurological: She is alert and oriented to person, place, and time  She exhibits normal muscle tone  Skin: Skin is warm  Psychiatric: She has a normal mood and affect         Additional Data:     Labs:      Results from last 7 days  Lab Units 18  0612   WBC Thousand/uL 4 99   HEMOGLOBIN g/dL 10 8*   HEMATOCRIT % 34 5*   PLATELETS Thousands/uL 124*   NEUTROS PCT % 85*   LYMPHS PCT % 2*   MONOS PCT % 11   EOS PCT % 1       Results from last 7 days  Lab Units 18  0612  18  0555   SODIUM mmol/L 140  < > 138   POTASSIUM mmol/L 3 7  < > 3 8   CHLORIDE mmol/L 104  < > 102   CO2 mmol/L 30  < > 29   BUN mg/dL 18  < > 19   CREATININE mg/dL 0 87  < > 0 97   CALCIUM mg/dL 9 8  < > 10 3*   ALK PHOS U/L  --   --  81   ALT U/L  --   --  22   AST U/L  --   --  29   < > = values in this interval not displayed  * I Have Reviewed All Lab Data Listed Above  * Additional Pertinent Lab Tests Reviewed:  All Labs Within Last 24 Hours Reviewed    Imaging:    Imaging Reports Reviewed Today Include:   None  Imaging Personally Reviewed by Myself Includes:  None    Recent Cultures (last 7 days):           Last 24 Hours Medication List:     Current Facility-Administered Medications:  acetaminophen 650 mg Oral Q6H PRN Dorena Distance, DO   al mag oxide-diphenhydramine-lidocaine viscous 10 mL Swish & Swallow 4x Daily (with meals and at bedtime) Evangelista Russell MD   allopurinol 200 mg Oral Daily Hetul Mcclellan, DO   apixaban 5 mg Oral BID Hetul Mcclellan, DO   ascorbic acid 500 mg Oral Daily With [employer], DO   bisacodyl 10 mg Rectal Daily Thomas Mandujano MD   fluconazole 200 mg Oral Daily Leni Lay MD   HYDROcodone-acetaminophen 1 tablet Oral Q6H PRN Dorena Distance, DO   lactulose 10 g Oral TID Thomas Mandujano MD   loratadine 10 mg Oral Early Morning Hetul Mcclellan, DO   LORazepam 0 5 mg Intravenous Q6H PRN ANNEMARIE Valiente   melatonin 6 mg Oral HS PRN Hetul Mcclellan, DO   metoprolol tartrate 25 mg Oral Q12H [address] ANNEMARIE Amezquita   nystatin 500,000 Units Swish & Swallow 4x Daily Hetul Mcclellan, DO   ondansetron 4 mg Intravenous Q6H PRN Hetul Mcclellan, DO   ondansetron 4 mg Oral Q6H PRN Hetul Mcclellan, DO   pantoprazole 40 mg Oral BID AC Hetul Mcclellan, DO   polyethylene glycol 17 g Oral Daily Hetul Mcclellan, DO   predniSONE 15 mg Oral Daily Dorena Distance, DO   senna 1 tablet Oral BID Hetul Mcclellan, DO   simethicone 80 mg Oral Q6H PRN Hetul Mcclellan, DO   sucralfate 1,000 mg Oral Q6H [address] ANNEMARIE Cooper   traZODone 25 mg Oral HS Reuben Christine DO        Today, Patient Was Seen By: Rommel Montoya MD    ** Please Note: Dragon 360 Dictation voice to text software may have been used in the creation of this document   **

## 2018-10-02 NOTE — SPEECH THERAPY NOTE
Speech Language/Pathology    Speech/Language Pathology Progress Note    Patient Name: Drea Conroy  SNTCK'C Date: 10/2/2018    Subjective:  "I can eat it  I just have to take small bites (and sips) or it burns "  and son present  Objective:  Pt seen for f/u dysphagia tx at lunch  Current diet:  Regular with thin liquids    Pt observed eating grilled cheese, fresh fruit, and drinking milk  Pt cut sandwich in fruit into very small pieces and chewed well  Swallow appeared prompt with adequate laryngeal excursion  No overt s/s aspiration observed  Pt c o pain/burning for large sip of milk only  Dietician in room during tx session  Reviewed suggestions for increased po intake  Son making smoothies of boost, cottage cheese, and banana to supplement  Assessment:  Oropharyngeal swallow appears wfls  No overt s/s aspiration observed  Odynophagia persists and likely related to esophagitis and probable kate  Son assisting pt in choosing appropriate foods to decrease pain and increase po intake  Plan/Recommendations:  Please cancel VBS order  ST to sign off at this time

## 2018-10-03 ENCOUNTER — TELEPHONE (OUTPATIENT)
Dept: FAMILY MEDICINE CLINIC | Facility: CLINIC | Age: 78
End: 2018-10-03

## 2018-10-03 ENCOUNTER — TRANSITIONAL CARE MANAGEMENT (OUTPATIENT)
Dept: FAMILY MEDICINE CLINIC | Facility: CLINIC | Age: 78
End: 2018-10-03

## 2018-10-03 VITALS
RESPIRATION RATE: 20 BRPM | HEIGHT: 63 IN | TEMPERATURE: 98.2 F | WEIGHT: 211.42 LBS | BODY MASS INDEX: 37.46 KG/M2 | OXYGEN SATURATION: 93 % | HEART RATE: 92 BPM | DIASTOLIC BLOOD PRESSURE: 56 MMHG | SYSTOLIC BLOOD PRESSURE: 107 MMHG

## 2018-10-03 PROBLEM — E44.0 MODERATE PROTEIN-CALORIE MALNUTRITION (HCC): Status: ACTIVE | Noted: 2018-10-03

## 2018-10-03 LAB
ANION GAP SERPL CALCULATED.3IONS-SCNC: 6 MMOL/L (ref 4–13)
BASOPHILS # BLD AUTO: 0.01 THOUSANDS/ΜL (ref 0–0.1)
BASOPHILS NFR BLD AUTO: 0 % (ref 0–1)
BUN SERPL-MCNC: 19 MG/DL (ref 5–25)
CALCIUM SERPL-MCNC: 9.8 MG/DL (ref 8.3–10.1)
CHLORIDE SERPL-SCNC: 104 MMOL/L (ref 100–108)
CO2 SERPL-SCNC: 30 MMOL/L (ref 21–32)
CREAT SERPL-MCNC: 0.76 MG/DL (ref 0.6–1.3)
EOSINOPHIL # BLD AUTO: 0.09 THOUSAND/ΜL (ref 0–0.61)
EOSINOPHIL NFR BLD AUTO: 2 % (ref 0–6)
ERYTHROCYTE [DISTWIDTH] IN BLOOD BY AUTOMATED COUNT: 15.7 % (ref 11.6–15.1)
GFR SERPL CREATININE-BSD FRML MDRD: 77 ML/MIN/1.73SQ M
GLUCOSE SERPL-MCNC: 82 MG/DL (ref 65–140)
HCT VFR BLD AUTO: 30.2 % (ref 34.8–46.1)
HGB BLD-MCNC: 9.4 G/DL (ref 11.5–15.4)
IMM GRANULOCYTES # BLD AUTO: 0.03 THOUSAND/UL (ref 0–0.2)
IMM GRANULOCYTES NFR BLD AUTO: 1 % (ref 0–2)
LYMPHOCYTES # BLD AUTO: 0.25 THOUSANDS/ΜL (ref 0.6–4.47)
LYMPHOCYTES NFR BLD AUTO: 6 % (ref 14–44)
MAGNESIUM SERPL-MCNC: 1.8 MG/DL (ref 1.6–2.6)
MCH RBC QN AUTO: 28.3 PG (ref 26.8–34.3)
MCHC RBC AUTO-ENTMCNC: 31.1 G/DL (ref 31.4–37.4)
MCV RBC AUTO: 91 FL (ref 82–98)
MONOCYTES # BLD AUTO: 0.54 THOUSAND/ΜL (ref 0.17–1.22)
MONOCYTES NFR BLD AUTO: 12 % (ref 4–12)
NEUTROPHILS # BLD AUTO: 3.47 THOUSANDS/ΜL (ref 1.85–7.62)
NEUTS SEG NFR BLD AUTO: 79 % (ref 43–75)
NRBC BLD AUTO-RTO: 0 /100 WBCS
PHOSPHATE SERPL-MCNC: 2.4 MG/DL (ref 2.3–4.1)
PLATELET # BLD AUTO: 108 THOUSANDS/UL (ref 149–390)
PMV BLD AUTO: 10.6 FL (ref 8.9–12.7)
POTASSIUM SERPL-SCNC: 3.5 MMOL/L (ref 3.5–5.3)
RBC # BLD AUTO: 3.32 MILLION/UL (ref 3.81–5.12)
SODIUM SERPL-SCNC: 140 MMOL/L (ref 136–145)
WBC # BLD AUTO: 4.39 THOUSAND/UL (ref 4.31–10.16)

## 2018-10-03 PROCEDURE — 85025 COMPLETE CBC W/AUTO DIFF WBC: CPT | Performed by: INTERNAL MEDICINE

## 2018-10-03 PROCEDURE — 84100 ASSAY OF PHOSPHORUS: CPT | Performed by: INTERNAL MEDICINE

## 2018-10-03 PROCEDURE — 83735 ASSAY OF MAGNESIUM: CPT | Performed by: INTERNAL MEDICINE

## 2018-10-03 PROCEDURE — 80048 BASIC METABOLIC PNL TOTAL CA: CPT | Performed by: INTERNAL MEDICINE

## 2018-10-03 PROCEDURE — 99239 HOSP IP/OBS DSCHRG MGMT >30: CPT | Performed by: INTERNAL MEDICINE

## 2018-10-03 RX ORDER — ACETAMINOPHEN 325 MG/1
650 TABLET ORAL EVERY 6 HOURS PRN
Qty: 30 TABLET | Refills: 0 | Status: SHIPPED | OUTPATIENT
Start: 2018-10-03

## 2018-10-03 RX ORDER — HEPARIN SODIUM 1000 [USP'U]/ML
1000 INJECTION, SOLUTION INTRAVENOUS; SUBCUTANEOUS ONCE
Status: DISCONTINUED | OUTPATIENT
Start: 2018-10-03 | End: 2018-10-03 | Stop reason: CLARIF

## 2018-10-03 RX ORDER — FLUCONAZOLE 200 MG/1
200 TABLET ORAL DAILY
Qty: 18 TABLET | Refills: 0 | Status: SHIPPED | OUTPATIENT
Start: 2018-10-03 | End: 2018-10-13 | Stop reason: HOSPADM

## 2018-10-03 RX ORDER — LACTULOSE 20 G/30ML
10 SOLUTION ORAL 3 TIMES DAILY
Qty: 1 BOTTLE | Refills: 0 | Status: SHIPPED | OUTPATIENT
Start: 2018-10-03 | End: 2018-10-10

## 2018-10-03 RX ORDER — PREDNISONE 1 MG/1
15 TABLET ORAL DAILY
Qty: 20 TABLET | Refills: 0 | Status: SHIPPED | OUTPATIENT
Start: 2018-10-03 | End: 2018-10-04

## 2018-10-03 RX ADMIN — SENNOSIDES 8.6 MG: 8.6 TABLET, FILM COATED ORAL at 09:04

## 2018-10-03 RX ADMIN — SUCRALFATE 1000 MG: 1 SUSPENSION ORAL at 06:10

## 2018-10-03 RX ADMIN — PANTOPRAZOLE SODIUM 40 MG: 40 TABLET, DELAYED RELEASE ORAL at 06:10

## 2018-10-03 RX ADMIN — APIXABAN 5 MG: 5 TABLET, FILM COATED ORAL at 09:04

## 2018-10-03 RX ADMIN — METOPROLOL TARTRATE 25 MG: 25 TABLET ORAL at 09:05

## 2018-10-03 RX ADMIN — ALLOPURINOL 200 MG: 100 TABLET ORAL at 09:04

## 2018-10-03 RX ADMIN — PREDNISONE 15 MG: 5 TABLET ORAL at 09:04

## 2018-10-03 RX ADMIN — POLYETHYLENE GLYCOL 3350 17 G: 17 POWDER, FOR SOLUTION ORAL at 09:04

## 2018-10-03 RX ADMIN — LORATADINE 10 MG: 10 TABLET ORAL at 06:10

## 2018-10-03 RX ADMIN — Medication 300 UNITS: at 11:20

## 2018-10-03 RX ADMIN — Medication 10 ML: at 08:59

## 2018-10-03 RX ADMIN — NYSTATIN 500000 UNITS: 100000 SUSPENSION ORAL at 08:59

## 2018-10-03 NOTE — NURSING NOTE
Patient's port was access, flushed with 10cc of normal saline, 3cc of heparin and deaccessed  Good blood return was noted upon access  A power port 19x1 port was used

## 2018-10-03 NOTE — ASSESSMENT & PLAN NOTE
Malnutrition Findings:   Malnutrition type: Acute illness (in the context of radiation esophagitis; as evidenced by intake meeting less than 75% estimated needs for greater than 7 days and slightly depressed temples suggesting mild muscle loss; treated with diet as tolerated and oral nutrition supplements)  Degree of Malnutrition: Malnutrition of moderate degree    BMI Findings: Body mass index is 37 45 kg/m²   Monitor p o  intake closely, monitor weight, patient should follow up as outpatient with PCP with possible referral to nutritionist

## 2018-10-03 NOTE — DISCHARGE SUMMARY
Discharge- Teresa Trae 1940, 78 y o  female MRN: [id number]    Unit/Bed#: [facility] 622-01 Encounter: [id number]    Primary Care Provider: Rafaela Negron PA-C   Date and time admitted to hospital: 9/25/2018  9:17 AM        Moderate protein-calorie malnutrition ([address] )   Assessment & Plan    Malnutrition Findings:   Malnutrition type: Acute illness (in the context of radiation esophagitis; as evidenced by intake meeting less than 75% estimated needs for greater than 7 days and slightly depressed temples suggesting mild muscle loss; treated with diet as tolerated and oral nutrition supplements)  Degree of Malnutrition: Malnutrition of moderate degree    BMI Findings: Body mass index is 37 45 kg/m²  Monitor p o  intake closely, monitor weight, patient should follow up as outpatient with PCP with possible referral to nutritionist     Esophageal dysphagia   Assessment & Plan    Secondary to esophagitis, please refer to above     Chronic systolic congestive heart failure (HCC)   Assessment & Plan    · Without acute exacerbation  · Patient with EF of 35% as per echocardiogram in July of 2018  · Suspect secondary to chemotherapy medication induced  · Outpatient follow-up with Cardiology  · Stable on the day of discharge  · As per chart, patient developed transient episode of hypoxia with oxygen level around 89%?   Patient remained 90% above without oxygen supplement for 2 days prior to discharge, no indication for oxygen at this time, as patient is status post radiation reasonable saturation for her could be 88% above  · Follow-up as an outpatient with primary care physician     Sinus tachycardia   Assessment & Plan    · Patient presented with heart rate around 140, likely physiologically induced tachycardia secondary to recurrent lymphoma with severe lymphadenopathy  · She was evaluated by Cardiology, started on beta-blocker, heart rate remains between 90 and 100 which is acceptable as per Cardiology recommendation  · She will continue with beta-blocker upon discharge, she will follow up as an outpatient with Cardiology         Constipation   Assessment & Plan    · Resolved, continue with bowel regimen upon discharge including MiraLax and senna     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 [facility])   Assessment & Plan    · Patient with relapsed diffuse large B-cell lymphoma, had last palliative radiation Friday September 28  · Will continue follow-up as outpatient with Oncology  · She will continue steroid taper with oncologist as outpatient, currently chemotherapy is on hold, this will be resumed as per Oncology recommendation as outpatient  · Patient remains on low-dose steroids, this should be tapered as an outpatient with Oncology if indicated     Arm DVT (deep venous thromboembolism), acute, left (HCC)   Assessment & Plan    · History of left jugular vein thrombosis continue Eliquis     * Candida esophagitis [address] [address] )   Assessment & Plan    Patient presented to the hospital with complain of palpitation and dysphagia for solid food with poor p o  intake and failure to thrive  She was evaluated by GI, underwent endoscopy, she was started on treatment for fluconazole that she will complete for a total of 21 days, 18 left upon discharge pathology taken during EGD with severe inflammation, no clear mention to Joanna  Also she was found to have a small esophageal stricture  With adequate treatment patient improved, able to tolerate p o  at the time of discharge  She will follow up with GI as outpatient in a month  Acute communication was sent to primary care physician to ensure appropriate follow-up upon discharge, answer was received             Discharging Physician / Practitioner: Fabio Kessler MD  PCP: Praveen Whiting PA-C  Admission Date:   Admission Orders     Ordered        09/25/18 1205  Inpatient Admission (expected length of stay for this patient is greater than two midnights) Once             Discharge Date: 10/03/18    Resolved Problems  Date Reviewed: 10/3/2018          Resolved    Hypoxia 10/1/2018     Resolved by  Vicente Ruby DO          Consultations During Hospital Stay:  · GI, Cardiology    Procedures Performed:     · EGD    Significant Findings / Test Results:     · Esophagitis    Incidental Findings:   · Not applicable     Test Results Pending at Discharge (will require follow up): · Not applicable     Outpatient Tests Requested:  · CBC, BMP    Complications:  None    Reason for Admission:   Refer to H&P    Hospital Course:     Raquel Morelos is a 78 y o  female patient who originally presented to the hospital on 9/25/2018 due to dysphagia and palpitation    Please see above list of diagnoses and related plan for additional information  Condition at Discharge: good     Discharge Day Visit / Exam:     Subjective:  Patient is feeling good today, she wishes to go home  She has no concerns at time of discharge  She is agreeable with hold medication changes and suggested follow-up  Vitals: Blood Pressure: 107/56 (10/03/18 0726)  Pulse: 92 (10/03/18 0726)  Temperature: 98 2 °F (36 8 °C) (10/03/18 0726)  Temp Source: Oral (10/01/18 1158)  Respirations: 20 (10/03/18 0726)  Height: 5' 3" (160 cm) (10/01/18 0830)  Weight - Scale: 95 9 kg (211 lb 6 7 oz) (10/03/18 0540)  SpO2: 93 % (10/03/18 0726)  Exam:   Physical Exam   Constitutional: She is oriented to person, place, and time  She appears well-developed  Cardiovascular: Normal rate, regular rhythm and normal heart sounds  Exam reveals no friction rub  No murmur heard  Pulmonary/Chest: Effort normal  No respiratory distress  She has no wheezes  She has no rales  Abdominal: Soft  She exhibits no distension  There is no tenderness  There is no rebound  Musculoskeletal: She exhibits edema (Plus one edema left ankle, chronic and ongoing, no edema on the right)     Neurological: She is alert and oriented to person, place, and time  She exhibits normal muscle tone  Skin: Skin is warm  Psychiatric: She has a normal mood and affect  Discussion with Family:   Son over the phone, in understanding and agreement, no concerns about mother to be discharged    Discharge instructions/Information to patient and family:   See after visit summary for information provided to patient and family  Provisions for Follow-Up Care:  See after visit summary for information related to follow-up care and any pertinent home health orders  Disposition:     Home with VNA Services (Reminder: Complete face to face encounter)    For Discharges to [facility] SNF:   · Not Applicable to this Patient - Not Applicable to this Patient    Planned Readmission:   No     Discharge Statement:  I spent greater than 30 minutes discharging the patient  This time was spent on the day of discharge  I had direct contact with the patient on the day of discharge  Greater than 50% of the total time was spent examining patient, answering all patient questions, arranging and discussing plan of care with patient as well as directly providing post-discharge instructions  Additional time then spent on discharge activities  Discharge Medications:  See after visit summary for reconciled discharge medications provided to patient and family        ** Please Note: This note has been constructed using a voice recognition system **

## 2018-10-03 NOTE — ASSESSMENT & PLAN NOTE
Patient presented to the hospital with complain of palpitation and dysphagia for solid food with poor p o  intake and failure to thrive  She was evaluated by GI, underwent endoscopy, she was started on treatment for fluconazole that she will complete for a total of 21 days, 18 left upon discharge pathology taken during EGD with severe inflammation, no clear mention to Joanna  Also she was found to have a small esophageal stricture  With adequate treatment patient improved, able to tolerate p o  at the time of discharge  She will follow up with GI as outpatient in a month  Acute communication was sent to primary care physician to ensure appropriate follow-up upon discharge, answer was received

## 2018-10-03 NOTE — ASSESSMENT & PLAN NOTE
· Patient with relapsed diffuse large B-cell lymphoma, had last palliative radiation Friday September 28  · Will continue follow-up as outpatient with Oncology  · She will continue steroid taper with oncologist as outpatient, currently chemotherapy is on hold, this will be resumed as per Oncology recommendation as outpatient  · Patient remains on low-dose steroids, this should be tapered as an outpatient with Oncology if indicated

## 2018-10-03 NOTE — DISCHARGE INSTRUCTIONS
Dear Aubrey Scott being discharged from the hospital today  Please note that some of new medication might be new or might have been changed  It is important for you to follow up with Cardiology as well as Gastroenterology  I did send as secured email to your primary care provider so that she knows about what happened in the hospital, your follow-up, your treatment  If you have any symptoms please come back to the ER for further evaluation if her unable to get in touch with your primary care physician  We will remain available with any questions or concerns  My name is Dr Graeme Thompson you can reach me to the office at [phone] if you have any concerns  Atrial Tachycardia   WHAT YOU NEED TO KNOW:   Atrial tachycardia is a condition that causes your heart to beat more than 100 times each minute  Atrial tachycardia is also called supraventricular tachycardia  It can develop because of problems with your heart's electrical system  Any of the following may also put you at risk for atrial tachycardia:  · A heart condition, hypertension, or fatigue    · Anxiety, stress, or pain    · Large amounts of caffeine from coffee, tea, and energy drinks    · Heavy alcohol use or cigarette smoking    · Use of some medicines or street drugs  DISCHARGE INSTRUCTIONS:   Call 911 or have someone call if:  · You have any of the following signs of a heart attack:     ¨ Squeezing, pressure, or pain in your chest that lasts longer than a few minutes  Chest pain may come and go  ¨ Pain in your jaw, neck, one or both arms, upper and lower back, or stomach  ¨ Shortness of breath, or panting    ¨ Nausea or vomiting    ¨ Lightheadedness    · You cannot be woken  Contact your healthcare provider if:  · Your pulse is faster than your healthcare provider said it should be  · You have frequent periods of a fast heart rate  · You feel weak or dizzy      · You have questions or concerns about your condition or care   Medicines: You may  be given any of the following:  · Antiarrhythmia medicines  help keep your heartbeat in a regular rhythm  · Beta blockers  help slow your heartbeat and keep it in a regular rhythm  · Calcium channel blockers  help slow your heartbeat  · Blood thinners  help prevent blood clots  Clots can lead to stroke, heart attack, and death  Aspirin is a type of blood thinner  You may need to take an aspirin each day to help prevent blood clots  Do not take acetaminophen or ibuprofen instead  Do not take more or less aspirin than healthcare providers say to take  If you are on other blood thinner medicine, ask your healthcare provider before you take aspirin for any reason  · Take your medicine as directed  Contact your healthcare provider if you think your medicine is not helping or if you have side effects  Tell him or her if you are allergic to any medicine  Keep a list of the medicines, vitamins, and herbs you take  Include the amounts, and when and why you take them  Bring the list or the pill bottles to follow-up visits  Carry your medicine list with you in case of an emergency  Follow up with your healthcare provider or cardiologist as directed: You may need more tests  Write down your questions so you remember to ask them during your visits  Prevention and Management:   · Decrease the amount of caffeine you drink  Caffeine can increase your heart rate  · Limit or do not drink alcohol  Alcohol can increase your heart rate  Ask your healthcare provider if it is safe for you to drink alcohol  Also ask how much is safe for you to drink  · Do not smoke  Nicotine and other chemicals in cigarettes can cause damage to your heart  Ask your healthcare provider for information if you currently smoke and need help to quit  E-cigarettes or smokeless tobacco still contain nicotine  Talk to your healthcare provider before you use these products  · Do not use illegal drugs  Drugs such as meth and cocaine can increase your heart rate  Talk to your healthcare provider if you use illegal drugs and need help to quit  · Get more rest   Fatigue can cause your heart rate to increase  · Learn ways to cope with stress  Stress, fear, and anxiety can cause a fast heart rate  Your healthcare provider may recommend relaxation techniques and deep breathing exercises  Your healthcare provider may recommend you talk to someone about your stress or anxiety, such as a counselor or a trusted friend  Check your pulse as directed: Your healthcare provider will show you how to check your pulse, and how often to check it  Write down how fast your pulse is and if it feels regular or like it is skipping beats  Also write down the activity you were doing if your heart rate is above 100  Bring the information with you to your follow-up appointment  © 2017 [employer] Information is for End User's use only and may not be sold, redistributed or otherwise used for commercial purposes  All illustrations and images included in CareNotes® are the copyrighted property of [employer]  or [employer]far[employer]  The above information is an  only  It is not intended as medical advice for individual conditions or treatments  Talk to your doctor, nurse or pharmacist before following any medical regimen to see if it is safe and effective for you  Constipation   WHAT YOU NEED TO KNOW:   Constipation is when you have hard, dry bowel movements, or you go longer than usual between bowel movements  DISCHARGE INSTRUCTIONS:   Seek care immediately if:   · You have blood in your bowel movements  · You have a fever and abdominal pain with the constipation  Contact your healthcare provider if:   · Your constipation gets worse  · You start to vomit  · You have questions or concerns about your condition or care    Medicines:   · Medicine or a fiber supplement  may help make your bowel movement softer  A laxative may help relax and loosen your intestines to help you have a bowel movement  You may also be given medicine to increase fluid in your intestines  The fluid may help move bowel movements through your intestines  · Take your medicine as directed  Contact your healthcare provider if you think your medicine is not helping or if you have side effects  Tell him of her if you are allergic to any medicine  Keep a list of the medicines, vitamins, and herbs you take  Include the amounts, and when and why you take them  Bring the list or the pill bottles to follow-up visits  Carry your medicine list with you in case of an emergency  Manage your constipation:   · Drink liquids as directed  You may need to drink extra liquids to help soften and move your bowels  Ask how much liquid to drink each day and which liquids are best for you  · Eat high-fiber foods  This may help decrease constipation by adding bulk to your bowel movements  High-fiber foods include fruit, vegetables, whole-grain breads and cereals, and beans  Your healthcare provider or dietitian can help you create a high-fiber meal plan  · Exercise regularly  Regular physical activity can help stimulate your intestines  Ask which exercises are best for you  · Schedule a time each day to have a bowel movement  This may help train your body to have regular bowel movements  Bend forward while you are on the toilet to help move the bowel movement out  Sit on the toilet for at least 10 minutes, even if you do not have a bowel movement  Follow up with your healthcare provider as directed:  Write down your questions so you remember to ask them during your visits  © 2017 [employer] Information is for End User's use only and may not be sold, redistributed or otherwise used for commercial purposes   All illustrations and images included in CareNotes® are the copyrighted property of [employer] Jason  or [employer]  The above information is an  only  It is not intended as medical advice for individual conditions or treatments  Talk to your doctor, nurse or pharmacist before following any medical regimen to see if it is safe and effective for you  Deep Venous Thrombosis   WHAT YOU NEED TO KNOW:   Deep venous thrombosis (DVT) is a blood clot that forms in a deep vein of the body  The deep veins in the legs, thighs, and hips are the most common sites for DVT  DVT can also occur in a deep vein within your arms  The clot prevents the normal flow of blood in the vein  The blood backs up and causes pain and swelling  The DVT can break into smaller pieces and travel to your lungs and cause a blockage called a pulmonary embolism  A pulmonary embolism can become life-threatening  DISCHARGE INSTRUCTIONS:   Call 911 for any of the following:   · You feel lightheaded, short of breath, and have chest pain  · You cough up blood  Seek care immediately if:   · Your symptoms get worse  · You develop new DVT symptoms in another leg or arm  Contact your healthcare provider if:   · Your gums or nose bleed  · You see blood in your urine or bowel movements  · Your bowel movements are black or darker than normal     · You have questions or concerns about your conditions or care  Medicines:   · Blood thinners  help prevent the DVT from getting bigger and prevent new clots from forming  Examples of blood thinners include heparin, rivaroxaban, apixiban, and warfarin  The following are general safety guidelines to follow while you are taking a blood thinner:     ¨ Watch for bleeding and bruising  Watch for bleeding from your gums or nose  Watch for blood in your urine and bowel movements  Use a soft washcloth on your skin, and a soft toothbrush to brush your teeth  This can keep your skin and gums from bleeding  If you shave, use an electric shaver   Do not play contact sports  ¨ Tell your dentist and other healthcare providers that you take a blood thinner  Wear a bracelet or necklace that says you take this medicine  ¨ Do not start or stop any medicines unless your healthcare provider tells you to  Many medicines cannot be used with blood thinners  ¨ Tell your healthcare provider right away if you forget to take the blood thinner , or if you take too much  ¨ Warfarin  is a blood thinner that you may need to take  The following are additional things you should be aware of if you take warfarin:    § Foods and medicines can affect the amount of warfarin in your blood  Do not make major changes to your diet  Warfarin works best when you eat about the same amount of vitamin K every day  Vitamin K is found in green leafy vegetables and certain other foods  Ask for more information about what to eat or not to eat  § You will need to see your healthcare provider for follow-up visits  You will need regular blood tests to decide how much warfarin you need  · Take your medicine as directed  Contact your healthcare provider if you think your medicine is not helping or if you have side effects  Tell him or her if you are allergic to any medicine  Keep a list of the medicines, vitamins, and herbs you take  Include the amounts, and when and why you take them  Bring the list or the pill bottles to follow-up visits  Carry your medicine list with you in case of an emergency  Manage your DVT:   · Wear pressure stockings  The stockings are tight and put pressure on your legs  This improves blood flow and helps prevent clots  Wear the stockings during the day  Do not wear them when you sleep  · Elevate your legs  above the level of your heart as often as you can  This will help decrease swelling and pain  Prop your legs on pillows or blankets to keep them elevated comfortably  · Exercise regularly  to help increase your blood flow   Walking is a good low-impact exercise  Talk to your healthcare provider about the best exercise plan for you  · Change body positions often  If you travel by car or work at a desk, move and stretch in your seat several times each hour  In an airplane, get up and walk every hour  If you are bedridden, ask for help to change your position every 1 to 2 hours  · Maintain a healthy weight  Ask your healthcare provider how much you should weigh  Ask him to help you create a weight loss plan if you are overweight  · Do not smoke  Nicotine and other chemicals in cigarettes and cigars can damage blood vessels and make it more difficult to manage your DVT  Ask your healthcare provider for information if you currently smoke and need help to quit  E-cigarettes or smokeless tobacco still contain nicotine  Talk to your healthcare provider before you use these products  Follow up with your healthcare provider as directed:  Write down your questions so you remember to ask them during your visits  © 2017 [employer] Information is for End User's use only and may not be sold, redistributed or otherwise used for commercial purposes  All illustrations and images included in CareNotes® are the copyrighted property of [employer]  or [employer]te Alfaro  The above information is an  only  It is not intended as medical advice for individual conditions or treatments  Talk to your doctor, nurse or pharmacist before following any medical regimen to see if it is safe and effective for you  Esophagitis   WHAT YOU NEED TO KNOW:   Esophagitis is inflammation or irritation of the lining of the esophagus  DISCHARGE INSTRUCTIONS:   Call 911 for any of the following:   · You have chest pain that does not go away within a few minutes or gets worse  Seek care immediately if:   · You feel like you have food stuck in your throat and you cannot cough it out      Contact your healthcare provider if:   · You have new or worsening symptoms, even after treatment  · You have questions or concerns about your condition or care  Medicines:   · Medicines  may be given to fight an infection or to control stomach acid  · Take your medicine as directed  Contact your healthcare provider if you think your medicine is not helping or if you have side effects  Tell him or her if you are allergic to any medicine  Keep a list of the medicines, vitamins, and herbs you take  Include the amounts, and when and why you take them  Bring the list or the pill bottles to follow-up visits  Carry your medicine list with you in case of an emergency  Follow up with your healthcare provider as directed: You may need ongoing tests or treatment  Write down your questions so you remember to ask them during your visits  Do not smoke:  Nicotine and other chemicals in cigarettes and cigars can cause blood vessel and lung damage  Ask your healthcare provider for information if you currently smoke and need help to quit  E-cigarettes or smokeless tobacco still contain nicotine  Talk to your healthcare provider before you use these products  Do not drink alcohol:  Alcohol can irritate your esophagus  Talk to your healthcare provider if you need help to stop drinking  Keep batteries and similar objects out of the reach of children:  Babies often put items in their mouths to explore them  Button batteries are easy to swallow and can cause serious damage  Keep the battery covers of electronic devices such as remote controls taped closed  Store all batteries and toxic materials where children cannot get to them  Use childproof locks to keep children away from dangerous materials  Manage or prevent esophagitis:   · Limit or do not eat foods that can lead to esophagitis  Foods such as oranges and salsa can irritate your esophagus  Caffeine and chocolate can cause acid reflux  High-fat and fried foods make your stomach digest food more slowly   This increases the amount of stomach acid your esophagus is exposed to  Eat small meals, and drink water with your meals  Soft foods such as yogurt and applesauce may help soothe your throat  Do not eat for at least 3 hours before you go to bed  · Prevent acid reflux  Do not bend over unless it is necessary  Acid may back up into your esophagus when you bend over  If possible, keep the head of your bed elevated while you sleep  This will help keep acid from backing up  Manage stress  Stress can make your symptoms worse or cause stomach acid to back up  · Drink more liquid when you take pills  Drink a full glass of water when you take your pills  Ask your healthcare provider if you can take your pills at least an hour before you go to bed  © 2017 [employer] Information is for End User's use only and may not be sold, redistributed or otherwise used for commercial purposes  All illustrations and images included in CareNotes® are the copyrighted property of [employer]  or Dionte Alfaro  The above information is an  only  It is not intended as medical advice for individual conditions or treatments  Talk to your doctor, nurse or pharmacist before following any medical regimen to see if it is safe and effective for you  Oral Candidiasis   AMBULATORY CARE:   Oral candidiasis , or thrush, is a fungal infection that affects the inside of your mouth  Seek care immediately if:   · You have trouble swallowing and your jaw and neck are stiff  · You are dizzy, thirsty, or have a dry mouth  · You are urinating little or not at all  · You cannot eat or drink because of the pain  Contact your healthcare provider if:   · You have a fever  · You have nausea, vomiting, or diarrhea  · Your signs and symptoms get worse, even after treatment  · You have questions or concerns about your condition or care    Common symptoms include the following:   · White or whitish-yellow patches in the mouth that look like milk curds    · Redness or bleeding under the patches    · Sore and painful mouth, with cracking or tearing on the corners    · Bright red tongue that may feel like it is burning    · Trouble swallowing and tasting    · Swelling under dentures  Treatment for oral candidiasis  includes antifungal medicine that helps kill the fungus that caused your oral candidiasis  This medicine may be a pill or a solution that you gargle  Remove dentures before you gargle  Prevent oral candidiasis:  Brush your teeth, gums, and tongue after you eat and before you go to sleep  Use a toothbrush with soft bristles  See your dentist for regular exams  Remove your dentures when you sleep, or at least 6 hours each day  Clean your dentures and soak them in denture   Let them air dry after soaking  Follow up with your healthcare provider as directed:  Write down your questions so you remember to ask them during your visits  © 2017 [employer] Information is for End User's use only and may not be sold, redistributed or otherwise used for commercial purposes  All illustrations and images included in CareNotes® are the copyrighted property of [employer]  or [employer]te Alfaro  The above information is an  only  It is not intended as medical advice for individual conditions or treatments  Talk to your doctor, nurse or pharmacist before following any medical regimen to see if it is safe and effective for you  Heart Failure   WHAT YOU NEED TO KNOW:   Heart failure (HF) is a condition that does not allow your heart to fill or pump properly  Not enough oxygen in your blood gets to your organs and tissues  HF can occur in the right side, the left side, or both lower chambers of your heart  HF is often caused by damage or injury to your heart  The damage may be caused by heart attack, other heart conditions, or high blood pressure   HF is a long-term condition that tends to get worse over time  It is important to manage your health to improve your quality of life  HF can be worsened by heavy alcohol use, smoking, diabetes that is not controlled, or obesity  DISCHARGE INSTRUCTIONS:   Call 911 if:   · You have any of the following signs of a heart attack:      ¨ Squeezing, pressure, or pain in your chest that lasts longer than 5 minutes or returns    ¨ Discomfort or pain in your back, neck, jaw, stomach, or arm     ¨ Trouble breathing    ¨ Nausea or vomiting    ¨ Lightheadedness or a sudden cold sweat, especially with chest pain or trouble breathing    Seek care immediately if:   · You gain 3 or more pounds (1 4 kg) in a day, or more than your healthcare provider says you should  · Your heartbeat is fast, slow, or uneven all the time  Contact your healthcare provider if:   · You have symptoms of worsening HF:      ¨ Shortness of breath at rest, at night, or that is getting worse in any way     ¨ Weight gain of 5 or more pounds (2 2 kg) in a week     ¨ More swelling in your legs or ankles     ¨ Abdominal pain or swelling     ¨ More coughing     ¨ Loss of appetite     ¨ Feeling tired all the time    · You feel hopeless or depressed, or you have lost interest in things you used to enjoy  · You often feel worried or afraid  · You have questions or concerns about your condition or care  Medicines: You may  need any of the following:  · Medicines  may be given to help regulate your heart rhythm  You may also need medicines to lower your blood pressure, and to get rid of extra fluids  · Take your medicine as directed  Contact your healthcare provider if you think your medicine is not helping or if you have side effects  Tell him or her if you are allergic to any medicine  Keep a list of the medicines, vitamins, and herbs you take  Include the amounts, and when and why you take them  Bring the list or the pill bottles to follow-up visits   Carry your medicine list with you in case of an emergency  Follow up with your healthcare provider or cardiologist as directed: You may need to return for other tests  You may need home health care  A healthcare provider will monitor your vital signs, weight, and make sure your medicines are working  Write down your questions so you remember to ask them during your visits  Go to cardiac rehab as directed:  Cardiac rehab is a program run by specialists who will help you safely strengthen your heart  The program includes exercise, relaxation, stress management, and heart-healthy nutrition  Healthcare providers will also make sure your medicines are helping to reduce your symptoms  Manage your HF:  · Do not smoke  Nicotine and other chemicals in cigarettes and cigars can cause lung damage and make HF difficult to manage  Ask your healthcare provider for information if you currently smoke and need help to quit  E-cigarettes or smokeless tobacco still contain nicotine  Talk to your healthcare provider before you use these products  · Do not drink alcohol or take illegal drugs  Alcohol and drugs can worsen your symptoms quickly  · Weigh yourself every morning  Use the same scale, in the same spot  Do this after you use the bathroom, but before you eat or drink anything  Wear the same type of clothing  Do not wear shoes  Record your weight each day so you will notice any sudden weight gain  Swelling and weight gain are signs of fluid retention  If you are overweight, ask how to lose weight safely  · Check your blood pressure and heart rate every day  Ask for more information about how to measure your blood pressure and heart rate correctly  Ask what these numbers should be for you  · Manage any chronic health conditions you have  These include high blood pressure, diabetes, obesity, high cholesterol, metabolic syndrome, and COPD  You will have fewer symptoms if you manage these health conditions  Follow your healthcare provider's recommendations and follow up with him or her regularly  · Eat heart-healthy foods and limit sodium (salt)  An easy way to do this is to eat more fresh fruits and vegetables and fewer canned and processed foods  Replace butter and margarine with heart-healthy oils such as olive oil and canola oil  Other heart-healthy foods include walnuts, whole-grain breads, low-fat dairy products, beans, and lean meats  Fatty fish such as salmon and tuna are also heart healthy  Ask how much salt you can eat each day  Do not use salt substitutes  · Drink liquids as directed  You may need to limit the amount of liquids you drink if you retain fluid  Ask how much liquid to drink each day and which liquids are best for you  · Stay active  If you are not active, your symptoms are likely to worsen quickly  Walking, bicycling, and other types of physical activity help maintain your strength and improve your mood  Physical activity also helps you manage your weight  Work with your healthcare provider to create an exercise plan that is right for you  · Get vaccines as directed  Get a flu shot every year  You may also need the pneumonia vaccine  The flu and pneumonia can be severe for a person who has HF  Vaccines protect you from these infections  Join a support group:  Living with HF can be difficult  It may be helpful to talk with others who have HF  You may learn how to better manage your condition or get emotional support  For more information:   · [address]  [place] , [place]   Phone: 6- [phone] - [phone]  Web Address: https://www strong com/  org   © 2017 [employer] Information is for End User's use only and may not be sold, redistributed or otherwise used for commercial purposes  All illustrations and images included in CareNotes® are the copyrighted property of [employer]  or Dionte Alfaro    The above information is an  only  It is not intended as medical advice for individual conditions or treatments  Talk to your doctor, nurse or pharmacist before following any medical regimen to see if it is safe and effective for you

## 2018-10-03 NOTE — PROGRESS NOTES
IM Residency Progress Note   Unit/Bed#: [facility] 622-01 Encounter: [id number]      Roberto Beam 78 y o  female [id number]    Hospital Stay Days: 8      Assessment/Plan:    Candida esophagitis  Patient with B-cell lymphoma s/p radiation with interval growth of cervical and mediastinal adenopathy with mass effect on the esophagus, esophagitis with appearance suspicious for Candida identified on EGD 10/1  She is now on fluconazole day 3  She reports symptomatic improvement and is tolerating soft foods and liquids  She is being discharged today  - Brush biopsy with evidence of inflammation; likely component of radiation esophagitis  - Continue magic mouthwash and nystatin  - Continue fluconazole 200mg for 21 days total    Constipation  Improved s/p lactulose and dulcolax suppository yesterday; patient reports 2 bowel movements yesterday  She plans to take Miralax at home  She may also be started on lubiprostone at discharge  Principal Problem:    Candida esophagitis (HCC)  Active Problems:    Arm DVT (deep venous thromboembolism), acute, left (HCC)    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 [facility])    Constipation    Sinus tachycardia    Chronic systolic congestive heart failure (HCC)    Esophageal dysphagia    Moderate protein-calorie malnutrition (HCC)        Disposition: Patient being discharged today  Subjective:   Patient seen and examined  She reports improvement of her odynophagia, with good tolerance for soft, cool foods  She states her appetite is low but is eating anyway  She reports that her constipation has improved as well with 2 bowel movements yesterday  She does complain of some right sided abdominal pain that has been intermittent for a few weeks  She notices it with position changes and it subsides on its own  She states that she went to the ED when it was worse some time ago, and was told it was related to growth of her cancer   Per chart review, CT chest abdomen pelvis report from  mentions increased lymphadenopathy particularly in the mediastinum but no acute process in the abdomen or pelvis is specifically mentioned  Vitals: Temp (24hrs), Av °F (36 7 °C), Min:97 7 °F (36 5 °C), Max:98 2 °F (36 8 °C)  Current: Temperature: 98 2 °F (36 8 °C)  Vitals:    10/02/18 0725 10/02/18 2235 10/03/18 0540 10/03/18 0726   BP:  107/55  107/56   Pulse: 102 93  92   Resp:  20  20   Temp:  97 7 °F (36 5 °C)  98 2 °F (36 8 °C)   TempSrc:       SpO2: 96% 91%  93%   Weight:   95 9 kg (211 lb 6 7 oz)    Height:        Body mass index is 37 45 kg/m²  I/O last 24 hours:   In: 650 [P O :650]  Out: 950 [Urine:950]      Physical Exam: General appearance: alert and oriented, in no acute distress  Throat: white coating over tongue  Lungs: clear to auscultation bilaterally  Heart: S1, S2 normal and systolic murmur: early systolic 2/6, crescendo at 2nd left intercostal space  Abdomen: soft, non-tender; bowel sounds normal; no masses,  no organomegaly  Extremities: edema of bilateral legs     Invasive Devices     Peripheral Intravenous Line            Peripheral IV 10/01/18 Right Hand 2 days                          Labs:   Recent Results (from the past 24 hour(s))   Phosphorus    Collection Time: 10/02/18 11:21 AM   Result Value Ref Range    Phosphorus 2 6 2 3 - 4 1 mg/dL   CBC and differential    Collection Time: 10/03/18  5:06 AM   Result Value Ref Range    WBC 4 39 4 31 - 10 16 Thousand/uL    RBC 3 32 (L) 3 81 - 5 12 Million/uL    Hemoglobin 9 4 (L) 11 5 - 15 4 g/dL    Hematocrit 30 2 (L) 34 8 - 46 1 %    MCV 91 82 - 98 fL    MCH 28 3 26 8 - 34 3 pg    MCHC 31 1 (L) 31 4 - 37 4 g/dL    RDW 15 7 (H) 11 6 - 15 1 %    MPV 10 6 8 9 - 12 7 fL    Platelets 209 (L) 076 - 390 Thousands/uL    nRBC 0 /100 WBCs    Neutrophils Relative 79 (H) 43 - 75 %    Immat GRANS % 1 0 - 2 %    Lymphocytes Relative 6 (L) 14 - 44 %    Monocytes Relative 12 4 - 12 %    Eosinophils Relative 2 0 - 6 %    Basophils Relative 0 0 - 1 %    Neutrophils Absolute 3 47 1 85 - 7 62 Thousands/µL    Immature Grans Absolute 0 03 0 00 - 0 20 Thousand/uL    Lymphocytes Absolute 0 25 (L) 0 60 - 4 47 Thousands/µL    Monocytes Absolute 0 54 0 17 - 1 22 Thousand/µL    Eosinophils Absolute 0 09 0 00 - 0 61 Thousand/µL    Basophils Absolute 0 01 0 00 - 0 10 Thousands/µL   Basic metabolic panel    Collection Time: 10/03/18  5:06 AM   Result Value Ref Range    Sodium 140 136 - 145 mmol/L    Potassium 3 5 3 5 - 5 3 mmol/L    Chloride 104 100 - 108 mmol/L    CO2 30 21 - 32 mmol/L    ANION GAP 6 4 - 13 mmol/L    BUN 19 5 - 25 mg/dL    Creatinine 0 76 0 60 - 1 30 mg/dL    Glucose 82 65 - 140 mg/dL    Calcium 9 8 8 3 - 10 1 mg/dL    eGFR 77 ml/min/1 73sq m   Magnesium    Collection Time: 10/03/18  5:06 AM   Result Value Ref Range    Magnesium 1 8 1 6 - 2 6 mg/dL   Phosphorus    Collection Time: 10/03/18  5:06 AM   Result Value Ref Range    Phosphorus 2 4 2 3 - 4 1 mg/dL       Radiology Results: I have personally reviewed pertinent reports  Other Diagnostic Testing:   I have personally reviewed pertinent reports          Active Meds:   Current Facility-Administered Medications   Medication Dose Route Frequency    acetaminophen (TYLENOL) tablet 650 mg  650 mg Oral Q6H PRN    al mag oxide-diphenhydramine-lidocaine viscous (MAGIC MOUTHWASH) suspension 10 mL  10 mL Swish & Swallow 4x Daily (with meals and at bedtime)    allopurinol (ZYLOPRIM) tablet 200 mg  200 mg Oral Daily    apixaban (ELIQUIS) tablet 5 mg  5 mg Oral BID    ascorbic acid (VITAMIN C) tablet 500 mg  500 mg Oral Daily With Dinner    bisacodyl (DULCOLAX) rectal suppository 10 mg  10 mg Rectal Daily    fluconazole (DIFLUCAN) tablet 200 mg  200 mg Oral Daily    HYDROcodone-acetaminophen (NORCO) 5-325 mg per tablet 1 tablet  1 tablet Oral Q6H PRN    lactulose 20 g/30 mL oral solution 10 g  10 g Oral TID    loratadine (CLARITIN) tablet 10 mg  10 mg Oral Early Morning    LORazepam (ATIVAN) 2 mg/mL injection 0 5 mg  0 5 mg Intravenous Q6H PRN    melatonin tablet 6 mg  6 mg Oral HS PRN    metoprolol tartrate (LOPRESSOR) tablet 25 mg  25 mg Oral Q12H [address]    nystatin (MYCOSTATIN) oral suspension 500,000 Units  500,000 Units Swish & Swallow 4x Daily    ondansetron (ZOFRAN) injection 4 mg  4 mg Intravenous Q6H PRN    ondansetron (ZOFRAN-ODT) dispersible tablet 4 mg  4 mg Oral Q6H PRN    pantoprazole (PROTONIX) EC tablet 40 mg  40 mg Oral BID AC    polyethylene glycol (MIRALAX) packet 17 g  17 g Oral Daily    predniSONE tablet 15 mg  15 mg Oral Daily    senna (SENOKOT) tablet 8 6 mg  1 tablet Oral BID    simethicone (MYLICON) chewable tablet 80 mg  80 mg Oral Q6H PRN    sucralfate (CARAFATE) oral suspension 1,000 mg  1,000 mg Oral Q6H [address]    traZODone (DESYREL) tablet 25 mg  25 mg Oral HS         Robert Casiano DO

## 2018-10-03 NOTE — SOCIAL WORK
S/w Dr Tracie Veliz from [address] [place] [address] whom stated pt will go home today & will need [facility] for SN  S/w pt & offered choices  Pt requested referral to  [facility]  Referral sent  Pt states her  will transport home

## 2018-10-03 NOTE — DISCHARGE INSTR - APPOINTMENTS
PCP appt Thursday, Oct 4th 2018  At 9 am    Dr Welsh [facility]  [phone]  [address] [facility] [address]      Outpatient Care Coordination  [phone]

## 2018-10-03 NOTE — ASSESSMENT & PLAN NOTE
· Without acute exacerbation  · Patient with EF of 35% as per echocardiogram in July of 2018  · Suspect secondary to chemotherapy medication induced  · Outpatient follow-up with Cardiology  · Stable on the day of discharge  · As per chart, patient developed transient episode of hypoxia with oxygen level around 89%?   Patient remained 90% above without oxygen supplement for 2 days prior to discharge, no indication for oxygen at this time, as patient is status post radiation reasonable saturation for her could be 88% above  · Follow-up as an outpatient with primary care physician

## 2018-10-03 NOTE — ASSESSMENT & PLAN NOTE
· Patient presented with heart rate around 140, likely physiologically induced tachycardia secondary to recurrent lymphoma with severe lymphadenopathy  · She was evaluated by Cardiology, started on beta-blocker, heart rate remains between 90 and 100 which is acceptable as per Cardiology recommendation  · She will continue with beta-blocker upon discharge, she will follow up as an outpatient with Cardiology

## 2018-10-04 ENCOUNTER — OFFICE VISIT (OUTPATIENT)
Dept: FAMILY MEDICINE CLINIC | Facility: CLINIC | Age: 78
End: 2018-10-04
Payer: COMMERCIAL

## 2018-10-04 ENCOUNTER — PATIENT OUTREACH (OUTPATIENT)
Dept: FAMILY MEDICINE CLINIC | Facility: CLINIC | Age: 78
End: 2018-10-04

## 2018-10-04 VITALS
RESPIRATION RATE: 17 BRPM | DIASTOLIC BLOOD PRESSURE: 60 MMHG | HEART RATE: 101 BPM | TEMPERATURE: 98.1 F | OXYGEN SATURATION: 93 % | SYSTOLIC BLOOD PRESSURE: 90 MMHG

## 2018-10-04 DIAGNOSIS — R26.2 AMBULATORY DYSFUNCTION: ICD-10-CM

## 2018-10-04 DIAGNOSIS — I42.7 CARDIOMYOPATHY SECONDARY TO DRUG (HCC): ICD-10-CM

## 2018-10-04 DIAGNOSIS — I95.9 HYPOTENSION, UNSPECIFIED HYPOTENSION TYPE: ICD-10-CM

## 2018-10-04 DIAGNOSIS — I50.22 CHRONIC SYSTOLIC CONGESTIVE HEART FAILURE (HCC): ICD-10-CM

## 2018-10-04 DIAGNOSIS — Z99.89 WALKER AS AMBULATION AID: ICD-10-CM

## 2018-10-04 DIAGNOSIS — R29.898 LEG WEAKNESS, BILATERAL: ICD-10-CM

## 2018-10-04 DIAGNOSIS — E44.0 MODERATE PROTEIN-CALORIE MALNUTRITION (HCC): ICD-10-CM

## 2018-10-04 DIAGNOSIS — C83.38 DIFFUSE LARGE B-CELL LYMPHOMA OF LYMPH NODES OF MULTIPLE SITES (HCC): ICD-10-CM

## 2018-10-04 DIAGNOSIS — D64.9 ANEMIA, UNSPECIFIED TYPE: ICD-10-CM

## 2018-10-04 DIAGNOSIS — Z09 HOSPITAL DISCHARGE FOLLOW-UP: Primary | ICD-10-CM

## 2018-10-04 PROCEDURE — 99496 TRANSJ CARE MGMT HIGH F2F 7D: CPT | Performed by: PHYSICIAN ASSISTANT

## 2018-10-04 NOTE — PATIENT INSTRUCTIONS
Please stop prednisone tablets  Due to the metoprolol lower blood pressure and causing extreme fatigue, please hold for now  Check pulse on outer wrist for 1 minute  Number should be between   If pulse continutes to be > 120s, please take metoprolol tablet  If you check BP is numbers are less than 90/60, then please hold metoprolol  Please call if you have concerns or questions - [phone]  Please call the palliative medicine team to reconnect with them

## 2018-10-04 NOTE — PROGRESS NOTES
Outpatient Care Management Note: Met pt for her TCM appt  Son was accompanying her  She states that she is now involved with Palliative Care and has [facility] coming out  She denies any pain  Just " feels tired and weak"  Has been having nutrition shakes  She has been tolerating thrush and trying to eat the best that she can  Meds not a problem at this time  Gave son information regarding a swivel seating sliding bench that fits over the tub wall because she has a difficult time lifting her legs to get into the tub  Otherwise, they have my contact information and agree to outreach

## 2018-10-04 NOTE — PROGRESS NOTES
Transition of Care  Follow-up After Hospitalization    Gayle Overton 78 y o  female   Date:  10/4/2018    Date and time hospital follow up call was made:  10/3/2018 12:02 PM  Patient was hopsitalized at:  [facility]  Date of admission:  9/25/18  Date of discharge:  10/3/18  Disposition:  Home  Were the patients medicaitons reviewed and updated:  Yes  Current symptoms:  None  Post hospital issues:  None  Should patient be enrolled in anticoag monitoring?:  No  Scheduled for follow up?:  Yes  Did you obtain your prescribed medications:  Yes  Do you need help managing your perscriptions or medications:  No  Is transportation to your appointments needed:  No  I have advised the patient to call PCP with any new or worsening symptoms (please type in name along with any credentials): [address]  Living Arrangements:  Family members  Support System:  Home care staff  The type of support provided:  None  Do you have social support:  No, not at all  Are you recieving outpatient services:  No  Are you recieving home care services:  Yes  Types of home care services:  Home health aid  Are you using any community resources:  No  Current waiver service:  No  Have you fallen in the last 12 months:  No  Interperter language line required?:  No  Counseling:  Patient  Counseling topics:  Diagnostic results, Activities of daily living, Home health agency benefits       Hospital records were reviewed  Medications upon discharge reviewed/updated  Discharge Disposition:  Home with home health   Follow up visits with other specialists: cardiology, GI, hem/onc, palliative       Assessment and Plan:    Johnnie Ramirez was seen today for transition of care management and fatigue  Diagnoses and all orders for this visit:    Hospital discharge follow-up    Moderate protein-calorie malnutrition ([address] )  -     Comprehensive metabolic panel; Future  -     Phosphorus; Future  -     Magnesium;  Future  -     Ambulatory Referral to Home Health; Future  - PO intake expected to improve as patient reports swallowing is better; however will continue to monitor electrolytes, r/o refeeding    Anemia, unspecified type  -     CBC and differential; Future  - continue to monitor   - caution with iron supplement given constipation    Chronic systolic congestive heart failure ([address] ); cardiomyopathy secondary to drug   -     Ambulatory Referral to [address] [place] [address] Aashish Calvillo; Future  - no exacerbation  - continue follow up with OP cardiology    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 [facility])  -     Ambulatory Referral to [address] [place] [address] Aashish Jose L De La Paz; Future  - patient and son agrees to follow up with palliative medicine  - patient admits that she does not want to return to hospital again  - continue follow up with hem/onc  - stopped prednisone taper in setting of esophagitis and thrush after speaking with primary hem/onc specialist whom agrees     Ambulatory dysfunction  -     Ambulatory Referral to [address] [place] [address] Aashish Calvillo; Future    Leg weakness, bilateral  -     Ambulatory Referral to Home Health; Future    Walker as ambulation aid  -     Ambulatory Referral to [address] [place] [address] Aashish Jose L De La Paz;  Future  - patient with VNA services but will place order to PT at home to improve ambulatory function in setting of weakness     Hypotension, unspecified hypotension type  - patient was started on metoprolol bid by cardiology in hospital; however will hold BB at this time given low BP and patient having extreme fatigue and weakness as a reaction to BB in past to see if helps improve her weakness  - continue nursing care at home   - will continue follow up with cardiology  - will monitor BP and HR at home (taught son how to measure pulse manually and will have their own pulse ox at home)             HPI:  HPI   Patient is a 79 yo female with PMH below who presents for follow up from recent hopsitalization after presenting to hospital with sinus tachycardia in setting of dehydration 2/2 to new esophagitis as a results of palliative radiation treatment  Please see discharge summary to complete detail of hospitalization  Due to her difficulty in swallowing with esophagitis, she was found to have malnutrition and nutritionist following in hospital  All her electrolytes had stablized prior to discharge  She reports today that her swallowing continues to improve  She was found to have candida esophagitis and consulted with GI  She did have thrush prior to discharge  She is currently completing 21 day course of diflucan  Patient was on steroid taper prior to admission from hem/onc which was continuing to be tapered while in hospital, had been decreased to 15 mg daily  She was evaluated by cardiology while in hospital and was placed on metoprolol 25 bid and has follow up upcoming with cardiology as outpatient  However, patients BP is now on lower side and she reports hardly being able to lift her legs to get up and down and get in and out of chairs and cars  She reports being put on metoprolol in the past by Dr Leticia Park and it caused the same reaction of extreme weakness and leg fatigue and it was stopped as an outpatient and she was switched to cardvideol with less severe but similar symptoms  She was given IVF while in hospital, with caution 2/2 to EF 35%, no exacerbation noted of CHF  During conversation, patient and son agree to return to palliative medicine, was following on as needed basis  Patient reports that she wishes to no longer turn to hospital, "I am really wiped out"  ROS: Review of Systems   Constitutional: Positive for activity change and fatigue  Negative for chills and fever  Respiratory: Negative for cough, chest tightness, shortness of breath and wheezing  Cardiovascular: Negative for chest pain, palpitations and leg swelling  Gastrointestinal: Negative for abdominal pain, blood in stool, diarrhea, nausea and vomiting  Genitourinary: Negative for difficulty urinating, dysuria and hematuria  Skin: Positive for pallor  Negative for color change, rash and wound  Neurological: Positive for weakness  Negative for dizziness, seizures, syncope, speech difficulty and headaches  Hematological: Positive for adenopathy  Does not bruise/bleed easily  Past Medical History:   Diagnosis Date    Basal cell carcinoma (BCC)     Bruit of right carotid artery     resolved 5/14/15     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HCC)     radiation    Dyslipidemia     Esophageal stricture     last assessed 2/28/13       GERD (gastroesophageal reflux disease)     History of chemotherapy     History of radiation therapy     [place] (hard of hearing)     b/l ears    Hyperlipidemia     Hypertension     Osteoarthritis     Osteoarthrosis of ankle and foot     last assessed 5/20/13     Osteopenia     last assessed 2/28/13     Plantar fasciitis of left foot     Polyp of sigmoid colon     last assessed 2/28/13     Shortness of breath     when walking up stairs       Past Surgical History:   Procedure Laterality Date    ANKLE ARTHROPLASTY Left     BLEPHAROPLASTY      right eye     CATARACT EXTRACTION Right     COLONOSCOPY      ESOPHAGEAL DILATION      x2    ESOPHAGOGASTRODUODENOSCOPY      dilitation    ESOPHAGOGASTRODUODENOSCOPY N/A 10/1/2018    Procedure: ESOPHAGOGASTRODUODENOSCOPY (EGD); Surgeon: Jay Aranda MD;  [facility]: [facility]; Service: Gastroenterology    FACIAL/NECK BIOPSY Right 9/8/2017    Procedure: NECK NODE BIOPSY WITH NEEDLE LOCALIZATION; LYMPHOMA PROTOCOL (NEEDLE LOC WITH I R  AT 1100);   Surgeon: Kieran Toth MD;  [facility];  Service: Surgical Oncology    HYSTERECTOMY      LYMPH NODE DISSECTION      right groin and neck    ORBITAL FLOOR EXPLORATION Right     for cancer     PORTACATH PLACEMENT      left chest     MO BX/REMV,LYMPH NODE,DEEP AXILL Left 8/14/2017    Procedure: Axillary lymph node excision with LYMPHOMA PROTOCOL;  Surgeon: Kieran Toth MD;  [facility];  Service: Surgical Oncology    IN BX/REMV,LYMPH NODE,DEEP CERV Right 5/2/2016    Procedure: NECK NODE BIOPSY;  Surgeon: Fiordaliza Mcpherson MD;  [facility];  Service: Surgical Oncology    IN BX/REMV,LYMPH NODE,DEEP CERV Left 7/25/2018    Procedure: NECK NODE BIOPYS WITH LYMPHOMA PROTOCOL; (ULTRASOUND GUIDED NEEDLE LOC IN IR AT 0800);   Surgeon: Fiordaliza Mcpherson MD;  [facility];  Service: Surgical Oncology    IN INSJ TUNNELED CTR VAD W/SUBQ PORT AGE 5 YR/> N/A 10/3/2017    Procedure: PLACEMENT OF PORT-A-CATH DEVICE;  Surgeon: Fiordaliza Mcpherson MD;  [facility];  Service: Surgical Oncology       Social History     Social History    Marital status: /Civil Union     Spouse name: N/A    Number of children: N/A    Years of education: N/A     Social History Main Topics    Smoking status: Former Smoker     Quit date: 1970    Smokeless tobacco: Never Used    Alcohol use No    Drug use: No    Sexual activity: Not on file     Other Topics Concern    Not on file     Social History Narrative    Uses safety equipment seatbelts        Family History   Problem Relation Age of Onset    Multiple myeloma Mother     Heart disease Father     Hypertension Father     Hypertension Sister     Heart disease Sister     Arthritis Family     Osteoporosis Family     Breast cancer Maternal Aunt        Allergies   Allergen Reactions    Alteplase Anaphylaxis    Aspirin Anaphylaxis    Celebrex [Celecoxib] Anaphylaxis    Nsaids Anaphylaxis    Other Anaphylaxis     Pt states when she received a certain type of chemotherapy it caused her throat to close    Rituxan [Rituximab] Anaphylaxis     Swelling of tongue and closing of throat    Sulfa Antibiotics Other (See Comments) and Anaphylaxis     VERY EMOTIONAL CRYING    Bactrim [Sulfamethoxazole-Trimethoprim] Other (See Comments)     VERY EMOTIONAL/CRYING         Current Outpatient Prescriptions:     acetaminophen (TYLENOL) 325 mg tablet, Take 2 tablets (650 mg total) by mouth every 6 (six) hours as needed for mild pain or headaches, Disp: 30 tablet, Rfl: 0    allopurinol (ZYLOPRIM) 100 mg tablet, Take 2 tablets (200 mg total) by mouth daily, Disp: 60 tablet, Rfl: 1    apixaban (ELIQUIS) 5 mg, Take 1 tablet (5 mg total) by mouth 2 (two) times a day, Disp: 60 tablet, Rfl: 2    ascorbic acid (VITAMIN C) 500 mg tablet, Take 500 mg by mouth daily with dinner  , Disp: , Rfl:     Cyanocobalamin (VITAMIN B 12 PO), Take 1 tablet by mouth daily with dinner  , Disp: , Rfl:     fluconazole (DIFLUCAN) 200 mg tablet, Take 1 tablet (200 mg total) by mouth daily for 18 days, Disp: 18 tablet, Rfl: 0    HYDROcodone-acetaminophen (NORCO) 5-325 mg per tablet, Take 1 tablet by mouth every 6 (six) hours as needed for pain Max Daily Amount: 4 tablets, Disp: 20 tablet, Rfl: 0    lactulose 20 g/30 mL, Take 15 mL (10 g total) by mouth 3 (three) times a day, Disp: 1 Bottle, Rfl: 0    Lenalidomide (REVLIMID) 20 MG CAPS, Take 1 capsule (20 mg total) by mouth daily Adult Female  Auth # [id number] Take 1 capsule daily days 1-21   Followed by 7 days off , Disp: 21 capsule, Rfl: 0    loratadine (CLARITIN) 10 mg tablet, Take 10 mg by mouth daily in the early morning  , Disp: , Rfl:     Melatonin 5 MG TABS, Take 1 tablet by mouth daily at bedtime as needed, Disp: , Rfl:     metoprolol tartrate (LOPRESSOR) 25 mg tablet, Take 1 tablet (25 mg total) by mouth every 12 (twelve) hours, Disp: 60 tablet, Rfl: 0    Multiple Vitamin (MULTIVITAMIN) capsule, Take 1 capsule by mouth daily with dinner  , Disp: , Rfl:     nystatin (MYCOSTATIN) 100,000 units/mL suspension, Apply 5 mL (500,000 Units total) to the mouth or throat 4 (four) times a day Swish and swallow 4 times daily for 1 week , Disp: 473 mL, Rfl: 0    omeprazole (PriLOSEC) 20 mg delayed release capsule, Take 1 capsule (20 mg total) by mouth daily, Disp: 90 capsule, Rfl: 1    ondansetron (ZOFRAN) 4 mg tablet, Take 1 tablet (4 mg total) by mouth every 8 (eight) hours as needed for nausea or vomiting, Disp: 30 tablet, Rfl: 0    polyethylene glycol (MIRALAX) 17 g packet, Take 17 g by mouth daily, Disp: 14 each, Rfl: 0    senna (SENOKOT) 8 6 mg, Take 1 tablet (8 6 mg total) by mouth 2 (two) times a day, Disp: , Rfl: 0    simethicone (MYLICON) 80 mg chewable tablet, Chew 1 tablet (80 mg total) every 6 (six) hours as needed for flatulence, Disp: 30 tablet, Rfl: 0    sucralfate (CARAFATE) 1 g tablet, Take 1 tablet (1 g total) by mouth 4 (four) times a day, Disp: 60 tablet, Rfl: 0    traZODone (DESYREL) 50 mg tablet, Take 0 5 tablets (25 mg total) by mouth daily at bedtime, Disp: 15 tablet, Rfl: 0    al mag oxide-diphenhydramine-lidocaine viscous (MAGIC MOUTHWASH) 1:1:1 suspension, Swish and swallow 10 mL 4 (four) times a day (with meals and at bedtime), Disp: 180 mL, Rfl: 0      Physical Exam:  BP 90/60   Pulse 101   Temp 98 1 °F (36 7 °C)   Resp 17   SpO2 93%     Physical Exam   Constitutional: She is oriented to person, place, and time  She appears well-developed and well-nourished  No distress  HENT:   Head: Normocephalic and atraumatic  Right Ear: External ear normal    Left Ear: External ear normal    Nose: Nose normal    Mouth/Throat: No oropharyngeal exudate  [place] nearly resolved   Eyes: Pupils are equal, round, and reactive to light  Conjunctivae are normal    Neck: Normal range of motion  Neck supple  No JVD present  No thyromegaly present  Cardiovascular: Regular rhythm, normal heart sounds and intact distal pulses  No murmur heard  HR in 90-low 100s   Pulmonary/Chest: Effort normal and breath sounds normal  She has no wheezes  Abdominal: Soft  Bowel sounds are normal  She exhibits no distension  There is no tenderness  Musculoskeletal:   Fatigued; slow to get out of chair   Ambulating with walker   Lymphadenopathy:     She has cervical adenopathy  Neurological: She is alert and oriented to person, place, and time   No cranial nerve deficit  Skin: Skin is warm and dry  No rash noted  There is pallor (slight palllor of face)  Psychiatric: She has a normal mood and affect             Labs:  Lab Results   Component Value Date    WBC 4 39 10/03/2018    HGB 9 4 (L) 10/03/2018    HCT 30 2 (L) 10/03/2018    MCV 91 10/03/2018     (L) 10/03/2018     Lab Results   Component Value Date     10/03/2018    K 3 5 10/03/2018     10/03/2018    CO2 30 10/03/2018    ANIONGAP 6 12/15/2015    BUN 19 10/03/2018    CREATININE 0 76 10/03/2018    GLUCOSE 91 12/15/2015    GLUF 69 08/31/2018    CALCIUM 9 8 10/03/2018    CORRECTEDCA 11 2 (H) 09/24/2018    AST 29 09/26/2018    ALT 22 09/26/2018    ALKPHOS 81 09/26/2018    PROT 6 2 04/19/2017    BILITOT 0 6 04/19/2017    EGFR 77 10/03/2018

## 2018-10-05 ENCOUNTER — TELEPHONE (OUTPATIENT)
Dept: FAMILY MEDICINE CLINIC | Facility: CLINIC | Age: 78
End: 2018-10-05

## 2018-10-05 NOTE — TELEPHONE ENCOUNTER
Pt home health nurse Essie Graham called questioning pt medications  Pt told Celia that she stopped her prednisone and metoprolol - wants to know if this is correct  PT also has Tramadol for pain instead of NORC, wants to make sure that is okay  Celia also stated that pt stopped her Vit C currently due to not being able to swallow         Rosa M Quintero is out at a conference today so I verbally talked to her and Rosa M Quintero stated, " Prednisone stopped; Metoprolol on hold for now yes because she remembers that made her weak in the past when Dr Agustín Templeton tried it and he stopped it because of that "       Called Celia back and she thanked me for the info

## 2018-10-08 ENCOUNTER — TELEPHONE (OUTPATIENT)
Dept: FAMILY MEDICINE CLINIC | Facility: CLINIC | Age: 78
End: 2018-10-08

## 2018-10-08 DIAGNOSIS — E86.0 DEHYDRATION: ICD-10-CM

## 2018-10-08 DIAGNOSIS — K20.80 RADIATION ESOPHAGITIS: Primary | ICD-10-CM

## 2018-10-08 DIAGNOSIS — T66.XXXA RADIATION ESOPHAGITIS: Primary | ICD-10-CM

## 2018-10-08 NOTE — TELEPHONE ENCOUNTER
Demarcus Lane from [address] called to report      This is only an FYI    2 times a week for 4 weeks for PT

## 2018-10-08 NOTE — TELEPHONE ENCOUNTER
Patient ran out of the magic mouth wash last night, it was written when she was in the hospital for 4 times daily  Would you be willing to authorize a RX to [facility] in [place] seeing Justina Pereyra is not in the office today? She does have a pending appt this week with Justina Pereyra for 1 week check up

## 2018-10-09 ENCOUNTER — TELEPHONE (OUTPATIENT)
Dept: PALLIATIVE MEDICINE | Facility: HOSPITAL | Age: 78
End: 2018-10-09

## 2018-10-09 ENCOUNTER — TELEPHONE (OUTPATIENT)
Dept: PALLIATIVE MEDICINE | Facility: CLINIC | Age: 78
End: 2018-10-09

## 2018-10-09 NOTE — TELEPHONE ENCOUNTER
Spoke with patient's son over phone  Please add home visit with me on 10/15 @4p and block schedule at 3p

## 2018-10-09 NOTE — TELEPHONE ENCOUNTER
Patient's son called office  She has been weak since discharge home from hospital 6 days ago  Metoprolol was stopped due to fatigue  Poor intake has been poor although has improved somewhat over past few days  Swallowing is much less painful but appetite has been poor  She has been constipated for days but had BM last night and this morning  She has been taking Miralax daily and senna BID  She was prescribed lactulose from hospital but has been afraid to take  She is receiving VNA services and home PT has started  Fatigue is likely multifactorial due to deconditioning, poor nutritional status, cancer, co-morbidities, anemia etc    Will be receiving PT  Advised to continue with bowel regimen including Miralax and senna and to add lactulose PRN so that she is at least having BM every few days or more often  Son notes that patient has told family that she does not want to return to the hospital in the future  Will schedule home visit early next week to discuss symptoms, goals of care, plan further with patient, son,

## 2018-10-10 ENCOUNTER — LAB (OUTPATIENT)
Dept: LAB | Facility: CLINIC | Age: 78
DRG: 871 | End: 2018-10-10
Payer: COMMERCIAL

## 2018-10-10 ENCOUNTER — TELEPHONE (OUTPATIENT)
Dept: FAMILY MEDICINE CLINIC | Facility: CLINIC | Age: 78
End: 2018-10-10

## 2018-10-10 ENCOUNTER — HOSPITAL ENCOUNTER (INPATIENT)
Facility: HOSPITAL | Age: 78
LOS: 3 days | Discharge: HOSPICE | DRG: 871 | End: 2018-10-13
Attending: EMERGENCY MEDICINE | Admitting: HOSPITALIST
Payer: COMMERCIAL

## 2018-10-10 ENCOUNTER — APPOINTMENT (EMERGENCY)
Dept: RADIOLOGY | Facility: HOSPITAL | Age: 78
DRG: 871 | End: 2018-10-10
Payer: COMMERCIAL

## 2018-10-10 DIAGNOSIS — A41.9 SEPTIC SHOCK (HCC): ICD-10-CM

## 2018-10-10 DIAGNOSIS — N39.0 UTI (URINARY TRACT INFECTION): ICD-10-CM

## 2018-10-10 DIAGNOSIS — A41.9 SEPSIS, DUE TO UNSPECIFIED ORGANISM: ICD-10-CM

## 2018-10-10 DIAGNOSIS — R13.19 ESOPHAGEAL DYSPHAGIA: ICD-10-CM

## 2018-10-10 DIAGNOSIS — R57.9 SHOCK (HCC): Primary | ICD-10-CM

## 2018-10-10 DIAGNOSIS — C83.38 DIFFUSE LARGE B-CELL LYMPHOMA OF LYMPH NODES OF MULTIPLE SITES (HCC): ICD-10-CM

## 2018-10-10 DIAGNOSIS — E87.2 LACTIC ACIDOSIS: ICD-10-CM

## 2018-10-10 DIAGNOSIS — R65.21 SEPTIC SHOCK (HCC): ICD-10-CM

## 2018-10-10 DIAGNOSIS — D64.9 ANEMIA, UNSPECIFIED TYPE: ICD-10-CM

## 2018-10-10 DIAGNOSIS — G89.3 CANCER RELATED PAIN: ICD-10-CM

## 2018-10-10 DIAGNOSIS — C83.30 DIFFUSE LARGE B-CELL LYMPHOMA, UNSPECIFIED BODY REGION (HCC): ICD-10-CM

## 2018-10-10 DIAGNOSIS — E44.0 MODERATE PROTEIN-CALORIE MALNUTRITION (HCC): ICD-10-CM

## 2018-10-10 PROBLEM — N30.00 ACUTE CYSTITIS: Status: ACTIVE | Noted: 2018-10-10

## 2018-10-10 LAB
ALBUMIN SERPL BCP-MCNC: 2 G/DL (ref 3.5–5)
ALBUMIN SERPL BCP-MCNC: 2.2 G/DL (ref 3.5–5)
ALP SERPL-CCNC: 141 U/L (ref 46–116)
ALP SERPL-CCNC: 155 U/L (ref 46–116)
ALT SERPL W P-5'-P-CCNC: 24 U/L (ref 12–78)
ALT SERPL W P-5'-P-CCNC: 26 U/L (ref 12–78)
ANION GAP SERPL CALCULATED.3IONS-SCNC: 10 MMOL/L (ref 4–13)
ANION GAP SERPL CALCULATED.3IONS-SCNC: 10 MMOL/L (ref 4–13)
APTT PPP: 84 SECONDS (ref 24–36)
AST SERPL W P-5'-P-CCNC: 60 U/L (ref 5–45)
AST SERPL W P-5'-P-CCNC: 69 U/L (ref 5–45)
BACTERIA UR QL AUTO: ABNORMAL /HPF
BASOPHILS # BLD AUTO: 0.01 THOUSANDS/ΜL (ref 0–0.1)
BASOPHILS # BLD AUTO: 0.01 THOUSANDS/ΜL (ref 0–0.1)
BASOPHILS NFR BLD AUTO: 0 % (ref 0–1)
BASOPHILS NFR BLD AUTO: 0 % (ref 0–1)
BILIRUB SERPL-MCNC: 0.93 MG/DL (ref 0.2–1)
BILIRUB SERPL-MCNC: 1.18 MG/DL (ref 0.2–1)
BILIRUB UR QL STRIP: ABNORMAL
BUN SERPL-MCNC: 15 MG/DL (ref 5–25)
BUN SERPL-MCNC: 16 MG/DL (ref 5–25)
CALCIUM ALBUM COR SERPL-MCNC: 12.2 MG/DL (ref 8.3–10.1)
CALCIUM SERPL-MCNC: 10.6 MG/DL (ref 8.3–10.1)
CALCIUM SERPL-MCNC: 10.8 MG/DL (ref 8.3–10.1)
CHLORIDE SERPL-SCNC: 100 MMOL/L (ref 100–108)
CHLORIDE SERPL-SCNC: 101 MMOL/L (ref 100–108)
CLARITY UR: CLEAR
CO2 SERPL-SCNC: 26 MMOL/L (ref 21–32)
CO2 SERPL-SCNC: 29 MMOL/L (ref 21–32)
COLOR UR: ABNORMAL
CREAT SERPL-MCNC: 0.85 MG/DL (ref 0.6–1.3)
CREAT SERPL-MCNC: 0.89 MG/DL (ref 0.6–1.3)
EOSINOPHIL # BLD AUTO: 0.01 THOUSAND/ΜL (ref 0–0.61)
EOSINOPHIL # BLD AUTO: 0.01 THOUSAND/ΜL (ref 0–0.61)
EOSINOPHIL NFR BLD AUTO: 0 % (ref 0–6)
EOSINOPHIL NFR BLD AUTO: 0 % (ref 0–6)
ERYTHROCYTE [DISTWIDTH] IN BLOOD BY AUTOMATED COUNT: 16.9 % (ref 11.6–15.1)
ERYTHROCYTE [DISTWIDTH] IN BLOOD BY AUTOMATED COUNT: 17.1 % (ref 11.6–15.1)
GFR SERPL CREATININE-BSD FRML MDRD: 64 ML/MIN/1.73SQ M
GFR SERPL CREATININE-BSD FRML MDRD: 67 ML/MIN/1.73SQ M
GLUCOSE SERPL-MCNC: 71 MG/DL (ref 65–140)
GLUCOSE SERPL-MCNC: 75 MG/DL (ref 65–140)
GLUCOSE UR STRIP-MCNC: NEGATIVE MG/DL
HCT VFR BLD AUTO: 31.6 % (ref 34.8–46.1)
HCT VFR BLD AUTO: 34.2 % (ref 34.8–46.1)
HGB BLD-MCNC: 10.5 G/DL (ref 11.5–15.4)
HGB BLD-MCNC: 9.7 G/DL (ref 11.5–15.4)
HGB UR QL STRIP.AUTO: ABNORMAL
IMM GRANULOCYTES # BLD AUTO: 0.06 THOUSAND/UL (ref 0–0.2)
IMM GRANULOCYTES # BLD AUTO: 0.06 THOUSAND/UL (ref 0–0.2)
IMM GRANULOCYTES NFR BLD AUTO: 1 % (ref 0–2)
IMM GRANULOCYTES NFR BLD AUTO: 1 % (ref 0–2)
INR PPP: 1.8 (ref 0.86–1.17)
KETONES UR STRIP-MCNC: NEGATIVE MG/DL
LACTATE SERPL-SCNC: 4.9 MMOL/L (ref 0.5–2)
LACTATE SERPL-SCNC: 6.9 MMOL/L (ref 0.5–2)
LEUKOCYTE ESTERASE UR QL STRIP: ABNORMAL
LYMPHOCYTES # BLD AUTO: 1.08 THOUSANDS/ΜL (ref 0.6–4.47)
LYMPHOCYTES # BLD AUTO: 1.26 THOUSANDS/ΜL (ref 0.6–4.47)
LYMPHOCYTES NFR BLD AUTO: 19 % (ref 14–44)
LYMPHOCYTES NFR BLD AUTO: 20 % (ref 14–44)
MAGNESIUM SERPL-MCNC: 1.9 MG/DL (ref 1.6–2.6)
MCH RBC QN AUTO: 27.9 PG (ref 26.8–34.3)
MCH RBC QN AUTO: 28 PG (ref 26.8–34.3)
MCHC RBC AUTO-ENTMCNC: 30.7 G/DL (ref 31.4–37.4)
MCHC RBC AUTO-ENTMCNC: 30.7 G/DL (ref 31.4–37.4)
MCV RBC AUTO: 91 FL (ref 82–98)
MCV RBC AUTO: 91 FL (ref 82–98)
MONOCYTES # BLD AUTO: 0.44 THOUSAND/ΜL (ref 0.17–1.22)
MONOCYTES # BLD AUTO: 0.48 THOUSAND/ΜL (ref 0.17–1.22)
MONOCYTES NFR BLD AUTO: 7 % (ref 4–12)
MONOCYTES NFR BLD AUTO: 9 % (ref 4–12)
NEUTROPHILS # BLD AUTO: 3.99 THOUSANDS/ΜL (ref 1.85–7.62)
NEUTROPHILS # BLD AUTO: 4.62 THOUSANDS/ΜL (ref 1.85–7.62)
NEUTS SEG NFR BLD AUTO: 71 % (ref 43–75)
NEUTS SEG NFR BLD AUTO: 72 % (ref 43–75)
NITRITE UR QL STRIP: POSITIVE
NON-SQ EPI CELLS URNS QL MICRO: ABNORMAL /HPF
NRBC BLD AUTO-RTO: 0 /100 WBCS
NRBC BLD AUTO-RTO: 0 /100 WBCS
PH UR STRIP.AUTO: 6 [PH] (ref 4.5–8)
PHOSPHATE SERPL-MCNC: 2.1 MG/DL (ref 2.3–4.1)
PLATELET # BLD AUTO: 60 THOUSANDS/UL (ref 149–390)
PLATELET # BLD AUTO: 63 THOUSANDS/UL (ref 149–390)
PMV BLD AUTO: 12.7 FL (ref 8.9–12.7)
PMV BLD AUTO: 13.2 FL (ref 8.9–12.7)
POTASSIUM SERPL-SCNC: 3.4 MMOL/L (ref 3.5–5.3)
POTASSIUM SERPL-SCNC: 3.6 MMOL/L (ref 3.5–5.3)
PROCALCITONIN SERPL-MCNC: 0.43 NG/ML
PROT SERPL-MCNC: 4.7 G/DL (ref 6.4–8.2)
PROT SERPL-MCNC: 4.9 G/DL (ref 6.4–8.2)
PROT UR STRIP-MCNC: ABNORMAL MG/DL
PROTHROMBIN TIME: 21 SECONDS (ref 11.8–14.2)
RBC # BLD AUTO: 3.47 MILLION/UL (ref 3.81–5.12)
RBC # BLD AUTO: 3.76 MILLION/UL (ref 3.81–5.12)
RBC #/AREA URNS AUTO: ABNORMAL /HPF
SODIUM SERPL-SCNC: 137 MMOL/L (ref 136–145)
SODIUM SERPL-SCNC: 139 MMOL/L (ref 136–145)
SP GR UR STRIP.AUTO: 1.02 (ref 1–1.03)
UROBILINOGEN UR QL STRIP.AUTO: 2 E.U./DL
WBC # BLD AUTO: 5.63 THOUSAND/UL (ref 4.31–10.16)
WBC # BLD AUTO: 6.4 THOUSAND/UL (ref 4.31–10.16)
WBC #/AREA URNS AUTO: ABNORMAL /HPF

## 2018-10-10 PROCEDURE — 87086 URINE CULTURE/COLONY COUNT: CPT

## 2018-10-10 PROCEDURE — 83605 ASSAY OF LACTIC ACID: CPT | Performed by: EMERGENCY MEDICINE

## 2018-10-10 PROCEDURE — 99223 1ST HOSP IP/OBS HIGH 75: CPT | Performed by: HOSPITALIST

## 2018-10-10 PROCEDURE — 99223 1ST HOSP IP/OBS HIGH 75: CPT | Performed by: EMERGENCY MEDICINE

## 2018-10-10 PROCEDURE — 81001 URINALYSIS AUTO W/SCOPE: CPT

## 2018-10-10 PROCEDURE — 96361 HYDRATE IV INFUSION ADD-ON: CPT

## 2018-10-10 PROCEDURE — 80053 COMPREHEN METABOLIC PANEL: CPT

## 2018-10-10 PROCEDURE — 99285 EMERGENCY DEPT VISIT HI MDM: CPT

## 2018-10-10 PROCEDURE — 85730 THROMBOPLASTIN TIME PARTIAL: CPT | Performed by: EMERGENCY MEDICINE

## 2018-10-10 PROCEDURE — 36415 COLL VENOUS BLD VENIPUNCTURE: CPT

## 2018-10-10 PROCEDURE — 87040 BLOOD CULTURE FOR BACTERIA: CPT | Performed by: EMERGENCY MEDICINE

## 2018-10-10 PROCEDURE — 87077 CULTURE AEROBIC IDENTIFY: CPT

## 2018-10-10 PROCEDURE — 96367 TX/PROPH/DG ADDL SEQ IV INF: CPT

## 2018-10-10 PROCEDURE — 71046 X-RAY EXAM CHEST 2 VIEWS: CPT

## 2018-10-10 PROCEDURE — 87103 BLOOD FUNGUS CULTURE: CPT | Performed by: EMERGENCY MEDICINE

## 2018-10-10 PROCEDURE — 93005 ELECTROCARDIOGRAM TRACING: CPT

## 2018-10-10 PROCEDURE — 84100 ASSAY OF PHOSPHORUS: CPT

## 2018-10-10 PROCEDURE — 83605 ASSAY OF LACTIC ACID: CPT | Performed by: NURSE PRACTITIONER

## 2018-10-10 PROCEDURE — 84145 PROCALCITONIN (PCT): CPT | Performed by: EMERGENCY MEDICINE

## 2018-10-10 PROCEDURE — 96365 THER/PROPH/DIAG IV INF INIT: CPT

## 2018-10-10 PROCEDURE — 96360 HYDRATION IV INFUSION INIT: CPT

## 2018-10-10 PROCEDURE — 85025 COMPLETE CBC W/AUTO DIFF WBC: CPT | Performed by: EMERGENCY MEDICINE

## 2018-10-10 PROCEDURE — 85610 PROTHROMBIN TIME: CPT | Performed by: EMERGENCY MEDICINE

## 2018-10-10 PROCEDURE — 96366 THER/PROPH/DIAG IV INF ADDON: CPT

## 2018-10-10 PROCEDURE — 80053 COMPREHEN METABOLIC PANEL: CPT | Performed by: EMERGENCY MEDICINE

## 2018-10-10 PROCEDURE — 85025 COMPLETE CBC W/AUTO DIFF WBC: CPT

## 2018-10-10 PROCEDURE — 87186 SC STD MICRODIL/AGAR DIL: CPT

## 2018-10-10 PROCEDURE — 83735 ASSAY OF MAGNESIUM: CPT

## 2018-10-10 RX ORDER — SODIUM CHLORIDE, SODIUM GLUCONATE, SODIUM ACETATE, POTASSIUM CHLORIDE, MAGNESIUM CHLORIDE, SODIUM PHOSPHATE, DIBASIC, AND POTASSIUM PHOSPHATE .53; .5; .37; .037; .03; .012; .00082 G/100ML; G/100ML; G/100ML; G/100ML; G/100ML; G/100ML; G/100ML
125 INJECTION, SOLUTION INTRAVENOUS CONTINUOUS
Status: DISCONTINUED | OUTPATIENT
Start: 2018-10-10 | End: 2018-10-12

## 2018-10-10 RX ORDER — SUCRALFATE 1 G/1
1 TABLET ORAL 4 TIMES DAILY
Status: DISCONTINUED | OUTPATIENT
Start: 2018-10-10 | End: 2018-10-12

## 2018-10-10 RX ORDER — FLUCONAZOLE 200 MG/1
200 TABLET ORAL DAILY
Status: DISCONTINUED | OUTPATIENT
Start: 2018-10-11 | End: 2018-10-13 | Stop reason: HOSPADM

## 2018-10-10 RX ORDER — SIMETHICONE 80 MG
80 TABLET,CHEWABLE ORAL EVERY 6 HOURS PRN
Status: DISCONTINUED | OUTPATIENT
Start: 2018-10-10 | End: 2018-10-13 | Stop reason: HOSPADM

## 2018-10-10 RX ORDER — ONDANSETRON 4 MG/1
4 TABLET, ORALLY DISINTEGRATING ORAL EVERY 6 HOURS PRN
Status: DISCONTINUED | OUTPATIENT
Start: 2018-10-10 | End: 2018-10-13 | Stop reason: HOSPADM

## 2018-10-10 RX ORDER — PANTOPRAZOLE SODIUM 20 MG/1
20 TABLET, DELAYED RELEASE ORAL
Status: DISCONTINUED | OUTPATIENT
Start: 2018-10-11 | End: 2018-10-13 | Stop reason: HOSPADM

## 2018-10-10 RX ORDER — HYDROCODONE BITARTRATE AND ACETAMINOPHEN 5; 325 MG/1; MG/1
1 TABLET ORAL EVERY 6 HOURS PRN
Status: DISCONTINUED | OUTPATIENT
Start: 2018-10-10 | End: 2018-10-12

## 2018-10-10 RX ORDER — POLYETHYLENE GLYCOL 3350 17 G/17G
17 POWDER, FOR SOLUTION ORAL DAILY
Status: DISCONTINUED | OUTPATIENT
Start: 2018-10-11 | End: 2018-10-12

## 2018-10-10 RX ORDER — LORATADINE 10 MG/1
10 TABLET ORAL
Status: DISCONTINUED | OUTPATIENT
Start: 2018-10-11 | End: 2018-10-12

## 2018-10-10 RX ORDER — ALLOPURINOL 100 MG/1
200 TABLET ORAL DAILY
Status: DISCONTINUED | OUTPATIENT
Start: 2018-10-11 | End: 2018-10-12

## 2018-10-10 RX ORDER — MAGNESIUM SULFATE HEPTAHYDRATE 40 MG/ML
2 INJECTION, SOLUTION INTRAVENOUS ONCE
Status: COMPLETED | OUTPATIENT
Start: 2018-10-10 | End: 2018-10-11

## 2018-10-10 RX ORDER — ACETAMINOPHEN 325 MG/1
650 TABLET ORAL EVERY 6 HOURS PRN
Status: DISCONTINUED | OUTPATIENT
Start: 2018-10-10 | End: 2018-10-13 | Stop reason: HOSPADM

## 2018-10-10 RX ORDER — TRAZODONE HYDROCHLORIDE 50 MG/1
25 TABLET ORAL
Status: DISCONTINUED | OUTPATIENT
Start: 2018-10-10 | End: 2018-10-11

## 2018-10-10 RX ORDER — 0.9 % SODIUM CHLORIDE 0.9 %
3 VIAL (ML) INJECTION AS NEEDED
Status: DISCONTINUED | OUTPATIENT
Start: 2018-10-10 | End: 2018-10-10

## 2018-10-10 RX ADMIN — SODIUM CHLORIDE 1000 ML: 0.9 INJECTION, SOLUTION INTRAVENOUS at 16:02

## 2018-10-10 RX ADMIN — CEFEPIME HYDROCHLORIDE 2000 MG: 2 INJECTION, POWDER, FOR SOLUTION INTRAVENOUS at 17:14

## 2018-10-10 RX ADMIN — VANCOMYCIN HYDROCHLORIDE 1500 MG: 1 INJECTION, POWDER, LYOPHILIZED, FOR SOLUTION INTRAVENOUS at 18:01

## 2018-10-10 RX ADMIN — SODIUM CHLORIDE 1000 ML: 0.9 INJECTION, SOLUTION INTRAVENOUS at 17:18

## 2018-10-10 RX ADMIN — SODIUM CHLORIDE 1000 ML: 0.9 INJECTION, SOLUTION INTRAVENOUS at 16:45

## 2018-10-10 NOTE — ED ATTENDING ATTESTATION
Mignon Cota MD, saw and evaluated the patient  I have discussed the patient with the resident/non-physician practitioner and agree with the resident's/non-physician practitioner's findings, Plan of Care, and MDM as documented in the resident's/non-physician practitioner's note, except where noted  All available labs and Radiology studies were reviewed  At this point I agree with the current assessment done in the Emergency Department  I have conducted an independent evaluation of this patient a history and physical is as follows:  Here with weakness   b cell lymphoma    Oral chemotherapy   Had radiation   Dec po intake   Dehydration   No fever   Not eating or drining patient does have a dry cough   patient was discharged approximately a week ago after similar symptoms she was diagnosed with esophagitis secondary to the radiation and a candidal infection she was treated with antifungal medications   exam  the patient is in no acute distress   blood pressure on my exam is 100/62 heart rate  105   throat is clear mucous membranes are dry lungs clear  Radiation changes are noted on the skin in the upper chestheart mildly tachycardic no murmur abdomen soft nontender extremities bilateral edema no cellulitis   impression: Volume depletion secondary to poor p o  In   labs IV fluid hydration patient declines pain medications  Critical Care Time  The patient presented with a condition in which there was a high probability of imminent or life-threatening deterioration, and critical care services (excluding separately billable procedures) totalled 30-74 minutes          Procedures

## 2018-10-10 NOTE — TELEPHONE ENCOUNTER
[address] [facility] [phone]    Satinder Casanova was doing step training with her and her leg gave out she did not fall but went to the floor she was weak and couldn't get her up her heart rate was high she called the ambulance they are taking her to the ER in [place]

## 2018-10-10 NOTE — TELEPHONE ENCOUNTER
Efrem/Son called asking if you have spoken to cardiologist yet on this patient? He stated that you were holding her heart medicine and he is interested to know what the outcome is  Bro Gibbs       [phone] a message can be left  He also cancelled the patient's appointment for tomorrow  Thank you

## 2018-10-11 ENCOUNTER — APPOINTMENT (INPATIENT)
Dept: RADIOLOGY | Facility: HOSPITAL | Age: 78
DRG: 871 | End: 2018-10-11
Payer: COMMERCIAL

## 2018-10-11 LAB
ALBUMIN SERPL BCP-MCNC: 1.8 G/DL (ref 3.5–5)
ALP SERPL-CCNC: 112 U/L (ref 46–116)
ALT SERPL W P-5'-P-CCNC: 21 U/L (ref 12–78)
ANION GAP SERPL CALCULATED.3IONS-SCNC: 8 MMOL/L (ref 4–13)
AST SERPL W P-5'-P-CCNC: 50 U/L (ref 5–45)
ATRIAL RATE: 124 BPM
BASOPHILS # BLD AUTO: 0 THOUSANDS/ΜL (ref 0–0.1)
BASOPHILS NFR BLD AUTO: 0 % (ref 0–1)
BILIRUB SERPL-MCNC: 0.84 MG/DL (ref 0.2–1)
BUN SERPL-MCNC: 15 MG/DL (ref 5–25)
CALCIUM SERPL-MCNC: 9.8 MG/DL (ref 8.3–10.1)
CHLORIDE SERPL-SCNC: 107 MMOL/L (ref 100–108)
CO2 SERPL-SCNC: 27 MMOL/L (ref 21–32)
CREAT SERPL-MCNC: 0.65 MG/DL (ref 0.6–1.3)
EOSINOPHIL # BLD AUTO: 0.02 THOUSAND/ΜL (ref 0–0.61)
EOSINOPHIL NFR BLD AUTO: 0 % (ref 0–6)
ERYTHROCYTE [DISTWIDTH] IN BLOOD BY AUTOMATED COUNT: 17.1 % (ref 11.6–15.1)
GFR SERPL CREATININE-BSD FRML MDRD: 87 ML/MIN/1.73SQ M
GLUCOSE SERPL-MCNC: 68 MG/DL (ref 65–140)
HCT VFR BLD AUTO: 26.9 % (ref 34.8–46.1)
HGB BLD-MCNC: 8.3 G/DL (ref 11.5–15.4)
IMM GRANULOCYTES # BLD AUTO: 0.04 THOUSAND/UL (ref 0–0.2)
IMM GRANULOCYTES NFR BLD AUTO: 1 % (ref 0–2)
LACTATE SERPL-SCNC: 3.5 MMOL/L (ref 0.5–2)
LACTATE SERPL-SCNC: 4.3 MMOL/L (ref 0.5–2)
LACTATE SERPL-SCNC: 5.3 MMOL/L (ref 0.5–2)
LACTATE SERPL-SCNC: 7.5 MMOL/L (ref 0.5–2)
LACTATE SERPL-SCNC: 9 MMOL/L (ref 0.5–2)
LDH SERPL-CCNC: 712 U/L (ref 81–234)
LYMPHOCYTES # BLD AUTO: 0.89 THOUSANDS/ΜL (ref 0.6–4.47)
LYMPHOCYTES NFR BLD AUTO: 19 % (ref 14–44)
MAGNESIUM SERPL-MCNC: 2 MG/DL (ref 1.6–2.6)
MCH RBC QN AUTO: 28.1 PG (ref 26.8–34.3)
MCHC RBC AUTO-ENTMCNC: 30.9 G/DL (ref 31.4–37.4)
MCV RBC AUTO: 91 FL (ref 82–98)
MONOCYTES # BLD AUTO: 0.4 THOUSAND/ΜL (ref 0.17–1.22)
MONOCYTES NFR BLD AUTO: 9 % (ref 4–12)
NEUTROPHILS # BLD AUTO: 3.3 THOUSANDS/ΜL (ref 1.85–7.62)
NEUTS SEG NFR BLD AUTO: 71 % (ref 43–75)
NRBC BLD AUTO-RTO: 0 /100 WBCS
P AXIS: 65 DEGREES
PHOSPHATE SERPL-MCNC: 4.1 MG/DL (ref 2.3–4.1)
PLATELET # BLD AUTO: 55 THOUSANDS/UL (ref 149–390)
PMV BLD AUTO: 12.2 FL (ref 8.9–12.7)
POTASSIUM SERPL-SCNC: 4 MMOL/L (ref 3.5–5.3)
PR INTERVAL: 120 MS
PROT SERPL-MCNC: 3.9 G/DL (ref 6.4–8.2)
QRS AXIS: 26 DEGREES
QRSD INTERVAL: 84 MS
QT INTERVAL: 312 MS
QTC INTERVAL: 448 MS
RBC # BLD AUTO: 2.95 MILLION/UL (ref 3.81–5.12)
SODIUM SERPL-SCNC: 142 MMOL/L (ref 136–145)
T WAVE AXIS: 26 DEGREES
VENTRICULAR RATE: 124 BPM
WBC # BLD AUTO: 4.65 THOUSAND/UL (ref 4.31–10.16)

## 2018-10-11 PROCEDURE — 99232 SBSQ HOSP IP/OBS MODERATE 35: CPT | Performed by: INTERNAL MEDICINE

## 2018-10-11 PROCEDURE — 99222 1ST HOSP IP/OBS MODERATE 55: CPT | Performed by: INTERNAL MEDICINE

## 2018-10-11 PROCEDURE — 83605 ASSAY OF LACTIC ACID: CPT | Performed by: PHYSICIAN ASSISTANT

## 2018-10-11 PROCEDURE — 97163 PT EVAL HIGH COMPLEX 45 MIN: CPT

## 2018-10-11 PROCEDURE — 80053 COMPREHEN METABOLIC PANEL: CPT | Performed by: HOSPITALIST

## 2018-10-11 PROCEDURE — 83605 ASSAY OF LACTIC ACID: CPT

## 2018-10-11 PROCEDURE — G8987 SELF CARE CURRENT STATUS: HCPCS

## 2018-10-11 PROCEDURE — 84100 ASSAY OF PHOSPHORUS: CPT | Performed by: HOSPITALIST

## 2018-10-11 PROCEDURE — 83735 ASSAY OF MAGNESIUM: CPT | Performed by: HOSPITALIST

## 2018-10-11 PROCEDURE — G8979 MOBILITY GOAL STATUS: HCPCS

## 2018-10-11 PROCEDURE — 85025 COMPLETE CBC W/AUTO DIFF WBC: CPT | Performed by: HOSPITALIST

## 2018-10-11 PROCEDURE — 93010 ELECTROCARDIOGRAM REPORT: CPT | Performed by: INTERNAL MEDICINE

## 2018-10-11 PROCEDURE — 92610 EVALUATE SWALLOWING FUNCTION: CPT

## 2018-10-11 PROCEDURE — G8997 SWALLOW GOAL STATUS: HCPCS

## 2018-10-11 PROCEDURE — 83615 LACTATE (LD) (LDH) ENZYME: CPT | Performed by: INTERNAL MEDICINE

## 2018-10-11 PROCEDURE — G8978 MOBILITY CURRENT STATUS: HCPCS

## 2018-10-11 PROCEDURE — 94762 N-INVAS EAR/PLS OXIMTRY CONT: CPT

## 2018-10-11 PROCEDURE — G8988 SELF CARE GOAL STATUS: HCPCS

## 2018-10-11 PROCEDURE — G8996 SWALLOW CURRENT STATUS: HCPCS

## 2018-10-11 PROCEDURE — 94760 N-INVAS EAR/PLS OXIMETRY 1: CPT

## 2018-10-11 PROCEDURE — 97167 OT EVAL HIGH COMPLEX 60 MIN: CPT

## 2018-10-11 PROCEDURE — 83605 ASSAY OF LACTIC ACID: CPT | Performed by: INTERNAL MEDICINE

## 2018-10-11 PROCEDURE — 74176 CT ABD & PELVIS W/O CONTRAST: CPT

## 2018-10-11 PROCEDURE — G8998 SWALLOW D/C STATUS: HCPCS

## 2018-10-11 RX ORDER — THIAMINE MONONITRATE (VIT B1) 100 MG
100 TABLET ORAL
Status: DISCONTINUED | OUTPATIENT
Start: 2018-10-11 | End: 2018-10-11

## 2018-10-11 RX ORDER — BENZONATATE 100 MG/1
100 CAPSULE ORAL 3 TIMES DAILY PRN
Status: DISCONTINUED | OUTPATIENT
Start: 2018-10-11 | End: 2018-10-13 | Stop reason: HOSPADM

## 2018-10-11 RX ORDER — DEXTROSE AND SODIUM CHLORIDE 5; .9 G/100ML; G/100ML
75 INJECTION, SOLUTION INTRAVENOUS CONTINUOUS
Status: DISCONTINUED | OUTPATIENT
Start: 2018-10-11 | End: 2018-10-11

## 2018-10-11 RX ORDER — LORAZEPAM 0.5 MG/1
0.5 TABLET ORAL EVERY 8 HOURS PRN
Status: DISCONTINUED | OUTPATIENT
Start: 2018-10-11 | End: 2018-10-12

## 2018-10-11 RX ADMIN — APIXABAN 5 MG: 5 TABLET, FILM COATED ORAL at 17:41

## 2018-10-11 RX ADMIN — LIDOCAINE HYDROCHLORIDE 10 ML: 20 SOLUTION ORAL; TOPICAL at 00:15

## 2018-10-11 RX ADMIN — ACETAMINOPHEN 650 MG: 325 TABLET, FILM COATED ORAL at 13:41

## 2018-10-11 RX ADMIN — APIXABAN 5 MG: 5 TABLET, FILM COATED ORAL at 09:47

## 2018-10-11 RX ADMIN — SODIUM CHLORIDE, SODIUM LACTATE, POTASSIUM CHLORIDE, AND CALCIUM CHLORIDE 2000 ML: .6; .31; .03; .02 INJECTION, SOLUTION INTRAVENOUS at 22:57

## 2018-10-11 RX ADMIN — SUCRALFATE 1 G: 1 TABLET ORAL at 20:30

## 2018-10-11 RX ADMIN — CEFEPIME HYDROCHLORIDE 1000 MG: 1 INJECTION, POWDER, FOR SOLUTION INTRAMUSCULAR; INTRAVENOUS at 20:36

## 2018-10-11 RX ADMIN — ALLOPURINOL 200 MG: 100 TABLET ORAL at 09:47

## 2018-10-11 RX ADMIN — LIDOCAINE HYDROCHLORIDE 10 ML: 20 SOLUTION ORAL; TOPICAL at 12:18

## 2018-10-11 RX ADMIN — TRAZODONE HYDROCHLORIDE 25 MG: 50 TABLET ORAL at 00:14

## 2018-10-11 RX ADMIN — FLUCONAZOLE 200 MG: 200 TABLET ORAL at 09:47

## 2018-10-11 RX ADMIN — SODIUM CHLORIDE, SODIUM GLUCONATE, SODIUM ACETATE, POTASSIUM CHLORIDE, MAGNESIUM CHLORIDE, SODIUM PHOSPHATE, DIBASIC, AND POTASSIUM PHOSPHATE 75 ML/HR: .53; .5; .37; .037; .03; .012; .00082 INJECTION, SOLUTION INTRAVENOUS at 17:53

## 2018-10-11 RX ADMIN — LORATADINE 10 MG: 10 TABLET ORAL at 05:08

## 2018-10-11 RX ADMIN — SUCRALFATE 1 G: 1 TABLET ORAL at 12:18

## 2018-10-11 RX ADMIN — PANTOPRAZOLE SODIUM 20 MG: 20 TABLET, DELAYED RELEASE ORAL at 05:08

## 2018-10-11 RX ADMIN — SUCRALFATE 1 G: 1 TABLET ORAL at 00:16

## 2018-10-11 RX ADMIN — SODIUM CHLORIDE, SODIUM GLUCONATE, SODIUM ACETATE, POTASSIUM CHLORIDE, MAGNESIUM CHLORIDE, SODIUM PHOSPHATE, DIBASIC, AND POTASSIUM PHOSPHATE 75 ML/HR: .53; .5; .37; .037; .03; .012; .00082 INJECTION, SOLUTION INTRAVENOUS at 03:30

## 2018-10-11 RX ADMIN — CEFEPIME HYDROCHLORIDE 1000 MG: 1 INJECTION, POWDER, FOR SOLUTION INTRAMUSCULAR; INTRAVENOUS at 09:48

## 2018-10-11 RX ADMIN — SUCRALFATE 1 G: 1 TABLET ORAL at 09:47

## 2018-10-11 RX ADMIN — POTASSIUM PHOSPHATE, MONOBASIC AND POTASSIUM PHOSPHATE, DIBASIC 30 MMOL: 224; 236 INJECTION, SOLUTION INTRAVENOUS at 00:03

## 2018-10-11 RX ADMIN — APIXABAN 5 MG: 5 TABLET, FILM COATED ORAL at 00:16

## 2018-10-11 RX ADMIN — SUCRALFATE 1 G: 1 TABLET ORAL at 17:41

## 2018-10-11 RX ADMIN — LIDOCAINE HYDROCHLORIDE 10 ML: 20 SOLUTION ORAL; TOPICAL at 20:31

## 2018-10-11 RX ADMIN — MAGNESIUM SULFATE HEPTAHYDRATE 2 G: 40 INJECTION, SOLUTION INTRAVENOUS at 01:33

## 2018-10-11 RX ADMIN — BENZONATATE 100 MG: 100 CAPSULE ORAL at 13:41

## 2018-10-11 RX ADMIN — LIDOCAINE HYDROCHLORIDE 10 ML: 20 SOLUTION ORAL; TOPICAL at 09:47

## 2018-10-11 NOTE — PLAN OF CARE
Problem: DISCHARGE PLANNING - CARE MANAGEMENT  Goal: Discharge to post-acute care or home with appropriate resources  INTERVENTIONS:  - Conduct assessment to determine patient/family and health care team treatment goals, and need for post-acute services based on payer coverage, community resources, and patient preferences, and barriers to discharge  - Address psychosocial, clinical, and financial barriers to discharge as identified in assessment in conjunction with the patient/family and health care team  - Arrange appropriate level of post-acute services according to patient's   needs and preference and payer coverage in collaboration with the physician and health care team  - Communicate with and update the patient/family, physician, and health care team regarding progress on the discharge plan  - Arrange appropriate transportation to post-acute venues  - DCP short term rehab  Outcome: Progressing

## 2018-10-11 NOTE — PROGRESS NOTES
Progress Note - Critical Care   Teresa Larkin 78 y o  female MRN: [id number]  Unit/Bed#: [facility] 528-81 Encounter: [id number]    Attending Physician: Lynn Amado MD      ______________________________________________________________________  Assessment and Plan:   1  Sepsis, POA  Suspected urinary source  2  Persistent lactic acidosis  3  Thrombocytopenia  4  Sinus Tachycardia  5  Anemia  6  Diffuse B-cell lymphoma, last palliative radiation 9/28, last chemo 1/2018  7  Left Knee pain  8  Moderate protein-calorie malnutrition  9  Chronic systolic CHF EF 76% on 32/7137  10  Active Candida esophagitis/Dysphagia  11  Prior Left cephalic/basilic/subclavian 0/1763  On Eliquis        Neuro:   Left knee pain: Consider obtaining radiographic imaging  Unable to bend/extend knee  Unable to bare weight  No obvious erythema/warmth  No obvious effusion however difficult to assess given habitus    CV:     Chronic Systolic HF: Clinically appears volume depleted  Continue gentle hydration for now  Monitoring for volume overload  Perform strict I/O  Echo: EF 82%, systolic dyfx 1/29  Sinus tachycardia: this has been chronic and cards placed patient on metoprolol on most recent admit  Likely exacerbated 2/2 current sepsis picture  TSH 0 224/FT4 1 39  Prior UE DVT on LEFT (subclavian/basilic/cephalic): On eliquis  Repeat imaging performed       Pulm: Currently stable  Titrate O2 to maintain SpO2 >92%    GI: On diet  Candida Esophagitis: on diflucan 200mg daily  Dx'd last admit by EGD    :   UTI: Consider CT abdomen/Pelvis to r/o stone/abcess given persistant lactic acidosis  Currently on cefepime  1g q12  F/u urine culture  Trend BUN/Creatine  Lactic Acidosis: may be 2/2 sepsis however t/c type B 2/2 extensive lymphoma  Despite lactic acidosis, she remains non-toxic appearing and HD stable  [place] to trend  F/E/N:   Continue maintenence fluids    ID:   Sepsis, POA: Suspect urinary source   Of note, patient had a CT on 8/21 of this year which showed extensive retroperitoneal lymphadenopathy & mediastinal adenopathy causing mass effect on esophagus  Continue cefepime  Trend WBC/temp/cultures  Heme:   Anemia: trend  Thrombocytopenia: Consider obtaining hemolysis smear/fibrinogen    Endo:   Stablke  TSH 0 224 9/26 w/ nl FT4     Msk/Skin:   Left knee pain: unable to bend  Consider obtaining imaging    Disposition: Continue care  Discussed case w/ Dr Kendrick Fleming of primary service  Code Status: Level 3 - DNAR and DNI    Counseling / Coordination of Care  Total Critical Care time spent 40 minutes excluding procedures, teaching and family updates  ______________________________________________________________________    Chief Complaint: "I feel so weak"    24 Hour Events:   · Admitted for weakness/sepsis  · [facility] consulted 2/2 persistent lactic acidosis/sepsis  · Remains hemodynamically stable    I/O: 871/1 urine     Review of Systems   Constitutional: Negative for fever  Respiratory: Negative for cough, shortness of breath, wheezing and stridor  ______________________________________________________________________    Physical Exam:   Physical Exam   Constitutional: Vital signs are normal  She is cooperative  Non-toxic appearance  Nasal cannula in place  HENT:   Head: Normocephalic  Eyes: Pupils are equal, round, and reactive to light  Neck: Normal range of motion  No JVD present  Cardiovascular: Intact distal pulses  Tachycardia present  Pulmonary/Chest: Effort normal and breath sounds normal  No respiratory distress  Abdominal: Soft  Bowel sounds are normal  There is no tenderness  Musculoskeletal: Normal range of motion  Neurological: She is alert  No cranial nerve deficit  GCS eye subscore is 4  GCS verbal subscore is 5  GCS motor subscore is 6  Skin: Skin is warm, dry and intact  Capillary refill takes less than 2 seconds  Psychiatric: She has a normal mood and affect   Her speech is normal and behavior is normal  Thought content normal    Vitals reviewed  ______________________________________________________________________  Vitals:    10/11/18 0715 10/11/18 0746 10/11/18 1028 10/11/18 1034   BP: 98/53      BP Location: Left arm      Pulse: (!) 110      Resp: 18      Temp: 98 2 °F (36 8 °C)      TempSrc: Oral      SpO2: 95% 94% 94% 94%   Weight:       Height:           Temperature:   Temp (24hrs), Av 4 °F (36 9 °C), Min:98 1 °F (36 7 °C), Max:98 8 °F (37 1 °C)    Current Temperature: 98 2 °F (36 8 °C)  Weights:   IBW: 52 4 kg    Body mass index is 38 7 kg/m²  Weight (last 2 days)     Date/Time   Weight    10/10/18 2300  99 1 (218 48)    10/10/18 1521  95 9 (211 42)            Hemodynamic Monitoring:  N/A     Non-Invasive/Invasive Ventilation Settings:  Respiratory    Lab Data (Last 4 hours)    None         O2/Vent Data (Last 4 hours)    None              No results found for: PHART, QOO3ZBV, PO2ART, QCW2MZR, K0IVYHNL, BEART, SOURCE  SpO2: SpO2: 95 %  Intake and Outputs:  I/O       10/09 0701 - 10/10 0700 10/10 0701 - 10/11 0700 10/11 0701 - 10/12 0700    I V  (mL/kg)  1000 (10 1)     IV Piggyback  2900 50    Total Intake(mL/kg)  3900 (39 4) 50 (0 5)    Net   +3900 +50           Unmeasured Urine Occurrence  1 x           Nutrition:        Diet Orders            Start     Ordered    10/11/18 1134  Diet Regular; Regular House  Diet effective now     Question Answer Comment   Diet Type Regular    Regular Regular House    RD to adjust diet per protocol?  Yes        10/11/18 1133          Labs:     Results from last 7 days  Lab Units 10/11/18  0506 10/10/18  1546 10/10/18  1115   WBC Thousand/uL 4 65 5 63 6 40   HEMOGLOBIN g/dL 8 3* 9 7* 10 5*   HEMATOCRIT % 26 9* 31 6* 34 2*   PLATELETS Thousands/uL 55* 60* 63*   NEUTROS PCT % 71 71 72   MONOS PCT % 9 9 7       Results from last 7 days  Lab Units 10/11/18  0506 10/10/18  1546 10/10/18  1115   SODIUM mmol/L 142 137 139   POTASSIUM mmol/L 4 0 3 4* 3 6 CHLORIDE mmol/L 107 101 100   CO2 mmol/L 27 26 29   BUN mg/dL 15 16 15   CREATININE mg/dL 0 65 0 85 0 89   CALCIUM mg/dL 9 8 10 6* 10 8*   ALK PHOS U/L 112 141* 155*   ALT U/L 21 24 26   AST U/L 50* 60* 69*       Results from last 7 days  Lab Units 10/11/18  0506 10/10/18  1115   MAGNESIUM mg/dL 2 0 1 9     Lab Results   Component Value Date    PHOS 4 1 10/11/2018    PHOS 2 1 (L) 10/10/2018    PHOS 2 4 10/03/2018        Results from last 7 days  Lab Units 10/10/18  1558   INR  1 80*   PTT seconds 84*       0  Lab Value Date/Time   TROPONINI <0 02 08/21/2018 0837       Results from last 7 days  Lab Units 10/11/18  1004 10/11/18  0735 10/11/18  0138   LACTIC ACID mmol/L 5 3* 3 5* 4 3*     ABG:No results found for: PHART, YVV8NEN, PO2ART, QML5DJU, L6ZSJFSP, BEART, SOURCE  Imaging: CXR: Small b/l pleural effusions  Suspicion of mild pulm vascular congestion I have personally reviewed pertinent reports  EKG: Sinus Tachycardia rate 117  Micro:  Lab Results   Component Value Date    URINECX 20,000-29,000 cfu/ml  09/25/2018    URINECX <10,000 cfu/ml  08/21/2018    URINECX 40,000-49,000 cfu/ml Escherichia coli (A) 08/10/2018    URINECX <10,000 cfu/ml  08/10/2018     Allergies:    Allergies   Allergen Reactions    Alteplase Anaphylaxis    Aspirin Anaphylaxis    Celebrex [Celecoxib] Anaphylaxis    Nsaids Anaphylaxis    Other Anaphylaxis     Pt states when she received a certain type of chemotherapy it caused her throat to close    Rituxan [Rituximab] Anaphylaxis     Swelling of tongue and closing of throat    Sulfa Antibiotics Other (See Comments) and Anaphylaxis     VERY EMOTIONAL CRYING    Bactrim [Sulfamethoxazole-Trimethoprim] Other (See Comments)     VERY EMOTIONAL/CRYING     Medications:   Scheduled Meds:  Current Facility-Administered Medications:  acetaminophen 650 mg Oral Q6H PRN Kathy Kim MD    al mag oxide-diphenhydramine-lidocaine viscous 10 mL Swish & Swallow 4x Daily (with meals and at bedtime) Regulo Sykes MD    allopurinol 200 mg Oral Daily Regulo Sykes MD    apixaban 5 mg Oral BID Regulo Sykes MD    cefepime 1,000 mg Intravenous Q12H Regulo Sykes MD Last Rate: Stopped (10/11/18 1018)   fluconazole 200 mg Oral Daily Regulo Sykes MD    HYDROcodone-acetaminophen 1 tablet Oral Q6H PRN Regulo Sykes MD    influenza vaccine 0 5 mL Intramuscular Prior to discharge Regulo Sykes MD    loratadine 10 mg Oral Early Morning Regulo Sykes MD    multi-electrolyte 75 mL/hr Intravenous Continuous Magdalene ANNEMARIE Tobar Last Rate: 75 mL/hr (10/11/18 0330)   norepinephrine 1-30 mcg/min Intravenous Titrated Alena Marrero MD Last Rate: Stopped (10/10/18 1745)   ondansetron 4 mg Oral Q6H PRN Regulo Sykes MD    pantoprazole 20 mg Oral Early Morning Regulo Sykes MD    polyethylene glycol 17 g Oral Daily Regulo Sykes MD    simethicone 80 mg Oral Q6H PRN Regulo Sykes MD    sucralfate 1 g Oral 4x Daily Regulo Sykes MD    traZODone 25 mg Oral HS Regulo Sykes MD      Continuous Infusions:  multi-electrolyte 75 mL/hr Last Rate: 75 mL/hr (10/11/18 0330)   norepinephrine 1-30 mcg/min Last Rate: Stopped (10/10/18 1745)     PRN Meds:    acetaminophen 650 mg Q6H PRN   HYDROcodone-acetaminophen 1 tablet Q6H PRN   influenza vaccine 0 5 mL Prior to discharge   ondansetron 4 mg Q6H PRN   simethicone 80 mg Q6H PRN     VTE Pharmacologic Prophylaxis: Apixaban (Eliquis)  VTE Mechanical Prophylaxis: sequential compression device  Invasive lines and devices: Invasive Devices     Peripheral Intravenous Line            Peripheral IV 10/11/18 Right Hand less than 1 day                     Portions of the record may have been created with voice recognition software  Occasional wrong word or "sound a like" substitutions may have occurred due to the inherent limitations of voice recognition software    Read the chart carefully and recognize, using context, where substitutions have occurred      Jessica Us

## 2018-10-11 NOTE — SOCIAL WORK
Patient identified as HRR per criteria  Call made to DC appointment hotline with information as required for CM support follow up    Referral to [facility]

## 2018-10-11 NOTE — ED PROVIDER NOTES
History  Chief Complaint   Patient presents with    Weakness - Generalized     pt was d/cd last week for dehydration, Physical Therapy at residence today found pt to be weak with c/o that her left leg was weakner than her right one  pt also has hypotension at home as per EMS 41'X systolic  Initiate a 79-year-old female with a diffuse B-cell lymphoma presenting to the emergency department for evaluation of generalized weakness  Was recently discharged this past week from the hospital for the same after was found she was dehydrated  A major contributing factor to this was esophageal candidiasis which she incurred after undergoing radiation treatment for her lymphoma  This caused this fossa and odynophagia and so she had decreased oral intake  She was admitted to the hospital for several days for IV hydration as well as treatment for the candidiasis  Her strength improved, her appetite returned, and she was discharged home  Her appetite and level of energy were normal until the past day where her appetite decreased as did her p o  Intake and though she was still able to tolerate some liquids she generally felt very weak  She had no other specific symptoms at this time  She has not had any fevers chills chest pains shortness of breath abdominal pain nausea vomiting and changes in bowel or bladder habits or any urinary symptoms  She completed a full course of radiation and was supposed to take oral chemotherapy pills but this has been held because of the development of the candidiasis  Prior to Admission Medications   Prescriptions Last Dose Informant Patient Reported? Taking?    HYDROcodone-acetaminophen (NORCO) 5-325 mg per tablet Past Week at Unknown time Self No Yes   Sig: Take 1 tablet by mouth every 6 (six) hours as needed for pain Max Daily Amount: 4 tablets   Lenalidomide (REVLIMID) 20 MG CAPS on hold at Unknown time Self No No   Sig: Take 1 capsule (20 mg total) by mouth daily Adult Female  Auth # [id number]  Take 1 capsule daily days 1-21  Followed by 7 days off     Melatonin 5 MG TABS Past Week at Unknown time  Yes Yes   Sig: Take 1 tablet by mouth daily at bedtime as needed   Multiple Vitamin (MULTIVITAMIN) capsule 10/10/2018 at 1200 Self Yes Yes   Sig: Take 1 capsule by mouth daily with dinner     acetaminophen (TYLENOL) 325 mg tablet Past Week at Unknown time  No Yes   Sig: Take 2 tablets (650 mg total) by mouth every 6 (six) hours as needed for mild pain or headaches   al mag oxide-diphenhydramine-lidocaine viscous (MAGIC MOUTHWASH) 1:1:1 suspension Past Week at Unknown time  No Yes   Sig: Swish and swallow 10 mL 4 (four) times a day (with meals and at bedtime)   allopurinol (ZYLOPRIM) 100 mg tablet 10/10/2018 at 1200 Self No Yes   Sig: Take 2 tablets (200 mg total) by mouth daily   apixaban (ELIQUIS) 5 mg 10/10/2018 at 1200 Self No Yes   Sig: Take 1 tablet (5 mg total) by mouth 2 (two) times a day   fluconazole (DIFLUCAN) 200 mg tablet 10/9/2018 at 2000  No Yes   Sig: Take 1 tablet (200 mg total) by mouth daily for 18 days   loratadine (CLARITIN) 10 mg tablet Past Week at Unknown time Self Yes Yes   Sig: Take 10 mg by mouth daily in the early morning     metoprolol tartrate (LOPRESSOR) 25 mg tablet Past Week at Unknown time  No Yes   Sig: Take 1 tablet (25 mg total) by mouth every 12 (twelve) hours   omeprazole (PriLOSEC) 20 mg delayed release capsule 10/10/2018 at 1200 Self No Yes   Sig: Take 1 capsule (20 mg total) by mouth daily   ondansetron (ZOFRAN) 4 mg tablet Past Week at Unknown time Self No Yes   Sig: Take 1 tablet (4 mg total) by mouth every 8 (eight) hours as needed for nausea or vomiting   polyethylene glycol (MIRALAX) 17 g packet Past Week at Unknown time Self No Yes   Sig: Take 17 g by mouth daily   simethicone (MYLICON) 80 mg chewable tablet Past Week at Unknown time Self No Yes   Sig: Chew 1 tablet (80 mg total) every 6 (six) hours as needed for flatulence   sucralfate (CARAFATE) 1 g tablet Past Month at Unknown time  No Yes   Sig: Take 1 tablet (1 g total) by mouth 4 (four) times a day   traZODone (DESYREL) 50 mg tablet Not Taking at Unknown time  No No   Sig: Take 0 5 tablets (25 mg total) by mouth daily at bedtime   Patient not taking: Reported on 10/10/2018       Facility-Administered Medications: None       Past Medical History:   Diagnosis Date    Basal cell carcinoma (BCC)     Bruit of right carotid artery     resolved 5/14/15     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HCC)     radiation    Dyslipidemia     Esophageal stricture     last assessed 2/28/13       GERD (gastroesophageal reflux disease)     History of chemotherapy     History of radiation therapy     [place]te (hard of hearing)     b/l ears    Hyperlipidemia     Hypertension     Osteoarthritis     Osteoarthrosis of ankle and foot     last assessed 5/20/13     Osteopenia     last assessed 2/28/13     Plantar fasciitis of left foot     Polyp of sigmoid colon     last assessed 2/28/13     Shortness of breath     when walking up stairs       Past Surgical History:   Procedure Laterality Date    ANKLE ARTHROPLASTY Left     BLEPHAROPLASTY      right eye     CATARACT EXTRACTION Right     COLONOSCOPY      ESOPHAGEAL DILATION      x2    ESOPHAGOGASTRODUODENOSCOPY      dilitation    ESOPHAGOGASTRODUODENOSCOPY N/A 10/1/2018    Procedure: ESOPHAGOGASTRODUODENOSCOPY (EGD); Surgeon: Jose Luis Mcneil MD;  [facility]: [facility]; Service: Gastroenterology    FACIAL/NECK BIOPSY Right 9/8/2017    Procedure: NECK NODE BIOPSY WITH NEEDLE LOCALIZATION; LYMPHOMA PROTOCOL (NEEDLE LOC WITH I R  AT 1100);   Surgeon: Mariaa Steven MD;  [facility];  Service: Surgical Oncology    HYSTERECTOMY      LYMPH NODE DISSECTION      right groin and neck    ORBITAL FLOOR EXPLORATION Right     for cancer     PORTACATH PLACEMENT      left chest     TN BX/REMV,LYMPH NODE,DEEP AXILL Left 8/14/2017    Procedure: Axillary lymph node excision with LYMPHOMA PROTOCOL;  Surgeon: Radha Martinez MD;  [facility];  Service: Surgical Oncology    MD BX/REMV,LYMPH NODE,DEEP CERV Right 5/2/2016    Procedure: NECK NODE BIOPSY;  Surgeon: Radha Martinez MD;  [facility];  Service: Surgical Oncology    MD BX/REMV,LYMPH NODE,DEEP CERV Left 7/25/2018    Procedure: NECK NODE BIOPYS WITH LYMPHOMA PROTOCOL; (ULTRASOUND GUIDED NEEDLE LOC IN IR AT 0800); Surgeon: Radha Martinez MD;  [facility];  Service: Surgical Oncology    MD INSJ TUNNELED CTR VAD W/SUBQ PORT AGE 5 YR/> N/A 10/3/2017    Procedure: Arthea Grandchild;  Surgeon: Radha Martinez MD;  [facility];  Service: Surgical Oncology       Family History   Problem Relation Age of Onset    Multiple myeloma Mother     Heart disease Father     Hypertension Father     Hypertension Sister     Heart disease Sister     Arthritis Family     Osteoporosis Family     Breast cancer Maternal Aunt      I have reviewed and agree with the history as documented  Social History   Substance Use Topics    Smoking status: Former Smoker     Quit date: 1970    Smokeless tobacco: Never Used    Alcohol use No        Review of Systems   Constitutional: Positive for appetite change and fatigue  Negative for chills and fever  HENT: Negative for sneezing and sore throat  Eyes: Negative for visual disturbance  Respiratory: Negative for cough, choking, chest tightness, shortness of breath and wheezing  Cardiovascular: Negative for chest pain and palpitations  Gastrointestinal: Negative for abdominal pain, constipation, diarrhea, nausea and vomiting  Genitourinary: Negative for difficulty urinating and dysuria  Neurological: Negative for dizziness, weakness, light-headedness, numbness and headaches  All other systems reviewed and are negative        Physical Exam  ED Triage Vitals   Temperature Pulse Respirations Blood Pressure SpO2   10/10/18 1521 10/10/18 1521 10/10/18 1521 10/10/18 1521 10/10/18 1521   98 8 °F (37 1 °C) (!) 122 22 (!) 89/54 93 %      Temp Source Heart Rate Source Patient Position - Orthostatic VS BP Location FiO2 (%)   10/10/18 1521 10/10/18 1938 10/10/18 1938 10/10/18 1938 --   Oral Monitor Sitting Left arm       Pain Score       10/10/18 1521       No Pain           Orthostatic Vital Signs  Vitals:    10/11/18 2058 10/11/18 2226 10/12/18 0411 10/12/18 0757   BP:  97/56 103/59 120/63   Pulse: 105 (!) 110 (!) 112 (!) 109   Patient Position - Orthostatic VS:  Lying Lying Lying       Physical Exam   Constitutional: She is oriented to person, place, and time  She appears well-developed  No distress  The patient appears dehydrated  She is noted to be hypotensive on initial evaluation   HENT:   Head: Normocephalic and atraumatic  Mouth/Throat: Oropharynx is clear and moist    Eyes: Pupils are equal, round, and reactive to light  EOM are normal    Neck: No JVD present  No tracheal deviation present  Cardiovascular: Normal rate, regular rhythm, normal heart sounds and intact distal pulses  Exam reveals no gallop and no friction rub  No murmur heard  Pulmonary/Chest: Effort normal and breath sounds normal  No respiratory distress  She has no wheezes  She has no rales  Abdominal: Soft  Bowel sounds are normal  She exhibits no distension  There is no tenderness  There is no rebound and no guarding  Neurological: She is alert and oriented to person, place, and time  No cranial nerve deficit  She exhibits normal muscle tone  Skin: Skin is warm and dry  She is not diaphoretic  No pallor  Psychiatric: She has a normal mood and affect  Her behavior is normal    Nursing note and vitals reviewed        ED Medications  Medications   sodium chloride 0 9 % bolus 1,000 mL (0 mL Intravenous Stopped 10/10/18 1645)   sodium chloride 0 9 % bolus 1,000 mL (0 mL Intravenous Stopped 10/10/18 1858)   vancomycin (VANCOCIN) 1,500 mg in sodium chloride 0 9 % 250 mL IVPB (0 mg/kg × 95 9 kg Intravenous Stopped 10/10/18 1951)   cefepime (MAXIPIME) 2 g/50 mL dextrose IVPB (0 mg Intravenous Stopped 10/10/18 1754)   sodium chloride 0 9 % bolus 1,000 mL (0 mL Intravenous Stopped 10/10/18 1950)   magnesium sulfate 2 g/50 mL IVPB (premix) 2 g (0 g Intravenous Stopped 10/11/18 0509)   potassium phosphate 30 mmol in sodium chloride 0 9 % 250 mL infusion (0 mmol Intravenous Stopped 10/12/18 0852)   lactated ringers bolus 2,000 mL (0 mL Intravenous Stopped 10/12/18 0157)   lactated ringers bolus 2,000 mL (0 mL Intravenous Stopped 10/12/18 0852)       Diagnostic Studies  Results Reviewed     Procedure Component Value Units Date/Time    Blood culture #1 [[id number]] Collected:  10/10/18 1558    Lab Status:  Preliminary result Specimen:  Blood from Line, Venous Updated:  10/13/18 1901     Blood Culture No Growth at 72 hrs  Blood culture #2 [[id number]] Collected:  10/10/18 1558    Lab Status:  Preliminary result Specimen:  Blood from Line, Venous Updated:  10/13/18 1901     Blood Culture No Growth at 72 hrs      Urine culture [[id number]]  (Abnormal)  (Susceptibility) Collected:  10/10/18 1740    Lab Status:  Final result Specimen:  Urine from Urine, Other Updated:  10/12/18 1053     Urine Culture >100,000 cfu/ml Escherichia coli (A)    Susceptibility      Escherichia coli     VINI    Ampicillin ($$) <=8 00 ug/ml Susceptible    Aztreonam ($$$)  <=4 ug/ml Susceptible    Cefazolin ($) <=2 00 ug/ml Susceptible    Ciprofloxacin ($)  <=1 00 ug/ml Susceptible    Gentamicin ($$) <=1 ug/ml Susceptible    Levofloxacin ($) <=0 25 ug/ml Susceptible    Nitrofurantoin <=32 ug/ml Susceptible    Tetracycline <=4 ug/ml Susceptible    Tobramycin ($) <=1 ug/ml Susceptible    Trimethoprim + Sulfamethoxazole ($$$) <=2/38 ug/ml Susceptible    ZID Performed Yes                     CBC and differential [[id number]]  (Abnormal) Collected:  10/11/18 0506    Lab Status:  Final result Specimen:  Blood from Arm, Right Updated: 10/11/18 0805     WBC 4 65 Thousand/uL      RBC 2 95 (L) Million/uL      Hemoglobin 8 3 (L) g/dL      Hematocrit 26 9 (L) %      MCV 91 fL      MCH 28 1 pg      MCHC 30 9 (L) g/dL      RDW 17 1 (H) %      MPV 12 2 fL      Platelets 55 (L) Thousands/uL      nRBC 0 /100 WBCs      Neutrophils Relative 71 %      Immat GRANS % 1 %      Lymphocytes Relative 19 %      Monocytes Relative 9 %      Eosinophils Relative 0 %      Basophils Relative 0 %      Neutrophils Absolute 3 30 Thousands/µL      Immature Grans Absolute 0 04 Thousand/uL      Lymphocytes Absolute 0 89 Thousands/µL      Monocytes Absolute 0 40 Thousand/µL      Eosinophils Absolute 0 02 Thousand/µL      Basophils Absolute 0 00 Thousands/µL     Comprehensive metabolic panel [[id number]]  (Abnormal) Collected:  10/11/18 0506    Lab Status:  Final result Specimen:  Blood from Arm, Right Updated:  10/11/18 7404     Sodium 142 mmol/L      Potassium 4 0 mmol/L      Chloride 107 mmol/L      CO2 27 mmol/L      ANION GAP 8 mmol/L      BUN 15 mg/dL      Creatinine 0 65 mg/dL      Glucose 68 mg/dL      Calcium 9 8 mg/dL      AST 50 (H) U/L      ALT 21 U/L      Alkaline Phosphatase 112 U/L      Total Protein 3 9 (L) g/dL      Albumin 1 8 (L) g/dL      Total Bilirubin 0 84 mg/dL      eGFR 87 ml/min/1 73sq m     Narrative:         National Kidney Disease Education Program recommendations are as follows:  GFR calculation is accurate only with a steady state creatinine  Chronic Kidney disease less than 60 ml/min/1 73 sq  meters  Kidney failure less than 15 ml/min/1 73 sq  meters      Magnesium [[id number]]  (Normal) Collected:  10/11/18 0506    Lab Status:  Final result Specimen:  Blood from Arm, Right Updated:  10/11/18 0803     Magnesium 2 0 mg/dL     Phosphorus [[id number]]  (Normal) Collected:  10/11/18 0506    Lab Status:  Final result Specimen:  Blood from Arm, Right Updated:  10/11/18 0803     Phosphorus 4 1 mg/dL     Lactic acid, plasma [[id number]]  (Abnormal) Collected: 10/11/18 0138    Lab Status:  Final result Specimen:  Blood from Hand, Right Updated:  10/11/18 0217     LACTIC ACID 4 3 (HH) mmol/L     Narrative:         Result may be elevated if tourniquet was used during collection  Lactic acid, plasma [[id number]]  (Abnormal) Collected:  10/10/18 2119    Lab Status:  Final result Specimen:  Blood from Arm, Left Updated:  10/10/18 2209     LACTIC ACID 4 9 (HH) mmol/L     Narrative:         Result may be elevated if tourniquet was used during collection  Fungal culture, blood [[id number]] Collected:  10/10/18 1909    Lab Status:   In process Specimen:  Blood from Hand, Left Updated:  10/10/18 1916    Urine Microscopic [[id number]]  (Abnormal) Collected:  10/10/18 1740    Lab Status:  Final result Specimen:  Urine from Urine, Other Updated:  10/10/18 1810     RBC, UA 10-20 (A) /hpf      WBC, UA 30-50 (A) /hpf      Epithelial Cells Occasional /hpf      Bacteria, UA Innumerable (A) /hpf     ED Urine Macroscopic [[id number]]  (Abnormal) Collected:  10/10/18 1740    Lab Status:  Final result Specimen:  Urine Updated:  10/10/18 1739     Color, UA Analia     Clarity, UA Clear     pH, UA 6 0     Leukocytes, UA Large (A)     Nitrite, UA Positive (A)     Protein,  (2+) (A) mg/dl      Glucose, UA Negative mg/dl      Ketones, UA Negative mg/dl      Urobilinogen, UA 2 0 (A) E U /dl      Bilirubin, UA Interference- unable to analyze (A)     Blood, UA Moderate (A)     Specific Prospect, UA 1 025    Narrative:       CLINITEK RESULT    CBC and differential [[id number]]  (Abnormal) Collected:  10/10/18 1546    Lab Status:  Final result Specimen:  Blood from Arm, Left Updated:  10/10/18 1706     WBC 5 63 Thousand/uL      RBC 3 47 (L) Million/uL      Hemoglobin 9 7 (L) g/dL      Hematocrit 31 6 (L) %      MCV 91 fL      MCH 28 0 pg      MCHC 30 7 (L) g/dL      RDW 17 1 (H) %      MPV 12 7 fL      Platelets 60 (L) Thousands/uL      nRBC 0 /100 WBCs      Neutrophils Relative 71 %      Immat GRANS % 1 %      Lymphocytes Relative 19 %      Monocytes Relative 9 %      Eosinophils Relative 0 %      Basophils Relative 0 %      Neutrophils Absolute 3 99 Thousands/µL      Immature Grans Absolute 0 06 Thousand/uL      Lymphocytes Absolute 1 08 Thousands/µL      Monocytes Absolute 0 48 Thousand/µL      Eosinophils Absolute 0 01 Thousand/µL      Basophils Absolute 0 01 Thousands/µL     Lactic Acid x2 [[id number]]  (Abnormal) Collected:  10/10/18 1559    Lab Status:  Final result Specimen:  Blood from Line, Venous Updated:  10/10/18 1641     LACTIC ACID 6 9 (HH) mmol/L     Narrative:         Result may be elevated if tourniquet was used during collection      APTT [[id number]]  (Abnormal) Collected:  10/10/18 1558    Lab Status:  Final result Specimen:  Blood from Central Venous Line Updated:  10/10/18 1635     PTT 84 (H) seconds     Protime-INR [[id number]]  (Abnormal) Collected:  10/10/18 1558    Lab Status:  Final result Specimen:  Blood from Central Venous Line Updated:  10/10/18 1634     Protime 21 0 (H) seconds      INR 1 80 (H)    Procalcitonin [[id number]]  (Abnormal) Collected:  10/10/18 1546    Lab Status:  Final result Specimen:  Blood from Arm, Left Updated:  10/10/18 1631     Procalcitonin 0 43 (H) ng/ml     Comprehensive metabolic panel [[id number]]  (Abnormal) Collected:  10/10/18 1546    Lab Status:  Final result Specimen:  Blood from Arm, Left Updated:  10/10/18 1620     Sodium 137 mmol/L      Potassium 3 4 (L) mmol/L      Chloride 101 mmol/L      CO2 26 mmol/L      ANION GAP 10 mmol/L      BUN 16 mg/dL      Creatinine 0 85 mg/dL      Glucose 71 mg/dL      Calcium 10 6 (H) mg/dL      AST 60 (H) U/L      ALT 24 U/L      Alkaline Phosphatase 141 (H) U/L      Total Protein 4 7 (L) g/dL      Albumin 2 0 (L) g/dL      Total Bilirubin 0 93 mg/dL      eGFR 67 ml/min/1 73sq m     Narrative:         National Kidney Disease Education Program recommendations are as follows:  GFR calculation is accurate only with a steady state creatinine  Chronic Kidney disease less than 60 ml/min/1 73 sq  meters  Kidney failure less than 15 ml/min/1 73 sq  meters  Lactic Acid x2 [[id number]] Collected:  10/10/18 1559    Lab Status:  No result Specimen:  Blood from Line, Venous                  CT renal stone study abdomen pelvis wo contrast   Final Result by Grady Awad DO (10/11 0082)      Extensive retroperitoneal and mesenteric adenopathy is again identified  There has been marked interval enlargement of the iliac and inguinal adenopathy compared to the prior examination performed 8/21/2018  There is an enlarging right axillary node    inferiorly as well  No hydronephrosis or nephrolithiasis  Small bilateral basilar effusions and adjacent consolidations most likely representing atelectasis  Nonspecific presacral edema  Workstation performed: [id number]         XR chest 2 views   Final Result by Figueroa Khalil MD (11/55 1640)      Small bilateral pleural effusions  Suspicion of mild pulmonary vascular congestion  Workstation performed: [id number]               Procedures  Procedures      Phone Consults  ED Phone Contact    ED Course  ED Course as of Oct 14 1144   Wed Oct 10, 2018   2009 Pending CC yuniel leon  paged with no answer thus far             Identification of Seniors at Risk      Most Recent Value   (ISAR) Identification of Seniors at Risk   Before the illness or injury that brought you to the Emergency, did you need someone to help you on a regular basis? 1 Filed at: 10/10/2018 1524   In the last 24 hours, have you needed more help than usual?  1 Filed at: 10/10/2018 1524   Have you been hospitalized for one or more nights during the past 6 months? 1 Filed at: 10/10/2018 1524   In general, do you see well?  0 Filed at: 10/10/2018 1524   In general, do you have serious problems with your memory? 0 Filed at: 10/10/2018 1524   Do you take more than three different medications every day?   1 Filed at: 10/10/2018 1524   ISAR Score  4 Filed at: 10/10/2018 1524                          MDM  Number of Diagnoses or Management Options  Lactic acidosis:   Septic shock ([place] [address] ): Shock [facility]):   UTI (urinary tract infection):   Diagnosis management comments: 22-year-old female presenting to the emergency department for evaluation of fatigue and decreased oral intake  She is noted to be hypotensive on initial evaluation and at this point I suspect that this is due to severe dehydration  Given the history of poor p o  Intake I think that this is more likely to to this rather than infectious etiology at this time the will obtain a full septic workup and may start empiric antibiotics if she does not improve with IV fluids initially    She    CritCare Time    Disposition  Final diagnoses:   Shock ([facility] )   Septic shock ([place] [address] )   UTI (urinary tract infection)   Lactic acidosis     Time reflects when diagnosis was documented in both MDM as applicable and the Disposition within this note     Time User Action Codes Description Comment    10/10/2018  5:43 PM Vipin [address] [place] [address] [R57 9] Shock ([facility] )     10/10/2018  8:59 PM Aashish Lew Add [A41 9,  R65 21] [facility] ([facility] )     10/10/2018  8:59 PM Tasneem Lew Add [N39 0] UTI (urinary tract infection)     10/10/2018  8:59 PM Aashish Lew Add [E87 2] Lactic acidosis     10/10/2018  9:23 PM Stevenson Krearlene Add [C83 38] 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 ([facility] )     10/10/2018  9:23 PM Stevenson Kronearya Modify [C83 38] 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 ([facility] )     10/10/2018  9:23 PM Terence Perales Remove [C83 38] 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 ([facility] )     10/10/2018  9:23 PM Terence Perales Add [C83 38] 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 ([facility] )     10/10/2018  9:23 PM Terence Perales Modify [C83 38] 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 ([facility] 75 )     10/10/2018  9:26 PM Jaquelyn Libman Add [C83 30] Diffuse large B-cell lymphoma, unspecified body region ([facility] )     10/10/2018 10:03 PM Cloretta Kehr Add [R13 10] Esophageal dysphagia     10/10/2018 10:03 PM Cloretta Kehr Modify [R13 10] Esophageal dysphagia     10/10/2018 10:03 PM Cloretta Kehr Modify [R13 10] Esophageal dysphagia     10/12/2018  4:39 PM Ada De Santiago Add [G89 3] Cancer related pain     10/13/2018 11:43 AM Marlene Mao Nail V Add [A41 9] Sepsis, due to unspecified organism [facility])       ED Disposition     ED Disposition Condition Comment    Admit  Case was discussed with AMANDA and the patient's admission status was agreed to be Admission Status: inpatient status to the service of Dr Kyle Trinidad           Follow-up Information     Follow up With Specialties Details Why [address] [facility], LOI Family Medicine, Physician Assistant Follow up  1970 Price Rizvi MD Palliative Care Follow up  [facility]  [facility] [address]  [phone]            Discharge Medication List as of 10/13/2018 12:07 PM      START taking these medications    Details   benzonatate (TESSALON PERLES) 100 mg capsule Take 1 capsule (100 mg total) by mouth 3 (three) times a day as needed for cough, Starting Fri 10/12/2018, Print      LORazepam (ATIVAN) 2 mg/mL concentrated solution Take 0 25 mL (0 5 mg total) by mouth every 6 (six) hours as needed for anxiety or sleep (or dyspnea) for up to 10 days, Starting Fri 10/12/2018, Until Mon 10/22/2018, Print      morphine 20 mg/mL concentrated solution Take 0 25 mL (5 mg total) by mouth every 4 (four) hours as needed (pain or dyspnea) for up to 10 days Max Daily Amount: 30 mg, Starting Fri 10/12/2018, Until Mon 10/22/2018, Print         CONTINUE these medications which have NOT CHANGED    Details   acetaminophen (TYLENOL) 325 mg tablet Take 2 tablets (650 mg total) by mouth every 6 (six) hours as needed for mild pain or headaches, Starting Wed 10/3/2018, Print      al mag oxide-diphenhydramine-lidocaine viscous (MAGIC MOUTHWASH) 1:1:1 suspension Swish and swallow 10 mL 4 (four) times a day (with meals and at bedtime), Starting Mon 10/8/2018, Print      Melatonin 5 MG TABS Take 1 tablet by mouth daily at bedtime as needed, Historical Med      ondansetron (ZOFRAN) 4 mg tablet Take 1 tablet (4 mg total) by mouth every 8 (eight) hours as needed for nausea or vomiting, Starting Wed 8/15/2018, Normal      sucralfate (CARAFATE) 1 g tablet Take 1 tablet (1 g total) by mouth 4 (four) times a day, Starting Mon 9/24/2018, Normal         STOP taking these medications       allopurinol (ZYLOPRIM) 100 mg tablet Comments:   Reason for Stopping:         apixaban (ELIQUIS) 5 mg Comments:   Reason for Stopping:         fluconazole (DIFLUCAN) 200 mg tablet Comments:   Reason for Stopping:         HYDROcodone-acetaminophen (NORCO) 5-325 mg per tablet Comments:   Reason for Stopping:         loratadine (CLARITIN) 10 mg tablet Comments:   Reason for Stopping:         metoprolol tartrate (LOPRESSOR) 25 mg tablet Comments:   Reason for Stopping:         Multiple Vitamin (MULTIVITAMIN) capsule Comments:   Reason for Stopping:         omeprazole (PriLOSEC) 20 mg delayed release capsule Comments:   Reason for Stopping:         polyethylene glycol (MIRALAX) 17 g packet Comments:   Reason for Stopping:         simethicone (MYLICON) 80 mg chewable tablet Comments:   Reason for Stopping:         Lenalidomide (REVLIMID) 20 MG CAPS Comments:   Reason for Stopping:         traZODone (DESYREL) 50 mg tablet Comments:   Reason for Stopping:               Outpatient Discharge Orders  Ambulatory Referral to Home Health   Standing Status: Future  Standing Exp  Date: 04/13/19     Discharge Diet     Activity as tolerated         ED Provider  Attending physically available and evaluated Susanna Vides I managed the patient along with the ED Attending      Electronically Signed by         Jesus Wolfe MD  10/14/18 5477

## 2018-10-11 NOTE — PROGRESS NOTES
Progress Note - Gayle Overton 1940, 78 y o  female MRN: [id number]    Unit/Bed#: [facility] 714-01 Encounter: [id number]    Primary Care Provider: Andrez Hurley PA-C   Date and time admitted to hospital: 10/10/2018  3:04 PM    Acute cystitis   Assessment & Plan    Suspected urinary scource of sepsis  IV ABx  Monitor HR  F/u lactic acid probably type B     Candida esophagitis (HCC)   Assessment & Plan    On fluconazole     Sinus tachycardia   Assessment & Plan    Intravascular volume depletion  Sinus tachy - monitor     DLBCL (diffuse large B cell lymphoma) (HCC)   Assessment & Plan    S/p chemo radiation  Presently not a chemo candidate     * Sepsis ([address] )   Assessment & Plan    2" urinary vs other scource  IVF and IV ABx  Monitor lactate       VTE Pharmacologic Prophylaxis: Apixaban (Eliquis)  Mechanical: Mechanical VTE prophylaxis in place  Patient Centered Rounds: I have performed bedside rounds with nursing staff today  Discussions with Specialists or Other Care Team Provider:     Education and Discussions with Family / Patient:     Time Spent for Care: More than 50% of total time spent on counseling and coordination of care as described above  Current Length of Stay: 1 day(s)    Current Patient Status: Inpatient   Certification Statement: The patient will continue to require additional inpatient hospital stay due to as above    Discharge Plan:    Code Status: Level 3 - DNAR and DNI      Subjective: nil acute    Objective:     Vitals:   Temp (24hrs), Av 3 °F (36 8 °C), Min:98 1 °F (36 7 °C), Max:98 5 °F (36 9 °C)    Temp:  [98 1 °F (36 7 °C)-98 5 °F (36 9 °C)] 98 3 °F (36 8 °C)  HR:  [108-120] 113  Resp:  [18-22] 20  BP: ()/(44-62) 94/51  SpO2:  [93 %-96 %] 95 %  Body mass index is 38 7 kg/m²  Input and Output Summary (last 24 hours):        Intake/Output Summary (Last 24 hours) at 10/11/18 1919  Last data filed at 10/11/18 1427   Gross per 24 hour   Intake          2871 25 ml Output                0 ml   Net          2871 25 ml       Physical Exam   HENT:   Head: Normocephalic  Eyes: Pupils are equal, round, and reactive to light  Cardiovascular: Normal heart sounds  Pulmonary/Chest: She has rales  Abdominal: Soft  Skin: There is pallor  Additional Data:     Labs:      Results from last 7 days  Lab Units 10/11/18  0506   WBC Thousand/uL 4 65   HEMOGLOBIN g/dL 8 3*   HEMATOCRIT % 26 9*   PLATELETS Thousands/uL 55*   NEUTROS PCT % 71   LYMPHS PCT % 19   MONOS PCT % 9   EOS PCT % 0       Results from last 7 days  Lab Units 10/11/18  0506   SODIUM mmol/L 142   POTASSIUM mmol/L 4 0   CHLORIDE mmol/L 107   CO2 mmol/L 27   BUN mg/dL 15   CREATININE mg/dL 0 65   CALCIUM mg/dL 9 8   ALK PHOS U/L 112   ALT U/L 21   AST U/L 50*       Results from last 7 days  Lab Units 10/10/18  1558   INR  1 80*       * I Have Reviewed All Lab Data Listed Above  Imaging:  Imaging Personally Reviewed by Myself Includes:     Cultures:   Blood Culture:   Lab Results   Component Value Date    BLOODCX No Growth at 24 hrs  10/10/2018    BLOODCX No Growth at 24 hrs   10/10/2018     Urine Culture:   Lab Results   Component Value Date    URINECX >100,000 cfu/ml Gram Negative Sanjiv Enteric Like (A) 10/10/2018    URINECX 20,000-29,000 cfu/ml  09/25/2018    URINECX <10,000 cfu/ml  08/21/2018    URINECX 40,000-49,000 cfu/ml Escherichia coli (A) 08/10/2018    URINECX <10,000 cfu/ml  08/10/2018    URINECX 40,000-49,000 cfu/ml Escherichia coli 09/20/2017     Sputum Culture: No components found for: SPUTUMCX  Wound Culture: No results found for: WOUNDCULT    Last 24 Hours Medication List:     Current Facility-Administered Medications:  acetaminophen 650 mg Oral Q6H PRN Marla Klinefelter, MD    al mag oxide-diphenhydramine-lidocaine viscous 10 mL Swish & Swallow 4x Daily (with meals and at bedtime) Marla Klinefelter, MD    allopurinol 200 mg Oral Daily Marla Klinefelter, MD    apixaban 5 mg Oral BID [facility]    benzonatate 100 mg Oral TID PRN Ada De Santiago PA-C    cefepime 1,000 mg Intravenous Q12H Patrice Capone MD Last Rate: Stopped (10/11/18 1018)   fluconazole 200 mg Oral Daily Patrice Capone MD    HYDROcodone-acetaminophen 1 tablet Oral Q6H PRN Patrice Capone MD    influenza vaccine 0 5 mL Intramuscular Prior to discharge Patrice Capone MD    loratadine 10 mg Oral Early Morning Patrice Capone MD    LORazepam 0 5 mg Oral Q8H PRN Ada De Santiago PA-C    multi-electrolyte 75 mL/hr Intravenous Continuous ANNEMARIE Bar Last Rate: 75 mL/hr (10/11/18 1753)   ondansetron 4 mg Oral Q6H PRN Patrice Capone MD    pantoprazole 20 mg Oral Early Morning Patrice Capone MD    polyethylene glycol 17 g Oral Daily Patrice Capone MD    simethicone 80 mg Oral Q6H PRN Patrice Capone MD    sucralfate 1 g Oral 4x Daily Patrice Capone MD         Today, Patient Was Seen By: Cortney Marshall MD    ** Please Note: Dragon 360 Dictation voice to text software may have been used in the creation of this document   **

## 2018-10-11 NOTE — UTILIZATION REVIEW
Initial Clinical Review    Admission: Date/Time/Statement: 10/10/18 @ 2100     Orders Placed This Encounter   Procedures    Inpatient Admission (expected length of stay for this patient is greater than two midnights)     Standing Status:   Standing     Number of Occurrences:   1     Order Specific Question:   Admitting Physician     Answer:   Tavares De León [[id number]]     Order Specific Question:   Level of Care     Answer:   Med Surg [16]     Order Specific Question:   Estimated length of stay     Answer:   More than 2 Midnights     Order Specific Question:   Certification     Answer:   I certify that inpatient services are medically necessary for this patient for a duration of greater than two midnights  See H&P and MD Progress Notes for additional information about the patient's course of treatment   Inpatient Admission (expected length of stay for this patient is greater than two midnights)     Standing Status:   Standing     Number of Occurrences:   1     Order Specific Question:   Admitting Physician     Answer:   Tavares De León [[id number]]     Order Specific Question:   Level of Care     Answer:   Level 2 Stepdown / HOT [14]     Order Specific Question:   Estimated length of stay     Answer:   More than 2 Midnights     Order Specific Question:   Certification     Answer:   I certify that inpatient services are medically necessary for this patient for a duration of greater than two midnights  See H&P and MD Progress Notes for additional information about the patient's course of treatment           ED: Date/Time/Mode of Arrival:   ED Arrival Information     Expected Arrival Acuity Means of Arrival Escorted By Service Admission Type    - 10/10/2018 15:03 Emergent [facility] Hospitalist Emergency    Arrival Complaint    hypotension          Chief Complaint:   Chief Complaint   Patient presents with    Weakness - Generalized     pt was d/cd last week for dehydration, Physical Therapy at residence today found pt to be weak with c/o that her left leg was weakner than her right one  pt also has hypotension at home as per EMS 75'P systolic  History of Illness: 78 y o  female presents to emergency department with complaint of extreme fatigue    She reports decreased oral intake since discharge from hospital      ED Vital Signs:   ED Triage Vitals   Temperature Pulse Respirations Blood Pressure SpO2   10/10/18 1521 10/10/18 1521 10/10/18 1521 10/10/18 1521 10/10/18 1521   98 8 °F (37 1 °C) (!) 122 22 (!) 89/54 93 %      Temp Source Heart Rate Source Patient Position - Orthostatic VS BP Location FiO2 (%)   10/10/18 1521 10/10/18 1938 10/10/18 1938 10/10/18 1938 --   Oral Monitor Sitting Left arm       Pain Score       10/10/18 1521       No Pain        Wt Readings from Last 1 Encounters:   10/10/18 99 1 kg (218 lb 7 6 oz)       Vital Signs (abnormal):   Date and Time Temp Pulse SpO2 Resp BP   10/10/18 2300 98 3 °F (36 8 °C)  115 96 % 20 110/57   10/10/18 2130 --  118 94 % 20 110/59   10/10/18 2045 --  120 94 % 18 116/57   10/10/18 1945 --  118 95 % 22 109/62   10/10/18 1938 --  117 95 % 20 109/62   10/10/18 1900 --  108 96 % 20 119/59   10/10/18 1745 --  110 96 % 20 103/59   10/10/18 1715 --  114 95 % 20 92/57   10/10/18 1645 --  114 94 % 20 96/54   10/10/18 1615 --  114 94 % 20 97/50     Abnormal Labs/Diagnostic Test Results: Lactic Acid 6 9, H/H 9 7/31 6, Platelets 60, PT/INR 21 0/1 80, Procalcitonin 0 43, K 3 4, AST 60, Alk Phos 141    CXR: Small bilateral pleural effusions   Suspicion of mild pulmonary vascular congestion     ED Urine Macroscopic [[id number]] (Abnormal)   Lab Status: Final result   10/10/2018 Specimen: Urine    Color, UA   velia    Clarity, UA  clear    pH, UA 6 0 4 5 - 8 0    Leukocytes, UA Large (A) Negative    Nitrite, UA Positive (A) Negative    Protein,  (2+) (A) Negative mg/dl    Glucose, UA Negative Negative mg/dl    Ketones, UA Negative Negative mg/dl Urobilinogen, UA 2 0 (A) 0 2, 1 0 E U /dl E U /dl    Bilirubin, UA     Blood, UA Moderate (A) Negative    Specific Gravity, UA 1 025 1 003 - 1 030   Urine Microscopic [[id number]] (Abnormal)   Lab Status: Final result Specimen: Urine from Urine, Other    RBC, UA 10-20 (A) None Seen, 0-5 /hpf    WBC, UA 30-50 (A) None Seen, 0-5, 5-55, 5-65 /hpf    Epithelial Cells Occasional None Seen, Occasional /hpf    Bacteria, UA Innumerable (A) None          ED Treatment:   Medication Administration from 10/10/2018 1503 to 10/10/2018 2302       Date/Time Order Dose Route Action Action by Comments     10/10/2018 1602 sodium chloride 0 9 % bolus 1,000 mL 1,000 mL Intravenous [facility], RN      10/10/2018 1645 sodium chloride 0 9 % bolus 1,000 mL 1,000 mL Intravenous [facility], RN      10/10/2018 1801 vancomycin (VANCOCIN) 1,500 mg in sodium chloride 0 9 % 250 mL IVPB 1,500 mg Intravenous [facility], RN      10/10/2018 1714 cefepime (MAXIPIME) 2 g/50 mL dextrose IVPB 2,000 mg Intravenous [facility], RN      10/10/2018 1718 sodium chloride 0 9 % bolus 1,000 mL 1,000 mL Intravenous New Bag Adrienne Melendez RN           Past Medical/Surgical History:   Diagnosis    Basal cell carcinoma (BCC)    Bruit of right carotid artery    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HCC)    Dyslipidemia    Esophageal stricture    GERD (gastroesophageal reflux disease)    History of chemotherapy    History of radiation therapy    [place] (hard of hearing)    Hyperlipidemia    Hypertension    Osteoarthritis    Osteoarthrosis of ankle and foot    Osteopenia    Plantar fasciitis of left foot    Polyp of sigmoid colon    Shortness of breath       Admitting Diagnosis: Esophageal dysphagia [R13 10]  Lactic acidosis [E87 2]  Shock (HCC) [R57 9]  UTI (urinary tract infection) [N39 0]  Hypotension [I95 9]  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 (HCC) [C83 38]  Septic shock ([place] [address] ) [A41 9, R65 21]  Diffuse large B-cell lymphoma, unspecified body region ([place] [address] ) [C83 30]    Age/Sex: 78 y o  female    Assessment/Plan:     Hospital Problem List:      Principal Problem:    DLBCL (diffuse large B cell lymphoma) ([place] [address] )  Active Problems:    Arm DVT (deep venous thromboembolism), acute, left (HCC)    Constipation    Esophageal stricture    [place]hard of hearing[place]    Sinus tachycardia    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[facility] )    Dysphagia    Esophageal dysphagia    Candida esophagitis (HCC)    Moderate protein-calorie malnutrition (HCC)     Present on Admission:   Esophageal stricture   Esophageal dysphagia   DLBCL (diffuse large B cell lymphoma) ([place] [address] )   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[place] [address] )   Constipation   Sinus tachycardia   Arm DVT (deep venous thromboembolism), acute, left (HCC)   Candida esophagitis (HCC)   Moderate protein-calorie malnutrition ([facility] )   [place]hard of hearing[place]   Dysphagia        Plan for the Primary Problem(s):  · Sepsis  ?  Admit to step-down unit for further workup and treatment, was started on cefepime, etiology likely secondary to acute urinary tract infection, aggressive IV fluids, monitor hemodynamics closely     Plan for Additional Problems:   · Acute UTI:  Will place on cefepime, await urine culture result, monitor temps, WBC count  · Sinus tachycardia:  Profound dehydration, continue IV fluids, monitor on telemetry  · Lactic acidosis:  Secondary to sepsis, dehydration, repeat level pending  · Dysphagia:  Secondary to oral candidiasis, resume full consult as taken as an outpatient  · Moderate protein calorie malnutrition, dietary consult  · Diffuse large B-cell lymphoma:  Outpatient follow-up with Hematology Oncology  · Arm DVT:  On Eliquis     VTE Prophylaxis: Apixaban (Eliquis)  / sequential compression device   Code Status: DNR  POLST: There is no POLST form on file for this patient (pre-hospital)     Anticipated Length of Stay:  Patient will be admitted on an Inpatient basis with an anticipated length of stay of  Greater than 2 midnights     Justification for Hospital Stay:  Treatment of sepsis, UTI    Admission Orders:  Inpatient/StepDown  Continuous Cardiac Monitoring  Serial Cardiac Enzymes q3h x 3  Consult GI r/e esophageal dysphagia  Consult Palliative Care r/e Diffuse large B-cell lymphoma   Consult Nutrition r/e weight loss, cancer   Bilateral Sequential Compression Device  OT/PT Evaluations  NPO, sips of clear liquids    Scheduled Meds:   Current Facility-Administered Medications:  acetaminophen 650 mg Oral Q6H PRN   al mag oxide-diphenhydramine-lidocaine viscous 10 mL Swish & Swallow 4x Daily (with meals and at bedtime)   allopurinol 200 mg Oral Daily   apixaban 5 mg Oral BID   cefepime 1,000 mg Intravenous Q12H   fluconazole 200 mg Oral Daily   influenza vaccine 0 5 mL Intramuscular Prior to discharge   loratadine 10 mg Oral Early Morning   pantoprazole 20 mg Oral Early Morning   polyethylene glycol 17 g Oral Daily   sucralfate 1 g Oral 4x Daily   traZODone 25 mg Oral HS     Continuous Infusions:   multi-electrolyte 75 mL/hr Last Rate: 75 mL/hr (10/11/18 0330)   norepinephrine 1-30 mcg/min Last Rate: Stopped (10/10/18 5581)     PRN Meds:   acetaminophen    HYDROcodone-acetaminophen    influenza vaccine    ondansetron    simethicone

## 2018-10-11 NOTE — CONSULTS
Consultation - Palliative Care   Gayle Overton 78 y o  female MRN: [id number]  Unit/Bed#: [facility] 714-01 Encounter: [id number]      Assessment/Plan     Assessment:  Patient Active Problem List   Diagnosis    Enlarged lymph node in neck    Lymphadenopathy, axillary    Lymphoma ([facility]    Arm DVT (deep venous thromboembolism), acute, left (HCC)    Essential hypertension    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 ([facility] )    Constipation    Left bundle branch block (LBBB)    Infected insect bite of neck    Vitamin D deficiency    Vitamin B12 deficiency    Thrombosis of left subclavian vein (HCC)    Situational anxiety    Thrombophlebitis of deep veins of upper extremities    Gastroesophageal reflux disease    Dyslipidemia    DLBCL (diffuse large B cell lymphoma) ([facility]    Cataract    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    Esophageal stricture    [place] (hard of hearing)    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HCC)    Osteoarthritis    Lymphadenopathy of head and neck    Marginal zone lymphoma (HCC)    Acute left flank pain    Left jugular vein thrombosis    Primary insomnia    Sinus tachycardia    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[facility] )    Dysphagia    Chronic systolic congestive heart failure (HCC)    Esophageal dysphagia    Candida esophagitis (HCC)    Moderate protein-calorie malnutrition (HCC)    Sepsis ([facility] )    Acute cystitis       Plan:  · Goals of care  · Level 3 DNR  · Patient feels "exhausted" of coming back to hospital and is strongly considering comfort measures  She first wants to discuss this with her son from [place] over the phone tonight  · In the meanwhile patient and family consent to ongoing infectious work-up and medical management  · Symptom management  · Acetaminophen 650mg Q6H PRN  · Hydrocodone-acetaminophen 5-325 Q6H PRN (home med) not ordered here  Patient is not experiencing significant pain  Mild discomfort managed with tylenol    · zofran 4mg Q6H PRN nausea  · Simethicone 80mg Q6H PRN flatulence  · Trazodone 25mg HS --> patient feels this is ineffective  Will use ativan 0 5mg Q7H PRN anxiety or insomnia  · Initiated tessalon pearles 100mg TID PRN  · Bowel regimen: miralax daily, mag citrate --> ongoing constipation  Consider dulcolax suppository as needed  · Medical management  · Currently getting IVF for resuscitation  However, caution fluid overload will small pleural effusions and vascular congestion  Patient does have SOB but well controlled with supplemental O2  EF 25%  SLIM as primary team  Critical care consulted  · Lactate trending up  Unsure of infectious source, UA suspicious for UTI pending culture (started on cefepime 1g Q12) and CT abdomen for possible intraabdominal etiology  · Continues on diflucan for candida esophagitis  Odynophagia and dysphagia have improved  History of Present Illness   Physician Requesting Consult: Lynn Amado MD  Reason for Consult / Principal Problem: goals of care discussion  History of large B-cell lymphoma  Hx and PE limited by: n/a  HPI: Teresa Larkin is a 78y o  year old female , PMH HTN, HLD, GERD, esophageal stricture s/p dilation, CHF, diffuse large B-cell lymphoma, insomnia, [facility] ED on 10/10 for weakness, dehydration, fatigue, poor appetite  Patient was previously living at home with VNA support and support from her , Stevie Levi, and her son, Florecita Dao  Patient has history of recurrent diffuse large B-cell lymphoma  She has undergone RT to lower neck/cervical region, supraclavicular region, and upper mediastinum  In [id number] she underwent RT for extranodal marginal zone lymphoma  She had an anaphylactic reaction to rituximab, was found to have hypermetabolic LAD in 2837 but bx were negative  2017 biopsy of left LAD showed high grade B cell lymphoma  Patient completed 3 cycles of CHOP and 3 cycles of EPOCH   In 2/2018 PET demonstrated response to treatment, but she developed cardiomyopathy with EF 25%  Patient had another bx 6/2018 which demonstrated recurrence  Patient follows with Dr Joseph Gupta and Dr Libertad Gabriel as an outpatient  Current plan to begin Revlimid weekly x3 with 1 week off  Upon palliative care office consultation her goal was to continue therapies with goal to preventing progression  Patient was admitted 8/21-8/23 for flank pain and constipation  She was again admitted on 9/25 for radiation induced esophagitis, dysphagia, poor PO intake, dehydration  Patient was prescribed nystatin and magic mouthwash  On 10/1 patient underwent GED which revealed candida esophagitis  Patient was started on fluconazole  Patient was discharged on 10/4  Her previous symptoms of dysphagia and odynophagia have improved  However, she still has a poor appetite  Complaints of significant weakness and fatigue  She admits to feeling "worn out" and "tired of being in the hospital"  Patient expresses wishes of wanting to go home  She is unsure whether going home vs SNF with comfort cares is preferable  She desires to speak with her son from [place] prior to making any decisions or having hospice consult placed  Inpatient consult to Palliative Care  Consult performed by: Roxi Graham  Consult ordered by: Mj Lombardi          Review of Systems   Constitutional: Positive for activity change, appetite change, fatigue and unexpected weight change  HENT: Positive for trouble swallowing (improved, but has to consume small amounts at a time  )  Negative for postnasal drip, rhinorrhea and sinus pressure  Eyes: Negative for visual disturbance  Respiratory: Positive for cough and shortness of breath  Cardiovascular: Negative for chest pain  Gastrointestinal: Positive for constipation  Negative for abdominal pain and nausea  Genitourinary: Negative for difficulty urinating  Musculoskeletal: Positive for back pain (mild)  Skin: Positive for pallor  Neurological: Negative for headaches  Psychiatric/Behavioral: Positive for decreased concentration  Historical Information   Past Medical History:   Diagnosis Date    Basal cell carcinoma (BCC)     Bruit of right carotid artery     resolved 5/14/15     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HCC)     radiation    Dyslipidemia     Esophageal stricture     last assessed 2/28/13       GERD (gastroesophageal reflux disease)     History of chemotherapy     History of radiation therapy     [place] (hard of hearing)     b/l ears    Hyperlipidemia     Hypertension     Osteoarthritis     Osteoarthrosis of ankle and foot     last assessed 5/20/13     Osteopenia     last assessed 2/28/13     Plantar fasciitis of left foot     Polyp of sigmoid colon     last assessed 2/28/13     Shortness of breath     when walking up stairs     Past Surgical History:   Procedure Laterality Date    ANKLE ARTHROPLASTY Left     BLEPHAROPLASTY      right eye     CATARACT EXTRACTION Right     COLONOSCOPY      ESOPHAGEAL DILATION      x2    ESOPHAGOGASTRODUODENOSCOPY      dilitation    ESOPHAGOGASTRODUODENOSCOPY N/A 10/1/2018    Procedure: ESOPHAGOGASTRODUODENOSCOPY (EGD); Surgeon: Trent Valdez MD;  [facility]; Service: Gastroenterology    FACIAL/NECK BIOPSY Right 9/8/2017    Procedure: NECK NODE BIOPSY WITH NEEDLE LOCALIZATION; LYMPHOMA PROTOCOL (NEEDLE LOC WITH I R  AT 1100);   Surgeon: Jamel Ballard MD;  [facility];  Service: Surgical Oncology    HYSTERECTOMY      LYMPH NODE DISSECTION      right groin and neck    ORBITAL FLOOR EXPLORATION Right     for cancer     PORTACATH PLACEMENT      left chest     MT BX/REMV,LYMPH NODE,DEEP AXILL Left 8/14/2017    Procedure: Axillary lymph node excision with LYMPHOMA PROTOCOL;  Surgeon: Jamel Ballard MD;  [facility];  Service: Surgical Oncology    MT BX/REMV,LYMPH NODE,DEEP CERV Right 5/2/2016    Procedure: NECK NODE BIOPSY;  Surgeon: Jamel Ballard MD;  [facility];  Service: Surgical Oncology    WA BX/REMV,LYMPH NODE,DEEP CERV Left 7/25/2018    Procedure: NECK NODE BIOPYS WITH LYMPHOMA PROTOCOL; (ULTRASOUND GUIDED NEEDLE LOC IN IR AT 0800);   Surgeon: Sridhar Handy MD;  [facility];  Service: Surgical Oncology    WA INSJ TUNNELED CTR VAD W/SUBQ PORT AGE 5 YR/> N/A 10/3/2017    Procedure: PLACEMENT OF PORT-A-CATH DEVICE;  Surgeon: Sridhar Handy MD;  [facility];  Service: Surgical Oncology     Social History     Social History    Marital status: /Civil Union     Spouse name: N/A    Number of children: N/A    Years of education: N/A     Social History Main Topics    Smoking status: Former Smoker     Quit date: 1970    Smokeless tobacco: Never Used    Alcohol use No    Drug use: No    Sexual activity: Not Asked     Other Topics Concern    None     Social History Narrative    Uses safety equipment seatbelts      Family History   Problem Relation Age of Onset    Multiple myeloma Mother     Heart disease Father     Hypertension Father     Hypertension Sister     Heart disease Sister     Arthritis Family     Osteoporosis Family     Breast cancer Maternal Aunt        Meds/Allergies   all current active meds have been reviewed and current meds:   Current Facility-Administered Medications   Medication Dose Route Frequency    acetaminophen (TYLENOL) tablet 650 mg  650 mg Oral Q6H PRN    al mag oxide-diphenhydramine-lidocaine viscous (MAGIC MOUTHWASH) suspension 10 mL  10 mL Swish & Swallow 4x Daily (with meals and at bedtime)    allopurinol (ZYLOPRIM) tablet 200 mg  200 mg Oral Daily    apixaban (ELIQUIS) tablet 5 mg  5 mg Oral BID    cefepime (MAXIPIME) 1,000 mg in dextrose 5 % 50 mL IVPB  1,000 mg Intravenous Q12H    fluconazole (DIFLUCAN) tablet 200 mg  200 mg Oral Daily    HYDROcodone-acetaminophen (NORCO) 5-325 mg per tablet 1 tablet  1 tablet Oral Q6H PRN    influenza vaccine, high-dose (FLUZONE HIGH-DOSE) IM injection KAYLYNN 0 5 mL  0 5 mL Intramuscular Prior to discharge    loratadine (CLARITIN) tablet 10 mg  10 mg Oral Early Morning    multi-electrolyte (ISOLYTE-S PH 7 4 equivalent) IV solution  75 mL/hr Intravenous Continuous    norepinephrine (LEVOPHED) 4 mg (STANDARD CONCENTRATION) IV in sodium chloride 0 9% 250 mL  1-30 mcg/min Intravenous Titrated    ondansetron (ZOFRAN-ODT) dispersible tablet 4 mg  4 mg Oral Q6H PRN    pantoprazole (PROTONIX) EC tablet 20 mg  20 mg Oral Early Morning    polyethylene glycol (MIRALAX) packet 17 g  17 g Oral Daily    simethicone (MYLICON) chewable tablet 80 mg  80 mg Oral Q6H PRN    sucralfate (CARAFATE) tablet 1 g  1 g Oral 4x Daily    traZODone (DESYREL) tablet 25 mg  25 mg Oral HS       Allergies   Allergen Reactions    Alteplase Anaphylaxis    Aspirin Anaphylaxis    Celebrex [Celecoxib] Anaphylaxis    Nsaids Anaphylaxis    Other Anaphylaxis     Pt states when she received a certain type of chemotherapy it caused her throat to close    Rituxan [Rituximab] Anaphylaxis     Swelling of tongue and closing of throat    Sulfa Antibiotics Other (See Comments) and Anaphylaxis     VERY EMOTIONAL CRYING    Bactrim [Sulfamethoxazole-Trimethoprim] Other (See Comments)     VERY EMOTIONAL/CRYING       Objective     Physical Exam   Constitutional: She appears well-developed  HENT:   Head: Normocephalic and atraumatic  Neck: Normal range of motion  Cardiovascular:   Sinus tachycardia   Pulmonary/Chest: Effort normal    Supplemental O2 via NC   Abdominal: Soft  Bowel sounds are normal  There is no tenderness  Musculoskeletal: She exhibits edema (LE)  Generalized weakness   Neurological: She is alert  Alert, oriented  Skin: Skin is warm and dry  Psychiatric:   Tearful at times, mood and behavior are appropriate  Lab Results:   I have personally reviewed pertinent labs  , CBC:   Lab Results   Component Value Date    WBC 4 65 10/11/2018    HGB 8 3 (L) 10/11/2018    HCT 26 9 (L) 10/11/2018 MCV 91 10/11/2018    PLT 55 (L) 10/11/2018    MCH 28 1 10/11/2018    MCHC 30 9 (L) 10/11/2018    RDW 17 1 (H) 10/11/2018    MPV 12 2 10/11/2018    NRBC 0 10/11/2018   , CMP:   Lab Results   Component Value Date     10/11/2018    K 4 0 10/11/2018     10/11/2018    CO2 27 10/11/2018    BUN 15 10/11/2018    CREATININE 0 65 10/11/2018    CALCIUM 9 8 10/11/2018    AST 50 (H) 10/11/2018    ALT 21 10/11/2018    ALKPHOS 112 10/11/2018    EGFR 87 10/11/2018     Imaging Studies: I have personally reviewed pertinent reports  and I have personally reviewed pertinent films in PACS  EKG, Pathology, and Other Studies: I have personally reviewed pertinent reports  Code Status: Level 3 - DNAR and DNI  Advance Directive and Living Will:      Power of :    POLST:      Counseling / Coordination of Care  Total floor / unit time spent today 60+ minutes  Greater than 50% of total time was spent with the patient and / or family counseling and / or coordination of care  A description of the counseling / coordination of care: time spent with patient, family, discussing plan of care with nursing staff, SLIM, and critical care teams

## 2018-10-11 NOTE — PHYSICAL THERAPY NOTE
Physical Therapy Evaluation:    2 forms of pt ID verified:name,birthdate and pt ID hernesto    Patient's Name: Elmer Carter    Admitting Diagnosis  Esophageal dysphagia [R13 10]  Lactic acidosis [E87 2]  Shock ([address] ) [R57 9]  UTI (urinary tract infection) [N39 0]  Hypotension [I95 9]  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 ([facility] ) [C83 38]  Septic shock ([facility] ) [A41 9, R65 21]  Diffuse large B-cell lymphoma, unspecified body region [facility]) [C83 30]    Problem List  Patient Active Problem List   Diagnosis    Enlarged lymph node in neck    Lymphadenopathy, axillary    Lymphoma ([facility] )    Arm DVT (deep venous thromboembolism), acute, left ([address] )    Essential hypertension    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 ([address] )    Constipation    Left bundle branch block (LBBB)    Infected insect bite of neck    Vitamin D deficiency    Vitamin B12 deficiency    Thrombosis of left subclavian vein (HCC)    Situational anxiety    Thrombophlebitis of deep veins of upper extremities    Gastroesophageal reflux disease    Dyslipidemia    DLBCL (diffuse large B cell lymphoma) ([address] )    Cataract    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    Esophageal stricture    [place] (hard of hearing)    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[address] )    Osteoarthritis    Lymphadenopathy of head and neck    Marginal zone lymphoma ([address] )    Acute left flank pain    Left jugular vein thrombosis    Primary insomnia    Sinus tachycardia    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[address] )    Dysphagia    Chronic systolic congestive heart failure (HCC)    Esophageal dysphagia    Candida esophagitis (HCC)    Moderate protein-calorie malnutrition (HCC)    Sepsis ([address] )    Acute cystitis       Past Medical History  Past Medical History:   Diagnosis Date    Basal cell carcinoma (BCC)     Bruit of right carotid artery     resolved 5/14/15     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[address] )     radiation    Dyslipidemia     Esophageal stricture     last assessed 2/28/13       GERD (gastroesophageal reflux disease)     History of chemotherapy     History of radiation therapy     [place] (hard of hearing)     b/l ears    Hyperlipidemia     Hypertension     Osteoarthritis     Osteoarthrosis of ankle and foot     last assessed 5/20/13     Osteopenia     last assessed 2/28/13     Plantar fasciitis of left foot     Polyp of sigmoid colon     last assessed 2/28/13     Shortness of breath     when walking up stairs       Past Surgical History  Past Surgical History:   Procedure Laterality Date    ANKLE ARTHROPLASTY Left     BLEPHAROPLASTY      right eye     CATARACT EXTRACTION Right     COLONOSCOPY      ESOPHAGEAL DILATION      x2    ESOPHAGOGASTRODUODENOSCOPY      dilitation    ESOPHAGOGASTRODUODENOSCOPY N/A 10/1/2018    Procedure: ESOPHAGOGASTRODUODENOSCOPY (EGD); Surgeon: Miesha Hill MD;  [facility]; Service: Gastroenterology    FACIAL/NECK BIOPSY Right 9/8/2017    Procedure: NECK NODE BIOPSY WITH NEEDLE LOCALIZATION; LYMPHOMA PROTOCOL (NEEDLE LOC WITH I R  AT 1100); Surgeon: Gorge Nesbitt MD;  [facility];  Service: Surgical Oncology    HYSTERECTOMY      LYMPH NODE DISSECTION      right groin and neck    ORBITAL FLOOR EXPLORATION Right     for cancer     PORTACATH PLACEMENT      left chest     TN BX/REMV,LYMPH NODE,DEEP AXILL Left 8/14/2017    Procedure: Axillary lymph node excision with LYMPHOMA PROTOCOL;  Surgeon: Gorge Nesbitt MD;  [facility];  Service: Surgical Oncology    TN BX/REMV,LYMPH NODE,DEEP CERV Right 5/2/2016    Procedure: NECK NODE BIOPSY;  Surgeon: Gorge Nesbitt MD;  [facility];  Service: Surgical Oncology    TN BX/REMV,LYMPH NODE,DEEP CERV Left 7/25/2018    Procedure: NECK NODE BIOPYS WITH LYMPHOMA PROTOCOL; (ULTRASOUND GUIDED NEEDLE LOC IN IR AT 0800);   Surgeon: Gorge Nesbitt MD;  [facility];  Service: Surgical Oncology    TN INSJ TUNNELED CTR VAD W/SUBQ PORT AGE 5 YR/> N/A 10/3/2017    Procedure: PLACEMENT OF PORT-A-CATH DEVICE;  Surgeon: Josefa Sauer MD;  [facility];  Service: Surgical Oncology          10/11/18 1110   Note Type   Note type Eval only   Pain Assessment   Pain Assessment No/denies pain   Pain Score No Pain   Home Living   Type of [address] One level;Stairs to enter with rails  (2 ELIOT)   Home Equipment Cane;Walker   Additional Comments reports living with  and son,needed A PTA from family,x1-2 recent falls needing A to get up from ground   Prior Function   Level of Kay Independent with ADLs and functional mobility  (per pt PTA, needs A for ADLs)   Lives With Spouse; Son   Neal Help From Family  (per pt PTA)   ADL Assistance Needs assistance   IADLs Needs assistance   Falls in the last 6 months 1 to 4  (1-2 per pt)   Restrictions/Precautions   Other Precautions Chair Alarm;Hard of hearing; Fall Risk;O2;Multiple lines   General   Additional Pertinent History acute UTI,failure to thrive,B pleural effusion,suspicious mild pulm vascular congestion,sepsis   Family/Caregiver Present No   Cognition   Overall Cognitive Status WFL   Arousal/Participation Cooperative   Attention Attends with cues to redirect   Orientation Level Oriented X4   Following Commands Follows one step commands with increased time or repetition  (2* slow mobility and weakness)   RLE Assessment   RLE Assessment (3+/5 grossly throughout)   LLE Assessment   LLE Assessment (3+/5 grossly throughout)   Coordination   Movements are Fluid and Coordinated 0   Coordination and Movement Description shuffling gait pattern,dec BLE step length,forward flexed posture,slow mobility,dec WB BLE and dec ability to weight shift BLE   Sensation WFL   Light Touch   RLE Light Touch Grossly intact   LLE Light Touch Grossly intact   Bed Mobility   Rolling L 3  Moderate assistance   Additional items Assist x 1;HOB elevated; Bedrails; Increased time required;Verbal cues;LE management   Supine to Sit 3  Moderate assistance   Additional items Assist x 1;HOB elevated; Bedrails; Increased time required;Verbal cues;LE management  (BLE inc edema)   Transfers   Sit to Stand 3  Moderate assistance   Additional items Assist x 2;HOB elevated; Bedrails; Increased time required;Verbal cues   Stand to Sit 3  Moderate assistance   Additional items Assist x 2;Armrests; Increased time required;Verbal cues  (for safety,education and control descent)   Stand pivot 3  Moderate assistance   Additional items Assist x 2; Increased time required;Verbal cues   Ambulation/Elevation   Gait pattern Poor UE support; Improper Weight shift;Narrow ERNIE; Forward Flexion; Shuffling; Inconsistent lang; Foward flexed; Short stride; Ataxia; Step to;Excessively slow   Gait Assistance 3  Moderate assist   Additional items Assist x 2;Verbal cues; Tactile cues   Assistive Device Rolling walker   Distance 5 feet bed->chair with use of RW on tile surface;limitd mobility and gait distance 2* inc weakness,fatigue and SOB with use of NC O2   Balance   Static Sitting Good  (in chair postmobility with chair alarm intact)   Dynamic Sitting (zero)   Static Standing Zero   Dynamic Standing (zero)   Ambulatory Zero   Endurance Deficit   Endurance Deficit Yes   Endurance Deficit Description fatigue,weakness,use of NC O2   Activity Tolerance   Activity Tolerance Patient limited by fatigue  (poor)   Medical Staff Made Aware OT Hieu Ovalle)   Nurse Made Aware yes   Assessment   Prognosis Guarded   Problem List Decreased strength;Decreased endurance; Impaired balance;Decreased mobility; Decreased safety awareness; Impaired hearing;Obesity; Decreased skin integrity   Assessment Pt is a 79 y/o female [facility] 2* sepsis,failure to thrive,acute UTI,B pleural effusion suspicious for mild pulm vascular congestion   pt lives with  and son in 1 story home,(+)ELIOT,use of personal DME PTA,reports needing A for ADLs,reports being I with mobility,x1-2 recent fall  pt currently is not at functional mobility baseline,needs Ax2 for mobility,use of RW,use of NC O2,multiple lines,IV medicine management,unsteady and ataxic gait pattern and ongoing medical care  Pt demonstrates moderate deficits during functional mobility and gait including dec endurance,dec balance,dec BLE strength,unsteady and ataxic gait pattern and needs modAx2 for transfers,BM and gait with use of RW  Pt would cont to benefit from skilled inpt PT services to maximize functional independence  Barriers to Discharge Inaccessible home environment  (ELIOT)   Goals   Patient Goals to get better   STG Expiration Date 10/21/18   Short Term Goal #1 in 7-10 days:pt will be able to ambulate >100 feet with use of RW on various surfaces without LOB and use of chair follow as needed minAx1 to A pt to return to PLOF,activity tolerance:45mins/45mins,inc balance 1/2 grade to dec fall risk,inc BLE strength 1/2-1 grade to A to inc balance,strength,mobility and endurance,BM and transfers minAx1->S level of A to A pt to return to PLOF,I with BLE ther ex HEP in various positions to A to inc balance,strength,endurance and mobility   Treatment Day 0   Plan   Treatment/Interventions Functional transfer training;LE strengthening/ROM; Therapeutic exercise; Endurance training;Patient/family training;Equipment eval/education; Bed mobility;Gait training;Spoke to nursing;OT   PT Frequency Other (Comment)  (3-5x/week)   Recommendation   Recommendation Other (Comment)  (inpt rhb recommended upon D/C)   Equipment Recommended Walker  (use of RW for mobility)   Barthel Index   Feeding 5   Bathing 0   Grooming Score 0   Dressing Score 5   Bladder Score 5   Bowels Score 10   Toilet Use Score 5   Transfers (Bed/Chair) Score 5   Mobility (Level Surface) Score 0   Stairs Score 0   Barthel Index Score 35   Skilled PT recommended while in hospital and upon DC to progress pt toward treatment goals       Genny Lyn, PT, DPT@

## 2018-10-11 NOTE — MALNUTRITION/BMI
This medical record reflects one or more clinical indicators suggestive of malnutrition  Malnutrition Findings:   Malnutrition type: Chronic illness (in the context of lymphoma and recent completion of radiation therapy to neck as evidenced by intake meeting less than 75% estimated needs for greater than 1 month, + 1 LLE edema and slightly depressed temples suggestive of mild muscle depletion)  Degree of Malnutrition: Malnutrition of moderate degree (tx with liberal diet and oral nutrition supplement of pts choice)  Malnutrition Characteristics: Muscle loss, Inadequate energy, Fluid accumulation        See Nutrition note dated 10/11/18 for additional details  Completed nutrition assessment is viewable in the nutrition documentation

## 2018-10-11 NOTE — SOCIAL WORK
Met with patient, son and  and discussed CM program and CM role  Lives in a ranch home with  and son  2 ELIOT, bedroom/bathroom on 1st floor  Prior level of function was independent with ambulation  Assistance from  for ADL's  Patient does not drive   or son provide transportation  PCP is Dr Sheth  Has prescription plan  Utilizes [address] for pharmacy  DME:  Hanna Raymundo and [employer]  Denies receiving [facility] care or IP rehab admission  Denies mental health issues, drug and/or alcohol abuse  Primary contact is her son Sheela Jain [phone]  Her , Mabel Blue is her POA  Requested he bring a copy of paperwork in so we can place in her medical records  Son will provide transportation home    Readmission - she did not call PCP  She fell at home and was brought to ER    Discussed DCP--recommendation by PT and MD for short term therapy prior to going home  Son would like [facility], but would like his mother to make the decision  Patient is unsure she wants to go  Explained benefits of continued therapy, she will think about it  CM reviewed d/c planning process including the following: identifying help at home, patient preference for d/c planning needs, Discharge Lounge, [facility] Meds to Bed program, availability of treatment team to discuss questions or concerns patient and/or family may have regarding understanding medications and recognizing signs and symptoms once discharged  CM also encouraged patient to follow up with all recommended appointments after discharge  Patient advised of importance for patient and family to participate in managing patients medical well being  Patient/caregiver received discharge checklist  Content reviewed  Patient/caregiver encouraged to participate in discharge plan of care prior to discharge home

## 2018-10-11 NOTE — SPEECH THERAPY NOTE
Speech Language/Pathology  Speech/Language Pathology Dysphagia Assessment    Patient Name: Alicia Catherine  DMACW'G Date: 10/11/2018     Problem List  Patient Active Problem List   Diagnosis    Enlarged lymph node in neck    Lymphadenopathy, axillary    Lymphoma ([facility] [address] )    Arm DVT (deep venous thromboembolism), acute, left (HCC)    Essential hypertension    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 ([address] )    Constipation    Left bundle branch block (LBBB)    Infected insect bite of neck    Vitamin D deficiency    Vitamin B12 deficiency    Thrombosis of left subclavian vein (HCC)    Situational anxiety    Thrombophlebitis of deep veins of upper extremities    Gastroesophageal reflux disease    Dyslipidemia    DLBCL (diffuse large B cell lymphoma) (HCC)    Cataract    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    Esophageal stricture    [place] (hard of hearing)    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[address] )    Osteoarthritis    Lymphadenopathy of head and neck    Marginal zone lymphoma ([address] )    Acute left flank pain    Left jugular vein thrombosis    Primary insomnia    Sinus tachycardia    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[address] )    Dysphagia    Chronic systolic congestive heart failure (HCC)    Esophageal dysphagia    Candida esophagitis (HCC)    Moderate protein-calorie malnutrition (HCC)    Sepsis ([address] )    Acute cystitis     Past Medical History  Past Medical History:   Diagnosis Date    Basal cell carcinoma (BCC)     Bruit of right carotid artery     resolved 5/14/15     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HCC)     radiation    Dyslipidemia     Esophageal stricture     last assessed 2/28/13       GERD (gastroesophageal reflux disease)     History of chemotherapy     History of radiation therapy     [place] (hard of hearing)     b/l ears    Hyperlipidemia     Hypertension     Osteoarthritis     Osteoarthrosis of ankle and foot     last assessed 5/20/13     Osteopenia     last assessed 2/28/13  Plantar fasciitis of left foot     Polyp of sigmoid colon     last assessed 2/28/13     Shortness of breath     when walking up stairs     Past Surgical History  Past Surgical History:   Procedure Laterality Date    ANKLE ARTHROPLASTY Left     BLEPHAROPLASTY      right eye     CATARACT EXTRACTION Right     COLONOSCOPY      ESOPHAGEAL DILATION      x2    ESOPHAGOGASTRODUODENOSCOPY      dilitation    ESOPHAGOGASTRODUODENOSCOPY N/A 10/1/2018    Procedure: ESOPHAGOGASTRODUODENOSCOPY (EGD); Surgeon: Calvin Qureshi MD;  [facility]; Service: Gastroenterology    FACIAL/NECK BIOPSY Right 9/8/2017    Procedure: NECK NODE BIOPSY WITH NEEDLE LOCALIZATION; LYMPHOMA PROTOCOL (NEEDLE LOC WITH I R  AT 1100); Surgeon: Rodrigue Cartagena MD;  [facility];  Service: Surgical Oncology    HYSTERECTOMY      LYMPH NODE DISSECTION      right groin and neck    ORBITAL FLOOR EXPLORATION Right     for cancer     PORTACATH PLACEMENT      left chest     MT BX/REMV,LYMPH NODE,DEEP AXILL Left 8/14/2017    Procedure: Axillary lymph node excision with LYMPHOMA PROTOCOL;  Surgeon: Rodrigue Cartagena MD;  [facility];  Service: Surgical Oncology    MT BX/REMV,LYMPH NODE,DEEP CERV Right 5/2/2016    Procedure: NECK NODE BIOPSY;  Surgeon: Rodrigue Cartagena MD;  [facility];  Service: Surgical Oncology    MT BX/REMV,LYMPH NODE,DEEP CERV Left 7/25/2018    Procedure: NECK NODE BIOPYS WITH LYMPHOMA PROTOCOL; (ULTRASOUND GUIDED NEEDLE LOC IN IR AT 0800); Surgeon: Rodrigue Cartagena MD;  [facility];  Service: Surgical Oncology    MT INSJ TUNNELED CTR VAD W/SUBQ PORT AGE 5 YR/> N/A 10/3/2017    Procedure: PLACEMENT OF PORT-A-CATH DEVICE;  Surgeon: Rodrigue Cartagena MD;  [facility];  Service: Surgical Oncology        Bedside Swallow Evaluation:    Summary:  Pt presents w/normal oropharyngeal swallow  No overt difficulty or s/s aspiration observed    Pt declines painful swallow except for rarely if she takes a very large sip  No c/o with toast or consecutive sips of drinks today  Recommendations:  Diet: regular   Liquid: thin  Meds: as desired/tolerated  Positioning:Upright  Oral care: frequent  Aspiration precautions  Reflux precautions    Eval only, No f/u tx indicated  Consider consult w/:  Nutrition      HPI:  From H&P:  Morro Turcios is a 78 y o  female with history of diffuse large B-cell lymphoma, moderate protein calorie malnutrition, DVT, esophageal dysphagia, oral candidiasis, failure to thrive presents to emergency department with complaint of extreme fatigue  She reports decreased oral intake since discharge from hospital   She denies fevers or chills but reports some sweats  In the emergency department patient was found to be hypotensive and tachycardic, she had a lactic acid level of 6 9  She was given empiric broad-spectrum antibiotic coverage with cefepime and vancomycin  Urinalysis revealed evidence of acute urinary tract infection  She has been admitted to step-down unit for management of sepsis  In the emergency department she was given aggressive fluid resuscitation with improvement of her blood pressures, at the time of my evaluation systolic pressures in the 110s  Pt known to this svc from previous admission earlier this month  At that time, oropharyngeal swallow appeared wfls  No overt s/s aspiration were observed but pt c/o odynophagia likely related to esophagitis and probable candidiasis  Son was assisting pt in choosing appropriate foods to decrease pain and increase po intake      Reason for consult:  Odynophagia   poor intake  weight loss   H/o esophageal dysphagia   H/o Head and neck CA    Current diet:  Npo  Premorbid diet[de-identified]  Regular with thin liquids  Voice/Speech:  [facility]  Social:  Lives at home; was receiving VNA svcs prior to admission  Follows commands:  yes                       Cognitive Status:  wfls  Oral [facility] exam:  Adequate dentition  Upper partial - not available  Dry mucosa  Full symmetry    Items administered:  Puree, toast with jelly, thin liquids  Liquids were taken by straw  Oral stage: WFLs  Cautious when eating; takes small bites and eats slowly    Lip closure: good  Mastication: mildly prolonged  Bolus formation: good  Bolus control: good  Transfer: prompt  Oral residue: no  Pocketing: no    Pharyngeal stage:  Swallow promptness: appears prompt  Laryngeal rise: good per palpation  Wet voice: no  Throat clear: no  Cough: no  Secondary swallows: no  Audible swallows: no  No s/s aspiration    Esophageal stage:  Reports early satiety  H/o GERD  H/o stricture w/ dilitation  H/o EGD    Results d/w:  Pt, nursing, physician

## 2018-10-11 NOTE — CONSULTS
Consultation - [facility]  Qi Diop 78 y o  female MRN: [id number]  Unit/Bed#: [facility] 714-01 Encounter: [id number]        Consults    ASSESSMENT/ PLAN:    79 yo female with pmh HTN, HLD, DVT, GERD, large b cell lymphoma s/p radiation, esophageal stricture who was admitted with sepsis secondary to UTI being seen for dysphagia    1  Dysphagia: recent admission with similar symptoms  S/p EGD 10/1 with esophagitis/pharyngitis with concern for candida  Brushings revealed atypical cellular changes which could be secondary to previous radiation treatment  She was started on fluconazole  She states her symptoms have improved and she is tolerating a diet here  -finish fluconazole course  -least restrictive diet per speech  -continue 20mg PPI daily   -Elective EGD as outpatient for repeat biopsy     Reason for Consult / Principal Problem: dysphagia    HPI: Ruba Strong is a 79 yo female with a pmh HTN, HLD, GERD, large b cell lymphoma s/p radiation with esophageal stricture who presented to the ED for generalized weakness  She was found to be septic secondary to UTI  GI was consulted for decreased oral intake since discharge and dysphagia  She was recently seen for similar symptoms and underwent EGD 10/1 which revealed esophagitis/pharyngitis with concern for candida esophagitis and started on fluconazole  She states that since admission her symptoms have improved and she is tolerating a diet  She has been continuing her fluconazole at home  She denies any n/v, abdominal pain, constipation, melena, hematochezia  She admits to some loose stool starting last week but no diarrhea  REVIEW OF SYSTEMS:     CONSTITUTIONAL: Denies any fever, chills, or rigors  Weakness   HEENT: No earache or tinnitus  Denies hearing loss or visual disturbances  CARDIOVASCULAR: No chest pain or palpitations  RESPIRATORY: Denies any cough, hemoptysis, shortness of breath or dyspnea on exertion    GASTROINTESTINAL: As noted in the History of Present Illness  GENITOURINARY: No problems with urination  Denies any hematuria or dysuria  NEUROLOGIC: No dizziness or vertigo, denies headaches  MUSCULOSKELETAL: Denies any muscle or joint pain  SKIN: Denies skin rashes or itching  ENDOCRINE: Denies excessive thirst  Denies intolerance to heat or cold  PSYCHOSOCIAL: Denies depression or anxiety  Denies any recent memory loss  Historical Information   Past Medical History:   Diagnosis Date    Basal cell carcinoma (BCC)     Bruit of right carotid artery     resolved 5/14/15     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HCC)     radiation    Dyslipidemia     Esophageal stricture     last assessed 2/28/13       GERD (gastroesophageal reflux disease)     History of chemotherapy     History of radiation therapy     [place] (hard of hearing)     b/l ears    Hyperlipidemia     Hypertension     Osteoarthritis     Osteoarthrosis of ankle and foot     last assessed 5/20/13     Osteopenia     last assessed 2/28/13     Plantar fasciitis of left foot     Polyp of sigmoid colon     last assessed 2/28/13     Shortness of breath     when walking up stairs     Past Surgical History:   Procedure Laterality Date    ANKLE ARTHROPLASTY Left     BLEPHAROPLASTY      right eye     CATARACT EXTRACTION Right     COLONOSCOPY      ESOPHAGEAL DILATION      x2    ESOPHAGOGASTRODUODENOSCOPY      dilitation    ESOPHAGOGASTRODUODENOSCOPY N/A 10/1/2018    Procedure: ESOPHAGOGASTRODUODENOSCOPY (EGD); Surgeon: Lorne Nevarez MD;  [facility]; Service: Gastroenterology    FACIAL/NECK BIOPSY Right 9/8/2017    Procedure: NECK NODE BIOPSY WITH NEEDLE LOCALIZATION; LYMPHOMA PROTOCOL (NEEDLE LOC WITH I R  AT 1100);   Surgeon: Drake Ricardo MD;  [facility];  Service: Surgical Oncology    HYSTERECTOMY      LYMPH NODE DISSECTION      right groin and neck    ORBITAL FLOOR EXPLORATION Right     for cancer     PORTACATH PLACEMENT      left chest     VA BX/REMV,LYMPH NODE,DEEP AXILL Left 8/14/2017    Procedure: Axillary lymph node excision with LYMPHOMA PROTOCOL;  Surgeon: Radha Martinez MD;  [facility];  Service: Surgical Oncology    [place] BX/REMV,LYMPH NODE,DEEP CERV Right 5/2/2016    Procedure: NECK NODE BIOPSY;  Surgeon: Radha Martinez MD;  [facility];  Service: Surgical Oncology    [place] BX/REMV,LYMPH NODE,DEEP CERV Left 7/25/2018    Procedure: NECK NODE BIOPYS WITH LYMPHOMA PROTOCOL; (ULTRASOUND GUIDED NEEDLE LOC IN IR AT 0800);   Surgeon: Radha Martinez MD;  [facility];  Service: Surgical Oncology    [place] INSJ TUNNELED CTR VAD W/SUBQ PORT AGE 5 YR/> N/A 10/3/2017    Procedure: PLACEMENT OF PORT-A-CATH DEVICE;  Surgeon: Radha Martinez MD;  [facility];  Service: Surgical Oncology     Social History   History   Alcohol Use No     History   Drug Use No     History   Smoking Status    Former Smoker    Quit date: 1970   Smokeless Tobacco    Never Used     Family History   Problem Relation Age of Onset    Multiple myeloma Mother     Heart disease Father    [facility] Hypertension Father     Hypertension Sister     Heart disease Sister     Arthritis Family     Osteoporosis Family     Breast cancer Maternal Aunt        Meds/Allergies     Prescriptions Prior to Admission   Medication    acetaminophen (TYLENOL) 325 mg tablet    al mag oxide-diphenhydramine-lidocaine viscous (MAGIC MOUTHWASH) 1:1:1 suspension    allopurinol (ZYLOPRIM) 100 mg tablet    apixaban (ELIQUIS) 5 mg    fluconazole (DIFLUCAN) 200 mg tablet    HYDROcodone-acetaminophen (NORCO) 5-325 mg per tablet    loratadine (CLARITIN) 10 mg tablet    Melatonin 5 MG TABS    metoprolol tartrate (LOPRESSOR) 25 mg tablet    Multiple Vitamin (MULTIVITAMIN) capsule    omeprazole (PriLOSEC) 20 mg delayed release capsule    ondansetron (ZOFRAN) 4 mg tablet    polyethylene glycol (MIRALAX) 17 g packet    simethicone (MYLICON) 80 mg chewable tablet    sucralfate (CARAFATE) 1 g tablet    Lenalidomide (REVLIMID) 20 MG CAPS    traZODone (DESYREL) 50 mg tablet     Current Facility-Administered Medications   Medication Dose Route Frequency    acetaminophen (TYLENOL) tablet 650 mg  650 mg Oral Q6H PRN    al mag oxide-diphenhydramine-lidocaine viscous (MAGIC MOUTHWASH) suspension 10 mL  10 mL Swish & Swallow 4x Daily (with meals and at bedtime)    allopurinol (ZYLOPRIM) tablet 200 mg  200 mg Oral Daily    apixaban (ELIQUIS) tablet 5 mg  5 mg Oral BID    cefepime (MAXIPIME) 1,000 mg in dextrose 5 % 50 mL IVPB  1,000 mg Intravenous Q12H    fluconazole (DIFLUCAN) tablet 200 mg  200 mg Oral Daily    HYDROcodone-acetaminophen (NORCO) 5-325 mg per tablet 1 tablet  1 tablet Oral Q6H PRN    influenza vaccine, high-dose (FLUZONE HIGH-DOSE) IM injection KAYLYNN 0 5 mL  0 5 mL Intramuscular Prior to discharge    loratadine (CLARITIN) tablet 10 mg  10 mg Oral Early Morning    multi-electrolyte (ISOLYTE-S PH 7 4 equivalent) IV solution  75 mL/hr Intravenous Continuous    norepinephrine (LEVOPHED) 4 mg (STANDARD CONCENTRATION) IV in sodium chloride 0 9% 250 mL  1-30 mcg/min Intravenous Titrated    ondansetron (ZOFRAN-ODT) dispersible tablet 4 mg  4 mg Oral Q6H PRN    pantoprazole (PROTONIX) EC tablet 20 mg  20 mg Oral Early Morning    polyethylene glycol (MIRALAX) packet 17 g  17 g Oral Daily    simethicone (MYLICON) chewable tablet 80 mg  80 mg Oral Q6H PRN    sucralfate (CARAFATE) tablet 1 g  1 g Oral 4x Daily    traZODone (DESYREL) tablet 25 mg  25 mg Oral HS       Allergies   Allergen Reactions    Alteplase Anaphylaxis    Aspirin Anaphylaxis    Celebrex [Celecoxib] Anaphylaxis    Nsaids Anaphylaxis    Other Anaphylaxis     Pt states when she received a certain type of chemotherapy it caused her throat to close    Rituxan [Rituximab] Anaphylaxis     Swelling of tongue and closing of throat    Sulfa Antibiotics Other (See Comments) and Anaphylaxis     VERY EMOTIONAL CRYING    Bactrim [Sulfamethoxazole-Trimethoprim] Other (See Comments)     VERY EMOTIONAL/CRYING           Objective     Blood pressure 98/53, pulse (!) 110, temperature 98 2 °F (36 8 °C), temperature source Oral, resp  rate 18, height 5' 3" (1 6 m), weight 99 1 kg (218 lb 7 6 oz), SpO2 94 %, not currently breastfeeding  Intake/Output Summary (Last 24 hours) at 10/11/18 1055  Last data filed at 10/11/18 0330   Gross per 24 hour   Intake             3900 ml   Output                0 ml   Net             3900 ml         PHYSICAL EXAM:      General Appearance:   A&Ox3, cooperative, no distress, appears stated age    HEENT:   Normocephalic, atraumatic  Right eye exhibits no discharge  Left eye exhibits no discharge  No scleral icterus     Neck:  Supple, symmetrical, trachea midline, no stridor    Lungs:   Clear to auscultation bilaterally; no rales, rhonchi or wheezing    Heart[de-identified]   S1 and S2 normal; regular rate and rhythm; no murmur, rub, or gallop  Abdomen:   Soft, non-tender, non-distended; normal bowel sounds; no masses, no organomegaly    Genitalia:   Deferred    Rectal:   Deferred    Extremities:  No cyanosis, clubbing   Left LE edema        Skin:  Skin color, texture, turgor normal, no rashes or lesions          Lab Results:   Admission on 10/10/2018   Component Date Value    WBC 10/10/2018 5 63     RBC 10/10/2018 3 47*    Hemoglobin 10/10/2018 9 7*    Hematocrit 10/10/2018 31 6*    MCV 10/10/2018 91     MCH 10/10/2018 28 0     MCHC 10/10/2018 30 7*    RDW 10/10/2018 17 1*    MPV 10/10/2018 12 7     Platelets 61/86/0745 60*    nRBC 10/10/2018 0     Neutrophils Relative 10/10/2018 71     Immat GRANS % 10/10/2018 1     Lymphocytes Relative 10/10/2018 19     Monocytes Relative 10/10/2018 9     Eosinophils Relative 10/10/2018 0     Basophils Relative 10/10/2018 0     Neutrophils Absolute 10/10/2018 3 99     Immature Grans Absolute 10/10/2018 0 06     Lymphocytes Absolute 10/10/2018 1 08     Monocytes Absolute 10/10/2018 0 48     Eosinophils Absolute 10/10/2018 0 01     Basophils Absolute 10/10/2018 0 01     Sodium 10/10/2018 137     Potassium 10/10/2018 3 4*    Chloride 10/10/2018 101     CO2 10/10/2018 26     ANION GAP 10/10/2018 10     BUN 10/10/2018 16     Creatinine 10/10/2018 0 85     Glucose 10/10/2018 71     Calcium 10/10/2018 10 6*    AST 10/10/2018 60*    ALT 10/10/2018 24     Alkaline Phosphatase 10/10/2018 141*    Total Protein 10/10/2018 4 7*    Albumin 10/10/2018 2 0*    Total Bilirubin 10/10/2018 0 93     eGFR 10/10/2018 67     LACTIC ACID 10/10/2018 6 9*    Protime 10/10/2018 21 0*    INR 10/10/2018 1 80*    PTT 10/10/2018 84*    Procalcitonin 10/10/2018 0 43*    Color, UA 10/10/2018 Analia     Clarity, UA 10/10/2018 Clear     pH, UA 10/10/2018 6 0     Leukocytes, UA 10/10/2018 Large*    Nitrite, UA 10/10/2018 Positive*    Protein, UA 10/10/2018 100 (2+)*    Glucose, UA 10/10/2018 Negative     Ketones, UA 10/10/2018 Negative     Urobilinogen, UA 10/10/2018 2 0*    Bilirubin, UA 10/10/2018 Interference- unable to analyze*    Blood, UA 10/10/2018 Moderate*    Specific Gravity, UA 10/10/2018 1 025     RBC, UA 10/10/2018 10-20*    WBC, UA 10/10/2018 30-50*    Epithelial Cells 10/10/2018 Occasional     Bacteria, UA 10/10/2018 Innumerable*    LACTIC ACID 10/11/2018 4 3*    LACTIC ACID 10/10/2018 4 9*    Sodium 10/11/2018 142     Potassium 10/11/2018 4 0     Chloride 10/11/2018 107     CO2 10/11/2018 27     ANION GAP 10/11/2018 8     BUN 10/11/2018 15     Creatinine 10/11/2018 0 65     Glucose 10/11/2018 68     Calcium 10/11/2018 9 8     AST 10/11/2018 50*    ALT 10/11/2018 21     Alkaline Phosphatase 10/11/2018 112     Total Protein 10/11/2018 3 9*    Albumin 10/11/2018 1 8*    Total Bilirubin 10/11/2018 0 84     eGFR 10/11/2018 87     Magnesium 10/11/2018 2 0     Phosphorus 10/11/2018 4 1     WBC 10/11/2018 4 65     RBC 10/11/2018 2 95*    Hemoglobin 10/11/2018 8 3*    Hematocrit 10/11/2018 26 9*    MCV 10/11/2018 91     MCH 10/11/2018 28 1     MCHC 10/11/2018 30 9*    RDW 10/11/2018 17 1*    MPV 10/11/2018 12 2     Platelets 65/42/7112 55*    nRBC 10/11/2018 0     Neutrophils Relative 10/11/2018 71     Immat GRANS % 10/11/2018 1     Lymphocytes Relative 10/11/2018 19     Monocytes Relative 10/11/2018 9     Eosinophils Relative 10/11/2018 0     Basophils Relative 10/11/2018 0     Neutrophils Absolute 10/11/2018 3 30     Immature Grans Absolute 10/11/2018 0 04     Lymphocytes Absolute 10/11/2018 0 89     Monocytes Absolute 10/11/2018 0 40     Eosinophils Absolute 10/11/2018 0 02     Basophils Absolute 10/11/2018 0 00     LACTIC ACID 10/11/2018 3 5*       Imaging Studies: I have personally reviewed pertinent imaging studies  Xr Chest 2 Views    Result Date: 10/11/2018  Impression: Small bilateral pleural effusions  Suspicion of mild pulmonary vascular congestion  This patient was seen and examined by Dr Alexis Pierce  All llanos medical decisions were made by Dr Alexis Pierce  Thank you for allowing us to participate in the care of this patient  We will follow up closely with you

## 2018-10-11 NOTE — SOCIAL WORK
Met with patient, son and   Advised we received call from [facility] related to bed and do they want a referral made  Patient would like to discuss with her son that is not present  They will discuss as a family and advise, at this time they do not want a referral made to [facility]   had questions on cost   Explained they can touch base with [facility] to obtain their costs or SNF will do that and advise  Inquired on switching to [place] [facility] [place], advised to contact [phone] office to discuss

## 2018-10-11 NOTE — CONSULTS
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%), esophageal stricture with esophageal dysphagia and recent diagnosis of candidal esophagitis  She was recently discharged on 10/3/18 after admission for increasing fatigue, weakness, and difficulty swallowing  She was found to have candidal esophagitis and was placed on fluconazole  She was resuscitated with IV fluids, and discharged on 10/6/18  According to the patient's son, she has functionally not return to baseline  She is unable to get herself in and out of the shower  She has overall continue to weak in since her discharge and is only able to take in small amounts of oral fluids and liquids  Her family has been tracking her blood pressure and heart rate at home, and EMS was called this afternoon given her significant weakness and low blood pressure  Per EMS report, her SBP was approximately 70 on arrival   She was given IV fluids in the emergency department with improvement to blood pressure 116/57  Patient admits to some throat pain, loose stools, fatigue, and particularly leg weakness, however she denies nausea, vomiting, headache, chest pain, or difficulty breathing      History obtained from chart review and the patient   ---------------------------------------------------------------------------------------------------------  Assessment and Plan  Principal Problem:    DLBCL (diffuse large B cell lymphoma) ([facility] )  Active Problems:    Arm DVT (deep venous thromboembolism), acute, left (HCC)    Constipation    Esophageal stricture    Sinus tachycardia    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[address] )    Esophageal dysphagia    Candida esophagitis (HCC)    Moderate protein-calorie malnutrition (HCC)  Resolved Problems:    * No resolved hospital problems  *      Neuro:   · No acute issues  Patient denies pain at this time  Continue routine neuro checks, sleep/wake cycle regulation      CV:   · Hypotension - likely secondary to hypovolemia  Patient states that she has been able to tolerate very small amounts of food, and sips of liquid  She was previously admitted for dehydration and according to family, she was not feeling back to baseline which was discharged  S/p 3 L IV fluids in the ED, continue gentle IV fluids  Patient's baseline blood pressure is approximately SBP   Continue to hold all antihypertensive agents  Goal MAP > 65  · 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%) - patient followed by Dr Adriana Casanova as an outpatient  She was previously on chemotherapy which was stopped when it was noted that her EF went from normal to 40%, but is now down to 25% based on echocardiogram from July  She was placed on beta-blocker and ACE-inhibitor, however she has not been taking the beta-blocker secondary to side effects  She does have some lower extremity edema, however she otherwise appears clinically very dry  Continue judicious fluids and closely monitor clinically  Minimal oxygen requirements at this time  Patient appears warm and otherwise well perfused  · Sinus tachycardia - in discussion with the patient's family, she was previously on metoprolol, however she was advised to stop this by an outpatient provider  The patient's son has been tracking her blood pressure and heart rate, and she has been tachycardic in the 110s to 120s since her recent discharge on 10/06  Continue to closely monitor on telemetry, goal heart rate is < 120         Pulm:  · No acute issues  Patient is noted to be on nasal cannula, however current SpO2 95%  Maintain SpO2 >90%  Continue pulmonary hygiene  Incentive spirometer q1h while awake, encourage coughing and deep breathing  GI:   · History of esophageal dysphagia secondary to esophageal stricture - patient's son states that she does occasionally cough with swallowing  Admits to pharyngeal pain  Consult speech and language pathology  · Candidal esophagitis - continue fluconazole, omeprazole  · Constipation - patient states that she has very small leaking of stool, however she has not had any significant bowel movement  She is on a bowel regimen of MiraLax, senna, Colace, and lactulose as an outpatient, however she has not been taking all of these medications recently  Continue bowel regimen and monitor for bowel movement       :   · No acute issues  Patient's creatinine is at baseline  She states that her urine output has been significantly decreased  Continue to closely monitor after IV fluids  No Mckeon  Strict q4h I/O monitoring  Continue to follow renal function tests  F/E/N:   · Lactic acidosis-  S/p 3 L normal saline, would continue with isolyte at 75 mL/hr for gentle rehydration  · Replace electrolytes, check BMP in AM  · Moderate protein calorie malnutrition  - nutrition consult  NPO secondary to dysphagia at this time, patient is able to tolerate some small sips of water    Heme/Onc:   · Diffuse large B-cell lymphoma - patient was previously on chemotherapy which was stopped secondary to cardiomyopathy, she also completed 10 doses of radiation, and was set to start an "oral chemo," however this is been held secondary to her poor functional status  Continue supportive care  · Anemia  -hemoglobin close to baseline  Continue to trend hemoglobin and transfuse for hemoglobin < 7 0  · Thrombocytopenia - likely secondary to lymphoma  Platelets currently 60  Continue to closely monitor    No transfusion indicated unless active bleeding < 50,000  · History of acute left arm DVT - no PE seen on CTA of chest   Patient was previously on Eliquis, which can likely be continued  Endo:   · No acute issues  Monitor glucose with BMPs  No history of diabetes  Goal -180  ID:   · Possible sepsis secondary to urinary tract infection - patient's urinalysis was significant for nitrites, leukocytes, innumerable bacteria  She denies any urinary symptoms  She has not had any fever and was without leukocytosis, however she is also pancytopenic at baseline  Patient given vancomycin and cefepime in the ED, would continue antibiotics and follow up cultures  Blood cultures drawn and pending  Continue to monitor fever and WBC curve  MSK/Skin:   · Reposition q2h, eliminate pressures points  · Close skin surveillance   · PT/OT is certainly needed given her significant debility      Dispo: Admit to step-down level 2  If there is any concern about worsening hypotension or clinical deterioration, please do not hesitate to contact critical care  I did have a long discussion with the patient and her family, and she wishes to be DNR/DNI level 3  Of note, she is followed by palliative care as an outpatient, and she had a phone conversation yesterday in which she stated that she would not want to return to the hospital   [address] Martin is consulted    Recommend POLST    Code Status: Level 3 - DNAR and DNI  ---------------------------------------------------------------------------------------------------------  Historical Information   Past Medical History:   Diagnosis Date    Basal cell carcinoma (BCC)     Bruit of right carotid artery     resolved 5/14/15     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HCC)     radiation    Dyslipidemia     Esophageal stricture     last assessed 2/28/13       GERD (gastroesophageal reflux disease)     History of chemotherapy     History of radiation therapy     [place] (hard of hearing)     b/l ears  Hyperlipidemia     Hypertension     Osteoarthritis     Osteoarthrosis of ankle and foot     last assessed 5/20/13     Osteopenia     last assessed 2/28/13     Plantar fasciitis of left foot     Polyp of sigmoid colon     last assessed 2/28/13     Shortness of breath     when walking up stairs     Past Surgical History:   Procedure Laterality Date    ANKLE ARTHROPLASTY Left     BLEPHAROPLASTY      right eye     CATARACT EXTRACTION Right     COLONOSCOPY      ESOPHAGEAL DILATION      x2    ESOPHAGOGASTRODUODENOSCOPY      dilitation    ESOPHAGOGASTRODUODENOSCOPY N/A 10/1/2018    Procedure: ESOPHAGOGASTRODUODENOSCOPY (EGD); Surgeon: Lorne Nevarez MD;  Location: [facility]; Service: Gastroenterology    FACIAL/NECK BIOPSY Right 9/8/2017    Procedure: NECK NODE BIOPSY WITH NEEDLE LOCALIZATION; LYMPHOMA PROTOCOL (NEEDLE LOC WITH I R  AT 1100); Surgeon: Drake Ricardo MD;  [facility];  Service: Surgical Oncology    HYSTERECTOMY      LYMPH NODE DISSECTION      right groin and neck    ORBITAL FLOOR EXPLORATION Right     for cancer     PORTACATH PLACEMENT      left chest     SD BX/REMV,LYMPH NODE,DEEP AXILL Left 8/14/2017    Procedure: Axillary lymph node excision with LYMPHOMA PROTOCOL;  Surgeon: Drake Ricardo MD;  [facility];  Service: Surgical Oncology    SD BX/REMV,LYMPH NODE,DEEP CERV Right 5/2/2016    Procedure: NECK NODE BIOPSY;  Surgeon: Drake Ricardo MD;  [facility];  Service: Surgical Oncology    SD BX/REMV,LYMPH NODE,DEEP CERV Left 7/25/2018    Procedure: NECK NODE BIOPYS WITH LYMPHOMA PROTOCOL; (ULTRASOUND GUIDED NEEDLE LOC IN IR AT 0800);   Surgeon: Drake Ricardo MD;  [facility];  Service: Surgical Oncology    SD INSJ TUNNELED CTR VAD W/SUBQ PORT AGE 5 YR/> N/A 10/3/2017    Procedure: PLACEMENT OF PORT-A-CATH DEVICE;  Surgeon: Drake Ricardo MD;  [facility];  Service: Surgical Oncology     Social History   History   Alcohol Use No     History Drug Use No     History   Smoking Status    Former Smoker    Quit date: 1970   Smokeless Tobacco    Never Used     Exercise History:  Unable to ambulate without assistance  Family History:   Family History   Problem Relation Age of Onset    Multiple myeloma Mother     Heart disease Father     Hypertension Father     Hypertension Sister     Heart disease Sister     Arthritis Family     Osteoporosis Family     Breast cancer Maternal Aunt        Meds/Allergies     (Not in a hospital admission)  Current Facility-Administered Medications   Medication Dose Route Frequency    norepinephrine (LEVOPHED) 4 mg (STANDARD CONCENTRATION) IV in sodium chloride 0 9% 250 mL  1-30 mcg/min Intravenous Titrated    sodium chloride (PF) 0 9 % injection 3 mL  3 mL Intravenous PRN      Allergies   Allergen Reactions    Alteplase Anaphylaxis    Aspirin Anaphylaxis    Celebrex [Celecoxib] Anaphylaxis    Nsaids Anaphylaxis    Other Anaphylaxis     Pt states when she received a certain type of chemotherapy it caused her throat to close    Rituxan [Rituximab] Anaphylaxis     Swelling of tongue and closing of throat    Sulfa Antibiotics Other (See Comments) and Anaphylaxis     VERY EMOTIONAL CRYING    Bactrim [Sulfamethoxazole-Trimethoprim] Other (See Comments)     VERY EMOTIONAL/CRYING       Review of Systems   Constitutional: Positive for activity change, appetite change and fatigue  Negative for fever  HENT: Negative  Eyes: Negative  Respiratory: Positive for cough and choking  Negative for chest tightness, shortness of breath and wheezing  Cardiovascular: Positive for leg swelling  Negative for chest pain and palpitations  Gastrointestinal: Positive for constipation and diarrhea  Negative for blood in stool, nausea and vomiting  Endocrine: Negative  Genitourinary: Positive for decreased urine volume  Negative for dysuria, flank pain, frequency and urgency  Musculoskeletal: Negative  Allergic/Immunologic: Negative  Neurological: Negative for dizziness and headaches  Hematological: Negative  Psychiatric/Behavioral: Negative  Laboratory and Diagnostics:    Results from last 7 days  Lab Units 10/10/18  1546 10/10/18  1115   WBC Thousand/uL 5 63 6 40   HEMOGLOBIN g/dL 9 7* 10 5*   HEMATOCRIT % 31 6* 34 2*   PLATELETS Thousands/uL 60* 63*   NEUTROS PCT % 71 72   MONOS PCT % 9 7       Results from last 7 days  Lab Units 10/10/18  1546 10/10/18  1115   SODIUM mmol/L 137 139   POTASSIUM mmol/L 3 4* 3 6   CHLORIDE mmol/L 101 100   CO2 mmol/L 26 29   BUN mg/dL 16 15   CREATININE mg/dL 0 85 0 89   CALCIUM mg/dL 10 6* 10 8*   ALBUMIN g/dL 2 0* 2 2*   ALK PHOS U/L 141* 155*   ALT U/L 24 26   AST U/L 60* 69*       Results from last 7 days  Lab Units 10/10/18  1115   MAGNESIUM mg/dL 1 9   PHOSPHORUS mg/dL 2 1*        Results from last 7 days  Lab Units 10/10/18  1558   INR  1 80*   PTT seconds 84*           Results from last 7 days  Lab Units 10/10/18  1559   LACTIC ACID mmol/L 6 9*       EKG: Sinus tach  Imaging: I have personally reviewed pertinent reports  and I have personally reviewed pertinent films in PACS  CTA chest: No pulmonary embolus  Profound increase in bulky rodger adenopathy in bilateral axilla and in the chest walls   Increase in the size of nodes in the upper abdomen, incompletely evaluated, and at the esophageal hiatus  Slight interval decrease in the size of mediastinal nodes  Vitals:   Vitals:    10/10/18 1900 10/10/18 1938 10/10/18 1945 10/10/18 2045   BP: 119/59 109/62 109/62 116/57   BP Location:  Left arm     Pulse: (!) 108 (!) 117 (!) 118 (!) 120   Resp: 20 20 22 18   Temp:       TempSrc:       SpO2: 96% 95% 95% 94%   Weight:       Height:         Temp  Min: 98 8 °F (37 1 °C)  Max: 98 8 °F (37 1 °C)  IBW: 52 4 kg  Body mass index is 37 45 kg/m²  Height: 5' 3" (160 cm)      Physical Exam   Constitutional: She is oriented to person, place, and time   She appears well-developed  She appears lethargic  She is cooperative  No distress  Nasal cannula in place  HENT:   Head: Normocephalic and atraumatic  Mouth/Throat: Mucous membranes are pale and dry  Pharyngeal thrush   Eyes: Pupils are equal, round, and reactive to light  No scleral icterus  Neck: Neck supple  No JVD present  Cardiovascular: Regular rhythm and normal heart sounds  No extrasystoles are present  Tachycardia present  Pulses:       Radial pulses are 2+ on the right side, and 2+ on the left side  Dorsalis pedis pulses are 1+ on the right side, and 1+ on the left side  2+ LE edema    Pulmonary/Chest: No tachypnea  No respiratory distress  She has decreased breath sounds  She has no wheezes  She has no rhonchi  Abdominal: Soft  She exhibits distension  Bowel sounds are decreased  There is no tenderness  Neurological: She is oriented to person, place, and time  She appears lethargic  GCS eye subscore is 4  GCS verbal subscore is 5  GCS motor subscore is 6  Generalized weakness 4/5   Skin: Skin is warm and dry  Capillary refill takes less than 2 seconds  She is not diaphoretic  Counseling / Coordination of Care  Total time spent today 32 minutes  Greater than 50% of total time was spent with the patient and / or family counseling and / or coordination of care  A description of the counseling / coordination of care: Sherice Louise   [facility]

## 2018-10-11 NOTE — PLAN OF CARE
Problem: OCCUPATIONAL THERAPY ADULT  Goal: Performs self-care activities at highest level of function for planned discharge setting  See evaluation for individualized goals  Treatment Interventions: ADL retraining, Functional transfer training, UE strengthening/ROM, Endurance training, Patient/family training, Equipment evaluation/education, Compensatory technique education, Energy conservation, Activityengagement          See flowsheet documentation for full assessment, interventions and recommendations  Limitation: Decreased ADL status, Decreased UE strength, Decreased endurance, Decreased self-care trans, Decreased high-level ADLs     Assessment: Pt is a 78 y o  female seen for OT evaluation s/p admit to [facility] on 10/10/2018 w/ Sepsis ([facility] )  She was recently discharged on 10/6/18 after admission for increasing fatigue, weakness, and difficulty swallowing  She was found to have candidal esophagitis and was placed on fluconazole  According to the patient's son, she has functionally not return to baseline  She is unable to get herself in and out of the shower  She has overall continue to weak in since her discharge and is only able to take in small amounts of oral fluids and liquids  Her family has been tracking her blood pressure and heart rate at home, and EMS was called this afternoon given her significant weakness and low blood pressure  Per EMS report, her SBP was approximately 70 on arrival   Comorbidities affecting pt's functional performance at time of assessment include: lymphoma s/p radiation/chemotherapy related cardiomyopathy, esophageal stricture, dysphagia  HTN, [place] osteoarthritis, situational anxiety  Personal factors affecting pt at time of IE include:steps to enter environment, difficulty performing ADLS, difficulty performing IADLS  and generalized weakness and poor activity tolerance   Prior to last  admission, pt was indpendent w all personal care, light home management, functional mobility  Upon evaluation: Pt requires mod/max assist for bedmobility, mod assist of 2 for sit to stand and for transfer OOB  She needs max assist for completion of self care  She has decreased balance in stance and sitting, as well as poor activity tolerance  Visible SOB w minimal activity  Pt to benefit from continued skilled OT tx while in the hospital to address deficits as defined above and maximize level of functional independence w ADL's and functional mobility  Occupational Performance areas to address include: grooming, bathing/shower, toilet hygiene, dressing, functional mobility and clothing management  Pt scored   35/100 on the barthel index  Based on findings from OT evaluation and functional performance deficits, pt has been identified as a  High complexity evaluation  From OT standpoint, recommendation at time of d/c would be inpatient rehab  OT Discharge Recommendation: Short Term Rehab  OT - OK to Discharge:  Yes

## 2018-10-11 NOTE — H&P
History and Physical - [age] [facility] Internal Medicine    Patient Information: Randy Armenta 78 y o  female MRN: [id number]  Unit/Bed#: ED 27 Encounter: [id number]  Admitting Physician: Heather Franklin MD  PCP: Brook Carver PA-C  Date of Admission:  10/10/18    Assessment/Plan:    Hospital Problem List:     Principal Problem:    DLBCL (diffuse large B cell lymphoma) ([address] )  Active Problems:    Arm DVT (deep venous thromboembolism), acute, left (HCC)    Constipation    Esophageal stricture    [place] (hard of hearing)    Sinus tachycardia    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[address] )    Dysphagia    Esophageal dysphagia    Candida esophagitis (HCC)    Moderate protein-calorie malnutrition ([facility] [address] )    Present on Admission:   Esophageal stricture   Esophageal dysphagia   DLBCL (diffuse large B cell lymphoma) ([address] )   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[address] )   Constipation   Sinus tachycardia   Arm DVT (deep venous thromboembolism), acute, left (HCC)   Candida esophagitis (HCC)   Moderate protein-calorie malnutrition ([address] )   [place] (hard of hearing)   Dysphagia      Plan for the Primary Problem(s):  · Sepsis  · Admit to step-down unit for further workup and treatment, was started on cefepime, etiology likely secondary to acute urinary tract infection, aggressive IV fluids, monitor hemodynamics closely    Plan for Additional Problems:   · Acute UTI:  Will place on cefepime, await urine culture result, monitor temps, WBC count  · Sinus tachycardia:  Profound dehydration, continue IV fluids, monitor on telemetry  · Lactic acidosis:  Secondary to sepsis, dehydration, repeat level pending  · Dysphagia:  Secondary to oral candidiasis, resume full consult as taken as an outpatient  · Moderate protein calorie malnutrition, dietary consult  · Diffuse large B-cell lymphoma:  Outpatient follow-up with Hematology Oncology  · Arm DVT:  On Eliquis    VTE Prophylaxis: Apixaban (Eliquis)  / sequential compression device   Code Status: DNR  POLST: There is no POLST form on file for this patient (pre-hospital)    Anticipated Length of Stay:  Patient will be admitted on an Inpatient basis with an anticipated length of stay of  Greater than 2 midnights  Justification for Hospital Stay:  Treatment of sepsis, UTI    Total Time for Visit, including Counseling / Coordination of Care: 45 minutes  Greater than 50% of this total time spent on direct patient counseling and coordination of care  Chief Complaint:   Fatigue, dehydration    History of Present Illness:    Qi Diop is a 78 y o  female with history of diffuse large B-cell lymphoma, moderate protein calorie malnutrition, DVT, esophageal dysphagia, oral candidiasis, failure to thrive presents to emergency department with complaint of extreme fatigue  She reports decreased oral intake since discharge from hospital   She denies fevers or chills but reports some sweats  In the emergency department patient was found to be hypotensive and tachycardic, she had a lactic acid level of 6 9  She was given empiric broad-spectrum antibiotic coverage with cefepime and vancomycin  Urinalysis revealed evidence of acute urinary tract infection  She has been admitted to step-down unit for management of sepsis  In the emergency department she was given aggressive fluid resuscitation with improvement of her blood pressures, at the time of my evaluation systolic pressures in the 110s  Review of Systems:  No headache, focal weakness  Generalized malaise, no fevers or chills, positive sweats  No abdominal pain, nausea  Decreased oral intake, failure to thrive    All systems are reviewed  Positive as per history of presenting illness  Patient answered no to all other questions         Past Medical and Surgical History:     Past Medical History:   Diagnosis Date    Basal cell carcinoma (BCC)     Bruit of right carotid artery     resolved 5/14/15     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[facility] [address] )     radiation    Dyslipidemia     Esophageal stricture     last assessed 2/28/13       GERD (gastroesophageal reflux disease)     History of chemotherapy     History of radiation therapy     [place] (hard of hearing)     b/l ears    Hyperlipidemia     Hypertension     Osteoarthritis     Osteoarthrosis of ankle and foot     last assessed 5/20/13     Osteopenia     last assessed 2/28/13     Plantar fasciitis of left foot     Polyp of sigmoid colon     last assessed 2/28/13     Shortness of breath     when walking up stairs       Past Surgical History:   Procedure Laterality Date    ANKLE ARTHROPLASTY Left     BLEPHAROPLASTY      right eye     CATARACT EXTRACTION Right     COLONOSCOPY      ESOPHAGEAL DILATION      x2    ESOPHAGOGASTRODUODENOSCOPY      dilitation    ESOPHAGOGASTRODUODENOSCOPY N/A 10/1/2018    Procedure: ESOPHAGOGASTRODUODENOSCOPY (EGD); Surgeon: Iqra Myers MD;  Location: [facility]; Service: Gastroenterology    FACIAL/NECK BIOPSY Right 9/8/2017    Procedure: NECK NODE BIOPSY WITH NEEDLE LOCALIZATION; LYMPHOMA PROTOCOL (NEEDLE LOC WITH I R  AT 1100); Surgeon: Olegario Gilliam MD;  [facility];  Service: Surgical Oncology    HYSTERECTOMY      LYMPH NODE DISSECTION      right groin and neck    ORBITAL FLOOR EXPLORATION Right     for cancer     PORTACATH PLACEMENT      left chest     PA BX/REMV,LYMPH NODE,DEEP AXILL Left 8/14/2017    Procedure: Axillary lymph node excision with LYMPHOMA PROTOCOL;  Surgeon: Olegario Gilliam MD;  [facility];  Service: Surgical Oncology    PA BX/REMV,LYMPH NODE,DEEP CERV Right 5/2/2016    Procedure: NECK NODE BIOPSY;  Surgeon: Olegario Gilliam MD;  [facility];  Service: Surgical Oncology    PA BX/REMV,LYMPH NODE,DEEP CERV Left 7/25/2018    Procedure: NECK NODE BIOPYS WITH LYMPHOMA PROTOCOL; (ULTRASOUND GUIDED NEEDLE LOC IN IR AT 0800);   Surgeon: Olegario Gilliam MD;  [facility];  Service: Surgical Oncology    PA INSJ TUNNELED CTR VAD W/SUBQ PORT AGE 5 YR/> N/A 10/3/2017    Procedure: PLACEMENT OF PORT-A-CATH DEVICE;  Surgeon: Georgie Hashimoto, MD;  [facility];  Service: Surgical Oncology       Meds/Allergies:    Prior to Admission medications    Medication Sig Start Date End Date Taking?  Authorizing Provider   acetaminophen (TYLENOL) 325 mg tablet Take 2 tablets (650 mg total) by mouth every 6 (six) hours as needed for mild pain or headaches 10/3/18  Yes Kusum Stephens MD   al mag oxide-diphenhydramine-lidocaine viscous (MAGIC MOUTHWASH) 1:1:1 suspension Swish and swallow 10 mL 4 (four) times a day (with meals and at bedtime) 10/8/18  Yes Laney Jones DO   allopurinol (ZYLOPRIM) 100 mg tablet Take 2 tablets (200 mg total) by mouth daily 8/3/18  Yes Sridhar Bernardo MD   apixaban (ELIQUIS) 5 mg Take 1 tablet (5 mg total) by mouth 2 (two) times a day 8/20/18  Yes Sridhar Bernardo MD   fluconazole (DIFLUCAN) 200 mg tablet Take 1 tablet (200 mg total) by mouth daily for 18 days 10/3/18 10/21/18 Yes Kusum Stephens MD   HYDROcodone-acetaminophen (NORCO) 5-325 mg per tablet Take 1 tablet by mouth every 6 (six) hours as needed for pain Max Daily Amount: 4 tablets 7/17/18  Yes Georgie Hashimoto, MD   loratadine (CLARITIN) 10 mg tablet Take 10 mg by mouth daily in the early morning     Yes Historical Provider, MD   Melatonin 5 MG TABS Take 1 tablet by mouth daily at bedtime as needed   Yes Historical Provider, MD   metoprolol tartrate (LOPRESSOR) 25 mg tablet Take 1 tablet (25 mg total) by mouth every 12 (twelve) hours 10/3/18  Yes Kusum Stephens MD   Multiple Vitamin (MULTIVITAMIN) capsule Take 1 capsule by mouth daily with dinner     Yes Historical Provider, MD   omeprazole (PriLOSEC) 20 mg delayed release capsule Take 1 capsule (20 mg total) by mouth daily 9/6/18  Yes Joe Galvan PA-C   ondansetron [facility]) 4 mg tablet Take 1 tablet (4 mg total) by mouth every 8 (eight) hours as needed for nausea or vomiting 8/15/18  Yes Pedro Prieto PA-C   polyethylene glycol (MIRALAX) 17 g packet Take 17 g by mouth daily 8/24/18  Yes Kushal Mcdonald PA-C   simethicone (MYLICON) 80 mg chewable tablet Chew 1 tablet (80 mg total) every 6 (six) hours as needed for flatulence 8/23/18  Yes Kushal Mcdonald PA-C   sucralfate (CARAFATE) 1 g tablet Take 1 tablet (1 g total) by mouth 4 (four) times a day 9/24/18  Yes Pedro Prieto PA-C   ascorbic acid (VITAMIN C) 500 mg tablet Take 500 mg by mouth daily with dinner    10/10/18 Yes Historical Provider, MD   Cyanocobalamin (VITAMIN B 12 PO) Take 1 tablet by mouth daily with dinner    10/10/18 Yes Historical Provider, MD   lactulose 20 g/30 mL Take 15 mL (10 g total) by mouth 3 (three) times a day 10/3/18 10/10/18 Yes Deb Myers MD   nystatin (MYCOSTATIN) 100,000 units/mL suspension Apply 5 mL (500,000 Units total) to the mouth or throat 4 (four) times a day Swish and swallow 4 times daily for 1 week  9/19/18 10/10/18 Yes Pedro Prieto PA-C   senna (SENOKOT) 8 6 mg Take 1 tablet (8 6 mg total) by mouth 2 (two) times a day 9/7/18 10/10/18 Yes ANNEMARIE Melendez   Lenalidomide (REVLIMID) 20 MG CAPS Take 1 capsule (20 mg total) by mouth daily Adult Female  Auth # [id number]  Take 1 capsule daily days 1-21  Followed by 7 days off  8/16/18   Valentina Prajapati MD   traZODone (DESYREL) 50 mg tablet Take 0 5 tablets (25 mg total) by mouth daily at bedtime  Patient not taking: Reported on 10/10/2018  9/24/18   ANNEMARIE Melendez     I have reviewed home medications using allscripts  Allergies:    Allergies   Allergen Reactions    Alteplase Anaphylaxis    Aspirin Anaphylaxis    Celebrex [Celecoxib] Anaphylaxis    Nsaids Anaphylaxis    Other Anaphylaxis     Pt states when she received a certain type of chemotherapy it caused her throat to close    Rituxan [Rituximab] Anaphylaxis     Swelling of tongue and closing of throat    Sulfa Antibiotics Other (See Comments) and Anaphylaxis VERY EMOTIONAL CRYING    Bactrim [Sulfamethoxazole-Trimethoprim] Other (See Comments)     VERY EMOTIONAL/CRYING       Social History:     Marital Status: /Civil Union   Substance Use History:   History   Alcohol Use No     History   Smoking Status    Former Smoker    Quit date: 1970   Smokeless Tobacco    Never Used     History   Drug Use No       Family History:    non-contributory      Physical Exam:  Vitals:   Blood Pressure: 110/59 (10/10/18 2130)  Pulse: (!) 118 (10/10/18 2130)  Temperature: 98 8 °F (37 1 °C) (10/10/18 1521)  Temp Source: Oral (10/10/18 1521)  Respirations: 20 (10/10/18 2130)  Height: 5' 3" (160 cm) (10/10/18 1521)  Weight - Scale: 95 9 kg (211 lb 6 7 oz) (10/10/18 1521)  SpO2: 94 % (10/10/18 2130)    Vital signs are reviewed as above  No distress, lying on stretcher, ill-appearing  Sclera anicteric  Tachycardic, regular rhythm  Decreased exchange secondary to effort  Left anterior chest wall port  Soft, positive bowel sounds  Bilateral lower extremity edema left greater than right, patient reports that this was chronic  Distal pulses intact  No rash  Oriented x3          Additional Data:     Lab Results: I have personally reviewed pertinent reports  Results from last 7 days  Lab Units 10/10/18  1546   WBC Thousand/uL 5 63   HEMOGLOBIN g/dL 9 7*   HEMATOCRIT % 31 6*   PLATELETS Thousands/uL 60*   NEUTROS PCT % 71   LYMPHS PCT % 19   MONOS PCT % 9   EOS PCT % 0       Results from last 7 days  Lab Units 10/10/18  1546   SODIUM mmol/L 137   POTASSIUM mmol/L 3 4*   CHLORIDE mmol/L 101   CO2 mmol/L 26   BUN mg/dL 16   CREATININE mg/dL 0 85   CALCIUM mg/dL 10 6*   ALK PHOS U/L 141*   ALT U/L 24   AST U/L 60*       Results from last 7 days  Lab Units 10/10/18  1558   INR  1 80*       Imaging: I have personally reviewed pertinent reports  Xr Chest Portable    Result Date: 9/27/2018  Narrative: CHEST INDICATION:   hypoxia   COMPARISON:  September 25, 2018 EXAM PERFORMED/VIEWS:  XR CHEST PORTABLE FINDINGS:  Left subclavian chest port is unchanged  Cardiomediastinal silhouette appears unremarkable  Lung markings are crowded secondary to low lung volumes  Within limitations of this examination there is no focal airspace opacity to suggest pneumonia  No pneumothorax or pleural effusion  Osseous structures appear within normal limits for patient age  Impression: No acute cardiopulmonary disease  Workstation performed: [id number]     Xr Chest 1 View    Result Date: 9/25/2018  Narrative: CHEST INDICATION:   sob  COMPARISON:  1/18/2018 chest x-ray EXAM PERFORMED/VIEWS:  XR CHEST 1 VIEW Single view FINDINGS: Persistent typical appearing left-sided portacatheter, terminating at mid right atrium Cardiomediastinal silhouette appears unremarkable  Lateral left lung base infiltrate, not previously evident No pneumothorax or pleural effusion  Osseous structures appear within normal limits for patient age  Impression: Interval development of infiltrate at lateral left lung base Workstation performed: [id number]     Cta Ed Chest Pe Study    Addendum Date: 9/25/2018 Addendum:   Please note: There is atelectasis at the left lung base both in the lingula and in the costophrenic angle, and this atelectatic change probably accounts for the airspace opacity in that location on frontal chest x-ray  Result Date: 9/25/2018  Narrative: CTA - CHEST WITH IV CONTRAST - PULMONARY ANGIOGRAM INDICATION:   Shortness of breath  Lymphoma  Lethargy  Patient is being treated with chemotherapy and radiation therapy  COMPARISON: August 21, 2018  TECHNIQUE: CTA examination of the chest was performed using angiographic technique according to a protocol specifically tailored to evaluate for pulmonary embolism  Axial, sagittal, and coronal 2D reformatted images were created from the source data and  submitted for interpretation  In addition, coronal 3D MIP postprocessing was performed on the acquisition scanner  Radiation dose length product (DLP) for this visit:  620 92 mGy-cm   This examination, like all CT scans performed in the [facility], was performed utilizing techniques to minimize radiation dose exposure, including the use of iterative  reconstruction and automated exposure control  IV Contrast:  85 mL of iodixanol (VISIPAQUE)  Because of borderline renal function, standard department hydration protocol was recommended to minimize risk of contrast induced nephropathy  FINDINGS: PULMONARY ARTERIAL TREE:  No pulmonary embolus is seen  LUNGS:  Atelectatic changes noted in the lung bases more notable on the left than on the right  No consolidative airspace opacity to this pneumonia  No suspicious pulmonary parenchymal mass present  PLEURA:  Trace left costophrenic angle effusion is slightly increased when compared to August 2018  No pleural-based mass  No right effusion  No pneumothorax  HEART/AORTA:  Intrinsically unremarkable  MEDIASTINUM AND MARCIO:  Extensive lymphadenopathy in the lower neck, throughout the mediastinum, and to a lesser degree in the marcio is reidentified  Lymphadenopathy in the mediastinum is difficult to measure as many of the nodes are indistinct margins or  a conglomerate appearance however, a superior mediastinal node in the left paratracheal region at the level of the thoracic inlet has discrete appearance and measures 2 9 x 2 2 cm on image 34 of series 3 compared with 3 3 x 2 5 cm when measured using similar technique on previous examination  This is representative of the slight interval decrease in the overall bulk and size of most of the mediastinal and hilar lymph nodes identified when compared to August 21, 2018    However, some mediastinal nodes  have increased in size or developed since the prior examination with the specific example including a paraesophageal node just above the esophageal hiatus in the lower mediastinum measuring 1 9 x 1 4 cm on image 148 of series 3, which was not visible on  previous examination  CHEST WALL AND LOWER NECK:   There has been marked interval progression of bilateral axillary and chest wall rodger disease  A representative node in the low right axilla measuring 3 3 x 2 2 cm is increased from 10 x 5 mm when measured using similar technique on the previous examination  A representative left axillary rodger mass measuring 2 9 x 2 1 cm on image 63 of series 3 is increased from 11 x 7 mm when measured using similar technique on previous examination  There is a 2 7 x 1 4 cm rodger mass in the lower outer right breast on image 101 of series 3 which is increased from 11 x 8 mm on the prior exam  VISUALIZED STRUCTURES IN THE UPPER ABDOMEN:  Enlarged retrocrural nodes are reidentified bilaterally, similar from prior examination  Though incompletely evaluated, upper retroperitoneal lymphadenopathy especially surrounding the celiac axis appears significantly increased from previous examination  OSSEOUS STRUCTURES:  No acute fracture or destructive osseous lesion  Impression: No pulmonary embolus  Profound increase in bulky rodger adenopathy in bilateral axilla and in the chest walls  Increase in the size of nodes in the upper abdomen, incompletely evaluated, and at the esophageal hiatus  Slight interval decrease in the size of mediastinal nodes  Workstation performed: [id number]     Allscripts Records Reviewed: Yes     ** Please Note: Dragon 360 Dictation voice to text software may have been used in the creation of this document   **

## 2018-10-11 NOTE — PLAN OF CARE
Problem: PHYSICAL THERAPY ADULT  Goal: Performs mobility at highest level of function for planned discharge setting  See evaluation for individualized goals  Treatment/Interventions: Functional transfer training, LE strengthening/ROM, Therapeutic exercise, Endurance training, Patient/family training, Equipment eval/education, Bed mobility, Gait training, Spoke to nursing, OT  Equipment Recommended: Walker (use of RW for mobility)       See flowsheet documentation for full assessment, interventions and recommendations  Prognosis: Guarded  Problem List: Decreased strength, Decreased endurance, Impaired balance, Decreased mobility, Decreased safety awareness, Impaired hearing, Obesity, Decreased skin integrity  Assessment: Pt is a 77 y/o female admitted to [facility]* sepsis,failure to thrive,acute UTI,B pleural effusion suspicious for mild pulm vascular congestion  pt lives with  and son in 1 story home,(+)[place],use of personal DME PTA,reports needing A for ADLs,reports being I with mobility,x1-2 recent fall  pt currently is not at functional mobility baseline,needs Ax2 for mobility,use of RW,use of [place] O2,multiple lines,IV medicine management,unsteady and ataxic gait pattern and ongoing medical care  Pt demonstrates moderate deficits during functional mobility and gait including dec endurance,dec balance,dec BLE strength,unsteady and ataxic gait pattern and needs modAx2 for transfers,BM and gait with use of RW  Pt would cont to benefit from skilled inpt PT services to maximize functional independence  Barriers to Discharge: Inaccessible home environment ([place])     Recommendation: Other (Comment) (inpt rhb recommended upon D/C)          See flowsheet documentation for full assessment

## 2018-10-12 VITALS
OXYGEN SATURATION: 93 % | DIASTOLIC BLOOD PRESSURE: 63 MMHG | WEIGHT: 218.48 LBS | SYSTOLIC BLOOD PRESSURE: 120 MMHG | TEMPERATURE: 98.3 F | RESPIRATION RATE: 20 BRPM | HEART RATE: 109 BPM | BODY MASS INDEX: 38.71 KG/M2 | HEIGHT: 63 IN

## 2018-10-12 PROBLEM — N10 ACUTE PYELONEPHRITIS: Status: ACTIVE | Noted: 2018-10-10

## 2018-10-12 LAB
ALBUMIN SERPL BCP-MCNC: 1.7 G/DL (ref 3.5–5)
ALP SERPL-CCNC: 148 U/L (ref 46–116)
ALT SERPL W P-5'-P-CCNC: 23 U/L (ref 12–78)
ANION GAP SERPL CALCULATED.3IONS-SCNC: 9 MMOL/L (ref 4–13)
AST SERPL W P-5'-P-CCNC: 57 U/L (ref 5–45)
BACTERIA UR CULT: ABNORMAL
BASOPHILS # BLD AUTO: 0.01 THOUSANDS/ΜL (ref 0–0.1)
BASOPHILS NFR BLD AUTO: 0 % (ref 0–1)
BILIRUB SERPL-MCNC: 0.9 MG/DL (ref 0.2–1)
BUN SERPL-MCNC: 15 MG/DL (ref 5–25)
CALCIUM SERPL-MCNC: 9.9 MG/DL (ref 8.3–10.1)
CHLORIDE SERPL-SCNC: 106 MMOL/L (ref 100–108)
CO2 SERPL-SCNC: 26 MMOL/L (ref 21–32)
CREAT SERPL-MCNC: 0.79 MG/DL (ref 0.6–1.3)
EOSINOPHIL # BLD AUTO: 0.04 THOUSAND/ΜL (ref 0–0.61)
EOSINOPHIL NFR BLD AUTO: 1 % (ref 0–6)
ERYTHROCYTE [DISTWIDTH] IN BLOOD BY AUTOMATED COUNT: 17.4 % (ref 11.6–15.1)
GFR SERPL CREATININE-BSD FRML MDRD: 73 ML/MIN/1.73SQ M
GLUCOSE SERPL-MCNC: 77 MG/DL (ref 65–140)
HCT VFR BLD AUTO: 27.4 % (ref 34.8–46.1)
HGB BLD-MCNC: 8.5 G/DL (ref 11.5–15.4)
IMM GRANULOCYTES # BLD AUTO: 0.05 THOUSAND/UL (ref 0–0.2)
IMM GRANULOCYTES NFR BLD AUTO: 1 % (ref 0–2)
LACTATE SERPL-SCNC: 7.7 MMOL/L (ref 0.5–2)
LACTATE SERPL-SCNC: 9 MMOL/L (ref 0.5–2)
LDH SERPL-CCNC: 709 U/L (ref 81–234)
LYMPHOCYTES # BLD AUTO: 1.05 THOUSANDS/ΜL (ref 0.6–4.47)
LYMPHOCYTES NFR BLD AUTO: 21 % (ref 14–44)
MAGNESIUM SERPL-MCNC: 1.7 MG/DL (ref 1.6–2.6)
MCH RBC QN AUTO: 28.3 PG (ref 26.8–34.3)
MCHC RBC AUTO-ENTMCNC: 31 G/DL (ref 31.4–37.4)
MCV RBC AUTO: 91 FL (ref 82–98)
MONOCYTES # BLD AUTO: 0.46 THOUSAND/ΜL (ref 0.17–1.22)
MONOCYTES NFR BLD AUTO: 9 % (ref 4–12)
NEUTROPHILS # BLD AUTO: 3.39 THOUSANDS/ΜL (ref 1.85–7.62)
NEUTS SEG NFR BLD AUTO: 68 % (ref 43–75)
NRBC BLD AUTO-RTO: 0 /100 WBCS
PHOSPHATE SERPL-MCNC: 2.1 MG/DL (ref 2.3–4.1)
PLATELET # BLD AUTO: 50 THOUSANDS/UL (ref 149–390)
PMV BLD AUTO: 12.4 FL (ref 8.9–12.7)
POTASSIUM SERPL-SCNC: 3.8 MMOL/L (ref 3.5–5.3)
PROCALCITONIN SERPL-MCNC: 0.55 NG/ML
PROT SERPL-MCNC: 3.9 G/DL (ref 6.4–8.2)
RBC # BLD AUTO: 3 MILLION/UL (ref 3.81–5.12)
SODIUM SERPL-SCNC: 141 MMOL/L (ref 136–145)
WBC # BLD AUTO: 5 THOUSAND/UL (ref 4.31–10.16)

## 2018-10-12 PROCEDURE — 99233 SBSQ HOSP IP/OBS HIGH 50: CPT | Performed by: INTERNAL MEDICINE

## 2018-10-12 PROCEDURE — 84100 ASSAY OF PHOSPHORUS: CPT | Performed by: PHYSICIAN ASSISTANT

## 2018-10-12 PROCEDURE — 83615 LACTATE (LD) (LDH) ENZYME: CPT | Performed by: INTERNAL MEDICINE

## 2018-10-12 PROCEDURE — 94762 N-INVAS EAR/PLS OXIMTRY CONT: CPT

## 2018-10-12 PROCEDURE — 83735 ASSAY OF MAGNESIUM: CPT | Performed by: PHYSICIAN ASSISTANT

## 2018-10-12 PROCEDURE — 83605 ASSAY OF LACTIC ACID: CPT | Performed by: PHYSICIAN ASSISTANT

## 2018-10-12 PROCEDURE — 85025 COMPLETE CBC W/AUTO DIFF WBC: CPT | Performed by: PHYSICIAN ASSISTANT

## 2018-10-12 PROCEDURE — 84145 PROCALCITONIN (PCT): CPT | Performed by: PHYSICIAN ASSISTANT

## 2018-10-12 PROCEDURE — 80053 COMPREHEN METABOLIC PANEL: CPT | Performed by: PHYSICIAN ASSISTANT

## 2018-10-12 PROCEDURE — 99232 SBSQ HOSP IP/OBS MODERATE 35: CPT | Performed by: INTERNAL MEDICINE

## 2018-10-12 PROCEDURE — 94760 N-INVAS EAR/PLS OXIMETRY 1: CPT

## 2018-10-12 PROCEDURE — 83605 ASSAY OF LACTIC ACID: CPT | Performed by: INTERNAL MEDICINE

## 2018-10-12 RX ORDER — MORPHINE SULFATE 15 MG/1
7.5 TABLET ORAL EVERY 4 HOURS PRN
Status: DISCONTINUED | OUTPATIENT
Start: 2018-10-12 | End: 2018-10-13

## 2018-10-12 RX ORDER — LORAZEPAM 0.5 MG/1
0.5 TABLET ORAL EVERY 6 HOURS PRN
Status: DISCONTINUED | OUTPATIENT
Start: 2018-10-12 | End: 2018-10-13 | Stop reason: HOSPADM

## 2018-10-12 RX ORDER — MORPHINE SULFATE 100 MG/5ML
5 SOLUTION ORAL EVERY 4 HOURS PRN
Qty: 30 ML | Refills: 0 | Status: SHIPPED | OUTPATIENT
Start: 2018-10-12 | End: 2018-10-22

## 2018-10-12 RX ORDER — BENZONATATE 100 MG/1
100 CAPSULE ORAL 3 TIMES DAILY PRN
Qty: 20 CAPSULE | Refills: 0 | Status: SHIPPED | OUTPATIENT
Start: 2018-10-12

## 2018-10-12 RX ORDER — LORAZEPAM 2 MG/ML
0.5 CONCENTRATE ORAL EVERY 6 HOURS PRN
Qty: 30 ML | Refills: 0 | Status: SHIPPED | OUTPATIENT
Start: 2018-10-12 | End: 2018-10-22

## 2018-10-12 RX ADMIN — LIDOCAINE HYDROCHLORIDE 10 ML: 20 SOLUTION ORAL; TOPICAL at 08:51

## 2018-10-12 RX ADMIN — LORAZEPAM 0.5 MG: 0.5 TABLET ORAL at 11:37

## 2018-10-12 RX ADMIN — LORAZEPAM 0.5 MG: 0.5 TABLET ORAL at 21:33

## 2018-10-12 RX ADMIN — THIAMINE HYDROCHLORIDE 200 MG: 100 INJECTION, SOLUTION INTRAMUSCULAR; INTRAVENOUS at 00:45

## 2018-10-12 RX ADMIN — SODIUM CHLORIDE, SODIUM GLUCONATE, SODIUM ACETATE, POTASSIUM CHLORIDE, MAGNESIUM CHLORIDE, SODIUM PHOSPHATE, DIBASIC, AND POTASSIUM PHOSPHATE 125 ML/HR: .53; .5; .37; .037; .03; .012; .00082 INJECTION, SOLUTION INTRAVENOUS at 05:10

## 2018-10-12 RX ADMIN — APIXABAN 5 MG: 5 TABLET, FILM COATED ORAL at 08:51

## 2018-10-12 RX ADMIN — SUCRALFATE 1 G: 1 TABLET ORAL at 08:51

## 2018-10-12 RX ADMIN — PANTOPRAZOLE SODIUM 20 MG: 20 TABLET, DELAYED RELEASE ORAL at 05:00

## 2018-10-12 RX ADMIN — SODIUM CHLORIDE, SODIUM LACTATE, POTASSIUM CHLORIDE, AND CALCIUM CHLORIDE 2000 ML: .6; .31; .03; .02 INJECTION, SOLUTION INTRAVENOUS at 04:55

## 2018-10-12 RX ADMIN — LORATADINE 10 MG: 10 TABLET ORAL at 05:00

## 2018-10-12 RX ADMIN — POLYETHYLENE GLYCOL 3350 17 G: 17 POWDER, FOR SOLUTION ORAL at 08:51

## 2018-10-12 RX ADMIN — APIXABAN 5 MG: 5 TABLET, FILM COATED ORAL at 17:32

## 2018-10-12 RX ADMIN — ALLOPURINOL 200 MG: 100 TABLET ORAL at 08:51

## 2018-10-12 RX ADMIN — FLUCONAZOLE 200 MG: 200 TABLET ORAL at 08:51

## 2018-10-12 RX ADMIN — MORPHINE SULFATE 7.5 MG: 15 TABLET ORAL at 21:34

## 2018-10-12 NOTE — SOCIAL WORK
Spoke with son and  regarding hospice care at home    Reviewed providers available, their preference was [facility], [facility], referral sent via [facility]    Son asked if DME (bed) would be handled by hospice, advised yes

## 2018-10-12 NOTE — PROGRESS NOTES
Progress note - Palliative and Supportive Care   Henok Pac 78 y o  female [id number]    Assessment/Plan         Assessment:      Patient Active Problem List   Diagnosis    Enlarged lymph node in neck    Lymphadenopathy, axillary    Lymphoma ([facility]    Arm DVT (deep venous thromboembolism), acute, left (HCC)    Essential hypertension    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 ([facility] )    Constipation    Left bundle branch block (LBBB)    Infected insect bite of neck    Vitamin D deficiency    Vitamin B12 deficiency    Thrombosis of left subclavian vein (HCC)    Situational anxiety    Thrombophlebitis of deep veins of upper extremities    Gastroesophageal reflux disease    Dyslipidemia    DLBCL (diffuse large B cell lymphoma) (HCC)    Cataract    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    Esophageal stricture    [place] (hard of hearing)    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HCC)    Osteoarthritis    Lymphadenopathy of head and neck    Marginal zone lymphoma (HCC)    Acute left flank pain    Left jugular vein thrombosis    Primary insomnia    Sinus tachycardia    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 Level 4 comfort care  ? Patient feels "exhausted" of coming back to hospital, labs, vitals, etc    ? No further wishes for work-up or treatment directed care  ? Patient is competent to make medical decisions  This has previously been discussed with family and will consult hospice liaison to discuss hospice placement when they arrive  · Symptom management  ? Acetaminophen 650mg Q6H PRN  ? Morphine 7 5mg Q4H PRN pain or dyspnea  ? zofran 4mg Q6H PRN nausea  ? Simethicone 80mg Q6H PRN flatulence  ? Trazodone 25mg HS --> patient feels this is ineffective   Will use ativan 0 5mg Q6H PRN anxiety, insomnia, or dys  ? Initiated tessalon pearles 100mg TID PRN x1 in past 24H   ? Bowel regimen: miralax daily, mag citrate --> ongoing constipation  Consider dulcolax suppository as needed  Had a BM this morning  · Medical management  ? Continues on diflucan for candida esophagitis  Odynophagia and dysphagia have improved  Magic mouthwash made PRN  ? D/c non-comfort meds, IVF, ABX    ? Patient declines any further work-up, labs, etc as she has decided on comfort care      Code Status: DNR - Level 3   Decisional apparatus:  Patient is competent on my exam today  If competence is lost, patient's substitute decision maker would default to , Dot Ritter, by [place] Act 169  Advance Directive / Living Will / POLST:  No AD on file  I have reviewed the patient's controlled substance dispensing history in the Prescription Drug Monitoring Program in compliance with the [facility] regulations before prescribing any controlled substances  Interval history:       Lactic acid continues to rise- now 9  CT abdomen and pelvis demonstrated no intra-abdominal abscess, but confirmed worsening LAD, pleural effusions  Patient declined further treatment last evening when IV was being inserted  When re-visiting goals of care with patient this morning she is adamant that she does not want any further measures to prolong her life  States "I don't want to live on like this"  She confirms that she would like to pursue comfort care/hospice  She agrees to hospice consult to discuss placement home vs SNF  Not a candidate for hospice IPU at this time  Current discomfort is secondary to dyspnea, chest pressure, and anxiety for which nurse will administer 0 5mg Ativan  Morphine available for pain and dyspnea as well  Family will arrive around 10am  Will re-visit to provide support       MEDICATIONS / ALLERGIES:     all current active meds have been reviewed and current meds:   Current Facility-Administered Medications   Medication Dose Route Frequency    acetaminophen (TYLENOL) tablet 650 mg  650 mg Oral Q6H PRN    al mag oxide-diphenhydramine-lidocaine viscous (MAGIC MOUTHWASH) suspension 10 mL  10 mL Swish & Swallow 4x Daily (with meals and at bedtime)    allopurinol (ZYLOPRIM) tablet 200 mg  200 mg Oral Daily    apixaban (ELIQUIS) tablet 5 mg  5 mg Oral BID    benzonatate (TESSALON PERLES) capsule 100 mg  100 mg Oral TID PRN    cefepime (MAXIPIME) 1,000 mg in dextrose 5 % 50 mL IVPB  1,000 mg Intravenous Q12H    fluconazole (DIFLUCAN) tablet 200 mg  200 mg Oral Daily    HYDROcodone-acetaminophen (NORCO) 5-325 mg per tablet 1 tablet  1 tablet Oral Q6H PRN    influenza vaccine, high-dose (FLUZONE HIGH-DOSE) IM injection KAYLYNN 0 5 mL  0 5 mL Intramuscular Prior to discharge    loratadine (CLARITIN) tablet 10 mg  10 mg Oral Early Morning    LORazepam (ATIVAN) tablet 0 5 mg  0 5 mg Oral Q8H PRN    multi-electrolyte (ISOLYTE-S PH 7 4 equivalent) IV solution  125 mL/hr Intravenous Continuous    ondansetron (ZOFRAN-ODT) dispersible tablet 4 mg  4 mg Oral Q6H PRN    pantoprazole (PROTONIX) EC tablet 20 mg  20 mg Oral Early Morning    polyethylene glycol (MIRALAX) packet 17 g  17 g Oral Daily    simethicone (MYLICON) chewable tablet 80 mg  80 mg Oral Q6H PRN    sucralfate (CARAFATE) tablet 1 g  1 g Oral 4x Daily    thiamine (VITAMIN B1) 200 mg in sodium chloride 0 9 % 50 mL IVPB  200 mg Intravenous Daily       Allergies   Allergen Reactions    Alteplase Anaphylaxis    Aspirin Anaphylaxis    Celebrex [Celecoxib] Anaphylaxis    Nsaids Anaphylaxis    Other Anaphylaxis     Pt states when she received a certain type of chemotherapy it caused her throat to close    Rituxan [Rituximab] Anaphylaxis     Swelling of tongue and closing of throat    Sulfa Antibiotics Other (See Comments) and Anaphylaxis     VERY EMOTIONAL CRYING    Bactrim [Sulfamethoxazole-Trimethoprim] Other (See Comments)     VERY EMOTIONAL/CRYING       OBJECTIVE:    Physical Exam  Physical Exam   Constitutional: She is oriented to person, place, and time  She appears well-developed  HENT:   Head: Normocephalic and atraumatic  Eyes: Conjunctivae are normal    Cardiovascular:   tachypneic   Pulmonary/Chest:   Tachypneic, wheezing, decreased breath sounds  Abdominal: Soft  Bowel sounds are normal  There is no tenderness  There is no guarding  Musculoskeletal: She exhibits edema (2+ b/l LE swelling)  Neurological: She is alert and oriented to person, place, and time  Diffuse weakness   Skin: Skin is warm and dry  Psychiatric:   Appears anxious, tearful at times  Appropriate mood and affect  Lab Results:   I have personally reviewed pertinent labs  , CBC: No results found for: WBC, HGB, HCT, MCV, PLT, ADJUSTEDWBC, MCH, MCHC, RDW, MPV, NRBC, CMP:   Lab Results   Component Value Date     10/12/2018    K 3 8 10/12/2018     10/12/2018    CO2 26 10/12/2018    BUN 15 10/12/2018    CREATININE 0 79 10/12/2018    CALCIUM 9 9 10/12/2018    AST 57 (H) 10/12/2018    ALT 23 10/12/2018    ALKPHOS 148 (H) 10/12/2018    EGFR 73 10/12/2018     Imaging Studies:  CT abdomen pelvis 10/11: Extensive retroperitoneal and mesenteric adenopathy is again identified  There has been marked interval enlargement of the iliac and inguinal adenopathy compared to the prior examination performed 8/21/2018  There is an enlarging right axillary node   inferiorly as well      No hydronephrosis or nephrolithiasis      Small bilateral basilar effusions and adjacent consolidations most likely representing atelectasis  Nonspecific presacral edema  EKG, Pathology, and Other Studies: reviewed pertinent studies    Counseling / Coordination of Care  Total floor / unit time spent today 45+ minutes  Greater than 50% of total time was spent with the patient and / or family counseling and / or coordination of care   A description of the counseling / coordination of care: time spent with family, communicating with nurse, CM, and hospice liaison

## 2018-10-12 NOTE — PROGRESS NOTES
Lactic acid elevated 9 0  Ephraim Toledo from critical care and SLIM made aware  New orders obtained  Pt made aware that Lactated ringers bolus will be infused but patient said "oh no, not again " pat seemed upset  While putting a new IV site in, patient said "please stop now, I dont want any treatment anymore " new IV site obtained so RN did not remove  SLIM made aware of the event and what the patient said

## 2018-10-12 NOTE — PROGRESS NOTES
Lactic acid rising overnight  Continue to hydrate with LR boluses and Maintenance IVF at 125 cc/hr  Urine outs 500 cc past 12 hours and HR still > 100 bpm, so will continue to resuscitate  Lactic acid levels ordered q6h  CT Abdomen :   Extensive retroperitoneal and mesenteric adenopathy is again identified  There has been marked interval enlargement of the iliac and inguinal adenopathy compared to the prior examination performed 8/21/2018  There is an enlarging right axillary node   inferiorly as well      No hydronephrosis or nephrolithiasis      Small bilateral basilar effusions and adjacent consolidations most likely representing atelectasis  Nonspecific presacral edema  RN note from last shift states that patient does not want any more treatment  This was not relayed to CCM  I spoke with current RN and asked her to confirm with Primary Service before giving above treatment      Discussed with Dr Katie Toth

## 2018-10-12 NOTE — HOSPICE NOTE
Met with family at bedside, they are wanting to take pt home with hospice support  Consents signed and faxed to [place] office  Equipment ordered for delivery to pts home by noon tomorrow, (hospital bed  Half rails, NPPR mattress, bed pads, over bed table, oxygen for 2 to 4lnc, bed pan  Spoke with Sharron Branch at [facility] ) Palliative wrote scripts, MOHIT Tillman sent down to pharmacy to be filled, son Roxy Skiff will  tomorrow AM  Emotional support provided to family  MOHIT Silva set up transport for 12 noon tomorrow

## 2018-10-13 PROBLEM — A41.9 SEPSIS (HCC): Status: RESOLVED | Noted: 2018-10-10 | Resolved: 2018-10-13

## 2018-10-13 PROCEDURE — 99239 HOSP IP/OBS DSCHRG MGMT >30: CPT | Performed by: INTERNAL MEDICINE

## 2018-10-13 RX ORDER — MORPHINE SULFATE 10 MG/5ML
5 SOLUTION ORAL EVERY 4 HOURS PRN
Status: DISCONTINUED | OUTPATIENT
Start: 2018-10-13 | End: 2018-10-13 | Stop reason: HOSPADM

## 2018-10-13 RX ADMIN — APIXABAN 5 MG: 5 TABLET, FILM COATED ORAL at 10:07

## 2018-10-13 RX ADMIN — FLUCONAZOLE 200 MG: 200 TABLET ORAL at 10:08

## 2018-10-13 NOTE — PROGRESS NOTES
Progress Note - Byron Serrano 1940, 78 y o  female MRN: [id number]    Unit/Bed#: [facility] 775-81 Encounter: [id number]    Primary Care Provider: Ashwin Taylor PA-C   Date and time admitted to hospital: 10/10/2018  3:04 PM    Acute pyelonephritis   Assessment & Plan    Suspected urinary scource of sepsis  IV ABx  Monitor HR  F/u lactic acid probably type B     Candida esophagitis (HCC)   Assessment & Plan    On fluconazole     Sinus tachycardia   Assessment & Plan    Sx Rx - lorazepam     DLBCL (diffuse large B cell lymphoma) ([facility] [address] )   Assessment & Plan    S/p chemo radiation  Presently not a chemo candidate  Hospice care as decided by pt and family     * Sepsis ([facility] )   Assessment & Plan    2" urinary vs other scource  IVF and PO ABx         VTE Pharmacologic Prophylaxis: Apixaban (Eliquis)  Mechanical: Mechanical VTE prophylaxis in place  Patient Centered Rounds: I have performed bedside rounds with nursing staff today  Discussions with Specialists or Other Care Team Provider:     Education and Discussions with Family / Patient:     Time Spent for Care: More than 50% of total time spent on counseling and coordination of care as described above  Current Length of Stay: 2 day(s)    Current Patient Status: Inpatient   Certification Statement: The patient will continue to require additional inpatient hospital stay due to as above    Discharge Plan:    Code Status: Level 4 - Comfort Care      Subjective: ready for hospice care    Objective:     Vitals:   Temp (24hrs), Av 3 °F (36 8 °C), Min:98 °F (36 7 °C), Max:98 5 °F (36 9 °C)    Temp:  [98 °F (36 7 °C)-98 5 °F (36 9 °C)] 98 3 °F (36 8 °C)  HR:  [109-112] 109  Resp:  [20] 20  BP: ()/(56-63) 120/63  SpO2:  [93 %-97 %] 93 %  Body mass index is 38 7 kg/m²  Input and Output Summary (last 24 hours):        Intake/Output Summary (Last 24 hours) at 10/12/18 6443  Last data filed at 10/12/18 1436   Gross per 24 hour   Intake          3644 58 ml   Output              550 ml   Net          3094 58 ml       Physical Exam   HENT:   Head: Normocephalic  Eyes: Pupils are equal, round, and reactive to light  Cardiovascular: Normal heart sounds  Pulmonary/Chest: Effort normal    Abdominal: Soft  Neurological: She is alert  Skin: There is pallor  Additional Data:     Labs:      Results from last 7 days  Lab Units 10/12/18   WBC Thousand/uL 5 00   HEMOGLOBIN g/dL 8 5*   HEMATOCRIT % 27 4*   PLATELETS Thousands/uL 50*   NEUTROS PCT % 68   LYMPHS PCT % 21   MONOS PCT % 9   EOS PCT % 1       Results from last 7 days  Lab Units 10/12/18  0446   SODIUM mmol/L 141   POTASSIUM mmol/L 3 8   CHLORIDE mmol/L 106   CO2 mmol/L 26   BUN mg/dL 15   CREATININE mg/dL 0 79   CALCIUM mg/dL 9 9   ALK PHOS U/L 148*   ALT U/L 23   AST U/L 57*       Results from last 7 days  Lab Units 10/10/18  1558   INR  1 80*       * I Have Reviewed All Lab Data Listed Above  Imaging:  Imaging Personally Reviewed by Myself Includes:     Cultures:   Blood Culture:   Lab Results   Component Value Date    BLOODCX No Growth at 48 hrs  10/10/2018    BLOODCX No Growth at 48 hrs   10/10/2018     Urine Culture:   Lab Results   Component Value Date    URINECX >100,000 cfu/ml Escherichia coli (A) 10/10/2018    URINECX 20,000-29,000 cfu/ml  09/25/2018    URINECX <10,000 cfu/ml  08/21/2018    URINECX 40,000-49,000 cfu/ml Escherichia coli (A) 08/10/2018    URINECX <10,000 cfu/ml  08/10/2018    URINECX 40,000-49,000 cfu/ml Escherichia coli 09/20/2017     Sputum Culture: No components found for: SPUTUMCX  Wound Culture: No results found for: WOUNDCULT    Last 24 Hours Medication List:     Current Facility-Administered Medications:  acetaminophen 650 mg Oral Q6H PRN Jim Yusuf MD   al mag oxide-diphenhydramine-lidocaine viscous 10 mL Swish & Swallow Q6H PRN Ada De Santiago PA-C   apixaban 5 mg Oral BID Jim Yusuf MD   benzonatate 100 mg Oral TID PRN Brianne Johnson PA-C fluconazole 200 mg Oral Daily Edna Andrews MD   LORazepam 0 5 mg Oral Q6H PRN Ada De Santiago PA-C   morphine 7 5 mg Oral Q4H PRN Ada De Santiago PA-C   ondansetron 4 mg Oral Q6H PRN Edna Andrews MD   pantoprazole 20 mg Oral Early Morning Edna Andrews MD   simethicone 80 mg Oral Q6H PRN Edna Andrews MD        Today, Patient Was Seen By: Georges Robles MD    ** Please Note: Dragon 360 Dictation voice to text software may have been used in the creation of this document   **

## 2018-10-15 LAB
BACTERIA BLD CULT: NORMAL
BACTERIA BLD CULT: NORMAL

## 2018-10-16 ENCOUNTER — TELEPHONE (OUTPATIENT)
Dept: FAMILY MEDICINE CLINIC | Facility: CLINIC | Age: 78
End: 2018-10-16

## 2018-10-16 NOTE — TELEPHONE ENCOUNTER
Ángela Hinds of [facility] left message on voicemail  10/15/18, 4:02 pm, stating patient discharged from hospital and home on hospice as of 10/13/18  Do you want to follow patient or do you want Dr Rachel Gale to do so? Thank you

## 2018-10-24 NOTE — ED ATTENDING ATTESTATION
Karen Calle MD, saw and evaluated the patient  I have discussed the patient with the resident/non-physician practitioner and agree with the resident's/non-physician practitioner's findings, Plan of Care, and MDM as documented in the resident's/non-physician practitioner's note, except where noted  All available labs and Radiology studies were reviewed  At this point I agree with the current assessment done in the Emergency Department  I have conducted an independent evaluation of this patient a history and physical is as follows:    Patient presents with chief complaint of fatigue  Patient has a history of B-cell lymphoma status post radiation  Patient reports having history of esophagitis due to radiation treatments and that over the last few weeks she has been unable to eat as much as normal   Patient states that she was only able to drink 1 bottle of ensure today  Additionally, patient does report having some difficulty breathing and pain with inspiration  no additional complaints  Exam: AAOx3, NAD, RRR, CTA, S/NT/ND, no motor/sensory deficits  A/P:  Weakness, chest pain  Will check cardiac labs to evaluate for any causes including dehydration  Will check CT scan to evaluate for PE given history of active cancer  Will give IV fluids      Critical Care Time  CritCare Time    Procedures

## 2018-11-23 LAB — FUNGAL BLOOD CULTURE: NORMAL

## 2021-01-01 NOTE — OCCUPATIONAL THERAPY NOTE
Infant stable in isolette. Voiding and stooling. Has had several clusters of desaturations this shift, during feeding and after feedings with increased 02 needs 21-28% to recover. Rounded abdomen, good bowel sounds and soft. Continues on CPap  5+. Small spits, no true spells this shift. Alternating Mask/prongs. Does best in prone position.   Occupational Therapy Evaluation      Cedrick Jeong    10/11/2018    Patient Active Problem List   Diagnosis    Enlarged lymph node in neck    Lymphadenopathy, axillary    Lymphoma (HCC)    Arm DVT (deep venous thromboembolism), acute, left (HCC)    Essential hypertension    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 ([address] )    Constipation    Left bundle branch block (LBBB)    Infected insect bite of neck    Vitamin D deficiency    Vitamin B12 deficiency    Thrombosis of left subclavian vein (HCC)    Situational anxiety    Thrombophlebitis of deep veins of upper extremities    Gastroesophageal reflux disease    Dyslipidemia    DLBCL (diffuse large B cell lymphoma) (HCC)    Cataract    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    Esophageal stricture    [facility] (hard of hearing)    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[address] )    Osteoarthritis    Lymphadenopathy of head and neck    Marginal zone lymphoma ([address] )    Acute left flank pain    Left jugular vein thrombosis    Primary insomnia    Sinus tachycardia    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[address] )    Dysphagia    Chronic systolic congestive heart failure (HCC)    Esophageal dysphagia    Candida esophagitis (HCC)    Moderate protein-calorie malnutrition (HCC)    Sepsis ([address] )    Acute cystitis       Past Medical History:   Diagnosis Date    Basal cell carcinoma (BCC)     Bruit of right carotid artery     resolved 5/14/15     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HCC)     radiation    Dyslipidemia     Esophageal stricture     last assessed 2/28/13       GERD (gastroesophageal reflux disease)     History of chemotherapy     History of radiation therapy     [facility] (hard of hearing)     b/l ears    Hyperlipidemia     Hypertension     Osteoarthritis     Osteoarthrosis of ankle and foot     last assessed 5/20/13     Osteopenia     last assessed 2/28/13     Plantar fasciitis of left foot     Polyp of sigmoid colon     last assessed 2/28/13     Shortness of breath     when walking up stairs       Past Surgical History:   Procedure Laterality Date    ANKLE ARTHROPLASTY Left     BLEPHAROPLASTY      right eye     CATARACT EXTRACTION Right     COLONOSCOPY      ESOPHAGEAL DILATION      x2    ESOPHAGOGASTRODUODENOSCOPY      dilitation    ESOPHAGOGASTRODUODENOSCOPY N/A 10/1/2018    Procedure: ESOPHAGOGASTRODUODENOSCOPY (EGD); Surgeon: Parish Gonzalez MD;  Location: [facility]; Service: Gastroenterology    FACIAL/NECK BIOPSY Right 9/8/2017    Procedure: NECK NODE BIOPSY WITH NEEDLE LOCALIZATION; LYMPHOMA PROTOCOL (NEEDLE LOC WITH I R  AT 1100); Surgeon: Devendra Huitron MD;  [facility];  Service: Surgical Oncology    HYSTERECTOMY      LYMPH NODE DISSECTION      right groin and neck    ORBITAL FLOOR EXPLORATION Right     for cancer     PORTACATH PLACEMENT      left chest     VA BX/REMV,LYMPH NODE,DEEP AXILL Left 8/14/2017    Procedure: Axillary lymph node excision with LYMPHOMA PROTOCOL;  Surgeon: Devendra Huitron MD;  [facility];  Service: Surgical Oncology    VA BX/REMV,LYMPH NODE,DEEP CERV Right 5/2/2016    Procedure: NECK NODE BIOPSY;  Surgeon: Devendra Huitron MD;  [facility];  Service: Surgical Oncology    VA BX/REMV,LYMPH NODE,DEEP CERV Left 7/25/2018    Procedure: NECK NODE BIOPYS WITH LYMPHOMA PROTOCOL; (ULTRASOUND GUIDED NEEDLE LOC IN IR AT 0800); Surgeon: Devendra Huitron MD;  [facility];  Service: Surgical Oncology    VA INSJ TUNNELED CTR VAD W/SUBQ PORT AGE 5 YR/> N/A 10/3/2017    Procedure: PLACEMENT OF PORT-A-CATH DEVICE;  Surgeon: Devendra Huitron MD;  [facility];  Service: Surgical Oncology      10/11/18 1040   Note Type   Note type Eval only   Restrictions/Precautions   Other Precautions Chair Alarm; Bed Alarm;Multiple lines;Telemetry; Fall Risk   Pain Assessment   Pain Assessment No/denies pain   Pain Score No Pain   Home Living   Type of [address] [place] [address] One level  (2 ELIOT)   Bathroom Shower/Tub Tub/shower unit   Bathroom Toilet Raised   Bathroom Equipment Grab bars in shower; Shower chair; Toilet raiser   Home Equipment Walker;Cane   Additional Comments pt states she used to only use the cane, however, this past week she has had to use a walker   Prior Function   Level of [place] Independent with ADLs and functional mobility   Lives With Spouse; Son   Neal Help From Family   ADL Assistance Independent   IADLs Needs assistance   Falls in the last 6 months 1 to 4   Comments admits to one fall trying to get down the steps to the garage   Lifestyle   Autonomy a few weeks ago, pt was fully independent w self care, mobility  Has needed help with home managment as of late   Reciprocal Relationships supportive family   Intrinsic Gratification yessiits hats for chemo patients, april   Psychosocial   Psychosocial (WDL) WDL   Subjective   Subjective "I am so weak, I can barely lift up my legs"   ADL   Eating Assistance 5  Supervision/Setup   Eating Deficit Increased time to complete   Grooming Assistance 3  Moderate Assistance   UB Bathing Assistance 3  Moderate Assistance   LB Bathing Assistance 2  Maximal Assistance   UB Dressing Assistance 3  Moderate Assistance   LB Dressing Assistance 2  Maximal [address]  2  Maximal Assistance   Additional Comments increased time for self care due to low activity tolrance   Bed Mobility   Supine to Sit 3  Moderate assistance   Transfers   Sit to Stand 3  Moderate assistance   Additional items Assist x 2; Increased time required;Verbal cues   Stand to Sit 3  Moderate assistance   Stand pivot 3  Moderate assistance   Additional items Assist x 2; Increased time required;Verbal cues   Balance   Static Sitting Fair +   Dynamic Sitting Fair   Static Standing Poor +   Dynamic Standing Poor   Activity Tolerance   Activity Tolerance Patient limited by fatigue   Nurse Made Aware appropriate to see   RUE Assessment   RUE Assessment DAISY BRAY Assessment   LUE Assessment WFL   Hand Function   Gross Motor Coordination Functional   Fine Motor Coordination Functional   Sensation   Light Touch No apparent deficits   Additional Comments b/l LE swollen, L more than R   Vision - Complex Assessment   Tracking Able to track stimulus in all quads without difficulty   Acuity Able to read clock/calendar on wall without difficulty   Cognition   Overall Cognitive Status [facility]   Arousal/Participation Alert; Cooperative   Attention Within functional limits   Orientation Level Oriented X4   Memory Within functional limits   Following Commands Follows one step commands without difficulty   Assessment   Limitation Decreased ADL status; Decreased UE strength;Decreased endurance;Decreased self-care trans;Decreased high-level ADLs   Assessment Pt is a 78 y o  female seen for OT evaluation s/p admit to [facility] on 10/10/2018 w/ Sepsis [address] [address] )  She was recently discharged on 10/6/18 after admission for increasing fatigue, weakness, and difficulty swallowing  She was found to have candidal esophagitis and was placed on fluconazole  According to the patient's son, she has functionally not return to baseline  She is unable to get herself in and out of the shower  She has overall continue to weak in since her discharge and is only able to take in small amounts of oral fluids and liquids  Her family has been tracking her blood pressure and heart rate at home, and EMS was called this afternoon given her significant weakness and low blood pressure  Per EMS report, her SBP was approximately 70 on arrival   Comorbidities affecting pt's functional performance at time of assessment include: lymphoma s/p radiation/chemotherapy related cardiomyopathy, esophageal stricture, dysphagia  HTN, [place] osteoarthritis, situational anxiety   Personal factors affecting pt at time of IE include:steps to enter environment, difficulty performing ADLS, difficulty performing IADLS  and generalized weakness and poor activity tolerance  Prior to last  admission, pt was indpendent w all personal care, light home management, functional mobility  Upon evaluation: Pt requires mod/max assist for bedmobility, mod assist of 2 for sit to stand and for transfer OOB  She needs max assist for completion of self care  She has decreased balance in stance and sitting, as well as poor activity tolerance  Visible SOB w minimal activity  Pt to benefit from continued skilled OT tx while in the hospital to address deficits as defined above and maximize level of functional independence w ADL's and functional mobility  Occupational Performance areas to address include: grooming, bathing/shower, toilet hygiene, dressing, functional mobility and clothing management  Pt scored   35/100 on the barthel index  Based on findings from OT evaluation and functional performance deficits, pt has been identified as a  High complexity evaluation  From OT standpoint, recommendation at time of d/c would be inpatient rehab  Goals   Patient Goals to get her strenght back   STG Time Frame 3-5   Short Term Goal #1 pt will complete bedmobility w min assist    Short Term Goal #2 good balance sitting at EOB x 10 min for increased participation in self care   Short Term Goal  complete grooming activities w min assist /set up   LTG Time Frame 10-14   Long Term Goal #1 pt will tolerate standing x 10 min at sinkside w fair + balance for completion of hygiene   Long Term Goal #2 demonstreat good ECT/self pacing skills w self care and functional mobility   Functional Transfer Goals   Pt Will Perform All Functional Transfers With mod indep; With good judgment/safety   ADL Goals   Pt Will Perform Eating Independently   Pt Will Perform Grooming Independently   Pt Will Perform Bathing Independently   Pt Will Perform UE Dressing Independently   Pt Will Perform LE Dressing With mod indep   Pt Will Perform Toileting With mod indep   Plan   Treatment Interventions ADL retraining;Functional transfer training;UE strengthening/ROM; Endurance training;Patient/family training;Equipment evaluation/education; Compensatory technique education; Energy conservation; Activityengagement   Goal Expiration Date 10/25/18   OT Frequency 3-5x/wk   Recommendation   OT Discharge Recommendation Short Term Rehab   OT - OK to Discharge Yes   Barthel Index   Feeding 5   Bathing 0   Grooming Score 0   Dressing Score 5   Bladder Score 5   Bowels Score 10   Toilet Use Score 5   Transfers (Bed/Chair) Score 5   Mobility (Level Surface) Score 0   Stairs Score 0   Barthel Index Score 35

## 2021-08-16 NOTE — DISCHARGE SUMMARY
Discharge- Damon Asher 1940, 78 y o  female MRN: [id number]  Unit/Bed#: -01 Encounter: [id number] DOS: 8/23/18  Primary Care Provider: Martin Valencia PA-C   Date and time admitted to hospital: 8/21/2018  8:07 AM    * Acute left flank pain   Assessment & Plan    · Unclear etiology, consider secondary to her worsening lymphadenopathy, however she also correlates her worst pain to nights where she ate dinner later than usual and had worsening pain with lying down and lots of gas  Will add simethicone to try to provide some relief this way - patient reports that she has taken and tolerated this medication in the past  · Of note, patient was recently treated for possible UTI with concern for Pyelo with prolonged course of Keflex  Kidneys were unremarkable on CT  She denies urinary symptoms  · Started on gabapentin, ultram with improvement   · Possibly 2/2 constipation and gas pain and simethicone added PRN   · Pain better controlled today and she was seen by PT who is recommending home with family support  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 [facility])   Assessment & Plan    · Patient known to Dr Noé Smith as outpatient  · Has been having worsening lymphadenopathy recently  · CT of the chest showed mixed progression as there was progression of the mediastinal and retrocrural lymphadenopathy but significant improvement of the axillary lymphadenopathy compared to prior per the result report  · CT of the chest abdomen and pelvis showed progression of supraclavicular, mediastinal, and retroperitoneal lymphadenopathy with mediastinal lymphadenopathy causing mass effect on the esophagus per the result report  · Has follow up appt with heme/onc today at 1pm with Dr Noé Smith     · Family agreeable to palliative med referral for pain mangement/symptom management   · She is on chronic steroids        Left jugular vein thrombosis   Assessment & Plan    · POA  · Diagnosis recently following removal of the neck lymph nodes  · Continue on Eliquis which was started by her hematologist        Constipation   Assessment & Plan    · Continue colace, senna, miralax PRN   · Encourage ambulation, oral hydration, PRN suppositories         Essential hypertension   Assessment & Plan    · Continue home medications - lisinopril and hydrochlorothiazide  Discharging Physician / Practitioner: Sean Woodruff PA-C  PCP: Suellen Moody PA-C  Admission Date:   Admission Orders     Ordered        08/21/18 1448  Inpatient Admission (expected length of stay for this patient is greater than two midnights)  Once             Discharge Date: 08/23/18    Resolved Problems  Date Reviewed: 8/23/2018    None        Consultations During Hospital Stay:  · none    Procedures Performed:   · none    Significant Findings / Test Results:   · Constipation, gas pains   · CT c/a/p - Supraclavicular, mediastinal, and retroperitoneal lymphadenopathy, progressed from prior  The mediastinal lymphadenopathy causes mass effect on the esophagus  · Urine culture neg for growth     Incidental Findings:   · none    Test Results Pending at Discharge (will require follow up):   · none     Outpatient Tests Requested:  · Follow up with PCP, cardiology  · Follow up with Dr Felisa Bateman today     Complications:  none    Reason for Admission: left flank pain     Hospital Course:     Silverio Lunsford is a 78 y o  female patient with PMh significant for HTN, L jug vein thrombosis, B cell lymphoma who originally presented to the hospital on 8/21/2018 due to left flank pain  CT a/p showed progression of lymphadenopathy  She also reported constipation and gas pain  She was started on low dose gabapentin, PRN tramadol and PRN simethicone and her sx improved  We recommend bowel regimen for constipation   Family was upset because they felt the patient "fell between the cracks" because her port is not working and she was supposed to start radiation today  Therefore, she was arranged follow up appt with Dr Noé Smith today at 1pm  She is medically stable for d/c home  Patient and family expressed understanding and agree with plan  Please see above list of diagnoses and related plan for additional information  Condition at Discharge: stable     Discharge Day Visit / Exam:     Subjective:  Pt seen and examined  Passing gas  Pain improved  Would like to go home  Vitals: Blood Pressure: 102/58 (08/23/18 0719)  Pulse: 88 (08/23/18 0719)  Temperature: 98 7 °F (37 1 °C) (08/23/18 0719)  Temp Source: Oral (08/23/18 0719)  Respirations: 18 (08/23/18 0719)  Height: 5' 4" (162 6 cm) (08/21/18 1611)  Weight - Scale: 94 3 kg (207 lb 14 3 oz) (08/21/18 1611)  SpO2: 92 % (08/23/18 0719)    Exam:   Physical Exam   Constitutional: She is oriented to person, place, and time  She appears well-developed and well-nourished  HENT:   Head: Normocephalic and atraumatic  Mouth/Throat: Oropharynx is clear and moist    Eyes: EOM are normal  No scleral icterus  Neck: Normal range of motion  Neck supple  Cardiovascular: Normal rate, regular rhythm and normal heart sounds  No murmur heard  Pulmonary/Chest: Effort normal  She has wheezes  She has no rales  Left chest wall port    Abdominal: Soft  Bowel sounds are normal  She exhibits no distension  There is no tenderness  No flank pain   Musculoskeletal: Normal range of motion  She exhibits no edema  Neurological: She is alert and oriented to person, place, and time  Skin: Skin is warm and dry  Psychiatric: She has a normal mood and affect  Nursing note and vitals reviewed  Discussion with Family: 2 sons and  at bedside     Discharge instructions/Information to patient and family:   See after visit summary for information provided to patient and family        Provisions for Follow-Up Care:  See after visit summary for information related to follow-up care and any pertinent home health orders  Disposition:     Home    For Discharges to [facility]:   · Not Applicable to this Patient - Not Applicable to this Patient    Planned Readmission: none     Discharge Statement:  I spent 40 minutes discharging the patient  This time was spent on the day of discharge  I had direct contact with the patient on the day of discharge  Greater than 50% of the total time was spent examining patient, answering all patient questions, arranging and discussing plan of care with patient as well as directly providing post-discharge instructions  Additional time then spent on discharge activities  Discharge Medications:  See after visit summary for reconciled discharge medications provided to patient and family        ** Please Note: This note has been constructed using a voice recognition system ** Tetracycline Counseling: Patient counseled regarding possible photosensitivity and increased risk for sunburn.  Patient instructed to avoid sunlight, if possible.  When exposed to sunlight, patients should wear protective clothing, sunglasses, and sunscreen.  The patient was instructed to call the office immediately if the following severe adverse effects occur:  hearing changes, easy bruising/bleeding, severe headache, or vision changes.  The patient verbalized understanding of the proper use and possible adverse effects of tetracycline.  All of the patient's questions and concerns were addressed. Patient understands to avoid pregnancy while on therapy due to potential birth defects. Thalidomide Counseling: I discussed with the patient the risks of thalidomide including but not limited to birth defects, anxiety, weakness, chest pain, dizziness, cough and severe allergy. Drysol Counseling:  I discussed with the patient the risks of drysol/aluminum chloride including but not limited to skin rash, itching, irritation, burning. Eucrisa Counseling: Patient may experience a mild burning sensation during topical application. Eucrisa is not approved in children less than 2 years of age. Arava Pregnancy And Lactation Text: This medication is Pregnancy Category X and is absolutely contraindicated during pregnancy. It is unknown if it is excreted in breast milk. Topical Retinoid counseling:  Patient advised to apply a pea-sized amount only at bedtime and wait 30 minutes after washing their face before applying.  If too drying, patient may add a non-comedogenic moisturizer. The patient verbalized understanding of the proper use and possible adverse effects of retinoids.  All of the patient's questions and concerns were addressed. Cephalexin Counseling: I counseled the patient regarding use of cephalexin as an antibiotic for prophylactic and/or therapeutic purposes. Cephalexin (commonly prescribed under brand name Keflex) is a cephalosporin antibiotic which is active against numerous classes of bacteria, including most skin bacteria. Side effects may include nausea, diarrhea, gastrointestinal upset, rash, hives, yeast infections, and in rare cases, hepatitis, kidney disease, seizures, fever, confusion, neurologic symptoms, and others. Patients with severe allergies to penicillin medications are cautioned that there is about a 10% incidence of cross-reactivity with cephalosporins. When possible, patients with penicillin allergies should use alternatives to cephalosporins for antibiotic therapy. Tremfya Pregnancy And Lactation Text: The risk during pregnancy and breastfeeding is uncertain with this medication. Tremfya Counseling: I discussed with the patient the risks of guselkumab including but not limited to immunosuppression, serious infections, worsening of inflammatory bowel disease and drug reactions.  The patient understands that monitoring is required including a PPD at baseline and must alert us or the primary physician if symptoms of infection or other concerning signs are noted. Clofazimine Counseling:  I discussed with the patient the risks of clofazimine including but not limited to skin and eye pigmentation, liver damage, nausea/vomiting, gastrointestinal bleeding and allergy. Topical Retinoid Pregnancy And Lactation Text: This medication is Pregnancy Category C. It is unknown if this medication is excreted in breast milk. Bactrim Pregnancy And Lactation Text: This medication is Pregnancy Category D and is known to cause fetal risk.  It is also excreted in breast milk. Prednisone Pregnancy And Lactation Text: This medication is Pregnancy Category C and it isn't know if it is safe during pregnancy. This medication is excreted in breast milk. Hydroxyzine Counseling: Patient advised that the medication is sedating and not to drive a car after taking this medication.  Patient informed of potential adverse effects including but not limited to dry mouth, urinary retention, and blurry vision.  The patient verbalized understanding of the proper use and possible adverse effects of hydroxyzine.  All of the patient's questions and concerns were addressed. Niacinamide Counseling: I recommended taking niacin or niacinamide, also know as vitamin B3, twice daily. Recent evidence suggests that taking vitamin B3 (500 mg twice daily) can reduce the risk of actinic keratoses and non-melanoma skin cancers. Side effects of vitamin B3 include flushing and headache. Humira Counseling:  I discussed with the patient the risks of adalimumab including but not limited to myelosuppression, immunosuppression, autoimmune hepatitis, demyelinating diseases, lymphoma, and serious infections.  The patient understands that monitoring is required including a PPD at baseline and must alert us or the primary physician if symptoms of infection or other concerning signs are noted. Hydroxyzine Pregnancy And Lactation Text: This medication is not safe during pregnancy and should not be taken. It is also excreted in breast milk and breast feeding isn't recommended. Niacinamide Pregnancy And Lactation Text: These medications are considered safe during pregnancy. Xeljanz Counseling: I discussed with the patient the risks of Xeljanz therapy including increased risk of infection, liver issues, headache, diarrhea, or cold symptoms. Live vaccines should be avoided. They were instructed to call if they have any problems. Cephalexin Pregnancy And Lactation Text: This medication is Pregnancy Category B and considered safe during pregnancy.  It is also excreted in breast milk but can be used safely for shorter doses. Acitretin Counseling:  I discussed with the patient the risks of acitretin including but not limited to hair loss, dry lips/skin/eyes, liver damage, hyperlipidemia, depression/suicidal ideation, photosensitivity.  Serious rare side effects can include but are not limited to pancreatitis, pseudotumor cerebri, bony changes, clot formation/stroke/heart attack.  Patient understands that alcohol is contraindicated since it can result in liver toxicity and significantly prolong the elimination of the drug by many years. Eucrisa Pregnancy And Lactation Text: This medication has not been assigned a Pregnancy Risk Category but animal studies failed to show danger with the topical medication. It is unknown if the medication is excreted in breast milk. Drysol Pregnancy And Lactation Text: This medication is considered safe during pregnancy and breast feeding. Humira Pregnancy And Lactation Text: This medication is Pregnancy Category B and is considered safe during pregnancy. It is unknown if this medication is excreted in breast milk. Clofazimine Pregnancy And Lactation Text: This medication is Pregnancy Category C and isn't considered safe during pregnancy. It is excreted in breast milk. Tetracycline Pregnancy And Lactation Text: This medication is Pregnancy Category D and not consider safe during pregnancy. It is also excreted in breast milk. Clindamycin Counseling: I counseled the patient regarding use of clindamycin as an antibiotic for prophylactic and/or therapeutic purposes. Clindamycin is active against numerous classes of bacteria, including skin bacteria. Side effects may include nausea, diarrhea, gastrointestinal upset, rash, hives, yeast infections, and in rare cases, colitis. Tranexamic Acid Counseling:  Patient advised of the small risk of bleeding problems with tranexamic acid. They were also instructed to call if they developed any nausea, vomiting or diarrhea. All of the patient's questions and concerns were addressed. Elidel Counseling: Patient may experience a mild burning sensation during topical application. Elidel is not approved in children less than 2 years of age. There have been case reports of hematologic and skin malignancies in patients using topical calcineurin inhibitors although causality is questionable. Acitretin Pregnancy And Lactation Text: This medication is Pregnancy Category X and should not be given to women who are pregnant or may become pregnant in the future. This medication is excreted in breast milk. Nsaids Counseling: NSAID Counseling: I discussed with the patient that NSAIDs should be taken with food. Prolonged use of NSAIDs can result in the development of stomach ulcers.  Patient advised to stop taking NSAIDs if abdominal pain occurs.  The patient verbalized understanding of the proper use and possible adverse effects of NSAIDs.  All of the patient's questions and concerns were addressed. Xeldeannaz Pregnancy And Lactation Text: This medication is Pregnancy Category D and is not considered safe during pregnancy.  The risk during breast feeding is also uncertain. Hydroquinone Counseling:  Patient advised that medication may result in skin irritation, lightening (hypopigmentation), dryness, and burning.  In the event of skin irritation, the patient was advised to reduce the amount of the drug applied or use it less frequently.  Rarely, spots that are treated with hydroquinone can become darker (pseudoochronosis).  Should this occur, patient instructed to stop medication and call the office. The patient verbalized understanding of the proper use and possible adverse effects of hydroquinone.  All of the patient's questions and concerns were addressed. Tazorac Counseling:  Patient advised that medication is irritating and drying.  Patient may need to apply sparingly and wash off after an hour before eventually leaving it on overnight.  The patient verbalized understanding of the proper use and possible adverse effects of tazorac.  All of the patient's questions and concerns were addressed. Tazorac Pregnancy And Lactation Text: This medication is not safe during pregnancy. It is unknown if this medication is excreted in breast milk. Ilumya Counseling: I discussed with the patient the risks of tildrakizumab including but not limited to immunosuppression, malignancy, posterior leukoencephalopathy syndrome, and serious infections.  The patient understands that monitoring is required including a PPD at baseline and must alert us or the primary physician if symptoms of infection or other concerning signs are noted. Colchicine Counseling:  Patient counseled regarding adverse effects including but not limited to stomach upset (nausea, vomiting, stomach pain, or diarrhea).  Patient instructed to limit alcohol consumption while taking this medication.  Colchicine may reduce blood counts especially with prolonged use.  The patient understands that monitoring of kidney function and blood counts may be required, especially at baseline. The patient verbalized understanding of the proper use and possible adverse effects of colchicine.  All of the patient's questions and concerns were addressed. Infliximab Counseling:  I discussed with the patient the risks of infliximab including but not limited to myelosuppression, immunosuppression, autoimmune hepatitis, demyelinating diseases, lymphoma, and serious infections.  The patient understands that monitoring is required including a PPD at baseline and must alert us or the primary physician if symptoms of infection or other concerning signs are noted. Bexarotene Counseling:  I discussed with the patient the risks of bexarotene including but not limited to hair loss, dry lips/skin/eyes, liver abnormalities, hyperlipidemia, pancreatitis, depression/suicidal ideation, photosensitivity, drug rash/allergic reactions, hypothyroidism, anemia, leukopenia, infection, cataracts, and teratogenicity.  Patient understands that they will need regular blood tests to check lipid profile, liver function tests, white blood cell count, thyroid function tests and pregnancy test if applicable. Tranexamic Acid Pregnancy And Lactation Text: It is unknown if this medication is safe during pregnancy or breast feeding. Xolair Counseling:  Patient informed of potential adverse effects including but not limited to fever, muscle aches, rash and allergic reactions.  The patient verbalized understanding of the proper use and possible adverse effects of Xolair.  All of the patient's questions and concerns were addressed. Nsaids Pregnancy And Lactation Text: These medications are considered safe up to 30 weeks gestation. It is excreted in breast milk. Fluconazole Pregnancy And Lactation Text: This medication is Pregnancy Category C and it isn't know if it is safe during pregnancy. It is also excreted in breast milk. Fluconazole Counseling:  Patient counseled regarding adverse effects of fluconazole including but not limited to headache, diarrhea, nausea, upset stomach, liver function test abnormalities, taste disturbance, and stomach pain.  There is a rare possibility of liver failure that can occur when taking fluconazole.  The patient understands that monitoring of LFTs and kidney function test may be required, especially at baseline. The patient verbalized understanding of the proper use and possible adverse effects of fluconazole.  All of the patient's questions and concerns were addressed. Albendazole Counseling:  I discussed with the patient the risks of albendazole including but not limited to cytopenia, kidney damage, nausea/vomiting and severe allergy.  The patient understands that this medication is being used in an off-label manner. Topical Clindamycin Counseling: Patient counseled that this medication may cause skin irritation or allergic reactions.  In the event of skin irritation, the patient was advised to reduce the amount of the drug applied or use it less frequently.   The patient verbalized understanding of the proper use and possible adverse effects of clindamycin.  All of the patient's questions and concerns were addressed. Clindamycin Pregnancy And Lactation Text: This medication can be used in pregnancy if certain situations. Clindamycin is also present in breast milk. Xolair Pregnancy And Lactation Text: This medication is Pregnancy Category B and is considered safe during pregnancy. This medication is excreted in breast milk. Odomzo Counseling- I discussed with the patient the risks of Odomzo including but not limited to nausea, vomiting, diarrhea, constipation, weight loss, changes in the sense of taste, decreased appetite, muscle spasms, and hair loss.  The patient verbalized understanding of the proper use and possible adverse effects of Odomzo.  All of the patient's questions and concerns were addressed. Dapsone Counseling: I discussed with the patient the risks of dapsone including but not limited to hemolytic anemia, agranulocytosis, rashes, methemoglobinemia, kidney failure, peripheral neuropathy, headaches, GI upset, and liver toxicity.  Patients who start dapsone require monitoring including baseline LFTs and weekly CBCs for the first month, then every month thereafter.  The patient verbalized understanding of the proper use and possible adverse effects of dapsone.  All of the patient's questions and concerns were addressed. Bexarotene Pregnancy And Lactation Text: This medication is Pregnancy Category X and should not be given to women who are pregnant or may become pregnant. This medication should not be used if you are breast feeding. Griseofulvin Counseling:  I discussed with the patient the risks of griseofulvin including but not limited to photosensitivity, cytopenia, liver damage, nausea/vomiting and severe allergy.  The patient understands that this medication is best absorbed when taken with a fatty meal (e.g., ice cream or french fries). Ivermectin Counseling:  Patient instructed to take medication on an empty stomach with a full glass of water.  Patient informed of potential adverse effects including but not limited to nausea, diarrhea, dizziness, itching, and swelling of the extremities or lymph nodes.  The patient verbalized understanding of the proper use and possible adverse effects of ivermectin.  All of the patient's questions and concerns were addressed. Valtrex Counseling: I discussed with the patient the risks of valacyclovir including but not limited to kidney damage, nausea, vomiting and severe allergy.  The patient understands that if the infection seems to be worsening or is not improving, they are to call. Imiquimod Counseling:  I discussed with the patient the risks of imiquimod including but not limited to erythema, scaling, itching, weeping, crusting, and pain.  Patient understands that the inflammatory response to imiquimod is variable from person to person and was educated regarded proper titration schedule.  If flu-like symptoms develop, patient knows to discontinue the medication and contact us. Albendazole Pregnancy And Lactation Text: This medication is Pregnancy Category C and it isn't known if it is safe during pregnancy. It is also excreted in breast milk. Valtrex Pregnancy And Lactation Text: this medication is Pregnancy Category B and is considered safe during pregnancy. This medication is not directly found in breast milk but it's metabolite acyclovir is present. Doxycycline Counseling:  Patient counseled regarding possible photosensitivity and increased risk for sunburn.  Patient instructed to avoid sunlight, if possible.  When exposed to sunlight, patients should wear protective clothing, sunglasses, and sunscreen.  The patient was instructed to call the office immediately if the following severe adverse effects occur:  hearing changes, easy bruising/bleeding, severe headache, or vision changes.  The patient verbalized understanding of the proper use and possible adverse effects of doxycycline.  All of the patient's questions and concerns were addressed. Topical Clindamycin Pregnancy And Lactation Text: This medication is Pregnancy Category B and is considered safe during pregnancy. It is unknown if it is excreted in breast milk. Topical Sulfur Applications Counseling: Topical Sulfur Counseling: Patient counseled that this medication may cause skin irritation or allergic reactions.  In the event of skin irritation, the patient was advised to reduce the amount of the drug applied or use it less frequently.   The patient verbalized understanding of the proper use and possible adverse effects of topical sulfur application.  All of the patient's questions and concerns were addressed. Isotretinoin Counseling: Patient should get monthly blood tests, not donate blood, not drive at night if vision affected, not share medication, and not undergo elective surgery for 6 months after tx completed. Side effects reviewed, pt to contact office should one occur. Erythromycin Counseling:  I discussed with the patient the risks of erythromycin including but not limited to GI upset, allergic reaction, drug rash, diarrhea, increase in liver enzymes, and yeast infections. Dapsone Pregnancy And Lactation Text: This medication is Pregnancy Category C and is not considered safe during pregnancy or breast feeding. Griseofulvin Pregnancy And Lactation Text: This medication is Pregnancy Category X and is known to cause serious birth defects. It is unknown if this medication is excreted in breast milk but breast feeding should be avoided. Doxycycline Pregnancy And Lactation Text: This medication is Pregnancy Category D and not consider safe during pregnancy. It is also excreted in breast milk but is considered safe for shorter treatment courses. Rituxan Counseling:  I discussed with the patient the risks of Rituxan infusions. Side effects can include infusion reactions, severe drug rashes including mucocutaneous reactions, reactivation of latent hepatitis and other infections and rarely progressive multifocal leukoencephalopathy.  All of the patient's questions and concerns were addressed. Use Enhanced Medication Counseling?: No Minoxidil Counseling: Minoxidil is a topical medication which can increase blood flow where it is applied. It is uncertain how this medication increases hair growth. Side effects are uncommon and include stinging and allergic reactions. Rituxan Pregnancy And Lactation Text: This medication is Pregnancy Category C and it isn't know if it is safe during pregnancy. It is unknown if this medication is excreted in breast milk but similar antibodies are known to be excreted. Erivedge Counseling- I discussed with the patient the risks of Erivedge including but not limited to nausea, vomiting, diarrhea, constipation, weight loss, changes in the sense of taste, decreased appetite, muscle spasms, and hair loss.  The patient verbalized understanding of the proper use and possible adverse effects of Erivedge.  All of the patient's questions and concerns were addressed. Itraconazole Counseling:  I discussed with the patient the risks of itraconazole including but not limited to liver damage, nausea/vomiting, neuropathy, and severe allergy.  The patient understands that this medication is best absorbed when taken with acidic beverages such as non-diet cola or ginger ale.  The patient understands that monitoring is required including baseline LFTs and repeat LFTs at intervals.  The patient understands that they are to contact us or the primary physician if concerning signs are noted. Topical Sulfur Applications Pregnancy And Lactation Text: This medication is Pregnancy Category C and has an unknown safety profile during pregnancy. It is unknown if this topical medication is excreted in breast milk. Otezla Counseling: The side effects of Otezla were discussed with the patient, including but not limited to worsening or new depression, weight loss, diarrhea, nausea, upper respiratory tract infection, and headache. Patient instructed to call the office should any adverse effect occur.  The patient verbalized understanding of the proper use and possible adverse effects of Otezla.  All the patient's questions and concerns were addressed. Otezla Pregnancy And Lactation Text: This medication is Pregnancy Category C and it isn't known if it is safe during pregnancy. It is unknown if it is excreted in breast milk. Isotretinoin Pregnancy And Lactation Text: This medication is Pregnancy Category X and is considered extremely dangerous during pregnancy. It is unknown if it is excreted in breast milk. Azathioprine Counseling:  I discussed with the patient the risks of azathioprine including but not limited to myelosuppression, immunosuppression, hepatotoxicity, lymphoma, and infections.  The patient understands that monitoring is required including baseline LFTs, Creatinine, possible TPMP genotyping and weekly CBCs for the first month and then every 2 weeks thereafter.  The patient verbalized understanding of the proper use and possible adverse effects of azathioprine.  All of the patient's questions and concerns were addressed. Mirvaso Counseling: Mirvaso is a topical medication which can decrease superficial blood flow where applied. Side effects are uncommon and include stinging, redness and allergic reactions. Detail Level: Simple High Dose Vitamin A Pregnancy And Lactation Text: High dose vitamin A therapy is contraindicated during pregnancy and breast feeding. Metronidazole Counseling:  I discussed with the patient the risks of metronidazole including but not limited to seizures, nausea/vomiting, a metallic taste in the mouth, nausea/vomiting and severe allergy. Cellcept Counseling:  I discussed with the patient the risks of mycophenolate mofetil including but not limited to infection/immunosuppression, GI upset, hypokalemia, hypercholesterolemia, bone marrow suppression, lymphoproliferative disorders, malignancy, GI ulceration/bleed/perforation, colitis, interstitial lung disease, kidney failure, progressive multifocal leukoencephalopathy, and birth defects.  The patient understands that monitoring is required including a baseline creatinine and regular CBC testing. In addition, patient must alert us immediately if symptoms of infection or other concerning signs are noted. Wartpeel Counseling:  I discussed with the patient the risks of Wartpeel including but not limited to erythema, scaling, itching, weeping, crusting, and pain. Azathioprine Pregnancy And Lactation Text: This medication is Pregnancy Category D and isn't considered safe during pregnancy. It is unknown if this medication is excreted in breast milk. Wartpeel Pregnancy And Lactation Text: This medication is Pregnancy Category X and contraindicated in pregnancy and in women who may become pregnant. It is unknown if this medication is excreted in breast milk. Finasteride Counseling:  I discussed with the patient the risks of use of finasteride including but not limited to decreased libido, decreased ejaculate volume, gynecomastia, and depression. Women should not handle medication.  All of the patient's questions and concerns were addressed. Erythromycin Pregnancy And Lactation Text: This medication is Pregnancy Category B and is considered safe during pregnancy. It is also excreted in breast milk. High Dose Vitamin A Counseling: Side effects reviewed, pt to contact office should one occur. Oxybutynin Counseling:  I discussed with the patient the risks of oxybutynin including but not limited to skin rash, drowsiness, dry mouth, difficulty urinating, and blurred vision. Siliq Counseling:  I discussed with the patient the risks of Siliq including but not limited to new or worsening depression, suicidal thoughts and behavior, immunosuppression, malignancy, posterior leukoencephalopathy syndrome, and serious infections.  The patient understands that monitoring is required including a PPD at baseline and must alert us or the primary physician if symptoms of infection or other concerning signs are noted. There is also a special program designed to monitor depression which is required with Siliq. Metronidazole Pregnancy And Lactation Text: This medication is Pregnancy Category B and considered safe during pregnancy.  It is also excreted in breast milk. Mirvaso Pregnancy And Lactation Text: This medication has not been assigned a Pregnancy Risk Category. It is unknown if the medication is excreted in breast milk. Finasteride Pregnancy And Lactation Text: This medication is absolutely contraindicated during pregnancy. It is unknown if it is excreted in breast milk. Ketoconazole Counseling:   Patient counseled regarding improving absorption with orange juice.  Adverse effects include but are not limited to breast enlargement, headache, diarrhea, nausea, upset stomach, liver function test abnormalities, taste disturbance, and stomach pain.  There is a rare possibility of liver failure that can occur when taking ketoconazole. The patient understands that monitoring of LFTs may be required, especially at baseline. The patient verbalized understanding of the proper use and possible adverse effects of ketoconazole.  All of the patient's questions and concerns were addressed. Minocycline Counseling: Patient advised regarding possible photosensitivity and discoloration of the teeth, skin, lips, tongue and gums.  Patient instructed to avoid sunlight, if possible.  When exposed to sunlight, patients should wear protective clothing, sunglasses, and sunscreen.  The patient was instructed to call the office immediately if the following severe adverse effects occur:  hearing changes, easy bruising/bleeding, severe headache, or vision changes.  The patient verbalized understanding of the proper use and possible adverse effects of minocycline.  All of the patient's questions and concerns were addressed. Propranolol Counseling:  I discussed with the patient the risks of propranolol including but not limited to low heart rate, low blood pressure, low blood sugar, restlessness and increased cold sensitivity. They should call the office if they experience any of these side effects. Cyclophosphamide Counseling:  I discussed with the patient the risks of cyclophosphamide including but not limited to hair loss, hormonal abnormalities, decreased fertility, abdominal pain, diarrhea, nausea and vomiting, bone marrow suppression and infection. The patient understands that monitoring is required while taking this medication. Picato Counseling:  I discussed with the patient the risks of Picato including but not limited to erythema, scaling, itching, weeping, crusting, and pain. Zyclara Counseling:  I discussed with the patient the risks of imiquimod including but not limited to erythema, scaling, itching, weeping, crusting, and pain.  Patient understands that the inflammatory response to imiquimod is variable from person to person and was educated regarded proper titration schedule.  If flu-like symptoms develop, patient knows to discontinue the medication and contact us. Simponi Counseling:  I discussed with the patient the risks of golimumab including but not limited to myelosuppression, immunosuppression, autoimmune hepatitis, demyelinating diseases, lymphoma, and serious infections.  The patient understands that monitoring is required including a PPD at baseline and must alert us or the primary physician if symptoms of infection or other concerning signs are noted. Ketoconazole Pregnancy And Lactation Text: This medication is Pregnancy Category C and it isn't know if it is safe during pregnancy. It is also excreted in breast milk and breast feeding isn't recommended. Cimzia Counseling:  I discussed with the patient the risks of Cimzia including but not limited to immunosuppression, allergic reactions and infections.  The patient understands that monitoring is required including a PPD at baseline and must alert us or the primary physician if symptoms of infection or other concerning signs are noted. Gabapentin Counseling: I discussed with the patient the risks of gabapentin including but not limited to dizziness, somnolence, fatigue and ataxia. Skyrizi Counseling: I discussed with the patient the risks of risankizumab-rzaa including but not limited to immunosuppression, and serious infections.  The patient understands that monitoring is required including a PPD at baseline and must alert us or the primary physician if symptoms of infection or other concerning signs are noted. Benzoyl Peroxide Pregnancy And Lactation Text: This medication is Pregnancy Category C. It is unknown if benzoyl peroxide is excreted in breast milk. Propranolol Pregnancy And Lactation Text: This medication is Pregnancy Category C and it isn't known if it is safe during pregnancy. It is excreted in breast milk. Cyclophosphamide Pregnancy And Lactation Text: This medication is Pregnancy Category D and it isn't considered safe during pregnancy. This medication is excreted in breast milk. Terbinafine Pregnancy And Lactation Text: This medication is Pregnancy Category B and is considered safe during pregnancy. It is also excreted in breast milk and breast feeding isn't recommended. Benzoyl Peroxide Counseling: Patient counseled that medicine may cause skin irritation and bleach clothing.  In the event of skin irritation, the patient was advised to reduce the amount of the drug applied or use it less frequently.   The patient verbalized understanding of the proper use and possible adverse effects of benzoyl peroxide.  All of the patient's questions and concerns were addressed. Terbinafine Counseling: Patient counseling regarding adverse effects of terbinafine including but not limited to headache, diarrhea, rash, upset stomach, liver function test abnormalities, itching, taste/smell disturbance, nausea, abdominal pain, and flatulence.  There is a rare possibility of liver failure that can occur when taking terbinafine.  The patient understands that a baseline LFT and kidney function test may be required. The patient verbalized understanding of the proper use and possible adverse effects of terbinafine.  All of the patient's questions and concerns were addressed. Cimzia Pregnancy And Lactation Text: This medication crosses the placenta but can be considered safe in certain situations. Cimzia may be excreted in breast milk. Cyclosporine Counseling:  I discussed with the patient the risks of cyclosporine including but not limited to hypertension, gingival hyperplasia,myelosuppression, immunosuppression, liver damage, kidney damage, neurotoxicity, lymphoma, and serious infections. The patient understands that monitoring is required including baseline blood pressure, CBC, CMP, lipid panel and uric acid, and then 1-2 times monthly CMP and blood pressure. Carac Counseling:  I discussed with the patient the risks of Carac including but not limited to erythema, scaling, itching, weeping, crusting, and pain. Birth Control Pills Counseling: Birth Control Pill Counseling: I discussed with the patient the potential side effects of OCPs including but not limited to increased risk of stroke, heart attack, thrombophlebitis, deep venous thrombosis, hepatic adenomas, breast changes, GI upset, headaches, and depression.  The patient verbalized understanding of the proper use and possible adverse effects of OCPs. All of the patient's questions and concerns were addressed. Glycopyrrolate Counseling:  I discussed with the patient the risks of glycopyrrolate including but not limited to skin rash, drowsiness, dry mouth, difficulty urinating, and blurred vision. Protopic Counseling: Patient may experience a mild burning sensation during topical application. Protopic is not approved in children less than 2 years of age. There have been case reports of hematologic and skin malignancies in patients using topical calcineurin inhibitors although causality is questionable. Protopic Pregnancy And Lactation Text: This medication is Pregnancy Category C. It is unknown if this medication is excreted in breast milk when applied topically. Cosentyx Counseling:  I discussed with the patient the risks of Cosentyx including but not limited to worsening of Crohn's disease, immunosuppression, allergic reactions and infections.  The patient understands that monitoring is required including a PPD at baseline and must alert us or the primary physician if symptoms of infection or other concerning signs are noted. Quinolones Counseling:  I discussed with the patient the risks of fluoroquinolones including but not limited to GI upset, allergic reaction, drug rash, diarrhea, dizziness, photosensitivity, yeast infections, liver function test abnormalities, tendonitis/tendon rupture. Opioid Counseling: I discussed with the patient the potential side effects of opioids including but not limited to addiction, altered mental status, and depression. I stressed avoiding alcohol, benzodiazepines, muscle relaxants and sleep aids unless specifically okayed by a physician. The patient verbalized understanding of the proper use and possible adverse effects of opioids. All of the patient's questions and concerns were addressed. They were instructed to flush the remaining pills down the toilet if they did not need them for pain. Opioid Pregnancy And Lactation Text: These medications can lead to premature delivery and should be avoided during pregnancy. These medications are also present in breast milk in small amounts. Dupixent Counseling: I discussed with the patient the risks of dupilumab including but not limited to eye infection and irritation, cold sores, injection site reactions, worsening of asthma, allergic reactions and increased risk of parasitic infection.  Live vaccines should be avoided while taking dupilumab. Dupilumab will also interact with certain medications such as warfarin and cyclosporine. The patient understands that monitoring is required and they must alert us or the primary physician if symptoms of infection or other concerning signs are noted. Birth Control Pills Pregnancy And Lactation Text: This medication should be avoided if pregnant and for the first 30 days post-partum. Stelara Counseling:  I discussed with the patient the risks of ustekinumab including but not limited to immunosuppression, malignancy, posterior leukoencephalopathy syndrome, and serious infections.  The patient understands that monitoring is required including a PPD at baseline and must alert us or the primary physician if symptoms of infection or other concerning signs are noted. Rifampin Counseling: I discussed with the patient the risks of rifampin including but not limited to liver damage, kidney damage, red-orange body fluids, nausea/vomiting and severe allergy. Glycopyrrolate Pregnancy And Lactation Text: This medication is Pregnancy Category B and is considered safe during pregnancy. It is unknown if it is excreted breast milk. Cimetidine Counseling:  I discussed with the patient the risks of Cimetidine including but not limited to gynecomastia, headache, diarrhea, nausea, drowsiness, arrhythmias, pancreatitis, skin rashes, psychosis, bone marrow suppression and kidney toxicity. Rhofade Counseling: Rhofade is a topical medication which can decrease superficial blood flow where applied. Side effects are uncommon and include stinging, redness and allergic reactions. Dupixent Pregnancy And Lactation Text: This medication likely crosses the placenta but the risk for the fetus is uncertain. This medication is excreted in breast milk. Hydroxychloroquine Counseling:  I discussed with the patient that a baseline ophthalmologic exam is needed at the start of therapy and every year thereafter while on therapy. A CBC may also be warranted for monitoring.  The side effects of this medication were discussed with the patient, including but not limited to agranulocytosis, aplastic anemia, seizures, rashes, retinopathy, and liver toxicity. Patient instructed to call the office should any adverse effect occur.  The patient verbalized understanding of the proper use and possible adverse effects of Plaquenil.  All the patient's questions and concerns were addressed. Spironolactone Counseling: Patient advised regarding risks of diarrhea, abdominal pain, hyperkalemia, birth defects (for female patients), liver toxicity and renal toxicity. The patient may need blood work to monitor liver and kidney function and potassium levels while on therapy. The patient verbalized understanding of the proper use and possible adverse effects of spironolactone.  All of the patient's questions and concerns were addressed. Spironolactone Pregnancy And Lactation Text: This medication can cause feminization of the male fetus and should be avoided during pregnancy. The active metabolite is also found in breast milk. Azithromycin Counseling:  I discussed with the patient the risks of azithromycin including but not limited to GI upset, allergic reaction, drug rash, diarrhea, and yeast infections. Calcipotriene Counseling:  I discussed with the patient the risks of calcipotriene including but not limited to erythema, scaling, itching, and irritation. Methotrexate Counseling:  Patient counseled regarding adverse effects of methotrexate including but not limited to nausea, vomiting, abnormalities in liver function tests. Patients may develop mouth sores, rash, diarrhea, and abnormalities in blood counts. The patient understands that monitoring is required including LFT's and blood counts.  There is a rare possibility of scarring of the liver and lung problems that can occur when taking methotrexate. Persistent nausea, loss of appetite, pale stools, dark urine, cough, and shortness of breath should be reported immediately. Patient advised to discontinue methotrexate treatment at least three months before attempting to become pregnant.  I discussed the need for folate supplements while taking methotrexate.  These supplements can decrease side effects during methotrexate treatment. The patient verbalized understanding of the proper use and possible adverse effects of methotrexate.  All of the patient's questions and concerns were addressed. Solaraze Counseling:  I discussed with the patient the risks of Solaraze including but not limited to erythema, scaling, itching, weeping, crusting, and pain. Doxepin Counseling:  Patient advised that the medication is sedating and not to drive a car after taking this medication. Patient informed of potential adverse effects including but not limited to dry mouth, urinary retention, and blurry vision.  The patient verbalized understanding of the proper use and possible adverse effects of doxepin.  All of the patient's questions and concerns were addressed. Hydroxychloroquine Pregnancy And Lactation Text: This medication has been shown to cause fetal harm but it isn't assigned a Pregnancy Risk Category. There are small amounts excreted in breast milk. Bactrim Counseling:  I discussed with the patient the risks of sulfa antibiotics including but not limited to GI upset, allergic reaction, drug rash, diarrhea, dizziness, photosensitivity, and yeast infections.  Rarely, more serious reactions can occur including but not limited to aplastic anemia, agranulocytosis, methemoglobinemia, blood dyscrasias, liver or kidney failure, lung infiltrates or desquamative/blistering drug rashes. Sarecycline Counseling: Patient advised regarding possible photosensitivity and discoloration of the teeth, skin, lips, tongue and gums.  Patient instructed to avoid sunlight, if possible.  When exposed to sunlight, patients should wear protective clothing, sunglasses, and sunscreen.  The patient was instructed to call the office immediately if the following severe adverse effects occur:  hearing changes, easy bruising/bleeding, severe headache, or vision changes.  The patient verbalized understanding of the proper use and possible adverse effects of sarecycline.  All of the patient's questions and concerns were addressed. Azithromycin Pregnancy And Lactation Text: This medication is considered safe during pregnancy and is also secreted in breast milk. Libtayo Counseling- I discussed with the patient the risks of Libtayo including but not limited to nausea, vomiting, diarrhea, and bone or muscle pain.  The patient verbalized understanding of the proper use and possible adverse effects of Libtayo.  All of the patient's questions and concerns were addressed. Methotrexate Pregnancy And Lactation Text: This medication is Pregnancy Category X and is known to cause fetal harm. This medication is excreted in breast milk. Arava Counseling:  Patient counseled regarding adverse effects of Arava including but not limited to nausea, vomiting, abnormalities in liver function tests. Patients may develop mouth sores, rash, diarrhea, and abnormalities in blood counts. The patient understands that monitoring is required including LFTs and blood counts.  There is a rare possibility of scarring of the liver and lung problems that can occur when taking methotrexate. Persistent nausea, loss of appetite, pale stools, dark urine, cough, and shortness of breath should be reported immediately. Patient advised to discontinue Arava treatment and consult with a physician prior to attempting conception. The patient will have to undergo a treatment to eliminate Arava from the body prior to conception. Rifampin Pregnancy And Lactation Text: This medication is Pregnancy Category C and it isn't know if it is safe during pregnancy. It is also excreted in breast milk and should not be used if you are breast feeding. Calcipotriene Pregnancy And Lactation Text: This medication has not been proven safe during pregnancy. It is unknown if this medication is excreted in breast milk. Enbrel Counseling:  I discussed with the patient the risks of etanercept including but not limited to myelosuppression, immunosuppression, autoimmune hepatitis, demyelinating diseases, lymphoma, and infections.  The patient understands that monitoring is required including a PPD at baseline and must alert us or the primary physician if symptoms of infection or other concerning signs are noted. SSKI Counseling:  I discussed with the patient the risks of SSKI including but not limited to thyroid abnormalities, metallic taste, GI upset, fever, headache, acne, arthralgias, paraesthesias, lymphadenopathy, easy bleeding, arrhythmias, and allergic reaction. Taltz Counseling: I discussed with the patient the risks of ixekizumab including but not limited to immunosuppression, serious infections, worsening of inflammatory bowel disease and drug reactions.  The patient understands that monitoring is required including a PPD at baseline and must alert us or the primary physician if symptoms of infection or other concerning signs are noted. Solaraze Pregnancy And Lactation Text: This medication is Pregnancy Category B and is considered safe. There is some data to suggest avoiding during the third trimester. It is unknown if this medication is excreted in breast milk. 5-Fu Counseling: 5-Fluorouracil Counseling:  I discussed with the patient the risks of 5-fluorouracil including but not limited to erythema, scaling, itching, weeping, crusting, and pain. Libtayo Pregnancy And Lactation Text: This medication is contraindicated in pregnancy and when breast feeding. Prednisone Counseling:  I discussed with the patient the risks of prolonged use of prednisone including but not limited to weight gain, insomnia, osteoporosis, mood changes, diabetes, susceptibility to infection, glaucoma and high blood pressure.  In cases where prednisone use is prolonged, patients should be monitored with blood pressure checks, serum glucose levels and an eye exam.  Additionally, the patient may need to be placed on GI prophylaxis, PCP prophylaxis, and calcium and vitamin D supplementation and/or a bisphosphonate.  The patient verbalized understanding of the proper use and the possible adverse effects of prednisone.  All of the patient's questions and concerns were addressed. Doxepin Pregnancy And Lactation Text: This medication is Pregnancy Category C and it isn't known if it is safe during pregnancy. It is also excreted in breast milk and breast feeding isn't recommended. Sski Pregnancy And Lactation Text: This medication is Pregnancy Category D and isn't considered safe during pregnancy. It is excreted in breast milk.

## 2022-06-15 NOTE — TELEPHONE ENCOUNTER
Patient can be followed by hospice doctor for comfort measures  They may feel free to reach out with any concerns  DC instructions

## 2025-02-13 NOTE — PROGRESS NOTES
Interval Note    Spoke with primary team physician Dr Paula Cartagena  Agreed with obtaining a CT scan to rule out intra-abdominal pathology of patient's persistent lactic acidosis  Patient requested no contrast (IV & PO)  She has made multiple comments to the primary team that she is" done with all of this" and "I a m  so tired"  Palliative Care was consulted and evaluated the patient  Patient and family are still deciding if they want to continue to disease-directed treatment therapy versus transitioning to comfort directed therapy  I followed up with the patient and her family to confirm that they would still like to proceed with the CT scan, and they agreed  Will continue to monitor end points  Time-based billing (NON-critical care) Time-based billing (NON-critical care) Time-based billing (NON-critical care) Time-based billing (NON-critical care) Time-based billing (NON-critical care) Time-based billing (NON-critical care) Time-based billing (NON-critical care) Time-based billing (NON-critical care)
